# Patient Record
Sex: FEMALE | Race: WHITE | NOT HISPANIC OR LATINO | Employment: OTHER | URBAN - NONMETROPOLITAN AREA
[De-identification: names, ages, dates, MRNs, and addresses within clinical notes are randomized per-mention and may not be internally consistent; named-entity substitution may affect disease eponyms.]

---

## 2017-02-23 ENCOUNTER — APPOINTMENT (OUTPATIENT)
Dept: LAB | Facility: MEDICAL CENTER | Age: 52
End: 2017-02-23
Payer: COMMERCIAL

## 2017-02-23 ENCOUNTER — TRANSCRIBE ORDERS (OUTPATIENT)
Dept: LAB | Facility: MEDICAL CENTER | Age: 52
End: 2017-02-23

## 2017-02-23 DIAGNOSIS — T78.3XXA GIANT URTICARIA, INITIAL ENCOUNTER: Primary | ICD-10-CM

## 2017-02-23 DIAGNOSIS — T78.3XXA GIANT URTICARIA, INITIAL ENCOUNTER: ICD-10-CM

## 2017-02-23 LAB
ALBUMIN SERPL BCP-MCNC: 3.1 G/DL (ref 3.5–5)
ALP SERPL-CCNC: 84 U/L (ref 46–116)
ALT SERPL W P-5'-P-CCNC: 23 U/L (ref 12–78)
ANION GAP SERPL CALCULATED.3IONS-SCNC: 6 MMOL/L (ref 4–13)
AST SERPL W P-5'-P-CCNC: 15 U/L (ref 5–45)
BASOPHILS # BLD AUTO: 0.09 THOUSANDS/ΜL (ref 0–0.1)
BASOPHILS NFR BLD AUTO: 1 % (ref 0–1)
BILIRUB SERPL-MCNC: 0.38 MG/DL (ref 0.2–1)
BUN SERPL-MCNC: 10 MG/DL (ref 5–25)
C4 SERPL-MCNC: 32 MG/DL (ref 10–40)
CALCIUM SERPL-MCNC: 8.9 MG/DL (ref 8.3–10.1)
CHLORIDE SERPL-SCNC: 108 MMOL/L (ref 100–108)
CO2 SERPL-SCNC: 28 MMOL/L (ref 21–32)
CREAT SERPL-MCNC: 1.04 MG/DL (ref 0.6–1.3)
EOSINOPHIL # BLD AUTO: 0.6 THOUSAND/ΜL (ref 0–0.61)
EOSINOPHIL NFR BLD AUTO: 8 % (ref 0–6)
ERYTHROCYTE [DISTWIDTH] IN BLOOD BY AUTOMATED COUNT: 13.6 % (ref 11.6–15.1)
GFR SERPL CREATININE-BSD FRML MDRD: 55.9 ML/MIN/1.73SQ M
GLUCOSE SERPL-MCNC: 83 MG/DL (ref 65–140)
HCT VFR BLD AUTO: 42.5 % (ref 34.8–46.1)
HGB BLD-MCNC: 13.6 G/DL (ref 11.5–15.4)
LYMPHOCYTES # BLD AUTO: 1.88 THOUSANDS/ΜL (ref 0.6–4.47)
LYMPHOCYTES NFR BLD AUTO: 26 % (ref 14–44)
MCH RBC QN AUTO: 30.4 PG (ref 26.8–34.3)
MCHC RBC AUTO-ENTMCNC: 32 G/DL (ref 31.4–37.4)
MCV RBC AUTO: 95 FL (ref 82–98)
MONOCYTES # BLD AUTO: 0.78 THOUSAND/ΜL (ref 0.17–1.22)
MONOCYTES NFR BLD AUTO: 11 % (ref 4–12)
NEUTROPHILS # BLD AUTO: 3.98 THOUSANDS/ΜL (ref 1.85–7.62)
NEUTS SEG NFR BLD AUTO: 54 % (ref 43–75)
NRBC BLD AUTO-RTO: 0 /100 WBCS
PLATELET # BLD AUTO: 347 THOUSANDS/UL (ref 149–390)
PMV BLD AUTO: 11.1 FL (ref 8.9–12.7)
POTASSIUM SERPL-SCNC: 3.8 MMOL/L (ref 3.5–5.3)
PROT SERPL-MCNC: 6.9 G/DL (ref 6.4–8.2)
RBC # BLD AUTO: 4.47 MILLION/UL (ref 3.81–5.12)
SODIUM SERPL-SCNC: 142 MMOL/L (ref 136–145)
WBC # BLD AUTO: 7.35 THOUSAND/UL (ref 4.31–10.16)

## 2017-02-23 PROCEDURE — 85025 COMPLETE CBC W/AUTO DIFF WBC: CPT

## 2017-02-23 PROCEDURE — 86162 COMPLEMENT TOTAL (CH50): CPT

## 2017-02-23 PROCEDURE — 36415 COLL VENOUS BLD VENIPUNCTURE: CPT

## 2017-02-23 PROCEDURE — 80053 COMPREHEN METABOLIC PANEL: CPT

## 2017-02-23 PROCEDURE — 86161 COMPLEMENT/FUNCTION ACTIVITY: CPT

## 2017-02-23 PROCEDURE — 86160 COMPLEMENT ANTIGEN: CPT

## 2017-02-24 LAB — CH50 SERPL-ACNC: 62 U/ML (ref 42–60)

## 2017-02-27 LAB
C1INH ACT/NOR SERPL: 89 %MEAN NORMAL
C1INH SERPL-MCNC: 33 MG/DL (ref 21–39)

## 2017-03-09 ENCOUNTER — TRANSCRIBE ORDERS (OUTPATIENT)
Dept: LAB | Facility: MEDICAL CENTER | Age: 52
End: 2017-03-09

## 2017-03-09 ENCOUNTER — APPOINTMENT (OUTPATIENT)
Dept: LAB | Facility: MEDICAL CENTER | Age: 52
End: 2017-03-09
Payer: COMMERCIAL

## 2017-03-09 DIAGNOSIS — T78.3XXA GIANT URTICARIA, INITIAL ENCOUNTER: Primary | ICD-10-CM

## 2017-03-09 DIAGNOSIS — T78.3XXA GIANT URTICARIA, INITIAL ENCOUNTER: ICD-10-CM

## 2017-03-09 PROCEDURE — 36415 COLL VENOUS BLD VENIPUNCTURE: CPT

## 2017-03-09 PROCEDURE — 86003 ALLG SPEC IGE CRUDE XTRC EA: CPT

## 2017-04-07 ENCOUNTER — HOSPITAL ENCOUNTER (EMERGENCY)
Facility: HOSPITAL | Age: 52
Discharge: HOME | End: 2017-04-08
Attending: EMERGENCY MEDICINE | Admitting: EMERGENCY MEDICINE
Payer: COMMERCIAL

## 2017-04-07 VITALS
HEIGHT: 62 IN | BODY MASS INDEX: 53.73 KG/M2 | TEMPERATURE: 97.6 F | RESPIRATION RATE: 20 BRPM | SYSTOLIC BLOOD PRESSURE: 140 MMHG | HEART RATE: 71 BPM | DIASTOLIC BLOOD PRESSURE: 93 MMHG | OXYGEN SATURATION: 95 % | WEIGHT: 292 LBS
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
## 2017-04-07 DIAGNOSIS — S30.861A TICK BITE OF ABDOMINAL WALL, INITIAL ENCOUNTER: Primary | ICD-10-CM

## 2017-04-07 RX ORDER — GINSENG 100 MG
1 CAPSULE ORAL ONCE
Status: COMPLETED | OUTPATIENT
Start: 2017-04-08 | End: 2017-04-07

## 2017-04-07 RX ORDER — GINSENG 100 MG
CAPSULE ORAL
Status: DISCONTINUED
Start: 2017-04-07 | End: 2017-04-08 | Stop reason: HOSPADM

## 2017-04-07 RX ADMIN — BACITRACIN 1 SMALL APPLICATION: 500 OINTMENT TOPICAL at 23:59

## 2017-04-08 PROBLEM — S30.861A TICK BITE OF ABDOMINAL WALL: Status: ACTIVE | Noted: 2017-04-08
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
## 2017-04-08 PROCEDURE — 99282 EMERGENCY DEPT VISIT SF MDM: CPT

## 2017-04-08 RX ORDER — DOXYCYCLINE HYCLATE 100 MG/1
100 CAPSULE ORAL 2 TIMES DAILY
Qty: 28 CAPSULE | Refills: 0 | Status: SHIPPED | OUTPATIENT
Start: 2017-04-08 | End: 2017-04-22

## 2017-05-18 ENCOUNTER — TRANSCRIBE ORDERS (OUTPATIENT)
Dept: ADMINISTRATIVE | Facility: HOSPITAL | Age: 52
End: 2017-05-18

## 2017-05-18 DIAGNOSIS — I80.9 SUPERFICIAL PHLEBITIS: Primary | ICD-10-CM

## 2017-05-19 ENCOUNTER — APPOINTMENT (OUTPATIENT)
Dept: LAB | Facility: HOSPITAL | Age: 52
End: 2017-05-19
Payer: COMMERCIAL

## 2017-05-19 ENCOUNTER — HOSPITAL ENCOUNTER (OUTPATIENT)
Dept: ULTRASOUND IMAGING | Facility: HOSPITAL | Age: 52
Discharge: HOME | End: 2017-05-19
Payer: COMMERCIAL

## 2017-05-19 DIAGNOSIS — I80.9 SUPERFICIAL PHLEBITIS: ICD-10-CM

## 2017-05-19 LAB — DEPRECATED D DIMER PPP: 2983 NG/ML (FEU) (ref 0–424)

## 2017-05-19 PROCEDURE — 36415 COLL VENOUS BLD VENIPUNCTURE: CPT

## 2017-05-19 PROCEDURE — 93971 EXTREMITY STUDY: CPT

## 2017-05-19 PROCEDURE — 85379 FIBRIN DEGRADATION QUANT: CPT

## 2017-05-24 ENCOUNTER — APPOINTMENT (EMERGENCY)
Dept: CT IMAGING | Facility: HOSPITAL | Age: 52
End: 2017-05-24
Payer: COMMERCIAL

## 2017-05-24 ENCOUNTER — HOSPITAL ENCOUNTER (EMERGENCY)
Facility: HOSPITAL | Age: 52
End: 2017-05-25
Attending: EMERGENCY MEDICINE
Payer: COMMERCIAL

## 2017-05-24 DIAGNOSIS — R77.8 TROPONIN LEVEL ELEVATED: ICD-10-CM

## 2017-05-24 DIAGNOSIS — R79.89 ELEVATED LACTIC ACID LEVEL: ICD-10-CM

## 2017-05-24 DIAGNOSIS — I26.99 PULMONARY EMBOLISM (HCC): Primary | ICD-10-CM

## 2017-05-24 LAB
ALBUMIN SERPL BCP-MCNC: 3.2 G/DL (ref 3.5–5)
ALP SERPL-CCNC: 98 U/L (ref 46–116)
ALT SERPL W P-5'-P-CCNC: 23 U/L (ref 12–78)
ANION GAP SERPL CALCULATED.3IONS-SCNC: 7 MMOL/L (ref 4–13)
AST SERPL W P-5'-P-CCNC: 19 U/L (ref 5–45)
BASOPHILS # BLD AUTO: 0.06 THOUSANDS/ΜL (ref 0–0.1)
BASOPHILS NFR BLD AUTO: 0 % (ref 0–1)
BILIRUB DIRECT SERPL-MCNC: 0.1 MG/DL (ref 0–0.2)
BILIRUB SERPL-MCNC: 0.5 MG/DL (ref 0.2–1)
BUN SERPL-MCNC: 15 MG/DL (ref 5–25)
CALCIUM SERPL-MCNC: 9.4 MG/DL (ref 8.3–10.1)
CHLORIDE SERPL-SCNC: 101 MMOL/L (ref 100–108)
CO2 SERPL-SCNC: 23 MMOL/L (ref 21–32)
CREAT SERPL-MCNC: 1.47 MG/DL (ref 0.6–1.3)
EOSINOPHIL # BLD AUTO: 0.05 THOUSAND/ΜL (ref 0–0.61)
EOSINOPHIL NFR BLD AUTO: 0 % (ref 0–6)
ERYTHROCYTE [DISTWIDTH] IN BLOOD BY AUTOMATED COUNT: 14 % (ref 11.6–15.1)
GFR SERPL CREATININE-BSD FRML MDRD: 37.3 ML/MIN/1.73SQ M
GLUCOSE SERPL-MCNC: 182 MG/DL (ref 65–140)
HCT VFR BLD AUTO: 48.2 % (ref 34.8–46.1)
HGB BLD-MCNC: 15.3 G/DL (ref 11.5–15.4)
INR PPP: 1.41 (ref 0.86–1.16)
LACTATE SERPL-SCNC: 3.8 MMOL/L (ref 0.5–2)
LIPASE SERPL-CCNC: 78 U/L (ref 73–393)
LYMPHOCYTES # BLD AUTO: 1.67 THOUSANDS/ΜL (ref 0.6–4.47)
LYMPHOCYTES NFR BLD AUTO: 12 % (ref 14–44)
MAGNESIUM SERPL-MCNC: 2.1 MG/DL (ref 1.6–2.6)
MCH RBC QN AUTO: 29.9 PG (ref 26.8–34.3)
MCHC RBC AUTO-ENTMCNC: 31.7 G/DL (ref 31.4–37.4)
MCV RBC AUTO: 94 FL (ref 82–98)
MONOCYTES # BLD AUTO: 0.7 THOUSAND/ΜL (ref 0.17–1.22)
MONOCYTES NFR BLD AUTO: 5 % (ref 4–12)
NEUTROPHILS # BLD AUTO: 11.38 THOUSANDS/ΜL (ref 1.85–7.62)
NEUTS SEG NFR BLD AUTO: 83 % (ref 43–75)
NT-PROBNP SERPL-MCNC: ABNORMAL PG/ML
PLATELET # BLD AUTO: 262 THOUSANDS/UL (ref 149–390)
PMV BLD AUTO: 10.9 FL (ref 8.9–12.7)
POTASSIUM SERPL-SCNC: 4.2 MMOL/L (ref 3.5–5.3)
PROT SERPL-MCNC: 7.4 G/DL (ref 6.4–8.2)
PROTHROMBIN TIME: 17.2 SECONDS (ref 12.1–14.4)
RBC # BLD AUTO: 5.12 MILLION/UL (ref 3.81–5.12)
SODIUM SERPL-SCNC: 131 MMOL/L (ref 136–145)
TROPONIN I SERPL-MCNC: 0.35 NG/ML
WBC # BLD AUTO: 13.86 THOUSAND/UL (ref 4.31–10.16)

## 2017-05-24 PROCEDURE — 85730 THROMBOPLASTIN TIME PARTIAL: CPT | Performed by: EMERGENCY MEDICINE

## 2017-05-24 PROCEDURE — 93005 ELECTROCARDIOGRAM TRACING: CPT | Performed by: EMERGENCY MEDICINE

## 2017-05-24 PROCEDURE — 71275 CT ANGIOGRAPHY CHEST: CPT

## 2017-05-24 PROCEDURE — 96365 THER/PROPH/DIAG IV INF INIT: CPT

## 2017-05-24 PROCEDURE — 83735 ASSAY OF MAGNESIUM: CPT | Performed by: EMERGENCY MEDICINE

## 2017-05-24 PROCEDURE — 85025 COMPLETE CBC W/AUTO DIFF WBC: CPT | Performed by: EMERGENCY MEDICINE

## 2017-05-24 PROCEDURE — 96375 TX/PRO/DX INJ NEW DRUG ADDON: CPT

## 2017-05-24 PROCEDURE — 36415 COLL VENOUS BLD VENIPUNCTURE: CPT | Performed by: EMERGENCY MEDICINE

## 2017-05-24 PROCEDURE — 85610 PROTHROMBIN TIME: CPT | Performed by: EMERGENCY MEDICINE

## 2017-05-24 PROCEDURE — 80076 HEPATIC FUNCTION PANEL: CPT | Performed by: EMERGENCY MEDICINE

## 2017-05-24 PROCEDURE — 70450 CT HEAD/BRAIN W/O DYE: CPT

## 2017-05-24 PROCEDURE — 74177 CT ABD & PELVIS W/CONTRAST: CPT

## 2017-05-24 PROCEDURE — 80048 BASIC METABOLIC PNL TOTAL CA: CPT | Performed by: EMERGENCY MEDICINE

## 2017-05-24 PROCEDURE — 83690 ASSAY OF LIPASE: CPT | Performed by: EMERGENCY MEDICINE

## 2017-05-24 PROCEDURE — 83880 ASSAY OF NATRIURETIC PEPTIDE: CPT | Performed by: EMERGENCY MEDICINE

## 2017-05-24 PROCEDURE — 84484 ASSAY OF TROPONIN QUANT: CPT | Performed by: EMERGENCY MEDICINE

## 2017-05-24 PROCEDURE — 96361 HYDRATE IV INFUSION ADD-ON: CPT

## 2017-05-24 PROCEDURE — 83605 ASSAY OF LACTIC ACID: CPT | Performed by: EMERGENCY MEDICINE

## 2017-05-24 RX ORDER — FENTANYL CITRATE 50 UG/ML
50 INJECTION, SOLUTION INTRAMUSCULAR; INTRAVENOUS ONCE
Status: COMPLETED | OUTPATIENT
Start: 2017-05-24 | End: 2017-05-24

## 2017-05-24 RX ORDER — HEPARIN SODIUM 10000 [USP'U]/100ML
3-30 INJECTION, SOLUTION INTRAVENOUS
Status: DISCONTINUED | OUTPATIENT
Start: 2017-05-24 | End: 2017-05-25 | Stop reason: HOSPADM

## 2017-05-24 RX ORDER — HEPARIN SODIUM 1000 [USP'U]/ML
5000 INJECTION, SOLUTION INTRAVENOUS; SUBCUTANEOUS AS NEEDED
Status: DISCONTINUED | OUTPATIENT
Start: 2017-05-24 | End: 2017-05-25 | Stop reason: HOSPADM

## 2017-05-24 RX ORDER — HEPARIN SODIUM 1000 [USP'U]/ML
10000 INJECTION, SOLUTION INTRAVENOUS; SUBCUTANEOUS ONCE
Status: COMPLETED | OUTPATIENT
Start: 2017-05-24 | End: 2017-05-24

## 2017-05-24 RX ORDER — CEFEPIME HYDROCHLORIDE 2 G/1
2000 INJECTION, POWDER, FOR SOLUTION INTRAVENOUS ONCE
Status: DISCONTINUED | OUTPATIENT
Start: 2017-05-24 | End: 2017-05-24

## 2017-05-24 RX ORDER — ASPIRIN 81 MG/1
324 TABLET, CHEWABLE ORAL ONCE
Status: COMPLETED | OUTPATIENT
Start: 2017-05-24 | End: 2017-05-24

## 2017-05-24 RX ORDER — HEPARIN SODIUM 1000 [USP'U]/ML
10000 INJECTION, SOLUTION INTRAVENOUS; SUBCUTANEOUS AS NEEDED
Status: DISCONTINUED | OUTPATIENT
Start: 2017-05-24 | End: 2017-05-25 | Stop reason: HOSPADM

## 2017-05-24 RX ADMIN — FENTANYL CITRATE 50 MCG: 50 INJECTION INTRAMUSCULAR; INTRAVENOUS at 21:45

## 2017-05-24 RX ADMIN — HEPARIN SODIUM 10000 UNITS: 1000 INJECTION, SOLUTION INTRAVENOUS; SUBCUTANEOUS at 23:02

## 2017-05-24 RX ADMIN — ASPIRIN 81 MG 324 MG: 81 TABLET ORAL at 21:43

## 2017-05-24 RX ADMIN — IODIXANOL 80 ML: 320 INJECTION, SOLUTION INTRAVASCULAR at 22:45

## 2017-05-24 RX ADMIN — SODIUM CHLORIDE 1000 ML: 0.9 INJECTION, SOLUTION INTRAVENOUS at 21:48

## 2017-05-24 RX ADMIN — HEPARIN SODIUM AND DEXTROSE 18 UNITS/KG/HR: 10000; 5 INJECTION INTRAVENOUS at 23:25

## 2017-05-25 ENCOUNTER — HOSPITAL ENCOUNTER (INPATIENT)
Facility: HOSPITAL | Age: 52
LOS: 4 days | Discharge: HOME | DRG: 951 | End: 2017-05-29
Attending: INTERNAL MEDICINE | Admitting: INTERNAL MEDICINE
Payer: COMMERCIAL

## 2017-05-25 ENCOUNTER — GENERIC CONVERSION - ENCOUNTER (OUTPATIENT)
Dept: OTHER | Facility: OTHER | Age: 52
End: 2017-05-25

## 2017-05-25 ENCOUNTER — APPOINTMENT (INPATIENT)
Dept: NON INVASIVE DIAGNOSTICS | Facility: HOSPITAL | Age: 52
DRG: 951 | End: 2017-05-25
Payer: COMMERCIAL

## 2017-05-25 ENCOUNTER — ANESTHESIA EVENT (INPATIENT)
Dept: RADIOLOGY | Facility: HOSPITAL | Age: 52
DRG: 951 | End: 2017-05-25
Payer: COMMERCIAL

## 2017-05-25 ENCOUNTER — APPOINTMENT (INPATIENT)
Dept: RADIOLOGY | Facility: HOSPITAL | Age: 52
DRG: 951 | End: 2017-05-25
Attending: INTERNAL MEDICINE
Payer: COMMERCIAL

## 2017-05-25 ENCOUNTER — APPOINTMENT (INPATIENT)
Dept: RADIOLOGY | Facility: HOSPITAL | Age: 52
DRG: 951 | End: 2017-05-25
Payer: COMMERCIAL

## 2017-05-25 ENCOUNTER — ANESTHESIA (INPATIENT)
Dept: RADIOLOGY | Facility: HOSPITAL | Age: 52
DRG: 951 | End: 2017-05-25
Payer: COMMERCIAL

## 2017-05-25 VITALS
OXYGEN SATURATION: 94 % | RESPIRATION RATE: 18 BRPM | DIASTOLIC BLOOD PRESSURE: 88 MMHG | HEART RATE: 114 BPM | TEMPERATURE: 97.6 F | BODY MASS INDEX: 52.77 KG/M2 | SYSTOLIC BLOOD PRESSURE: 143 MMHG | WEIGHT: 288.5 LBS

## 2017-05-25 DIAGNOSIS — N17.9 AKI (ACUTE KIDNEY INJURY) (HCC): ICD-10-CM

## 2017-05-25 DIAGNOSIS — I26.99 PULMONARY EMBOLISM AND INFARCTION (HCC): ICD-10-CM

## 2017-05-25 DIAGNOSIS — I26.92 ACUTE SADDLE PULMONARY EMBOLISM (HCC): Primary | ICD-10-CM

## 2017-05-25 PROBLEM — I95.9 HYPOTENSION: Status: ACTIVE | Noted: 2017-05-25

## 2017-05-25 PROBLEM — E87.2 LACTIC ACIDOSIS: Status: ACTIVE | Noted: 2017-05-25

## 2017-05-25 PROBLEM — R55 SYNCOPE: Status: ACTIVE | Noted: 2017-05-25

## 2017-05-25 PROBLEM — E87.2 LACTIC ACIDOSIS: Status: RESOLVED | Noted: 2017-05-25 | Resolved: 2017-05-25

## 2017-05-25 PROBLEM — S30.861A TICK BITE OF ABDOMINAL WALL: Status: RESOLVED | Noted: 2017-04-08 | Resolved: 2017-05-25

## 2017-05-25 PROBLEM — E87.1 HYPONATREMIA: Status: RESOLVED | Noted: 2017-05-25 | Resolved: 2017-05-25

## 2017-05-25 PROBLEM — R79.89 ELEVATED TROPONIN: Status: ACTIVE | Noted: 2017-05-25
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
## 2017-05-25 PROBLEM — K21.9 GERD (GASTROESOPHAGEAL REFLUX DISEASE): Status: ACTIVE | Noted: 2017-05-25

## 2017-05-25 PROBLEM — E87.20 LACTIC ACIDOSIS: Status: RESOLVED | Noted: 2017-05-25 | Resolved: 2017-05-25

## 2017-05-25 PROBLEM — R77.8 ELEVATED TROPONIN: Status: ACTIVE | Noted: 2017-05-25

## 2017-05-25 PROBLEM — E87.1 HYPONATREMIA: Status: ACTIVE | Noted: 2017-05-25

## 2017-05-25 PROBLEM — I21.4 NSTEMI (NON-ST ELEVATED MYOCARDIAL INFARCTION) (HCC): Status: ACTIVE | Noted: 2017-05-25

## 2017-05-25 PROBLEM — E87.20 LACTIC ACIDOSIS: Status: ACTIVE | Noted: 2017-05-25

## 2017-05-25 LAB
ANION GAP SERPL CALCULATED.3IONS-SCNC: 9 MMOL/L (ref 4–13)
APTT PPP: 45 SECONDS (ref 23–35)
APTT PPP: 65 SECONDS (ref 23–35)
APTT PPP: >210 SECONDS (ref 23–35)
APTT PPP: >210 SECONDS (ref 23–35)
ATRIAL RATE: 115 BPM
BUN SERPL-MCNC: 12 MG/DL (ref 5–25)
CA-I BLD-SCNC: 1.11 MMOL/L (ref 1.12–1.32)
CALCIUM SERPL-MCNC: 8.3 MG/DL (ref 8.3–10.1)
CHLORIDE SERPL-SCNC: 111 MMOL/L (ref 100–108)
CO2 SERPL-SCNC: 22 MMOL/L (ref 21–32)
CREAT SERPL-MCNC: 1.09 MG/DL (ref 0.6–1.3)
ERYTHROCYTE [DISTWIDTH] IN BLOOD BY AUTOMATED COUNT: 14 % (ref 11.6–15.1)
ERYTHROCYTE [DISTWIDTH] IN BLOOD BY AUTOMATED COUNT: 14.1 % (ref 11.6–15.1)
ERYTHROCYTE [DISTWIDTH] IN BLOOD BY AUTOMATED COUNT: 14.1 % (ref 11.6–15.1)
FIBRINOGEN PPP-MCNC: 303 MG/DL (ref 227–495)
GFR SERPL CREATININE-BSD FRML MDRD: 52.7 ML/MIN/1.73SQ M
GLUCOSE SERPL-MCNC: 124 MG/DL (ref 65–140)
HCT VFR BLD AUTO: 41.2 % (ref 34.8–46.1)
HCT VFR BLD AUTO: 42.3 % (ref 34.8–46.1)
HCT VFR BLD AUTO: 44.3 % (ref 34.8–46.1)
HGB BLD-MCNC: 13.5 G/DL (ref 11.5–15.4)
HGB BLD-MCNC: 13.6 G/DL (ref 11.5–15.4)
HGB BLD-MCNC: 14.4 G/DL (ref 11.5–15.4)
INR PPP: 1.5 (ref 0.86–1.16)
INR PPP: 1.56 (ref 0.86–1.16)
LACTATE SERPL-SCNC: 1.5 MMOL/L (ref 0.5–2)
LACTATE SERPL-SCNC: 2.8 MMOL/L (ref 0.5–2)
LACTATE SERPL-SCNC: 3.2 MMOL/L (ref 0.5–2)
MAGNESIUM SERPL-MCNC: 2.1 MG/DL (ref 1.6–2.6)
MCH RBC QN AUTO: 29.9 PG (ref 26.8–34.3)
MCH RBC QN AUTO: 30.2 PG (ref 26.8–34.3)
MCH RBC QN AUTO: 30.6 PG (ref 26.8–34.3)
MCHC RBC AUTO-ENTMCNC: 32.2 G/DL (ref 31.4–37.4)
MCHC RBC AUTO-ENTMCNC: 32.5 G/DL (ref 31.4–37.4)
MCHC RBC AUTO-ENTMCNC: 32.8 G/DL (ref 31.4–37.4)
MCV RBC AUTO: 92 FL (ref 82–98)
MCV RBC AUTO: 93 FL (ref 82–98)
MCV RBC AUTO: 94 FL (ref 82–98)
P AXIS: 64 DEGREES
PHOSPHATE SERPL-MCNC: 2.2 MG/DL (ref 2.7–4.5)
PLATELET # BLD AUTO: 183 THOUSANDS/UL (ref 149–390)
PLATELET # BLD AUTO: 197 THOUSANDS/UL (ref 149–390)
PLATELET # BLD AUTO: 268 THOUSANDS/UL (ref 149–390)
PMV BLD AUTO: 10.9 FL (ref 8.9–12.7)
PMV BLD AUTO: 11 FL (ref 8.9–12.7)
PMV BLD AUTO: 11 FL (ref 8.9–12.7)
POTASSIUM SERPL-SCNC: 4.2 MMOL/L (ref 3.5–5.3)
PR INTERVAL: 154 MS
PROTHROMBIN TIME: 18.2 SECONDS (ref 12.1–14.4)
PROTHROMBIN TIME: 18.8 SECONDS (ref 12.1–14.4)
QRS AXIS: 193 DEGREES
QRSD INTERVAL: 92 MS
QT INTERVAL: 344 MS
QTC INTERVAL: 475 MS
RBC # BLD AUTO: 4.47 MILLION/UL (ref 3.81–5.12)
RBC # BLD AUTO: 4.55 MILLION/UL (ref 3.81–5.12)
RBC # BLD AUTO: 4.71 MILLION/UL (ref 3.81–5.12)
SODIUM SERPL-SCNC: 142 MMOL/L (ref 136–145)
T WAVE AXIS: 3 DEGREES
TROPONIN I SERPL-MCNC: 0.5 NG/ML
TROPONIN I SERPL-MCNC: 1.05 NG/ML
TROPONIN I SERPL-MCNC: 1.19 NG/ML
TROPONIN I SERPL-MCNC: 1.72 NG/ML
VENTRICULAR RATE: 115 BPM
WBC # BLD AUTO: 12.96 THOUSAND/UL (ref 4.31–10.16)
WBC # BLD AUTO: 14.24 THOUSAND/UL (ref 4.31–10.16)
WBC # BLD AUTO: 15.37 THOUSAND/UL (ref 4.31–10.16)

## 2017-05-25 PROCEDURE — C1894 INTRO/SHEATH, NON-LASER: HCPCS

## 2017-05-25 PROCEDURE — 99153 MOD SED SAME PHYS/QHP EA: CPT

## 2017-05-25 PROCEDURE — 93308 TTE F-UP OR LMTD: CPT

## 2017-05-25 PROCEDURE — B5191ZZ FLUOROSCOPY OF INFERIOR VENA CAVA USING LOW OSMOLAR CONTRAST: ICD-10-PCS | Performed by: RADIOLOGY

## 2017-05-25 PROCEDURE — 85027 COMPLETE CBC AUTOMATED: CPT | Performed by: RADIOLOGY

## 2017-05-25 PROCEDURE — C1880 VENA CAVA FILTER: HCPCS

## 2017-05-25 PROCEDURE — 3E04317 INTRODUCTION OF OTHER THROMBOLYTIC INTO CENTRAL VEIN, PERCUTANEOUS APPROACH: ICD-10-PCS | Performed by: RADIOLOGY

## 2017-05-25 PROCEDURE — C1751 CATH, INF, PER/CENT/MIDLINE: HCPCS

## 2017-05-25 PROCEDURE — 76937 US GUIDE VASCULAR ACCESS: CPT

## 2017-05-25 PROCEDURE — 83605 ASSAY OF LACTIC ACID: CPT | Performed by: NURSE PRACTITIONER

## 2017-05-25 PROCEDURE — 99152 MOD SED SAME PHYS/QHP 5/>YRS: CPT

## 2017-05-25 PROCEDURE — 85730 THROMBOPLASTIN TIME PARTIAL: CPT | Performed by: RADIOLOGY

## 2017-05-25 PROCEDURE — 94760 N-INVAS EAR/PLS OXIMETRY 1: CPT

## 2017-05-25 PROCEDURE — 80048 BASIC METABOLIC PNL TOTAL CA: CPT | Performed by: NURSE PRACTITIONER

## 2017-05-25 PROCEDURE — 85730 THROMBOPLASTIN TIME PARTIAL: CPT | Performed by: NURSE PRACTITIONER

## 2017-05-25 PROCEDURE — 06H03DZ INSERTION OF INTRALUMINAL DEVICE INTO INFERIOR VENA CAVA, PERCUTANEOUS APPROACH: ICD-10-PCS | Performed by: RADIOLOGY

## 2017-05-25 PROCEDURE — 83735 ASSAY OF MAGNESIUM: CPT | Performed by: NURSE PRACTITIONER

## 2017-05-25 PROCEDURE — C1769 GUIDE WIRE: HCPCS

## 2017-05-25 PROCEDURE — 75743 ARTERY X-RAYS LUNGS: CPT

## 2017-05-25 PROCEDURE — 37191 INS ENDOVAS VENA CAVA FILTR: CPT

## 2017-05-25 PROCEDURE — 99285 EMERGENCY DEPT VISIT HI MDM: CPT

## 2017-05-25 PROCEDURE — 85610 PROTHROMBIN TIME: CPT | Performed by: NURSE PRACTITIONER

## 2017-05-25 PROCEDURE — 36015 PLACE CATHETER IN ARTERY: CPT

## 2017-05-25 PROCEDURE — 94640 AIRWAY INHALATION TREATMENT: CPT

## 2017-05-25 PROCEDURE — 03HY32Z INSERTION OF MONITORING DEVICE INTO UPPER ARTERY, PERCUTANEOUS APPROACH: ICD-10-PCS | Performed by: HOSPITALIST

## 2017-05-25 PROCEDURE — 85347 COAGULATION TIME ACTIVATED: CPT

## 2017-05-25 PROCEDURE — 82330 ASSAY OF CALCIUM: CPT | Performed by: NURSE PRACTITIONER

## 2017-05-25 PROCEDURE — 93005 ELECTROCARDIOGRAM TRACING: CPT | Performed by: NURSE PRACTITIONER

## 2017-05-25 PROCEDURE — 85384 FIBRINOGEN ACTIVITY: CPT | Performed by: RADIOLOGY

## 2017-05-25 PROCEDURE — 85027 COMPLETE CBC AUTOMATED: CPT | Performed by: NURSE PRACTITIONER

## 2017-05-25 PROCEDURE — 84484 ASSAY OF TROPONIN QUANT: CPT | Performed by: INTERNAL MEDICINE

## 2017-05-25 PROCEDURE — 84100 ASSAY OF PHOSPHORUS: CPT | Performed by: NURSE PRACTITIONER

## 2017-05-25 PROCEDURE — B31TZZZ FLUOROSCOPY OF LEFT PULMONARY ARTERY: ICD-10-PCS | Performed by: RADIOLOGY

## 2017-05-25 PROCEDURE — 37211 THROMBOLYTIC ART THERAPY: CPT

## 2017-05-25 PROCEDURE — 84484 ASSAY OF TROPONIN QUANT: CPT | Performed by: NURSE PRACTITIONER

## 2017-05-25 PROCEDURE — 85610 PROTHROMBIN TIME: CPT | Performed by: RADIOLOGY

## 2017-05-25 PROCEDURE — 93970 EXTREMITY STUDY: CPT

## 2017-05-25 RX ORDER — FENTANYL CITRATE 50 UG/ML
25 INJECTION, SOLUTION INTRAMUSCULAR; INTRAVENOUS EVERY 2 HOUR PRN
Status: DISCONTINUED | OUTPATIENT
Start: 2017-05-25 | End: 2017-05-29 | Stop reason: HOSPADM

## 2017-05-25 RX ORDER — HEPARIN SODIUM 1000 [USP'U]/ML
10000 INJECTION, SOLUTION INTRAVENOUS; SUBCUTANEOUS ONCE
Status: COMPLETED | OUTPATIENT
Start: 2017-05-25 | End: 2017-05-25

## 2017-05-25 RX ORDER — FENTANYL CITRATE 50 UG/ML
INJECTION, SOLUTION INTRAMUSCULAR; INTRAVENOUS
Status: COMPLETED
Start: 2017-05-25 | End: 2017-05-25

## 2017-05-25 RX ORDER — FENTANYL CITRATE 50 UG/ML
INJECTION, SOLUTION INTRAMUSCULAR; INTRAVENOUS CODE/TRAUMA/SEDATION MEDICATION
Status: COMPLETED | OUTPATIENT
Start: 2017-05-25 | End: 2017-05-25

## 2017-05-25 RX ORDER — SODIUM CHLORIDE 9 MG/ML
100 INJECTION, SOLUTION INTRAVENOUS CONTINUOUS
Status: DISCONTINUED | OUTPATIENT
Start: 2017-05-25 | End: 2017-05-26

## 2017-05-25 RX ORDER — HEPARIN SODIUM 10000 [USP'U]/100ML
500 INJECTION, SOLUTION INTRAVENOUS CONTINUOUS
Status: DISCONTINUED | OUTPATIENT
Start: 2017-05-25 | End: 2017-05-25

## 2017-05-25 RX ORDER — SODIUM CHLORIDE FOR INHALATION 0.9 %
3 VIAL, NEBULIZER (ML) INHALATION
Status: DISCONTINUED | OUTPATIENT
Start: 2017-05-25 | End: 2017-05-28

## 2017-05-25 RX ORDER — HEPARIN SODIUM 10000 [USP'U]/100ML
3-30 INJECTION, SOLUTION INTRAVENOUS
Status: DISCONTINUED | OUTPATIENT
Start: 2017-05-25 | End: 2017-05-26

## 2017-05-25 RX ORDER — PANTOPRAZOLE SODIUM 40 MG/1
40 TABLET, DELAYED RELEASE ORAL
Status: DISCONTINUED | OUTPATIENT
Start: 2017-05-25 | End: 2017-05-29 | Stop reason: HOSPADM

## 2017-05-25 RX ORDER — ALBUTEROL SULFATE 90 UG/1
2 AEROSOL, METERED RESPIRATORY (INHALATION) EVERY 4 HOURS PRN
Status: DISCONTINUED | OUTPATIENT
Start: 2017-05-25 | End: 2017-05-25

## 2017-05-25 RX ORDER — HEPARIN SODIUM 1000 [USP'U]/ML
5000 INJECTION, SOLUTION INTRAVENOUS; SUBCUTANEOUS AS NEEDED
Status: DISCONTINUED | OUTPATIENT
Start: 2017-05-25 | End: 2017-05-26

## 2017-05-25 RX ORDER — ONDANSETRON 2 MG/ML
4 INJECTION INTRAMUSCULAR; INTRAVENOUS EVERY 6 HOURS PRN
Status: DISCONTINUED | OUTPATIENT
Start: 2017-05-25 | End: 2017-05-29 | Stop reason: HOSPADM

## 2017-05-25 RX ORDER — METHYLPREDNISOLONE SODIUM SUCCINATE 40 MG/ML
40 INJECTION, POWDER, LYOPHILIZED, FOR SOLUTION INTRAMUSCULAR; INTRAVENOUS EVERY 12 HOURS SCHEDULED
Status: DISCONTINUED | OUTPATIENT
Start: 2017-05-25 | End: 2017-05-26

## 2017-05-25 RX ORDER — LEVALBUTEROL 1.25 MG/.5ML
SOLUTION, CONCENTRATE RESPIRATORY (INHALATION)
Status: DISPENSED
Start: 2017-05-25 | End: 2017-05-25

## 2017-05-25 RX ORDER — MIDAZOLAM HYDROCHLORIDE 1 MG/ML
INJECTION INTRAMUSCULAR; INTRAVENOUS CODE/TRAUMA/SEDATION MEDICATION
Status: COMPLETED | OUTPATIENT
Start: 2017-05-25 | End: 2017-05-25

## 2017-05-25 RX ORDER — LIDOCAINE HYDROCHLORIDE AND EPINEPHRINE 10; 10 MG/ML; UG/ML
5 INJECTION, SOLUTION INFILTRATION; PERINEURAL ONCE
Status: DISCONTINUED | OUTPATIENT
Start: 2017-05-25 | End: 2017-05-25

## 2017-05-25 RX ORDER — MIDAZOLAM HYDROCHLORIDE 1 MG/ML
INJECTION INTRAMUSCULAR; INTRAVENOUS AS NEEDED
Status: DISCONTINUED | OUTPATIENT
Start: 2017-05-25 | End: 2017-05-25 | Stop reason: SURG

## 2017-05-25 RX ORDER — LIDOCAINE HYDROCHLORIDE 10 MG/ML
5 INJECTION, SOLUTION EPIDURAL; INFILTRATION; INTRACAUDAL; PERINEURAL ONCE
Status: COMPLETED | OUTPATIENT
Start: 2017-05-25 | End: 2017-05-25

## 2017-05-25 RX ORDER — LEVALBUTEROL 1.25 MG/.5ML
1.25 SOLUTION, CONCENTRATE RESPIRATORY (INHALATION) EVERY 8 HOURS PRN
Status: DISCONTINUED | OUTPATIENT
Start: 2017-05-25 | End: 2017-05-25

## 2017-05-25 RX ORDER — HEPARIN SODIUM 1000 [USP'U]/ML
10000 INJECTION, SOLUTION INTRAVENOUS; SUBCUTANEOUS AS NEEDED
Status: DISCONTINUED | OUTPATIENT
Start: 2017-05-25 | End: 2017-05-26

## 2017-05-25 RX ORDER — LIDOCAINE HYDROCHLORIDE AND EPINEPHRINE BITARTRATE 20; .01 MG/ML; MG/ML
10 INJECTION, SOLUTION SUBCUTANEOUS ONCE
Status: COMPLETED | OUTPATIENT
Start: 2017-05-25 | End: 2017-05-25

## 2017-05-25 RX ORDER — SODIUM CHLORIDE FOR INHALATION 0.9 %
VIAL, NEBULIZER (ML) INHALATION
Status: COMPLETED
Start: 2017-05-25 | End: 2017-05-25

## 2017-05-25 RX ORDER — LEVALBUTEROL 1.25 MG/.5ML
1.25 SOLUTION, CONCENTRATE RESPIRATORY (INHALATION)
Status: DISCONTINUED | OUTPATIENT
Start: 2017-05-25 | End: 2017-05-25

## 2017-05-25 RX ORDER — LEVALBUTEROL 1.25 MG/.5ML
1.25 SOLUTION, CONCENTRATE RESPIRATORY (INHALATION)
Status: DISCONTINUED | OUTPATIENT
Start: 2017-05-25 | End: 2017-05-28

## 2017-05-25 RX ORDER — LIDOCAINE HYDROCHLORIDE 10 MG/ML
INJECTION, SOLUTION EPIDURAL; INFILTRATION; INTRACAUDAL; PERINEURAL
Status: COMPLETED
Start: 2017-05-25 | End: 2017-05-25

## 2017-05-25 RX ADMIN — METHYLPREDNISOLONE SODIUM SUCCINATE 40 MG: 40 INJECTION, POWDER, FOR SOLUTION INTRAMUSCULAR; INTRAVENOUS at 11:58

## 2017-05-25 RX ADMIN — MIDAZOLAM HYDROCHLORIDE 0.5 MG: 1 INJECTION, SOLUTION INTRAMUSCULAR; INTRAVENOUS at 04:21

## 2017-05-25 RX ADMIN — IOHEXOL 30 ML: 300 INJECTION, SOLUTION INTRAVENOUS at 16:49

## 2017-05-25 RX ADMIN — MIDAZOLAM HYDROCHLORIDE 0.5 MG: 1 INJECTION, SOLUTION INTRAMUSCULAR; INTRAVENOUS at 04:15

## 2017-05-25 RX ADMIN — PANTOPRAZOLE SODIUM 40 MG: 40 TABLET, DELAYED RELEASE ORAL at 06:53

## 2017-05-25 RX ADMIN — HEPARIN SODIUM 20 UNITS/HR: 200 INJECTION, SOLUTION INTRAVENOUS at 05:28

## 2017-05-25 RX ADMIN — ISODIUM CHLORIDE 3 ML: 0.03 SOLUTION RESPIRATORY (INHALATION) at 20:16

## 2017-05-25 RX ADMIN — ISODIUM CHLORIDE 3 ML: 0.03 SOLUTION RESPIRATORY (INHALATION) at 10:56

## 2017-05-25 RX ADMIN — MIDAZOLAM HYDROCHLORIDE 0.5 MG: 1 INJECTION, SOLUTION INTRAMUSCULAR; INTRAVENOUS at 04:19

## 2017-05-25 RX ADMIN — SODIUM CHLORIDE: 0.9 INJECTION, SOLUTION INTRAVENOUS at 03:47

## 2017-05-25 RX ADMIN — SODIUM CHLORIDE 100 ML/HR: 0.9 INJECTION, SOLUTION INTRAVENOUS at 02:18

## 2017-05-25 RX ADMIN — FENTANYL CITRATE 50 MCG: 50 INJECTION, SOLUTION INTRAMUSCULAR; INTRAVENOUS at 15:49

## 2017-05-25 RX ADMIN — LIDOCAINE HYDROCHLORIDE,EPINEPHRINE BITARTRATE 10 ML: 20; .01 INJECTION, SOLUTION INFILTRATION; PERINEURAL at 20:32

## 2017-05-25 RX ADMIN — LIDOCAINE HYDROCHLORIDE 5 ML: 10 INJECTION, SOLUTION EPIDURAL; INFILTRATION; INTRACAUDAL; PERINEURAL at 19:00

## 2017-05-25 RX ADMIN — ALTEPLASE 1 MG/HR: 2.2 INJECTION, POWDER, LYOPHILIZED, FOR SOLUTION INTRAVENOUS at 05:28

## 2017-05-25 RX ADMIN — FENTANYL CITRATE 25 MCG: 50 INJECTION INTRAMUSCULAR; INTRAVENOUS at 18:09

## 2017-05-25 RX ADMIN — SODIUM CHLORIDE 100 ML/HR: 0.9 INJECTION, SOLUTION INTRAVENOUS at 12:02

## 2017-05-25 RX ADMIN — HEPARIN SODIUM 10000 UNITS: 1000 INJECTION, SOLUTION INTRAVENOUS; SUBCUTANEOUS at 17:46

## 2017-05-25 RX ADMIN — METHYLPREDNISOLONE SODIUM SUCCINATE 40 MG: 40 INJECTION, POWDER, FOR SOLUTION INTRAMUSCULAR; INTRAVENOUS at 20:54

## 2017-05-25 RX ADMIN — MIDAZOLAM HYDROCHLORIDE 0.5 MG: 1 INJECTION, SOLUTION INTRAMUSCULAR; INTRAVENOUS at 04:09

## 2017-05-25 RX ADMIN — LEVALBUTEROL HYDROCHLORIDE 1.25 MG: 1.25 SOLUTION, CONCENTRATE RESPIRATORY (INHALATION) at 20:16

## 2017-05-25 RX ADMIN — ALTEPLASE 1 MG/HR: 2.2 INJECTION, POWDER, LYOPHILIZED, FOR SOLUTION INTRAVENOUS at 05:27

## 2017-05-25 RX ADMIN — SODIUM CHLORIDE 100 ML/HR: 0.9 INJECTION, SOLUTION INTRAVENOUS at 19:23

## 2017-05-25 RX ADMIN — HEPARIN SODIUM 18 UNITS/KG/HR: 10000 INJECTION, SOLUTION INTRAVENOUS at 17:00

## 2017-05-25 RX ADMIN — ALTEPLASE 1 MG/HR: 2.2 INJECTION, POWDER, LYOPHILIZED, FOR SOLUTION INTRAVENOUS at 14:35

## 2017-05-25 RX ADMIN — ALTEPLASE 1 MG/HR: 2.2 INJECTION, POWDER, LYOPHILIZED, FOR SOLUTION INTRAVENOUS at 14:34

## 2017-05-25 RX ADMIN — FENTANYL CITRATE 25 MCG: 50 INJECTION, SOLUTION INTRAMUSCULAR; INTRAVENOUS at 18:09

## 2017-05-25 RX ADMIN — FENTANYL CITRATE 50 MCG: 50 INJECTION, SOLUTION INTRAMUSCULAR; INTRAVENOUS at 16:16

## 2017-05-25 RX ADMIN — THROMBIN, TOPICAL (BOVINE): KIT at 22:14

## 2017-05-25 RX ADMIN — MIDAZOLAM HYDROCHLORIDE 1 MG: 1 INJECTION, SOLUTION INTRAMUSCULAR; INTRAVENOUS at 16:19

## 2017-05-25 RX ADMIN — FENTANYL CITRATE 50 MCG: 50 INJECTION, SOLUTION INTRAMUSCULAR; INTRAVENOUS at 15:44

## 2017-05-25 RX ADMIN — HEPARIN SODIUM AND DEXTROSE 500 UNITS/HR: 10000; 5 INJECTION INTRAVENOUS at 06:25

## 2017-05-25 RX ADMIN — LEVALBUTEROL HYDROCHLORIDE 1.25 MG: 1.25 SOLUTION, CONCENTRATE RESPIRATORY (INHALATION) at 10:55

## 2017-05-25 NOTE — PROGRESS NOTES
Progress Note - Critical Care   Naomi Braun 46 y o  female MRN: [id number]  Unit/Bed#: MICU 06 Encounter: [id number]    Attending Physician: Kishore Santana MD      ______________________________________________________________________  Assessment and Plan:     1- acute submassive pulmonary embolism/saddle embolism  2- recurrent syncope  3- lactic acidosis  4- MILO  5- Asthma without exacerbation  6- morbid obesity  7- elevated troponin/elevated BNP     Plan   Critically ill- admit ICU  Concern with recurrent syncope and evidence of right heart strain with elevated troponin/BNP  SBP presently in 120's but remains tachycardic and tachypneic  Her extremities are cold  Extensive clot burden on CT chest with saddle embolus as well as large volume emboli extending to proximal branch vessels in all lobes of lung  I called IR for possible catheter directed thrombolysis  Will continue iv heparin for now  Insert arterial catheter for hemodynamic monitoring  Will check 2D echo  Gentle IV fluid- caution with RV overload  Will check LE duplex  Insert banuelos for now  bronchodilators                Code Status: Level 1 - Full Code    Counseling / Coordination of Care  Total Critical Care time spent 55 minutes excluding procedures, teaching and family updates  ______________________________________________________________________    Chief Complaint: transfer from [address] [place] [address] with acute PE/recurrent syncope    History of present illness  53yo with multiple medical problems transferred from [address] [place] [address] after she had initially presented with syncope, dizziness, lightheadedness and intermittent diaphoresis  CT chest revealed saddle pulmonary embolus and also emboli extending to proximal branch vessels in all the lobes of her lungs   Lab data with positive troponin, elevated BNP and RV/LV ratio >0 9    ______________________________________________________________________    Physical Exam:     Constitutional: She is oriented to person, place, and time  Lying flat in bed in mild respiratory distress  HENT:   Head: Normocephalic and atraumatic  Eyes: Pupils are equal, round, and reactive to light  Neck: Neck supple  No JVD present  Cardiovascular: Regular rhythm and normal heart sounds  Tachycardia present  Pulmonary/Chest: She has decreased breath sounds in the right lower field  She has no wheezes  She has no rhonchi  Abdominal: She exhibits no distension  There is no tenderness  Neurological: She is alert and oriented to person, place, and time  She has normal strength  No sensory deficit  GCS eye subscore is 4  GCS verbal subscore is 5  GCS motor subscore is 6  Extremities: cold and poorly perfused    ______________________________________________________________________  Vitals:    17 0202 17 0206 17 0241   BP:  129/88    Pulse:  (!) 115 (!) 110   Resp:  22 (!) 24   Temp:  99 °F (37 2 °C)    TempSrc:  Rectal    SpO2:  97% 97%   Weight: 128 kg (281 lb 12 oz) 128 kg (281 lb 12 oz)    Height:  5' 3" (1 6 m)      Arterial Line BP: 156/112  Arterial Line MAP (mmHg): 122 mmHg     Temperature:   Temp (24hrs), Av 3 °F (36 8 °C), Min:97 6 °F (36 4 °C), Max:99 °F (37 2 °C)    Current Temperature: 99 °F (37 2 °C)  Weights:   IBW: 52 4 kg    Body mass index is 49 91 kg/(m^2)  Weight (last 2 days)     Date/Time   Weight    17 0206  128 (281 75)    17 0202  128 (281 75)            Hemodynamic Monitoring:  N/A     Non-Invasive/Invasive Ventilation Settings:  Respiratory    Lab Data (Last 4 hours)    None         O2/Vent Data (Last 4 hours)    None              No results found for: PHART, JMY6PAC, PO2ART, NVA7OEJ, G3KQACJP, BEART, SOURCE  SpO2: SpO2: 97 %  Intake and Outputs:  I/O     None          Nutrition:        Diet Orders            Start     Ordered    17 0159  Diet NPO; Sips of clear liquids  Diet effective now     Question Answer Comment   Diet Type NPO    NPO Except:  Sips of clear liquids    RD to adjust diet per protocol? No        05/25/17 0201        Labs:     Results from last 7 days  Lab Units 05/25/17  0215 05/24/17  2124   WBC Thousand/uL 15 37* 13 86*   HEMOGLOBIN g/dL 14 4 15 3   HEMATOCRIT % 44 3 48 2*   PLATELETS Thousands/uL 268 262   NEUTROS PCT %  --  83*   MONOS PCT %  --  5       Results from last 7 days  Lab Units 05/25/17  0215 05/24/17  2124   SODIUM mmol/L 142 131*   POTASSIUM mmol/L 4 2 4 2   CHLORIDE mmol/L 111* 101   CO2 mmol/L 22 23   BUN mg/dL 12 15   CREATININE mg/dL 1 09 1 47*   CALCIUM mg/dL 8 3 9 4   TOTAL PROTEIN g/dL  --  7 4   BILIRUBIN TOTAL mg/dL  --  0 50   ALK PHOS U/L  --  98   ALT U/L  --  23   AST U/L  --  19   GLUCOSE RANDOM mg/dL 124 182*       Results from last 7 days  Lab Units 05/25/17 0215 05/24/17 2124   MAGNESIUM mg/dL 2 1 2 1     Lab Results   Component Value Date    PHOS 2 2 (L) 05/25/2017    PHOS 2 6 (L) 11/25/2016    PHOS 2 8 11/24/2016        Results from last 7 days  Lab Units 05/24/17  2327   INR  1 41*   PTT seconds >210*       0  Lab Value Date/Time   TROPONINI 0 50 (H) 05/25/2017 0215   TROPONINI 0 35 (HH) 05/24/2017 2124   TROPONINI <0 02 11/24/2016 1142       Results from last 7 days  Lab Units 05/25/17 0214 05/24/17 2328 05/24/17 2124   LACTIC ACID mmol/L 2 8* 3 2* 3 8*     ABG:  Lab Results   Component Value Date    PHART 7 408 11/24/2016    HCD0IPS 34 0 (L) 11/24/2016    PO2ART 103 5 11/24/2016    CNT7KGB 21 0 (L) 11/24/2016    BEART -3 0 11/24/2016    SOURCE Brachial, Right 11/24/2016     Imaging:  I have personally reviewed pertinent films in PACS  EKG: Sinus tachycardia  Micro:  No results found for: Reid Low, WOUNDCULT, SPUTUMCULTUR  Allergies:    Allergies   Allergen Reactions    Penicillins GI Intolerance     Nausea and vomiting     Medications:   Scheduled Meds:  pantoprazole 40 mg Oral Early Morning     Continuous Infusions:  sodium chloride 100 mL/hr Last Rate: 100 mL/hr (05/25/17 0218)     PRN Meds:     VTE Pharmacologic Prophylaxis: IV Heparin  VTE Mechanical Prophylaxis: sequential compression device  Invasive lines and devices: Invasive Devices     Peripheral Intravenous Line            Peripheral IV 05/24/17 Left Antecubital less than 1 day    Peripheral IV 05/24/17 Right Antecubital less than 1 day          Arterial Line            Arterial Line 05/25/17 Radial less than 1 day                     Portions of the record may have been created with voice recognition software  Occasional wrong word or "sound a like" substitutions may have occurred due to the inherent limitations of voice recognition software  Read the chart carefully and recognize, using context, where substitutions have occurred      Sarah Jacobo MD

## 2017-05-25 NOTE — BRIEF OP NOTE (RAD/CATH)
INTERVENTIONAL RADIOLOGY PROCEDURE NOTE    Date: 5/25/2017    Preoperative diagnosis: Submassive PE, left lower extremity DVT    Postoperative diagnosis: Same    Procedure: Follow-up pulmonary arteriogram, pressure measurements, IVC gram and filter placement    Surgeon: Joseline Mcginnis MD     Assistant: None  No qualified resident was available  Blood loss: 2 ML    Specimens: None    Findings: Significant improvement in pulmonary flow noted  Pulmonary pressure was reduced from 82/47(60) mm Hg to 31/9(18) mm Hg  lysis was discontinued  Normal IVC noted  ALN optional filter was placed   Patient should be reevaluated in 3 months for possible filter removal     Complications: None immediate    Anesthesia: IV conscious sedation

## 2017-05-25 NOTE — PLAN OF CARE
CARDIOVASCULAR - ADULT     Maintains optimal cardiac output and hemodynamic stability Progressing        DISCHARGE PLANNING     Discharge to home or other facility with appropriate resources Progressing        GASTROINTESTINAL - ADULT     Maintains or returns to baseline bowel function Progressing        GENITOURINARY - ADULT     Maintains or returns to baseline urinary function Progressing     Absence of urinary retention Progressing     Urinary catheter remains patent Progressing        HEMATOLOGIC - ADULT     Maintains hematologic stability Progressing        INFECTION - ADULT     Absence or prevention of progression during hospitalization Progressing     Absence of fever/infection during neutropenic period Progressing        Knowledge Deficit     Patient/family/caregiver demonstrates understanding of disease process, treatment plan, medications, and discharge instructions Progressing        METABOLIC, FLUID AND ELECTROLYTES - ADULT     Electrolytes maintained within normal limits Progressing        MUSCULOSKELETAL - ADULT     Maintain or return mobility to safest level of function Progressing        PAIN - ADULT     Verbalizes/displays adequate comfort level or baseline comfort level Progressing        Potential for Falls     Patient will remain free of falls Progressing        Prexisting or High Potential for Compromised Skin Integrity     Skin integrity is maintained or improved Progressing        RESPIRATORY - ADULT     Achieves optimal ventilation and oxygenation Progressing        SAFETY ADULT     Maintain or return to baseline ADL function Progressing     Maintain or return mobility status to optimal level Progressing        SKIN/TISSUE INTEGRITY - ADULT     Skin integrity remains intact Progressing

## 2017-05-25 NOTE — PROGRESS NOTES
Spoke with [facility] Resident Gilford Jumbo, to clarify if I am to restart heparin gtt, instructed to restart drip now

## 2017-05-25 NOTE — PLAN OF CARE
Problem: DISCHARGE PLANNING - CARE MANAGEMENT  Goal: Discharge to post-acute care or home with appropriate resources  INTERVENTIONS:  - Conduct assessment to determine patient/family and health care team treatment goals, and need for post-acute services based on payer coverage, community resources, and patient preferences, and barriers to discharge  - Address psychosocial, clinical, and financial barriers to discharge as identified in assessment in conjunction with the patient/family and health care team  - Arrange appropriate level of post-acute services according to patients needs and preference and payer coverage in collaboration with the physician and health care team  - Communicate with and update the patient/family, physician, and health care team regarding progress on the discharge plan  - Arrange appropriate transportation to post-acute venues  - Pt to discharge with appropriate resources when medically stable     Outcome: Progressing

## 2017-05-25 NOTE — H&P
H&P Exam - [address] 46 y o  female MRN: [id number]  Unit/Bed#: MICU 06 Encounter: [id number]      Chief Complaint: Syncope     History of Present Illness     Nita Portillo is a 46 y o  female who presented to [facility] with syncope  She states that the previous day, she began feeling light-headed and fatigued  She would "black out" whenever she would stand or attempt activity, and even at rest she was feeling dizzy  This continued to persist into 5/24/17 and she also was experiencing chest pain and pressure with worsening shortness of breath  She is prescribed a diuretic for lower extremity edema, which she no longer takes because of cramping  However, she noted that her legs were more swollen than usual, and had a lower extremity duplex on 5/19/17 as an outpatient that was negative for DVT at that time  In the ED at [facility], CTA chest revealed a large saddle embolus  She also had laboratory evidence of RV strain  Given her constellation of symptoms and associated lab findings, she was a candidate for systemic tPA, however, she refused  However, she agreed to catheter-guided thrombolysis and was transferred to [facility] for IR intervention  History obtained from chart review and the patient   -------------------------------------------------------------------------------------------------------------  Assessment and Plan  Principal Problem:    Acute saddle pulmonary embolism  Active Problems:    Asthma    Leukocytosis    Lactic acidosis    Hyponatremia    GERD (gastroesophageal reflux disease)    Syncope    Hypotension    Elevated troponin      Neuro:   · Syncope- Continue close neuro monitoring  Patient continues to be symptomatic with position changes  CAM-ICU daily  CV:   · Hypotension- Improved since transfer from [facility]  Baseline -120  Preload dependent, start on IVF  Difficult to obtain consistent and accurate cuff pressures  Insert arterial line now   Goal MAP >65  · Elevated troponin- secondary to RV strain  Continue to trend until peak  BNP elevated  Echo ordered STAT  Pulm:  · Saddle pulmonary embolus- Patient refused systemic tPA  Plan for catheter directed thrombolysis with IR  Continue heparin drip  LE duplex and TTE ordered   · Asthma- Nebs PRN  Maintain SpO2 >92%  Continue pulmonary hygiene  Incentive spirometer q1h while awake, encourage coughing and deep breathing  GI:   · GERD- protonix PO q24h       :   · MILO-  Baseline creatinine 0 87-1 04  Possibly secondary to hypoperfusion  Will need to closely watch given IV contrast load  Giving IV fluids now  Patient unable to void, placing banuelos now  Strict q2h I/O monitoring  Continue to follow renal function tests  F/E/N:   · Lactic acidosis- Repeat now and trend until clear  Normal saline @ 100ml/hr  · Hyponatremia- continue to monitor with hydration   · Morbid obesity- NPO with sips of clears for now  Nutrition consult when appropriate     Heme/Onc:   · Hemoglobin and platelets are stable at this time  Repeat CBC now  · PPx- continue heparin drip  LE duplex pending     Endo:   · No history of diabetes  BGL elevated on initial BMP  Repeat now and start SSI as needed  Goal -180  ID:   · Leukocytosis- likely reactive  History not consistent with infection  Continue to monitor fever and WBC curve       MSK/Skin:   · Reposition q2h, eliminate pressures points  · Close skin surveillance       Disposition: Critical care     Code Status: Level 1 - Full Code  --------------------------------------------------------------------------------------------------------------    Historical Information   Past Medical History:   Diagnosis Date    Asthma     GERD (gastroesophageal reflux disease)      Past Surgical History:   Procedure Laterality Date    COLONOSCOPY      EXPLORATORY LAPAROTOMY      FOOT SURGERY Left     HAND SURGERY Right     cyst    HYSTERECTOMY  2009    OPEN ANTERIOR SHOULDER RECONSTRUCTION Left     UT KNEE SCOPE,MED/LAT MENISECTOMY Right 4/11/2016    Procedure: KNEE ARTHROSCOPY WITH MEDIAL MENISCECTOMY ;  Surgeon: Jennifer Hess MD;  Location: [facility] MAIN OR;  Service: Orthopedics     Social History   History   Alcohol Use    Yes     Comment: socially     History   Drug Use No     History   Smoking Status    Former Smoker    Quit date: 11/25/1995   Smokeless Tobacco    Never Used     Comment: quit 20 years ago     Exercise History: Independent ADL, largely sedentary   Family History:   Family History   Problem Relation Age of Onset    Arthritis Mother     Colon cancer Brother     Colon cancer Child     Diabetes Other        Vitals:   Vitals:    05/25/17 0202 05/25/17 0206 05/25/17 0241   BP:  129/88    Pulse:  (!) 115 (!) 110   Resp:  22 (!) 24   Temp:  99 °F (37 2 °C)    TempSrc:  Rectal    SpO2:  97% 97%   Weight: 128 kg (281 lb 12 oz) 128 kg (281 lb 12 oz)    Height:  5' 3" (1 6 m)      Temp  Min: 97 6 °F (36 4 °C)  Max: 99 °F (37 2 °C)        Body mass index is 49 91 kg/(m^2)  Physical Exam   Constitutional: She is oriented to person, place, and time  She appears well-developed and well-nourished  She is cooperative  She appears distressed  Nasal cannula in place  HENT:   Head: Normocephalic and atraumatic  Mouth/Throat: Mucous membranes are normal    Eyes: Pupils are equal, round, and reactive to light  Neck: Neck supple  No JVD present  Cardiovascular: Regular rhythm and normal heart sounds  Tachycardia present  Pulses:       Radial pulses are 2+ on the right side, and 2+ on the left side  Dorsalis pedis pulses are 1+ on the right side, and 1+ on the left side  7 second cap refill  Extremities cold   Varicose veins  1+ LE edema    Pulmonary/Chest: Tachypnea noted  She is in respiratory distress  She has decreased breath sounds in the right lower field  She has no wheezes  She has no rhonchi  She has no rales     Able to speak 3-5 word sentences  Dyspnea at rest    Abdominal: She exhibits no distension  Bowel sounds are decreased  There is no tenderness  Neurological: She is alert and oriented to person, place, and time  She has normal strength  No sensory deficit  GCS eye subscore is 4  GCS verbal subscore is 5  GCS motor subscore is 6  Skin: Skin is dry and intact  She is not diaphoretic  Nursing note and vitals reviewed  Medications:  Prior to Admission Medications   Prescriptions Last Dose Informant Patient Reported? Taking? albuterol (2 5 mg/3 mL) 0 083 % nebulizer solution   Yes No   Sig: Take 2 5 mg by nebulization as needed for wheezing  albuterol (PROVENTIL HFA,VENTOLIN HFA) 90 mcg/act inhaler   Yes No   Sig: Inhale 2 puffs as needed for wheezing  fluticasone (FLOVENT DISKUS) 50 MCG/BLIST diskus inhaler   Yes No   Sig: Inhale 1 puff daily  fluticasone (FLOVENT HFA) 220 mcg/act inhaler   Yes No   Sig: Inhale 2 puffs as needed  loratadine (CLARITIN) 10 mg tablet   Yes No   Sig: Take 10 mg by mouth daily  omeprazole (PriLOSEC) 20 mg delayed release capsule   Yes No   Sig: Take 20 mg by mouth daily  Facility-Administered Medications: None     Allergies: Allergies   Allergen Reactions    Penicillins GI Intolerance     Nausea and vomiting       Review of Systems   Constitutional: Positive for fatigue  HENT: Negative  Eyes: Negative  Respiratory: Positive for shortness of breath  Negative for cough, choking, chest tightness and wheezing  Cardiovascular: Positive for chest pain and leg swelling  Negative for palpitations  Gastrointestinal: Negative  Endocrine: Negative  Genitourinary: Negative  Musculoskeletal: Positive for back pain and myalgias  Skin: Positive for pallor  Allergic/Immunologic: Negative  Neurological: Positive for dizziness, syncope, weakness and light-headedness  Hematological: Negative  Psychiatric/Behavioral: Negative          Laboratory and Diagnostics:    Results from last 7 days  Lab Units 05/24/17 2124   WBC Thousand/uL 13 86*   HEMOGLOBIN g/dL 15 3   HEMATOCRIT % 48 2*   PLATELETS Thousands/uL 262   NEUTROS PCT % 83*   MONOS PCT % 5       Results from last 7 days  Lab Units 05/24/17 2124   SODIUM mmol/L 131*   POTASSIUM mmol/L 4 2   CHLORIDE mmol/L 101   CO2 mmol/L 23   BUN mg/dL 15   CREATININE mg/dL 1 47*   CALCIUM mg/dL 9 4   TOTAL PROTEIN g/dL 7 4   BILIRUBIN TOTAL mg/dL 0 50   ALK PHOS U/L 98   ALT U/L 23   AST U/L 19   GLUCOSE RANDOM mg/dL 182*       Results from last 7 days  Lab Units 05/24/17 2124   MAGNESIUM mg/dL 2 1     Lab Results   Component Value Date    PHOS 2 6 (L) 11/25/2016    PHOS 2 8 11/24/2016    PHOS 1 8 (L) 11/24/2016        Results from last 7 days  Lab Units 05/24/17 2327   INR  1 41*   PTT seconds >210*       0  Lab Value Date/Time   TROPONINI 0 35 (HH) 05/24/2017 2124   TROPONINI <0 02 11/24/2016 1142       Results from last 7 days  Lab Units 05/24/17 2328 05/24/17 2124   LACTIC ACID mmol/L 3 2* 3 8*     ABG:  Lab Results   Component Value Date    PHART 7 408 11/24/2016    FOF4FWR 34 0 (L) 11/24/2016    PO2ART 103 5 11/24/2016    LHR5ZAV 21 0 (L) 11/24/2016    BEART -3 0 11/24/2016    SOURCE Brachial, Right 11/24/2016       EKG: Sinus tach with right-axis deviation     Imaging: I have personally reviewed pertinent films in PACS  5/24 CTH: No acute findings  5/24 CTA chest: Large saddle embolus  Pulmonary embolic disease extending to all lobes of lungs  Dilated RV  Small hiatal hernia  Colonic diverticulosis without diverticulitis  Questionable wall parapelvic renal cysts  ------------------------------------------------------------------------------------------------------------    Anticipated Length of Stay is > 2 midnights    Counseling / Coordination of Care  Total Critical Care time spent 39 minutes excluding procedures, teaching and family updates  Jennifer Kelley

## 2017-05-25 NOTE — PROGRESS NOTES
51-year-old female with submassive PE presenting for pulmonary thrombolysis  The history and physical were reviewed, along with progress notes, and the patient was examined  There are no changes since it was written  Julieth Zacarias

## 2017-05-25 NOTE — MALNUTRITION/BMI
This medical record reflects one or more clinical indicators suggestive of morbid obesity  BMI Findings:  45-49 9    Body mass index is 49 91 kg/(m^2)  See Nutrition note dated 5/25/2017 for additional details  Completed nutrition assessment is viewable in the nutrition documentation

## 2017-05-25 NOTE — BRIEF OP NOTE (RAD/CATH)
INTERVENTIONAL RADIOLOGY PROCEDURE NOTE    Date: 5/25/2017    Preoperative diagnosis: Submassive pulmonary embolism    Postoperative diagnosis: Same    Procedure: Pulmonary thrombolysis    Surgeon: Norman Melendez MD     Assistant: None  No qualified resident was available  Blood loss: Minimal    Specimens: None    Findings: Large saddle embolism and bilateral PEs present  Marketed elevation of pulmonary pressure at 82/47(60) mmHg  Bilateral 5 [place] infusion catheters placed  TPA infusion started at 2 mg per hour, 1 mg in each catheter  The patient will be infused for 12 hours      Complications: None    Anesthesia: IV conscious sedation provided by the Department of anesthesiology

## 2017-05-25 NOTE — SOCIAL WORK
CM met with pt at bedside who is alert  CM reviewed role with dcp  Pt resides with her boyfriend - Louis in a 2 story home with 2 JEFF  Pt has flight of stairs to bedroom  Full bathroom on first floor  Pt independent with ADLs and ambulation PTA  Pt uses a SPC as needed  Pt reports Louis is home on disability and able to help her if needed  Pt and her boyfriend are both able to drive  Pt does not work and home on disability  Pt is on oxygen 2L at night time and is supplied through [address]  Pt has no hx of MH or drug/alcohol treatment  Pt has no hx of HHC or STR  Pt previously went to OP PT  Preferred pharmacy is [facility] in [place] [address]  CM to follow for dcp

## 2017-05-25 NOTE — PLAN OF CARE
Problem: Nutrition/Hydration-ADULT  Goal: Nutrient/Hydration intake appropriate for improving, restoring or maintaining nutritional needs  Monitor and assess patients nutrition/hydration status for malnutrition (ex- brittle hair, bruises, dry skin, pale skin and conjunctiva, muscle wasting, smooth red tongue, and disorientation)  Collaborate with interdisciplinary team and initiate plan and interventions as ordered  Monitor patients weight and dietary intake as ordered or per policy  Utilize nutrition screening tool and intervene per policy  Determine patients food preferences and provide high-protein, high-caloric foods as appropriate       INTERVENTIONS:  - Monitor oral intake, urinary output, labs, and treatment plans  - Assess nutrition and hydration status and recommend course of action  - Evaluate amount of meals eaten  - Assist patient with eating if necessary   - Allow adequate time for meals  - Recommend/ encourage appropriate diets, oral nutritional supplements, and vitamin/mineral supplements  - Order, calculate, and assess calorie counts as needed  - Recommend, monitor, and adjust tube feedings and TPN/PPN based on assessed needs  - Assess need for intravenous fluids  - Provide specific nutrition/hydration education as appropriate  - Include patient/family/caregiver in decisions related to nutrition  Outcome: Not Progressing  NPO

## 2017-05-25 NOTE — PROGRESS NOTES
Patient presented for IVC gram and IVC filter placement with follow-up thrombolysis pressure measurements of the pulmonary artery  The patient has shown improvement in her vital signs and clinical status since lysis began  The patient has received 20 mg of TPA

## 2017-05-25 NOTE — IP AVS SNAPSHOT
[address] Shae Villalobos  [phone]                  After Visit Summary for:             Arthur Baltazar   46 yrs Female (1965)    MRN:  [id number]           About your hospitalization     You were admitted on:  May 25, 2017 You last received care in the:  [address] [place] [address] 7    You were discharged on:  May 29, 2017 Unit phone number:  [phone]         Medications     It is important that you maintain an up-to-date list of medications (prescribed and over-the-counter, as well as vitamins and mineral supplements) that you are taking  Bring your list of current medications whenever you seek treatment at a hospital or other healthcare setting  If you have any questions or concerns, contact your primary care physician's office  Your Medications      STOP taking these medications     fluticasone 50 MCG/BLIST diskus inhaler   Commonly known as:  FLOVENT DISKUS             TAKE these medications     albuterol 90 mcg/act inhaler   Inhale 2 puffs as needed for wheezing  Refills:  0   Commonly known as:  PROVENTIL HFA,VENTOLIN HFA           albuterol (2 5 mg/3 mL) 0 083 % nebulizer solution   Take 2 5 mg by nebulization as needed for wheezing  Refills:  0           dabigatran etexilate 150 mg capsu   Take 1 capsule by mouth 2 (two) times a day   Last time this was given:  150 mg on 5/29/2017  8:32 AM   Refills:  2   Commonly known as:  PRADAXA           fluticasone 220 mcg/act inhaler   Inhale 2 puffs as needed  Refills:  0   Commonly known as:  FLOVENT HFA           loratadine 10 mg tablet   Take 10 mg by mouth daily  Refills:  0   Commonly known as:  CLARITIN           omeprazole 20 mg delayed release capsule   Take 20 mg by mouth daily     Refills:  0   Commonly known as:  PriLOSEC                Where to Get Your Medications      These medications were sent to [address] [place] [address], [facility] [address] [facility] Kathya Dials, [address]  [address] [place], [place] [address] [address]     Phone:  [phone]     dabigatran etexilate 150 mg capsu                  Your Medications      Your Medication List           Morning Noon Evening Bedtime As Needed    * albuterol 90 mcg/act inhaler   Inhale 2 puffs as needed for wheezing  Commonly known as:  PROVENTIL HFA,VENTOLIN HFA                                * albuterol (2 5 mg/3 mL) 0 083 % nebulizer solution   Take 2 5 mg by nebulization as needed for wheezing  dabigatran etexilate 150 mg capsu   Take 1 capsule by mouth 2 (two) times a day   Last time this was given:  150 mg on 5/29/2017  8:32 AM   Commonly known as:  PRADAXA                                fluticasone 220 mcg/act inhaler   Inhale 2 puffs as needed  Commonly known as:  FLOVENT HFA                                loratadine 10 mg tablet   Take 10 mg by mouth daily  Commonly known as:  CLARITIN                                omeprazole 20 mg delayed release capsule   Take 20 mg by mouth daily  Commonly known as:  PriLOSEC                                * Notice: This list has 2 medication(s) that are the same as other medications prescribed for you  Read the directions carefully, and ask your doctor or other care provider to review them with you  Follow-ups for After Discharge        Follow-up Information     Follow up with Monserrat Colon DO  Go on 8/16/2017  Specialties:  Pulmonary Disease, Neurology, Pulmonology    Why:  Please follow up with Dr Cheyanne Lehman in the [facility] Pulmonary Office at 120 pm     Contact information:    [address] [place] [address]  [phone]          Follow up with Mary Dubon DO Follow up in 1 week(s)      Specialty:  Family Medicine    Contact information:    [address] [facility] [address] [id number] [phone]        Immunizations     Name Date      Influenza, Quadrivalent defer-05/29/17     Deferral:  Other Influenza, Quadrivalent 11/27/16       Your Allergies  Date Reviewed: 5/25/2017    Allergen Reactions    Penicillins GI Intolerance    Nausea and vomiting      POLST/Adv Directive          Most Recent Value    POLST  Patient does not have POLST - would not like information    Advance Directive  Patient does not have advance directive - would not like information          Instructions for After Discharge        Activity Instructions     Activity as tolerated                 Other Instructions     Call provider for:  persistent dizziness or light-headedness           Call provider for: active or persistent bleeding                    Summary of Your Hospitalization        Reason for Hospitalization     Your primary diagnosis was:  Acute Saddle Pulmonary Embolism    Your diagnoses also included:  Asthma, Leukocytosis, Lactic Acidosis, Hyponatremia, Gerd (Gastroesophageal Reflux Disease), Syncope, Hypotension, Elevated Troponin, John (Acute Kidney Injury), Nstemi (Non-St Elevated Myocardial Infarction), Hematoma Of Neck, Secondary Pulmonary Hypertension, Acute Blood Loss Anemia      Attending/Consulting Providers     Provider Service Role Specialty Primary office phone    Beatrice Bernal MD -- Attending Provider Internal Medicine [phone]    Valencia Peacock MD Pulmonology Consulting Physician  Critical Care Medicine  [phone]         Last Resulted Labs     Date/Time Component Value Reference Range Units Lab Status    05/25/17 1730 WBC 12 96 4 31 - 10 16 Thousand/uL Thousand/uL Final result    05/26/17 1246 HGB 11 4 11 5 - 15 4 g/dL g/dL Final result    05/26/17 1246 HCT 35 9 34 8 - 46 1 % % Final result    05/25/17 1730  149 - 390 Thousands/uL Thousands/uL Final result    05/26/17 0428  136 - 145 mmol/L mmol/L Final result    05/26/17 0428 K 4 0 3 5 - 5 3 mmol/L mmol/L Final result    05/26/17 0428  100 - 108 mmol/L mmol/L Final result    05/26/17 0428 CO2 21 21 - 32 mmol/L mmol/L Final result 05/26/17 0428 BUN 11 5 - 25 mg/dL mg/dL Final result    05/26/17 0428 CREATININE 0 74 0 60 - 1 30 mg/dL mg/dL Final result    05/26/17 0428 GLUCOSE 147 65 - 140 mg/dL mg/dL Final result    05/24/17 2124 ALKPHOS 98 46 - 116 U/L U/L Final result    05/24/17 2124 ALT 23 12 - 78 U/L U/L Final result    05/24/17 2124 AST 19 5 - 45 U/L U/L Final result    05/24/17 2124 ALBUMIN 3 2 3 5 - 5 0 g/dL g/dL Final result    05/24/17 2124 BILITOT 0 50 0 20 - 1 00 mg/dL mg/dL Final result    05/24/17 2124 LIPASE 78 73 - 393 u/L u/L Final result    01/07/16 0820 CHOL 250 mg/dL mg/dL Final result    01/07/16 0820 HDL 48 mg/dL mg/dL Final result    01/07/16 0820 LDLCALC 170 0 - 100 mg/dL mg/dL Final result    01/07/16 0820 TRIG 159 mg/dL mg/dL Final result    05/14/15 1228 HGBA1C 5 9 4 0 - 5 6 % % Final result    05/25/17 1730 TROPONINI 1 05 <0 04 ng/mL ng/mL Final result    05/25/17 1730 INR 1 56 0 86 - 1 16 - Final result      Librato     To access this document and other health information online, please visit www  [email or web address] to login or create a patient portal account  For questions about any pending test results, you may contact the ordering physician or your primary care provider  Discharge Weight          Most Recent Value    Weight  (!)  141 kg (310 lb 13 6 oz) filed at 05/29/2017 0600      Educational Information     Venous Thromboembolism (VTE) / Deep Vein Thrombosis (DVT) Prevention   VTE/DVT is a disease caused by blood clots that form in veins  Blood clots can be serious and common in patients with risk factors  VTE/DVT may occur after discharge  To decrease risks, take anticoagulation medicine if ordered by your doctor  Report any calf pain, swelling or tenderness and/or shortness of breath to your doctor immediately  Smoking Cessation   If you smoke, use tobacco or nicotine, and/or are exposed to second hand smoke, you are encouraged to stop to improve your health    If you need help quitting, please talk to your health care provider or call:  · Alie Callahan ([phone])  · [facility] ([phone])   · [address] [place] [address] ([phone])      If your symptoms return or if your condition unexpectedly worsens from the time of discharge, notify your doctor or go to the nearest emergency room  If you think you are experiencing a medical emergency, call 91  Readmission Risk Score     Meagan Greenberg is committed to ensuring a safe discharge plan that will allow you to continue your recovery at home  Your risk for readmission has been determined to be HIGH      To prevent readmission, please be sure to:   See your health care provider within 1 week of discharge   Follow your discharge instructions    Take your medication as prescribed   Call your health care provider with any questions or concerns

## 2017-05-25 NOTE — SEDATION DOCUMENTATION
Called to MICU for bleeding from [address] puncture site  Pressure held for 70 minutes with suture x 2 placed by Dr Alyce Braun in the interim  Site soft after cessation of bleeding  Tatyana RN in to evaluate with IR nurse before leaving unit  Patient tolerated well

## 2017-05-25 NOTE — PROGRESS NOTES
Right jugular sheath site bleeding manual pressure applied  ecchymoticat site  noted , IR staff notified dr Alexander Lauren at bedside, Dr Pat Myers notified    prn fentanyl given v/ss noted

## 2017-05-25 NOTE — PROCEDURES
Arterial Line Insertion  Date/Time: 5/25/2017 2:47 AM  Performed by: Jeannette Sapp  Authorized by: Jeannette Sapp     Patient location:  Bedside  Other Assisting Provider: Yes (comment) (Dr Lilly Amato)    Consent:     Consent obtained:  Verbal    Consent given by:  Patient    Risks discussed:  Bleeding, infection, ischemia, pain and repeat procedure  Universal protocol:     Procedure explained and questions answered to patient or proxy's satisfaction: yes      Patient identity confirmed:  Verbally with patient and arm band  Indications:     Indications: hemodynamic monitoring, continuous blood pressure monitoring and frequent labs / infusion    Pre-procedure details:     Skin preparation:  Chlorhexidine    Preparation: Patient was prepped and draped in sterile fashion    Anesthesia (see MAR for exact dosages): Anesthesia method:  Local infiltration    Local anesthetic:  Lidocaine 1% w/o epi  Procedure details:     Location / Tip of Catheter:  Radial    Laterality:  Left    Needle gauge:  20 G    Placement technique:  Percutaneous and ultrasound guided    Number of attempts:  1    Successful placement: yes      Transducer: waveform confirmed    Post-procedure details:     Post-procedure:  Biopatch applied, secured with tape, sterile dressing applied and sutured    CMS:  Unchanged    Patient tolerance of procedure:   Tolerated well, no immediate complications

## 2017-05-26 ENCOUNTER — APPOINTMENT (INPATIENT)
Dept: RADIOLOGY | Facility: HOSPITAL | Age: 52
DRG: 951 | End: 2017-05-26
Payer: COMMERCIAL

## 2017-05-26 ENCOUNTER — GENERIC CONVERSION - ENCOUNTER (OUTPATIENT)
Dept: OTHER | Facility: OTHER | Age: 52
End: 2017-05-26

## 2017-05-26 PROBLEM — S10.93XA HEMATOMA OF NECK: Status: ACTIVE | Noted: 2017-05-26

## 2017-05-26 PROBLEM — IMO0002 SECONDARY PULMONARY HYPERTENSION: Status: ACTIVE | Noted: 2017-05-26

## 2017-05-26 PROBLEM — R77.8 ELEVATED TROPONIN: Status: RESOLVED | Noted: 2017-05-25 | Resolved: 2017-05-26

## 2017-05-26 PROBLEM — R79.89 ELEVATED TROPONIN: Status: RESOLVED | Noted: 2017-05-25 | Resolved: 2017-05-26

## 2017-05-26 LAB
ANION GAP SERPL CALCULATED.3IONS-SCNC: 9 MMOL/L (ref 4–13)
APTT PPP: >210 SECONDS (ref 23–35)
BUN SERPL-MCNC: 11 MG/DL (ref 5–25)
CALCIUM SERPL-MCNC: 8.2 MG/DL (ref 8.3–10.1)
CHLORIDE SERPL-SCNC: 112 MMOL/L (ref 100–108)
CO2 SERPL-SCNC: 21 MMOL/L (ref 21–32)
CREAT SERPL-MCNC: 0.74 MG/DL (ref 0.6–1.3)
GFR SERPL CREATININE-BSD FRML MDRD: >60 ML/MIN/1.73SQ M
GLUCOSE SERPL-MCNC: 147 MG/DL (ref 65–140)
HCT VFR BLD AUTO: 35.9 % (ref 34.8–46.1)
HGB BLD-MCNC: 11.4 G/DL (ref 11.5–15.4)
HGB BLD-MCNC: 11.7 G/DL (ref 11.5–15.4)
POTASSIUM SERPL-SCNC: 4 MMOL/L (ref 3.5–5.3)
SODIUM SERPL-SCNC: 142 MMOL/L (ref 136–145)

## 2017-05-26 PROCEDURE — 0HQ4XZZ REPAIR NECK SKIN, EXTERNAL APPROACH: ICD-10-PCS | Performed by: HOSPITALIST

## 2017-05-26 PROCEDURE — 85730 THROMBOPLASTIN TIME PARTIAL: CPT | Performed by: INTERNAL MEDICINE

## 2017-05-26 PROCEDURE — 94640 AIRWAY INHALATION TREATMENT: CPT

## 2017-05-26 PROCEDURE — 70450 CT HEAD/BRAIN W/O DYE: CPT

## 2017-05-26 PROCEDURE — 85018 HEMOGLOBIN: CPT | Performed by: INTERNAL MEDICINE

## 2017-05-26 PROCEDURE — 94760 N-INVAS EAR/PLS OXIMETRY 1: CPT

## 2017-05-26 PROCEDURE — 85014 HEMATOCRIT: CPT | Performed by: INTERNAL MEDICINE

## 2017-05-26 PROCEDURE — 80048 BASIC METABOLIC PNL TOTAL CA: CPT | Performed by: INTERNAL MEDICINE

## 2017-05-26 PROCEDURE — 85018 HEMOGLOBIN: CPT | Performed by: EMERGENCY MEDICINE

## 2017-05-26 PROCEDURE — 70490 CT SOFT TISSUE NECK W/O DYE: CPT

## 2017-05-26 RX ORDER — METHYLPREDNISOLONE SODIUM SUCCINATE 40 MG/ML
40 INJECTION, POWDER, LYOPHILIZED, FOR SOLUTION INTRAMUSCULAR; INTRAVENOUS DAILY
Status: DISCONTINUED | OUTPATIENT
Start: 2017-05-27 | End: 2017-05-27

## 2017-05-26 RX ADMIN — ISODIUM CHLORIDE 3 ML: 0.03 SOLUTION RESPIRATORY (INHALATION) at 13:51

## 2017-05-26 RX ADMIN — LEVALBUTEROL HYDROCHLORIDE 1.25 MG: 1.25 SOLUTION, CONCENTRATE RESPIRATORY (INHALATION) at 19:56

## 2017-05-26 RX ADMIN — ISODIUM CHLORIDE 3 ML: 0.03 SOLUTION RESPIRATORY (INHALATION) at 19:56

## 2017-05-26 RX ADMIN — METHYLPREDNISOLONE SODIUM SUCCINATE 40 MG: 40 INJECTION, POWDER, FOR SOLUTION INTRAMUSCULAR; INTRAVENOUS at 08:00

## 2017-05-26 RX ADMIN — SODIUM CHLORIDE 100 ML/HR: 0.9 INJECTION, SOLUTION INTRAVENOUS at 04:13

## 2017-05-26 RX ADMIN — ISODIUM CHLORIDE 3 ML: 0.03 SOLUTION RESPIRATORY (INHALATION) at 08:06

## 2017-05-26 RX ADMIN — HEPARIN SODIUM 15 UNITS/KG/HR: 10000 INJECTION, SOLUTION INTRAVENOUS at 04:12

## 2017-05-26 RX ADMIN — LEVALBUTEROL HYDROCHLORIDE 1.25 MG: 1.25 SOLUTION, CONCENTRATE RESPIRATORY (INHALATION) at 13:51

## 2017-05-26 RX ADMIN — ENOXAPARIN SODIUM 135 MG: 150 INJECTION SUBCUTANEOUS at 13:33

## 2017-05-26 RX ADMIN — PANTOPRAZOLE SODIUM 40 MG: 40 TABLET, DELAYED RELEASE ORAL at 06:19

## 2017-05-26 RX ADMIN — LEVALBUTEROL HYDROCHLORIDE 1.25 MG: 1.25 SOLUTION, CONCENTRATE RESPIRATORY (INHALATION) at 08:06

## 2017-05-26 NOTE — PROGRESS NOTES
Dr Marcelo Coffey called to bedside for oozing from sheath site   Applied histocryl, and is no attempting another intervention to stop bleeding

## 2017-05-26 NOTE — PROGRESS NOTES
Progress Note - Critical Care   Ernesto Hamilton 46 y o  female MRN: [id number]  Unit/Bed#: MICU 06 Encounter: [id number]          ______________________________________________________________________  Assessment and Plan:   Patient Active Problem List   Diagnosis    Asthma    Leukocytosis    Acute saddle pulmonary embolism    GERD (gastroesophageal reflux disease)    Syncope    Hypotension    Elevated troponin    NSTEMI (non-ST elevated myocardial infarction)     LINES  PIV  Left A-line  Mark    Neuro:   No active issues    CV:   NSTEMi type II  Likely secondary to strain secondary to submassive pulmonary embolus  Troponin peaked at 1 72  Likely does not require ischemic workup    Echocardiogram performed on 5/25 for evaluation of right heart strain during localized TPA treatment showed moderately dilated right ventricle with moderately reduced systolic function and estimated peak PA pressure of 65 mmHg     Pulm:   Submassive pulmonary embolus  Completed localized TPA therapy on 5/25 with noted improvement in pulmonary pressure from 82/47 (60) mmHg to 31/9 (18) mmHg  IVC filter in place and require reevaluation in 3 months  Currently on heparin drip for VTE/PE high-dose algorithm  Saturating well on 3 L nasal cannula     Secondary pulmonary hypertension  Improved following completion of localized TPA therapy on 5/25     Asthma  Continue with Xopenex nebulization 3 times daily    GI:   Pantoprazole 40 mg daily    : Will DC Mark     F/E/N:   Regular house diet   DC normal saline    ID:   No active issues    Heme:   Hemoglobin this morning 11 7 which is decreased from 13 5 from admission  Follow-up H/H this afternoon  noted oozing from right neck catheter site overnight requiring suture placement and pressure dressing    Endo: no active issues     Msk/Skin:   Oozing/bleeding noted from right neck catheter site  Noted by nursing over night  Required placement of suture as well as pressure dressing  interventional radiology evaluated last evening, and will have interventional radiology evaluate this morning  Tenderness noted to anterior and inferior portions with associated swelling   continue to monitor hemoglobin    Disposition: Continue with ICU level of care     Code Status: Level 1 - Full Code    Counseling / Coordination of Care  Total Critical Care time spent 36 minutes excluding procedures, teaching and family updates  ______________________________________________________________________    Chief Complaint: Complaints of pain in the right neck     24 Hour Events:   Localized TPA therapy discontinued on 5/25 by interventional radiology following improvement in peak pulmonary pressures  Following removal of catheters from right neck, noted to have some oozing of blood site  Pressure dressing was applied for approximately one hour with continued oozing  Sutures as well as localized therapy and pressure dressing applied overnight    ______________________________________________________________________    Physical Exam:       Physical Exam   Constitutional: She is oriented to person, place, and time  She appears well-developed and well-nourished  Nasal cannula in place  HENT:   Head: Normocephalic and atraumatic  Eyes: Conjunctivae and EOM are normal  Pupils are equal, round, and reactive to light  Neck: Neck supple  No JVD present  Cardiovascular: Normal rate, regular rhythm, normal heart sounds and intact distal pulses  Exam reveals no gallop and no friction rub  No murmur heard  Pulmonary/Chest: Effort normal and breath sounds normal  No respiratory distress  She has no wheezes  She has no rales  Abdominal: Soft  Bowel sounds are normal  She exhibits no distension  There is no tenderness  There is no rebound  Genitourinary:   Genitourinary Comments: Flores in place, draining blood tinged urine  Musculoskeletal: She exhibits edema     Neurological: She is alert and oriented to person, place, and time  No cranial nerve deficit  She exhibits normal muscle tone  Skin: Skin is warm and dry  Vitals reviewed  ______________________________________________________________________  Vitals:    17 0400 17 0500 17 0559 17 0600   BP:       Pulse: 68 80  72   Resp: 14 19  22   Temp: 97 5 °F (36 4 °C) (!) 97 2 °F (36 2 °C)  (!) 97 2 °F (36 2 °C)   TempSrc: Probe      SpO2: 99% 99%  100%   Weight:   129 kg (284 lb 9 8 oz)    Height:         Arterial Line BP: 120/68  Arterial Line MAP (mmHg): 88 mmHg     Temperature:   Temp (24hrs), Av 8 °F (36 6 °C), Min:97 2 °F (36 2 °C), Max:98 6 °F (37 °C)    Current Temperature: (!) 97 2 °F (36 2 °C)  Weights:   IBW: 52 4 kg    Body mass index is 50 42 kg/(m^2)  Weight (last 2 days)     Date/Time   Weight    17 0559  129 (284 61)    17 0206  128 (281 75)    17 0202  128 (281 75)            Hemodynamic Monitoring:  PAP:  , PAP mean:        Non-Invasive/Invasive Ventilation Settings:  Respiratory    Lab Data (Last 4 hours)    None         O2/Vent Data (Last 4 hours)    None              No results found for: PHART, EKQ6VFQ, PO2ART, RBE7CHM, K6CDQNDU, BEART, SOURCE  SpO2: SpO2: 100 %  Intake and Outputs:  I/O       701 -  07 -  07    P  O   200    I V  (mL/kg) 161 (1 3) 2651 4 (20 5)    IV Piggyback 26 7 223 7    Total Intake(mL/kg) 187 7 (1 5) 3075 2 (23 8)    Urine (mL/kg/hr) 230 800 (0 3)    Total Output 230 800    Net -42 3 +2275 2              UOP: 33 33cc/hour   Nutrition:        Diet Orders            Start     Ordered    17 1707  Diet Regular; Regular House  Diet effective now     Question Answer Comment   Diet Type Regular    Regular Regular House    RD to adjust diet per protocol?  No        17 1706        Labs:     Results from last 7 days  Lab Units 17  0428 17  1730 17  1156 17  0215 17  2124   WBC Thousand/uL  -- 12 96* 14 24* 15 37* 13 86*   HEMOGLOBIN g/dL 11 7 13 5 13 6 14 4 15 3   HEMATOCRIT %  --  41 2 42 3 44 3 48 2*   PLATELETS Thousands/uL  --  183 197 268 262   NEUTROS PCT %  --   --   --   --  83*   MONOS PCT %  --   --   --   --  5       Results from last 7 days  Lab Units 05/26/17  0428 05/25/17  0215 05/24/17  2124   SODIUM mmol/L 142 142 131*   POTASSIUM mmol/L 4 0 4 2 4 2   CHLORIDE mmol/L 112* 111* 101   CO2 mmol/L 21 22 23   BUN mg/dL 11 12 15   CREATININE mg/dL 0 74 1 09 1 47*   CALCIUM mg/dL 8 2* 8 3 9 4   TOTAL PROTEIN g/dL  --   --  7 4   BILIRUBIN TOTAL mg/dL  --   --  0 50   ALK PHOS U/L  --   --  98   ALT U/L  --   --  23   AST U/L  --   --  19   GLUCOSE RANDOM mg/dL 147* 124 182*       Results from last 7 days  Lab Units 05/25/17 0215 05/24/17  2124   MAGNESIUM mg/dL 2 1 2 1     Lab Results   Component Value Date    PHOS 2 2 (L) 05/25/2017    PHOS 2 6 (L) 11/25/2016    PHOS 2 8 11/24/2016        Results from last 7 days  Lab Units 05/26/17  0019 05/25/17  1730 05/25/17  1156 05/25/17  0300 05/24/17  2327   INR   --  1 56*  --  1 50* 1 41*   PTT seconds >210* 65* 45* >210* >210*       0  Lab Value Date/Time   TROPONINI 1 05 (H) 05/25/2017 1730   TROPONINI 1 72 (H) 05/25/2017 1038   TROPONINI 1 19 (H) 05/25/2017 0640   TROPONINI 0 50 (H) 05/25/2017 0215   TROPONINI 0 35 (HH) 05/24/2017 2124   TROPONINI <0 02 11/24/2016 1142       Results from last 7 days  Lab Units 05/25/17  0640 05/25/17  0214 05/24/17  2328   LACTIC ACID mmol/L 1 5 2 8* 3 2*     ABG:  Lab Results   Component Value Date    PHART 7 408 11/24/2016    WZH9HMA 34 0 (L) 11/24/2016    PO2ART 103 5 11/24/2016    QIT2LRD 21 0 (L) 11/24/2016    BEART -3 0 11/24/2016    SOURCE Brachial, Right 11/24/2016     Imaging:   Ir Optional Ivc Filter    Result Date: 5/25/2017  Impression: Impression: 1    Significant improvement in pulmonary pressure was 32/9(18) mm Hg, significantly improved from her original pre-lysis pressure measurement which was 82/47(60) mm Hg  2   Normal inferior venacavogram  3   Satisfactory intravascular placement of an ALN (optional) filter below the level of the renal veins  If the patient is stable on anticoagulation at 3 months and has no significant lower extremity DVT progression, filter removal may be considered at that time  Workstation performed: [id number]     Ir Pulmonary Arteriogram    Result Date: 5/25/2017  Impression: Impression: 1  Large bilateral obstructive pulmonary emboli with a saddle component demonstrated  2   Significant pulmonary hypertension with a pressure of 82/47 (60) mm Hg  3   Successful placement of bilateral infusion catheters with mild milligram per TPA infused through each catheter per hour, for a total dose of 2 mg of TPA per hour  Workstation performed: [id number]      I have personally reviewed pertinent reports  EKG: NSR on Tele  Micro:  No results found for: Maribell Thomas, SPUTUMCULTUR  Allergies: Allergies   Allergen Reactions    Penicillins GI Intolerance     Nausea and vomiting     Medications:   Scheduled Meds:  levalbuterol 1 25 mg Nebulization TID   methylPREDNISolone sodium succinate 40 mg Intravenous Q12H RAYMUNDO   pantoprazole 40 mg Oral Early Morning   sodium chloride 3 mL Nebulization TID     Continuous Infusions:  heparin (porcine) 3-30 Units/kg/hr (Order-Specific) Last Rate: 15 Units/kg/hr (05/26/17 0412)   sodium chloride 100 mL/hr Last Rate: 100 mL/hr (05/26/17 0413)     PRN Meds:    fentanyl citrate (PF) 25 mcg Q2H PRN   heparin (porcine) 10,000 Units PRN   heparin (porcine) 5,000 Units PRN   influenza vaccine 0 5 mL Prior to discharge   iodixanol 100 mL Once in imaging   ondansetron 4 mg Q6H PRN     VTE Pharmacologic Prophylaxis: Heparin  VTE Mechanical Prophylaxis: sequential compression device  Invasive lines and devices:   Invasive Devices     Peripheral Intravenous Line            Peripheral IV 05/24/17 Left Antecubital 1 day          Arterial Line Arterial Line 05/25/17 Radial 1 day          Drain            Urethral Catheter Temperature probe 1 day                     Portions of the record may have been created with voice recognition software  Occasional wrong word or "sound a like" substitutions may have occurred due to the inherent limitations of voice recognition software  Read the chart carefully and recognize, using context, where substitutions have occurred      Dennie Marshall, MD

## 2017-05-26 NOTE — PROGRESS NOTES
Notified Dr Ashley Feldman that patients right IJ site continued to ooze small amounts of blood, instructed me to reapply pressure dressing and leave in place do not remove or lift up tape at all

## 2017-05-26 NOTE — PROCEDURES
Laceration Repair  Date/Time: 5/26/2017 2:09 AM  Performed by: Kavitha Smith  Authorized by: Kavitha Smith     Patient location:  Bedside  Consent:     Consent obtained:  Verbal    Consent given by:  Patient    Risks discussed:  Infection, need for additional repair and pain    Alternatives discussed:  No treatment  Universal protocol:     Procedure explained and questions answered to patient or proxy's satisfaction: yes      Required blood products, implants, devices, and special equipment available: yes      Site/side marked: yes      Immediately prior to procedure, a time out was called: yes      Patient identity confirmed:  Verbally with patient  Anesthesia (see MAR for exact dosages): Anesthesia method:  Local infiltration    Local anesthetic:  Lidocaine 1% w/o epi  Laceration details:     Location:  Neck    Neck location:  R anterior    Length (cm) of Repair:  0 2    Depth (mm):  5  Repair type:     Repair type:  Simple  Pre-procedure details:     Preparation:  Patient was prepped and draped in usual sterile fashion  Skin repair:     Repair method:  Sutures    Suture size:  2-0    Wound skin closure material used: silk  Suture technique:  Simple interrupted    Number of sutures:  2  Approximation:     Approximation:  Close    Approximation difficulty:  Simple  Post-procedure details:     Dressing:  Sterile dressing    Complication (if applicable):  Continued bleeding  Comments:      Patient with bleeding at the site of R IJ sheath removed by IR earlier today used for tPA administration  Patient on heparin due to massive PEs  IR held pressure for over an hour without improvement  Two 2-0 silk sutures placed with hemostasis significantly improved but mild oozing continued  Histacryl placed and lido with 2% epinephrine given with continued improvement  Sterile gauze with pressure bandage

## 2017-05-26 NOTE — PROGRESS NOTES
Patient with persistent bleeding from the site of the right internal jugular sheath used to deliver TPA  Mild venous oozing at the site of the previously repaired laceration  The previously applied surgical glue and one of the previous sutures was removed  There was persistent oozing in the area of the laceration near the remaining suture  A figure of 8 suture was placed and lidocaine with epinephrine was injected in the area which achieved hemostasis  Surgical glue was reapplied and Surgifoam was placed over the area with gauze and a Tegaderm for easy observation of the area  The area anterior and inferior to the wound appears tense and is painful to palpation  Likely contusion although difficult to fully ascertain as the patient has a large neck    Will have IR re-evaluate patient in the AM

## 2017-05-26 NOTE — CASE MANAGEMENT
Initial Clinical Review    Admission: Date/Time/Statement: 5/25/17 @ 0135 Inpatient Written     Orders Placed This Encounter   Procedures    Inpatient Admission     Standing Status:   Standing     Number of Occurrences:   1     Order Specific Question:   Admitting Physician     Answer:   Kirk Hinojosa [[id number]]     Order Specific Question:   Level of Care     Answer:   Critical Care [15]     Order Specific Question:   Estimated length of stay     Answer:   More than 2 Midnights     Order Specific Question:   Certification     Answer:   I certify that inpatient services are medically necessary for this patient for a duration of greater than two midnights  See H&P and MD Progress Notes for additional information about the patient's course of treatment  Chief Complaint: Syncope    History of Illness: Jaimee Abrams is a 46 y o  female who presented to [facility] with syncope  She states that the previous day, she began feeling light-headed and fatigued  She would "black out" whenever she would stand or attempt activity, and even at rest she was feeling dizzy  This continued to persist into 5/24/17 and she also was experiencing chest pain and pressure with worsening shortness of breath  She is prescribed a diuretic for lower extremity edema, which she no longer takes because of cramping  However, she noted that her legs were more swollen than usual, and had a lower extremity duplex on 5/19/17 as an outpatient that was negative for DVT at that time  In the ED at [facility], CTA chest revealed a large saddle embolus  She also had laboratory evidence of RV strain  Given her constellation of symptoms and associated lab findings, she was a candidate for systemic tPA, however, she refused   However, she agreed to catheter-guided thrombolysis and was transferred to [facility] for IR intervention         ED Vital Signs:   ED Triage Vitals   Temperature Pulse Respirations Blood Pressure SpO2   05/25/17 0206 05/25/17 0206 05/25/17 0206 05/25/17 0206 05/25/17 0206   99 °F (37 2 °C) 115 22 129/88 97 %      Temp Source Heart Rate Source Patient Position BP Location FiO2 (%)   05/25/17 0206 05/25/17 0206 05/25/17 0206 05/25/17 0206 --   Rectal Monitor Lying Right arm       Pain Score       05/25/17 0206       No Pain        Wt Readings from Last 1 Encounters:   05/26/17 129 kg (284 lb 9 8 oz)       Vital Signs (abnormal): temp 97 2, /86, 151/101, 160/119, -115,   Resps 20-31    Abnormal Labs:  WBC Thousand/uL 13 86*   HEMATOCRIT % 48 2*   NEUTROS PCT % 83*     SODIUM mmol/L 131*   CREATININE mg/dL 1 47*   GLUCOSE RANDOM mg/dL 182*     INR   1 41*   PTT seconds >210*     TROPONINI 0 35 (HH)     LACTIC ACID mmol/L 3 2* 3 8*     OQM9QYF 34 0 (L)   SDT9JAG 21 0 (L)     Diagnostic Test Results:   EKG: Sinus tach with right-axis deviation      Imaging: I have personally reviewed pertinent films in PACS  5/24 CTH: No acute findings  5/24 CTA chest: Large saddle embolus  Pulmonary embolic disease extending to all lobes of lungs  Dilated RV  Small hiatal hernia  Colonic diverticulosis without diverticulitis  Questionable wall parapelvic renal cysts    Past Medical/Surgical History:    Active Ambulatory Problems     Diagnosis Date Noted    Asthma 11/26/2016    Leukocytosis 11/26/2016    Acute saddle pulmonary embolism 05/25/2017    GERD (gastroesophageal reflux disease) 05/25/2017     Resolved Ambulatory Problems     Diagnosis Date Noted    Medial meniscus tear 04/11/2016    Endotracheally intubated 11/24/2016    Hypokalemia 11/24/2016    Hypophosphatemia 11/24/2016    Bradycardia 11/24/2016    Angioedema 11/24/2016    CAP (community acquired pneumonia) 11/26/2016    Tick bite of abdominal wall 04/08/2017    Lactic acidosis 05/25/2017    Hyponatremia 05/25/2017     Past Medical History:   Diagnosis Date    Asthma     GERD (gastroesophageal reflux disease)        Admitting Diagnosis: Saddle pulmonary embolus [I26 92]    Age/Sex: 46 y o  female    Assessment:  Principal Problem:  Acute saddle pulmonary embolism  Active Problems:  Asthma  Leukocytosis  Lactic acidosis  Hyponatremia  GERD (gastroesophageal reflux disease)  Syncope  Hypotension  Elevated troponin        Neuro:   · Syncope- Continue close neuro monitoring  Patient continues to be symptomatic with position changes  CAM-ICU daily        CV:   · Hypotension- Improved since transfer from [facility]  Baseline -120  Preload dependent, start on IVF  Difficult to obtain consistent and accurate cuff pressures  Insert arterial line now  Goal MAP >65  · Elevated troponin- secondary to RV strain  Continue to trend until peak  BNP elevated  Echo ordered STAT      Pulm:  · Saddle pulmonary embolus- Patient refused systemic tPA  Plan for catheter directed thrombolysis with IR  Continue heparin drip  LE duplex and TTE ordered   · Asthma- Nebs PRN  Maintain SpO2 >92%  Continue pulmonary hygiene  Incentive spirometer q1h while awake, encourage coughing and deep breathing        GI:   · GERD- protonix PO q24h      :   · MILO- Baseline creatinine 0 87-1 04  Possibly secondary to hypoperfusion  Will need to closely watch given IV contrast load  Giving IV fluids now  Patient unable to void, placing banuelos now  Strict q2h I/O monitoring  Continue to follow renal function tests        F/E/N:   · Lactic acidosis- Repeat now and trend until clear  Normal saline @ 100ml/hr  · Hyponatremia- continue to monitor with hydration   · Morbid obesity- NPO with sips of clears for now  Nutrition consult when appropriate      Heme/Onc:   · Hemoglobin and platelets are stable at this time  Repeat CBC now  · PPx- continue heparin drip  LE duplex pending      Endo:   · No history of diabetes  BGL elevated on initial BMP  Repeat now and start SSI as needed  Goal -180        ID:   · Leukocytosis- likely reactive  History not consistent with infection   Continue to monitor fever and WBC curve       MSK/Skin: · Reposition q2h, eliminate pressures points  · Close skin surveillance       Disposition: Critical care  Anticipated Length of Stay is > 2 midnights    Admission Orders:  MICU  NPO  Bedrest   Michaela  Daily weights  I/O  Neuro checks q4h  Respiratory protocol  Consult case management, nutrition services  O2 NC   Echocardiogram  VAS lower limb venous duplex study  Sequential compression device  Trop q3h  IR IVC filter    Scheduled Meds:   levalbuterol 1 25 mg Nebulization TID   methylPREDNISolone sodium succinate 40 mg Intravenous Q12H [address]   pantoprazole 40 mg Oral Early Morning   sodium chloride 3 mL Nebulization TID     Continuous Infusions:   heparin (porcine) 3-30 Units/kg/hr (Order-Specific) Last Rate: 15 Units/kg/hr (05/26/17 0412)   sodium chloride 100 mL/hr Last Rate: 100 mL/hr (05/26/17 0413)     PRN Meds:   fentanyl citrate (PF)    heparin (porcine)    heparin (porcine)    influenza vaccine    iodixanol    ondansetron    St  [address] [facility] Utilization Review Assistants  Main Number:  [phone] (follow prompts) Main Fax: [phone]   Phone [place] [address] Denials/Peer to Peer All Facilities   Op  [address] [facility]  3 Op  [address] [place]  3 Op  [address] [facility] Patients [place]   Op  [address] ([facility]) Patients/Requests Additional Clinical [place]/All Facilities   Op  [address] [facility] Patients [facility] [address] [facility] Patients [facility]   Please call with any questions or concerns [phone] - Confidential Voicemails  Fax all determinations, requests for additional clinical or denials to [phone]

## 2017-05-26 NOTE — PROGRESS NOTES
Progress Note - ICU Transfer to Step down/med  surg  - Delicia Colvin 46 y o  female MRN: [id number]    [facility]: [id number]    Code Status: Level 1 - Full Code  POA:    POLST:      Reason for ICU adm: Submassive pulmonary embolus    Active problems: Principal Problem:    Acute saddle pulmonary embolism  Active Problems:    Asthma    Leukocytosis    GERD (gastroesophageal reflux disease)    Syncope    Hypotension    NSTEMI (non-ST elevated myocardial infarction)    Hematoma of neck    Secondary pulmonary hypertension  Resolved Problems:    Lactic acidosis    Hyponatremia    Elevated troponin    MILO (acute kidney injury)      Consultants: Interventional Radiology    History of Present Illness/Summary of clinical course:   Ms Delicia Colvin is a 71-year-old female with past medical history significant for asthma as well as arthritis  She presented to [address] [place] [address] on the evening of 5/24 with complaints of 2 days episodes of syncope  For that approximately one week prior to presentation she presented to the emergency department for evaluation of lower extremity swelling  At that time, lower extremity duplex was negative for DVTs  On presentation to the emergency department on 5/24, patient was evaluated for pulmonary embolus with CT angiogram which was positive for a large saddle embolus  At [facility], patient refused therapy with systemic TPA and was transferred to [facility] for catheter directed localized TPA with interventional radiology  Initial evaluation was also significant for elevation of troponin, BNP and EKG changes consistent with pulmonary embolus and right heart strain  On arrival to [facility] intensive care unit, patient was noted to be to Go saturating well on 4 L nasal cannula   Patient was taken to interventional radiology for placement of catheter directed TPA therapy and transferred back to the intensive care floor for a completion of treatment of 12 hours of TPA  Bedside echocardiogram performed on 5/25 showed right ventricle moderate dilation and moderately reduced systolic and elevated peak PA pressure at 65 mmHg  Patient returned to interventional radiology in the afternoon of 5/25 for evaluation of localized TPA therapy  Follow-up interventional radiology note noted a reduction in pulmonary pressure from 82/47 (60) mmHg to 31/9 (18) mmHg  Patient was transitioned to high-dose heparin therapy for the VTE management on the afternoon of 5/25 and transitioned to therapeutic Lovenox therapy on the morning of 5/26  Patient was transferred to the medical floors with telemetry monitoring on the afternoon of 5/26  Recent or scheduled procedures:   Ct Head Wo Contrast    Result Date: 5/26/2017  Impression: No acute intracranial abnormality  Workstation performed: [id number]     Ct Soft Tissue Neck Wo Contrast    Result Date: 5/26/2017  Impression: Enlargement of the right sternocleidomastoid muscle with edema and stranding surrounding the muscle extending inferiorly from approximately C3 into the soft tissues inferiorly to the level of the clavicular head  Findings suggest hematoma  Without contrast active extravasation cannot be excluded  Workstation performed: [id number]     Ir Optional Ivc Filter    Result Date: 5/25/2017  Impression: Impression: 1  Significant improvement in pulmonary pressure was 32/9(18) mm Hg, significantly improved from her original pre-lysis pressure measurement which was 82/47(60) mm Hg  2   Normal inferior venacavogram  3   Satisfactory intravascular placement of an ALN (optional) filter below the level of the renal veins  If the patient is stable on anticoagulation at 3 months and has no significant lower extremity DVT progression, filter removal may be considered at that time  Workstation performed: [id number]     Ir Pulmonary Arteriogram    Result Date: 5/25/2017  Impression: Impression: 1   Large bilateral obstructive pulmonary emboli with a saddle component demonstrated  2   Significant pulmonary hypertension with a pressure of 82/47 (60) mm Hg  3   Successful placement of bilateral infusion catheters with mild milligram per TPA infused through each catheter per hour, for a total dose of 2 mg of TPA per hour  Workstation performed: [id number]     Outstanding/pending diagnostics: none       Specific Diagnosis Plan:  Submassive Pulmonary embolism  Patient initially presented to [address] with episodes of syncope  At [address], patient had CT angiogram which showed large saddle embolus  Laboratories were positive for elevated BNP, troponin and EKG changes consistent with pulmonary embolism and right heart strain  Patient was transferred to [facility] for localize catheter directed TPA therapy  Patient completed TPA therapy on 5/20 6 in the afternoon  Transition to high-dose heparin therapy for venous thromboembolism which was discontinued this morning to transitioned to therapeutic Lovenox management  Follow-up interventional radiology note noted a reduction in pulmonary pressure from 82/47 (60) mmHg to 31/9 (18) mmHg  IVC filter was placed which would require reevaluation in 3 months    LLE DVT  Lower extremity duplex Doppler on 5/25 was positive for acute occlusive thrombus in the popliteal vein, posterior tibial veins and peroneal veins  Suspected thrombus in the distal femoral vein  [facility] filter placed on 5/25 by interventional radiology  Continue Lovenox therapeutic therapy until transition to oral anticoagulant    NSTEMI type II  On presentation, patient had elevated troponin which peaked at 1 72  This was likely secondary to right ventricular strain and pulmonary embolus  Asthma  Patient has history of asthma for which she reports using albuterol and Flovent at home    Continue with Xopenex therapy per respiratory protocol    Right Neck Hematoma  Following removal of catheter directed TPA therapy on 5/25, patient noted to have pain and tenderness of the right neck at site of removed catheters  Interventional radiology evaluated and recommended pressure dressing  Over the evening, site noted to continue losing for which sutures were applied as well as localized thrombin and epinephrine  Continue to monitor dressing  CT neck showed enlargement of right sternocleidomastoid muscle with edema and stranding surrounding the muscle extending inferiorly from approximately C3 into soft tissue inferiorly  Finding suggestive of hematoma  Continue to monitor clinically    Spoke with Dr Betty Rodriugez at 14:21 regarding transfer         Marva Babinski, MD  Internal Medicine Resident  [id number]

## 2017-05-26 NOTE — PLAN OF CARE
CARDIOVASCULAR - ADULT     Maintains optimal cardiac output and hemodynamic stability Progressing        DISCHARGE PLANNING     Discharge to home or other facility with appropriate resources Progressing        DISCHARGE PLANNING - CARE MANAGEMENT     Discharge to post-acute care or home with appropriate resources Progressing        GASTROINTESTINAL - ADULT     Maintains or returns to baseline bowel function Progressing        GENITOURINARY - ADULT     Maintains or returns to baseline urinary function Progressing     Absence of urinary retention Progressing     Urinary catheter remains patent Progressing        HEMATOLOGIC - ADULT     Maintains hematologic stability Progressing        INFECTION - ADULT     Absence or prevention of progression during hospitalization Progressing     Absence of fever/infection during neutropenic period Progressing        Knowledge Deficit     Patient/family/caregiver demonstrates understanding of disease process, treatment plan, medications, and discharge instructions Progressing        METABOLIC, FLUID AND ELECTROLYTES - ADULT     Electrolytes maintained within normal limits Progressing        MUSCULOSKELETAL - ADULT     Maintain or return mobility to safest level of function Progressing        Nutrition/Hydration-ADULT     Nutrient/Hydration intake appropriate for improving, restoring or maintaining nutritional needs Progressing        PAIN - ADULT     Verbalizes/displays adequate comfort level or baseline comfort level Progressing        Potential for Falls     Patient will remain free of falls Progressing        Prexisting or High Potential for Compromised Skin Integrity     Skin integrity is maintained or improved Progressing        RESPIRATORY - ADULT     Achieves optimal ventilation and oxygenation Progressing        SAFETY ADULT     Maintain or return to baseline ADL function Progressing     Maintain or return mobility status to optimal level Progressing SKIN/TISSUE INTEGRITY - ADULT     Skin integrity remains intact Progressing

## 2017-05-26 NOTE — PROGRESS NOTES
Received from MICU awake,alert and oriented with o2 at 2l nasal canulla, family at bedside, report given to incoming nurse, telemetry started

## 2017-05-26 NOTE — PROCEDURES
Laceration Repair  Date/Time: 5/26/2017 5:11 AM  Performed by: Jayda Valladares  Authorized by: Jayda Valladares     Patient location:  Bedside  Consent:     Consent obtained:  Verbal    Consent given by:  Patient    Risks discussed:  Need for additional repair, poor cosmetic result and vascular damage    Alternatives discussed:  No treatment  Universal protocol:     Procedure explained and questions answered to patient or proxy's satisfaction: yes      Required blood products, implants, devices, and special equipment available: yes      Site/side marked: yes      Immediately prior to procedure, a time out was called: yes      Patient identity confirmed:  Verbally with patient  Anesthesia (see MAR for exact dosages): Anesthesia method:  Local infiltration    Local anesthetic:  Lidocaine 2% WITH epi  Laceration details:     Location:  Neck    Neck location:  R anterior    Length (cm) of Repair:  0 2    Depth (mm):  5  Repair type:     Repair type:  Simple  Pre-procedure details:     Preparation:  Patient was prepped and draped in usual sterile fashion  Exploration:     Hemostasis achieved with:  Epinephrine and direct pressure (topical thrombin)  Treatment:     Area cleansed with:  Water    Amount of cleaning:  Standard  Skin repair:     Repair method:  Sutures and glue    Suture size:  3-0    Suture material:  Nylon    Suture technique:  Figure eight    Number of sutures:  1  Approximation:     Approximation:  Close    Approximation difficulty:  Simple  Post-procedure details:     Dressing: surgifoam     Patient tolerance of procedure: Tolerated well, no immediate complications  Comments:      1 prior suture removed  Currently 1 prior (silk 2-0) simple interrupted and 1 current (nylon 3-0) figure 8  Hemostasis achieved and area reinforced with surgical glue and surgifoam layered

## 2017-05-27 ENCOUNTER — GENERIC CONVERSION - ENCOUNTER (OUTPATIENT)
Dept: OTHER | Facility: OTHER | Age: 52
End: 2017-05-27

## 2017-05-27 PROBLEM — D62 ACUTE BLOOD LOSS ANEMIA: Status: ACTIVE | Noted: 2017-05-27

## 2017-05-27 PROCEDURE — 94760 N-INVAS EAR/PLS OXIMETRY 1: CPT

## 2017-05-27 PROCEDURE — C1751 CATH, INF, PER/CENT/MIDLINE: HCPCS

## 2017-05-27 PROCEDURE — 36569 INSJ PICC 5 YR+ W/O IMAGING: CPT

## 2017-05-27 PROCEDURE — 94640 AIRWAY INHALATION TREATMENT: CPT

## 2017-05-27 RX ORDER — PREDNISONE 20 MG/1
20 TABLET ORAL DAILY
Status: COMPLETED | OUTPATIENT
Start: 2017-05-28 | End: 2017-05-29

## 2017-05-27 RX ORDER — IBUPROFEN 600 MG/1
600 TABLET ORAL EVERY 6 HOURS PRN
Status: DISCONTINUED | OUTPATIENT
Start: 2017-05-27 | End: 2017-05-28

## 2017-05-27 RX ORDER — PREDNISONE 20 MG/1
40 TABLET ORAL DAILY
Status: DISCONTINUED | OUTPATIENT
Start: 2017-05-28 | End: 2017-05-27

## 2017-05-27 RX ADMIN — LEVALBUTEROL HYDROCHLORIDE 1.25 MG: 1.25 SOLUTION, CONCENTRATE RESPIRATORY (INHALATION) at 13:23

## 2017-05-27 RX ADMIN — LEVALBUTEROL HYDROCHLORIDE 1.25 MG: 1.25 SOLUTION, CONCENTRATE RESPIRATORY (INHALATION) at 07:15

## 2017-05-27 RX ADMIN — ENOXAPARIN SODIUM 135 MG: 150 INJECTION SUBCUTANEOUS at 14:50

## 2017-05-27 RX ADMIN — ENOXAPARIN SODIUM 135 MG: 150 INJECTION SUBCUTANEOUS at 01:21

## 2017-05-27 RX ADMIN — IBUPROFEN 600 MG: 600 TABLET ORAL at 02:37

## 2017-05-27 RX ADMIN — METHYLPREDNISOLONE SODIUM SUCCINATE 40 MG: 40 INJECTION, POWDER, FOR SOLUTION INTRAMUSCULAR; INTRAVENOUS at 10:03

## 2017-05-27 RX ADMIN — LEVALBUTEROL HYDROCHLORIDE 1.25 MG: 1.25 SOLUTION, CONCENTRATE RESPIRATORY (INHALATION) at 19:17

## 2017-05-27 RX ADMIN — ISODIUM CHLORIDE 3 ML: 0.03 SOLUTION RESPIRATORY (INHALATION) at 13:23

## 2017-05-27 RX ADMIN — ISODIUM CHLORIDE 3 ML: 0.03 SOLUTION RESPIRATORY (INHALATION) at 19:17

## 2017-05-27 RX ADMIN — PANTOPRAZOLE SODIUM 40 MG: 40 TABLET, DELAYED RELEASE ORAL at 05:07

## 2017-05-27 RX ADMIN — ISODIUM CHLORIDE 3 ML: 0.03 SOLUTION RESPIRATORY (INHALATION) at 07:15

## 2017-05-27 NOTE — PROCEDURES
Insert PICC line  Date/Time: 5/27/2017 9:27 AM  Performed by: Avery Waddell by: Samia Conner     Patient location:  Bedside  Procedure performed by consultant: Deena KIDD  Consent:     Consent obtained:  Written (Cely Gama obtained consent on chart )    Consent given by:  Patient  Universal protocol:     Procedure explained and questions answered to patient or proxy's satisfaction: yes      Relevant documents present and verified: yes      Test results available and properly labeled: yes      Imaging studies available: yes      Required blood products, implants, devices, and special equipment available: yes      Site/side marked: yes      Immediately prior to procedure, a time out was called: yes      Patient identity confirmed:  Verbally with patient and arm band  Pre-procedure details:     Hand hygiene: Hand hygiene performed prior to insertion      Sterile barrier technique: All elements of maximal sterile technique followed      Skin preparation:  ChloraPrep    Skin preparation agent: Skin preparation agent completely dried prior to procedure    Indications:     PICC line indications: vascular access    Anesthesia (see MAR for exact dosages):      Anesthesia method:  Local infiltration    Local anesthetic:  Lidocaine 1% w/o epi (2 ml)  Procedure details:     Location:  [place]    Vessel type: vein      Laterality:  Right    Approach: percutaneous technique used      Patient position:  Flat    Catheter size:  5 Fr    Landmarks identified: yes      Ultrasound guidance: yes      Sterile ultrasound techniques: Sterile gel and sterile probe covers were used      Number of attempts:  2    Successful placement: yes      Vessel of catheter tip end:  Sherlock 3CG confirmed    Total catheter length (cm):  44    Catheter out on skin (cm):  0    Max flow rate:  999ml/hr    Arm circumference:  39  Post-procedure details:     Post-procedure:  Securement device placed and dressing applied Assessment:  Blood return through all ports    Post-procedure complications: none      Patient tolerance of procedure: Tolerated well, no immediate complications  Comments:      Used R arm for PICC line pt c/o  Left antecubital  IV site pain that is very painful and tender to touch IV site has swelling and ecchymosis  Primary RN Milka Tavera aware and d/c'd IV site at bedside before PICC line was placed

## 2017-05-27 NOTE — PROGRESS NOTES
Progress Note - Pulmonary   Wilfredo Leblanc 46 y o  female MRN: [id number]  Unit/Bed#: [facility] 729-01 Encounter: [id number]    Assessment/Plan:  1  Acute saddle pulmonary embolism  · On Lovenox  · Status post inferior vena cava filter  · Agree with plan to transition to oral anticoagulation tomorrow  · Continue to monitor clinical symptoms  2  Secondary pulmonary hypertension related to the pulmonary embolism  3  Acute hypoxemic respiratory failure  · Continues on 4 L of supplemental oxygen, wean as able  · This is likely secondary to a combination of the acute pulmonary embolism, and obesity with hypoventilation  4  Asthma without exacerbation  · On prednisone, unclear reasons at this point  · Taper rapidly  We will go to 20 mg tomorrow and can DC on 5/29 if tolerating reduction in dose  · Continue nebulizer treatments  5  Morbid obesity    D/w Dr rTisten Marion    ----------------------------------------------------------------------------------------------------------------------    HPI/Interval History:   Seen in transfer out of the intensive care unit  Clinically improving  Still requiring supplemental oxygen at 4 L  She denies respiratory distress  She does not feel that her asthma is exacerbated  She is tolerating nebulizer treatments  Vitals:   Blood pressure 95/62, pulse 96, temperature 97 7 °F (36 5 °C), temperature source Oral, resp  rate 18, height 5' 3" (1 6 m), weight (!) 142 kg (313 lb 0 9 oz), SpO2 99 %  ,Body mass index is 55 45 kg/(m^2)  SpO2: 99 %  SpO2 Activity: At Rest  O2 Device: Nasal cannula    Physical Exam:   Gen: Alert and without respiratory distress  Morbidly obese  HEENT: PERRL  O/P: clear  no erythema or exudate  Crowded posteriorly  Neck: Supple  There is no JVD, no LAD or thyromegaly appreciated  Trachea is midline  There is a right neck hematoma and bruising  Chest: Shallow inspiratory effort but completely clear to auscultation  Cardiac: Regular, no murmur  Abdomen: Obese Soft and nontender  Bowel sounds are present  Extremities: Swelling of the legs bilaterally  Neuro: Awake and alert  Some mild generalized weakness  Skin: Multiple ecchymoses and bruises at IV puncture site      Labs:    CBC:    Results from last 7 days  Lab Units 05/26/17  1246  05/25/17  1730   WBC Thousand/uL  --   --  12 96*   HEMOGLOBIN g/dL 11 4*  < > 13 5   HEMATOCRIT % 35 9  --  41 2   PLATELETS Thousands/uL  --   --  183   < > = values in this interval not displayed  BMP:     Results from last 7 days  Lab Units 05/26/17  0428   SODIUM mmol/L 142   POTASSIUM mmol/L 4 0   CHLORIDE mmol/L 112*   CO2 mmol/L 21   BUN mg/dL 11   CREATININE mg/dL 0 74   GLUCOSE RANDOM mg/dL 147*   CALCIUM mg/dL 8 2*       Imaging studies:  CT of the neck reviewed on the [facility] system, consistent with hematoma  CT scan of the chest was reviewed on the [facility] system  This showed a significant saddle pulmonary embolism      Lottie Aragon MD    Portions of the record may have been created with voice recognition software  Occasional wrong word or "sound a like" substitutions may have occurred due to the inherent limitations of voice recognition software  Read the chart carefully and recognize, using context, where substitutions have occurred

## 2017-05-27 NOTE — PROGRESS NOTES
[address] Internal Medicine Progress Note  Patient: Javi Medina 46 y o  female   MRN: [id number]  PCP: Kathe Au DO  Unit/Bed#: [facility] 729-01 Encounter: [id number]  Date Of Visit: 05/27/17    Assessment:    Principal Problem:    Acute saddle pulmonary embolism  Active Problems:    Asthma    Leukocytosis    GERD (gastroesophageal reflux disease)    Syncope    Hypotension    NSTEMI (non-ST elevated myocardial infarction)    Hematoma of neck    Secondary pulmonary hypertension    Plan:    · Acute saddle PE with cor pulmonale  · S/p catheter direct thrombolysis  · Cont lovenox BID  · Will transition to oral agent tomorrow   · Cont supplemental O2 as needed   · S/p IVC filter--> will need to be addressed in 3 months  · Asthma:  · Cont steroids (pred 40mg x5 d to start tomorrow)  · Anemia; acute blood loss 2/2 hematoma  · Monitor CBC daily   · Elevated troponin; type II NSTEMI 2/2 right heart strain      VTE Pharmacologic Prophylaxis:   Pharmacologic: Enoxaparin (Lovenox)  Mechanical VTE Prophylaxis in Place: No    Patient Centered Rounds: I have performed bedside rounds with nursing staff today  Discussions with Specialists or Other Care Team Provider: none    Education and Discussions with Family / Patient: patient     Time Spent for Care: 30 minutes  More than 50% of total time spent on counseling and coordination of care as described above  Current Length of Stay: 2 day(s)    Current Patient Status: Inpatient   Certification Statement: The patient will continue to require additional inpatient hospital stay due to hypoxia 2/2 acute saddle embolus     Discharge Plan / Estimated Discharge Date: TBD based on clinical course; once oxygenation improves will plan on starting DOAC  Code Status: Level 1 - Full Code    Subjective:   Pt is a little sore from the various bruises and hematomas she has accumulated  Breathing feels good  Pt is anxious to get up and move as well       Objective:     Vitals:   Temp (24hrs), Av °F (36 7 °C), Min:97 7 °F (36 5 °C), Max:98 6 °F (37 °C)    HR:  [87-98] 96  Resp:  [17-20] 18  BP: ()/(62-80) 95/62  SpO2:  [96 %-100 %] 99 %  Body mass index is 55 45 kg/(m^2)  Input and Output Summary (last 24 hours): Intake/Output Summary (Last 24 hours) at 17 1100  Last data filed at 17 0330   Gross per 24 hour   Intake            783 5 ml   Output              475 ml   Net            308 5 ml     Physical Exam:      Physical Exam   Constitutional: She is oriented to person, place, and time  HENT:   R sided neck fullness with ecchymosis  NC in place   Cardiovascular: Normal rate and regular rhythm  Exam reveals no gallop  No murmur heard  Pulmonary/Chest: Effort normal and breath sounds normal  No respiratory distress  She has no wheezes  Abdominal: Soft  Bowel sounds are normal  She exhibits no distension  There is no tenderness  There is no rebound  Musculoskeletal: She exhibits edema (LUE extremity (forearrm); overlying ecchymoses in the area of prior IV site  )  Neurological: She is alert and oriented to person, place, and time  No cranial nerve deficit  Skin: Skin is warm and dry  Psychiatric: She has a normal mood and affect  Her behavior is normal    Nursing note and vitals reviewed  Additional Data:     Labs:      Results from last 7 days  Lab Units 17  1246  17  1730  17  2124   WBC Thousand/uL  --   --  12 96*  < > 13 86*   HEMOGLOBIN g/dL 11 4*  < > 13 5  < > 15 3   HEMATOCRIT % 35 9  --  41 2  < > 48 2*   PLATELETS Thousands/uL  --   --  183  < > 262   NEUTROS PCT %  --   --   --   --  83*   LYMPHS PCT %  --   --   --   --  12*   MONOS PCT %  --   --   --   --  5   EOS PCT %  --   --   --   --  0   < > = values in this interval not displayed      Results from last 7 days  Lab Units 17  0428  17  2124   SODIUM mmol/L 142  < > 131*   POTASSIUM mmol/L 4 0  < > 4 2   CHLORIDE mmol/L 112*  < > 101   CO2 mmol/L 21 < > 23   BUN mg/dL 11  < > 15   CREATININE mg/dL 0 74  < > 1 47*   CALCIUM mg/dL 8 2*  < > 9 4   TOTAL PROTEIN g/dL  --   --  7 4   BILIRUBIN TOTAL mg/dL  --   --  0 50   ALK PHOS U/L  --   --  98   ALT U/L  --   --  23   AST U/L  --   --  19   GLUCOSE RANDOM mg/dL 147*  < > 182*   < > = values in this interval not displayed  Results from last 7 days  Lab Units 05/25/17  1730   INR  1 56*       * I Have Reviewed All Lab Data Listed Above  * Additional Pertinent Lab Tests Reviewed: All Labs Within Last 24 Hours Reviewed    Imaging:    Imaging Reports Reviewed Today Include:   Imaging Personally Reviewed by Myself Includes:     Recent Cultures (last 7 days):           Last 24 Hours Medication List:     enoxaparin 1 mg/kg Subcutaneous Q12H [address]   levalbuterol 1 25 mg Nebulization TID   methylPREDNISolone sodium succinate 40 mg Intravenous Daily   pantoprazole 40 mg Oral Early Morning   sodium chloride 3 mL Nebulization TID        Today, Patient Was Seen By: Maryam Norris MD    ** Please Note: This note has been constructed using a voice recognition system   **

## 2017-05-28 PROCEDURE — 94760 N-INVAS EAR/PLS OXIMETRY 1: CPT

## 2017-05-28 PROCEDURE — 94640 AIRWAY INHALATION TREATMENT: CPT

## 2017-05-28 RX ORDER — DABIGATRAN ETEXILATE 150 MG/1
150 CAPSULE, COATED PELLETS ORAL EVERY 12 HOURS SCHEDULED
Status: DISCONTINUED | OUTPATIENT
Start: 2017-05-28 | End: 2017-05-29 | Stop reason: HOSPADM

## 2017-05-28 RX ORDER — IBUPROFEN 400 MG/1
400 TABLET ORAL EVERY 6 HOURS PRN
Status: DISCONTINUED | OUTPATIENT
Start: 2017-05-28 | End: 2017-05-29 | Stop reason: HOSPADM

## 2017-05-28 RX ORDER — LEVALBUTEROL 1.25 MG/.5ML
1.25 SOLUTION, CONCENTRATE RESPIRATORY (INHALATION) EVERY 6 HOURS PRN
Status: DISCONTINUED | OUTPATIENT
Start: 2017-05-28 | End: 2017-05-29 | Stop reason: HOSPADM

## 2017-05-28 RX ORDER — SODIUM CHLORIDE FOR INHALATION 0.9 %
3 VIAL, NEBULIZER (ML) INHALATION EVERY 6 HOURS PRN
Status: DISCONTINUED | OUTPATIENT
Start: 2017-05-28 | End: 2017-05-29 | Stop reason: HOSPADM

## 2017-05-28 RX ADMIN — ISODIUM CHLORIDE 3 ML: 0.03 SOLUTION RESPIRATORY (INHALATION) at 13:32

## 2017-05-28 RX ADMIN — ENOXAPARIN SODIUM 135 MG: 150 INJECTION SUBCUTANEOUS at 00:25

## 2017-05-28 RX ADMIN — ISODIUM CHLORIDE 3 ML: 0.03 SOLUTION RESPIRATORY (INHALATION) at 07:23

## 2017-05-28 RX ADMIN — LEVALBUTEROL HYDROCHLORIDE 1.25 MG: 1.25 SOLUTION, CONCENTRATE RESPIRATORY (INHALATION) at 13:32

## 2017-05-28 RX ADMIN — PREDNISONE 20 MG: 20 TABLET ORAL at 08:12

## 2017-05-28 RX ADMIN — IBUPROFEN 600 MG: 600 TABLET ORAL at 08:12

## 2017-05-28 RX ADMIN — ENOXAPARIN SODIUM 135 MG: 150 INJECTION SUBCUTANEOUS at 12:06

## 2017-05-28 RX ADMIN — DABIGATRAN ETEXILATE MESYLATE 150 MG: 150 CAPSULE ORAL at 21:01

## 2017-05-28 RX ADMIN — LEVALBUTEROL HYDROCHLORIDE 1.25 MG: 1.25 SOLUTION, CONCENTRATE RESPIRATORY (INHALATION) at 07:23

## 2017-05-28 RX ADMIN — PANTOPRAZOLE SODIUM 40 MG: 40 TABLET, DELAYED RELEASE ORAL at 05:31

## 2017-05-28 NOTE — PROGRESS NOTES
Progress Note - Pulmonary   Jaimee Abrams 46 y o  female MRN: [id number]  Unit/Bed#: [facility] 729-01 Encounter: [id number]    Assessment/Plan:  1  Acute saddle pulmonary embolism  · On Lovenox  · Okay for transition to oral anticoagulant from my perspective  2  Secondary pulmonary hypertension related to the pulmonary embolism  · Eventual outpatient follow-up echocardiogram in roughly 6 months  3  Acute hypoxemic respiratory failure  · Continues on 4 L of supplemental oxygen, wean as able  · Encourage incentive spirometry  · Increase activity as able, ambulate with assistance  4  Asthma without exacerbation  · Taper prednisone rapidly  We will go to 20 mg tomorrow and can DC on 5/29 if tolerating reduction in dose  · Continue nebulizer treatments  5  Morbid obesity    D/w Dr Meredith Regalado    ----------------------------------------------------------------------------------------------------------------------    HPI/Interval History:   Feeling better  Oxygen requirement has improved  Now down to 2 L  Denies chest pain  Some pain in the left forearm and antecubital area from bruising and hematoma    Vitals:   Blood pressure 117/66, pulse 84, temperature 98 °F (36 7 °C), temperature source Oral, resp  rate 18, height 5' 3" (1 6 m), weight (!) 140 kg (307 lb 12 2 oz), SpO2 99 %  ,Body mass index is 54 52 kg/(m^2)  SpO2: 99 %  SpO2 Activity: At Rest  O2 Device: Nasal cannula    Physical Exam:   Gen: More active today  Appears less short of breath  HEENT: PERRL  O/P: clear  no erythema or exudate  Crowded posteriorly  Neck:  There is a right neck hematoma and bruising which extends into the right anterior chest wall  Chest: Shallow inspiratory effort but completely clear to auscultation  Cardiac: Regular, no murmur  Abdomen: Obese Soft and nontender  Bowel sounds are present  Extremities: Mild Swelling of the legs bilaterally   Left upper arm swollen with a large ecchymosis and hematoma at the left antecubital and forearm area  Skin: Multiple ecchymoses and bruises at IV puncture sites      Labs:    CBC:    Results from last 7 days  Lab Units 05/26/17  1246  05/25/17  1730   WBC Thousand/uL  --   --  12 96*   HEMOGLOBIN g/dL 11 4*  < > 13 5   HEMATOCRIT % 35 9  --  41 2   PLATELETS Thousands/uL  --   --  183   < > = values in this interval not displayed  BMP:     Results from last 7 days  Lab Units 05/26/17  0428   SODIUM mmol/L 142   POTASSIUM mmol/L 4 0   CHLORIDE mmol/L 112*   CO2 mmol/L 21   BUN mg/dL 11   CREATININE mg/dL 0 74   GLUCOSE RANDOM mg/dL 147*   CALCIUM mg/dL 8 2*       Imaging studies:  No new imaging studies    Orma MD Kat    Portions of the record may have been created with voice recognition software  Occasional wrong word or "sound a like" substitutions may have occurred due to the inherent limitations of voice recognition software  Read the chart carefully and recognize, using context, where substitutions have occurred

## 2017-05-28 NOTE — PROGRESS NOTES
[address] Internal Medicine Progress Note  Patient: Chad Ghosh 46 y o  female   MRN: [id number]  PCP: Eliel Burch DO  [facility] Encounter: [id number]  Date Of Visit: 05/28/17    Assessment:    Principal Problem:    Acute saddle pulmonary embolism  Active Problems:    Asthma    Leukocytosis    GERD (gastroesophageal reflux disease)    Syncope    Hypotension    NSTEMI (non-ST elevated myocardial infarction)    Hematoma of neck    Secondary pulmonary hypertension    Acute blood loss anemia      Plan:    · Acute saddle PE with cor pulmonale  ¨ S/p catheter direct thrombolysis  ¨ discont lovenox BID   ¨ transition to pradaxa this evening  ¨ Will transition to oral agent tomorrow   ¨ Cont supplemental O2 as needed   ¨ Encourage ambulation, IS  ¨ S/p IVC filter--> will need to be addressed in 3 months  · Asthma:  ¨ Rapid steroid taper  · Anemia; acute blood loss 2/2 hematoma  ¨ Monitor CBC daily   · Elevated troponin; type II NSTEMI 2/2 right heart strain  ¨ Repeat TTE in ~ 6months       VTE Pharmacologic Prophylaxis:   Pharmacologic: Dabigatran (Pradaxa)  Mechanical VTE Prophylaxis in Place: Yes    Patient Centered Rounds: I have performed bedside rounds with nursing staff today  Discussions with Specialists or Other Care Team Provider: pulmonology    Education and Discussions with Family / Patient: patient     Time Spent for Care: 20 minutes  More than 50% of total time spent on counseling and coordination of care as described above  Current Length of Stay: 3 day(s)    Current Patient Status: Inpatient   Certification Statement: The patient will continue to require additional inpatient hospital stay due to resolving hypoxia s/p lysis of pulmonary embolus  Discharge Plan / Estimated Discharge Date: TBD based on clinical course; ~24-48hrs (hopeful dc tomorrow)     Code Status: Level 1 - Full Code    Subjective:   Pt is feeling better overall   Has been increasing her activity and using spirometer  No hemoptysis  No CP     Objective:     Vitals:   Temp (24hrs), Av 9 °F (36 6 °C), Min:97 7 °F (36 5 °C), Max:98 2 °F (36 8 °C)    HR:  [82-98] 98  Resp:  [18] 18  BP: ()/(63-69) 96/66  SpO2:  [97 %-100 %] 98 %  Body mass index is 54 52 kg/(m^2)  Input and Output Summary (last 24 hours): Intake/Output Summary (Last 24 hours) at 17 1226  Last data filed at 17 0800   Gross per 24 hour   Intake              580 ml   Output             1100 ml   Net             -520 ml       Physical Exam:     Physical Exam   Constitutional: She is oriented to person, place, and time  She appears well-developed and well-nourished  No distress  Cardiovascular: Normal rate and regular rhythm  Exam reveals no gallop  No murmur heard  Pulmonary/Chest: Effort normal and breath sounds normal  No respiratory distress  She has no wheezes  She has no rales  Abdominal: Soft  Bowel sounds are normal  She exhibits no distension  There is no tenderness  Neurological: She is alert and oriented to person, place, and time  No cranial nerve deficit  Skin: Skin is warm and dry  She is not diaphoretic  No erythema  Scattered ecchymoses of UE  Psychiatric: She has a normal mood and affect  Her behavior is normal    Nursing note and vitals reviewed  Additional Data:     Labs:      Results from last 7 days  Lab Units 17  1246  17  1730  17  2124   WBC Thousand/uL  --   --  12 96*  < > 13 86*   HEMOGLOBIN g/dL 11 4*  < > 13 5  < > 15 3   HEMATOCRIT % 35 9  --  41 2  < > 48 2*   PLATELETS Thousands/uL  --   --  183  < > 262   NEUTROS PCT %  --   --   --   --  83*   LYMPHS PCT %  --   --   --   --  12*   MONOS PCT %  --   --   --   --  5   EOS PCT %  --   --   --   --  0   < > = values in this interval not displayed      Results from last 7 days  Lab Units 17  0428  17  2124   SODIUM mmol/L 142  < > 131*   POTASSIUM mmol/L 4 0  < > 4 2   CHLORIDE mmol/L 112*  < > 101 CO2 mmol/L 21  < > 23   BUN mg/dL 11  < > 15   CREATININE mg/dL 0 74  < > 1 47*   CALCIUM mg/dL 8 2*  < > 9 4   TOTAL PROTEIN g/dL  --   --  7 4   BILIRUBIN TOTAL mg/dL  --   --  0 50   ALK PHOS U/L  --   --  98   ALT U/L  --   --  23   AST U/L  --   --  19   GLUCOSE RANDOM mg/dL 147*  < > 182*   < > = values in this interval not displayed  Results from last 7 days  Lab Units 05/25/17  1730   INR  1 56*       * I Have Reviewed All Lab Data Listed Above  * Additional Pertinent Lab Tests Reviewed: All Labs Within Last 24 Hours Reviewed    Imaging:    Imaging Reports Reviewed Today Include:   Imaging Personally Reviewed by Myself Includes:      Recent Cultures (last 7 days):           Last 24 Hours Medication List:     dabigatran etexilate 150 mg Oral Q12H [address]   levalbuterol 1 25 mg Nebulization TID   pantoprazole 40 mg Oral Early Morning   predniSONE 20 mg Oral Daily   sodium chloride 3 mL Nebulization TID        Today, Patient Was Seen By: Michelle Jeter MD    ** Please Note: This note has been constructed using a voice recognition system   **

## 2017-05-29 VITALS
DIASTOLIC BLOOD PRESSURE: 76 MMHG | BODY MASS INDEX: 51.91 KG/M2 | OXYGEN SATURATION: 95 % | TEMPERATURE: 98.5 F | SYSTOLIC BLOOD PRESSURE: 119 MMHG | HEIGHT: 63 IN | RESPIRATION RATE: 16 BRPM | HEART RATE: 91 BPM | WEIGHT: 293 LBS

## 2017-05-29 PROCEDURE — 94760 N-INVAS EAR/PLS OXIMETRY 1: CPT

## 2017-05-29 RX ORDER — DABIGATRAN ETEXILATE 150 MG/1
150 CAPSULE, COATED PELLETS ORAL 2 TIMES DAILY
Qty: 60 CAPSULE | Refills: 2 | Status: SHIPPED | OUTPATIENT
Start: 2017-05-29 | End: 2017-06-26 | Stop reason: HOSPADM

## 2017-05-29 RX ADMIN — DABIGATRAN ETEXILATE MESYLATE 150 MG: 150 CAPSULE ORAL at 08:32

## 2017-05-29 RX ADMIN — PREDNISONE 20 MG: 20 TABLET ORAL at 08:32

## 2017-05-29 RX ADMIN — PANTOPRAZOLE SODIUM 40 MG: 40 TABLET, DELAYED RELEASE ORAL at 05:27

## 2017-05-29 NOTE — PROGRESS NOTES
Progress Note - Pulmonary   Rey Schooling 46 y o  female MRN: [id number]  Unit/Bed#: [facility] 729-01 Encounter: [id number]      Assessment:  1  Acute hypoxic respiratory failure - resolved  2  Acute saddle PE  3  Asthma without exacerbation  4  Secondary pulmonary HTN related to PE  5  Morbid obesity    Plan:  1  Pradaxa per SLIM  2  Finish prednisone taper as ordered  3  Room air rest saturations acceptable  Will need to check ambulatory sats to ensure she does not desaturate with activities  4  Repeat 2Decho in 6 months  5  Follow up with us as per discharge instructions  6  Stable from a pulmonary standpoint for discharge once room air saturations obtained with walking  7  Discussed with Dr Cyrus Mcburney    Subjective:   Mrs Edinson Montalvo is seen in bed this morning  She is doing well and is hoping to go home later today  She has been weaned from oxygen at rest   She denies any shortness of breath, cough, bronchospasm or sputum production  She has been afebrile and denies chest pain  She does complain of some arthritic pain this morning  Objective:     Vitals: Blood pressure 127/82, pulse 86, temperature 98 3 °F (36 8 °C), temperature source Oral, resp  rate 18, height 5' 3" (1 6 m), weight (!) 141 kg (310 lb 13 6 oz), SpO2 95 % , RA rest, Body mass index is 55 06 kg/(m^2)  Intake/Output Summary (Last 24 hours) at 05/29/17 0901  Last data filed at 05/29/17 0757   Gross per 24 hour   Intake              900 ml   Output                0 ml   Net              900 ml         Physical Exam  Gen: Awake, alert, oriented x 3, no acute distress  HEENT: Mucous membranes moist, no oral lesions, no thrush  Crowded oropharynx  NECK: No accessory muscle use, JVP not elevated, stoudt neck  Cardiac: Regular, single S1, single S2, no murmurs, no rubs, no gallops  Lungs: Clear to auscultation bilaterally without wheezes, rhonchi or rales bilaterally  Abdomen:  Morbidly obese, normoactive bowel sounds, soft nontender, nondistended, no rebound or rigidity, no guarding  Extremities: no cyanosis, no clubbing, no edema  Multiple ecchymotic areas over both arms  Labs: I have personally reviewed pertinent lab results  , ABG: No results found for: PHART, PRZ2QVF, PO2ART, JIH9ULB, T7PCUDFG, BEART, SOURCE, BNP: No results found for: BNP, CBC: No results found for: WBC, HGB, HCT, MCV, PLT, ADJUSTEDWBC, MCH, MCHC, RDW, MPV, NRBC, CMP: No results found for: NA, K, CL, CO2, ANIONGAP, BUN, CREATININE, GLUCOSE, CALCIUM, AST, ALT, ALKPHOS, PROT, ALBUMIN, BILITOT, EGFR, PT/INR: No results found for: PT, INR, Troponin: No results found for: TROPONINI     Imaging and other studies: I have personally reviewed pertinent films in PACS    No new pulmonary imaging since 5/25/17    Lord Saint, PA-C

## 2017-05-29 NOTE — DISCHARGE SUMMARY
Discharge Summary - [place] [address] Internal Medicine    Patient Information: Chad Ghosh 46 y o  female MRN: [id number]  [facility] Encounter: [id number]    Discharging Physician / Practitioner: Rosalina Fan MD  PCP: Eliel Burch DO  Admission Date: 5/25/2017  Discharge Date: 05/29/17    Reason for Admission: SOB     Discharge Diagnoses:     Principal Problem:    Acute saddle pulmonary embolism  Active Problems:    Asthma    Leukocytosis    GERD (gastroesophageal reflux disease)    Syncope    Hypotension    NSTEMI (non-ST elevated myocardial infarction)    Hematoma of neck    Secondary pulmonary hypertension    Acute blood loss anemia  Resolved Problems:    Lactic acidosis    Hyponatremia    Elevated troponin    MILO (acute kidney injury)      Consultations During Hospital Stay:  · IR  · Pulmonology     Procedures Performed:     · IR catheter directed lysis    Significant Findings / Test Results:   CT PE (5/24/17)  IMPRESSION:  Large saddle embolus  Pulmonary embolic disease extending to all lobes of the lungs  Dilated right ventricle suggesting right heart strain which puts the patient at risk for increased morbidity and mortality  Small hiatal hernia  Colonic diverticulosis without diverticulitis  Questionable wall parapelvic renal cysts    LE doppler (5/25/17)  Impression:  RIGHT LOWER LIMB:  No evidence of acute deep vein thrombosis  No evidence of superficial thrombophlebitis noted  Doppler evaluation shows a normal response to augmentation maneuvers  Popliteal, posterior tibial and anterior tibial arterial Doppler waveforms are  triphasic       LEFT LOWER LIMB:  Evaluation shows acute occlusive thrombus in the popliteal vein, posterior  tibial veins and peroneal veins  Suspected thrombus in the distal femoral vein  based on color flow analysis  No evidence of superficial thrombophlebitis noted    Popliteal, posterior tibial and anterior tibial arterial Doppler waveforms are  triphasic  CT head (5/26/17)  IMPRESSION:     No acute intracranial abnormality    CT head and neck:  IMPRESSION:     Enlargement of the right sternocleidomastoid muscle with edema and stranding surrounding the muscle extending inferiorly from approximately C3 into the soft tissues inferiorly to the level of the clavicular head  Findings suggest hematoma  Without   contrast active extravasation cannot be excluded  Incidental Findings:   · none     Test Results Pending at Discharge (will require follow up):   · none     Outpatient Tests Requested:  · Repeat CBC with PCP  · Repeat TTE in ~6 months    Complications:  Hematoma of R neck; hematoma of left arm     Hospital Course:     Liliana Guerrero is a 46 y o  female patient who originally presented to the hospital on 5/25/2017 due to syncope and chest pain  Pt was seen at her PCP's office prior to her presentation with complaints of swollen LE  DVT study at that time was negative(5/19)  Pt's CTA of chest on this admission showed a large saddle embolus  Troponin elevated suggesting R heart strain  Pt was a candidate for systemic tPA, but refused  She did agree to a catheter directed lysis of the clot and was transferred to [facility] for this  Procedure was successful  Pt was initially on heparin gtt--> lovenox and eventually transitioned to pradaxa 150mg BID  Pt intially hypoxic but this was able to be weaned to RA during her stay  Pt's stay was complicated by a large R sided neck hematoma and LUE ecchymosis  hgb remained stable on repeat, but should have this checked at hospital follow-up  Pt to follow-up with pulmonology in Aug  Should have repeat TTE in ~6 months  Condition at Discharge: fair     Discharge Day Visit / Exam:     * Please refer to separate progress for these details *    Discharge instructions/Information to patient and family:   See after visit summary for information provided to patient and family        Provisions for Follow-Up Care:  See after visit summary for information related to follow-up care and any pertinent home health orders  Disposition:     Home    For Discharges to [facility] SNF:   · Not Applicable to this Patient - Not Applicable to this Patient    Planned Readmission: none    Discharge Statement:  I spent 35 minutes discharging the patient  This time was spent on the day of discharge  I had direct contact with the patient on the day of discharge  Greater than 50% of the total time was spent examining patient, answering all patient questions, arranging and discussing plan of care with patient as well as directly providing post-discharge instructions  Additional time then spent on discharge activities  Discharge Medications:  See after visit summary for reconciled discharge medications provided to patient and family  ** Please Note: This note has been constructed using a voice recognition system   **

## 2017-05-31 LAB
KCT BLD-ACNC: 213 SEC (ref 89–137)
SPECIMEN SOURCE: ABNORMAL

## 2017-06-01 ENCOUNTER — HOSPITAL ENCOUNTER (EMERGENCY)
Facility: HOSPITAL | Age: 52
Discharge: HOME | End: 2017-06-01
Attending: EMERGENCY MEDICINE
Payer: COMMERCIAL

## 2017-06-01 ENCOUNTER — APPOINTMENT (EMERGENCY)
Dept: CT IMAGING | Facility: HOSPITAL | Age: 52
End: 2017-06-01
Payer: COMMERCIAL

## 2017-06-01 VITALS
WEIGHT: 291.1 LBS | DIASTOLIC BLOOD PRESSURE: 70 MMHG | TEMPERATURE: 98 F | HEIGHT: 65 IN | RESPIRATION RATE: 18 BRPM | OXYGEN SATURATION: 100 % | SYSTOLIC BLOOD PRESSURE: 132 MMHG | HEART RATE: 70 BPM | BODY MASS INDEX: 48.5 KG/M2

## 2017-06-01 DIAGNOSIS — S10.93XD HEMATOMA OF NECK, SUBSEQUENT ENCOUNTER: Primary | ICD-10-CM

## 2017-06-01 LAB
ANION GAP BLD CALC-SCNC: 16 MMOL/L (ref 4–13)
BUN BLD-MCNC: 14 MG/DL (ref 5–25)
CA-I BLD-SCNC: 1.14 MMOL/L (ref 1.12–1.32)
CHLORIDE BLD-SCNC: 102 MMOL/L (ref 100–108)
CREAT BLD-MCNC: 1.1 MG/DL (ref 0.6–1.3)
GFR SERPL CREATININE-BSD FRML MDRD: 52.2 ML/MIN/1.73SQ M
GLUCOSE SERPL-MCNC: 102 MG/DL (ref 65–140)
HCT VFR BLD CALC: 38 % (ref 34.8–46.1)
HGB BLDA-MCNC: 12.9 G/DL (ref 11.5–15.4)
PCO2 BLD: 27 MMOL/L (ref 21–32)
POTASSIUM BLD-SCNC: 4.1 MMOL/L (ref 3.5–5.3)
SODIUM BLD-SCNC: 140 MMOL/L (ref 136–145)
SPECIMEN SOURCE: ABNORMAL

## 2017-06-01 PROCEDURE — 99284 EMERGENCY DEPT VISIT MOD MDM: CPT

## 2017-06-01 PROCEDURE — 85014 HEMATOCRIT: CPT

## 2017-06-01 PROCEDURE — 96374 THER/PROPH/DIAG INJ IV PUSH: CPT

## 2017-06-01 PROCEDURE — 80047 BASIC METABLC PNL IONIZED CA: CPT

## 2017-06-01 PROCEDURE — 96375 TX/PRO/DX INJ NEW DRUG ADDON: CPT

## 2017-06-01 PROCEDURE — 70491 CT SOFT TISSUE NECK W/DYE: CPT

## 2017-06-01 PROCEDURE — 96361 HYDRATE IV INFUSION ADD-ON: CPT

## 2017-06-01 RX ORDER — MORPHINE SULFATE 4 MG/ML
4 INJECTION, SOLUTION INTRAMUSCULAR; INTRAVENOUS ONCE
Status: COMPLETED | OUTPATIENT
Start: 2017-06-01 | End: 2017-06-01

## 2017-06-01 RX ORDER — ONDANSETRON 2 MG/ML
4 INJECTION INTRAMUSCULAR; INTRAVENOUS ONCE
Status: COMPLETED | OUTPATIENT
Start: 2017-06-01 | End: 2017-06-01

## 2017-06-01 RX ADMIN — MORPHINE SULFATE 4 MG: 4 INJECTION, SOLUTION INTRAMUSCULAR; INTRAVENOUS at 16:36

## 2017-06-01 RX ADMIN — ONDANSETRON HYDROCHLORIDE 4 MG: 2 INJECTION, SOLUTION INTRAVENOUS at 16:39

## 2017-06-01 RX ADMIN — IOHEXOL 85 ML: 350 INJECTION, SOLUTION INTRAVENOUS at 17:29

## 2017-06-01 RX ADMIN — SODIUM CHLORIDE 1000 ML: 0.9 INJECTION, SOLUTION INTRAVENOUS at 16:36

## 2017-06-04 ENCOUNTER — APPOINTMENT (EMERGENCY)
Dept: RADIOLOGY | Facility: HOSPITAL | Age: 52
End: 2017-06-04
Payer: COMMERCIAL

## 2017-06-04 ENCOUNTER — HOSPITAL ENCOUNTER (EMERGENCY)
Facility: HOSPITAL | Age: 52
End: 2017-06-04
Attending: EMERGENCY MEDICINE
Payer: COMMERCIAL

## 2017-06-04 ENCOUNTER — APPOINTMENT (EMERGENCY)
Dept: CT IMAGING | Facility: HOSPITAL | Age: 52
End: 2017-06-04
Payer: COMMERCIAL

## 2017-06-04 VITALS
BODY MASS INDEX: 48.26 KG/M2 | TEMPERATURE: 98.3 F | HEART RATE: 110 BPM | RESPIRATION RATE: 20 BRPM | OXYGEN SATURATION: 98 % | SYSTOLIC BLOOD PRESSURE: 116 MMHG | WEIGHT: 290 LBS | DIASTOLIC BLOOD PRESSURE: 74 MMHG

## 2017-06-04 DIAGNOSIS — I26.99 PULMONARY INFARCTION (HCC): ICD-10-CM

## 2017-06-04 DIAGNOSIS — I26.99 PULMONARY EMBOLISM (HCC): ICD-10-CM

## 2017-06-04 DIAGNOSIS — R65.10 SIRS (SYSTEMIC INFLAMMATORY RESPONSE SYNDROME) (HCC): Primary | ICD-10-CM

## 2017-06-04 LAB
ALBUMIN SERPL BCP-MCNC: 2.6 G/DL (ref 3.5–5)
ALP SERPL-CCNC: 127 U/L (ref 46–116)
ALT SERPL W P-5'-P-CCNC: 114 U/L (ref 12–78)
ANION GAP SERPL CALCULATED.3IONS-SCNC: 11 MMOL/L (ref 4–13)
AST SERPL W P-5'-P-CCNC: 106 U/L (ref 5–45)
BACTERIA UR QL AUTO: ABNORMAL /HPF
BASOPHILS # BLD AUTO: 0.05 THOUSANDS/ΜL (ref 0–0.1)
BASOPHILS NFR BLD AUTO: 0 % (ref 0–1)
BILIRUB SERPL-MCNC: 1.7 MG/DL (ref 0.2–1)
BILIRUB UR QL STRIP: ABNORMAL
BUN SERPL-MCNC: 13 MG/DL (ref 5–25)
CALCIUM SERPL-MCNC: 8.5 MG/DL (ref 8.3–10.1)
CHLORIDE SERPL-SCNC: 98 MMOL/L (ref 100–108)
CLARITY UR: ABNORMAL
CO2 SERPL-SCNC: 22 MMOL/L (ref 21–32)
COLOR UR: ABNORMAL
CREAT SERPL-MCNC: 1.32 MG/DL (ref 0.6–1.3)
EOSINOPHIL # BLD AUTO: 0 THOUSAND/ΜL (ref 0–0.61)
EOSINOPHIL NFR BLD AUTO: 0 % (ref 0–6)
ERYTHROCYTE [DISTWIDTH] IN BLOOD BY AUTOMATED COUNT: 14.4 % (ref 11.6–15.1)
GFR SERPL CREATININE-BSD FRML MDRD: 42.3 ML/MIN/1.73SQ M
GLUCOSE SERPL-MCNC: 130 MG/DL (ref 65–140)
GLUCOSE UR STRIP-MCNC: NEGATIVE MG/DL
HCT VFR BLD AUTO: 35.7 % (ref 34.8–46.1)
HGB BLD-MCNC: 11.3 G/DL (ref 11.5–15.4)
HGB UR QL STRIP.AUTO: NEGATIVE
KETONES UR STRIP-MCNC: ABNORMAL MG/DL
LACTATE SERPL-SCNC: 1.2 MMOL/L (ref 0.5–2)
LEUKOCYTE ESTERASE UR QL STRIP: ABNORMAL
LYMPHOCYTES # BLD AUTO: 0.65 THOUSANDS/ΜL (ref 0.6–4.47)
LYMPHOCYTES NFR BLD AUTO: 4 % (ref 14–44)
MCH RBC QN AUTO: 30.2 PG (ref 26.8–34.3)
MCHC RBC AUTO-ENTMCNC: 31.7 G/DL (ref 31.4–37.4)
MCV RBC AUTO: 96 FL (ref 82–98)
MONOCYTES # BLD AUTO: 1.61 THOUSAND/ΜL (ref 0.17–1.22)
MONOCYTES NFR BLD AUTO: 10 % (ref 4–12)
NEUTROPHILS # BLD AUTO: 13.62 THOUSANDS/ΜL (ref 1.85–7.62)
NEUTS SEG NFR BLD AUTO: 86 % (ref 43–75)
NITRITE UR QL STRIP: NEGATIVE
NON-SQ EPI CELLS URNS QL MICRO: ABNORMAL /HPF
PH UR STRIP.AUTO: 5.5 [PH] (ref 4.5–8)
PLATELET # BLD AUTO: 336 THOUSANDS/UL (ref 149–390)
PMV BLD AUTO: 10.5 FL (ref 8.9–12.7)
POTASSIUM SERPL-SCNC: 3.3 MMOL/L (ref 3.5–5.3)
PROT SERPL-MCNC: 6.8 G/DL (ref 6.4–8.2)
PROT UR STRIP-MCNC: ABNORMAL MG/DL
RBC # BLD AUTO: 3.74 MILLION/UL (ref 3.81–5.12)
RBC #/AREA URNS AUTO: ABNORMAL /HPF
SODIUM SERPL-SCNC: 131 MMOL/L (ref 136–145)
SP GR UR STRIP.AUTO: >=1.03 (ref 1–1.03)
TROPONIN I SERPL-MCNC: 0.08 NG/ML
UROBILINOGEN UR QL STRIP.AUTO: 1 E.U./DL
WBC # BLD AUTO: 15.93 THOUSAND/UL (ref 4.31–10.16)
WBC #/AREA URNS AUTO: ABNORMAL /HPF

## 2017-06-04 PROCEDURE — 99285 EMERGENCY DEPT VISIT HI MDM: CPT

## 2017-06-04 PROCEDURE — 71275 CT ANGIOGRAPHY CHEST: CPT

## 2017-06-04 PROCEDURE — 87147 CULTURE TYPE IMMUNOLOGIC: CPT | Performed by: PHYSICIAN ASSISTANT

## 2017-06-04 PROCEDURE — 96361 HYDRATE IV INFUSION ADD-ON: CPT

## 2017-06-04 PROCEDURE — 71010 HB CHEST X-RAY 1 VIEW FRONTAL (PORTABLE): CPT

## 2017-06-04 PROCEDURE — 85025 COMPLETE CBC W/AUTO DIFF WBC: CPT | Performed by: PHYSICIAN ASSISTANT

## 2017-06-04 PROCEDURE — 96365 THER/PROPH/DIAG IV INF INIT: CPT

## 2017-06-04 PROCEDURE — 96374 THER/PROPH/DIAG INJ IV PUSH: CPT

## 2017-06-04 PROCEDURE — 83605 ASSAY OF LACTIC ACID: CPT | Performed by: PHYSICIAN ASSISTANT

## 2017-06-04 PROCEDURE — 80053 COMPREHEN METABOLIC PANEL: CPT | Performed by: PHYSICIAN ASSISTANT

## 2017-06-04 PROCEDURE — 96375 TX/PRO/DX INJ NEW DRUG ADDON: CPT

## 2017-06-04 PROCEDURE — 87040 BLOOD CULTURE FOR BACTERIA: CPT | Performed by: PHYSICIAN ASSISTANT

## 2017-06-04 PROCEDURE — 87186 SC STD MICRODIL/AGAR DIL: CPT | Performed by: PHYSICIAN ASSISTANT

## 2017-06-04 PROCEDURE — 36415 COLL VENOUS BLD VENIPUNCTURE: CPT | Performed by: PHYSICIAN ASSISTANT

## 2017-06-04 PROCEDURE — 81001 URINALYSIS AUTO W/SCOPE: CPT | Performed by: PHYSICIAN ASSISTANT

## 2017-06-04 PROCEDURE — 84484 ASSAY OF TROPONIN QUANT: CPT | Performed by: PHYSICIAN ASSISTANT

## 2017-06-04 PROCEDURE — 96360 HYDRATION IV INFUSION INIT: CPT

## 2017-06-04 RX ORDER — CEFEPIME HYDROCHLORIDE 2 G/1
INJECTION, POWDER, FOR SOLUTION INTRAVENOUS
Status: COMPLETED
Start: 2017-06-04 | End: 2017-06-04

## 2017-06-04 RX ORDER — ACETAMINOPHEN 325 MG/1
975 TABLET ORAL ONCE
Status: COMPLETED | OUTPATIENT
Start: 2017-06-04 | End: 2017-06-04

## 2017-06-04 RX ORDER — POTASSIUM CHLORIDE 20 MEQ/1
40 TABLET, EXTENDED RELEASE ORAL ONCE
Status: COMPLETED | OUTPATIENT
Start: 2017-06-04 | End: 2017-06-04

## 2017-06-04 RX ORDER — FENTANYL CITRATE 50 UG/ML
50 INJECTION, SOLUTION INTRAMUSCULAR; INTRAVENOUS ONCE
Status: COMPLETED | OUTPATIENT
Start: 2017-06-04 | End: 2017-06-04

## 2017-06-04 RX ORDER — ONDANSETRON 2 MG/ML
4 INJECTION INTRAMUSCULAR; INTRAVENOUS ONCE
Status: COMPLETED | OUTPATIENT
Start: 2017-06-04 | End: 2017-06-04

## 2017-06-04 RX ORDER — DIAZEPAM 5 MG/ML
5 INJECTION, SOLUTION INTRAMUSCULAR; INTRAVENOUS ONCE
Status: DISCONTINUED | OUTPATIENT
Start: 2017-06-04 | End: 2017-06-04

## 2017-06-04 RX ADMIN — SODIUM CHLORIDE 1000 ML: 0.9 INJECTION, SOLUTION INTRAVENOUS at 20:52

## 2017-06-04 RX ADMIN — FENTANYL CITRATE 50 MCG: 50 INJECTION INTRAMUSCULAR; INTRAVENOUS at 23:44

## 2017-06-04 RX ADMIN — ONDANSETRON 4 MG: 2 INJECTION INTRAMUSCULAR; INTRAVENOUS at 20:44

## 2017-06-04 RX ADMIN — SODIUM CHLORIDE 1000 ML: 0.9 INJECTION, SOLUTION INTRAVENOUS at 19:21

## 2017-06-04 RX ADMIN — SODIUM CHLORIDE 1000 ML: 0.9 INJECTION, SOLUTION INTRAVENOUS at 20:12

## 2017-06-04 RX ADMIN — IODIXANOL 100 ML: 320 INJECTION, SOLUTION INTRAVASCULAR at 21:52

## 2017-06-04 RX ADMIN — POTASSIUM CHLORIDE 40 MEQ: 20 TABLET, EXTENDED RELEASE ORAL at 21:30

## 2017-06-04 RX ADMIN — ACETAMINOPHEN 975 MG: 325 TABLET, FILM COATED ORAL at 19:22

## 2017-06-04 RX ADMIN — CEFEPIME HYDROCHLORIDE 2000 MG: 2 INJECTION, POWDER, FOR SOLUTION INTRAVENOUS at 20:47

## 2017-06-05 ENCOUNTER — APPOINTMENT (INPATIENT)
Dept: NON INVASIVE DIAGNOSTICS | Facility: HOSPITAL | Age: 52
DRG: 711 | End: 2017-06-05
Payer: COMMERCIAL

## 2017-06-05 ENCOUNTER — GENERIC CONVERSION - ENCOUNTER (OUTPATIENT)
Dept: OTHER | Facility: OTHER | Age: 52
End: 2017-06-05

## 2017-06-05 ENCOUNTER — APPOINTMENT (INPATIENT)
Dept: RADIOLOGY | Facility: HOSPITAL | Age: 52
DRG: 711 | End: 2017-06-05
Payer: COMMERCIAL

## 2017-06-05 ENCOUNTER — HOSPITAL ENCOUNTER (INPATIENT)
Facility: HOSPITAL | Age: 52
LOS: 21 days | Discharge: HOME HEALTH | DRG: 711 | End: 2017-06-26
Attending: HOSPITALIST | Admitting: HOSPITALIST
Payer: COMMERCIAL

## 2017-06-05 DIAGNOSIS — R74.8 ELEVATED LIVER ENZYMES: Primary | ICD-10-CM

## 2017-06-05 DIAGNOSIS — A41.9 SEVERE SEPSIS (HCC): ICD-10-CM

## 2017-06-05 DIAGNOSIS — L02.91 ABSCESS: ICD-10-CM

## 2017-06-05 DIAGNOSIS — R65.20 SEVERE SEPSIS (HCC): ICD-10-CM

## 2017-06-05 DIAGNOSIS — I26.99 PULMONARY EMBOLISM, BILATERAL (HCC): ICD-10-CM

## 2017-06-05 DIAGNOSIS — N17.9 ACUTE KIDNEY INJURY (HCC): ICD-10-CM

## 2017-06-05 DIAGNOSIS — S10.93XD HEMATOMA OF NECK, SUBSEQUENT ENCOUNTER: ICD-10-CM

## 2017-06-05 DIAGNOSIS — I21.4 NSTEMI (NON-ST ELEVATED MYOCARDIAL INFARCTION) (HCC): ICD-10-CM

## 2017-06-05 PROBLEM — E66.01 MORBID OBESITY (HCC): Status: ACTIVE | Noted: 2017-06-05

## 2017-06-05 LAB
ALBUMIN SERPL BCP-MCNC: 2.3 G/DL (ref 3.5–5)
ALP SERPL-CCNC: 106 U/L (ref 46–116)
ALT SERPL W P-5'-P-CCNC: 92 U/L (ref 12–78)
ANION GAP SERPL CALCULATED.3IONS-SCNC: 9 MMOL/L (ref 4–13)
APTT PPP: 77 SECONDS (ref 23–35)
APTT PPP: 80 SECONDS (ref 23–35)
ARTERIAL PATENCY WRIST A: YES
AST SERPL W P-5'-P-CCNC: 69 U/L (ref 5–45)
ATRIAL RATE: 144 BPM
BACTERIA UR QL AUTO: ABNORMAL /HPF
BASE EXCESS BLDA CALC-SCNC: -7.8 MMOL/L
BILIRUB SERPL-MCNC: 2.03 MG/DL (ref 0.2–1)
BILIRUB UR QL STRIP: ABNORMAL
BUN SERPL-MCNC: 12 MG/DL (ref 5–25)
CALCIUM SERPL-MCNC: 7.7 MG/DL (ref 8.3–10.1)
CHLORIDE SERPL-SCNC: 111 MMOL/L (ref 100–108)
CHOLEST SERPL-MCNC: 127 MG/DL (ref 50–200)
CLARITY UR: CLEAR
CO2 SERPL-SCNC: 21 MMOL/L (ref 21–32)
COLOR UR: ABNORMAL
CREAT SERPL-MCNC: 1.28 MG/DL (ref 0.6–1.3)
ERYTHROCYTE [DISTWIDTH] IN BLOOD BY AUTOMATED COUNT: 14.8 % (ref 11.6–15.1)
ERYTHROCYTE [DISTWIDTH] IN BLOOD BY AUTOMATED COUNT: 15.3 % (ref 11.6–15.1)
GFR SERPL CREATININE-BSD FRML MDRD: 43.8 ML/MIN/1.73SQ M
GLUCOSE SERPL-MCNC: 108 MG/DL (ref 65–140)
GLUCOSE UR STRIP-MCNC: NEGATIVE MG/DL
HCO3 BLDA-SCNC: 16.4 MMOL/L (ref 22–28)
HCT VFR BLD AUTO: 31.9 % (ref 34.8–46.1)
HCT VFR BLD AUTO: 33.8 % (ref 34.8–46.1)
HDLC SERPL-MCNC: 35 MG/DL (ref 40–60)
HGB BLD-MCNC: 10.6 G/DL (ref 11.5–15.4)
HGB BLD-MCNC: 9.7 G/DL (ref 11.5–15.4)
HGB UR QL STRIP.AUTO: ABNORMAL
INR PPP: 1.75 (ref 0.86–1.16)
INR PPP: 1.91 (ref 0.86–1.16)
KETONES UR STRIP-MCNC: ABNORMAL MG/DL
LACTATE SERPL-SCNC: 1.7 MMOL/L (ref 0.5–2)
LDLC SERPL CALC-MCNC: 75 MG/DL (ref 0–100)
LEUKOCYTE ESTERASE UR QL STRIP: NEGATIVE
MAGNESIUM SERPL-MCNC: 1.8 MG/DL (ref 1.6–2.6)
MCH RBC QN AUTO: 29.2 PG (ref 26.8–34.3)
MCH RBC QN AUTO: 29.9 PG (ref 26.8–34.3)
MCHC RBC AUTO-ENTMCNC: 30.4 G/DL (ref 31.4–37.4)
MCHC RBC AUTO-ENTMCNC: 31.4 G/DL (ref 31.4–37.4)
MCV RBC AUTO: 96 FL (ref 82–98)
MCV RBC AUTO: 96 FL (ref 82–98)
NASAL CANNULA: ABNORMAL
NITRITE UR QL STRIP: NEGATIVE
NON-SQ EPI CELLS URNS QL MICRO: ABNORMAL /HPF
NT-PROBNP SERPL-MCNC: 4794 PG/ML
O2 CT BLDA-SCNC: 14.5 ML/DL (ref 16–23)
OXYHGB MFR BLDA: 97.9 % (ref 94–97)
P AXIS: 57 DEGREES
PCO2 BLDA: 29.2 MM HG (ref 36–44)
PH BLDA: 7.37 [PH] (ref 7.35–7.45)
PH UR STRIP.AUTO: 5 [PH] (ref 4.5–8)
PHOSPHATE SERPL-MCNC: 1.2 MG/DL (ref 2.7–4.5)
PLATELET # BLD AUTO: 264 THOUSANDS/UL (ref 149–390)
PLATELET # BLD AUTO: 288 THOUSANDS/UL (ref 149–390)
PMV BLD AUTO: 10 FL (ref 8.9–12.7)
PMV BLD AUTO: 10.1 FL (ref 8.9–12.7)
PO2 BLDA: 124.1 MM HG (ref 75–129)
POTASSIUM SERPL-SCNC: 4 MMOL/L (ref 3.5–5.3)
PR INTERVAL: 140 MS
PROT SERPL-MCNC: 6.2 G/DL (ref 6.4–8.2)
PROT UR STRIP-MCNC: ABNORMAL MG/DL
PROTHROMBIN TIME: 20.6 SECONDS (ref 12.1–14.4)
PROTHROMBIN TIME: 22.1 SECONDS (ref 12.1–14.4)
QRS AXIS: -28 DEGREES
QRSD INTERVAL: 82 MS
QT INTERVAL: 270 MS
QTC INTERVAL: 418 MS
RBC # BLD AUTO: 3.32 MILLION/UL (ref 3.81–5.12)
RBC # BLD AUTO: 3.54 MILLION/UL (ref 3.81–5.12)
RBC #/AREA URNS AUTO: ABNORMAL /HPF
SODIUM SERPL-SCNC: 141 MMOL/L (ref 136–145)
SP GR UR STRIP.AUTO: >1.045 (ref 1–1.03)
SPECIMEN SOURCE: ABNORMAL
T WAVE AXIS: 48 DEGREES
TRIGL SERPL-MCNC: 86 MG/DL
TROPONIN I SERPL-MCNC: 0.08 NG/ML
TROPONIN I SERPL-MCNC: 0.1 NG/ML
UROBILINOGEN UR QL STRIP.AUTO: 1 E.U./DL
VENTRICULAR RATE: 144 BPM
WBC # BLD AUTO: 14.47 THOUSAND/UL (ref 4.31–10.16)
WBC # BLD AUTO: 15.49 THOUSAND/UL (ref 4.31–10.16)
WBC #/AREA URNS AUTO: ABNORMAL /HPF

## 2017-06-05 PROCEDURE — 85730 THROMBOPLASTIN TIME PARTIAL: CPT | Performed by: HOSPITALIST

## 2017-06-05 PROCEDURE — 84484 ASSAY OF TROPONIN QUANT: CPT | Performed by: HOSPITALIST

## 2017-06-05 PROCEDURE — 87205 SMEAR GRAM STAIN: CPT | Performed by: SURGERY

## 2017-06-05 PROCEDURE — 85027 COMPLETE CBC AUTOMATED: CPT | Performed by: HOSPITALIST

## 2017-06-05 PROCEDURE — 83605 ASSAY OF LACTIC ACID: CPT | Performed by: HOSPITALIST

## 2017-06-05 PROCEDURE — 85730 THROMBOPLASTIN TIME PARTIAL: CPT | Performed by: FAMILY MEDICINE

## 2017-06-05 PROCEDURE — 87186 SC STD MICRODIL/AGAR DIL: CPT | Performed by: SURGERY

## 2017-06-05 PROCEDURE — 94640 AIRWAY INHALATION TREATMENT: CPT

## 2017-06-05 PROCEDURE — 85027 COMPLETE CBC AUTOMATED: CPT | Performed by: FAMILY MEDICINE

## 2017-06-05 PROCEDURE — 0J940ZZ DRAINAGE OF RIGHT NECK SUBCUTANEOUS TISSUE AND FASCIA, OPEN APPROACH: ICD-10-PCS | Performed by: SURGERY

## 2017-06-05 PROCEDURE — 93308 TTE F-UP OR LMTD: CPT

## 2017-06-05 PROCEDURE — 83880 ASSAY OF NATRIURETIC PEPTIDE: CPT | Performed by: INTERNAL MEDICINE

## 2017-06-05 PROCEDURE — 36600 WITHDRAWAL OF ARTERIAL BLOOD: CPT

## 2017-06-05 PROCEDURE — 85610 PROTHROMBIN TIME: CPT | Performed by: FAMILY MEDICINE

## 2017-06-05 PROCEDURE — 84100 ASSAY OF PHOSPHORUS: CPT | Performed by: HOSPITALIST

## 2017-06-05 PROCEDURE — 83735 ASSAY OF MAGNESIUM: CPT | Performed by: HOSPITALIST

## 2017-06-05 PROCEDURE — 81001 URINALYSIS AUTO W/SCOPE: CPT | Performed by: HOSPITALIST

## 2017-06-05 PROCEDURE — 82805 BLOOD GASES W/O2 SATURATION: CPT | Performed by: GENERAL PRACTICE

## 2017-06-05 PROCEDURE — 87070 CULTURE OTHR SPECIMN AEROBIC: CPT | Performed by: SURGERY

## 2017-06-05 PROCEDURE — 80053 COMPREHEN METABOLIC PANEL: CPT | Performed by: HOSPITALIST

## 2017-06-05 PROCEDURE — 87147 CULTURE TYPE IMMUNOLOGIC: CPT | Performed by: SURGERY

## 2017-06-05 PROCEDURE — 85610 PROTHROMBIN TIME: CPT | Performed by: HOSPITALIST

## 2017-06-05 PROCEDURE — 76705 ECHO EXAM OF ABDOMEN: CPT

## 2017-06-05 PROCEDURE — 94760 N-INVAS EAR/PLS OXIMETRY 1: CPT

## 2017-06-05 PROCEDURE — 87075 CULTR BACTERIA EXCEPT BLOOD: CPT | Performed by: SURGERY

## 2017-06-05 PROCEDURE — 80061 LIPID PANEL: CPT | Performed by: INTERNAL MEDICINE

## 2017-06-05 RX ORDER — DOCUSATE SODIUM 100 MG/1
100 CAPSULE, LIQUID FILLED ORAL 2 TIMES DAILY
Status: DISCONTINUED | OUTPATIENT
Start: 2017-06-05 | End: 2017-06-26 | Stop reason: HOSPADM

## 2017-06-05 RX ORDER — HEPARIN SODIUM 10000 [USP'U]/100ML
3-20 INJECTION, SOLUTION INTRAVENOUS
Status: DISCONTINUED | OUTPATIENT
Start: 2017-06-05 | End: 2017-06-05

## 2017-06-05 RX ORDER — LEVALBUTEROL 1.25 MG/.5ML
1.25 SOLUTION, CONCENTRATE RESPIRATORY (INHALATION) EVERY 6 HOURS PRN
Status: DISCONTINUED | OUTPATIENT
Start: 2017-06-05 | End: 2017-06-06

## 2017-06-05 RX ORDER — ALBUTEROL SULFATE 2.5 MG/3ML
2.5 SOLUTION RESPIRATORY (INHALATION) AS NEEDED
Status: DISCONTINUED | OUTPATIENT
Start: 2017-06-05 | End: 2017-06-05

## 2017-06-05 RX ORDER — PANTOPRAZOLE SODIUM 20 MG/1
20 TABLET, DELAYED RELEASE ORAL
Status: DISCONTINUED | OUTPATIENT
Start: 2017-06-05 | End: 2017-06-21

## 2017-06-05 RX ORDER — LIDOCAINE HYDROCHLORIDE AND EPINEPHRINE 10; 10 MG/ML; UG/ML
20 INJECTION, SOLUTION INFILTRATION; PERINEURAL ONCE
Status: COMPLETED | OUTPATIENT
Start: 2017-06-05 | End: 2017-06-05

## 2017-06-05 RX ORDER — ALBUTEROL SULFATE 90 UG/1
2 AEROSOL, METERED RESPIRATORY (INHALATION) AS NEEDED
Status: DISCONTINUED | OUTPATIENT
Start: 2017-06-05 | End: 2017-06-05

## 2017-06-05 RX ORDER — SODIUM CHLORIDE 9 MG/ML
90 INJECTION, SOLUTION INTRAVENOUS CONTINUOUS
Status: DISPENSED | OUTPATIENT
Start: 2017-06-05 | End: 2017-06-05

## 2017-06-05 RX ORDER — SENNOSIDES 8.6 MG
1 TABLET ORAL DAILY
Status: DISCONTINUED | OUTPATIENT
Start: 2017-06-05 | End: 2017-06-26 | Stop reason: HOSPADM

## 2017-06-05 RX ORDER — FLUTICASONE PROPIONATE 220 UG/1
2 AEROSOL, METERED RESPIRATORY (INHALATION) EVERY 12 HOURS SCHEDULED
Status: DISCONTINUED | OUTPATIENT
Start: 2017-06-05 | End: 2017-06-26 | Stop reason: HOSPADM

## 2017-06-05 RX ORDER — LEVALBUTEROL 1.25 MG/.5ML
1.25 SOLUTION, CONCENTRATE RESPIRATORY (INHALATION)
Status: DISCONTINUED | OUTPATIENT
Start: 2017-06-05 | End: 2017-06-05

## 2017-06-05 RX ORDER — DABIGATRAN ETEXILATE 150 MG/1
150 CAPSULE, COATED PELLETS ORAL 2 TIMES DAILY
Status: DISCONTINUED | OUTPATIENT
Start: 2017-06-05 | End: 2017-06-05

## 2017-06-05 RX ORDER — OXYCODONE HYDROCHLORIDE AND ACETAMINOPHEN 5; 325 MG/1; MG/1
1 TABLET ORAL EVERY 4 HOURS PRN
Status: DISCONTINUED | OUTPATIENT
Start: 2017-06-05 | End: 2017-06-26 | Stop reason: HOSPADM

## 2017-06-05 RX ORDER — ACETAMINOPHEN 325 MG/1
650 TABLET ORAL EVERY 6 HOURS PRN
Status: DISCONTINUED | OUTPATIENT
Start: 2017-06-05 | End: 2017-06-26 | Stop reason: HOSPADM

## 2017-06-05 RX ORDER — ONDANSETRON 2 MG/ML
4 INJECTION INTRAMUSCULAR; INTRAVENOUS EVERY 4 HOURS PRN
Status: DISCONTINUED | OUTPATIENT
Start: 2017-06-05 | End: 2017-06-06

## 2017-06-05 RX ORDER — HEPARIN SODIUM 10000 [USP'U]/100ML
3-30 INJECTION, SOLUTION INTRAVENOUS
Status: DISCONTINUED | OUTPATIENT
Start: 2017-06-05 | End: 2017-06-06

## 2017-06-05 RX ORDER — LORATADINE 10 MG/1
10 TABLET ORAL DAILY
Status: DISCONTINUED | OUTPATIENT
Start: 2017-06-05 | End: 2017-06-26 | Stop reason: HOSPADM

## 2017-06-05 RX ORDER — HEPARIN SODIUM 1000 [USP'U]/ML
10000 INJECTION, SOLUTION INTRAVENOUS; SUBCUTANEOUS AS NEEDED
Status: DISCONTINUED | OUTPATIENT
Start: 2017-06-05 | End: 2017-06-06

## 2017-06-05 RX ORDER — LEVALBUTEROL 1.25 MG/.5ML
1.25 SOLUTION, CONCENTRATE RESPIRATORY (INHALATION) ONCE
Status: COMPLETED | OUTPATIENT
Start: 2017-06-05 | End: 2017-06-05

## 2017-06-05 RX ORDER — HEPARIN SODIUM 1000 [USP'U]/ML
5000 INJECTION, SOLUTION INTRAVENOUS; SUBCUTANEOUS AS NEEDED
Status: DISCONTINUED | OUTPATIENT
Start: 2017-06-05 | End: 2017-06-06

## 2017-06-05 RX ADMIN — CEFEPIME HYDROCHLORIDE 2000 MG: 2 INJECTION, POWDER, FOR SOLUTION INTRAVENOUS at 09:25

## 2017-06-05 RX ADMIN — Medication 1 MG: at 08:20

## 2017-06-05 RX ADMIN — CEFEPIME HYDROCHLORIDE 2000 MG: 2 INJECTION, POWDER, FOR SOLUTION INTRAVENOUS at 21:06

## 2017-06-05 RX ADMIN — SENNOSIDES 8.6 MG: 8.6 TABLET, FILM COATED ORAL at 09:41

## 2017-06-05 RX ADMIN — OXYCODONE HYDROCHLORIDE AND ACETAMINOPHEN 1 TABLET: 5; 325 TABLET ORAL at 14:51

## 2017-06-05 RX ADMIN — HEPARIN SODIUM AND DEXTROSE 11.1 UNITS/KG/HR: 10000; 5 INJECTION INTRAVENOUS at 18:03

## 2017-06-05 RX ADMIN — FLUTICASONE PROPIONATE 2 PUFF: 220 AEROSOL, METERED RESPIRATORY (INHALATION) at 21:06

## 2017-06-05 RX ADMIN — VANCOMYCIN HYDROCHLORIDE 2000 MG: 1 INJECTION, POWDER, LYOPHILIZED, FOR SOLUTION INTRAVENOUS at 03:50

## 2017-06-05 RX ADMIN — DOCUSATE SODIUM 100 MG: 100 CAPSULE, LIQUID FILLED ORAL at 09:41

## 2017-06-05 RX ADMIN — HYDROMORPHONE HYDROCHLORIDE 1 MG: 1 INJECTION, SOLUTION INTRAMUSCULAR; INTRAVENOUS; SUBCUTANEOUS at 02:25

## 2017-06-05 RX ADMIN — SODIUM PHOSPHATE, MONOBASIC, MONOHYDRATE AND SODIUM PHOSPHATE, DIBASIC, ANHYDROUS 30 MMOL: 276; 142 INJECTION, SOLUTION INTRAVENOUS at 10:00

## 2017-06-05 RX ADMIN — PANTOPRAZOLE SODIUM 20 MG: 20 TABLET, DELAYED RELEASE ORAL at 05:59

## 2017-06-05 RX ADMIN — HEPARIN SODIUM AND DEXTROSE 11.1 UNITS/KG/HR: 10000; 5 INJECTION INTRAVENOUS at 04:15

## 2017-06-05 RX ADMIN — LORATADINE 10 MG: 10 TABLET ORAL at 09:41

## 2017-06-05 RX ADMIN — DOCUSATE SODIUM 100 MG: 100 CAPSULE, LIQUID FILLED ORAL at 18:24

## 2017-06-05 RX ADMIN — LIDOCAINE HYDROCHLORIDE AND EPINEPHRINE 20 ML: 10; 10 INJECTION, SOLUTION INFILTRATION; PERINEURAL at 03:51

## 2017-06-05 RX ADMIN — VANCOMYCIN HYDROCHLORIDE 2000 MG: 1 INJECTION, POWDER, LYOPHILIZED, FOR SOLUTION INTRAVENOUS at 17:30

## 2017-06-05 RX ADMIN — FLUTICASONE PROPIONATE 2 PUFF: 220 AEROSOL, METERED RESPIRATORY (INHALATION) at 09:30

## 2017-06-05 RX ADMIN — HYDROMORPHONE HYDROCHLORIDE 1 MG: 1 INJECTION, SOLUTION INTRAMUSCULAR; INTRAVENOUS; SUBCUTANEOUS at 20:51

## 2017-06-05 RX ADMIN — HYDROMORPHONE HYDROCHLORIDE 1 MG: 1 INJECTION, SOLUTION INTRAMUSCULAR; INTRAVENOUS; SUBCUTANEOUS at 08:20

## 2017-06-05 RX ADMIN — ACETAMINOPHEN 650 MG: 325 TABLET, FILM COATED ORAL at 07:51

## 2017-06-05 RX ADMIN — SODIUM CHLORIDE: 0.9 INJECTION, SOLUTION INTRAVENOUS at 09:00

## 2017-06-05 RX ADMIN — LEVALBUTEROL HYDROCHLORIDE 1.25 MG: 1.25 SOLUTION, CONCENTRATE RESPIRATORY (INHALATION) at 09:50

## 2017-06-05 RX ADMIN — SODIUM CHLORIDE 90 ML/HR: 0.9 INJECTION, SOLUTION INTRAVENOUS at 01:24

## 2017-06-06 LAB
ALBUMIN SERPL BCP-MCNC: 1.9 G/DL (ref 3.5–5)
ALP SERPL-CCNC: 163 U/L (ref 46–116)
ALT SERPL W P-5'-P-CCNC: 66 U/L (ref 12–78)
ANION GAP SERPL CALCULATED.3IONS-SCNC: 8 MMOL/L (ref 4–13)
APTT PPP: 75 SECONDS (ref 23–35)
AST SERPL W P-5'-P-CCNC: 35 U/L (ref 5–45)
BASOPHILS # BLD AUTO: 0.03 THOUSANDS/ΜL (ref 0–0.1)
BASOPHILS NFR BLD AUTO: 0 % (ref 0–1)
BILIRUB SERPL-MCNC: 0.94 MG/DL (ref 0.2–1)
BUN SERPL-MCNC: 14 MG/DL (ref 5–25)
CALCIUM SERPL-MCNC: 7.5 MG/DL (ref 8.3–10.1)
CHLORIDE SERPL-SCNC: 107 MMOL/L (ref 100–108)
CO2 SERPL-SCNC: 23 MMOL/L (ref 21–32)
CREAT SERPL-MCNC: 1.21 MG/DL (ref 0.6–1.3)
EOSINOPHIL # BLD AUTO: 0.14 THOUSAND/ΜL (ref 0–0.61)
EOSINOPHIL NFR BLD AUTO: 1 % (ref 0–6)
ERYTHROCYTE [DISTWIDTH] IN BLOOD BY AUTOMATED COUNT: 15.4 % (ref 11.6–15.1)
EST. AVERAGE GLUCOSE BLD GHB EST-MCNC: 123 MG/DL
GFR SERPL CREATININE-BSD FRML MDRD: 46.7 ML/MIN/1.73SQ M
GLUCOSE SERPL-MCNC: 104 MG/DL (ref 65–140)
HBA1C MFR BLD: 5.9 % (ref 4.2–6.3)
HCT VFR BLD AUTO: 29.8 % (ref 34.8–46.1)
HGB BLD-MCNC: 9.3 G/DL (ref 11.5–15.4)
INR PPP: 1.73 (ref 0.86–1.16)
LYMPHOCYTES # BLD AUTO: 0.58 THOUSANDS/ΜL (ref 0.6–4.47)
LYMPHOCYTES NFR BLD AUTO: 4 % (ref 14–44)
MAGNESIUM SERPL-MCNC: 2 MG/DL (ref 1.6–2.6)
MCH RBC QN AUTO: 29.4 PG (ref 26.8–34.3)
MCHC RBC AUTO-ENTMCNC: 31.2 G/DL (ref 31.4–37.4)
MCV RBC AUTO: 94 FL (ref 82–98)
MONOCYTES # BLD AUTO: 0.99 THOUSAND/ΜL (ref 0.17–1.22)
MONOCYTES NFR BLD AUTO: 7 % (ref 4–12)
NEUTROPHILS # BLD AUTO: 11.87 THOUSANDS/ΜL (ref 1.85–7.62)
NEUTS SEG NFR BLD AUTO: 88 % (ref 43–75)
NRBC BLD AUTO-RTO: 0 /100 WBCS
PHOSPHATE SERPL-MCNC: 2.3 MG/DL (ref 2.7–4.5)
PLATELET # BLD AUTO: 280 THOUSANDS/UL (ref 149–390)
PMV BLD AUTO: 10.8 FL (ref 8.9–12.7)
POTASSIUM SERPL-SCNC: 3.8 MMOL/L (ref 3.5–5.3)
PROT SERPL-MCNC: 5.9 G/DL (ref 6.4–8.2)
PROTHROMBIN TIME: 20.4 SECONDS (ref 12.1–14.4)
RBC # BLD AUTO: 3.16 MILLION/UL (ref 3.81–5.12)
SODIUM SERPL-SCNC: 138 MMOL/L (ref 136–145)
VANCOMYCIN TROUGH SERPL-MCNC: 24.9 UG/ML (ref 10–20)
WBC # BLD AUTO: 13.87 THOUSAND/UL (ref 4.31–10.16)

## 2017-06-06 PROCEDURE — 83036 HEMOGLOBIN GLYCOSYLATED A1C: CPT | Performed by: INTERNAL MEDICINE

## 2017-06-06 PROCEDURE — 83735 ASSAY OF MAGNESIUM: CPT | Performed by: GENERAL PRACTICE

## 2017-06-06 PROCEDURE — 85610 PROTHROMBIN TIME: CPT | Performed by: PHYSICIAN ASSISTANT

## 2017-06-06 PROCEDURE — 85025 COMPLETE CBC W/AUTO DIFF WBC: CPT | Performed by: PHYSICIAN ASSISTANT

## 2017-06-06 PROCEDURE — 85025 COMPLETE CBC W/AUTO DIFF WBC: CPT | Performed by: GENERAL PRACTICE

## 2017-06-06 PROCEDURE — 80053 COMPREHEN METABOLIC PANEL: CPT | Performed by: GENERAL PRACTICE

## 2017-06-06 PROCEDURE — 85730 THROMBOPLASTIN TIME PARTIAL: CPT | Performed by: PHYSICIAN ASSISTANT

## 2017-06-06 PROCEDURE — 85610 PROTHROMBIN TIME: CPT | Performed by: HOSPITALIST

## 2017-06-06 PROCEDURE — 84100 ASSAY OF PHOSPHORUS: CPT | Performed by: GENERAL PRACTICE

## 2017-06-06 PROCEDURE — 80202 ASSAY OF VANCOMYCIN: CPT | Performed by: INTERNAL MEDICINE

## 2017-06-06 PROCEDURE — 85730 THROMBOPLASTIN TIME PARTIAL: CPT | Performed by: FAMILY MEDICINE

## 2017-06-06 RX ORDER — LEVALBUTEROL 1.25 MG/.5ML
1.25 SOLUTION, CONCENTRATE RESPIRATORY (INHALATION)
Status: DISCONTINUED | OUTPATIENT
Start: 2017-06-06 | End: 2017-06-06

## 2017-06-06 RX ORDER — GUAIFENESIN/DEXTROMETHORPHAN 100-10MG/5
10 SYRUP ORAL EVERY 4 HOURS PRN
Status: DISCONTINUED | OUTPATIENT
Start: 2017-06-06 | End: 2017-06-26 | Stop reason: HOSPADM

## 2017-06-06 RX ORDER — ALBUTEROL SULFATE 2.5 MG/3ML
2.5 SOLUTION RESPIRATORY (INHALATION) EVERY 4 HOURS PRN
Status: DISCONTINUED | OUTPATIENT
Start: 2017-06-06 | End: 2017-06-26 | Stop reason: HOSPADM

## 2017-06-06 RX ORDER — GUAIFENESIN 600 MG
600 TABLET, EXTENDED RELEASE 12 HR ORAL EVERY 12 HOURS SCHEDULED
Status: DISCONTINUED | OUTPATIENT
Start: 2017-06-06 | End: 2017-06-26 | Stop reason: HOSPADM

## 2017-06-06 RX ORDER — SODIUM PHOSPHATE, DIBASIC AND SODIUM PHOSPHATE, MONOBASIC 7; 19 G/133ML; G/133ML
1 ENEMA RECTAL DAILY PRN
Status: DISCONTINUED | OUTPATIENT
Start: 2017-06-06 | End: 2017-06-21

## 2017-06-06 RX ORDER — OXYMETAZOLINE HYDROCHLORIDE 0.05 G/100ML
3 SPRAY NASAL ONCE
Status: COMPLETED | OUTPATIENT
Start: 2017-06-06 | End: 2017-06-06

## 2017-06-06 RX ORDER — METOCLOPRAMIDE HYDROCHLORIDE 5 MG/ML
10 INJECTION INTRAMUSCULAR; INTRAVENOUS EVERY 6 HOURS PRN
Status: DISCONTINUED | OUTPATIENT
Start: 2017-06-06 | End: 2017-06-26 | Stop reason: HOSPADM

## 2017-06-06 RX ORDER — BISACODYL 10 MG
10 SUPPOSITORY, RECTAL RECTAL DAILY PRN
Status: DISCONTINUED | OUTPATIENT
Start: 2017-06-06 | End: 2017-06-26 | Stop reason: HOSPADM

## 2017-06-06 RX ORDER — METOCLOPRAMIDE HYDROCHLORIDE 5 MG/ML
10 INJECTION INTRAMUSCULAR; INTRAVENOUS EVERY 6 HOURS PRN
Status: DISCONTINUED | OUTPATIENT
Start: 2017-06-06 | End: 2017-06-06

## 2017-06-06 RX ORDER — POLYETHYLENE GLYCOL 3350 17 G/17G
17 POWDER, FOR SOLUTION ORAL DAILY
Status: DISPENSED | OUTPATIENT
Start: 2017-06-06 | End: 2017-06-09

## 2017-06-06 RX ORDER — PROCHLORPERAZINE MALEATE 5 MG/1
5 TABLET ORAL EVERY 6 HOURS PRN
Status: DISCONTINUED | OUTPATIENT
Start: 2017-06-06 | End: 2017-06-26 | Stop reason: HOSPADM

## 2017-06-06 RX ADMIN — LORATADINE 10 MG: 10 TABLET ORAL at 08:03

## 2017-06-06 RX ADMIN — CEFEPIME HYDROCHLORIDE 2000 MG: 2 INJECTION, POWDER, FOR SOLUTION INTRAVENOUS at 08:06

## 2017-06-06 RX ADMIN — ONDANSETRON 4 MG: 2 INJECTION INTRAMUSCULAR; INTRAVENOUS at 10:09

## 2017-06-06 RX ADMIN — FLUTICASONE PROPIONATE 2 PUFF: 220 AEROSOL, METERED RESPIRATORY (INHALATION) at 21:52

## 2017-06-06 RX ADMIN — PANTOPRAZOLE SODIUM 20 MG: 20 TABLET, DELAYED RELEASE ORAL at 05:56

## 2017-06-06 RX ADMIN — SODIUM PHOSPHATE 1 ENEMA: 7; 19 ENEMA RECTAL at 22:38

## 2017-06-06 RX ADMIN — VANCOMYCIN HYDROCHLORIDE 1500 MG: 1 INJECTION, POWDER, LYOPHILIZED, FOR SOLUTION INTRAVENOUS at 18:00

## 2017-06-06 RX ADMIN — ONDANSETRON 4 MG: 2 INJECTION INTRAMUSCULAR; INTRAVENOUS at 15:08

## 2017-06-06 RX ADMIN — FLUTICASONE PROPIONATE 2 PUFF: 220 AEROSOL, METERED RESPIRATORY (INHALATION) at 08:03

## 2017-06-06 RX ADMIN — VANCOMYCIN HYDROCHLORIDE 2000 MG: 1 INJECTION, POWDER, LYOPHILIZED, FOR SOLUTION INTRAVENOUS at 04:46

## 2017-06-06 RX ADMIN — GUAIFENESIN AND DEXTROMETHORPHAN 10 ML: 100; 10 SYRUP ORAL at 23:39

## 2017-06-06 RX ADMIN — SENNOSIDES 8.6 MG: 8.6 TABLET, FILM COATED ORAL at 08:03

## 2017-06-06 RX ADMIN — HEPARIN SODIUM AND DEXTROSE 11.1 UNITS/KG/HR: 10000; 5 INJECTION INTRAVENOUS at 00:11

## 2017-06-06 RX ADMIN — HYDROMORPHONE HYDROCHLORIDE 1 MG: 1 INJECTION, SOLUTION INTRAMUSCULAR; INTRAVENOUS; SUBCUTANEOUS at 15:41

## 2017-06-06 RX ADMIN — SODIUM CHLORIDE 500 ML: 0.9 INJECTION, SOLUTION INTRAVENOUS at 21:52

## 2017-06-06 RX ADMIN — GUAIFENESIN 600 MG: 600 TABLET, EXTENDED RELEASE ORAL at 10:07

## 2017-06-06 RX ADMIN — PROCHLORPERAZINE MALEATE 5 MG: 5 TABLET, FILM COATED ORAL at 16:59

## 2017-06-06 RX ADMIN — DOCUSATE SODIUM 100 MG: 100 CAPSULE, LIQUID FILLED ORAL at 08:03

## 2017-06-06 RX ADMIN — HEPARIN SODIUM AND DEXTROSE 11.1 UNITS/KG/HR: 10000; 5 INJECTION INTRAVENOUS at 16:59

## 2017-06-06 RX ADMIN — DOCUSATE SODIUM 100 MG: 100 CAPSULE, LIQUID FILLED ORAL at 17:00

## 2017-06-06 RX ADMIN — OXYMETAZOLINE HYDROCHLORIDE 3 SPRAY: 5 SPRAY NASAL at 23:50

## 2017-06-06 RX ADMIN — METOCLOPRAMIDE 10 MG: 5 INJECTION, SOLUTION INTRAMUSCULAR; INTRAVENOUS at 12:32

## 2017-06-06 RX ADMIN — BISACODYL 10 MG: 10 SUPPOSITORY RECTAL at 21:52

## 2017-06-07 ENCOUNTER — APPOINTMENT (INPATIENT)
Dept: RADIOLOGY | Facility: HOSPITAL | Age: 52
DRG: 711 | End: 2017-06-07
Payer: COMMERCIAL

## 2017-06-07 LAB
ANION GAP SERPL CALCULATED.3IONS-SCNC: 7 MMOL/L (ref 4–13)
APTT PPP: 45 SECONDS (ref 23–35)
APTT PPP: 62 SECONDS (ref 23–35)
APTT PPP: 63 SECONDS (ref 23–35)
APTT PPP: 82 SECONDS (ref 23–35)
BACTERIA BLD CULT: ABNORMAL
BACTERIA SPEC ANAEROBE CULT: NORMAL
BACTERIA WND AEROBE CULT: ABNORMAL
BASOPHILS # BLD AUTO: 0.02 THOUSANDS/ΜL (ref 0–0.1)
BASOPHILS # BLD AUTO: 0.03 THOUSANDS/ΜL (ref 0–0.1)
BASOPHILS NFR BLD AUTO: 0 % (ref 0–1)
BASOPHILS NFR BLD AUTO: 0 % (ref 0–1)
BUN SERPL-MCNC: 19 MG/DL (ref 5–25)
CALCIUM SERPL-MCNC: 7.9 MG/DL (ref 8.3–10.1)
CHLORIDE SERPL-SCNC: 107 MMOL/L (ref 100–108)
CO2 SERPL-SCNC: 25 MMOL/L (ref 21–32)
CREAT SERPL-MCNC: 0.78 MG/DL (ref 0.6–1.3)
EOSINOPHIL # BLD AUTO: 0.03 THOUSAND/ΜL (ref 0–0.61)
EOSINOPHIL # BLD AUTO: 0.04 THOUSAND/ΜL (ref 0–0.61)
EOSINOPHIL NFR BLD AUTO: 0 % (ref 0–6)
EOSINOPHIL NFR BLD AUTO: 0 % (ref 0–6)
ERYTHROCYTE [DISTWIDTH] IN BLOOD BY AUTOMATED COUNT: 15.5 % (ref 11.6–15.1)
ERYTHROCYTE [DISTWIDTH] IN BLOOD BY AUTOMATED COUNT: 15.5 % (ref 11.6–15.1)
GFR SERPL CREATININE-BSD FRML MDRD: >60 ML/MIN/1.73SQ M
GLUCOSE SERPL-MCNC: 153 MG/DL (ref 65–140)
GRAM STN SPEC: ABNORMAL
HCT VFR BLD AUTO: 26.8 % (ref 34.8–46.1)
HCT VFR BLD AUTO: 27.7 % (ref 34.8–46.1)
HCT VFR BLD AUTO: 28.7 % (ref 34.8–46.1)
HGB BLD-MCNC: 8.6 G/DL (ref 11.5–15.4)
HGB BLD-MCNC: 8.8 G/DL (ref 11.5–15.4)
HGB BLD-MCNC: 9 G/DL (ref 11.5–15.4)
INR PPP: 1.61 (ref 0.86–1.16)
LYMPHOCYTES # BLD AUTO: 0.57 THOUSANDS/ΜL (ref 0.6–4.47)
LYMPHOCYTES # BLD AUTO: 0.88 THOUSANDS/ΜL (ref 0.6–4.47)
LYMPHOCYTES NFR BLD AUTO: 5 % (ref 14–44)
LYMPHOCYTES NFR BLD AUTO: 8 % (ref 14–44)
MCH RBC QN AUTO: 29.4 PG (ref 26.8–34.3)
MCH RBC QN AUTO: 30.2 PG (ref 26.8–34.3)
MCHC RBC AUTO-ENTMCNC: 31.4 G/DL (ref 31.4–37.4)
MCHC RBC AUTO-ENTMCNC: 32.1 G/DL (ref 31.4–37.4)
MCV RBC AUTO: 94 FL (ref 82–98)
MCV RBC AUTO: 94 FL (ref 82–98)
MONOCYTES # BLD AUTO: 0.89 THOUSAND/ΜL (ref 0.17–1.22)
MONOCYTES # BLD AUTO: 1.09 THOUSAND/ΜL (ref 0.17–1.22)
MONOCYTES NFR BLD AUTO: 7 % (ref 4–12)
MONOCYTES NFR BLD AUTO: 9 % (ref 4–12)
NEUTROPHILS # BLD AUTO: 10.85 THOUSANDS/ΜL (ref 1.85–7.62)
NEUTROPHILS # BLD AUTO: 9.64 THOUSANDS/ΜL (ref 1.85–7.62)
NEUTS SEG NFR BLD AUTO: 83 % (ref 43–75)
NEUTS SEG NFR BLD AUTO: 88 % (ref 43–75)
NRBC BLD AUTO-RTO: 0 /100 WBCS
NRBC BLD AUTO-RTO: 0 /100 WBCS
PHOSPHATE SERPL-MCNC: 1.7 MG/DL (ref 2.7–4.5)
PLATELET # BLD AUTO: 285 THOUSANDS/UL (ref 149–390)
PLATELET # BLD AUTO: 303 THOUSANDS/UL (ref 149–390)
PMV BLD AUTO: 10.5 FL (ref 8.9–12.7)
PMV BLD AUTO: 11.5 FL (ref 8.9–12.7)
POTASSIUM SERPL-SCNC: 3.4 MMOL/L (ref 3.5–5.3)
PROTHROMBIN TIME: 19.3 SECONDS (ref 12.1–14.4)
RBC # BLD AUTO: 2.85 MILLION/UL (ref 3.81–5.12)
RBC # BLD AUTO: 3.06 MILLION/UL (ref 3.81–5.12)
SODIUM SERPL-SCNC: 139 MMOL/L (ref 136–145)
WBC # BLD AUTO: 11.78 THOUSAND/UL (ref 4.31–10.16)
WBC # BLD AUTO: 12.48 THOUSAND/UL (ref 4.31–10.16)

## 2017-06-07 PROCEDURE — 94640 AIRWAY INHALATION TREATMENT: CPT

## 2017-06-07 PROCEDURE — 87070 CULTURE OTHR SPECIMN AEROBIC: CPT | Performed by: HOSPITALIST

## 2017-06-07 PROCEDURE — 87147 CULTURE TYPE IMMUNOLOGIC: CPT | Performed by: INTERNAL MEDICINE

## 2017-06-07 PROCEDURE — 87040 BLOOD CULTURE FOR BACTERIA: CPT | Performed by: INTERNAL MEDICINE

## 2017-06-07 PROCEDURE — 85730 THROMBOPLASTIN TIME PARTIAL: CPT | Performed by: PHYSICIAN ASSISTANT

## 2017-06-07 PROCEDURE — 80048 BASIC METABOLIC PNL TOTAL CA: CPT | Performed by: HOSPITALIST

## 2017-06-07 PROCEDURE — 94760 N-INVAS EAR/PLS OXIMETRY 1: CPT

## 2017-06-07 PROCEDURE — 84100 ASSAY OF PHOSPHORUS: CPT | Performed by: HOSPITALIST

## 2017-06-07 PROCEDURE — 85018 HEMOGLOBIN: CPT | Performed by: PHYSICIAN ASSISTANT

## 2017-06-07 PROCEDURE — 87205 SMEAR GRAM STAIN: CPT | Performed by: HOSPITALIST

## 2017-06-07 PROCEDURE — 85025 COMPLETE CBC W/AUTO DIFF WBC: CPT | Performed by: HOSPITALIST

## 2017-06-07 PROCEDURE — 85730 THROMBOPLASTIN TIME PARTIAL: CPT | Performed by: HOSPITALIST

## 2017-06-07 PROCEDURE — 85014 HEMATOCRIT: CPT | Performed by: PHYSICIAN ASSISTANT

## 2017-06-07 PROCEDURE — 70491 CT SOFT TISSUE NECK W/DYE: CPT

## 2017-06-07 RX ORDER — HEPARIN SODIUM 1000 [USP'U]/ML
5000 INJECTION, SOLUTION INTRAVENOUS; SUBCUTANEOUS AS NEEDED
Status: DISCONTINUED | OUTPATIENT
Start: 2017-06-07 | End: 2017-06-24

## 2017-06-07 RX ORDER — HEPARIN SODIUM 10000 [USP'U]/100ML
3-20 INJECTION, SOLUTION INTRAVENOUS
Status: DISCONTINUED | OUTPATIENT
Start: 2017-06-07 | End: 2017-06-07

## 2017-06-07 RX ORDER — HEPARIN SODIUM 1000 [USP'U]/ML
10000 INJECTION, SOLUTION INTRAVENOUS; SUBCUTANEOUS AS NEEDED
Status: DISCONTINUED | OUTPATIENT
Start: 2017-06-07 | End: 2017-06-24

## 2017-06-07 RX ORDER — HEPARIN SODIUM 1000 [USP'U]/ML
4000 INJECTION, SOLUTION INTRAVENOUS; SUBCUTANEOUS AS NEEDED
Status: DISCONTINUED | OUTPATIENT
Start: 2017-06-07 | End: 2017-06-07

## 2017-06-07 RX ORDER — HEPARIN SODIUM 10000 [USP'U]/100ML
3-30 INJECTION, SOLUTION INTRAVENOUS
Status: DISPENSED | OUTPATIENT
Start: 2017-06-07 | End: 2017-06-23

## 2017-06-07 RX ORDER — POTASSIUM CHLORIDE 20 MEQ/1
40 TABLET, EXTENDED RELEASE ORAL ONCE
Status: DISCONTINUED | OUTPATIENT
Start: 2017-06-07 | End: 2017-06-07

## 2017-06-07 RX ORDER — HEPARIN SODIUM 1000 [USP'U]/ML
2000 INJECTION, SOLUTION INTRAVENOUS; SUBCUTANEOUS AS NEEDED
Status: DISCONTINUED | OUTPATIENT
Start: 2017-06-07 | End: 2017-06-07

## 2017-06-07 RX ADMIN — IOHEXOL 85 ML: 350 INJECTION, SOLUTION INTRAVENOUS at 14:51

## 2017-06-07 RX ADMIN — GUAIFENESIN 600 MG: 600 TABLET, EXTENDED RELEASE ORAL at 19:49

## 2017-06-07 RX ADMIN — DIBASIC SODIUM PHOSPHATE, MONOBASIC POTASSIUM PHOSPHATE AND MONOBASIC SODIUM PHOSPHATE 1 TABLET: 852; 155; 130 TABLET ORAL at 12:02

## 2017-06-07 RX ADMIN — FLUTICASONE PROPIONATE 2 PUFF: 220 AEROSOL, METERED RESPIRATORY (INHALATION) at 08:46

## 2017-06-07 RX ADMIN — METOCLOPRAMIDE 10 MG: 5 INJECTION, SOLUTION INTRAMUSCULAR; INTRAVENOUS at 01:45

## 2017-06-07 RX ADMIN — VANCOMYCIN HYDROCHLORIDE 1500 MG: 1 INJECTION, POWDER, LYOPHILIZED, FOR SOLUTION INTRAVENOUS at 19:48

## 2017-06-07 RX ADMIN — DOCUSATE SODIUM 100 MG: 100 CAPSULE, LIQUID FILLED ORAL at 08:45

## 2017-06-07 RX ADMIN — PANTOPRAZOLE SODIUM 20 MG: 20 TABLET, DELAYED RELEASE ORAL at 06:09

## 2017-06-07 RX ADMIN — HEPARIN SODIUM 13.1 UNITS/KG/HR: 10000 INJECTION, SOLUTION INTRAVENOUS at 19:45

## 2017-06-07 RX ADMIN — DIBASIC SODIUM PHOSPHATE, MONOBASIC POTASSIUM PHOSPHATE AND MONOBASIC SODIUM PHOSPHATE 1 TABLET: 852; 155; 130 TABLET ORAL at 17:48

## 2017-06-07 RX ADMIN — DOCUSATE SODIUM 100 MG: 100 CAPSULE, LIQUID FILLED ORAL at 17:48

## 2017-06-07 RX ADMIN — FLUTICASONE PROPIONATE 2 PUFF: 220 AEROSOL, METERED RESPIRATORY (INHALATION) at 20:06

## 2017-06-07 RX ADMIN — HEPARIN SODIUM AND DEXTROSE 11.1 UNITS/KG/HR: 10000; 5 INJECTION INTRAVENOUS at 01:53

## 2017-06-07 RX ADMIN — ALBUTEROL SULFATE 2.5 MG: 2.5 SOLUTION RESPIRATORY (INHALATION) at 13:42

## 2017-06-07 RX ADMIN — VANCOMYCIN HYDROCHLORIDE 1500 MG: 1 INJECTION, POWDER, LYOPHILIZED, FOR SOLUTION INTRAVENOUS at 06:09

## 2017-06-07 RX ADMIN — DIBASIC SODIUM PHOSPHATE, MONOBASIC POTASSIUM PHOSPHATE AND MONOBASIC SODIUM PHOSPHATE 1 TABLET: 852; 155; 130 TABLET ORAL at 08:45

## 2017-06-07 RX ADMIN — LORATADINE 10 MG: 10 TABLET ORAL at 08:45

## 2017-06-07 RX ADMIN — DIBASIC SODIUM PHOSPHATE, MONOBASIC POTASSIUM PHOSPHATE AND MONOBASIC SODIUM PHOSPHATE 1 TABLET: 852; 155; 130 TABLET ORAL at 19:49

## 2017-06-07 RX ADMIN — GUAIFENESIN 600 MG: 600 TABLET, EXTENDED RELEASE ORAL at 08:45

## 2017-06-08 ENCOUNTER — ANESTHESIA EVENT (INPATIENT)
Dept: PERIOP | Facility: HOSPITAL | Age: 52
DRG: 711 | End: 2017-06-08
Payer: COMMERCIAL

## 2017-06-08 ENCOUNTER — ANESTHESIA (INPATIENT)
Dept: PERIOP | Facility: HOSPITAL | Age: 52
DRG: 711 | End: 2017-06-08
Payer: COMMERCIAL

## 2017-06-08 LAB
ABO GROUP BLD: NORMAL
ALBUMIN SERPL BCP-MCNC: 1.6 G/DL (ref 3.5–5)
ALP SERPL-CCNC: 117 U/L (ref 46–116)
ALT SERPL W P-5'-P-CCNC: 57 U/L (ref 12–78)
ANION GAP SERPL CALCULATED.3IONS-SCNC: 7 MMOL/L (ref 4–13)
APTT PPP: 35 SECONDS (ref 23–35)
APTT PPP: 93 SECONDS (ref 23–35)
AST SERPL W P-5'-P-CCNC: 55 U/L (ref 5–45)
BASOPHILS # BLD AUTO: 0.03 THOUSANDS/ΜL (ref 0–0.1)
BASOPHILS NFR BLD AUTO: 0 % (ref 0–1)
BILIRUB DIRECT SERPL-MCNC: 0.63 MG/DL (ref 0–0.2)
BILIRUB SERPL-MCNC: 1.06 MG/DL (ref 0.2–1)
BLD GP AB SCN SERPL QL: NEGATIVE
BUN SERPL-MCNC: 14 MG/DL (ref 5–25)
CALCIUM SERPL-MCNC: 8.2 MG/DL (ref 8.3–10.1)
CHLORIDE SERPL-SCNC: 109 MMOL/L (ref 100–108)
CO2 SERPL-SCNC: 27 MMOL/L (ref 21–32)
CREAT SERPL-MCNC: 0.72 MG/DL (ref 0.6–1.3)
EOSINOPHIL # BLD AUTO: 0.14 THOUSAND/ΜL (ref 0–0.61)
EOSINOPHIL NFR BLD AUTO: 1 % (ref 0–6)
ERYTHROCYTE [DISTWIDTH] IN BLOOD BY AUTOMATED COUNT: 15.7 % (ref 11.6–15.1)
GFR SERPL CREATININE-BSD FRML MDRD: >60 ML/MIN/1.73SQ M
GLUCOSE SERPL-MCNC: 117 MG/DL (ref 65–140)
HCT VFR BLD AUTO: 26.1 % (ref 34.8–46.1)
HGB BLD-MCNC: 8.3 G/DL (ref 11.5–15.4)
LYMPHOCYTES # BLD AUTO: 1.22 THOUSANDS/ΜL (ref 0.6–4.47)
LYMPHOCYTES NFR BLD AUTO: 10 % (ref 14–44)
MCH RBC QN AUTO: 30 PG (ref 26.8–34.3)
MCHC RBC AUTO-ENTMCNC: 31.8 G/DL (ref 31.4–37.4)
MCV RBC AUTO: 94 FL (ref 82–98)
MONOCYTES # BLD AUTO: 1.29 THOUSAND/ΜL (ref 0.17–1.22)
MONOCYTES NFR BLD AUTO: 11 % (ref 4–12)
NEUTROPHILS # BLD AUTO: 9.25 THOUSANDS/ΜL (ref 1.85–7.62)
NEUTS SEG NFR BLD AUTO: 78 % (ref 43–75)
NRBC BLD AUTO-RTO: 0 /100 WBCS
PHOSPHATE SERPL-MCNC: 2.5 MG/DL (ref 2.7–4.5)
PLATELET # BLD AUTO: 352 THOUSANDS/UL (ref 149–390)
PMV BLD AUTO: 11.3 FL (ref 8.9–12.7)
POTASSIUM SERPL-SCNC: 3.3 MMOL/L (ref 3.5–5.3)
PROT SERPL-MCNC: 5.4 G/DL (ref 6.4–8.2)
RBC # BLD AUTO: 2.77 MILLION/UL (ref 3.81–5.12)
RH BLD: NEGATIVE
SODIUM SERPL-SCNC: 143 MMOL/L (ref 136–145)
SPECIMEN EXPIRATION DATE: NORMAL
WBC # BLD AUTO: 12.01 THOUSAND/UL (ref 4.31–10.16)

## 2017-06-08 PROCEDURE — 85025 COMPLETE CBC W/AUTO DIFF WBC: CPT | Performed by: HOSPITALIST

## 2017-06-08 PROCEDURE — 84100 ASSAY OF PHOSPHORUS: CPT | Performed by: HOSPITALIST

## 2017-06-08 PROCEDURE — 87147 CULTURE TYPE IMMUNOLOGIC: CPT | Performed by: OTOLARYNGOLOGY

## 2017-06-08 PROCEDURE — 87186 SC STD MICRODIL/AGAR DIL: CPT | Performed by: OTOLARYNGOLOGY

## 2017-06-08 PROCEDURE — 87075 CULTR BACTERIA EXCEPT BLOOD: CPT | Performed by: OTOLARYNGOLOGY

## 2017-06-08 PROCEDURE — 87070 CULTURE OTHR SPECIMN AEROBIC: CPT | Performed by: OTOLARYNGOLOGY

## 2017-06-08 PROCEDURE — 86900 BLOOD TYPING SEROLOGIC ABO: CPT | Performed by: SURGERY

## 2017-06-08 PROCEDURE — 85730 THROMBOPLASTIN TIME PARTIAL: CPT | Performed by: INTERNAL MEDICINE

## 2017-06-08 PROCEDURE — 80048 BASIC METABOLIC PNL TOTAL CA: CPT | Performed by: HOSPITALIST

## 2017-06-08 PROCEDURE — 87205 SMEAR GRAM STAIN: CPT | Performed by: OTOLARYNGOLOGY

## 2017-06-08 PROCEDURE — 86901 BLOOD TYPING SEROLOGIC RH(D): CPT | Performed by: SURGERY

## 2017-06-08 PROCEDURE — 88304 TISSUE EXAM BY PATHOLOGIST: CPT | Performed by: OTOLARYNGOLOGY

## 2017-06-08 PROCEDURE — 94760 N-INVAS EAR/PLS OXIMETRY 1: CPT

## 2017-06-08 PROCEDURE — 0K9200Z DRAINAGE OF RIGHT NECK MUSCLE WITH DRAINAGE DEVICE, OPEN APPROACH: ICD-10-PCS | Performed by: OTOLARYNGOLOGY

## 2017-06-08 PROCEDURE — 80076 HEPATIC FUNCTION PANEL: CPT | Performed by: HOSPITALIST

## 2017-06-08 PROCEDURE — 86850 RBC ANTIBODY SCREEN: CPT | Performed by: SURGERY

## 2017-06-08 RX ORDER — ALBUTEROL SULFATE 2.5 MG/3ML
2.5 SOLUTION RESPIRATORY (INHALATION) ONCE
Status: COMPLETED | OUTPATIENT
Start: 2017-06-08 | End: 2017-06-08

## 2017-06-08 RX ORDER — ONDANSETRON 2 MG/ML
4 INJECTION INTRAMUSCULAR; INTRAVENOUS EVERY 4 HOURS PRN
Status: DISCONTINUED | OUTPATIENT
Start: 2017-06-08 | End: 2017-06-08 | Stop reason: HOSPADM

## 2017-06-08 RX ORDER — SODIUM CHLORIDE, SODIUM LACTATE, POTASSIUM CHLORIDE, CALCIUM CHLORIDE 600; 310; 30; 20 MG/100ML; MG/100ML; MG/100ML; MG/100ML
50 INJECTION, SOLUTION INTRAVENOUS CONTINUOUS
Status: DISCONTINUED | OUTPATIENT
Start: 2017-06-08 | End: 2017-06-09

## 2017-06-08 RX ORDER — GINSENG 100 MG
CAPSULE ORAL AS NEEDED
Status: DISCONTINUED | OUTPATIENT
Start: 2017-06-08 | End: 2017-06-08 | Stop reason: HOSPADM

## 2017-06-08 RX ORDER — LIDOCAINE HYDROCHLORIDE 10 MG/ML
INJECTION, SOLUTION INFILTRATION; PERINEURAL AS NEEDED
Status: DISCONTINUED | OUTPATIENT
Start: 2017-06-08 | End: 2017-06-08 | Stop reason: SURG

## 2017-06-08 RX ORDER — MIDAZOLAM HYDROCHLORIDE 1 MG/ML
INJECTION INTRAMUSCULAR; INTRAVENOUS AS NEEDED
Status: DISCONTINUED | OUTPATIENT
Start: 2017-06-08 | End: 2017-06-08 | Stop reason: SURG

## 2017-06-08 RX ORDER — LIDOCAINE HYDROCHLORIDE AND EPINEPHRINE 10; 10 MG/ML; UG/ML
INJECTION, SOLUTION INFILTRATION; PERINEURAL AS NEEDED
Status: DISCONTINUED | OUTPATIENT
Start: 2017-06-08 | End: 2017-06-08 | Stop reason: HOSPADM

## 2017-06-08 RX ORDER — FENTANYL CITRATE/PF 50 MCG/ML
25 SYRINGE (ML) INJECTION
Status: DISCONTINUED | OUTPATIENT
Start: 2017-06-08 | End: 2017-06-08 | Stop reason: HOSPADM

## 2017-06-08 RX ORDER — POTASSIUM CHLORIDE 20 MEQ/1
40 TABLET, EXTENDED RELEASE ORAL ONCE
Status: COMPLETED | OUTPATIENT
Start: 2017-06-08 | End: 2017-06-08

## 2017-06-08 RX ORDER — FENTANYL CITRATE 50 UG/ML
INJECTION, SOLUTION INTRAMUSCULAR; INTRAVENOUS AS NEEDED
Status: DISCONTINUED | OUTPATIENT
Start: 2017-06-08 | End: 2017-06-08 | Stop reason: SURG

## 2017-06-08 RX ORDER — PROPOFOL 10 MG/ML
INJECTION, EMULSION INTRAVENOUS AS NEEDED
Status: DISCONTINUED | OUTPATIENT
Start: 2017-06-08 | End: 2017-06-08 | Stop reason: SURG

## 2017-06-08 RX ORDER — MAGNESIUM HYDROXIDE 1200 MG/15ML
LIQUID ORAL AS NEEDED
Status: DISCONTINUED | OUTPATIENT
Start: 2017-06-08 | End: 2017-06-08 | Stop reason: HOSPADM

## 2017-06-08 RX ORDER — ONDANSETRON 2 MG/ML
INJECTION INTRAMUSCULAR; INTRAVENOUS AS NEEDED
Status: DISCONTINUED | OUTPATIENT
Start: 2017-06-08 | End: 2017-06-08 | Stop reason: SURG

## 2017-06-08 RX ORDER — GLYCOPYRROLATE 0.2 MG/ML
INJECTION INTRAMUSCULAR; INTRAVENOUS AS NEEDED
Status: DISCONTINUED | OUTPATIENT
Start: 2017-06-08 | End: 2017-06-08 | Stop reason: SURG

## 2017-06-08 RX ORDER — SODIUM CHLORIDE 9 MG/ML
75 INJECTION, SOLUTION INTRAVENOUS CONTINUOUS
Status: DISCONTINUED | OUTPATIENT
Start: 2017-06-08 | End: 2017-06-09

## 2017-06-08 RX ADMIN — OXYCODONE HYDROCHLORIDE AND ACETAMINOPHEN 1 TABLET: 5; 325 TABLET ORAL at 22:14

## 2017-06-08 RX ADMIN — METOPROLOL TARTRATE 2.5 MG: 5 INJECTION, SOLUTION INTRAVENOUS at 19:14

## 2017-06-08 RX ADMIN — FENTANYL CITRATE 25 MCG: 50 INJECTION, SOLUTION INTRAMUSCULAR; INTRAVENOUS at 19:20

## 2017-06-08 RX ADMIN — POTASSIUM CHLORIDE 40 MEQ: 1500 TABLET, EXTENDED RELEASE ORAL at 10:13

## 2017-06-08 RX ADMIN — LORATADINE 10 MG: 10 TABLET ORAL at 08:24

## 2017-06-08 RX ADMIN — MIDAZOLAM HYDROCHLORIDE 1 MG: 1 INJECTION, SOLUTION INTRAMUSCULAR; INTRAVENOUS at 18:49

## 2017-06-08 RX ADMIN — ALBUTEROL SULFATE 2.5 MG: 2.5 SOLUTION RESPIRATORY (INHALATION) at 09:11

## 2017-06-08 RX ADMIN — HEPARIN SODIUM 11.1 UNITS/KG/HR: 10000 INJECTION, SOLUTION INTRAVENOUS at 20:40

## 2017-06-08 RX ADMIN — VANCOMYCIN HYDROCHLORIDE 1500 MG: 1 INJECTION, POWDER, LYOPHILIZED, FOR SOLUTION INTRAVENOUS at 06:15

## 2017-06-08 RX ADMIN — GLYCOPYRROLATE 0.1 MG: 0.2 INJECTION INTRAMUSCULAR; INTRAVENOUS at 18:49

## 2017-06-08 RX ADMIN — SODIUM CHLORIDE 75 ML/HR: 0.9 INJECTION, SOLUTION INTRAVENOUS at 05:17

## 2017-06-08 RX ADMIN — PROPOFOL 100 MG: 10 INJECTION, EMULSION INTRAVENOUS at 19:00

## 2017-06-08 RX ADMIN — DOCUSATE SODIUM 100 MG: 100 CAPSULE, LIQUID FILLED ORAL at 08:24

## 2017-06-08 RX ADMIN — GUAIFENESIN 600 MG: 600 TABLET, EXTENDED RELEASE ORAL at 08:24

## 2017-06-08 RX ADMIN — GUAIFENESIN 600 MG: 600 TABLET, EXTENDED RELEASE ORAL at 22:55

## 2017-06-08 RX ADMIN — ONDANSETRON 4 MG: 2 INJECTION INTRAMUSCULAR; INTRAVENOUS at 18:49

## 2017-06-08 RX ADMIN — FLUTICASONE PROPIONATE 2 PUFF: 220 AEROSOL, METERED RESPIRATORY (INHALATION) at 08:44

## 2017-06-08 RX ADMIN — GUAIFENESIN AND DEXTROMETHORPHAN 10 ML: 100; 10 SYRUP ORAL at 00:27

## 2017-06-08 RX ADMIN — DIBASIC SODIUM PHOSPHATE, MONOBASIC POTASSIUM PHOSPHATE AND MONOBASIC SODIUM PHOSPHATE 2 TABLET: 852; 155; 130 TABLET ORAL at 22:55

## 2017-06-08 RX ADMIN — SENNOSIDES 8.6 MG: 8.6 TABLET, FILM COATED ORAL at 08:24

## 2017-06-08 RX ADMIN — VANCOMYCIN HYDROCHLORIDE 1500 MG: 1 INJECTION, POWDER, LYOPHILIZED, FOR SOLUTION INTRAVENOUS at 19:05

## 2017-06-08 RX ADMIN — MIDAZOLAM HYDROCHLORIDE 1 MG: 1 INJECTION, SOLUTION INTRAMUSCULAR; INTRAVENOUS at 18:53

## 2017-06-08 RX ADMIN — OXYCODONE HYDROCHLORIDE AND ACETAMINOPHEN 1 TABLET: 5; 325 TABLET ORAL at 00:27

## 2017-06-08 RX ADMIN — SODIUM CHLORIDE 75 ML/HR: 0.9 INJECTION, SOLUTION INTRAVENOUS at 20:32

## 2017-06-08 RX ADMIN — DIBASIC SODIUM PHOSPHATE, MONOBASIC POTASSIUM PHOSPHATE AND MONOBASIC SODIUM PHOSPHATE 1 TABLET: 852; 155; 130 TABLET ORAL at 10:13

## 2017-06-08 RX ADMIN — FENTANYL CITRATE 25 MCG: 50 INJECTION, SOLUTION INTRAMUSCULAR; INTRAVENOUS at 19:08

## 2017-06-08 RX ADMIN — METOPROLOL TARTRATE 2.5 MG: 5 INJECTION, SOLUTION INTRAVENOUS at 19:25

## 2017-06-08 RX ADMIN — SODIUM CHLORIDE, SODIUM LACTATE, POTASSIUM CHLORIDE, AND CALCIUM CHLORIDE: .6; .31; .03; .02 INJECTION, SOLUTION INTRAVENOUS at 18:45

## 2017-06-08 RX ADMIN — PANTOPRAZOLE SODIUM 20 MG: 20 TABLET, DELAYED RELEASE ORAL at 05:17

## 2017-06-08 RX ADMIN — LIDOCAINE HYDROCHLORIDE 100 MG: 10 INJECTION, SOLUTION INFILTRATION; PERINEURAL at 19:00

## 2017-06-08 RX ADMIN — GUAIFENESIN AND DEXTROMETHORPHAN 10 ML: 100; 10 SYRUP ORAL at 22:14

## 2017-06-08 RX ADMIN — FENTANYL CITRATE 50 MCG: 50 INJECTION, SOLUTION INTRAMUSCULAR; INTRAVENOUS at 19:00

## 2017-06-08 RX ADMIN — ALBUTEROL SULFATE 2.5 MG: 2.5 SOLUTION RESPIRATORY (INHALATION) at 20:00

## 2017-06-09 ENCOUNTER — APPOINTMENT (INPATIENT)
Dept: RADIOLOGY | Facility: HOSPITAL | Age: 52
DRG: 711 | End: 2017-06-09
Attending: HOSPITALIST
Payer: COMMERCIAL

## 2017-06-09 LAB
ALBUMIN SERPL BCP-MCNC: 1.5 G/DL (ref 3.5–5)
ALP SERPL-CCNC: 105 U/L (ref 46–116)
ALT SERPL W P-5'-P-CCNC: 57 U/L (ref 12–78)
ANION GAP SERPL CALCULATED.3IONS-SCNC: 8 MMOL/L (ref 4–13)
ANION GAP SERPL CALCULATED.3IONS-SCNC: 8 MMOL/L (ref 4–13)
APTT PPP: 46 SECONDS (ref 23–35)
APTT PPP: 85 SECONDS (ref 23–35)
APTT PPP: 94 SECONDS (ref 23–35)
AST SERPL W P-5'-P-CCNC: 50 U/L (ref 5–45)
BACTERIA SPT RESP CULT: NORMAL
BASOPHILS # BLD AUTO: 0.05 THOUSANDS/ΜL (ref 0–0.1)
BASOPHILS NFR BLD AUTO: 0 % (ref 0–1)
BILIRUB DIRECT SERPL-MCNC: 0.5 MG/DL (ref 0–0.2)
BILIRUB SERPL-MCNC: 0.81 MG/DL (ref 0.2–1)
BUN SERPL-MCNC: 15 MG/DL (ref 5–25)
BUN SERPL-MCNC: 18 MG/DL (ref 5–25)
CALCIUM SERPL-MCNC: 6.8 MG/DL (ref 8.3–10.1)
CALCIUM SERPL-MCNC: 8.2 MG/DL (ref 8.3–10.1)
CHLORIDE SERPL-SCNC: 111 MMOL/L (ref 100–108)
CHLORIDE SERPL-SCNC: 113 MMOL/L (ref 100–108)
CO2 SERPL-SCNC: 24 MMOL/L (ref 21–32)
CO2 SERPL-SCNC: 25 MMOL/L (ref 21–32)
CREAT SERPL-MCNC: 0.88 MG/DL (ref 0.6–1.3)
CREAT SERPL-MCNC: 1.5 MG/DL (ref 0.6–1.3)
EOSINOPHIL # BLD AUTO: 0.26 THOUSAND/ΜL (ref 0–0.61)
EOSINOPHIL NFR BLD AUTO: 2 % (ref 0–6)
ERYTHROCYTE [DISTWIDTH] IN BLOOD BY AUTOMATED COUNT: 15.9 % (ref 11.6–15.1)
GFR SERPL CREATININE-BSD FRML MDRD: 36.5 ML/MIN/1.73SQ M
GFR SERPL CREATININE-BSD FRML MDRD: >60 ML/MIN/1.73SQ M
GLUCOSE SERPL-MCNC: 109 MG/DL (ref 65–140)
GLUCOSE SERPL-MCNC: 133 MG/DL (ref 65–140)
GRAM STN SPEC: NORMAL
HCT VFR BLD AUTO: 25.1 % (ref 34.8–46.1)
HGB BLD-MCNC: 7.9 G/DL (ref 11.5–15.4)
LYMPHOCYTES # BLD AUTO: 1.57 THOUSANDS/ΜL (ref 0.6–4.47)
LYMPHOCYTES NFR BLD AUTO: 10 % (ref 14–44)
MCH RBC QN AUTO: 29.6 PG (ref 26.8–34.3)
MCHC RBC AUTO-ENTMCNC: 31.5 G/DL (ref 31.4–37.4)
MCV RBC AUTO: 94 FL (ref 82–98)
MONOCYTES # BLD AUTO: 1.37 THOUSAND/ΜL (ref 0.17–1.22)
MONOCYTES NFR BLD AUTO: 9 % (ref 4–12)
NEUTROPHILS # BLD AUTO: 11.58 THOUSANDS/ΜL (ref 1.85–7.62)
NEUTS SEG NFR BLD AUTO: 79 % (ref 43–75)
NRBC BLD AUTO-RTO: 1 /100 WBCS
PLATELET # BLD AUTO: 393 THOUSANDS/UL (ref 149–390)
PMV BLD AUTO: 10.2 FL (ref 8.9–12.7)
POTASSIUM SERPL-SCNC: 3 MMOL/L (ref 3.5–5.3)
POTASSIUM SERPL-SCNC: 4.7 MMOL/L (ref 3.5–5.3)
PROT SERPL-MCNC: 4.9 G/DL (ref 6.4–8.2)
RBC # BLD AUTO: 2.67 MILLION/UL (ref 3.81–5.12)
SODIUM SERPL-SCNC: 143 MMOL/L (ref 136–145)
SODIUM SERPL-SCNC: 146 MMOL/L (ref 136–145)
WBC # BLD AUTO: 15.03 THOUSAND/UL (ref 4.31–10.16)

## 2017-06-09 PROCEDURE — 94760 N-INVAS EAR/PLS OXIMETRY 1: CPT | Performed by: SOCIAL WORKER

## 2017-06-09 PROCEDURE — 85025 COMPLETE CBC W/AUTO DIFF WBC: CPT | Performed by: HOSPITALIST

## 2017-06-09 PROCEDURE — 94640 AIRWAY INHALATION TREATMENT: CPT

## 2017-06-09 PROCEDURE — 85730 THROMBOPLASTIN TIME PARTIAL: CPT | Performed by: HOSPITALIST

## 2017-06-09 PROCEDURE — 94640 AIRWAY INHALATION TREATMENT: CPT | Performed by: SOCIAL WORKER

## 2017-06-09 PROCEDURE — 80048 BASIC METABOLIC PNL TOTAL CA: CPT | Performed by: HOSPITALIST

## 2017-06-09 PROCEDURE — 94760 N-INVAS EAR/PLS OXIMETRY 1: CPT

## 2017-06-09 PROCEDURE — 71020 HB CHEST X-RAY 2VW FRONTAL&LATL: CPT

## 2017-06-09 PROCEDURE — 80076 HEPATIC FUNCTION PANEL: CPT | Performed by: HOSPITALIST

## 2017-06-09 RX ORDER — POTASSIUM CHLORIDE 14.9 MG/ML
20 INJECTION INTRAVENOUS ONCE
Status: COMPLETED | OUTPATIENT
Start: 2017-06-09 | End: 2017-06-10

## 2017-06-09 RX ORDER — DEXTROSE AND SODIUM CHLORIDE 5; .45 G/100ML; G/100ML
150 INJECTION, SOLUTION INTRAVENOUS CONTINUOUS
Status: DISCONTINUED | OUTPATIENT
Start: 2017-06-09 | End: 2017-06-10

## 2017-06-09 RX ORDER — LEVALBUTEROL 1.25 MG/.5ML
1.25 SOLUTION, CONCENTRATE RESPIRATORY (INHALATION)
Status: DISCONTINUED | OUTPATIENT
Start: 2017-06-09 | End: 2017-06-09

## 2017-06-09 RX ORDER — DEXTROSE AND SODIUM CHLORIDE 5; .45 G/100ML; G/100ML
100 INJECTION, SOLUTION INTRAVENOUS CONTINUOUS
Status: DISCONTINUED | OUTPATIENT
Start: 2017-06-09 | End: 2017-06-09

## 2017-06-09 RX ORDER — POTASSIUM CHLORIDE 20 MEQ/1
40 TABLET, EXTENDED RELEASE ORAL ONCE
Status: COMPLETED | OUTPATIENT
Start: 2017-06-09 | End: 2017-06-09

## 2017-06-09 RX ORDER — SODIUM CHLORIDE FOR INHALATION 0.9 %
3 VIAL, NEBULIZER (ML) INHALATION
Status: DISCONTINUED | OUTPATIENT
Start: 2017-06-09 | End: 2017-06-09

## 2017-06-09 RX ORDER — LEVALBUTEROL 1.25 MG/.5ML
1.25 SOLUTION, CONCENTRATE RESPIRATORY (INHALATION)
Status: DISCONTINUED | OUTPATIENT
Start: 2017-06-09 | End: 2017-06-15

## 2017-06-09 RX ORDER — SODIUM CHLORIDE FOR INHALATION 0.9 %
3 VIAL, NEBULIZER (ML) INHALATION
Status: DISCONTINUED | OUTPATIENT
Start: 2017-06-09 | End: 2017-06-15

## 2017-06-09 RX ADMIN — DIBASIC SODIUM PHOSPHATE, MONOBASIC POTASSIUM PHOSPHATE AND MONOBASIC SODIUM PHOSPHATE 1 TABLET: 852; 155; 130 TABLET ORAL at 09:48

## 2017-06-09 RX ADMIN — LEVALBUTEROL HYDROCHLORIDE 1.25 MG: 1.25 SOLUTION, CONCENTRATE RESPIRATORY (INHALATION) at 19:38

## 2017-06-09 RX ADMIN — GUAIFENESIN 600 MG: 600 TABLET, EXTENDED RELEASE ORAL at 09:47

## 2017-06-09 RX ADMIN — POTASSIUM CHLORIDE 40 MEQ: 1500 TABLET, EXTENDED RELEASE ORAL at 05:19

## 2017-06-09 RX ADMIN — DEXTROSE AND SODIUM CHLORIDE 150 ML/HR: 5; .45 INJECTION, SOLUTION INTRAVENOUS at 17:41

## 2017-06-09 RX ADMIN — LEVALBUTEROL HYDROCHLORIDE 1.25 MG: 1.25 SOLUTION, CONCENTRATE RESPIRATORY (INHALATION) at 13:32

## 2017-06-09 RX ADMIN — FLUTICASONE PROPIONATE 2 PUFF: 220 AEROSOL, METERED RESPIRATORY (INHALATION) at 20:14

## 2017-06-09 RX ADMIN — HEPARIN SODIUM 13.1 UNITS/KG/HR: 10000 INJECTION, SOLUTION INTRAVENOUS at 15:00

## 2017-06-09 RX ADMIN — POTASSIUM CHLORIDE 20 MEQ: 200 INJECTION, SOLUTION INTRAVENOUS at 05:15

## 2017-06-09 RX ADMIN — LORATADINE 10 MG: 10 TABLET ORAL at 09:47

## 2017-06-09 RX ADMIN — POTASSIUM CHLORIDE 20 MEQ: 200 INJECTION, SOLUTION INTRAVENOUS at 09:47

## 2017-06-09 RX ADMIN — FLUTICASONE PROPIONATE 2 PUFF: 220 AEROSOL, METERED RESPIRATORY (INHALATION) at 09:48

## 2017-06-09 RX ADMIN — DOCUSATE SODIUM 100 MG: 100 CAPSULE, LIQUID FILLED ORAL at 09:47

## 2017-06-09 RX ADMIN — VANCOMYCIN HYDROCHLORIDE 1500 MG: 1 INJECTION, POWDER, LYOPHILIZED, FOR SOLUTION INTRAVENOUS at 06:27

## 2017-06-09 RX ADMIN — ISODIUM CHLORIDE 3 ML: 0.03 SOLUTION RESPIRATORY (INHALATION) at 13:32

## 2017-06-09 RX ADMIN — HYDROMORPHONE HYDROCHLORIDE 1 MG: 1 INJECTION, SOLUTION INTRAMUSCULAR; INTRAVENOUS; SUBCUTANEOUS at 02:27

## 2017-06-09 RX ADMIN — GUAIFENESIN AND DEXTROMETHORPHAN 10 ML: 100; 10 SYRUP ORAL at 17:50

## 2017-06-09 RX ADMIN — GUAIFENESIN AND DEXTROMETHORPHAN 10 ML: 100; 10 SYRUP ORAL at 09:47

## 2017-06-09 RX ADMIN — ISODIUM CHLORIDE 3 ML: 0.03 SOLUTION RESPIRATORY (INHALATION) at 19:38

## 2017-06-09 RX ADMIN — GUAIFENESIN 600 MG: 600 TABLET, EXTENDED RELEASE ORAL at 20:14

## 2017-06-09 RX ADMIN — ALBUTEROL SULFATE 2.5 MG: 2.5 SOLUTION RESPIRATORY (INHALATION) at 08:57

## 2017-06-09 RX ADMIN — VANCOMYCIN HYDROCHLORIDE 1500 MG: 1 INJECTION, POWDER, LYOPHILIZED, FOR SOLUTION INTRAVENOUS at 23:40

## 2017-06-09 RX ADMIN — HEPARIN SODIUM 5000 UNITS: 1000 INJECTION, SOLUTION INTRAVENOUS; SUBCUTANEOUS at 10:08

## 2017-06-09 RX ADMIN — DOCUSATE SODIUM 100 MG: 100 CAPSULE, LIQUID FILLED ORAL at 17:50

## 2017-06-09 RX ADMIN — SENNOSIDES 8.6 MG: 8.6 TABLET, FILM COATED ORAL at 09:47

## 2017-06-09 RX ADMIN — PANTOPRAZOLE SODIUM 20 MG: 20 TABLET, DELAYED RELEASE ORAL at 05:19

## 2017-06-10 LAB
ANION GAP SERPL CALCULATED.3IONS-SCNC: 8 MMOL/L (ref 4–13)
ANISOCYTOSIS BLD QL SMEAR: PRESENT
APTT PPP: 128 SECONDS (ref 23–35)
APTT PPP: 51 SECONDS (ref 23–35)
APTT PPP: 53 SECONDS (ref 23–35)
APTT PPP: 97 SECONDS (ref 23–35)
BACTERIA BLD CULT: NORMAL
BACTERIA SPEC ANAEROBE CULT: NORMAL
BASOPHILS # BLD MANUAL: 0 THOUSAND/UL (ref 0–0.1)
BASOPHILS NFR MAR MANUAL: 0 % (ref 0–1)
BUN SERPL-MCNC: 18 MG/DL (ref 5–25)
CALCIUM SERPL-MCNC: 8 MG/DL (ref 8.3–10.1)
CHLORIDE SERPL-SCNC: 110 MMOL/L (ref 100–108)
CO2 SERPL-SCNC: 26 MMOL/L (ref 21–32)
CREAT SERPL-MCNC: 1.73 MG/DL (ref 0.6–1.3)
CREAT UR-MCNC: 85.1 MG/DL
EOSINOPHIL # BLD MANUAL: 0.25 THOUSAND/UL (ref 0–0.4)
EOSINOPHIL NFR BLD MANUAL: 2 % (ref 0–6)
ERYTHROCYTE [DISTWIDTH] IN BLOOD BY AUTOMATED COUNT: 16.5 % (ref 11.6–15.1)
GFR SERPL CREATININE-BSD FRML MDRD: 30.9 ML/MIN/1.73SQ M
GLUCOSE SERPL-MCNC: 108 MG/DL (ref 65–140)
GRAM STN SPEC: NORMAL
HCT VFR BLD AUTO: 22.6 % (ref 34.8–46.1)
HCT VFR BLD AUTO: 24.6 % (ref 34.8–46.1)
HGB BLD-MCNC: 7.1 G/DL (ref 11.5–15.4)
HGB BLD-MCNC: 7.7 G/DL (ref 11.5–15.4)
LYMPHOCYTES # BLD AUTO: 1.77 THOUSAND/UL (ref 0.6–4.47)
LYMPHOCYTES # BLD AUTO: 14 % (ref 14–44)
MCH RBC QN AUTO: 29.5 PG (ref 26.8–34.3)
MCHC RBC AUTO-ENTMCNC: 31.4 G/DL (ref 31.4–37.4)
MCV RBC AUTO: 94 FL (ref 82–98)
METAMYELOCYTES NFR BLD MANUAL: 3 % (ref 0–1)
MONOCYTES # BLD AUTO: 1.77 THOUSAND/UL (ref 0–1.22)
MONOCYTES NFR BLD: 14 % (ref 4–12)
MYELOCYTES NFR BLD MANUAL: 1 % (ref 0–1)
NEUTROPHILS # BLD MANUAL: 8.34 THOUSAND/UL (ref 1.85–7.62)
NEUTS BAND NFR BLD MANUAL: 1 % (ref 0–8)
NEUTS SEG NFR BLD AUTO: 65 % (ref 43–75)
NRBC BLD AUTO-RTO: 1 /100 WBCS
NRBC BLD AUTO-RTO: 2 /100 WBC (ref 0–2)
PLATELET # BLD AUTO: 466 THOUSANDS/UL (ref 149–390)
PLATELET BLD QL SMEAR: ABNORMAL
PMV BLD AUTO: 10.4 FL (ref 8.9–12.7)
POIKILOCYTOSIS BLD QL SMEAR: PRESENT
POLYCHROMASIA BLD QL SMEAR: PRESENT
POTASSIUM SERPL-SCNC: 3.7 MMOL/L (ref 3.5–5.3)
RBC # BLD AUTO: 2.41 MILLION/UL (ref 3.81–5.12)
RBC MORPH BLD: PRESENT
SODIUM 24H UR-SCNC: 30 MOL/L
SODIUM SERPL-SCNC: 144 MMOL/L (ref 136–145)
VANCOMYCIN TROUGH SERPL-MCNC: 35.2 UG/ML (ref 10–20)
WBC # BLD AUTO: 12.63 THOUSAND/UL (ref 4.31–10.16)

## 2017-06-10 PROCEDURE — 94760 N-INVAS EAR/PLS OXIMETRY 1: CPT

## 2017-06-10 PROCEDURE — 85014 HEMATOCRIT: CPT | Performed by: HOSPITALIST

## 2017-06-10 PROCEDURE — 85730 THROMBOPLASTIN TIME PARTIAL: CPT | Performed by: HOSPITALIST

## 2017-06-10 PROCEDURE — 82570 ASSAY OF URINE CREATININE: CPT | Performed by: HOSPITALIST

## 2017-06-10 PROCEDURE — 94640 AIRWAY INHALATION TREATMENT: CPT

## 2017-06-10 PROCEDURE — 85018 HEMOGLOBIN: CPT | Performed by: HOSPITALIST

## 2017-06-10 PROCEDURE — 80048 BASIC METABOLIC PNL TOTAL CA: CPT | Performed by: HOSPITALIST

## 2017-06-10 PROCEDURE — 85007 BL SMEAR W/DIFF WBC COUNT: CPT | Performed by: HOSPITALIST

## 2017-06-10 PROCEDURE — 87040 BLOOD CULTURE FOR BACTERIA: CPT | Performed by: INTERNAL MEDICINE

## 2017-06-10 PROCEDURE — 84300 ASSAY OF URINE SODIUM: CPT | Performed by: HOSPITALIST

## 2017-06-10 PROCEDURE — 85027 COMPLETE CBC AUTOMATED: CPT | Performed by: HOSPITALIST

## 2017-06-10 PROCEDURE — 80202 ASSAY OF VANCOMYCIN: CPT | Performed by: INTERNAL MEDICINE

## 2017-06-10 RX ADMIN — OXYCODONE HYDROCHLORIDE AND ACETAMINOPHEN 1 TABLET: 5; 325 TABLET ORAL at 15:19

## 2017-06-10 RX ADMIN — MENTHOL 5.4 MG: 5.4 LOZENGE ORAL at 15:54

## 2017-06-10 RX ADMIN — MENTHOL 5.4 MG: 5.4 LOZENGE ORAL at 21:30

## 2017-06-10 RX ADMIN — LEVALBUTEROL HYDROCHLORIDE 1.25 MG: 1.25 SOLUTION, CONCENTRATE RESPIRATORY (INHALATION) at 19:30

## 2017-06-10 RX ADMIN — ISODIUM CHLORIDE 3 ML: 0.03 SOLUTION RESPIRATORY (INHALATION) at 07:31

## 2017-06-10 RX ADMIN — GUAIFENESIN AND DEXTROMETHORPHAN 10 ML: 100; 10 SYRUP ORAL at 21:30

## 2017-06-10 RX ADMIN — FLUTICASONE PROPIONATE 2 PUFF: 220 AEROSOL, METERED RESPIRATORY (INHALATION) at 21:30

## 2017-06-10 RX ADMIN — LEVALBUTEROL HYDROCHLORIDE 1.25 MG: 1.25 SOLUTION, CONCENTRATE RESPIRATORY (INHALATION) at 07:31

## 2017-06-10 RX ADMIN — OXYCODONE HYDROCHLORIDE AND ACETAMINOPHEN 1 TABLET: 5; 325 TABLET ORAL at 06:31

## 2017-06-10 RX ADMIN — DOCUSATE SODIUM 100 MG: 100 CAPSULE, LIQUID FILLED ORAL at 17:04

## 2017-06-10 RX ADMIN — SENNOSIDES 8.6 MG: 8.6 TABLET, FILM COATED ORAL at 08:46

## 2017-06-10 RX ADMIN — HEPARIN SODIUM 5000 UNITS: 1000 INJECTION, SOLUTION INTRAVENOUS; SUBCUTANEOUS at 01:23

## 2017-06-10 RX ADMIN — PANTOPRAZOLE SODIUM 20 MG: 20 TABLET, DELAYED RELEASE ORAL at 06:31

## 2017-06-10 RX ADMIN — GUAIFENESIN AND DEXTROMETHORPHAN 10 ML: 100; 10 SYRUP ORAL at 15:19

## 2017-06-10 RX ADMIN — GUAIFENESIN 600 MG: 600 TABLET, EXTENDED RELEASE ORAL at 08:46

## 2017-06-10 RX ADMIN — DOCUSATE SODIUM 100 MG: 100 CAPSULE, LIQUID FILLED ORAL at 08:46

## 2017-06-10 RX ADMIN — ISODIUM CHLORIDE 3 ML: 0.03 SOLUTION RESPIRATORY (INHALATION) at 19:30

## 2017-06-10 RX ADMIN — LEVALBUTEROL HYDROCHLORIDE 1.25 MG: 1.25 SOLUTION, CONCENTRATE RESPIRATORY (INHALATION) at 13:49

## 2017-06-10 RX ADMIN — HEPARIN SODIUM 5000 UNITS: 1000 INJECTION, SOLUTION INTRAVENOUS; SUBCUTANEOUS at 16:30

## 2017-06-10 RX ADMIN — HEPARIN SODIUM 13.1 UNITS/KG/HR: 10000 INJECTION, SOLUTION INTRAVENOUS at 06:37

## 2017-06-10 RX ADMIN — FLUTICASONE PROPIONATE 2 PUFF: 220 AEROSOL, METERED RESPIRATORY (INHALATION) at 08:46

## 2017-06-10 RX ADMIN — GUAIFENESIN AND DEXTROMETHORPHAN 10 ML: 100; 10 SYRUP ORAL at 06:32

## 2017-06-10 RX ADMIN — LORATADINE 10 MG: 10 TABLET ORAL at 08:46

## 2017-06-10 RX ADMIN — GUAIFENESIN 600 MG: 600 TABLET, EXTENDED RELEASE ORAL at 21:30

## 2017-06-10 RX ADMIN — ISODIUM CHLORIDE 3 ML: 0.03 SOLUTION RESPIRATORY (INHALATION) at 13:49

## 2017-06-11 LAB
ANION GAP SERPL CALCULATED.3IONS-SCNC: 7 MMOL/L (ref 4–13)
ANISOCYTOSIS BLD QL SMEAR: PRESENT
APTT PPP: 173 SECONDS (ref 23–35)
APTT PPP: 41 SECONDS (ref 23–35)
APTT PPP: 75 SECONDS (ref 23–35)
BACTERIA WND AEROBE CULT: ABNORMAL
BASOPHILS # BLD MANUAL: 0 THOUSAND/UL (ref 0–0.1)
BASOPHILS NFR MAR MANUAL: 0 % (ref 0–1)
BUN SERPL-MCNC: 15 MG/DL (ref 5–25)
CALCIUM SERPL-MCNC: 8.1 MG/DL (ref 8.3–10.1)
CHLORIDE SERPL-SCNC: 110 MMOL/L (ref 100–108)
CO2 SERPL-SCNC: 27 MMOL/L (ref 21–32)
CREAT SERPL-MCNC: 1.75 MG/DL (ref 0.6–1.3)
EOSINOPHIL # BLD MANUAL: 0.36 THOUSAND/UL (ref 0–0.4)
EOSINOPHIL NFR BLD MANUAL: 3 % (ref 0–6)
ERYTHROCYTE [DISTWIDTH] IN BLOOD BY AUTOMATED COUNT: 16.9 % (ref 11.6–15.1)
GFR SERPL CREATININE-BSD FRML MDRD: 30.5 ML/MIN/1.73SQ M
GLUCOSE SERPL-MCNC: 93 MG/DL (ref 65–140)
GRAM STN SPEC: ABNORMAL
GRAM STN SPEC: ABNORMAL
HCT VFR BLD AUTO: 22.7 % (ref 34.8–46.1)
HGB BLD-MCNC: 7.3 G/DL (ref 11.5–15.4)
LYMPHOCYTES # BLD AUTO: 0.96 THOUSAND/UL (ref 0.6–4.47)
LYMPHOCYTES # BLD AUTO: 8 % (ref 14–44)
MCH RBC QN AUTO: 30.5 PG (ref 26.8–34.3)
MCHC RBC AUTO-ENTMCNC: 32.2 G/DL (ref 31.4–37.4)
MCV RBC AUTO: 95 FL (ref 82–98)
MONOCYTES # BLD AUTO: 0.72 THOUSAND/UL (ref 0–1.22)
MONOCYTES NFR BLD: 6 % (ref 4–12)
NEUTROPHILS # BLD MANUAL: 9.98 THOUSAND/UL (ref 1.85–7.62)
NEUTS BAND NFR BLD MANUAL: 1 % (ref 0–8)
NEUTS SEG NFR BLD AUTO: 82 % (ref 43–75)
NRBC BLD AUTO-RTO: 0 /100 WBCS
PHOSPHATE SERPL-MCNC: 3.7 MG/DL (ref 2.7–4.5)
PLATELET # BLD AUTO: 491 THOUSANDS/UL (ref 149–390)
PLATELET BLD QL SMEAR: ABNORMAL
PMV BLD AUTO: 10.5 FL (ref 8.9–12.7)
POLYCHROMASIA BLD QL SMEAR: PRESENT
POTASSIUM SERPL-SCNC: 3.6 MMOL/L (ref 3.5–5.3)
RBC # BLD AUTO: 2.39 MILLION/UL (ref 3.81–5.12)
RBC MORPH BLD: PRESENT
SODIUM SERPL-SCNC: 144 MMOL/L (ref 136–145)
VANCOMYCIN SERPL-MCNC: 22.4 UG/ML
WBC # BLD AUTO: 12.02 THOUSAND/UL (ref 4.31–10.16)

## 2017-06-11 PROCEDURE — 85730 THROMBOPLASTIN TIME PARTIAL: CPT | Performed by: INTERNAL MEDICINE

## 2017-06-11 PROCEDURE — 84100 ASSAY OF PHOSPHORUS: CPT | Performed by: HOSPITALIST

## 2017-06-11 PROCEDURE — 94640 AIRWAY INHALATION TREATMENT: CPT

## 2017-06-11 PROCEDURE — 85730 THROMBOPLASTIN TIME PARTIAL: CPT | Performed by: HOSPITALIST

## 2017-06-11 PROCEDURE — 80202 ASSAY OF VANCOMYCIN: CPT | Performed by: INTERNAL MEDICINE

## 2017-06-11 PROCEDURE — 80048 BASIC METABOLIC PNL TOTAL CA: CPT | Performed by: HOSPITALIST

## 2017-06-11 PROCEDURE — 94760 N-INVAS EAR/PLS OXIMETRY 1: CPT

## 2017-06-11 PROCEDURE — 85007 BL SMEAR W/DIFF WBC COUNT: CPT | Performed by: HOSPITALIST

## 2017-06-11 PROCEDURE — 85027 COMPLETE CBC AUTOMATED: CPT | Performed by: HOSPITALIST

## 2017-06-11 RX ORDER — POTASSIUM CHLORIDE 20 MEQ/1
40 TABLET, EXTENDED RELEASE ORAL ONCE
Status: COMPLETED | OUTPATIENT
Start: 2017-06-11 | End: 2017-06-11

## 2017-06-11 RX ORDER — VANCOMYCIN HYDROCHLORIDE 1 G/200ML
1000 INJECTION, SOLUTION INTRAVENOUS EVERY 12 HOURS
Status: DISCONTINUED | OUTPATIENT
Start: 2017-06-11 | End: 2017-06-11

## 2017-06-11 RX ADMIN — FLUTICASONE PROPIONATE 2 PUFF: 220 AEROSOL, METERED RESPIRATORY (INHALATION) at 08:11

## 2017-06-11 RX ADMIN — POTASSIUM CHLORIDE 40 MEQ: 1500 TABLET, EXTENDED RELEASE ORAL at 10:01

## 2017-06-11 RX ADMIN — GUAIFENESIN 600 MG: 600 TABLET, EXTENDED RELEASE ORAL at 20:16

## 2017-06-11 RX ADMIN — ISODIUM CHLORIDE 3 ML: 0.03 SOLUTION RESPIRATORY (INHALATION) at 13:46

## 2017-06-11 RX ADMIN — HEPARIN SODIUM 10000 UNITS: 1000 INJECTION, SOLUTION INTRAVENOUS; SUBCUTANEOUS at 07:57

## 2017-06-11 RX ADMIN — LORATADINE 10 MG: 10 TABLET ORAL at 08:10

## 2017-06-11 RX ADMIN — MENTHOL 5.4 MG: 5.4 LOZENGE ORAL at 08:15

## 2017-06-11 RX ADMIN — BISACODYL 10 MG: 10 SUPPOSITORY RECTAL at 08:10

## 2017-06-11 RX ADMIN — LEVALBUTEROL HYDROCHLORIDE 1.25 MG: 1.25 SOLUTION, CONCENTRATE RESPIRATORY (INHALATION) at 19:37

## 2017-06-11 RX ADMIN — ISODIUM CHLORIDE 3 ML: 0.03 SOLUTION RESPIRATORY (INHALATION) at 07:19

## 2017-06-11 RX ADMIN — HEPARIN SODIUM 11.12 UNITS/KG/HR: 10000 INJECTION, SOLUTION INTRAVENOUS at 20:17

## 2017-06-11 RX ADMIN — GUAIFENESIN AND DEXTROMETHORPHAN 10 ML: 100; 10 SYRUP ORAL at 06:26

## 2017-06-11 RX ADMIN — LEVALBUTEROL HYDROCHLORIDE 1.25 MG: 1.25 SOLUTION, CONCENTRATE RESPIRATORY (INHALATION) at 07:19

## 2017-06-11 RX ADMIN — SENNOSIDES 8.6 MG: 8.6 TABLET, FILM COATED ORAL at 08:10

## 2017-06-11 RX ADMIN — PANTOPRAZOLE SODIUM 20 MG: 20 TABLET, DELAYED RELEASE ORAL at 06:26

## 2017-06-11 RX ADMIN — GUAIFENESIN AND DEXTROMETHORPHAN 10 ML: 100; 10 SYRUP ORAL at 20:16

## 2017-06-11 RX ADMIN — ISODIUM CHLORIDE 3 ML: 0.03 SOLUTION RESPIRATORY (INHALATION) at 19:37

## 2017-06-11 RX ADMIN — CEFTAROLINE FOSAMIL 600 MG: 600 POWDER, FOR SOLUTION INTRAVENOUS at 10:02

## 2017-06-11 RX ADMIN — HEPARIN SODIUM 10.1 UNITS/KG/HR: 10000 INJECTION, SOLUTION INTRAVENOUS at 02:50

## 2017-06-11 RX ADMIN — GUAIFENESIN 600 MG: 600 TABLET, EXTENDED RELEASE ORAL at 08:10

## 2017-06-11 RX ADMIN — LEVALBUTEROL HYDROCHLORIDE 1.25 MG: 1.25 SOLUTION, CONCENTRATE RESPIRATORY (INHALATION) at 13:46

## 2017-06-11 RX ADMIN — FLUTICASONE PROPIONATE 2 PUFF: 220 AEROSOL, METERED RESPIRATORY (INHALATION) at 20:17

## 2017-06-11 RX ADMIN — DOCUSATE SODIUM 100 MG: 100 CAPSULE, LIQUID FILLED ORAL at 08:10

## 2017-06-11 RX ADMIN — GUAIFENESIN AND DEXTROMETHORPHAN 10 ML: 100; 10 SYRUP ORAL at 12:49

## 2017-06-11 RX ADMIN — CEFTAROLINE FOSAMIL 600 MG: 600 POWDER, FOR SOLUTION INTRAVENOUS at 23:38

## 2017-06-11 RX ADMIN — ACETAMINOPHEN 650 MG: 325 TABLET, FILM COATED ORAL at 10:08

## 2017-06-12 LAB
ANION GAP SERPL CALCULATED.3IONS-SCNC: 8 MMOL/L (ref 4–13)
APTT PPP: 79 SECONDS (ref 23–35)
BACTERIA BLD CULT: NORMAL
BASOPHILS # BLD MANUAL: 0.13 THOUSAND/UL (ref 0–0.1)
BASOPHILS NFR MAR MANUAL: 1 % (ref 0–1)
BUN SERPL-MCNC: 14 MG/DL (ref 5–25)
CALCIUM SERPL-MCNC: 8.2 MG/DL (ref 8.3–10.1)
CHLORIDE SERPL-SCNC: 111 MMOL/L (ref 100–108)
CO2 SERPL-SCNC: 27 MMOL/L (ref 21–32)
CREAT SERPL-MCNC: 1.69 MG/DL (ref 0.6–1.3)
EOSINOPHIL # BLD MANUAL: 0.13 THOUSAND/UL (ref 0–0.4)
EOSINOPHIL NFR BLD MANUAL: 1 % (ref 0–6)
ERYTHROCYTE [DISTWIDTH] IN BLOOD BY AUTOMATED COUNT: 17 % (ref 11.6–15.1)
GFR SERPL CREATININE-BSD FRML MDRD: 31.8 ML/MIN/1.73SQ M
GLUCOSE SERPL-MCNC: 97 MG/DL (ref 65–140)
HCT VFR BLD AUTO: 23.3 % (ref 34.8–46.1)
HGB BLD-MCNC: 7.3 G/DL (ref 11.5–15.4)
LYMPHOCYTES # BLD AUTO: 1.25 THOUSAND/UL (ref 0.6–4.47)
LYMPHOCYTES # BLD AUTO: 10 % (ref 14–44)
MCH RBC QN AUTO: 29.9 PG (ref 26.8–34.3)
MCHC RBC AUTO-ENTMCNC: 31.3 G/DL (ref 31.4–37.4)
MCV RBC AUTO: 96 FL (ref 82–98)
METAMYELOCYTES NFR BLD MANUAL: 2 % (ref 0–1)
MONOCYTES # BLD AUTO: 0.25 THOUSAND/UL (ref 0–1.22)
MONOCYTES NFR BLD: 2 % (ref 4–12)
MYELOCYTES NFR BLD MANUAL: 1 % (ref 0–1)
NEUTROPHILS # BLD MANUAL: 10.38 THOUSAND/UL (ref 1.85–7.62)
NEUTS BAND NFR BLD MANUAL: 1 % (ref 0–8)
NEUTS SEG NFR BLD AUTO: 82 % (ref 43–75)
NRBC BLD AUTO-RTO: 1 /100 WBCS
PLATELET # BLD AUTO: 530 THOUSANDS/UL (ref 149–390)
PLATELET BLD QL SMEAR: ABNORMAL
PMV BLD AUTO: 10.1 FL (ref 8.9–12.7)
POTASSIUM SERPL-SCNC: 3.9 MMOL/L (ref 3.5–5.3)
RBC # BLD AUTO: 2.44 MILLION/UL (ref 3.81–5.12)
SODIUM SERPL-SCNC: 146 MMOL/L (ref 136–145)
TOTAL CELLS COUNTED SPEC: 100
WBC # BLD AUTO: 12.51 THOUSAND/UL (ref 4.31–10.16)

## 2017-06-12 PROCEDURE — 85007 BL SMEAR W/DIFF WBC COUNT: CPT | Performed by: HOSPITALIST

## 2017-06-12 PROCEDURE — 94640 AIRWAY INHALATION TREATMENT: CPT

## 2017-06-12 PROCEDURE — 80048 BASIC METABOLIC PNL TOTAL CA: CPT | Performed by: HOSPITALIST

## 2017-06-12 PROCEDURE — 85730 THROMBOPLASTIN TIME PARTIAL: CPT | Performed by: HOSPITALIST

## 2017-06-12 PROCEDURE — 85027 COMPLETE CBC AUTOMATED: CPT | Performed by: HOSPITALIST

## 2017-06-12 PROCEDURE — 94760 N-INVAS EAR/PLS OXIMETRY 1: CPT

## 2017-06-12 RX ORDER — SODIUM CHLORIDE 450 MG/100ML
50 INJECTION, SOLUTION INTRAVENOUS CONTINUOUS
Status: DISCONTINUED | OUTPATIENT
Start: 2017-06-12 | End: 2017-06-17

## 2017-06-12 RX ADMIN — FLUTICASONE PROPIONATE 2 PUFF: 220 AEROSOL, METERED RESPIRATORY (INHALATION) at 10:10

## 2017-06-12 RX ADMIN — FLUTICASONE PROPIONATE 2 PUFF: 220 AEROSOL, METERED RESPIRATORY (INHALATION) at 20:17

## 2017-06-12 RX ADMIN — LEVALBUTEROL HYDROCHLORIDE 1.25 MG: 1.25 SOLUTION, CONCENTRATE RESPIRATORY (INHALATION) at 07:40

## 2017-06-12 RX ADMIN — CEFTAROLINE FOSAMIL 600 MG: 600 POWDER, FOR SOLUTION INTRAVENOUS at 20:15

## 2017-06-12 RX ADMIN — GUAIFENESIN 600 MG: 600 TABLET, EXTENDED RELEASE ORAL at 10:09

## 2017-06-12 RX ADMIN — ISODIUM CHLORIDE 3 ML: 0.03 SOLUTION RESPIRATORY (INHALATION) at 14:12

## 2017-06-12 RX ADMIN — GUAIFENESIN AND DEXTROMETHORPHAN 10 ML: 100; 10 SYRUP ORAL at 20:12

## 2017-06-12 RX ADMIN — LEVALBUTEROL HYDROCHLORIDE 1.25 MG: 1.25 SOLUTION, CONCENTRATE RESPIRATORY (INHALATION) at 14:12

## 2017-06-12 RX ADMIN — ACETAMINOPHEN 650 MG: 325 TABLET, FILM COATED ORAL at 20:12

## 2017-06-12 RX ADMIN — CEFTAROLINE FOSAMIL 600 MG: 600 POWDER, FOR SOLUTION INTRAVENOUS at 10:09

## 2017-06-12 RX ADMIN — PANTOPRAZOLE SODIUM 20 MG: 20 TABLET, DELAYED RELEASE ORAL at 05:30

## 2017-06-12 RX ADMIN — HEPARIN SODIUM 11.1 UNITS/KG/HR: 10000 INJECTION, SOLUTION INTRAVENOUS at 10:09

## 2017-06-12 RX ADMIN — GUAIFENESIN 600 MG: 600 TABLET, EXTENDED RELEASE ORAL at 20:12

## 2017-06-12 RX ADMIN — ISODIUM CHLORIDE 3 ML: 0.03 SOLUTION RESPIRATORY (INHALATION) at 07:40

## 2017-06-12 RX ADMIN — ISODIUM CHLORIDE 3 ML: 0.03 SOLUTION RESPIRATORY (INHALATION) at 19:16

## 2017-06-12 RX ADMIN — SODIUM CHLORIDE 50 ML/HR: 0.45 INJECTION, SOLUTION INTRAVENOUS at 12:21

## 2017-06-12 RX ADMIN — DOCUSATE SODIUM 100 MG: 100 CAPSULE, LIQUID FILLED ORAL at 10:10

## 2017-06-12 RX ADMIN — LORATADINE 10 MG: 10 TABLET ORAL at 10:10

## 2017-06-12 RX ADMIN — LEVALBUTEROL HYDROCHLORIDE 1.25 MG: 1.25 SOLUTION, CONCENTRATE RESPIRATORY (INHALATION) at 19:16

## 2017-06-12 RX ADMIN — ACETAMINOPHEN 650 MG: 325 TABLET, FILM COATED ORAL at 05:40

## 2017-06-12 RX ADMIN — SENNOSIDES 8.6 MG: 8.6 TABLET, FILM COATED ORAL at 10:10

## 2017-06-13 PROBLEM — I21.A1 NON-ST ELEVATION MYOCARDIAL INFARCTION (NSTEMI), TYPE 2: Status: ACTIVE | Noted: 2017-05-25

## 2017-06-13 LAB
ANION GAP SERPL CALCULATED.3IONS-SCNC: 8 MMOL/L (ref 4–13)
APTT PPP: 109 SECONDS (ref 23–35)
APTT PPP: 58 SECONDS (ref 23–35)
APTT PPP: 94 SECONDS (ref 23–35)
BASOPHILS # BLD AUTO: 0.05 THOUSANDS/ΜL (ref 0–0.1)
BASOPHILS NFR BLD AUTO: 1 % (ref 0–1)
BUN SERPL-MCNC: 12 MG/DL (ref 5–25)
CALCIUM SERPL-MCNC: 7.8 MG/DL (ref 8.3–10.1)
CHLORIDE SERPL-SCNC: 109 MMOL/L (ref 100–108)
CO2 SERPL-SCNC: 24 MMOL/L (ref 21–32)
CREAT SERPL-MCNC: 1.66 MG/DL (ref 0.6–1.3)
EOSINOPHIL # BLD AUTO: 0.18 THOUSAND/ΜL (ref 0–0.61)
EOSINOPHIL NFR BLD AUTO: 2 % (ref 0–6)
ERYTHROCYTE [DISTWIDTH] IN BLOOD BY AUTOMATED COUNT: 17.2 % (ref 11.6–15.1)
GFR SERPL CREATININE-BSD FRML MDRD: 32.4 ML/MIN/1.73SQ M
GLUCOSE SERPL-MCNC: 107 MG/DL (ref 65–140)
HCT VFR BLD AUTO: 24.1 % (ref 34.8–46.1)
HGB BLD-MCNC: 7.4 G/DL (ref 11.5–15.4)
LYMPHOCYTES # BLD AUTO: 1.29 THOUSANDS/ΜL (ref 0.6–4.47)
LYMPHOCYTES NFR BLD AUTO: 12 % (ref 14–44)
MCH RBC QN AUTO: 29.5 PG (ref 26.8–34.3)
MCHC RBC AUTO-ENTMCNC: 30.7 G/DL (ref 31.4–37.4)
MCV RBC AUTO: 96 FL (ref 82–98)
MONOCYTES # BLD AUTO: 0.85 THOUSAND/ΜL (ref 0.17–1.22)
MONOCYTES NFR BLD AUTO: 8 % (ref 4–12)
NEUTROPHILS # BLD AUTO: 8 THOUSANDS/ΜL (ref 1.85–7.62)
NEUTS SEG NFR BLD AUTO: 77 % (ref 43–75)
NRBC BLD AUTO-RTO: 1 /100 WBCS
PLATELET # BLD AUTO: 542 THOUSANDS/UL (ref 149–390)
PMV BLD AUTO: 10 FL (ref 8.9–12.7)
POTASSIUM SERPL-SCNC: 3.7 MMOL/L (ref 3.5–5.3)
RBC # BLD AUTO: 2.51 MILLION/UL (ref 3.81–5.12)
SODIUM SERPL-SCNC: 141 MMOL/L (ref 136–145)
WBC # BLD AUTO: 10.57 THOUSAND/UL (ref 4.31–10.16)

## 2017-06-13 PROCEDURE — 85730 THROMBOPLASTIN TIME PARTIAL: CPT | Performed by: HOSPITALIST

## 2017-06-13 PROCEDURE — 80048 BASIC METABOLIC PNL TOTAL CA: CPT | Performed by: HOSPITALIST

## 2017-06-13 PROCEDURE — 85025 COMPLETE CBC W/AUTO DIFF WBC: CPT | Performed by: HOSPITALIST

## 2017-06-13 PROCEDURE — 94640 AIRWAY INHALATION TREATMENT: CPT

## 2017-06-13 PROCEDURE — 94760 N-INVAS EAR/PLS OXIMETRY 1: CPT

## 2017-06-13 PROCEDURE — 85730 THROMBOPLASTIN TIME PARTIAL: CPT | Performed by: FAMILY MEDICINE

## 2017-06-13 RX ADMIN — CEFTAROLINE FOSAMIL 600 MG: 600 POWDER, FOR SOLUTION INTRAVENOUS at 21:25

## 2017-06-13 RX ADMIN — ISODIUM CHLORIDE 3 ML: 0.03 SOLUTION RESPIRATORY (INHALATION) at 19:04

## 2017-06-13 RX ADMIN — GUAIFENESIN AND DEXTROMETHORPHAN 10 ML: 100; 10 SYRUP ORAL at 21:52

## 2017-06-13 RX ADMIN — LEVALBUTEROL HYDROCHLORIDE 1.25 MG: 1.25 SOLUTION, CONCENTRATE RESPIRATORY (INHALATION) at 19:04

## 2017-06-13 RX ADMIN — GUAIFENESIN 600 MG: 600 TABLET, EXTENDED RELEASE ORAL at 08:54

## 2017-06-13 RX ADMIN — FLUTICASONE PROPIONATE 2 PUFF: 220 AEROSOL, METERED RESPIRATORY (INHALATION) at 08:57

## 2017-06-13 RX ADMIN — ISODIUM CHLORIDE 3 ML: 0.03 SOLUTION RESPIRATORY (INHALATION) at 13:58

## 2017-06-13 RX ADMIN — PANTOPRAZOLE SODIUM 20 MG: 20 TABLET, DELAYED RELEASE ORAL at 05:00

## 2017-06-13 RX ADMIN — HEPARIN SODIUM 11.1 UNITS/KG/HR: 10000 INJECTION, SOLUTION INTRAVENOUS at 02:35

## 2017-06-13 RX ADMIN — ACETAMINOPHEN 650 MG: 325 TABLET, FILM COATED ORAL at 08:54

## 2017-06-13 RX ADMIN — CEFTAROLINE FOSAMIL 600 MG: 600 POWDER, FOR SOLUTION INTRAVENOUS at 09:00

## 2017-06-13 RX ADMIN — GUAIFENESIN AND DEXTROMETHORPHAN 10 ML: 100; 10 SYRUP ORAL at 08:54

## 2017-06-13 RX ADMIN — LEVALBUTEROL HYDROCHLORIDE 1.25 MG: 1.25 SOLUTION, CONCENTRATE RESPIRATORY (INHALATION) at 07:39

## 2017-06-13 RX ADMIN — ISODIUM CHLORIDE 3 ML: 0.03 SOLUTION RESPIRATORY (INHALATION) at 07:39

## 2017-06-13 RX ADMIN — HEPARIN SODIUM 9.1 UNITS/KG/HR: 10000 INJECTION, SOLUTION INTRAVENOUS at 23:12

## 2017-06-13 RX ADMIN — GUAIFENESIN AND DEXTROMETHORPHAN 10 ML: 100; 10 SYRUP ORAL at 03:24

## 2017-06-13 RX ADMIN — FLUTICASONE PROPIONATE 2 PUFF: 220 AEROSOL, METERED RESPIRATORY (INHALATION) at 21:25

## 2017-06-13 RX ADMIN — HEPARIN SODIUM 5000 UNITS: 1000 INJECTION, SOLUTION INTRAVENOUS; SUBCUTANEOUS at 14:28

## 2017-06-13 RX ADMIN — GUAIFENESIN 600 MG: 600 TABLET, EXTENDED RELEASE ORAL at 21:25

## 2017-06-13 RX ADMIN — LEVALBUTEROL HYDROCHLORIDE 1.25 MG: 1.25 SOLUTION, CONCENTRATE RESPIRATORY (INHALATION) at 13:58

## 2017-06-13 RX ADMIN — LORATADINE 10 MG: 10 TABLET ORAL at 08:54

## 2017-06-14 PROBLEM — R11.2 NAUSEA & VOMITING: Status: ACTIVE | Noted: 2017-06-14

## 2017-06-14 LAB
ABO GROUP BLD: NORMAL
ANION GAP SERPL CALCULATED.3IONS-SCNC: 7 MMOL/L (ref 4–13)
ANION GAP SERPL CALCULATED.3IONS-SCNC: 8 MMOL/L (ref 4–13)
APTT PPP: 173 SECONDS (ref 23–35)
APTT PPP: 39 SECONDS (ref 23–35)
APTT PPP: 40 SECONDS (ref 23–35)
APTT PPP: >210 SECONDS (ref 23–35)
BASOPHILS # BLD AUTO: 0.04 THOUSANDS/ΜL (ref 0–0.1)
BASOPHILS # BLD AUTO: 0.04 THOUSANDS/ΜL (ref 0–0.1)
BASOPHILS NFR BLD AUTO: 0 % (ref 0–1)
BASOPHILS NFR BLD AUTO: 0 % (ref 0–1)
BLD GP AB SCN SERPL QL: NEGATIVE
BUN SERPL-MCNC: 10 MG/DL (ref 5–25)
BUN SERPL-MCNC: 9 MG/DL (ref 5–25)
CALCIUM SERPL-MCNC: 7.1 MG/DL (ref 8.3–10.1)
CALCIUM SERPL-MCNC: 8.2 MG/DL (ref 8.3–10.1)
CHLORIDE SERPL-SCNC: 105 MMOL/L (ref 100–108)
CHLORIDE SERPL-SCNC: 108 MMOL/L (ref 100–108)
CO2 SERPL-SCNC: 23 MMOL/L (ref 21–32)
CO2 SERPL-SCNC: 27 MMOL/L (ref 21–32)
CREAT SERPL-MCNC: 1.51 MG/DL (ref 0.6–1.3)
CREAT SERPL-MCNC: 1.74 MG/DL (ref 0.6–1.3)
EOSINOPHIL # BLD AUTO: 0.15 THOUSAND/ΜL (ref 0–0.61)
EOSINOPHIL # BLD AUTO: 0.21 THOUSAND/ΜL (ref 0–0.61)
EOSINOPHIL NFR BLD AUTO: 2 % (ref 0–6)
EOSINOPHIL NFR BLD AUTO: 2 % (ref 0–6)
ERYTHROCYTE [DISTWIDTH] IN BLOOD BY AUTOMATED COUNT: 16.9 % (ref 11.6–15.1)
ERYTHROCYTE [DISTWIDTH] IN BLOOD BY AUTOMATED COUNT: 17.2 % (ref 11.6–15.1)
GFR SERPL CREATININE-BSD FRML MDRD: 30.7 ML/MIN/1.73SQ M
GFR SERPL CREATININE-BSD FRML MDRD: 36.2 ML/MIN/1.73SQ M
GLUCOSE SERPL-MCNC: 100 MG/DL (ref 65–140)
GLUCOSE SERPL-MCNC: 171 MG/DL (ref 65–140)
HCT VFR BLD AUTO: 20.7 % (ref 34.8–46.1)
HCT VFR BLD AUTO: 25.5 % (ref 34.8–46.1)
HGB BLD-MCNC: 6.3 G/DL (ref 11.5–15.4)
HGB BLD-MCNC: 7.7 G/DL (ref 11.5–15.4)
LYMPHOCYTES # BLD AUTO: 1.22 THOUSANDS/ΜL (ref 0.6–4.47)
LYMPHOCYTES # BLD AUTO: 1.61 THOUSANDS/ΜL (ref 0.6–4.47)
LYMPHOCYTES NFR BLD AUTO: 12 % (ref 14–44)
LYMPHOCYTES NFR BLD AUTO: 13 % (ref 14–44)
MCH RBC QN AUTO: 29.1 PG (ref 26.8–34.3)
MCH RBC QN AUTO: 29.6 PG (ref 26.8–34.3)
MCHC RBC AUTO-ENTMCNC: 30.2 G/DL (ref 31.4–37.4)
MCHC RBC AUTO-ENTMCNC: 30.4 G/DL (ref 31.4–37.4)
MCV RBC AUTO: 96 FL (ref 82–98)
MCV RBC AUTO: 97 FL (ref 82–98)
MONOCYTES # BLD AUTO: 0.79 THOUSAND/ΜL (ref 0.17–1.22)
MONOCYTES # BLD AUTO: 0.8 THOUSAND/ΜL (ref 0.17–1.22)
MONOCYTES NFR BLD AUTO: 6 % (ref 4–12)
MONOCYTES NFR BLD AUTO: 8 % (ref 4–12)
NEUTROPHILS # BLD AUTO: 7.7 THOUSANDS/ΜL (ref 1.85–7.62)
NEUTROPHILS # BLD AUTO: 9.72 THOUSANDS/ΜL (ref 1.85–7.62)
NEUTS SEG NFR BLD AUTO: 78 % (ref 43–75)
NEUTS SEG NFR BLD AUTO: 79 % (ref 43–75)
NRBC BLD AUTO-RTO: 0 /100 WBCS
NRBC BLD AUTO-RTO: 0 /100 WBCS
PLATELET # BLD AUTO: 456 THOUSANDS/UL (ref 149–390)
PLATELET # BLD AUTO: 603 THOUSANDS/UL (ref 149–390)
PMV BLD AUTO: 9.3 FL (ref 8.9–12.7)
PMV BLD AUTO: 9.4 FL (ref 8.9–12.7)
POTASSIUM SERPL-SCNC: 3.4 MMOL/L (ref 3.5–5.3)
POTASSIUM SERPL-SCNC: 3.8 MMOL/L (ref 3.5–5.3)
RBC # BLD AUTO: 2.13 MILLION/UL (ref 3.81–5.12)
RBC # BLD AUTO: 2.65 MILLION/UL (ref 3.81–5.12)
RH BLD: NEGATIVE
SODIUM SERPL-SCNC: 136 MMOL/L (ref 136–145)
SODIUM SERPL-SCNC: 142 MMOL/L (ref 136–145)
SPECIMEN EXPIRATION DATE: NORMAL
WBC # BLD AUTO: 10.01 THOUSAND/UL (ref 4.31–10.16)
WBC # BLD AUTO: 12.52 THOUSAND/UL (ref 4.31–10.16)

## 2017-06-14 PROCEDURE — 94640 AIRWAY INHALATION TREATMENT: CPT

## 2017-06-14 PROCEDURE — 86900 BLOOD TYPING SEROLOGIC ABO: CPT | Performed by: PHYSICIAN ASSISTANT

## 2017-06-14 PROCEDURE — 94760 N-INVAS EAR/PLS OXIMETRY 1: CPT

## 2017-06-14 PROCEDURE — 86923 COMPATIBILITY TEST ELECTRIC: CPT

## 2017-06-14 PROCEDURE — 85025 COMPLETE CBC W/AUTO DIFF WBC: CPT | Performed by: PHYSICIAN ASSISTANT

## 2017-06-14 PROCEDURE — 85730 THROMBOPLASTIN TIME PARTIAL: CPT | Performed by: FAMILY MEDICINE

## 2017-06-14 PROCEDURE — 85025 COMPLETE CBC W/AUTO DIFF WBC: CPT | Performed by: FAMILY MEDICINE

## 2017-06-14 PROCEDURE — 80048 BASIC METABOLIC PNL TOTAL CA: CPT | Performed by: FAMILY MEDICINE

## 2017-06-14 PROCEDURE — 86901 BLOOD TYPING SEROLOGIC RH(D): CPT | Performed by: PHYSICIAN ASSISTANT

## 2017-06-14 PROCEDURE — 86850 RBC ANTIBODY SCREEN: CPT | Performed by: PHYSICIAN ASSISTANT

## 2017-06-14 PROCEDURE — 30233N1 TRANSFUSION OF NONAUTOLOGOUS RED BLOOD CELLS INTO PERIPHERAL VEIN, PERCUTANEOUS APPROACH: ICD-10-PCS | Performed by: HOSPITALIST

## 2017-06-14 RX ADMIN — ISODIUM CHLORIDE 3 ML: 0.03 SOLUTION RESPIRATORY (INHALATION) at 08:24

## 2017-06-14 RX ADMIN — ISODIUM CHLORIDE 3 ML: 0.03 SOLUTION RESPIRATORY (INHALATION) at 13:54

## 2017-06-14 RX ADMIN — GUAIFENESIN 600 MG: 600 TABLET, EXTENDED RELEASE ORAL at 22:31

## 2017-06-14 RX ADMIN — GUAIFENESIN AND DEXTROMETHORPHAN 10 ML: 100; 10 SYRUP ORAL at 17:45

## 2017-06-14 RX ADMIN — FLUTICASONE PROPIONATE 2 PUFF: 220 AEROSOL, METERED RESPIRATORY (INHALATION) at 08:49

## 2017-06-14 RX ADMIN — PANTOPRAZOLE SODIUM 20 MG: 20 TABLET, DELAYED RELEASE ORAL at 05:02

## 2017-06-14 RX ADMIN — SENNOSIDES 8.6 MG: 8.6 TABLET, FILM COATED ORAL at 08:48

## 2017-06-14 RX ADMIN — SODIUM CHLORIDE 50 ML/HR: 0.45 INJECTION, SOLUTION INTRAVENOUS at 17:37

## 2017-06-14 RX ADMIN — GUAIFENESIN AND DEXTROMETHORPHAN 10 ML: 100; 10 SYRUP ORAL at 05:02

## 2017-06-14 RX ADMIN — SODIUM CHLORIDE 50 ML/HR: 0.45 INJECTION, SOLUTION INTRAVENOUS at 04:42

## 2017-06-14 RX ADMIN — LEVALBUTEROL HYDROCHLORIDE 1.25 MG: 1.25 SOLUTION, CONCENTRATE RESPIRATORY (INHALATION) at 19:16

## 2017-06-14 RX ADMIN — HEPARIN SODIUM 10000 UNITS: 1000 INJECTION, SOLUTION INTRAVENOUS; SUBCUTANEOUS at 11:54

## 2017-06-14 RX ADMIN — LEVALBUTEROL HYDROCHLORIDE 1.25 MG: 1.25 SOLUTION, CONCENTRATE RESPIRATORY (INHALATION) at 08:25

## 2017-06-14 RX ADMIN — DOCUSATE SODIUM 100 MG: 100 CAPSULE, LIQUID FILLED ORAL at 08:49

## 2017-06-14 RX ADMIN — HEPARIN SODIUM 13.12 UNITS/KG/HR: 10000 INJECTION, SOLUTION INTRAVENOUS at 17:37

## 2017-06-14 RX ADMIN — FLUTICASONE PROPIONATE 2 PUFF: 220 AEROSOL, METERED RESPIRATORY (INHALATION) at 22:30

## 2017-06-14 RX ADMIN — CEFTAROLINE FOSAMIL 600 MG: 600 POWDER, FOR SOLUTION INTRAVENOUS at 17:31

## 2017-06-14 RX ADMIN — LORATADINE 10 MG: 10 TABLET ORAL at 08:48

## 2017-06-14 RX ADMIN — CEFTAROLINE FOSAMIL 600 MG: 600 POWDER, FOR SOLUTION INTRAVENOUS at 08:39

## 2017-06-14 RX ADMIN — DOCUSATE SODIUM 100 MG: 100 CAPSULE, LIQUID FILLED ORAL at 17:31

## 2017-06-14 RX ADMIN — ISODIUM CHLORIDE 3 ML: 0.03 SOLUTION RESPIRATORY (INHALATION) at 19:16

## 2017-06-14 RX ADMIN — LEVALBUTEROL HYDROCHLORIDE 1.25 MG: 1.25 SOLUTION, CONCENTRATE RESPIRATORY (INHALATION) at 13:54

## 2017-06-14 RX ADMIN — GUAIFENESIN 600 MG: 600 TABLET, EXTENDED RELEASE ORAL at 08:48

## 2017-06-15 LAB
ANION GAP SERPL CALCULATED.3IONS-SCNC: 7 MMOL/L (ref 4–13)
APTT PPP: 37 SECONDS (ref 23–35)
APTT PPP: 79 SECONDS (ref 23–35)
APTT PPP: >210 SECONDS (ref 23–35)
BACTERIA BLD CULT: NORMAL
BACTERIA BLD CULT: NORMAL
BUN SERPL-MCNC: 10 MG/DL (ref 5–25)
CALCIUM SERPL-MCNC: 8 MG/DL (ref 8.3–10.1)
CHLORIDE SERPL-SCNC: 108 MMOL/L (ref 100–108)
CO2 SERPL-SCNC: 28 MMOL/L (ref 21–32)
CREAT SERPL-MCNC: 1.81 MG/DL (ref 0.6–1.3)
ERYTHROCYTE [DISTWIDTH] IN BLOOD BY AUTOMATED COUNT: 17 % (ref 11.6–15.1)
GFR SERPL CREATININE-BSD FRML MDRD: 29.4 ML/MIN/1.73SQ M
GLUCOSE SERPL-MCNC: 100 MG/DL (ref 65–140)
HCT VFR BLD AUTO: 24 % (ref 34.8–46.1)
HGB BLD-MCNC: 7.5 G/DL (ref 11.5–15.4)
MCH RBC QN AUTO: 30.2 PG (ref 26.8–34.3)
MCHC RBC AUTO-ENTMCNC: 31.3 G/DL (ref 31.4–37.4)
MCV RBC AUTO: 97 FL (ref 82–98)
PLATELET # BLD AUTO: 518 THOUSANDS/UL (ref 149–390)
PMV BLD AUTO: 9.4 FL (ref 8.9–12.7)
POTASSIUM SERPL-SCNC: 3.7 MMOL/L (ref 3.5–5.3)
RBC # BLD AUTO: 2.48 MILLION/UL (ref 3.81–5.12)
SODIUM SERPL-SCNC: 143 MMOL/L (ref 136–145)
WBC # BLD AUTO: 10.03 THOUSAND/UL (ref 4.31–10.16)

## 2017-06-15 PROCEDURE — 94640 AIRWAY INHALATION TREATMENT: CPT

## 2017-06-15 PROCEDURE — 94760 N-INVAS EAR/PLS OXIMETRY 1: CPT

## 2017-06-15 PROCEDURE — 80048 BASIC METABOLIC PNL TOTAL CA: CPT | Performed by: FAMILY MEDICINE

## 2017-06-15 PROCEDURE — 85730 THROMBOPLASTIN TIME PARTIAL: CPT | Performed by: FAMILY MEDICINE

## 2017-06-15 PROCEDURE — 85027 COMPLETE CBC AUTOMATED: CPT | Performed by: FAMILY MEDICINE

## 2017-06-15 RX ORDER — LEVALBUTEROL 1.25 MG/.5ML
1.25 SOLUTION, CONCENTRATE RESPIRATORY (INHALATION)
Status: DISCONTINUED | OUTPATIENT
Start: 2017-06-15 | End: 2017-06-24

## 2017-06-15 RX ORDER — SODIUM CHLORIDE FOR INHALATION 0.9 %
3 VIAL, NEBULIZER (ML) INHALATION
Status: DISCONTINUED | OUTPATIENT
Start: 2017-06-15 | End: 2017-06-24

## 2017-06-15 RX ADMIN — PANTOPRAZOLE SODIUM 20 MG: 20 TABLET, DELAYED RELEASE ORAL at 05:27

## 2017-06-15 RX ADMIN — SENNOSIDES 8.6 MG: 8.6 TABLET, FILM COATED ORAL at 08:37

## 2017-06-15 RX ADMIN — HEPARIN SODIUM 10000 UNITS: 1000 INJECTION, SOLUTION INTRAVENOUS; SUBCUTANEOUS at 12:27

## 2017-06-15 RX ADMIN — ISODIUM CHLORIDE 3 ML: 0.03 SOLUTION RESPIRATORY (INHALATION) at 19:45

## 2017-06-15 RX ADMIN — LEVALBUTEROL HYDROCHLORIDE 1.25 MG: 1.25 SOLUTION, CONCENTRATE RESPIRATORY (INHALATION) at 19:45

## 2017-06-15 RX ADMIN — HEPARIN SODIUM 14.1 UNITS/KG/HR: 10000 INJECTION, SOLUTION INTRAVENOUS at 14:10

## 2017-06-15 RX ADMIN — CEFTAROLINE FOSAMIL 600 MG: 600 POWDER, FOR SOLUTION INTRAVENOUS at 08:38

## 2017-06-15 RX ADMIN — GUAIFENESIN 600 MG: 600 TABLET, EXTENDED RELEASE ORAL at 08:37

## 2017-06-15 RX ADMIN — FLUTICASONE PROPIONATE 2 PUFF: 220 AEROSOL, METERED RESPIRATORY (INHALATION) at 21:49

## 2017-06-15 RX ADMIN — SODIUM CHLORIDE 50 ML/HR: 0.45 INJECTION, SOLUTION INTRAVENOUS at 14:12

## 2017-06-15 RX ADMIN — LORATADINE 10 MG: 10 TABLET ORAL at 08:37

## 2017-06-15 RX ADMIN — ISODIUM CHLORIDE 3 ML: 0.03 SOLUTION RESPIRATORY (INHALATION) at 08:22

## 2017-06-15 RX ADMIN — GUAIFENESIN 600 MG: 600 TABLET, EXTENDED RELEASE ORAL at 21:49

## 2017-06-15 RX ADMIN — CEFTAROLINE FOSAMIL 600 MG: 600 POWDER, FOR SOLUTION INTRAVENOUS at 21:48

## 2017-06-15 RX ADMIN — CEFTAROLINE FOSAMIL 600 MG: 600 POWDER, FOR SOLUTION INTRAVENOUS at 00:11

## 2017-06-15 RX ADMIN — FLUTICASONE PROPIONATE 2 PUFF: 220 AEROSOL, METERED RESPIRATORY (INHALATION) at 08:38

## 2017-06-15 RX ADMIN — DOCUSATE SODIUM 100 MG: 100 CAPSULE, LIQUID FILLED ORAL at 08:37

## 2017-06-15 RX ADMIN — ACETAMINOPHEN 650 MG: 325 TABLET, FILM COATED ORAL at 16:58

## 2017-06-15 RX ADMIN — LEVALBUTEROL HYDROCHLORIDE 1.25 MG: 1.25 SOLUTION, CONCENTRATE RESPIRATORY (INHALATION) at 08:22

## 2017-06-16 LAB
ANION GAP SERPL CALCULATED.3IONS-SCNC: 7 MMOL/L (ref 4–13)
APTT PPP: 101 SECONDS (ref 23–35)
APTT PPP: 52 SECONDS (ref 23–35)
APTT PPP: 60 SECONDS (ref 23–35)
BUN SERPL-MCNC: 9 MG/DL (ref 5–25)
CALCIUM SERPL-MCNC: 8.1 MG/DL (ref 8.3–10.1)
CHLORIDE SERPL-SCNC: 109 MMOL/L (ref 100–108)
CO2 SERPL-SCNC: 27 MMOL/L (ref 21–32)
CREAT SERPL-MCNC: 1.77 MG/DL (ref 0.6–1.3)
ERYTHROCYTE [DISTWIDTH] IN BLOOD BY AUTOMATED COUNT: 17 % (ref 11.6–15.1)
GFR SERPL CREATININE-BSD FRML MDRD: 30.1 ML/MIN/1.73SQ M
GLUCOSE SERPL-MCNC: 99 MG/DL (ref 65–140)
HCT VFR BLD AUTO: 24 % (ref 34.8–46.1)
HGB BLD-MCNC: 7.4 G/DL (ref 11.5–15.4)
MCH RBC QN AUTO: 29.7 PG (ref 26.8–34.3)
MCHC RBC AUTO-ENTMCNC: 30.8 G/DL (ref 31.4–37.4)
MCV RBC AUTO: 96 FL (ref 82–98)
PLATELET # BLD AUTO: 509 THOUSANDS/UL (ref 149–390)
PMV BLD AUTO: 9.1 FL (ref 8.9–12.7)
POTASSIUM SERPL-SCNC: 3.8 MMOL/L (ref 3.5–5.3)
RBC # BLD AUTO: 2.49 MILLION/UL (ref 3.81–5.12)
SODIUM SERPL-SCNC: 143 MMOL/L (ref 136–145)
WBC # BLD AUTO: 9.37 THOUSAND/UL (ref 4.31–10.16)

## 2017-06-16 PROCEDURE — G8987 SELF CARE CURRENT STATUS: HCPCS

## 2017-06-16 PROCEDURE — G8978 MOBILITY CURRENT STATUS: HCPCS

## 2017-06-16 PROCEDURE — 94640 AIRWAY INHALATION TREATMENT: CPT

## 2017-06-16 PROCEDURE — 94760 N-INVAS EAR/PLS OXIMETRY 1: CPT

## 2017-06-16 PROCEDURE — 85730 THROMBOPLASTIN TIME PARTIAL: CPT | Performed by: FAMILY MEDICINE

## 2017-06-16 PROCEDURE — 85027 COMPLETE CBC AUTOMATED: CPT | Performed by: FAMILY MEDICINE

## 2017-06-16 PROCEDURE — 80048 BASIC METABOLIC PNL TOTAL CA: CPT | Performed by: FAMILY MEDICINE

## 2017-06-16 PROCEDURE — 97167 OT EVAL HIGH COMPLEX 60 MIN: CPT

## 2017-06-16 PROCEDURE — G8979 MOBILITY GOAL STATUS: HCPCS

## 2017-06-16 PROCEDURE — 97163 PT EVAL HIGH COMPLEX 45 MIN: CPT

## 2017-06-16 PROCEDURE — G8988 SELF CARE GOAL STATUS: HCPCS

## 2017-06-16 RX ADMIN — DOCUSATE SODIUM 100 MG: 100 CAPSULE, LIQUID FILLED ORAL at 09:18

## 2017-06-16 RX ADMIN — LORATADINE 10 MG: 10 TABLET ORAL at 09:18

## 2017-06-16 RX ADMIN — HEPARIN SODIUM 5000 UNITS: 1000 INJECTION, SOLUTION INTRAVENOUS; SUBCUTANEOUS at 20:25

## 2017-06-16 RX ADMIN — GUAIFENESIN 600 MG: 600 TABLET, EXTENDED RELEASE ORAL at 09:16

## 2017-06-16 RX ADMIN — HEPARIN SODIUM 9.1 UNITS/KG/HR: 10000 INJECTION, SOLUTION INTRAVENOUS at 20:18

## 2017-06-16 RX ADMIN — CEFTAROLINE FOSAMIL 600 MG: 600 POWDER, FOR SOLUTION INTRAVENOUS at 08:21

## 2017-06-16 RX ADMIN — LEVALBUTEROL HYDROCHLORIDE 1.25 MG: 1.25 SOLUTION, CONCENTRATE RESPIRATORY (INHALATION) at 08:32

## 2017-06-16 RX ADMIN — CEFTAROLINE FOSAMIL 600 MG: 600 POWDER, FOR SOLUTION INTRAVENOUS at 20:21

## 2017-06-16 RX ADMIN — PANTOPRAZOLE SODIUM 20 MG: 20 TABLET, DELAYED RELEASE ORAL at 05:00

## 2017-06-16 RX ADMIN — HEPARIN SODIUM 11.1 UNITS/KG/HR: 10000 INJECTION, SOLUTION INTRAVENOUS at 04:56

## 2017-06-16 RX ADMIN — SODIUM CHLORIDE 50 ML/HR: 0.45 INJECTION, SOLUTION INTRAVENOUS at 20:16

## 2017-06-16 RX ADMIN — FLUTICASONE PROPIONATE 2 PUFF: 220 AEROSOL, METERED RESPIRATORY (INHALATION) at 20:23

## 2017-06-16 RX ADMIN — SODIUM CHLORIDE 50 ML/HR: 0.45 INJECTION, SOLUTION INTRAVENOUS at 04:56

## 2017-06-16 RX ADMIN — LEVALBUTEROL HYDROCHLORIDE 1.25 MG: 1.25 SOLUTION, CONCENTRATE RESPIRATORY (INHALATION) at 19:27

## 2017-06-16 RX ADMIN — ISODIUM CHLORIDE 3 ML: 0.03 SOLUTION RESPIRATORY (INHALATION) at 19:27

## 2017-06-16 RX ADMIN — SENNOSIDES 8.6 MG: 8.6 TABLET, FILM COATED ORAL at 09:18

## 2017-06-16 RX ADMIN — ISODIUM CHLORIDE 3 ML: 0.03 SOLUTION RESPIRATORY (INHALATION) at 08:32

## 2017-06-16 RX ADMIN — GUAIFENESIN 600 MG: 600 TABLET, EXTENDED RELEASE ORAL at 20:23

## 2017-06-16 RX ADMIN — FLUTICASONE PROPIONATE 2 PUFF: 220 AEROSOL, METERED RESPIRATORY (INHALATION) at 09:18

## 2017-06-17 PROBLEM — D62 ACUTE BLOOD LOSS ANEMIA: Status: RESOLVED | Noted: 2017-05-27 | Resolved: 2017-06-17

## 2017-06-17 PROBLEM — A41.9 SEVERE SEPSIS (HCC): Status: RESOLVED | Noted: 2017-06-05 | Resolved: 2017-06-17

## 2017-06-17 PROBLEM — R65.20 SEVERE SEPSIS (HCC): Status: RESOLVED | Noted: 2017-06-05 | Resolved: 2017-06-17

## 2017-06-17 PROBLEM — N17.9 AKI (ACUTE KIDNEY INJURY) (HCC): Status: RESOLVED | Noted: 2017-05-25 | Resolved: 2017-06-17

## 2017-06-17 LAB
ANION GAP SERPL CALCULATED.3IONS-SCNC: 6 MMOL/L (ref 4–13)
APTT PPP: 118 SECONDS (ref 23–35)
APTT PPP: 56 SECONDS (ref 23–35)
APTT PPP: 85 SECONDS (ref 23–35)
BUN SERPL-MCNC: 10 MG/DL (ref 5–25)
CALCIUM SERPL-MCNC: 8.2 MG/DL (ref 8.3–10.1)
CHLORIDE SERPL-SCNC: 109 MMOL/L (ref 100–108)
CO2 SERPL-SCNC: 28 MMOL/L (ref 21–32)
CREAT SERPL-MCNC: 1.85 MG/DL (ref 0.6–1.3)
ERYTHROCYTE [DISTWIDTH] IN BLOOD BY AUTOMATED COUNT: 16.8 % (ref 11.6–15.1)
GFR SERPL CREATININE-BSD FRML MDRD: 28.6 ML/MIN/1.73SQ M
GLUCOSE SERPL-MCNC: 102 MG/DL (ref 65–140)
HCT VFR BLD AUTO: 23.9 % (ref 34.8–46.1)
HGB BLD-MCNC: 7.4 G/DL (ref 11.5–15.4)
MCH RBC QN AUTO: 30.1 PG (ref 26.8–34.3)
MCHC RBC AUTO-ENTMCNC: 31 G/DL (ref 31.4–37.4)
MCV RBC AUTO: 97 FL (ref 82–98)
PLATELET # BLD AUTO: 501 THOUSANDS/UL (ref 149–390)
PMV BLD AUTO: 9.1 FL (ref 8.9–12.7)
POTASSIUM SERPL-SCNC: 3.8 MMOL/L (ref 3.5–5.3)
RBC # BLD AUTO: 2.46 MILLION/UL (ref 3.81–5.12)
SODIUM SERPL-SCNC: 143 MMOL/L (ref 136–145)
WBC # BLD AUTO: 9.6 THOUSAND/UL (ref 4.31–10.16)

## 2017-06-17 PROCEDURE — 80048 BASIC METABOLIC PNL TOTAL CA: CPT | Performed by: FAMILY MEDICINE

## 2017-06-17 PROCEDURE — 94640 AIRWAY INHALATION TREATMENT: CPT

## 2017-06-17 PROCEDURE — 85027 COMPLETE CBC AUTOMATED: CPT | Performed by: FAMILY MEDICINE

## 2017-06-17 PROCEDURE — 94760 N-INVAS EAR/PLS OXIMETRY 1: CPT

## 2017-06-17 PROCEDURE — 85730 THROMBOPLASTIN TIME PARTIAL: CPT | Performed by: FAMILY MEDICINE

## 2017-06-17 RX ORDER — PROCHLORPERAZINE MALEATE 5 MG/1
5 TABLET ORAL EVERY 6 HOURS PRN
Qty: 30 TABLET | Refills: 0 | Status: SHIPPED | OUTPATIENT
Start: 2017-06-17 | End: 2018-05-08 | Stop reason: ALTCHOICE

## 2017-06-17 RX ORDER — OXYCODONE HYDROCHLORIDE AND ACETAMINOPHEN 5; 325 MG/1; MG/1
1 TABLET ORAL EVERY 4 HOURS PRN
Qty: 15 TABLET | Refills: 0 | Status: SHIPPED | OUTPATIENT
Start: 2017-06-17 | End: 2017-06-27

## 2017-06-17 RX ORDER — SODIUM CHLORIDE 9 MG/ML
75 INJECTION, SOLUTION INTRAVENOUS ONCE
Status: COMPLETED | OUTPATIENT
Start: 2017-06-17 | End: 2017-06-17

## 2017-06-17 RX ADMIN — CEFTAROLINE FOSAMIL 600 MG: 600 POWDER, FOR SOLUTION INTRAVENOUS at 08:19

## 2017-06-17 RX ADMIN — PANTOPRAZOLE SODIUM 20 MG: 20 TABLET, DELAYED RELEASE ORAL at 05:46

## 2017-06-17 RX ADMIN — DOCUSATE SODIUM 100 MG: 100 CAPSULE, LIQUID FILLED ORAL at 18:38

## 2017-06-17 RX ADMIN — ISODIUM CHLORIDE 3 ML: 0.03 SOLUTION RESPIRATORY (INHALATION) at 07:54

## 2017-06-17 RX ADMIN — ISODIUM CHLORIDE 3 ML: 0.03 SOLUTION RESPIRATORY (INHALATION) at 20:05

## 2017-06-17 RX ADMIN — GUAIFENESIN 600 MG: 600 TABLET, EXTENDED RELEASE ORAL at 09:24

## 2017-06-17 RX ADMIN — LEVALBUTEROL HYDROCHLORIDE 1.25 MG: 1.25 SOLUTION, CONCENTRATE RESPIRATORY (INHALATION) at 07:53

## 2017-06-17 RX ADMIN — SODIUM CHLORIDE 75 ML/HR: 0.9 INJECTION, SOLUTION INTRAVENOUS at 10:37

## 2017-06-17 RX ADMIN — HEPARIN SODIUM 10.08 UNITS/KG/HR: 10000 INJECTION, SOLUTION INTRAVENOUS at 16:39

## 2017-06-17 RX ADMIN — SENNOSIDES 8.6 MG: 8.6 TABLET, FILM COATED ORAL at 09:24

## 2017-06-17 RX ADMIN — CEFTAROLINE FOSAMIL 600 MG: 600 POWDER, FOR SOLUTION INTRAVENOUS at 20:56

## 2017-06-17 RX ADMIN — DOCUSATE SODIUM 100 MG: 100 CAPSULE, LIQUID FILLED ORAL at 09:27

## 2017-06-17 RX ADMIN — LORATADINE 10 MG: 10 TABLET ORAL at 09:24

## 2017-06-17 RX ADMIN — FLUTICASONE PROPIONATE 2 PUFF: 220 AEROSOL, METERED RESPIRATORY (INHALATION) at 20:57

## 2017-06-17 RX ADMIN — LEVALBUTEROL HYDROCHLORIDE 1.25 MG: 1.25 SOLUTION, CONCENTRATE RESPIRATORY (INHALATION) at 20:05

## 2017-06-17 RX ADMIN — GUAIFENESIN 600 MG: 600 TABLET, EXTENDED RELEASE ORAL at 20:57

## 2017-06-17 RX ADMIN — FLUTICASONE PROPIONATE 2 PUFF: 220 AEROSOL, METERED RESPIRATORY (INHALATION) at 09:25

## 2017-06-17 RX ADMIN — HEPARIN SODIUM 5000 UNITS: 1000 INJECTION, SOLUTION INTRAVENOUS; SUBCUTANEOUS at 12:03

## 2017-06-18 LAB
ANION GAP SERPL CALCULATED.3IONS-SCNC: 7 MMOL/L (ref 4–13)
APTT PPP: 66 SECONDS (ref 23–35)
BUN SERPL-MCNC: 10 MG/DL (ref 5–25)
CALCIUM SERPL-MCNC: 8.1 MG/DL (ref 8.3–10.1)
CHLORIDE SERPL-SCNC: 108 MMOL/L (ref 100–108)
CO2 SERPL-SCNC: 28 MMOL/L (ref 21–32)
CREAT SERPL-MCNC: 1.92 MG/DL (ref 0.6–1.3)
GFR SERPL CREATININE-BSD FRML MDRD: 27.4 ML/MIN/1.73SQ M
GLUCOSE SERPL-MCNC: 109 MG/DL (ref 65–140)
POTASSIUM SERPL-SCNC: 3.5 MMOL/L (ref 3.5–5.3)
SODIUM SERPL-SCNC: 143 MMOL/L (ref 136–145)

## 2017-06-18 PROCEDURE — 94640 AIRWAY INHALATION TREATMENT: CPT

## 2017-06-18 PROCEDURE — 85730 THROMBOPLASTIN TIME PARTIAL: CPT | Performed by: FAMILY MEDICINE

## 2017-06-18 PROCEDURE — 80048 BASIC METABOLIC PNL TOTAL CA: CPT | Performed by: FAMILY MEDICINE

## 2017-06-18 PROCEDURE — 94760 N-INVAS EAR/PLS OXIMETRY 1: CPT

## 2017-06-18 RX ORDER — FUROSEMIDE 10 MG/ML
20 INJECTION INTRAMUSCULAR; INTRAVENOUS ONCE
Status: COMPLETED | OUTPATIENT
Start: 2017-06-18 | End: 2017-06-18

## 2017-06-18 RX ADMIN — GUAIFENESIN 600 MG: 600 TABLET, EXTENDED RELEASE ORAL at 08:30

## 2017-06-18 RX ADMIN — CEFTAROLINE FOSAMIL 600 MG: 600 POWDER, FOR SOLUTION INTRAVENOUS at 08:30

## 2017-06-18 RX ADMIN — DOCUSATE SODIUM 100 MG: 100 CAPSULE, LIQUID FILLED ORAL at 17:20

## 2017-06-18 RX ADMIN — ISODIUM CHLORIDE 3 ML: 0.03 SOLUTION RESPIRATORY (INHALATION) at 08:34

## 2017-06-18 RX ADMIN — PANTOPRAZOLE SODIUM 20 MG: 20 TABLET, DELAYED RELEASE ORAL at 06:23

## 2017-06-18 RX ADMIN — ISODIUM CHLORIDE 3 ML: 0.03 SOLUTION RESPIRATORY (INHALATION) at 20:39

## 2017-06-18 RX ADMIN — LEVALBUTEROL HYDROCHLORIDE 1.25 MG: 1.25 SOLUTION, CONCENTRATE RESPIRATORY (INHALATION) at 20:39

## 2017-06-18 RX ADMIN — FLUTICASONE PROPIONATE 2 PUFF: 220 AEROSOL, METERED RESPIRATORY (INHALATION) at 22:22

## 2017-06-18 RX ADMIN — GUAIFENESIN 600 MG: 600 TABLET, EXTENDED RELEASE ORAL at 22:24

## 2017-06-18 RX ADMIN — LORATADINE 10 MG: 10 TABLET ORAL at 08:30

## 2017-06-18 RX ADMIN — FLUTICASONE PROPIONATE 2 PUFF: 220 AEROSOL, METERED RESPIRATORY (INHALATION) at 08:30

## 2017-06-18 RX ADMIN — LEVALBUTEROL HYDROCHLORIDE 1.25 MG: 1.25 SOLUTION, CONCENTRATE RESPIRATORY (INHALATION) at 08:34

## 2017-06-18 RX ADMIN — FUROSEMIDE 20 MG: 10 INJECTION, SOLUTION INTRAMUSCULAR; INTRAVENOUS at 12:34

## 2017-06-18 RX ADMIN — HEPARIN SODIUM 10.08 UNITS/KG/HR: 10000 INJECTION, SOLUTION INTRAVENOUS at 12:37

## 2017-06-18 RX ADMIN — CEFTAROLINE FOSAMIL 600 MG: 600 POWDER, FOR SOLUTION INTRAVENOUS at 22:22

## 2017-06-19 PROBLEM — R11.2 NAUSEA & VOMITING: Status: RESOLVED | Noted: 2017-06-14 | Resolved: 2017-06-19

## 2017-06-19 LAB
ANION GAP SERPL CALCULATED.3IONS-SCNC: 8 MMOL/L (ref 4–13)
APTT PPP: 104 SECONDS (ref 23–35)
APTT PPP: 44 SECONDS (ref 23–35)
APTT PPP: 68 SECONDS (ref 23–35)
BUN SERPL-MCNC: 9 MG/DL (ref 5–25)
CALCIUM SERPL-MCNC: 9 MG/DL (ref 8.3–10.1)
CHLORIDE SERPL-SCNC: 107 MMOL/L (ref 100–108)
CO2 SERPL-SCNC: 29 MMOL/L (ref 21–32)
CREAT SERPL-MCNC: 1.94 MG/DL (ref 0.6–1.3)
GFR SERPL CREATININE-BSD FRML MDRD: 27.1 ML/MIN/1.73SQ M
GLUCOSE SERPL-MCNC: 94 MG/DL (ref 65–140)
POTASSIUM SERPL-SCNC: 3.7 MMOL/L (ref 3.5–5.3)
SODIUM SERPL-SCNC: 144 MMOL/L (ref 136–145)

## 2017-06-19 PROCEDURE — 97530 THERAPEUTIC ACTIVITIES: CPT

## 2017-06-19 PROCEDURE — 94640 AIRWAY INHALATION TREATMENT: CPT

## 2017-06-19 PROCEDURE — 94760 N-INVAS EAR/PLS OXIMETRY 1: CPT

## 2017-06-19 PROCEDURE — 80048 BASIC METABOLIC PNL TOTAL CA: CPT | Performed by: FAMILY MEDICINE

## 2017-06-19 PROCEDURE — 85730 THROMBOPLASTIN TIME PARTIAL: CPT | Performed by: FAMILY MEDICINE

## 2017-06-19 PROCEDURE — 97535 SELF CARE MNGMENT TRAINING: CPT

## 2017-06-19 RX ORDER — FUROSEMIDE 10 MG/ML
40 INJECTION INTRAMUSCULAR; INTRAVENOUS ONCE
Status: COMPLETED | OUTPATIENT
Start: 2017-06-19 | End: 2017-06-19

## 2017-06-19 RX ADMIN — LORATADINE 10 MG: 10 TABLET ORAL at 07:53

## 2017-06-19 RX ADMIN — FUROSEMIDE 40 MG: 10 INJECTION, SOLUTION INTRAMUSCULAR; INTRAVENOUS at 09:16

## 2017-06-19 RX ADMIN — PANTOPRAZOLE SODIUM 20 MG: 20 TABLET, DELAYED RELEASE ORAL at 06:09

## 2017-06-19 RX ADMIN — GUAIFENESIN 600 MG: 600 TABLET, EXTENDED RELEASE ORAL at 21:17

## 2017-06-19 RX ADMIN — FLUTICASONE PROPIONATE 2 PUFF: 220 AEROSOL, METERED RESPIRATORY (INHALATION) at 07:54

## 2017-06-19 RX ADMIN — ISODIUM CHLORIDE 3 ML: 0.03 SOLUTION RESPIRATORY (INHALATION) at 19:13

## 2017-06-19 RX ADMIN — HEPARIN SODIUM 10.1 UNITS/KG/HR: 10000 INJECTION, SOLUTION INTRAVENOUS at 06:10

## 2017-06-19 RX ADMIN — LEVALBUTEROL HYDROCHLORIDE 1.25 MG: 1.25 SOLUTION, CONCENTRATE RESPIRATORY (INHALATION) at 07:37

## 2017-06-19 RX ADMIN — HEPARIN SODIUM 10.1 UNITS/KG/HR: 10000 INJECTION, SOLUTION INTRAVENOUS at 21:04

## 2017-06-19 RX ADMIN — FLUTICASONE PROPIONATE 2 PUFF: 220 AEROSOL, METERED RESPIRATORY (INHALATION) at 21:17

## 2017-06-19 RX ADMIN — SENNOSIDES 8.6 MG: 8.6 TABLET, FILM COATED ORAL at 07:53

## 2017-06-19 RX ADMIN — GUAIFENESIN 600 MG: 600 TABLET, EXTENDED RELEASE ORAL at 07:53

## 2017-06-19 RX ADMIN — CEFTAROLINE FOSAMIL 600 MG: 600 POWDER, FOR SOLUTION INTRAVENOUS at 21:09

## 2017-06-19 RX ADMIN — LEVALBUTEROL HYDROCHLORIDE 1.25 MG: 1.25 SOLUTION, CONCENTRATE RESPIRATORY (INHALATION) at 19:13

## 2017-06-19 RX ADMIN — CEFTAROLINE FOSAMIL 600 MG: 600 POWDER, FOR SOLUTION INTRAVENOUS at 07:59

## 2017-06-19 RX ADMIN — HEPARIN SODIUM 5000 UNITS: 1000 INJECTION, SOLUTION INTRAVENOUS; SUBCUTANEOUS at 07:47

## 2017-06-19 RX ADMIN — ISODIUM CHLORIDE 3 ML: 0.03 SOLUTION RESPIRATORY (INHALATION) at 07:37

## 2017-06-19 RX ADMIN — DOCUSATE SODIUM 100 MG: 100 CAPSULE, LIQUID FILLED ORAL at 07:53

## 2017-06-20 LAB
ANION GAP SERPL CALCULATED.3IONS-SCNC: 8 MMOL/L (ref 4–13)
APTT PPP: 110 SECONDS (ref 23–35)
APTT PPP: 41 SECONDS (ref 23–35)
APTT PPP: 64 SECONDS (ref 23–35)
BASOPHILS # BLD AUTO: 0.18 THOUSANDS/ΜL (ref 0–0.1)
BASOPHILS NFR BLD AUTO: 2 % (ref 0–1)
BUN SERPL-MCNC: 10 MG/DL (ref 5–25)
CALCIUM SERPL-MCNC: 8.8 MG/DL (ref 8.3–10.1)
CHLORIDE SERPL-SCNC: 104 MMOL/L (ref 100–108)
CO2 SERPL-SCNC: 29 MMOL/L (ref 21–32)
CREAT SERPL-MCNC: 1.96 MG/DL (ref 0.6–1.3)
EOSINOPHIL # BLD AUTO: 0.25 THOUSAND/ΜL (ref 0–0.61)
EOSINOPHIL NFR BLD AUTO: 3 % (ref 0–6)
ERYTHROCYTE [DISTWIDTH] IN BLOOD BY AUTOMATED COUNT: 16.7 % (ref 11.6–15.1)
GFR SERPL CREATININE-BSD FRML MDRD: 26.8 ML/MIN/1.73SQ M
GLUCOSE SERPL-MCNC: 103 MG/DL (ref 65–140)
HCT VFR BLD AUTO: 25.8 % (ref 34.8–46.1)
HGB BLD-MCNC: 8.1 G/DL (ref 11.5–15.4)
INR PPP: 1.19 (ref 0.86–1.16)
LYMPHOCYTES # BLD AUTO: 1.58 THOUSANDS/ΜL (ref 0.6–4.47)
LYMPHOCYTES NFR BLD AUTO: 18 % (ref 14–44)
MCH RBC QN AUTO: 30.7 PG (ref 26.8–34.3)
MCHC RBC AUTO-ENTMCNC: 31.4 G/DL (ref 31.4–37.4)
MCV RBC AUTO: 98 FL (ref 82–98)
MONOCYTES # BLD AUTO: 0.97 THOUSAND/ΜL (ref 0.17–1.22)
MONOCYTES NFR BLD AUTO: 11 % (ref 4–12)
NEUTROPHILS # BLD AUTO: 5.71 THOUSANDS/ΜL (ref 1.85–7.62)
NEUTS SEG NFR BLD AUTO: 66 % (ref 43–75)
NRBC BLD AUTO-RTO: 0 /100 WBCS
PLATELET # BLD AUTO: 497 THOUSANDS/UL (ref 149–390)
PMV BLD AUTO: 9.3 FL (ref 8.9–12.7)
POTASSIUM SERPL-SCNC: 3.7 MMOL/L (ref 3.5–5.3)
PROTHROMBIN TIME: 15.2 SECONDS (ref 12.1–14.4)
RBC # BLD AUTO: 2.64 MILLION/UL (ref 3.81–5.12)
SODIUM SERPL-SCNC: 141 MMOL/L (ref 136–145)
WBC # BLD AUTO: 8.73 THOUSAND/UL (ref 4.31–10.16)

## 2017-06-20 PROCEDURE — 94640 AIRWAY INHALATION TREATMENT: CPT

## 2017-06-20 PROCEDURE — 97110 THERAPEUTIC EXERCISES: CPT

## 2017-06-20 PROCEDURE — 80048 BASIC METABOLIC PNL TOTAL CA: CPT | Performed by: INTERNAL MEDICINE

## 2017-06-20 PROCEDURE — 97116 GAIT TRAINING THERAPY: CPT

## 2017-06-20 PROCEDURE — 94760 N-INVAS EAR/PLS OXIMETRY 1: CPT

## 2017-06-20 PROCEDURE — 85730 THROMBOPLASTIN TIME PARTIAL: CPT | Performed by: FAMILY MEDICINE

## 2017-06-20 PROCEDURE — 85610 PROTHROMBIN TIME: CPT | Performed by: HOSPITALIST

## 2017-06-20 PROCEDURE — 85025 COMPLETE CBC W/AUTO DIFF WBC: CPT | Performed by: INTERNAL MEDICINE

## 2017-06-20 PROCEDURE — 85730 THROMBOPLASTIN TIME PARTIAL: CPT | Performed by: HOSPITALIST

## 2017-06-20 RX ORDER — WARFARIN SODIUM 5 MG/1
5 TABLET ORAL
Status: DISCONTINUED | OUTPATIENT
Start: 2017-06-20 | End: 2017-06-22

## 2017-06-20 RX ADMIN — LORATADINE 10 MG: 10 TABLET ORAL at 08:23

## 2017-06-20 RX ADMIN — WARFARIN SODIUM 5 MG: 5 TABLET ORAL at 18:42

## 2017-06-20 RX ADMIN — FLUTICASONE PROPIONATE 2 PUFF: 220 AEROSOL, METERED RESPIRATORY (INHALATION) at 08:23

## 2017-06-20 RX ADMIN — CEFTAROLINE FOSAMIL 600 MG: 600 POWDER, FOR SOLUTION INTRAVENOUS at 21:33

## 2017-06-20 RX ADMIN — CEFTAROLINE FOSAMIL 600 MG: 600 POWDER, FOR SOLUTION INTRAVENOUS at 08:22

## 2017-06-20 RX ADMIN — ISODIUM CHLORIDE 3 ML: 0.03 SOLUTION RESPIRATORY (INHALATION) at 07:37

## 2017-06-20 RX ADMIN — GUAIFENESIN 600 MG: 600 TABLET, EXTENDED RELEASE ORAL at 21:33

## 2017-06-20 RX ADMIN — PANTOPRAZOLE SODIUM 20 MG: 20 TABLET, DELAYED RELEASE ORAL at 05:44

## 2017-06-20 RX ADMIN — FLUTICASONE PROPIONATE 2 PUFF: 220 AEROSOL, METERED RESPIRATORY (INHALATION) at 21:33

## 2017-06-20 RX ADMIN — DOCUSATE SODIUM 100 MG: 100 CAPSULE, LIQUID FILLED ORAL at 18:41

## 2017-06-20 RX ADMIN — LEVALBUTEROL HYDROCHLORIDE 1.25 MG: 1.25 SOLUTION, CONCENTRATE RESPIRATORY (INHALATION) at 19:20

## 2017-06-20 RX ADMIN — HEPARIN SODIUM 12.1 UNITS/KG/HR: 10000 INJECTION, SOLUTION INTRAVENOUS at 13:00

## 2017-06-20 RX ADMIN — HEPARIN SODIUM 10000 UNITS: 1000 INJECTION, SOLUTION INTRAVENOUS; SUBCUTANEOUS at 07:30

## 2017-06-20 RX ADMIN — GUAIFENESIN 600 MG: 600 TABLET, EXTENDED RELEASE ORAL at 08:23

## 2017-06-20 RX ADMIN — DOCUSATE SODIUM 100 MG: 100 CAPSULE, LIQUID FILLED ORAL at 08:23

## 2017-06-20 RX ADMIN — SENNOSIDES 8.6 MG: 8.6 TABLET, FILM COATED ORAL at 08:23

## 2017-06-20 RX ADMIN — LEVALBUTEROL HYDROCHLORIDE 1.25 MG: 1.25 SOLUTION, CONCENTRATE RESPIRATORY (INHALATION) at 07:37

## 2017-06-20 RX ADMIN — ISODIUM CHLORIDE 3 ML: 0.03 SOLUTION RESPIRATORY (INHALATION) at 19:20

## 2017-06-21 ENCOUNTER — APPOINTMENT (INPATIENT)
Dept: PHYSICAL THERAPY | Facility: HOSPITAL | Age: 52
DRG: 711 | End: 2017-06-21
Payer: COMMERCIAL

## 2017-06-21 LAB
ANION GAP SERPL CALCULATED.3IONS-SCNC: 7 MMOL/L (ref 4–13)
APTT PPP: 74 SECONDS (ref 23–35)
BUN SERPL-MCNC: 12 MG/DL (ref 5–25)
CALCIUM SERPL-MCNC: 8.8 MG/DL (ref 8.3–10.1)
CHLORIDE SERPL-SCNC: 105 MMOL/L (ref 100–108)
CO2 SERPL-SCNC: 29 MMOL/L (ref 21–32)
CREAT SERPL-MCNC: 2.02 MG/DL (ref 0.6–1.3)
GFR SERPL CREATININE-BSD FRML MDRD: 25.9 ML/MIN/1.73SQ M
GLUCOSE SERPL-MCNC: 110 MG/DL (ref 65–140)
INR PPP: 1.12 (ref 0.86–1.16)
POTASSIUM SERPL-SCNC: 4 MMOL/L (ref 3.5–5.3)
PROTHROMBIN TIME: 14.4 SECONDS (ref 12.1–14.4)
SODIUM SERPL-SCNC: 141 MMOL/L (ref 136–145)

## 2017-06-21 PROCEDURE — 94640 AIRWAY INHALATION TREATMENT: CPT

## 2017-06-21 PROCEDURE — 85610 PROTHROMBIN TIME: CPT | Performed by: HOSPITALIST

## 2017-06-21 PROCEDURE — 94760 N-INVAS EAR/PLS OXIMETRY 1: CPT

## 2017-06-21 PROCEDURE — 85730 THROMBOPLASTIN TIME PARTIAL: CPT | Performed by: HOSPITALIST

## 2017-06-21 PROCEDURE — 97530 THERAPEUTIC ACTIVITIES: CPT

## 2017-06-21 PROCEDURE — 97110 THERAPEUTIC EXERCISES: CPT

## 2017-06-21 PROCEDURE — 80048 BASIC METABOLIC PNL TOTAL CA: CPT | Performed by: HOSPITALIST

## 2017-06-21 RX ORDER — FAMOTIDINE 20 MG/1
20 TABLET, FILM COATED ORAL DAILY
Status: DISCONTINUED | OUTPATIENT
Start: 2017-06-21 | End: 2017-06-26 | Stop reason: HOSPADM

## 2017-06-21 RX ADMIN — LEVALBUTEROL HYDROCHLORIDE 1.25 MG: 1.25 SOLUTION, CONCENTRATE RESPIRATORY (INHALATION) at 20:34

## 2017-06-21 RX ADMIN — PANTOPRAZOLE SODIUM 20 MG: 20 TABLET, DELAYED RELEASE ORAL at 05:19

## 2017-06-21 RX ADMIN — CEFTAROLINE FOSAMIL 600 MG: 600 POWDER, FOR SOLUTION INTRAVENOUS at 09:27

## 2017-06-21 RX ADMIN — LEVALBUTEROL HYDROCHLORIDE 1.25 MG: 1.25 SOLUTION, CONCENTRATE RESPIRATORY (INHALATION) at 07:41

## 2017-06-21 RX ADMIN — FAMOTIDINE 20 MG: 20 TABLET ORAL at 17:17

## 2017-06-21 RX ADMIN — SENNOSIDES 8.6 MG: 8.6 TABLET, FILM COATED ORAL at 09:27

## 2017-06-21 RX ADMIN — ISODIUM CHLORIDE 3 ML: 0.03 SOLUTION RESPIRATORY (INHALATION) at 20:34

## 2017-06-21 RX ADMIN — GUAIFENESIN 600 MG: 600 TABLET, EXTENDED RELEASE ORAL at 21:23

## 2017-06-21 RX ADMIN — FLUTICASONE PROPIONATE 2 PUFF: 220 AEROSOL, METERED RESPIRATORY (INHALATION) at 21:23

## 2017-06-21 RX ADMIN — DOCUSATE SODIUM 100 MG: 100 CAPSULE, LIQUID FILLED ORAL at 09:27

## 2017-06-21 RX ADMIN — WARFARIN SODIUM 5 MG: 5 TABLET ORAL at 17:17

## 2017-06-21 RX ADMIN — ISODIUM CHLORIDE 3 ML: 0.03 SOLUTION RESPIRATORY (INHALATION) at 07:41

## 2017-06-21 RX ADMIN — FLUTICASONE PROPIONATE 2 PUFF: 220 AEROSOL, METERED RESPIRATORY (INHALATION) at 09:28

## 2017-06-21 RX ADMIN — DOCUSATE SODIUM 100 MG: 100 CAPSULE, LIQUID FILLED ORAL at 17:17

## 2017-06-21 RX ADMIN — CEFTAROLINE FOSAMIL 600 MG: 600 POWDER, FOR SOLUTION INTRAVENOUS at 20:12

## 2017-06-21 RX ADMIN — GUAIFENESIN 600 MG: 600 TABLET, EXTENDED RELEASE ORAL at 09:27

## 2017-06-21 RX ADMIN — HEPARIN SODIUM 10.1 UNITS/KG/HR: 10000 INJECTION, SOLUTION INTRAVENOUS at 11:49

## 2017-06-21 RX ADMIN — LORATADINE 10 MG: 10 TABLET ORAL at 09:27

## 2017-06-22 ENCOUNTER — GENERIC CONVERSION - ENCOUNTER (OUTPATIENT)
Dept: OTHER | Facility: OTHER | Age: 52
End: 2017-06-22

## 2017-06-22 ENCOUNTER — APPOINTMENT (INPATIENT)
Dept: RADIOLOGY | Facility: HOSPITAL | Age: 52
DRG: 711 | End: 2017-06-22
Payer: COMMERCIAL

## 2017-06-22 LAB
ANION GAP SERPL CALCULATED.3IONS-SCNC: 9 MMOL/L (ref 4–13)
APTT PPP: 64 SECONDS (ref 23–35)
BACTERIA UR QL AUTO: ABNORMAL /HPF
BILIRUB UR QL STRIP: NEGATIVE
BUN SERPL-MCNC: 13 MG/DL (ref 5–25)
CALCIUM SERPL-MCNC: 9.2 MG/DL (ref 8.3–10.1)
CHLORIDE SERPL-SCNC: 105 MMOL/L (ref 100–108)
CLARITY UR: CLEAR
CO2 SERPL-SCNC: 26 MMOL/L (ref 21–32)
COLOR UR: YELLOW
CREAT SERPL-MCNC: 1.91 MG/DL (ref 0.6–1.3)
FERRITIN SERPL-MCNC: 329 NG/ML (ref 8–388)
GFR SERPL CREATININE-BSD FRML MDRD: 27.6 ML/MIN/1.73SQ M
GLUCOSE SERPL-MCNC: 100 MG/DL (ref 65–140)
GLUCOSE UR STRIP-MCNC: NEGATIVE MG/DL
HGB UR QL STRIP.AUTO: NEGATIVE
HYALINE CASTS #/AREA URNS LPF: ABNORMAL /LPF
INR PPP: 1.14 (ref 0.86–1.16)
IRON SATN MFR SERPL: 15 %
IRON SERPL-MCNC: 46 UG/DL (ref 50–170)
KETONES UR STRIP-MCNC: NEGATIVE MG/DL
LEUKOCYTE ESTERASE UR QL STRIP: ABNORMAL
MAGNESIUM SERPL-MCNC: 2.4 MG/DL (ref 1.6–2.6)
NITRITE UR QL STRIP: NEGATIVE
NON-SQ EPI CELLS URNS QL MICRO: ABNORMAL /HPF
PH UR STRIP.AUTO: 7.5 [PH] (ref 4.5–8)
POTASSIUM SERPL-SCNC: 3.8 MMOL/L (ref 3.5–5.3)
PROT UR STRIP-MCNC: NEGATIVE MG/DL
PROTHROMBIN TIME: 14.6 SECONDS (ref 12.1–14.4)
RBC #/AREA URNS AUTO: ABNORMAL /HPF
SODIUM SERPL-SCNC: 140 MMOL/L (ref 136–145)
SP GR UR STRIP.AUTO: 1.01 (ref 1–1.03)
TIBC SERPL-MCNC: 311 UG/DL (ref 250–450)
UROBILINOGEN UR QL STRIP.AUTO: 0.2 E.U./DL
WBC #/AREA URNS AUTO: ABNORMAL /HPF

## 2017-06-22 PROCEDURE — 76770 US EXAM ABDO BACK WALL COMP: CPT

## 2017-06-22 PROCEDURE — 83550 IRON BINDING TEST: CPT | Performed by: NURSE PRACTITIONER

## 2017-06-22 PROCEDURE — 83735 ASSAY OF MAGNESIUM: CPT | Performed by: NURSE PRACTITIONER

## 2017-06-22 PROCEDURE — 83540 ASSAY OF IRON: CPT | Performed by: NURSE PRACTITIONER

## 2017-06-22 PROCEDURE — 02HV33Z INSERTION OF INFUSION DEVICE INTO SUPERIOR VENA CAVA, PERCUTANEOUS APPROACH: ICD-10-PCS | Performed by: HOSPITALIST

## 2017-06-22 PROCEDURE — 94760 N-INVAS EAR/PLS OXIMETRY 1: CPT

## 2017-06-22 PROCEDURE — 85610 PROTHROMBIN TIME: CPT | Performed by: HOSPITALIST

## 2017-06-22 PROCEDURE — 87205 SMEAR GRAM STAIN: CPT | Performed by: NURSE PRACTITIONER

## 2017-06-22 PROCEDURE — 80048 BASIC METABOLIC PNL TOTAL CA: CPT | Performed by: INTERNAL MEDICINE

## 2017-06-22 PROCEDURE — 81001 URINALYSIS AUTO W/SCOPE: CPT | Performed by: HOSPITALIST

## 2017-06-22 PROCEDURE — 94640 AIRWAY INHALATION TREATMENT: CPT

## 2017-06-22 PROCEDURE — C1751 CATH, INF, PER/CENT/MIDLINE: HCPCS

## 2017-06-22 PROCEDURE — 82728 ASSAY OF FERRITIN: CPT | Performed by: NURSE PRACTITIONER

## 2017-06-22 PROCEDURE — 36569 INSJ PICC 5 YR+ W/O IMAGING: CPT

## 2017-06-22 PROCEDURE — 85730 THROMBOPLASTIN TIME PARTIAL: CPT | Performed by: HOSPITALIST

## 2017-06-22 RX ORDER — WARFARIN SODIUM 7.5 MG/1
7.5 TABLET ORAL
Status: DISCONTINUED | OUTPATIENT
Start: 2017-06-22 | End: 2017-06-23

## 2017-06-22 RX ADMIN — FAMOTIDINE 20 MG: 20 TABLET ORAL at 09:33

## 2017-06-22 RX ADMIN — CEFTAROLINE FOSAMIL 600 MG: 600 POWDER, FOR SOLUTION INTRAVENOUS at 09:33

## 2017-06-22 RX ADMIN — DOCUSATE SODIUM 100 MG: 100 CAPSULE, LIQUID FILLED ORAL at 17:26

## 2017-06-22 RX ADMIN — ISODIUM CHLORIDE 3 ML: 0.03 SOLUTION RESPIRATORY (INHALATION) at 07:17

## 2017-06-22 RX ADMIN — WARFARIN SODIUM 7.5 MG: 7.5 TABLET ORAL at 17:26

## 2017-06-22 RX ADMIN — BISACODYL 10 MG: 10 SUPPOSITORY RECTAL at 16:52

## 2017-06-22 RX ADMIN — DOCUSATE SODIUM 100 MG: 100 CAPSULE, LIQUID FILLED ORAL at 09:33

## 2017-06-22 RX ADMIN — LEVALBUTEROL HYDROCHLORIDE 1.25 MG: 1.25 SOLUTION, CONCENTRATE RESPIRATORY (INHALATION) at 07:17

## 2017-06-22 RX ADMIN — GUAIFENESIN 600 MG: 600 TABLET, EXTENDED RELEASE ORAL at 09:33

## 2017-06-22 RX ADMIN — CEFTAROLINE FOSAMIL 600 MG: 600 POWDER, FOR SOLUTION INTRAVENOUS at 21:21

## 2017-06-22 RX ADMIN — FLUTICASONE PROPIONATE 2 PUFF: 220 AEROSOL, METERED RESPIRATORY (INHALATION) at 09:34

## 2017-06-22 RX ADMIN — FLUTICASONE PROPIONATE 2 PUFF: 220 AEROSOL, METERED RESPIRATORY (INHALATION) at 21:17

## 2017-06-22 RX ADMIN — LORATADINE 10 MG: 10 TABLET ORAL at 09:33

## 2017-06-22 RX ADMIN — LEVALBUTEROL HYDROCHLORIDE 1.25 MG: 1.25 SOLUTION, CONCENTRATE RESPIRATORY (INHALATION) at 19:51

## 2017-06-22 RX ADMIN — GUAIFENESIN 600 MG: 600 TABLET, EXTENDED RELEASE ORAL at 21:17

## 2017-06-22 RX ADMIN — HEPARIN SODIUM 12.6 UNITS/KG/HR: 10000 INJECTION, SOLUTION INTRAVENOUS at 06:12

## 2017-06-22 RX ADMIN — SENNOSIDES 8.6 MG: 8.6 TABLET, FILM COATED ORAL at 09:33

## 2017-06-22 RX ADMIN — ISODIUM CHLORIDE 3 ML: 0.03 SOLUTION RESPIRATORY (INHALATION) at 19:51

## 2017-06-23 LAB
ANION GAP SERPL CALCULATED.3IONS-SCNC: 9 MMOL/L (ref 4–13)
APTT PPP: 68 SECONDS (ref 23–35)
BASOPHILS # BLD AUTO: 0.22 THOUSANDS/ΜL (ref 0–0.1)
BASOPHILS NFR BLD AUTO: 3 % (ref 0–1)
BUN SERPL-MCNC: 15 MG/DL (ref 5–25)
CALCIUM SERPL-MCNC: 9.2 MG/DL (ref 8.3–10.1)
CHLORIDE SERPL-SCNC: 107 MMOL/L (ref 100–108)
CO2 SERPL-SCNC: 27 MMOL/L (ref 21–32)
CREAT SERPL-MCNC: 1.94 MG/DL (ref 0.6–1.3)
EOSINOPHIL # BLD AUTO: 0.25 THOUSAND/ΜL (ref 0–0.61)
EOSINOPHIL NFR BLD AUTO: 4 % (ref 0–6)
EOSINOPHIL NFR URNS MANUAL: 2 %
ERYTHROCYTE [DISTWIDTH] IN BLOOD BY AUTOMATED COUNT: 16 % (ref 11.6–15.1)
GFR SERPL CREATININE-BSD FRML MDRD: 27.1 ML/MIN/1.73SQ M
GLUCOSE SERPL-MCNC: 96 MG/DL (ref 65–140)
HCT VFR BLD AUTO: 26.9 % (ref 34.8–46.1)
HGB BLD-MCNC: 8 G/DL (ref 11.5–15.4)
LYMPHOCYTES # BLD AUTO: 1.81 THOUSANDS/ΜL (ref 0.6–4.47)
LYMPHOCYTES NFR BLD AUTO: 26 % (ref 14–44)
MCH RBC QN AUTO: 29.3 PG (ref 26.8–34.3)
MCHC RBC AUTO-ENTMCNC: 29.7 G/DL (ref 31.4–37.4)
MCV RBC AUTO: 99 FL (ref 82–98)
MONOCYTES # BLD AUTO: 0.74 THOUSAND/ΜL (ref 0.17–1.22)
MONOCYTES NFR BLD AUTO: 11 % (ref 4–12)
NEUTROPHILS # BLD AUTO: 3.94 THOUSANDS/ΜL (ref 1.85–7.62)
NEUTS SEG NFR BLD AUTO: 56 % (ref 43–75)
NRBC BLD AUTO-RTO: 0 /100 WBCS
PLATELET # BLD AUTO: 401 THOUSANDS/UL (ref 149–390)
PMV BLD AUTO: 9 FL (ref 8.9–12.7)
POTASSIUM SERPL-SCNC: 3.9 MMOL/L (ref 3.5–5.3)
RBC # BLD AUTO: 2.73 MILLION/UL (ref 3.81–5.12)
SODIUM SERPL-SCNC: 143 MMOL/L (ref 136–145)
WBC # BLD AUTO: 6.99 THOUSAND/UL (ref 4.31–10.16)

## 2017-06-23 PROCEDURE — 94760 N-INVAS EAR/PLS OXIMETRY 1: CPT

## 2017-06-23 PROCEDURE — 94640 AIRWAY INHALATION TREATMENT: CPT

## 2017-06-23 PROCEDURE — 85025 COMPLETE CBC W/AUTO DIFF WBC: CPT | Performed by: HOSPITALIST

## 2017-06-23 PROCEDURE — 97116 GAIT TRAINING THERAPY: CPT

## 2017-06-23 PROCEDURE — 97110 THERAPEUTIC EXERCISES: CPT

## 2017-06-23 PROCEDURE — 97535 SELF CARE MNGMENT TRAINING: CPT

## 2017-06-23 PROCEDURE — 85730 THROMBOPLASTIN TIME PARTIAL: CPT | Performed by: HOSPITALIST

## 2017-06-23 PROCEDURE — 97530 THERAPEUTIC ACTIVITIES: CPT

## 2017-06-23 PROCEDURE — 80048 BASIC METABOLIC PNL TOTAL CA: CPT | Performed by: HOSPITALIST

## 2017-06-23 RX ADMIN — CEFTAROLINE FOSAMIL 600 MG: 600 POWDER, FOR SOLUTION INTRAVENOUS at 08:42

## 2017-06-23 RX ADMIN — ISODIUM CHLORIDE 3 ML: 0.03 SOLUTION RESPIRATORY (INHALATION) at 07:19

## 2017-06-23 RX ADMIN — GUAIFENESIN 600 MG: 600 TABLET, EXTENDED RELEASE ORAL at 21:25

## 2017-06-23 RX ADMIN — CEFTAROLINE FOSAMIL 600 MG: 600 POWDER, FOR SOLUTION INTRAVENOUS at 21:25

## 2017-06-23 RX ADMIN — FLUTICASONE PROPIONATE 2 PUFF: 220 AEROSOL, METERED RESPIRATORY (INHALATION) at 08:43

## 2017-06-23 RX ADMIN — HEPARIN SODIUM 12.6 UNITS/KG/HR: 10000 INJECTION, SOLUTION INTRAVENOUS at 03:44

## 2017-06-23 RX ADMIN — FLUTICASONE PROPIONATE 2 PUFF: 220 AEROSOL, METERED RESPIRATORY (INHALATION) at 21:25

## 2017-06-23 RX ADMIN — DOCUSATE SODIUM 100 MG: 100 CAPSULE, LIQUID FILLED ORAL at 08:42

## 2017-06-23 RX ADMIN — DOCUSATE SODIUM 100 MG: 100 CAPSULE, LIQUID FILLED ORAL at 17:14

## 2017-06-23 RX ADMIN — LEVALBUTEROL HYDROCHLORIDE 1.25 MG: 1.25 SOLUTION, CONCENTRATE RESPIRATORY (INHALATION) at 07:19

## 2017-06-23 RX ADMIN — LEVALBUTEROL HYDROCHLORIDE 1.25 MG: 1.25 SOLUTION, CONCENTRATE RESPIRATORY (INHALATION) at 19:32

## 2017-06-23 RX ADMIN — ISODIUM CHLORIDE 3 ML: 0.03 SOLUTION RESPIRATORY (INHALATION) at 19:32

## 2017-06-23 RX ADMIN — SENNOSIDES 8.6 MG: 8.6 TABLET, FILM COATED ORAL at 08:42

## 2017-06-23 RX ADMIN — FAMOTIDINE 20 MG: 20 TABLET ORAL at 08:42

## 2017-06-23 RX ADMIN — APIXABAN 5 MG: 5 TABLET, FILM COATED ORAL at 17:14

## 2017-06-23 RX ADMIN — LORATADINE 10 MG: 10 TABLET ORAL at 08:42

## 2017-06-23 RX ADMIN — GUAIFENESIN 600 MG: 600 TABLET, EXTENDED RELEASE ORAL at 08:42

## 2017-06-24 LAB
ANION GAP SERPL CALCULATED.3IONS-SCNC: 6 MMOL/L (ref 4–13)
BASOPHILS # BLD AUTO: 0.16 THOUSANDS/ΜL (ref 0–0.1)
BASOPHILS NFR BLD AUTO: 3 % (ref 0–1)
BUN SERPL-MCNC: 14 MG/DL (ref 5–25)
CALCIUM SERPL-MCNC: 9.2 MG/DL (ref 8.3–10.1)
CHLORIDE SERPL-SCNC: 106 MMOL/L (ref 100–108)
CO2 SERPL-SCNC: 29 MMOL/L (ref 21–32)
CREAT SERPL-MCNC: 1.95 MG/DL (ref 0.6–1.3)
EOSINOPHIL # BLD AUTO: 0.26 THOUSAND/ΜL (ref 0–0.61)
EOSINOPHIL NFR BLD AUTO: 5 % (ref 0–6)
ERYTHROCYTE [DISTWIDTH] IN BLOOD BY AUTOMATED COUNT: 16 % (ref 11.6–15.1)
GFR SERPL CREATININE-BSD FRML MDRD: 26.9 ML/MIN/1.73SQ M
GLUCOSE SERPL-MCNC: 91 MG/DL (ref 65–140)
HCT VFR BLD AUTO: 26.3 % (ref 34.8–46.1)
HGB BLD-MCNC: 7.9 G/DL (ref 11.5–15.4)
LYMPHOCYTES # BLD AUTO: 1.15 THOUSANDS/ΜL (ref 0.6–4.47)
LYMPHOCYTES NFR BLD AUTO: 21 % (ref 14–44)
MCH RBC QN AUTO: 29.6 PG (ref 26.8–34.3)
MCHC RBC AUTO-ENTMCNC: 30 G/DL (ref 31.4–37.4)
MCV RBC AUTO: 99 FL (ref 82–98)
MONOCYTES # BLD AUTO: 0.75 THOUSAND/ΜL (ref 0.17–1.22)
MONOCYTES NFR BLD AUTO: 14 % (ref 4–12)
NEUTROPHILS # BLD AUTO: 3.24 THOUSANDS/ΜL (ref 1.85–7.62)
NEUTS SEG NFR BLD AUTO: 57 % (ref 43–75)
NRBC BLD AUTO-RTO: 0 /100 WBCS
PLATELET # BLD AUTO: 337 THOUSANDS/UL (ref 149–390)
PMV BLD AUTO: 9.1 FL (ref 8.9–12.7)
POTASSIUM SERPL-SCNC: 4.1 MMOL/L (ref 3.5–5.3)
RBC # BLD AUTO: 2.67 MILLION/UL (ref 3.81–5.12)
SODIUM SERPL-SCNC: 141 MMOL/L (ref 136–145)
WBC # BLD AUTO: 5.57 THOUSAND/UL (ref 4.31–10.16)

## 2017-06-24 PROCEDURE — 97116 GAIT TRAINING THERAPY: CPT

## 2017-06-24 PROCEDURE — 85025 COMPLETE CBC W/AUTO DIFF WBC: CPT | Performed by: HOSPITALIST

## 2017-06-24 PROCEDURE — 94760 N-INVAS EAR/PLS OXIMETRY 1: CPT

## 2017-06-24 PROCEDURE — 94640 AIRWAY INHALATION TREATMENT: CPT

## 2017-06-24 PROCEDURE — 80048 BASIC METABOLIC PNL TOTAL CA: CPT | Performed by: HOSPITALIST

## 2017-06-24 RX ADMIN — SENNOSIDES 8.6 MG: 8.6 TABLET, FILM COATED ORAL at 08:06

## 2017-06-24 RX ADMIN — FLUTICASONE PROPIONATE 2 PUFF: 220 AEROSOL, METERED RESPIRATORY (INHALATION) at 20:28

## 2017-06-24 RX ADMIN — APIXABAN 5 MG: 5 TABLET, FILM COATED ORAL at 08:06

## 2017-06-24 RX ADMIN — ISODIUM CHLORIDE 3 ML: 0.03 SOLUTION RESPIRATORY (INHALATION) at 07:40

## 2017-06-24 RX ADMIN — DOCUSATE SODIUM 100 MG: 100 CAPSULE, LIQUID FILLED ORAL at 17:27

## 2017-06-24 RX ADMIN — GUAIFENESIN 600 MG: 600 TABLET, EXTENDED RELEASE ORAL at 20:28

## 2017-06-24 RX ADMIN — LEVALBUTEROL HYDROCHLORIDE 1.25 MG: 1.25 SOLUTION, CONCENTRATE RESPIRATORY (INHALATION) at 07:40

## 2017-06-24 RX ADMIN — LORATADINE 10 MG: 10 TABLET ORAL at 08:06

## 2017-06-24 RX ADMIN — CEFTAROLINE FOSAMIL 600 MG: 600 POWDER, FOR SOLUTION INTRAVENOUS at 19:52

## 2017-06-24 RX ADMIN — FLUTICASONE PROPIONATE 2 PUFF: 220 AEROSOL, METERED RESPIRATORY (INHALATION) at 08:08

## 2017-06-24 RX ADMIN — DOCUSATE SODIUM 100 MG: 100 CAPSULE, LIQUID FILLED ORAL at 08:06

## 2017-06-24 RX ADMIN — APIXABAN 5 MG: 5 TABLET, FILM COATED ORAL at 17:29

## 2017-06-24 RX ADMIN — GUAIFENESIN 600 MG: 600 TABLET, EXTENDED RELEASE ORAL at 08:07

## 2017-06-24 RX ADMIN — FAMOTIDINE 20 MG: 20 TABLET ORAL at 08:06

## 2017-06-24 RX ADMIN — CEFTAROLINE FOSAMIL 600 MG: 600 POWDER, FOR SOLUTION INTRAVENOUS at 08:56

## 2017-06-25 LAB
ANION GAP SERPL CALCULATED.3IONS-SCNC: 6 MMOL/L (ref 4–13)
BACTERIA BLD CULT: ABNORMAL
BASOPHILS # BLD AUTO: 0.2 THOUSANDS/ΜL (ref 0–0.1)
BASOPHILS NFR BLD AUTO: 3 % (ref 0–1)
BUN SERPL-MCNC: 13 MG/DL (ref 5–25)
CALCIUM SERPL-MCNC: 9.2 MG/DL (ref 8.3–10.1)
CHLORIDE SERPL-SCNC: 106 MMOL/L (ref 100–108)
CK SERPL-CCNC: 44 U/L (ref 26–192)
CO2 SERPL-SCNC: 28 MMOL/L (ref 21–32)
CREAT SERPL-MCNC: 1.92 MG/DL (ref 0.6–1.3)
EOSINOPHIL # BLD AUTO: 0.35 THOUSAND/ΜL (ref 0–0.61)
EOSINOPHIL NFR BLD AUTO: 6 % (ref 0–6)
ERYTHROCYTE [DISTWIDTH] IN BLOOD BY AUTOMATED COUNT: 15.6 % (ref 11.6–15.1)
GFR SERPL CREATININE-BSD FRML MDRD: 27.4 ML/MIN/1.73SQ M
GLUCOSE SERPL-MCNC: 93 MG/DL (ref 65–140)
GRAM STN SPEC: ABNORMAL
HCT VFR BLD AUTO: 28 % (ref 34.8–46.1)
HGB BLD-MCNC: 8.4 G/DL (ref 11.5–15.4)
LYMPHOCYTES # BLD AUTO: 1.38 THOUSANDS/ΜL (ref 0.6–4.47)
LYMPHOCYTES NFR BLD AUTO: 22 % (ref 14–44)
MCH RBC QN AUTO: 29.7 PG (ref 26.8–34.3)
MCHC RBC AUTO-ENTMCNC: 30 G/DL (ref 31.4–37.4)
MCV RBC AUTO: 99 FL (ref 82–98)
MONOCYTES # BLD AUTO: 0.92 THOUSAND/ΜL (ref 0.17–1.22)
MONOCYTES NFR BLD AUTO: 15 % (ref 4–12)
NEUTROPHILS # BLD AUTO: 3.46 THOUSANDS/ΜL (ref 1.85–7.62)
NEUTS SEG NFR BLD AUTO: 54 % (ref 43–75)
NRBC BLD AUTO-RTO: 0 /100 WBCS
PLATELET # BLD AUTO: 315 THOUSANDS/UL (ref 149–390)
PMV BLD AUTO: 9.3 FL (ref 8.9–12.7)
POTASSIUM SERPL-SCNC: 4.2 MMOL/L (ref 3.5–5.3)
RBC # BLD AUTO: 2.83 MILLION/UL (ref 3.81–5.12)
SODIUM SERPL-SCNC: 140 MMOL/L (ref 136–145)
WBC # BLD AUTO: 6.33 THOUSAND/UL (ref 4.31–10.16)

## 2017-06-25 PROCEDURE — 82550 ASSAY OF CK (CPK): CPT | Performed by: INTERNAL MEDICINE

## 2017-06-25 PROCEDURE — 97530 THERAPEUTIC ACTIVITIES: CPT

## 2017-06-25 PROCEDURE — 80048 BASIC METABOLIC PNL TOTAL CA: CPT | Performed by: HOSPITALIST

## 2017-06-25 PROCEDURE — 97535 SELF CARE MNGMENT TRAINING: CPT

## 2017-06-25 PROCEDURE — 85025 COMPLETE CBC W/AUTO DIFF WBC: CPT | Performed by: HOSPITALIST

## 2017-06-25 RX ADMIN — GUAIFENESIN 600 MG: 600 TABLET, EXTENDED RELEASE ORAL at 08:18

## 2017-06-25 RX ADMIN — FLUTICASONE PROPIONATE 2 PUFF: 220 AEROSOL, METERED RESPIRATORY (INHALATION) at 20:08

## 2017-06-25 RX ADMIN — DOCUSATE SODIUM 100 MG: 100 CAPSULE, LIQUID FILLED ORAL at 08:19

## 2017-06-25 RX ADMIN — APIXABAN 5 MG: 5 TABLET, FILM COATED ORAL at 08:18

## 2017-06-25 RX ADMIN — DOCUSATE SODIUM 100 MG: 100 CAPSULE, LIQUID FILLED ORAL at 17:38

## 2017-06-25 RX ADMIN — FLUTICASONE PROPIONATE 2 PUFF: 220 AEROSOL, METERED RESPIRATORY (INHALATION) at 08:19

## 2017-06-25 RX ADMIN — SENNOSIDES 8.6 MG: 8.6 TABLET, FILM COATED ORAL at 08:19

## 2017-06-25 RX ADMIN — LORATADINE 10 MG: 10 TABLET ORAL at 08:19

## 2017-06-25 RX ADMIN — GUAIFENESIN 600 MG: 600 TABLET, EXTENDED RELEASE ORAL at 20:07

## 2017-06-25 RX ADMIN — FAMOTIDINE 20 MG: 20 TABLET ORAL at 08:19

## 2017-06-25 RX ADMIN — DAPTOMYCIN 500 MG: 500 INJECTION, POWDER, LYOPHILIZED, FOR SOLUTION INTRAVENOUS at 10:32

## 2017-06-25 RX ADMIN — CEFTAROLINE FOSAMIL 600 MG: 600 POWDER, FOR SOLUTION INTRAVENOUS at 08:18

## 2017-06-25 RX ADMIN — APIXABAN 5 MG: 5 TABLET, FILM COATED ORAL at 17:38

## 2017-06-26 VITALS
SYSTOLIC BLOOD PRESSURE: 126 MMHG | HEIGHT: 63 IN | TEMPERATURE: 97.8 F | RESPIRATION RATE: 18 BRPM | OXYGEN SATURATION: 95 % | DIASTOLIC BLOOD PRESSURE: 71 MMHG | BODY MASS INDEX: 49.77 KG/M2 | HEART RATE: 82 BPM | WEIGHT: 280.87 LBS

## 2017-06-26 LAB
ALBUMIN SERPL BCP-MCNC: 2.5 G/DL (ref 3.5–5)
ALP SERPL-CCNC: 83 U/L (ref 46–116)
ALT SERPL W P-5'-P-CCNC: 22 U/L (ref 12–78)
ANION GAP SERPL CALCULATED.3IONS-SCNC: 8 MMOL/L (ref 4–13)
AST SERPL W P-5'-P-CCNC: 20 U/L (ref 5–45)
BASOPHILS # BLD AUTO: 0.14 THOUSANDS/ΜL (ref 0–0.1)
BASOPHILS NFR BLD AUTO: 3 % (ref 0–1)
BILIRUB SERPL-MCNC: 0.33 MG/DL (ref 0.2–1)
BUN SERPL-MCNC: 15 MG/DL (ref 5–25)
CALCIUM SERPL-MCNC: 9.1 MG/DL (ref 8.3–10.1)
CHLORIDE SERPL-SCNC: 105 MMOL/L (ref 100–108)
CO2 SERPL-SCNC: 26 MMOL/L (ref 21–32)
CREAT SERPL-MCNC: 1.96 MG/DL (ref 0.6–1.3)
EOSINOPHIL # BLD AUTO: 0.3 THOUSAND/ΜL (ref 0–0.61)
EOSINOPHIL NFR BLD AUTO: 5 % (ref 0–6)
ERYTHROCYTE [DISTWIDTH] IN BLOOD BY AUTOMATED COUNT: 15.4 % (ref 11.6–15.1)
GFR SERPL CREATININE-BSD FRML MDRD: 26.8 ML/MIN/1.73SQ M
GLUCOSE SERPL-MCNC: 95 MG/DL (ref 65–140)
HCT VFR BLD AUTO: 27.5 % (ref 34.8–46.1)
HGB BLD-MCNC: 8.4 G/DL (ref 11.5–15.4)
LYMPHOCYTES # BLD AUTO: 1.54 THOUSANDS/ΜL (ref 0.6–4.47)
LYMPHOCYTES NFR BLD AUTO: 27 % (ref 14–44)
MCH RBC QN AUTO: 29.9 PG (ref 26.8–34.3)
MCHC RBC AUTO-ENTMCNC: 30.5 G/DL (ref 31.4–37.4)
MCV RBC AUTO: 98 FL (ref 82–98)
MONOCYTES # BLD AUTO: 0.66 THOUSAND/ΜL (ref 0.17–1.22)
MONOCYTES NFR BLD AUTO: 12 % (ref 4–12)
NEUTROPHILS # BLD AUTO: 2.99 THOUSANDS/ΜL (ref 1.85–7.62)
NEUTS SEG NFR BLD AUTO: 53 % (ref 43–75)
NRBC BLD AUTO-RTO: 0 /100 WBCS
PLATELET # BLD AUTO: 273 THOUSANDS/UL (ref 149–390)
PMV BLD AUTO: 9.4 FL (ref 8.9–12.7)
POTASSIUM SERPL-SCNC: 4.2 MMOL/L (ref 3.5–5.3)
PROT SERPL-MCNC: 7 G/DL (ref 6.4–8.2)
RBC # BLD AUTO: 2.81 MILLION/UL (ref 3.81–5.12)
SODIUM SERPL-SCNC: 139 MMOL/L (ref 136–145)
WBC # BLD AUTO: 5.65 THOUSAND/UL (ref 4.31–10.16)

## 2017-06-26 PROCEDURE — 80053 COMPREHEN METABOLIC PANEL: CPT | Performed by: INTERNAL MEDICINE

## 2017-06-26 PROCEDURE — 85025 COMPLETE CBC W/AUTO DIFF WBC: CPT | Performed by: INTERNAL MEDICINE

## 2017-06-26 PROCEDURE — 97116 GAIT TRAINING THERAPY: CPT

## 2017-06-26 RX ORDER — FAMOTIDINE 20 MG/1
20 TABLET, FILM COATED ORAL DAILY
Qty: 30 TABLET | Refills: 0 | Status: SHIPPED | OUTPATIENT
Start: 2017-06-26 | End: 2018-10-11 | Stop reason: SDUPTHER

## 2017-06-26 RX ADMIN — LORATADINE 10 MG: 10 TABLET ORAL at 08:51

## 2017-06-26 RX ADMIN — FLUTICASONE PROPIONATE 2 PUFF: 220 AEROSOL, METERED RESPIRATORY (INHALATION) at 08:52

## 2017-06-26 RX ADMIN — DOCUSATE SODIUM 100 MG: 100 CAPSULE, LIQUID FILLED ORAL at 08:52

## 2017-06-26 RX ADMIN — DAPTOMYCIN 500 MG: 500 INJECTION, POWDER, LYOPHILIZED, FOR SOLUTION INTRAVENOUS at 08:55

## 2017-06-26 RX ADMIN — GUAIFENESIN 600 MG: 600 TABLET, EXTENDED RELEASE ORAL at 08:51

## 2017-06-26 RX ADMIN — FAMOTIDINE 20 MG: 20 TABLET ORAL at 08:52

## 2017-06-26 RX ADMIN — SENNOSIDES 8.6 MG: 8.6 TABLET, FILM COATED ORAL at 08:51

## 2017-06-26 RX ADMIN — APIXABAN 5 MG: 5 TABLET, FILM COATED ORAL at 08:52

## 2017-06-30 ENCOUNTER — ALLSCRIPTS OFFICE VISIT (OUTPATIENT)
Dept: OTHER | Facility: OTHER | Age: 52
End: 2017-06-30

## 2017-07-03 ENCOUNTER — LAB REQUISITION (OUTPATIENT)
Dept: LAB | Facility: HOSPITAL | Age: 52
End: 2017-07-03
Payer: COMMERCIAL

## 2017-07-03 DIAGNOSIS — A41.02 SEPSIS DUE TO METHICILLIN RESISTANT STAPHYLOCOCCUS AUREUS (HCC): ICD-10-CM

## 2017-07-03 LAB
ANION GAP SERPL CALCULATED.3IONS-SCNC: 5 MMOL/L (ref 4–13)
BASOPHILS # BLD AUTO: 0.11 THOUSANDS/ΜL (ref 0–0.1)
BASOPHILS NFR BLD AUTO: 2 % (ref 0–1)
BUN SERPL-MCNC: 14 MG/DL (ref 5–25)
CALCIUM SERPL-MCNC: 9.3 MG/DL (ref 8.3–10.1)
CHLORIDE SERPL-SCNC: 108 MMOL/L (ref 100–108)
CK MB SERPL-MCNC: 1.7 NG/ML (ref 0–5)
CK MB SERPL-MCNC: <1 % (ref 0–2.5)
CK SERPL-CCNC: 190 U/L (ref 26–192)
CO2 SERPL-SCNC: 26 MMOL/L (ref 21–32)
CREAT SERPL-MCNC: 1.61 MG/DL (ref 0.6–1.3)
EOSINOPHIL # BLD AUTO: 0.5 THOUSAND/ΜL (ref 0–0.61)
EOSINOPHIL NFR BLD AUTO: 8 % (ref 0–6)
ERYTHROCYTE [DISTWIDTH] IN BLOOD BY AUTOMATED COUNT: 15.1 % (ref 11.6–15.1)
GFR SERPL CREATININE-BSD FRML MDRD: 33.6 ML/MIN/1.73SQ M
GLUCOSE P FAST SERPL-MCNC: 89 MG/DL (ref 65–99)
HCT VFR BLD AUTO: 30.7 % (ref 34.8–46.1)
HGB BLD-MCNC: 9.4 G/DL (ref 11.5–15.4)
LYMPHOCYTES # BLD AUTO: 1.98 THOUSANDS/ΜL (ref 0.6–4.47)
LYMPHOCYTES NFR BLD AUTO: 32 % (ref 14–44)
MCH RBC QN AUTO: 29.7 PG (ref 26.8–34.3)
MCHC RBC AUTO-ENTMCNC: 30.6 G/DL (ref 31.4–37.4)
MCV RBC AUTO: 97 FL (ref 82–98)
MONOCYTES # BLD AUTO: 0.75 THOUSAND/ΜL (ref 0.17–1.22)
MONOCYTES NFR BLD AUTO: 12 % (ref 4–12)
NEUTROPHILS # BLD AUTO: 2.89 THOUSANDS/ΜL (ref 1.85–7.62)
NEUTS SEG NFR BLD AUTO: 46 % (ref 43–75)
NRBC BLD AUTO-RTO: 0 /100 WBCS
PLATELET # BLD AUTO: 360 THOUSANDS/UL (ref 149–390)
PMV BLD AUTO: 9.9 FL (ref 8.9–12.7)
POTASSIUM SERPL-SCNC: 4 MMOL/L (ref 3.5–5.3)
RBC # BLD AUTO: 3.16 MILLION/UL (ref 3.81–5.12)
SODIUM SERPL-SCNC: 139 MMOL/L (ref 136–145)
WBC # BLD AUTO: 6.24 THOUSAND/UL (ref 4.31–10.16)

## 2017-07-03 PROCEDURE — 80048 BASIC METABOLIC PNL TOTAL CA: CPT | Performed by: INTERNAL MEDICINE

## 2017-07-03 PROCEDURE — 82550 ASSAY OF CK (CPK): CPT | Performed by: INTERNAL MEDICINE

## 2017-07-03 PROCEDURE — 85025 COMPLETE CBC W/AUTO DIFF WBC: CPT | Performed by: INTERNAL MEDICINE

## 2017-07-03 PROCEDURE — 82553 CREATINE MB FRACTION: CPT | Performed by: INTERNAL MEDICINE

## 2017-07-06 LAB — MISCELLANEOUS LAB TEST RESULT: NORMAL

## 2017-07-09 ENCOUNTER — APPOINTMENT (EMERGENCY)
Dept: RADIOLOGY | Facility: HOSPITAL | Age: 52
End: 2017-07-09
Payer: COMMERCIAL

## 2017-07-09 ENCOUNTER — HOSPITAL ENCOUNTER (EMERGENCY)
Facility: HOSPITAL | Age: 52
End: 2017-07-09
Attending: EMERGENCY MEDICINE | Admitting: EMERGENCY MEDICINE
Payer: COMMERCIAL

## 2017-07-09 ENCOUNTER — APPOINTMENT (EMERGENCY)
Dept: CT IMAGING | Facility: HOSPITAL | Age: 52
End: 2017-07-09
Payer: COMMERCIAL

## 2017-07-09 ENCOUNTER — HOSPITAL ENCOUNTER (INPATIENT)
Facility: HOSPITAL | Age: 52
LOS: 3 days | Discharge: HOME HEALTH | DRG: 721 | End: 2017-07-12
Attending: INTERNAL MEDICINE | Admitting: INTERNAL MEDICINE
Payer: COMMERCIAL

## 2017-07-09 VITALS
DIASTOLIC BLOOD PRESSURE: 70 MMHG | HEART RATE: 119 BPM | TEMPERATURE: 99.6 F | SYSTOLIC BLOOD PRESSURE: 113 MMHG | BODY MASS INDEX: 51.73 KG/M2 | OXYGEN SATURATION: 97 % | WEIGHT: 292 LBS | RESPIRATION RATE: 20 BRPM

## 2017-07-09 DIAGNOSIS — R00.0 TACHYCARDIA: ICD-10-CM

## 2017-07-09 DIAGNOSIS — J18.9 MULTIFOCAL PNEUMONIA: ICD-10-CM

## 2017-07-09 DIAGNOSIS — R78.81 BACTEREMIA: Primary | ICD-10-CM

## 2017-07-09 DIAGNOSIS — E87.6 HYPOKALEMIA: ICD-10-CM

## 2017-07-09 DIAGNOSIS — Z86.711 PERSONAL HISTORY OF PULMONARY EMBOLISM: ICD-10-CM

## 2017-07-09 DIAGNOSIS — A41.9 SEVERE SEPSIS (HCC): Primary | ICD-10-CM

## 2017-07-09 DIAGNOSIS — R65.20 SEVERE SEPSIS (HCC): Primary | ICD-10-CM

## 2017-07-09 DIAGNOSIS — T80.219A PICC LINE INFECTION, INITIAL ENCOUNTER: ICD-10-CM

## 2017-07-09 DIAGNOSIS — N39.0 URINARY TRACT INFECTION: ICD-10-CM

## 2017-07-09 DIAGNOSIS — J18.9 HOSPITAL-ACQUIRED PNEUMONIA: ICD-10-CM
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
## 2017-07-09 LAB
ALBUMIN SERPL BCP-MCNC: 2.6 G/DL (ref 3.5–5)
ALP SERPL-CCNC: 112 U/L (ref 46–116)
ALT SERPL W P-5'-P-CCNC: 29 U/L (ref 12–78)
ANION GAP SERPL CALCULATED.3IONS-SCNC: 15 MMOL/L (ref 4–13)
APTT PPP: 34 SECONDS (ref 23–35)
AST SERPL W P-5'-P-CCNC: 41 U/L (ref 5–45)
BACTERIA UR QL AUTO: ABNORMAL /HPF
BASOPHILS # BLD AUTO: 0.04 THOUSANDS/ΜL (ref 0–0.1)
BASOPHILS NFR BLD AUTO: 2 % (ref 0–1)
BILIRUB SERPL-MCNC: 0.6 MG/DL (ref 0.2–1)
BILIRUB UR QL STRIP: NEGATIVE
BUN SERPL-MCNC: 17 MG/DL (ref 5–25)
CALCIUM SERPL-MCNC: 8.2 MG/DL (ref 8.3–10.1)
CHLORIDE SERPL-SCNC: 101 MMOL/L (ref 100–108)
CLARITY UR: ABNORMAL
CO2 SERPL-SCNC: 20 MMOL/L (ref 21–32)
COLOR UR: ABNORMAL
CREAT SERPL-MCNC: 1.86 MG/DL (ref 0.6–1.3)
EOSINOPHIL # BLD AUTO: 0.01 THOUSAND/ΜL (ref 0–0.61)
EOSINOPHIL NFR BLD AUTO: 0 % (ref 0–6)
ERYTHROCYTE [DISTWIDTH] IN BLOOD BY AUTOMATED COUNT: 14.9 % (ref 11.6–15.1)
GFR SERPL CREATININE-BSD FRML MDRD: 28.4 ML/MIN/1.73SQ M
GLUCOSE SERPL-MCNC: 99 MG/DL (ref 65–140)
GLUCOSE UR STRIP-MCNC: NEGATIVE MG/DL
HCT VFR BLD AUTO: 31.6 % (ref 34.8–46.1)
HGB BLD-MCNC: 9.6 G/DL (ref 11.5–15.4)
HGB UR QL STRIP.AUTO: NEGATIVE
INR PPP: 1.72 (ref 0.86–1.16)
KETONES UR STRIP-MCNC: NEGATIVE MG/DL
LACTATE SERPL-SCNC: 2.3 MMOL/L (ref 0.5–2)
LACTATE SERPL-SCNC: 2.3 MMOL/L (ref 0.5–2)
LEUKOCYTE ESTERASE UR QL STRIP: ABNORMAL
LYMPHOCYTES # BLD AUTO: 0.24 THOUSANDS/ΜL (ref 0.6–4.47)
LYMPHOCYTES NFR BLD AUTO: 10 % (ref 14–44)
MCH RBC QN AUTO: 29.5 PG (ref 26.8–34.3)
MCHC RBC AUTO-ENTMCNC: 30.4 G/DL (ref 31.4–37.4)
MCV RBC AUTO: 97 FL (ref 82–98)
MONOCYTES # BLD AUTO: 0.04 THOUSAND/ΜL (ref 0.17–1.22)
MONOCYTES NFR BLD AUTO: 2 % (ref 4–12)
NEUTROPHILS # BLD AUTO: 2.18 THOUSANDS/ΜL (ref 1.85–7.62)
NEUTS SEG NFR BLD AUTO: 86 % (ref 43–75)
NITRITE UR QL STRIP: NEGATIVE
NON-SQ EPI CELLS URNS QL MICRO: ABNORMAL /HPF
PH UR STRIP.AUTO: 5.5 [PH] (ref 4.5–8)
PLATELET # BLD AUTO: 213 THOUSANDS/UL (ref 149–390)
PMV BLD AUTO: 9.5 FL (ref 8.9–12.7)
POTASSIUM SERPL-SCNC: 3.3 MMOL/L (ref 3.5–5.3)
PROT SERPL-MCNC: 6.9 G/DL (ref 6.4–8.2)
PROT UR STRIP-MCNC: ABNORMAL MG/DL
PROTHROMBIN TIME: 20.2 SECONDS (ref 12.1–14.4)
RBC # BLD AUTO: 3.25 MILLION/UL (ref 3.81–5.12)
RBC #/AREA URNS AUTO: ABNORMAL /HPF
SODIUM SERPL-SCNC: 136 MMOL/L (ref 136–145)
SP GR UR STRIP.AUTO: 1.02 (ref 1–1.03)
UROBILINOGEN UR QL STRIP.AUTO: 0.2 E.U./DL
WBC # BLD AUTO: 2.51 THOUSAND/UL (ref 4.31–10.16)
WBC #/AREA URNS AUTO: ABNORMAL /HPF

## 2017-07-09 PROCEDURE — 99285 EMERGENCY DEPT VISIT HI MDM: CPT

## 2017-07-09 PROCEDURE — 87449 NOS EACH ORGANISM AG IA: CPT | Performed by: EMERGENCY MEDICINE

## 2017-07-09 PROCEDURE — 96375 TX/PRO/DX INJ NEW DRUG ADDON: CPT

## 2017-07-09 PROCEDURE — 96365 THER/PROPH/DIAG IV INF INIT: CPT

## 2017-07-09 PROCEDURE — 87040 BLOOD CULTURE FOR BACTERIA: CPT | Performed by: EMERGENCY MEDICINE

## 2017-07-09 PROCEDURE — 71020 HB CHEST X-RAY 2VW FRONTAL&LATL: CPT

## 2017-07-09 PROCEDURE — 81001 URINALYSIS AUTO W/SCOPE: CPT | Performed by: EMERGENCY MEDICINE

## 2017-07-09 PROCEDURE — 85025 COMPLETE CBC W/AUTO DIFF WBC: CPT | Performed by: EMERGENCY MEDICINE

## 2017-07-09 PROCEDURE — 87077 CULTURE AEROBIC IDENTIFY: CPT | Performed by: EMERGENCY MEDICINE

## 2017-07-09 PROCEDURE — 87186 SC STD MICRODIL/AGAR DIL: CPT | Performed by: EMERGENCY MEDICINE

## 2017-07-09 PROCEDURE — 36415 COLL VENOUS BLD VENIPUNCTURE: CPT | Performed by: EMERGENCY MEDICINE

## 2017-07-09 PROCEDURE — 80053 COMPREHEN METABOLIC PANEL: CPT | Performed by: EMERGENCY MEDICINE

## 2017-07-09 PROCEDURE — 85730 THROMBOPLASTIN TIME PARTIAL: CPT | Performed by: EMERGENCY MEDICINE

## 2017-07-09 PROCEDURE — 96366 THER/PROPH/DIAG IV INF ADDON: CPT

## 2017-07-09 PROCEDURE — 96367 TX/PROPH/DG ADDL SEQ IV INF: CPT

## 2017-07-09 PROCEDURE — 85610 PROTHROMBIN TIME: CPT | Performed by: EMERGENCY MEDICINE

## 2017-07-09 PROCEDURE — 71250 CT THORAX DX C-: CPT

## 2017-07-09 PROCEDURE — 96361 HYDRATE IV INFUSION ADD-ON: CPT

## 2017-07-09 PROCEDURE — 74176 CT ABD & PELVIS W/O CONTRAST: CPT

## 2017-07-09 PROCEDURE — 83605 ASSAY OF LACTIC ACID: CPT | Performed by: EMERGENCY MEDICINE

## 2017-07-09 PROCEDURE — 94640 AIRWAY INHALATION TREATMENT: CPT

## 2017-07-09 RX ORDER — METHYLPREDNISOLONE SODIUM SUCCINATE 125 MG/2ML
125 INJECTION, POWDER, LYOPHILIZED, FOR SOLUTION INTRAMUSCULAR; INTRAVENOUS ONCE
Status: COMPLETED | OUTPATIENT
Start: 2017-07-09 | End: 2017-07-09

## 2017-07-09 RX ORDER — SODIUM CHLORIDE 9 MG/ML
100 INJECTION, SOLUTION INTRAVENOUS CONTINUOUS
Status: DISCONTINUED | OUTPATIENT
Start: 2017-07-09 | End: 2017-07-12 | Stop reason: HOSPADM

## 2017-07-09 RX ORDER — FAMOTIDINE 20 MG/1
20 TABLET, FILM COATED ORAL DAILY
Status: DISCONTINUED | OUTPATIENT
Start: 2017-07-10 | End: 2017-07-12 | Stop reason: HOSPADM

## 2017-07-09 RX ORDER — ALBUTEROL SULFATE 2.5 MG/3ML
2.5 SOLUTION RESPIRATORY (INHALATION) 3 TIMES DAILY PRN
Status: DISCONTINUED | OUTPATIENT
Start: 2017-07-09 | End: 2017-07-11

## 2017-07-09 RX ORDER — FLUTICASONE PROPIONATE 220 UG/1
2 AEROSOL, METERED RESPIRATORY (INHALATION) EVERY 12 HOURS SCHEDULED
Status: DISCONTINUED | OUTPATIENT
Start: 2017-07-09 | End: 2017-07-12 | Stop reason: HOSPADM

## 2017-07-09 RX ORDER — GUAIFENESIN/DEXTROMETHORPHAN 100-10MG/5
10 SYRUP ORAL ONCE
Status: COMPLETED | OUTPATIENT
Start: 2017-07-09 | End: 2017-07-09

## 2017-07-09 RX ORDER — HEPARIN SODIUM 10000 [USP'U]/100ML
18 INJECTION, SOLUTION INTRAVENOUS
Status: DISCONTINUED | OUTPATIENT
Start: 2017-07-09 | End: 2017-07-09 | Stop reason: HOSPADM

## 2017-07-09 RX ORDER — HEPARIN SODIUM 1000 [USP'U]/ML
9600 INJECTION, SOLUTION INTRAVENOUS; SUBCUTANEOUS AS NEEDED
Status: DISCONTINUED | OUTPATIENT
Start: 2017-07-09 | End: 2017-07-09 | Stop reason: HOSPADM

## 2017-07-09 RX ORDER — POTASSIUM CHLORIDE 20 MEQ/1
40 TABLET, EXTENDED RELEASE ORAL ONCE
Status: COMPLETED | OUTPATIENT
Start: 2017-07-09 | End: 2017-07-09

## 2017-07-09 RX ORDER — PROCHLORPERAZINE MALEATE 5 MG/1
5 TABLET ORAL EVERY 6 HOURS PRN
Status: DISCONTINUED | OUTPATIENT
Start: 2017-07-09 | End: 2017-07-12 | Stop reason: HOSPADM

## 2017-07-09 RX ORDER — ACETAMINOPHEN 325 MG/1
650 TABLET ORAL ONCE
Status: COMPLETED | OUTPATIENT
Start: 2017-07-09 | End: 2017-07-09

## 2017-07-09 RX ORDER — LORATADINE 10 MG/1
10 TABLET ORAL DAILY
Status: DISCONTINUED | OUTPATIENT
Start: 2017-07-10 | End: 2017-07-12 | Stop reason: HOSPADM

## 2017-07-09 RX ORDER — DAPTOMYCIN 50 MG/ML
500 INJECTION, POWDER, LYOPHILIZED, FOR SOLUTION INTRAVENOUS DAILY
Status: DISCONTINUED | OUTPATIENT
Start: 2017-07-10 | End: 2017-07-09

## 2017-07-09 RX ORDER — ALBUTEROL SULFATE 2.5 MG/3ML
2.5 SOLUTION RESPIRATORY (INHALATION) AS NEEDED
Status: DISCONTINUED | OUTPATIENT
Start: 2017-07-09 | End: 2017-07-09

## 2017-07-09 RX ORDER — MAGNESIUM HYDROXIDE/ALUMINUM HYDROXICE/SIMETHICONE 120; 1200; 1200 MG/30ML; MG/30ML; MG/30ML
30 SUSPENSION ORAL EVERY 6 HOURS PRN
Status: DISCONTINUED | OUTPATIENT
Start: 2017-07-09 | End: 2017-07-12 | Stop reason: HOSPADM

## 2017-07-09 RX ORDER — DOCUSATE SODIUM 100 MG/1
100 CAPSULE, LIQUID FILLED ORAL 2 TIMES DAILY PRN
Status: DISCONTINUED | OUTPATIENT
Start: 2017-07-09 | End: 2017-07-12 | Stop reason: HOSPADM

## 2017-07-09 RX ORDER — HEPARIN SODIUM 1000 [USP'U]/ML
9600 INJECTION, SOLUTION INTRAVENOUS; SUBCUTANEOUS ONCE
Status: COMPLETED | OUTPATIENT
Start: 2017-07-09 | End: 2017-07-09

## 2017-07-09 RX ORDER — HEPARIN SODIUM 1000 [USP'U]/ML
4800 INJECTION, SOLUTION INTRAVENOUS; SUBCUTANEOUS AS NEEDED
Status: DISCONTINUED | OUTPATIENT
Start: 2017-07-09 | End: 2017-07-09 | Stop reason: HOSPADM

## 2017-07-09 RX ORDER — ALBUTEROL SULFATE 90 UG/1
2 AEROSOL, METERED RESPIRATORY (INHALATION)
Status: DISCONTINUED | OUTPATIENT
Start: 2017-07-10 | End: 2017-07-12 | Stop reason: HOSPADM

## 2017-07-09 RX ORDER — ONDANSETRON 2 MG/ML
4 INJECTION INTRAMUSCULAR; INTRAVENOUS EVERY 6 HOURS PRN
Status: DISCONTINUED | OUTPATIENT
Start: 2017-07-09 | End: 2017-07-12 | Stop reason: HOSPADM

## 2017-07-09 RX ORDER — ALBUTEROL SULFATE 90 UG/1
2 AEROSOL, METERED RESPIRATORY (INHALATION) AS NEEDED
Status: DISCONTINUED | OUTPATIENT
Start: 2017-07-09 | End: 2017-07-09

## 2017-07-09 RX ORDER — ACETAMINOPHEN 325 MG/1
650 TABLET ORAL EVERY 6 HOURS PRN
Status: DISCONTINUED | OUTPATIENT
Start: 2017-07-09 | End: 2017-07-11

## 2017-07-09 RX ADMIN — HEPARIN SODIUM AND DEXTROSE 18 UNITS/KG/HR: 10000; 5 INJECTION INTRAVENOUS at 17:11

## 2017-07-09 RX ADMIN — SODIUM CHLORIDE 1000 ML: 0.9 INJECTION, SOLUTION INTRAVENOUS at 14:41

## 2017-07-09 RX ADMIN — SODIUM CHLORIDE 100 ML/HR: 0.9 INJECTION, SOLUTION INTRAVENOUS at 22:34

## 2017-07-09 RX ADMIN — SODIUM CHLORIDE 1000 ML: 0.9 INJECTION, SOLUTION INTRAVENOUS at 16:55

## 2017-07-09 RX ADMIN — HEPARIN SODIUM 9600 UNITS: 1000 INJECTION, SOLUTION INTRAVENOUS; SUBCUTANEOUS at 17:11

## 2017-07-09 RX ADMIN — GUAIFENESIN AND DEXTROMETHORPHAN 10 ML: 100; 10 SYRUP ORAL at 14:21

## 2017-07-09 RX ADMIN — CEFEPIME HYDROCHLORIDE 2000 MG: 2 INJECTION, SOLUTION INTRAVENOUS at 14:40

## 2017-07-09 RX ADMIN — ACETAMINOPHEN 650 MG: 325 TABLET, FILM COATED ORAL at 15:55

## 2017-07-09 RX ADMIN — IPRATROPIUM BROMIDE 0.5 MG: 0.5 SOLUTION RESPIRATORY (INHALATION) at 14:22

## 2017-07-09 RX ADMIN — ALBUTEROL SULFATE 5 MG: 2.5 SOLUTION RESPIRATORY (INHALATION) at 14:21

## 2017-07-09 RX ADMIN — METHYLPREDNISOLONE SODIUM SUCCINATE 125 MG: 125 INJECTION, POWDER, FOR SOLUTION INTRAMUSCULAR; INTRAVENOUS at 16:57

## 2017-07-09 RX ADMIN — FLUTICASONE PROPIONATE 2 PUFF: 220 AEROSOL, METERED RESPIRATORY (INHALATION) at 22:36

## 2017-07-09 RX ADMIN — POTASSIUM CHLORIDE 40 MEQ: 1500 TABLET, EXTENDED RELEASE ORAL at 15:56

## 2017-07-09 RX ADMIN — APIXABAN 5 MG: 5 TABLET, FILM COATED ORAL at 22:36

## 2017-07-09 RX ADMIN — AZITHROMYCIN MONOHYDRATE 500 MG: 500 INJECTION, POWDER, LYOPHILIZED, FOR SOLUTION INTRAVENOUS at 15:12

## 2017-07-10 PROBLEM — R05.9 COUGH: Status: ACTIVE | Noted: 2017-07-10

## 2017-07-10 PROBLEM — L02.11 NECK ABSCESS: Status: ACTIVE | Noted: 2017-07-10

## 2017-07-10 PROBLEM — Z86.14 HISTORY OF METHICILLIN RESISTANT STAPHYLOCOCCUS AUREUS (MRSA): Status: ACTIVE | Noted: 2017-07-10

## 2017-07-10 PROBLEM — Z79.01 CHRONIC ANTICOAGULATION: Status: ACTIVE | Noted: 2017-07-10

## 2017-07-10 PROBLEM — R50.9 FEVER IN ADULT: Status: ACTIVE | Noted: 2017-07-10

## 2017-07-10 PROBLEM — R19.7 DIARRHEA: Status: ACTIVE | Noted: 2017-07-10

## 2017-07-10 PROBLEM — R78.81 BACTEREMIA: Status: ACTIVE | Noted: 2017-07-10

## 2017-07-10 LAB
ALBUMIN SERPL BCP-MCNC: 2.3 G/DL (ref 3.5–5)
ALP SERPL-CCNC: 96 U/L (ref 46–116)
ALT SERPL W P-5'-P-CCNC: 37 U/L (ref 12–78)
ANION GAP SERPL CALCULATED.3IONS-SCNC: 8 MMOL/L (ref 4–13)
APTT PPP: 132 SECONDS (ref 23–35)
AST SERPL W P-5'-P-CCNC: 35 U/L (ref 5–45)
BILIRUB SERPL-MCNC: 0.44 MG/DL (ref 0.2–1)
BUN SERPL-MCNC: 19 MG/DL (ref 5–25)
CALCIUM SERPL-MCNC: 7.8 MG/DL (ref 8.3–10.1)
CHLORIDE SERPL-SCNC: 112 MMOL/L (ref 100–108)
CO2 SERPL-SCNC: 20 MMOL/L (ref 21–32)
CREAT SERPL-MCNC: 1.5 MG/DL (ref 0.6–1.3)
ERYTHROCYTE [DISTWIDTH] IN BLOOD BY AUTOMATED COUNT: 15.3 % (ref 11.6–15.1)
GFR SERPL CREATININE-BSD FRML MDRD: 36.5 ML/MIN/1.73SQ M
GLUCOSE SERPL-MCNC: 183 MG/DL (ref 65–140)
HCT VFR BLD AUTO: 29.4 % (ref 34.8–46.1)
HGB BLD-MCNC: 9.2 G/DL (ref 11.5–15.4)
INR PPP: 2.15 (ref 0.86–1.16)
L PNEUMO1 AG UR QL IA.RAPID: NEGATIVE
MAGNESIUM SERPL-MCNC: 2.1 MG/DL (ref 1.6–2.6)
MCH RBC QN AUTO: 29.9 PG (ref 26.8–34.3)
MCHC RBC AUTO-ENTMCNC: 31.3 G/DL (ref 31.4–37.4)
MCV RBC AUTO: 96 FL (ref 82–98)
PLATELET # BLD AUTO: 207 THOUSANDS/UL (ref 149–390)
PMV BLD AUTO: 9.7 FL (ref 8.9–12.7)
POTASSIUM SERPL-SCNC: 4.3 MMOL/L (ref 3.5–5.3)
PROT SERPL-MCNC: 6.4 G/DL (ref 6.4–8.2)
PROTHROMBIN TIME: 24.2 SECONDS (ref 12.1–14.4)
RBC # BLD AUTO: 3.08 MILLION/UL (ref 3.81–5.12)
S PNEUM AG UR QL: NEGATIVE
SODIUM SERPL-SCNC: 140 MMOL/L (ref 136–145)
WBC # BLD AUTO: 16.03 THOUSAND/UL (ref 4.31–10.16)

## 2017-07-10 PROCEDURE — 85027 COMPLETE CBC AUTOMATED: CPT | Performed by: INTERNAL MEDICINE

## 2017-07-10 PROCEDURE — 87493 C DIFF AMPLIFIED PROBE: CPT | Performed by: INTERNAL MEDICINE

## 2017-07-10 PROCEDURE — 85730 THROMBOPLASTIN TIME PARTIAL: CPT | Performed by: INTERNAL MEDICINE

## 2017-07-10 PROCEDURE — 02PYX3Z REMOVAL OF INFUSION DEVICE FROM GREAT VESSEL, EXTERNAL APPROACH: ICD-10-PCS | Performed by: INTERNAL MEDICINE

## 2017-07-10 PROCEDURE — 85610 PROTHROMBIN TIME: CPT | Performed by: INTERNAL MEDICINE

## 2017-07-10 PROCEDURE — 83735 ASSAY OF MAGNESIUM: CPT | Performed by: INTERNAL MEDICINE

## 2017-07-10 PROCEDURE — 80053 COMPREHEN METABOLIC PANEL: CPT | Performed by: INTERNAL MEDICINE

## 2017-07-10 PROCEDURE — 87070 CULTURE OTHR SPECIMN AEROBIC: CPT | Performed by: PHYSICIAN ASSISTANT

## 2017-07-10 RX ORDER — GUAIFENESIN 100 MG/5ML
100 SOLUTION ORAL EVERY 6 HOURS PRN
Status: DISCONTINUED | OUTPATIENT
Start: 2017-07-10 | End: 2017-07-12 | Stop reason: HOSPADM

## 2017-07-10 RX ADMIN — LORATADINE 10 MG: 10 TABLET ORAL at 09:26

## 2017-07-10 RX ADMIN — ALBUTEROL SULFATE 2 PUFF: 90 AEROSOL, METERED RESPIRATORY (INHALATION) at 20:24

## 2017-07-10 RX ADMIN — GUAIFENESIN 100 MG: 100 SOLUTION ORAL at 19:39

## 2017-07-10 RX ADMIN — DAPTOMYCIN 500 MG: 500 INJECTION, POWDER, LYOPHILIZED, FOR SOLUTION INTRAVENOUS at 09:26

## 2017-07-10 RX ADMIN — FLUTICASONE PROPIONATE 2 PUFF: 220 AEROSOL, METERED RESPIRATORY (INHALATION) at 20:20

## 2017-07-10 RX ADMIN — ALBUTEROL SULFATE 2 PUFF: 90 AEROSOL, METERED RESPIRATORY (INHALATION) at 09:26

## 2017-07-10 RX ADMIN — CEFEPIME HYDROCHLORIDE 1000 MG: 1 INJECTION, POWDER, FOR SOLUTION INTRAMUSCULAR; INTRAVENOUS at 11:08

## 2017-07-10 RX ADMIN — APIXABAN 5 MG: 5 TABLET, FILM COATED ORAL at 09:26

## 2017-07-10 RX ADMIN — SODIUM CHLORIDE 100 ML/HR: 0.9 INJECTION, SOLUTION INTRAVENOUS at 09:25

## 2017-07-10 RX ADMIN — CEFEPIME HYDROCHLORIDE 1000 MG: 1 INJECTION, POWDER, FOR SOLUTION INTRAMUSCULAR; INTRAVENOUS at 23:22

## 2017-07-10 RX ADMIN — APIXABAN 5 MG: 5 TABLET, FILM COATED ORAL at 17:30

## 2017-07-10 RX ADMIN — FLUTICASONE PROPIONATE 2 PUFF: 220 AEROSOL, METERED RESPIRATORY (INHALATION) at 09:25

## 2017-07-10 RX ADMIN — FAMOTIDINE 20 MG: 20 TABLET, FILM COATED ORAL at 09:26

## 2017-07-11 LAB
ANION GAP SERPL CALCULATED.3IONS-SCNC: 6 MMOL/L (ref 4–13)
ANISOCYTOSIS BLD QL SMEAR: PRESENT
BACTERIA BLD CULT: NORMAL
BACTERIA BLD CULT: NORMAL
BASOPHILS # BLD MANUAL: 0.15 THOUSAND/UL (ref 0–0.1)
BASOPHILS NFR MAR MANUAL: 1 % (ref 0–1)
BUN SERPL-MCNC: 23 MG/DL (ref 5–25)
BURR CELLS BLD QL SMEAR: PRESENT
C DIFF TOX GENS STL QL NAA+PROBE: NORMAL
CALCIUM SERPL-MCNC: 8 MG/DL (ref 8.3–10.1)
CHLORIDE SERPL-SCNC: 115 MMOL/L (ref 100–108)
CO2 SERPL-SCNC: 21 MMOL/L (ref 21–32)
CREAT SERPL-MCNC: 1.29 MG/DL (ref 0.6–1.3)
EOSINOPHIL # BLD MANUAL: 0 THOUSAND/UL (ref 0–0.4)
EOSINOPHIL NFR BLD MANUAL: 0 % (ref 0–6)
ERYTHROCYTE [DISTWIDTH] IN BLOOD BY AUTOMATED COUNT: 15.7 % (ref 11.6–15.1)
GFR SERPL CREATININE-BSD FRML MDRD: 43.4 ML/MIN/1.73SQ M
GIANT PLATELETS BLD QL SMEAR: PRESENT
GLUCOSE SERPL-MCNC: 91 MG/DL (ref 65–140)
GRAM STN SPEC: NORMAL
GRAM STN SPEC: NORMAL
HCT VFR BLD AUTO: 27.8 % (ref 34.8–46.1)
HGB BLD-MCNC: 8.7 G/DL (ref 11.5–15.4)
LYMPHOCYTES # BLD AUTO: 0.6 THOUSAND/UL (ref 0.6–4.47)
LYMPHOCYTES # BLD AUTO: 4 % (ref 14–44)
MCH RBC QN AUTO: 30 PG (ref 26.8–34.3)
MCHC RBC AUTO-ENTMCNC: 31.3 G/DL (ref 31.4–37.4)
MCV RBC AUTO: 96 FL (ref 82–98)
MONOCYTES # BLD AUTO: 0.45 THOUSAND/UL (ref 0–1.22)
MONOCYTES NFR BLD: 3 % (ref 4–12)
NEUTROPHILS # BLD MANUAL: 13.73 THOUSAND/UL (ref 1.85–7.62)
NEUTS BAND NFR BLD MANUAL: 8 % (ref 0–8)
NEUTS SEG NFR BLD AUTO: 84 % (ref 43–75)
NRBC BLD AUTO-RTO: 0 /100 WBCS
PLATELET # BLD AUTO: 236 THOUSANDS/UL (ref 149–390)
PLATELET BLD QL SMEAR: ADEQUATE
PMV BLD AUTO: 10.5 FL (ref 8.9–12.7)
POIKILOCYTOSIS BLD QL SMEAR: PRESENT
POTASSIUM SERPL-SCNC: 3.8 MMOL/L (ref 3.5–5.3)
RBC # BLD AUTO: 2.9 MILLION/UL (ref 3.81–5.12)
RBC MORPH BLD: PRESENT
SODIUM SERPL-SCNC: 142 MMOL/L (ref 136–145)
WBC # BLD AUTO: 14.92 THOUSAND/UL (ref 4.31–10.16)

## 2017-07-11 PROCEDURE — 85007 BL SMEAR W/DIFF WBC COUNT: CPT | Performed by: PHYSICIAN ASSISTANT

## 2017-07-11 PROCEDURE — 85027 COMPLETE CBC AUTOMATED: CPT | Performed by: PHYSICIAN ASSISTANT

## 2017-07-11 PROCEDURE — 80048 BASIC METABOLIC PNL TOTAL CA: CPT | Performed by: PHYSICIAN ASSISTANT

## 2017-07-11 RX ORDER — ACETAMINOPHEN 325 MG/1
650 TABLET ORAL EVERY 6 HOURS PRN
Status: DISCONTINUED | OUTPATIENT
Start: 2017-07-11 | End: 2017-07-12 | Stop reason: HOSPADM

## 2017-07-11 RX ORDER — ALBUTEROL SULFATE 90 UG/1
2 AEROSOL, METERED RESPIRATORY (INHALATION) EVERY 4 HOURS PRN
Status: DISCONTINUED | OUTPATIENT
Start: 2017-07-11 | End: 2017-07-12 | Stop reason: HOSPADM

## 2017-07-11 RX ADMIN — SODIUM CHLORIDE 100 ML/HR: 0.9 INJECTION, SOLUTION INTRAVENOUS at 10:18

## 2017-07-11 RX ADMIN — LORATADINE 10 MG: 10 TABLET ORAL at 08:56

## 2017-07-11 RX ADMIN — SODIUM CHLORIDE 100 ML/HR: 0.9 INJECTION, SOLUTION INTRAVENOUS at 20:57

## 2017-07-11 RX ADMIN — ALBUTEROL SULFATE 2 PUFF: 90 AEROSOL, METERED RESPIRATORY (INHALATION) at 20:52

## 2017-07-11 RX ADMIN — DAPTOMYCIN 500 MG: 500 INJECTION, POWDER, LYOPHILIZED, FOR SOLUTION INTRAVENOUS at 08:56

## 2017-07-11 RX ADMIN — APIXABAN 5 MG: 5 TABLET, FILM COATED ORAL at 08:56

## 2017-07-11 RX ADMIN — GUAIFENESIN 100 MG: 100 SOLUTION ORAL at 08:56

## 2017-07-11 RX ADMIN — ACETAMINOPHEN 650 MG: 325 TABLET, FILM COATED ORAL at 10:34

## 2017-07-11 RX ADMIN — ALBUTEROL SULFATE 2 PUFF: 90 AEROSOL, METERED RESPIRATORY (INHALATION) at 08:56

## 2017-07-11 RX ADMIN — FAMOTIDINE 20 MG: 20 TABLET, FILM COATED ORAL at 08:56

## 2017-07-11 RX ADMIN — CEFEPIME HYDROCHLORIDE 1000 MG: 1 INJECTION, POWDER, FOR SOLUTION INTRAMUSCULAR; INTRAVENOUS at 10:04

## 2017-07-11 RX ADMIN — FLUTICASONE PROPIONATE 2 PUFF: 220 AEROSOL, METERED RESPIRATORY (INHALATION) at 20:52

## 2017-07-11 RX ADMIN — FLUTICASONE PROPIONATE 2 PUFF: 220 AEROSOL, METERED RESPIRATORY (INHALATION) at 08:56

## 2017-07-11 RX ADMIN — CEFAZOLIN SODIUM 1000 MG: 1 SOLUTION INTRAVENOUS at 14:27

## 2017-07-11 RX ADMIN — CEFAZOLIN SODIUM 1000 MG: 1 SOLUTION INTRAVENOUS at 21:00

## 2017-07-11 RX ADMIN — APIXABAN 5 MG: 5 TABLET, FILM COATED ORAL at 17:46

## 2017-07-12 VITALS
HEART RATE: 70 BPM | TEMPERATURE: 97.4 F | OXYGEN SATURATION: 97 % | WEIGHT: 293 LBS | BODY MASS INDEX: 51.91 KG/M2 | RESPIRATION RATE: 18 BRPM | HEIGHT: 63 IN | SYSTOLIC BLOOD PRESSURE: 110 MMHG | DIASTOLIC BLOOD PRESSURE: 68 MMHG

## 2017-07-12 PROBLEM — T80.219A PICC LINE INFECTION: Status: ACTIVE | Noted: 2017-07-12

## 2017-07-12 PROBLEM — R50.9 FEVER IN ADULT: Status: RESOLVED | Noted: 2017-07-10 | Resolved: 2017-07-12

## 2017-07-12 PROBLEM — R78.81 BACTEREMIA: Status: RESOLVED | Noted: 2017-07-10 | Resolved: 2017-07-12

## 2017-07-12 PROBLEM — L02.11 NECK ABSCESS: Status: RESOLVED | Noted: 2017-07-10 | Resolved: 2017-07-12

## 2017-07-12 PROBLEM — R19.7 DIARRHEA: Status: RESOLVED | Noted: 2017-07-10 | Resolved: 2017-07-12

## 2017-07-12 LAB
ANION GAP SERPL CALCULATED.3IONS-SCNC: 7 MMOL/L (ref 4–13)
BASOPHILS # BLD AUTO: 0.09 THOUSANDS/ΜL (ref 0–0.1)
BASOPHILS NFR BLD AUTO: 1 % (ref 0–1)
BUN SERPL-MCNC: 16 MG/DL (ref 5–25)
CALCIUM SERPL-MCNC: 8.2 MG/DL (ref 8.3–10.1)
CHLORIDE SERPL-SCNC: 116 MMOL/L (ref 100–108)
CO2 SERPL-SCNC: 22 MMOL/L (ref 21–32)
CREAT SERPL-MCNC: 1.16 MG/DL (ref 0.6–1.3)
EOSINOPHIL # BLD AUTO: 0.09 THOUSAND/ΜL (ref 0–0.61)
EOSINOPHIL NFR BLD AUTO: 1 % (ref 0–6)
ERYTHROCYTE [DISTWIDTH] IN BLOOD BY AUTOMATED COUNT: 15.7 % (ref 11.6–15.1)
GFR SERPL CREATININE-BSD FRML MDRD: 49.1 ML/MIN/1.73SQ M
GLUCOSE SERPL-MCNC: 82 MG/DL (ref 65–140)
HCT VFR BLD AUTO: 28.1 % (ref 34.8–46.1)
HGB BLD-MCNC: 8.8 G/DL (ref 11.5–15.4)
LYMPHOCYTES # BLD AUTO: 2.23 THOUSANDS/ΜL (ref 0.6–4.47)
LYMPHOCYTES NFR BLD AUTO: 32 % (ref 14–44)
MCH RBC QN AUTO: 29.7 PG (ref 26.8–34.3)
MCHC RBC AUTO-ENTMCNC: 31.3 G/DL (ref 31.4–37.4)
MCV RBC AUTO: 95 FL (ref 82–98)
MONOCYTES # BLD AUTO: 0.77 THOUSAND/ΜL (ref 0.17–1.22)
MONOCYTES NFR BLD AUTO: 11 % (ref 4–12)
NEUTROPHILS # BLD AUTO: 3.81 THOUSANDS/ΜL (ref 1.85–7.62)
NEUTS SEG NFR BLD AUTO: 55 % (ref 43–75)
NRBC BLD AUTO-RTO: 0 /100 WBCS
PLATELET # BLD AUTO: 262 THOUSANDS/UL (ref 149–390)
PMV BLD AUTO: 10.4 FL (ref 8.9–12.7)
POTASSIUM SERPL-SCNC: 4 MMOL/L (ref 3.5–5.3)
RBC # BLD AUTO: 2.96 MILLION/UL (ref 3.81–5.12)
SODIUM SERPL-SCNC: 145 MMOL/L (ref 136–145)
WBC # BLD AUTO: 7.01 THOUSAND/UL (ref 4.31–10.16)

## 2017-07-12 PROCEDURE — 80048 BASIC METABOLIC PNL TOTAL CA: CPT | Performed by: PHYSICIAN ASSISTANT

## 2017-07-12 PROCEDURE — 85025 COMPLETE CBC W/AUTO DIFF WBC: CPT | Performed by: PHYSICIAN ASSISTANT

## 2017-07-12 RX ORDER — CEPHALEXIN 500 MG/1
500 CAPSULE ORAL EVERY 6 HOURS SCHEDULED
Qty: 48 CAPSULE | Refills: 0 | Status: SHIPPED | OUTPATIENT
Start: 2017-07-12 | End: 2017-07-24

## 2017-07-12 RX ADMIN — CEFAZOLIN SODIUM 1000 MG: 1 SOLUTION INTRAVENOUS at 05:43

## 2017-07-12 RX ADMIN — ALBUTEROL SULFATE 2 PUFF: 90 AEROSOL, METERED RESPIRATORY (INHALATION) at 08:49

## 2017-07-12 RX ADMIN — FAMOTIDINE 20 MG: 20 TABLET, FILM COATED ORAL at 08:48

## 2017-07-12 RX ADMIN — DAPTOMYCIN 500 MG: 500 INJECTION, POWDER, LYOPHILIZED, FOR SOLUTION INTRAVENOUS at 08:51

## 2017-07-12 RX ADMIN — FLUTICASONE PROPIONATE 2 PUFF: 220 AEROSOL, METERED RESPIRATORY (INHALATION) at 08:46

## 2017-07-12 RX ADMIN — LORATADINE 10 MG: 10 TABLET ORAL at 08:48

## 2017-07-12 RX ADMIN — SODIUM CHLORIDE 100 ML/HR: 0.9 INJECTION, SOLUTION INTRAVENOUS at 08:45

## 2017-07-12 RX ADMIN — APIXABAN 5 MG: 5 TABLET, FILM COATED ORAL at 08:48

## 2017-07-13 LAB
BACTERIA CATH TIP CULT: NO GROWTH
BACTERIA CATH TIP CULT: NORMAL

## 2017-07-18 ENCOUNTER — ALLSCRIPTS OFFICE VISIT (OUTPATIENT)
Dept: OTHER | Facility: OTHER | Age: 52
End: 2017-07-18

## 2017-07-25 ENCOUNTER — TRANSCRIBE ORDERS (OUTPATIENT)
Dept: LAB | Facility: MEDICAL CENTER | Age: 52
End: 2017-07-25

## 2017-07-25 ENCOUNTER — APPOINTMENT (OUTPATIENT)
Dept: LAB | Facility: MEDICAL CENTER | Age: 52
End: 2017-07-25
Payer: COMMERCIAL

## 2017-07-25 DIAGNOSIS — A41.9 SEPSIS, UNSPECIFIED: ICD-10-CM

## 2017-07-25 DIAGNOSIS — N17.9 ACUTE KIDNEY FAILURE (HCC): ICD-10-CM

## 2017-07-25 PROCEDURE — 87040 BLOOD CULTURE FOR BACTERIA: CPT

## 2017-07-30 LAB — BACTERIA BLD CULT: NORMAL

## 2017-07-31 ENCOUNTER — GENERIC CONVERSION - ENCOUNTER (OUTPATIENT)
Dept: OTHER | Facility: OTHER | Age: 52
End: 2017-07-31

## 2017-08-01 ENCOUNTER — ALLSCRIPTS OFFICE VISIT (OUTPATIENT)
Dept: OTHER | Facility: OTHER | Age: 52
End: 2017-08-01

## 2017-08-01 ENCOUNTER — APPOINTMENT (OUTPATIENT)
Dept: LAB | Facility: MEDICAL CENTER | Age: 52
End: 2017-08-01
Payer: COMMERCIAL

## 2017-08-01 ENCOUNTER — GENERIC CONVERSION - ENCOUNTER (OUTPATIENT)
Dept: OTHER | Facility: OTHER | Age: 52
End: 2017-08-01

## 2017-08-01 DIAGNOSIS — A41.9 SEPSIS, UNSPECIFIED: ICD-10-CM

## 2017-08-01 DIAGNOSIS — N17.9 ACUTE KIDNEY FAILURE (HCC): ICD-10-CM

## 2017-08-01 LAB
ANION GAP SERPL CALCULATED.3IONS-SCNC: 8 MMOL/L (ref 4–13)
BASOPHILS # BLD AUTO: 0.1 THOUSANDS/ΜL (ref 0–0.1)
BASOPHILS NFR BLD AUTO: 2 % (ref 0–1)
BUN SERPL-MCNC: 14 MG/DL (ref 5–25)
CALCIUM SERPL-MCNC: 9 MG/DL (ref 8.3–10.1)
CHLORIDE SERPL-SCNC: 109 MMOL/L (ref 100–108)
CO2 SERPL-SCNC: 24 MMOL/L (ref 21–32)
CREAT SERPL-MCNC: 1.31 MG/DL (ref 0.6–1.3)
EOSINOPHIL # BLD AUTO: 0.16 THOUSAND/ΜL (ref 0–0.61)
EOSINOPHIL NFR BLD AUTO: 4 % (ref 0–6)
ERYTHROCYTE [DISTWIDTH] IN BLOOD BY AUTOMATED COUNT: 14.6 % (ref 11.6–15.1)
GFR SERPL CREATININE-BSD FRML MDRD: 47 ML/MIN/1.73SQ M
GLUCOSE P FAST SERPL-MCNC: 93 MG/DL (ref 65–99)
HCT VFR BLD AUTO: 37.7 % (ref 34.8–46.1)
HGB BLD-MCNC: 11.6 G/DL (ref 11.5–15.4)
LYMPHOCYTES # BLD AUTO: 2.2 THOUSANDS/ΜL (ref 0.6–4.47)
LYMPHOCYTES NFR BLD AUTO: 48 % (ref 14–44)
MCH RBC QN AUTO: 29.3 PG (ref 26.8–34.3)
MCHC RBC AUTO-ENTMCNC: 30.8 G/DL (ref 31.4–37.4)
MCV RBC AUTO: 95 FL (ref 82–98)
MONOCYTES # BLD AUTO: 0.57 THOUSAND/ΜL (ref 0.17–1.22)
MONOCYTES NFR BLD AUTO: 13 % (ref 4–12)
NEUTROPHILS # BLD AUTO: 1.49 THOUSANDS/ΜL (ref 1.85–7.62)
NEUTS SEG NFR BLD AUTO: 33 % (ref 43–75)
NRBC BLD AUTO-RTO: 0 /100 WBCS
PLATELET # BLD AUTO: 391 THOUSANDS/UL (ref 149–390)
PMV BLD AUTO: 10.1 FL (ref 8.9–12.7)
POTASSIUM SERPL-SCNC: 4 MMOL/L (ref 3.5–5.3)
RBC # BLD AUTO: 3.96 MILLION/UL (ref 3.81–5.12)
SODIUM SERPL-SCNC: 141 MMOL/L (ref 136–145)
WBC # BLD AUTO: 4.52 THOUSAND/UL (ref 4.31–10.16)

## 2017-08-01 PROCEDURE — 85025 COMPLETE CBC W/AUTO DIFF WBC: CPT

## 2017-08-01 PROCEDURE — 80048 BASIC METABOLIC PNL TOTAL CA: CPT

## 2017-08-01 PROCEDURE — 36415 COLL VENOUS BLD VENIPUNCTURE: CPT

## 2017-08-16 ENCOUNTER — ALLSCRIPTS OFFICE VISIT (OUTPATIENT)
Dept: OTHER | Facility: OTHER | Age: 52
End: 2017-08-16

## 2017-08-29 ENCOUNTER — ALLSCRIPTS OFFICE VISIT (OUTPATIENT)
Dept: OTHER | Facility: OTHER | Age: 52
End: 2017-08-29

## 2017-08-29 DIAGNOSIS — E78.00 PURE HYPERCHOLESTEROLEMIA: ICD-10-CM

## 2017-08-29 DIAGNOSIS — E03.9 HYPOTHYROIDISM: ICD-10-CM

## 2017-08-29 DIAGNOSIS — I26.99 OTHER PULMONARY EMBOLISM WITHOUT ACUTE COR PULMONALE (HCC): ICD-10-CM

## 2017-08-29 DIAGNOSIS — N17.9 ACUTE KIDNEY FAILURE (HCC): ICD-10-CM

## 2017-08-29 DIAGNOSIS — Z12.31 ENCOUNTER FOR SCREENING MAMMOGRAM FOR MALIGNANT NEOPLASM OF BREAST: ICD-10-CM

## 2017-08-31 ENCOUNTER — TELEPHONE (OUTPATIENT)
Dept: RADIOLOGY | Facility: HOSPITAL | Age: 52
End: 2017-08-31

## 2017-09-07 ENCOUNTER — HOSPITAL ENCOUNTER (OUTPATIENT)
Dept: MAMMOGRAPHY | Facility: HOSPITAL | Age: 52
Discharge: HOME | End: 2017-09-07
Payer: COMMERCIAL

## 2017-09-07 DIAGNOSIS — Z12.31 ENCOUNTER FOR SCREENING MAMMOGRAM FOR MALIGNANT NEOPLASM OF BREAST: ICD-10-CM

## 2017-09-12 ENCOUNTER — HOSPITAL ENCOUNTER (OUTPATIENT)
Dept: ULTRASOUND IMAGING | Facility: HOSPITAL | Age: 52
Discharge: HOME | End: 2017-09-12
Attending: INTERNAL MEDICINE
Payer: COMMERCIAL

## 2017-09-12 DIAGNOSIS — I26.99 OTHER PULMONARY EMBOLISM WITHOUT ACUTE COR PULMONALE (HCC): ICD-10-CM

## 2017-09-12 PROCEDURE — 93970 EXTREMITY STUDY: CPT

## 2017-09-14 ENCOUNTER — GENERIC CONVERSION - ENCOUNTER (OUTPATIENT)
Dept: OTHER | Facility: OTHER | Age: 52
End: 2017-09-14

## 2017-09-18 ENCOUNTER — GENERIC CONVERSION - ENCOUNTER (OUTPATIENT)
Dept: OTHER | Facility: OTHER | Age: 52
End: 2017-09-18

## 2017-09-25 ENCOUNTER — GENERIC CONVERSION - ENCOUNTER (OUTPATIENT)
Dept: OTHER | Facility: OTHER | Age: 52
End: 2017-09-25

## 2017-10-17 ENCOUNTER — TRANSCRIBE ORDERS (OUTPATIENT)
Dept: LAB | Facility: MEDICAL CENTER | Age: 52
End: 2017-10-17

## 2017-10-17 ENCOUNTER — APPOINTMENT (OUTPATIENT)
Dept: LAB | Facility: MEDICAL CENTER | Age: 52
End: 2017-10-17
Payer: COMMERCIAL

## 2017-10-17 DIAGNOSIS — Z12.39 BREAST SCREENING: Primary | ICD-10-CM

## 2017-10-17 DIAGNOSIS — E03.9 HYPOTHYROIDISM: ICD-10-CM

## 2017-10-17 DIAGNOSIS — E78.00 PURE HYPERCHOLESTEROLEMIA: ICD-10-CM

## 2017-10-17 DIAGNOSIS — N17.9 ACUTE KIDNEY FAILURE (HCC): ICD-10-CM

## 2017-10-17 LAB
ALBUMIN SERPL BCP-MCNC: 3.2 G/DL (ref 3.5–5)
ALP SERPL-CCNC: 92 U/L (ref 46–116)
ALT SERPL W P-5'-P-CCNC: 21 U/L (ref 12–78)
ANION GAP SERPL CALCULATED.3IONS-SCNC: 5 MMOL/L (ref 4–13)
AST SERPL W P-5'-P-CCNC: 20 U/L (ref 5–45)
BASOPHILS # BLD AUTO: 0.09 THOUSANDS/ΜL (ref 0–0.1)
BASOPHILS NFR BLD AUTO: 1 % (ref 0–1)
BILIRUB SERPL-MCNC: 0.35 MG/DL (ref 0.2–1)
BUN SERPL-MCNC: 15 MG/DL (ref 5–25)
CALCIUM SERPL-MCNC: 9 MG/DL (ref 8.3–10.1)
CHLORIDE SERPL-SCNC: 105 MMOL/L (ref 100–108)
CHOLEST SERPL-MCNC: 202 MG/DL (ref 50–200)
CO2 SERPL-SCNC: 28 MMOL/L (ref 21–32)
CREAT SERPL-MCNC: 1.3 MG/DL (ref 0.6–1.3)
EOSINOPHIL # BLD AUTO: 0.21 THOUSAND/ΜL (ref 0–0.61)
EOSINOPHIL NFR BLD AUTO: 3 % (ref 0–6)
ERYTHROCYTE [DISTWIDTH] IN BLOOD BY AUTOMATED COUNT: 13.9 % (ref 11.6–15.1)
GFR SERPL CREATININE-BSD FRML MDRD: 47 ML/MIN/1.73SQ M
GLUCOSE P FAST SERPL-MCNC: 83 MG/DL (ref 65–99)
HCT VFR BLD AUTO: 42.2 % (ref 34.8–46.1)
HDLC SERPL-MCNC: 46 MG/DL (ref 40–60)
HGB BLD-MCNC: 13.5 G/DL (ref 11.5–15.4)
LDLC SERPL CALC-MCNC: 115 MG/DL (ref 0–100)
LYMPHOCYTES # BLD AUTO: 2.27 THOUSANDS/ΜL (ref 0.6–4.47)
LYMPHOCYTES NFR BLD AUTO: 37 % (ref 14–44)
MCH RBC QN AUTO: 29 PG (ref 26.8–34.3)
MCHC RBC AUTO-ENTMCNC: 32 G/DL (ref 31.4–37.4)
MCV RBC AUTO: 91 FL (ref 82–98)
MONOCYTES # BLD AUTO: 0.7 THOUSAND/ΜL (ref 0.17–1.22)
MONOCYTES NFR BLD AUTO: 11 % (ref 4–12)
NEUTROPHILS # BLD AUTO: 2.93 THOUSANDS/ΜL (ref 1.85–7.62)
NEUTS SEG NFR BLD AUTO: 48 % (ref 43–75)
NRBC BLD AUTO-RTO: 0 /100 WBCS
PLATELET # BLD AUTO: 376 THOUSANDS/UL (ref 149–390)
PMV BLD AUTO: 10.3 FL (ref 8.9–12.7)
POTASSIUM SERPL-SCNC: 4.2 MMOL/L (ref 3.5–5.3)
PROT SERPL-MCNC: 7.3 G/DL (ref 6.4–8.2)
RBC # BLD AUTO: 4.66 MILLION/UL (ref 3.81–5.12)
SODIUM SERPL-SCNC: 138 MMOL/L (ref 136–145)
TRIGL SERPL-MCNC: 207 MG/DL
TSH SERPL DL<=0.05 MIU/L-ACNC: 7.8 UIU/ML (ref 0.36–3.74)
WBC # BLD AUTO: 6.21 THOUSAND/UL (ref 4.31–10.16)

## 2017-10-17 PROCEDURE — 36415 COLL VENOUS BLD VENIPUNCTURE: CPT

## 2017-10-17 PROCEDURE — 80061 LIPID PANEL: CPT

## 2017-10-17 PROCEDURE — 80053 COMPREHEN METABOLIC PANEL: CPT

## 2017-10-17 PROCEDURE — 85025 COMPLETE CBC W/AUTO DIFF WBC: CPT

## 2017-10-17 PROCEDURE — 84443 ASSAY THYROID STIM HORMONE: CPT

## 2017-10-18 ENCOUNTER — GENERIC CONVERSION - ENCOUNTER (OUTPATIENT)
Dept: OTHER | Facility: OTHER | Age: 52
End: 2017-10-18

## 2017-10-23 ENCOUNTER — ALLSCRIPTS OFFICE VISIT (OUTPATIENT)
Dept: OTHER | Facility: OTHER | Age: 52
End: 2017-10-23

## 2017-10-24 NOTE — PROGRESS NOTES
Assessment  1  Eye redness (379 93) (H57 8)   2  Pain of right eye (379 91) (H57 11)    Plan  Colon cancer screening    · COLONOSCOPY; Status:Temporary Deferral - Pt requests deferral;    10/23/2018  Dermatitis    · Triamcinolone Acetonide 0 1 % External Cream  Eye redness, Pain of right eye    · THUY MIKEY (OPTHAMOLOGY ) Co-Management  *  Status: Hold For -  Scheduling  Requested for: [address] provided  : Yes    Discussion/Summary  The patient was counseled regarding instructions for management,-- risk factor reductions,-- prognosis,-- patient and family education,-- risks and benefits of treatment options,-- importance of compliance with treatment  Possible side effects of new medications were reviewed with the patient/guardian today  The treatment plan was reviewed with the patient/guardian  The patient/guardian understands and agrees with the treatment plan      Chief Complaint  1  Eye Itching  Soumya Tatum is here today with complaints of redness and pain in her R eye since this morning  She denies any trauma to the area  She does not wear contacts and does not use any eye drops  History of Present Illness  HPI: See chief complaint  Review of Systems    Constitutional: no fever-- and-- no chills  ENT: no earache,-- no sore throat-- and-- no nasal discharge  Cardiovascular: no chest pain  Respiratory: no shortness of breath  Neurological: no headache  ROS reviewed  Active Problems  1  Acute renal failure (584 9) (N17 9)   2  Arthritis (716 90) (M19 90)   3  Asthma (493 90) (J45 909)   4  Chest tightness or pressure (786 59) (R07 89)   5  Colon cancer screening (V76 51) (Z12 11)   6  Depression screening (V79 0) (Z13 89)   7  Dermatitis (692 9) (L30 9)   8  Disorder of heart (429 9) (I51 9)   9  Heart murmur (785 2) (R01 1)   10  High cholesterol (272 0) (E78 00)   11  Hypothyroidism (244 9) (E03 9)   12  Pulmonary embolism (415 19) (I26 99)   13   Sepsis (038 9,995 91) (A41 9)    Past Medical History  1  History of Abdominal pain, lower (789 09) (R10 30)   2  History of Ankle pain, right (719 47) (M25 571)   3  History of Bilateral chronic knee pain (534 56,928 04) (M25 561,M25 562,G89 29)   4  History of Bladder pain (788 99) (R39 89)   5  History of Curvature of spine (737 9) (M43 9)   6  History of Encounter for cervical Pap smear with pelvic exam (V76 2,V72 31) (Z01 419)   7  History of Female pelvic pain (625 9) (R10 2)   8  History of Foot pain, unspecified laterality   9  History of Hernia (553 9) (K46 9)   10  History of allergic rhinitis (V12 69) (Z87 09)   11  History of arthritis (V13 4) (Z87 39)   12  History of back pain (V13 59) (Z87 39)   13  History of bursitis (V13 59) (Z87 39)   14  History of chest pain (V13 89) (Z87 898)   15  History of hiatal hernia (V12 79) (Z87 19)   16  History of influenza vaccination (V49 89) (Z92 29)   17  History of muscle pain (V13 59) (Z87 39)   18  History of obesity (V13 89) (Z86 39)   19  History of pneumococcal vaccination (V49 89) (Z92 29)   20  History of right inguinal hernia (V45 89) (Z87 19)   21  History of screening mammography (V15 89) (Z92 89)   22  History of screening mammography (V15 89) (Z92 89)   23  History of sinusitis (V12 69) (Z87 09)   24  History of thyroid disorder (V12 29) (Z86 39)   25  History of umbilical hernia (W21 93) (Z87 19)   26  History of upper respiratory infection (V12 09) (Z87 09)   27  History of vitamin D deficiency (V12 1) (Z86 39)   28  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)   29  History of Left knee pain (719 46) (M25 562)   30  History of Lumbar herniated disc (722 10) (M51 26)   31  History of Morbid or severe obesity due to excess calories (278 01) (E66 01)   32  History of Otitis media of both ears (382 9) (H66 93)   33  History of Pain of finger, unspecified laterality (729 5) (M12 646)   34  History of Patellar tendonitis (726 64) (M76 50)   35   History of Poor flexibility of tendon (727 81) (M67 80)   36  History of Primary osteoarthritis of right knee (715 16) (M17 11)   37  History of Right knee pain (719 46) (M25 561)   38  History of Screening for colon cancer (V76 51) (Z12 11)   39  History of Screening for depression (V79 0) (Z13 89)   40  History of Screening for osteoporosis (V82 81) (Z13 820)   41  History of Severe sepsis due to methicillin resistant Staphylococcus aureus (MRSA) with    acute organ dysfunction (891 59,032 10) (A41 02,R65 20)   42  History of Spider vein, symptomatic (448 1) (I78 1)   43  History of Status post arthroscopy of right knee (V45 89) (Z98 890)   44  History of Stomach disorder (537 9) (K31 9)   45  History of Tear of medial meniscus of right knee, subsequent encounter  Active Problems And Past Medical History Reviewed: The active problems and past medical history were reviewed and updated today  Family History  Mother    1  Family history of Arthritis pain (716 90) (M19 90)  Child    2  Family history of Colon cancer, ascending (153 6) (C18 2)  Brother    3  Family history of Colon cancer, ascending (153 6) (C18 2)  Aunt    4  Family history of Diabetes mellitus (250 00) (E11 9)  Unknown    5  Family history of diabetes mellitus (V18 0) (Z83 3)   6  Family history of lung disease (V19 8) (Z83 6)  Family History Reviewed: The family history was reviewed and updated today  Social History   · Always uses seat belt   ·    · Former smoker (T40 80) (N99 627)   · No alcohol use   · No drug use  The social history was reviewed and updated today  The social history was reviewed and is unchanged  Surgical History  1  History of Blood Vessel Repair   2  History of Cystoscopy With Biopsy   3  History of Exploratory Laparotomy   4  History of Hand Excision Of Tendon Cyst   5  History of Hysterectomy   6  History of Incisional Hernia Repair With Implantation Of Mesh   7   History of IVC Filter Placement W/ Fluorosc Angiogr Guidance W/ IV Contrast   8  History of Knee Arthroscopy (Therapeutic)   9  History of Shoulder Surgery  Surgical History Reviewed: The surgical history was reviewed and updated today  Current Meds   1  Albuterol Sulfate (2 5 MG/3ML) 0 083% Inhalation Nebulization Solution; USE 1 UNIT   DOSE EVERY 4-6 HOURS AS NEEDED FOR WHEEZING ; Therapy: 41BKU0540 to (Last Rx:30Jun2017)  Requested for: 65Ori3573 Ordered   2  Eliquis 5 MG Oral Tablet; Take 1 tablet twice daily  Requested for: 46Kiq0885; Last   Rx:74Qrb6084 Ordered   3  Famotidine 20 MG Oral Tablet; take 1 tablet by mouth once daily; Therapy: 51ISC4053 to (Evaluate:31Mar2018)  Requested for: 76MNK6141; Last   Rx:02Oct2017 Ordered   4  Flovent  MCG/ACT Inhalation Aerosol; INHALE 2 PUFFS TWICE DAILY  RINSE   MOUTH AFTER USE; Therapy: 65FNE3186 to (Last Rx:30Jun2017) Ordered   5  Levothyroxine Sodium 25 MCG Oral Tablet; TAKE 1 TABLET BY MOUTH ONCE DAILY; Therapy: 53QAZ8101 to (Last Rx:18Oct2017)  Requested for: 37TUC4632 Ordered   6  Loratadine 10 MG Oral Tablet; TAKE ONE TABLET EVERY DAY; Therapy: 41ZNY9875 to (HDQLFTAG:60QHE1992)  Requested for: 85IQS9417; Last   Rx:60Rhm8542 Ordered   7  Synvisc One 48 MG/6ML Intra-articular Solution Prefilled Syringe; INJECT 48  UNIT   Intra-articular; To Be Done: 62BMN1542; Status: HOLD FOR - Administration Ordered   8  Triamcinolone Acetonide 0 1 % External Cream; APPLY  AND RUB  IN A THIN FILM TO   AFFECTED AREAS TWICE DAILY  (AM AND PM); Therapy: 29Wcs3733 to (Last Ang [place])  Requested for: 45Rkb5755 Ordered   9  Ventolin  (90 Base) MCG/ACT Inhalation Aerosol Solution; INHALE 2 PUFFS   EVERY 6 HOURS AS NEEDED FOR WHEEZING; Therapy: 23IMP8947 to (Evaluate:00Zrq4642); Last Rx:30Jun2017 Ordered    The medication list was reviewed and updated today  Allergies  1  Amoxicillin-Pot Clavulanate TABS   2  Penicillin G Sodium SOLR   3   Penicillins    Vitals   Recorded: 51WCP3061 44:69IQ   Systolic 382, LUE, Sitting   Diastolic 80, LUE, Sitting   Height 5 ft 4 in   Weight 287 lb 8 0 oz   BMI Calculated 49 35   BSA Calculated 2 28     Physical Exam    Constitutional   General appearance: No acute distress, well appearing and well nourished  Eyes   Conjunctiva and lids: Abnormal   Conjunctiva Findings: right hyperemia, but-- no watery discharge,-- no purulent discharge,-- no subconjunctival hemorrhages,-- no increase in tearing-- and-- normal left conjunctiva  Pupils and irises: Equal, round and reactive to light  Ears, Nose, Mouth, and Throat   External inspection of ears and nose: Normal     Otoscopic examination: Tympanic membranes translucent with normal light reflex  Canals patent without erythema  Oropharynx: Normal with no erythema, edema, exudate or lesions  Pulmonary   Respiratory effort: No increased work of breathing or signs of respiratory distress  Auscultation of lungs: Clear to auscultation  Cardiovascular   Auscultation of heart: Normal rate and rhythm, normal S1 and S2, without murmurs  Skin   Skin and subcutaneous tissue: Normal without rashes or lesions      Psychiatric   Orientation to person, place, and time: Normal     Mood and affect: Normal          Future Appointments    Date/Time Provider Specialty Site   12/21/2017 10:00 AM Katie Lang DO Family Medicine [address] [facility]     Signatures   Electronically signed by : Floresita Mccoy, [facility]; Oct 23 2017  3:02PM EST                       (Author)    Electronically signed by : Nathan Chatterjee DO; Oct 23 2017  5:32PM EST

## 2017-11-03 ENCOUNTER — HOSPITAL ENCOUNTER (OUTPATIENT)
Dept: MAMMOGRAPHY | Facility: HOSPITAL | Age: 52
Discharge: HOME | End: 2017-11-03
Payer: COMMERCIAL

## 2017-11-03 DIAGNOSIS — Z12.39 BREAST SCREENING: ICD-10-CM

## 2017-11-03 PROCEDURE — 77063 BREAST TOMOSYNTHESIS BI: CPT

## 2017-11-03 PROCEDURE — G0202 SCR MAMMO BI INCL CAD: HCPCS

## 2017-11-06 ENCOUNTER — GENERIC CONVERSION - ENCOUNTER (OUTPATIENT)
Dept: OTHER | Facility: OTHER | Age: 52
End: 2017-11-06

## 2017-11-21 ENCOUNTER — GENERIC CONVERSION - ENCOUNTER (OUTPATIENT)
Dept: FAMILY MEDICINE CLINIC | Facility: CLINIC | Age: 52
End: 2017-11-21

## 2017-11-28 ENCOUNTER — ALLSCRIPTS OFFICE VISIT (OUTPATIENT)
Dept: OTHER | Facility: OTHER | Age: 52
End: 2017-11-28

## 2017-11-29 ENCOUNTER — GENERIC CONVERSION - ENCOUNTER (OUTPATIENT)
Dept: OTHER | Facility: OTHER | Age: 52
End: 2017-11-29

## 2017-11-29 ENCOUNTER — TRANSCRIBE ORDERS (OUTPATIENT)
Dept: RADIOLOGY | Facility: MEDICAL CENTER | Age: 52
End: 2017-11-29

## 2017-11-29 ENCOUNTER — APPOINTMENT (OUTPATIENT)
Dept: LAB | Facility: MEDICAL CENTER | Age: 52
End: 2017-11-29
Payer: COMMERCIAL

## 2017-11-29 DIAGNOSIS — E03.9 HYPOTHYROIDISM: ICD-10-CM

## 2017-11-29 LAB — TSH SERPL DL<=0.05 MIU/L-ACNC: 7.99 UIU/ML (ref 0.36–3.74)

## 2017-11-29 PROCEDURE — 36415 COLL VENOUS BLD VENIPUNCTURE: CPT

## 2017-11-29 PROCEDURE — 84443 ASSAY THYROID STIM HORMONE: CPT

## 2017-11-29 NOTE — PROGRESS NOTES
Assessment    1  Acute sinusitis (461 9) (J01 90)    Plan  Acute sinusitis    · DrRx Zithromax Z-Cristiano 250 MG #6 pill pack; Take 2 pills on day 1, take 1 pill ondays 2-5  Screening for colon cancer    · COLONOSCOPY; Status:Temporary Deferral - Pt requests deferral;    11/28/2018    Discussion/Summary  The patient was counseled regarding instructions for management,-- risk factor reductions,-- prognosis,-- patient and family education,-- risks and benefits of treatment options,-- importance of compliance with treatment  Possible side effects of new medications were reviewed with the patient/guardian today  The treatment plan was reviewed with the patient/guardian  The patient/guardian understands and agrees with the treatment plan      Chief Complaint    1  Sore Throat  Rosio Mckeon is here today with cold symptoms since last week  History of Present Illness  HPI: See chief complaint  Review of Systems   Constitutional: no fever-- and-- no chills  ENT: sore throat-- and-- nasal discharge, but-- no earache  Cardiovascular: no chest pain-- and-- no palpitations  Respiratory: cough-- and-- PND, but-- no shortness of breath-- and-- no wheezing  Gastrointestinal: no abdominal pain,-- no constipation-- and-- no diarrhea  ROS reviewed  Active Problems  1  Asthma (493 90) (J45 909)   2  Hypothyroidism (244 9) (E03 9)   3  Screening for colon cancer (V76 51) (Z12 11)    Past Medical History    1  History of Abdominal pain, lower (789 09) (R10 30)   2  History of Ankle pain, right (719 47) (M25 571)   3  History of Bilateral chronic knee pain (859 23,174 80) (M25 561,M25 562,G89 29)   4  History of Bladder pain (788 99) (R39 89)   5  History of Chest tightness or pressure (786 59) (R07 89)   6  History of Curvature of spine (737 9) (M43 9)   7  History of Encounter for cervical Pap smear with pelvic exam (V76 2,V72 31) (Z01 419)   8  History of Female pelvic pain (625 9) (R10 2)   9   History of Foot pain, unspecified laterality   10  History of Hernia (553 9) (K46 9)   11  History of acute renal failure (V13 09) (Z87 448)   12  History of allergic rhinitis (V12 69) (Z87 09)   13  History of arthritis (V13 4) (Z87 39)   14  History of arthritis (V13 4) (Z87 39)   15  History of back pain (V13 59) (Z87 39)   16  History of bursitis (V13 59) (Z87 39)   17  History of cardiac disorder (V12 50) (Z86 79)   18  History of cardiac murmur (V12 59) (Z86 79)   19  History of chest pain (V13 89) (Z87 898)   20  History of dermatitis (V13 3) (Z87 2)   21  History of hiatal hernia (V12 79) (Z87 19)   22  History of high cholesterol (V12 29) (Z86 39)   23  History of influenza vaccination (V49 89) (Z92 29)   24  History of muscle pain (V13 59) (Z87 39)   25  History of obesity (V13 89) (Z86 39)   26  History of pneumococcal vaccination (V49 89) (Z92 29)   27  History of pulmonary embolism (V12 55) (Z86 711)   28  History of redness of eye (V12 49) (Z86 69)   29  History of right inguinal hernia (V45 89) (Z87 19)   30  History of screening mammography (V15 89) (Z92 89)   31  History of screening mammography (V15 89) (Z92 89)   32  History of sepsis (V12 09) (Z86 19)   33  History of sinusitis (V12 69) (Z87 09)   34  History of thyroid disorder (V12 29) (Z86 39)   35  History of umbilical hernia (Q54 34) (Z87 19)   36  History of upper respiratory infection (V12 09) (Z87 09)   37  History of vitamin D deficiency (V12 1) (Z86 39)   38  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)   39  History of Left knee pain (719 46) (M25 562)   40  History of Lumbar herniated disc (722 10) (M51 26)   41  History of Morbid or severe obesity due to excess calories (278 01) (E66 01)   42  History of Otitis media of both ears (382 9) (H66 93)   43  History of Pain of finger, unspecified laterality (729 5) (M24 646)   44  History of Pain of right eye (379 91) (H57 11)   45  History of Patellar tendonitis (726 64) (M83 50)   46   History of Poor flexibility of tendon (727 81) (M67 80)   47  History of Primary osteoarthritis of right knee (715 16) (M17 11)   48  History of Right knee pain (719 46) (M25 561)   49  History of Screening for colon cancer (V76 51) (Z12 11)   50  History of Screening for depression (V79 0) (Z13 89)   51  History of Screening for osteoporosis (V82 81) (Z13 820)   52  History of Severe sepsis due to methicillin resistant Staphylococcus aureus (MRSA) with  acute organ dysfunction (954 60,783 86) (A41 02,R65 20)   53  History of Spider vein, symptomatic (448 1) (I78 1)   54  History of Status post arthroscopy of right knee (V45 89) (Z98 890)   55  History of Stomach disorder (537 9) (K31 9)   56  History of Tear of medial meniscus of right knee, subsequent encounter  Active Problems And Past Medical History Reviewed: The active problems and past medical history were reviewed and updated today  Family History  Mother    1  Family history of Arthritis pain (716 90) (M19 90)  Child    2  Family history of Colon cancer, ascending (153 6) (C18 2)  Brother    3  Family history of Colon cancer, ascending (153 6) (C18 2)  Aunt    4  Family history of Diabetes mellitus (250 00) (E11 9)  Unknown    5  Family history of diabetes mellitus (V18 0) (Z83 3)   6  Family history of lung disease (V19 8) (Z83 6)  Family History Reviewed: The family history was reviewed and updated today  Social History   · Always uses seat belt   ·    · Former smoker (J42 64) (K41 100)   · No alcohol use   · No drug use  The social history was reviewed and updated today  The social history was reviewed and is unchanged  Surgical History    1  History of Blood Vessel Repair   2  History of Cystoscopy With Biopsy   3  History of Exploratory Laparotomy   4  History of Hand Excision Of Tendon Cyst   5  History of Hysterectomy   6  History of Incisional Hernia Repair With Implantation Of Mesh   7   History of IVC Filter Placement W/ Fluorosc Angiogr Guidance W/ IV Contrast   8  History of Knee Arthroscopy (Therapeutic)   9  History of Shoulder Surgery  Surgical History Reviewed: The surgical history was reviewed and updated today  Current Meds   1  Albuterol Sulfate (2 5 MG/3ML) 0 083% Inhalation Nebulization Solution; USE 1 UNIT DOSE EVERY 4-6 HOURS AS NEEDED FOR WHEEZING ; Therapy: 63QIN8399 to (Last Rx:30Jun2017)  Requested for: 30Jun2017 Ordered   2  Eliquis 5 MG Oral Tablet; Take 1 tablet twice daily  Requested for: 57Uer3280; Last Rx:02Wha0226 Ordered   3  Famotidine 20 MG Oral Tablet; take 1 tablet by mouth once daily; Therapy: 84QWS2637 to (Evaluate:31Mar2018)  Requested for: 38BLL8056; Last Rx:02Oct2017 Ordered   4  Flovent  MCG/ACT Inhalation Aerosol; INHALE 2 PUFFS TWICE DAILY  RINSE MOUTH AFTER USE; Therapy: 55URN2235 to (Last Rx:30Jun2017) Ordered   5  Levothyroxine Sodium 25 MCG Oral Tablet; TAKE 1 TABLET BY MOUTH ONCE DAILY; Therapy: 08TGD5494 to (Last Rx:18Oct2017)  Requested for: 39SQZ4696 Ordered   6  Loratadine 10 MG Oral Tablet; TAKE ONE TABLET EVERY DAY; Therapy: 41RIW7507 to (HUYWCOMA:46QHZ9425)  Requested for: 80TBP8918; Last Rx:22Vtg9372 Ordered   7  Synvisc One 48 MG/6ML Intra-articular Solution Prefilled Syringe; INJECT 48  UNIT Intra-articular; To Be Done: 09UAP7748; Status: HOLD FOR - Administration Ordered   8  Ventolin  (90 Base) MCG/ACT Inhalation Aerosol Solution; INHALE 2 PUFFS EVERY 6 HOURS AS NEEDED FOR WHEEZING; Therapy: 11DQJ5789 to (Evaluate:37Shx2896); Last Rx:30Jun2017 Ordered    The medication list was reviewed and updated today  Allergies  1  Amoxicillin-Pot Clavulanate TABS   2  Penicillin G Sodium SOLR   3   Penicillins    Vitals   Recorded: 88IFJ5675 03:07PM   Temperature 34 8 F   Systolic 983, LUE, Sitting   Diastolic 76, LUE, Sitting   Height 5 ft 4 in   Weight 292 lb    BMI Calculated 50 12   BSA Calculated 2 3       Physical Exam   Constitutional  General appearance: No acute distress, well appearing and well nourished  Ears, Nose, Mouth, and Throat  External inspection of ears and nose: Normal    Otoscopic examination: Tympanic membranes translucent with normal light reflex  Canals patent without erythema  Oropharynx: Abnormal   The posterior pharynx was erythematous, but-- did not have an exudate  -- +PND  Pulmonary  Respiratory effort: No increased work of breathing or signs of respiratory distress  Auscultation of lungs: Clear to auscultation  Cardiovascular  Auscultation of heart: Normal rate and rhythm, normal S1 and S2, without murmurs  Lymphatic  Palpation of lymph nodes in neck: No lymphadenopathy  Skin  Skin and subcutaneous tissue: Normal without rashes or lesions     Psychiatric  Orientation to person, place, and time: Normal    Mood and affect: Normal          Future Appointments    Date/Time Provider Specialty Site   12/21/2017 10:00 AM Rosemary Osler, DO Family Medicine [address] [facility]       Signatures   Electronically signed by : Beata Davis, [facility]; Nov 28 2017  3:20PM EST                       (Author)    Electronically signed by : Angelica Glynn DO; Nov 28 2017  5:34PM EST

## 2017-12-06 ENCOUNTER — ALLSCRIPTS OFFICE VISIT (OUTPATIENT)
Dept: OTHER | Facility: OTHER | Age: 52
End: 2017-12-06

## 2017-12-07 NOTE — PROGRESS NOTES
Assessment    1  Former smoker (V15 82) (D77 827)   2  Acute upper respiratory infection (465 9) (J06 9)    Plan  Acute upper respiratory infection    · Fluticasone Propionate 50 MCG/ACT Nasal Suspension; USE 2 SPRAYS IN EACHNOSTRIL ONCE DAILY   · PredniSONE 10 MG Oral Tablet; 2 tabs by mouth daily for 3 days, then one tab bymouth daily for 6 days, then half pill daily for 6 days   · Follow Up if Not Better Evaluation and Treatment  Follow-up  Status: Complete  Done:07Mii3031  Asthma    · Ventolin  (90 Base) MCG/ACT Inhalation Aerosol Solution; INHALE 2PUFFS EVERY 6 HOURS AS NEEDED FOR WHEEZING  Screening for colon cancer    · 1 - Nicole Garrison DO (Gastroenterology) Co-Management  *  Status: Active -Retrospective By Protocol Authorization  Requested for: 52LXK6911  Care Summary provided  : Yes    Discussion/Summary    We will treat the congestion  Rx for prednisone taper and Flonase nose spray  Push fluids  Get a room humidifier  Call us if symptoms continue or increase  Possible side effects of new medications were reviewed with the patient/guardian today  The treatment plan was reviewed with the patient/guardian  The patient/guardian understands and agrees with the treatment plan      Chief Complaint    1  Cold Symptoms  Continue cold symptoms      History of Present Illness  HPI: Patient here today continue with congestion  Patient finished a Z-Cristiano last week  Still has postnasal drip and mild productive cough  No fever chills  No nausea vomiting or diarrhea  Cold Symptoms:   DAIN NARVAEZ presents with complaints of constant episodes of moderate cold symptoms  Episodes started about 2 weeks ago  She is currently experiencing cold symptoms  Symptoms are unchanged  Associated symptoms include nasal congestion,-- post nasal drainage-- and-- sore throat, but-- no nausea,-- no vomiting,-- no diarrhea,-- no fever-- and-- no chills  The patient presents with complaints of mild productive cough        Review of Systems   Constitutional: No fever, no chills, feels well, no tiredness, no recent weight gain or loss  ENT: as noted in HPI  Cardiovascular: no complaints of slow or fast heart rate, no chest pain, no palpitations, no leg claudication or lower extremity edema  Respiratory: no complaints of shortness of breath, no wheezing, no dyspnea on exertion, no orthopnea or PND  Gastrointestinal: no complaints of abdominal pain, no constipation, no nausea or diarrhea, no vomiting, no bloody stools  Genitourinary: no complaints of dysuria, no incontinence, no pelvic pain, no dysmenorrhea, no vaginal discharge or abnormal vaginal bleeding  Active Problems  1  Acute sinusitis (461 9) (J01 90)   2  Asthma (493 90) (J45 909)   3  Hypothyroidism (244 9) (E03 9)   4  Screening for colon cancer (V76 51) (Z12 11)    Past Medical History    1  History of Abdominal pain, lower (789 09) (R10 30)   2  History of Ankle pain, right (719 47) (M25 571)   3  History of Bilateral chronic knee pain (318 06,872 48) (M25 561,M25 562,G89 29)   4  History of Bladder pain (788 99) (R39 89)   5  History of Chest tightness or pressure (786 59) (R07 89)   6  History of Curvature of spine (737 9) (M43 9)   7  History of Encounter for cervical Pap smear with pelvic exam (V76 2,V72 31) (Z01 419)   8  History of Female pelvic pain (625 9) (R10 2)   9  History of Foot pain, unspecified laterality   10  History of Hernia (553 9) (K46 9)   11  History of acute renal failure (V13 09) (Z87 448)   12  History of allergic rhinitis (V12 69) (Z87 09)   13  History of arthritis (V13 4) (Z87 39)   14  History of arthritis (V13 4) (Z87 39)   15  History of back pain (V13 59) (Z87 39)   16  History of bursitis (V13 59) (Z87 39)   17  History of cardiac disorder (V12 50) (Z86 79)   18  History of cardiac murmur (V12 59) (Z86 79)   19  History of chest pain (V13 89) (Z87 898)   20  History of dermatitis (V13 3) (Z87 2)   21   History of hiatal hernia (V12 79) (Z87 19)   22  History of high cholesterol (V12 29) (Z86 39)   23  History of influenza vaccination (V49 89) (Z92 29)   24  History of muscle pain (V13 59) (Z87 39)   25  History of obesity (V12 29) (Z86 39)   26  History of pneumococcal vaccination (V49 89) (Z92 29)   27  History of pulmonary embolism (V12 55) (Z86 711)   28  History of redness of eye (V12 49) (Z86 69)   29  History of right inguinal hernia (V45 89) (Z87 19)   30  History of screening mammography (V15 89) (Z92 89)   31  History of screening mammography (V15 89) (Z92 89)   32  History of sepsis (V12 09) (Z86 19)   33  History of sinusitis (V12 69) (Z87 09)   34  History of thyroid disorder (V12 29) (Z86 39)   35  History of umbilical hernia (V65 58) (Z87 19)   36  History of upper respiratory infection (V12 09) (Z87 09)   37  History of vitamin D deficiency (V12 1) (Z86 39)   38  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)   39  History of Left knee pain (719 46) (M25 562)   40  History of Lumbar herniated disc (722 10) (M51 26)   41  History of Morbid or severe obesity due to excess calories (278 01) (E66 01)   42  History of Otitis media of both ears (382 9) (H66 93)   43  History of Pain of finger, unspecified laterality (729 5) (M79 646)   44  History of Pain of right eye (379 91) (H57 11)   45  History of Patellar tendonitis (726 64) (M76 50)   46  History of Poor flexibility of tendon (727 81) (M67 80)   47  History of Primary osteoarthritis of right knee (715 16) (M17 11)   48  History of Right knee pain (719 46) (M25 561)   49  History of Screening for colon cancer (V76 51) (Z12 11)   50  History of Screening for depression (V79 0) (Z13 89)   51  History of Screening for osteoporosis (V82 81) (Z13 820)   52  History of Severe sepsis due to methicillin resistant Staphylococcus aureus (MRSA) with  acute organ dysfunction (579 67,902 50) (A41 02,R65 20)   53  History of Spider vein, symptomatic (448 1) (I78 1)   54   History of Status post arthroscopy of right knee (V45 89) (Z98 890)   55  History of Stomach disorder (537 9) (K31 9)   56  History of Tear of medial meniscus of right knee, subsequent encounter  Active Problems And Past Medical History Reviewed: The active problems and past medical history were reviewed and updated today  Family History  Mother    1  Family history of Arthritis pain (716 90) (M19 90)  Child    2  Family history of Colon cancer, ascending (153 6) (C18 2)  Brother    3  Family history of Colon cancer, ascending (153 6) (C18 2)  Aunt    4  Family history of Diabetes mellitus (250 00) (E11 9)  Unknown    5  Family history of diabetes mellitus (V18 0) (Z83 3)   6  Family history of lung disease (V19 8) (Z83 6)  Family History Reviewed: The family history was reviewed and updated today  Social History   · Always uses seat belt   ·    · Former smoker (K13 47) (K48 584)   · No alcohol use   · No drug use  The social history was reviewed and updated today  The social history was reviewed and is unchanged  Surgical History    1  History of Blood Vessel Repair   2  History of Cystoscopy With Biopsy   3  History of Exploratory Laparotomy   4  History of Hand Excision Of Tendon Cyst   5  History of Hysterectomy   6  History of Incisional Hernia Repair With Implantation Of Mesh   7  History of IVC Filter Placement W/ Fluorosc Angiogr Guidance W/ IV Contrast   8  History of Knee Arthroscopy (Therapeutic)   9  History of Shoulder Surgery  Surgical History Reviewed: The surgical history was reviewed and updated today  Current Meds   1  Albuterol Sulfate (2 5 MG/3ML) 0 083% Inhalation Nebulization Solution; USE 1 UNIT DOSE EVERY 4-6 HOURS AS NEEDED FOR WHEEZING ; Therapy: 61CRH4048 to (Last Rx:30Jun2017)  Requested for: 30Jun2017 Ordered   2  Eliquis 5 MG Oral Tablet; Take 1 tablet twice daily  Requested for: 26Xvi0302; Last Rx:16Vjy4505 Ordered   3   Famotidine 20 MG Oral Tablet; take 1 tablet by mouth once daily; Therapy: 24BEZ0226 to (Evaluate:31Mar2018)  Requested for: 96MGB5950; Last Rx:02Oct2017 Ordered   4  Flovent  MCG/ACT Inhalation Aerosol; INHALE 2 PUFFS TWICE DAILY  RINSE MOUTH AFTER USE; Therapy: 16ZTI3381 to (Last Rx:30Jun2017) Ordered   5  Levothyroxine Sodium 50 MCG Oral Tablet; TAKE 1 TABLET BY MOUTH ONCE DAILY; Therapy: 23JPO0586 to (Cinthia Gabriel)  Requested for: 95EVR7490; Last Rx:29Nov2017 Ordered   6  Loratadine 10 MG Oral Tablet; TAKE ONE TABLET EVERY DAY; Therapy: 58MNX4612 to (HJUDQIME:37HEM7522)  Requested for: 98CRU1889; Last Rx:24Wzy1133 Ordered   7  Synvisc One 48 MG/6ML Intra-articular Solution Prefilled Syringe; INJECT 48  UNIT Intra-articular; To Be Done: 34ZLG9773; Status: HOLD FOR - Administration Ordered   8  Ventolin  (90 Base) MCG/ACT Inhalation Aerosol Solution; INHALE 2 PUFFS EVERY 6 HOURS AS NEEDED FOR WHEEZING; Therapy: 84OZM5229 to (Evaluate:07Cen2199); Last Rx:30Jun2017 Ordered    The medication list was reviewed and updated today  Allergies  1  Amoxicillin-Pot Clavulanate TABS   2  Penicillin G Sodium SOLR   3  Penicillins    Vitals   Recorded: 13UDR2132 16:51WR   Systolic 817   Diastolic 82   Height 5 ft    Weight 291 lb    BMI Calculated 56 83   BSA Calculated 2 19       Physical Exam   Constitutional  General appearance: No acute distress, well appearing and well nourished  Ears, Nose, Mouth, and Throat  Otoscopic examination: Tympanic membranes translucent with normal light reflex  Canals patent without erythema  Nasal mucosa, septum, and turbinates: Abnormal   There was clear rhinorrhea from both nares  The bilateral nasal mucosa was boggy  Oropharynx: Abnormal   The posterior pharynx Clear postnasal drip, but-- was not erythematous-- and-- did not have an exudate  Pulmonary  Respiratory effort: No increased work of breathing or signs of respiratory distress  Auscultation of lungs: Clear to auscultation     Cardiovascular  Auscultation of heart: Normal rate and rhythm, normal S1 and S2, without murmurs     Examination of extremities for edema and/or varicosities: Normal    Psychiatric  Orientation to person, place, and time: Normal    Mood and affect: Normal          Future Appointments    Date/Time Provider Specialty Site   12/21/2017 10:00 AM Jose Austin DO [address]   Electronically signed by : Davis Salgado DO; Dec  6 2017  1:09PM EST                       (Author)

## 2018-01-09 NOTE — RESULT NOTES
Verified Results  (1) CULTURE, BLOOD BACT 71Yzb4967 10:38AM Toby Polk Order Number: [id number]     Test Name Result Flag Reference   CLINICAL REPORT (Report)     Test:        Blood culture  Specimen Type:   Blood  Specimen Date:   7/25/2017 10:38 AM  Result Date:    7/30/2017 4:01 PM  Result Status:   Final result  Resulting Lab:   [place] [address]            Tel: [phone]      CULTURE                                       ------------------                                   No Growth After 5 Days

## 2018-01-10 DIAGNOSIS — E03.9 HYPOTHYROIDISM: ICD-10-CM

## 2018-01-10 NOTE — PROCEDURES
Procedures by Vern Shaffer MD at  6/5/2017  3:52 AM      Author:  Vern Shaffer MD Service:  Surgery-General Author Type:  Resident    Filed:  6/5/2017  3:59 AM Date of Service:  6/5/2017  3:52 AM Status:  Attested    :  Vern Shaffer MD (Resident)  Cosigner:  Josephine Bryant MD at 6/7/2017  9:23 AM      Procedure Orders:       1  INCISION AND DRAINAGE [[id number]] ordered by Vern Shaffer MD at 06/05/17 4175                 Post-procedure Diagnoses:       1  Abscess [L02 91]              Attestation signed by Josephine Bryant MD at 6/7/2017  9:23 AM             Teaching Physician Statement  I agree with the resident's procedure note, documented findings, and plan of care  Josephine Bryant MD  2017-Jun-5                                              Incision and Drainage  Date/Time: 6/5/2017 3:53 AM  Performed by: Mandy Maya  Authorized by: Carlos Arias     Patient location:  Bedside  Other Assisting Provider:  Yes (comment) Bo Schumacher)    Consent:     Consent obtained:  Written    Consent given by:  Patient    Risks discussed:  Bleeding, incomplete drainage, damage to other organs and pain  Universal protocol:     Patient identity confirmed:  Arm band  Location:     Type:  Abscess    Location:  Head/neck    Head/neck location:  Neck (right)  Pre-procedure details:     Skin preparation:  Hibiclens  Anesthesia (see MAR for exact dosages): Anesthesia method:  Local infiltration    Local anesthetic:  Lidocaine 1% WITH epi  Procedure details:     Complexity:  Simple    Incision types:  Cruciate    Scalpel blade:  15    Approach:  Open    Incision depth:  Subcutaneous    Wound management:  Probed and deloculated, irrigated with saline, extensive cleaning and debrided    Drainage:  Purulent and serosanguinous    Drainage amount:   Moderate    Wound treatment:  Wound left open    Packing materials:  1/2 in gauze  Post-procedure details:     Patient tolerance of procedure: Tolerated well, no immediate complications        Several foreign bodies were removed before proceeding, they appeared to be silk and nylon sutures matted in pus  The wound appeared as such after removal of the mated foreign bodies:        Next, the previous IJ sheath site was extended open anteriorly and superficially  Ultrasound-guidance was used to ensure we did not extend into a major vessel  Copious pus was expressed and sent for culture  Inferior to the IJ site a separate area of  skin opened on its own and pus was expressed  Finally, a cruciate incision was made anterior to the IJ sheath site in order to facilitate irrigation  Copious loculations were broken up, and the saline was flushed into the anterior wound to drain out the  IJ sheath site  The skin opening was also flushed  All three openings were packed with 1/2 gauze          Monserrat Cahs MD PGY-3  3:59 AM  06/05/17               Received for:Anup Bergeron MD  Jun 7 2017  9:23AM Eastern Standard Time

## 2018-01-10 NOTE — PROGRESS NOTES
Assessment    1  Spider vein, symptomatic (448 1) (I78 1)   2  Morbid or severe obesity due to excess calories (278 01) (E66 01)    Plan    1  Some eating tips that can help you lose weight ; Status:Complete;   Done: 76OAW1617    2  Keep your leg elevated whenever you can to decrease swelling and increase circulation ;   Status:Complete;   Done: 85ZJD5074   3  Support stockings can help keep the blood from pooling in the small veins in your feet   and legs ; Status:Complete;   Done: 48VTO9976   4  Follow-up PRN Evaluation and Treatment  Follow-up  Status: Complete  Done:   39GLX5079   5  Gradient compression stocking, below knee, 20-30mm Hg, each; Status:Complete;     Done: 90KYN0437    Discussion/Summary  Discussion Summary: This patient has a few scattered spider varicosities to bilateral lower extremities  She reports bilateral intermittent edema and cramping to the lower extremities  She has easily palpable DP pulses bilaterally and LEV negative for DVT and with triphasic waveforms  We discussed the pathophysiology of venous disease  I have given her a script to obtain 20-30 mmHg gradient compression stockings, which she should wear on a daily basis  We also discussed the benefits of frequent elevation, regular physical activity and weight loss  If her symptoms are not managed with conservative therapy she should contact the office, otherwise we will be happy to see her on an as needed basis  Chief Complaint  Chief Complaint Free Text Note Form: "I am here to have my painful spider veins checked "         History of Present Illness  Varicose Veins [facility]:   Symptoms:  bilateral leg swelling, bilateral muscle cramps, bilateral spider veins and bilateral leg pain, but no bulging veins, no stasis dermatitis, no cellulitis, no hyperpigmentation, no venous ulcers, no dry skin, no itching and no bleeding  The patient describes the pain as Cramping  These symptoms developed 20 year(s) ago     This patient has no history of DVT, pulmonary embolism, superficial venous thrombosis, or a hypercoagulable state  Evaluation and Treatment History: She was previously evaluated by a primary physician  This patient has had no prior surgical treatment of the venous system  This patient is not currently utilizing any conservative management strategies to manage the current symptoms  Free Text HPI: This patient is new to our practice, referred by Dr Yumiko Walker for evaluation of varicose veins  Patient had a LEV done on 10/24/2016  She complains of spider veins to her right leg  She says that she gets cramping pain to bilateral legs  Pain occurs at any time, she can be walking or resting  It goes away on it's own  She is not aware of anything that makes it better  It occurs and goes away spontaneously  She reports intermittent bilateral ankle edema  She has never worn compression stockings  She does not elevate  She denies heaviness, fatigue or aching pain  Stating it is intermittent unexplained cramping  She has easily palpable DP pulses  So venous skin changes  Review of Systems  Complete Female - Vasc:   Constitutional: no loss in appetite, chills and feeling tired, but no fever, no recent weight gain and no recent weight loss  Eyes: itching of the eyes, no sudden vision loss, no blurred vision and no double vision, but no eye pain, no eyesight problems, no dryness of the eyes, eyes not red, no purulent discharge from the eyes and wears glasses  ENT: earache, nasal discharge and hoarseness, but no nosebleeds, no sore throat and no hearing loss  Cardiovascular: no painful veins and no bleeding veins, but regular heart rate, no chest pain, no intermittent leg claudication, no palpitations and leg pain with walking  Respiratory: no shortness of breath, no cough, no orthopnea and no complaints of PND, but wheezing and shortness of breath during exertion     Genitourinary: no dysuria, no Hematuria,no urinary incontinence  Musculoskeletal: joint swelling, limb pain, myalgias, joint stiffness and limb swelling, but lumbar pain  Integumentary: no rash, no lesions, no wounds, no ulcer  Neurological: no dementia and difficulty walking, but no headache, no numbness, no confusion, no dizziness, no limb weakness, no convulsions and no fainting  Psychiatric: no depression, no mood disorders, no anxiety  Hematologic/Lymphatic: no bleeding disorder, no easy bruising  ROS Reviewed:   ROS reviewed  Active Problems    1  Abdominal pain, lower (789 09) (R10 30)   2  Allergic rhinitis (477 9) (J30 9)   3  Ankle pain, right (719 47) (M25 571)   4  Arthritis (716 90) (M19 90)   5  Arthritis (716 90) (M19 90)   6  Asthma (493 90) (J45 909)   7  Back pain (724 5) (M54 9)   8  Bilateral chronic knee pain (711 16,335 23) (M25 561,M25 562,G89 29)   9  Bladder pain (788 99) (R39 89)   10  Bursitis (727 3) (M71 9)   11  Chest pain (786 50) (R07 9)   12  Curvature of spine (737 9) (M43 9)   13  Disorder of heart (429 9) (I51 9)   14  Encounter for cervical Pap smear with pelvic exam (V76 2,V72 31) (Z01 419)   15  Female pelvic pain (625 9) (R10 2)   16  Foot pain, unspecified laterality   17  Heart murmur (785 2) (R01 1)   18  Hernia (553 9) (K46 9)   19  Hernia, inguinal, right (550 90) (K40 90)   20  Hiatal hernia (553 3) (K44 9)   21  High cholesterol (272 0) (E78 00)   22  Hypothyroidism (244 9) (E03 9)   23  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)   24  Left knee pain (719 46) (M25 562)   25  Lumbar herniated disc (722 10) (M51 26)   26  Morbid or severe obesity due to excess calories (278 01) (E66 01)   27  Myalgia (729 1) (M79 1)   28  Otitis media of both ears (382 9) (H66 93)   29  Pain of finger, unspecified laterality (729 5) (M79 646)   30  Patellar tendonitis (726 64) (M76 50)   31  Poor flexibility of tendon (727 81) (M67 80)   32  Primary osteoarthritis of right knee (715 16) (M17 11)   33   Right knee pain (719 46) (M25 561)   34  Screening for osteoporosis (V82 81) (Z13 820)   35  Sinusitis (473 9) (J32 9)   36  Status post arthroscopy of right knee (V45 89) (Z98 890)   37  Stomach disorder (537 9) (K31 9)   38  Tear of medial meniscus of right knee, subsequent encounter   39  Thyroid disease (246 9) (E07 9)   40  Umbilical hernia (883 8) (K42 9)   41  Upper respiratory infection (465 9) (J06 9)   42  Visit for screening mammogram (V76 12) (Z12 31)   43  Vitamin D deficiency (268 9) (E55 9)    Past Medical History    1  History of obesity (V13 89) (Z86 39)  Active Problems And Past Medical History Reviewed: The active problems and past medical history were reviewed and updated today  Surgical History    1  History of Blood Vessel Repair   2  History of Cystoscopy With Biopsy   3  History of Exploratory Laparotomy   4  History of Hand Excision Of Tendon Cyst   5  History of Hysterectomy   6  History of Incisional Hernia Repair With Implantation Of Mesh   7  History of Knee Arthroscopy   8  History of Shoulder Surgery  Surgical History Reviewed: The surgical history was reviewed and updated today  Family History  Mother    1  Family history of Arthritis pain (716 90) (M19 90)  Child    2  Family history of Colon cancer, ascending (153 6) (C18 2)  Brother    3  Family history of Colon cancer, ascending (153 6) (C18 2)  Aunt    4  Family history of Diabetes mellitus (250 00) (E11 9)  Unknown    5  Family history of diabetes mellitus (V18 0) (Z83 3)   6  Family history of lung disease (V19 8) (Z83 6)  Family History Reviewed: The family history was reviewed and updated today  Social History    · Always uses seat belt   ·    · Former smoker (V93 27) (T61 840)   · No alcohol use   · No drug use  Social History Reviewed: The social history was reviewed and updated today  Current Meds   1   Albuterol Sulfate (2 5 MG/3ML) 0 083% Inhalation Nebulization Solution; USE 1 UNIT   DOSE EVERY 4-6 HOURS AS NEEDED FOR WHEEZING ; Therapy: 68XHX2471 to (Last Rx:50Yvx0958)  Requested for: 66PEW5485 Ordered   2  Claritin 10 MG Oral Tablet; TAKE 1 TABLET DAILY AS NEEDED; Therapy: 79GMJ8801 to (YWTCDAEB:32JMP0938)  Requested for: 46HKG8816; Last   MO:87NSK3126 Ordered   3  Diclofenac-Misoprostol 75-0 2 MG Oral Tablet Delayed Release; TAKE 1 TABLET Every   twelve hours PRN hand pain; Therapy: 25Djk6539 to (Evaluate:53Vyp8682)  Requested for: 62Pqf2366; Last   Rx:00Sog9788 Ordered   4  Drisdol 78177 UNIT CAPS; TAKE 1 CAPSULE Weekly Recorded   5  Flovent  MCG/ACT Inhalation Aerosol; INHALE 2 PUFFS BY MOUTH TWICE   DAILY  RINSE MOUTH AFTER USE; Therapy: 08WKZ1840 to ()  Requested for: 93MGR3835; Last   SJ:38WZW2055 Ordered   6  Fluticasone Propionate 50 MCG/ACT Nasal Suspension; USE 1 TO 2 SPRAYS IN EACH   NOSTRIL ONCE DAILY; Therapy: 43PPY2732 to (Last Rx:49Oxw2683)  Requested for: 61Sal6257 Ordered   7  Levothyroxine Sodium 100 MCG Oral Tablet; Take 1 tablet daily as directed Recorded   8  Omeprazole 20 MG Oral Capsule Delayed Release; take 1 capsule by mouth once daily; Therapy: 81VRX7313 to ()  Requested for: 69IAA9363; Last   LF:72PZU8310 Ordered   9  Omeprazole 20 MG Oral Tablet Delayed Release; Take 1 tablet daily  Requested for:   92Arn5377; Last Rx:65Aes8799 Ordered   10  Proventil  (90 Base) MCG/ACT Inhalation Aerosol Solution; 2 PUFFS Q 4 HRS; Therapy: 35KSJ2906 to (Last Rx:18Vei5699)  Requested for: 05SDO6093 Ordered   11  Synvisc One 48 MG/6ML Intra-articular Solution Prefilled Syringe; INJECT 48  UNIT    Intra-articular; To Be Done: 05WEI1986; Status: HOLD FOR - Administration Ordered   12  Triamterene-HCTZ 75-50 MG Oral Tablet; Take 1 tablet daily Recorded  Medication List Reviewed: The medication list was reviewed and updated today  Allergies    1  Amoxicillin-Pot Clavulanate TABS   2  Penicillin G Sodium SOLR   3  Penicillins    Vitals  Vital Signs    Recorded: 54Hqz1420 02:47PM   Heart Rate 78, L Brachial Artery   Pulse Quality Regular, L Brachial Artery   Respiration Quality Normal   Respiration 18   Systolic 860, LUE, Sitting   Diastolic 94, LUE, Sitting   Height 5 ft 4 in   Weight 295 lb    BMI Calculated 50 64   BSA Calculated 2 31     Results/Data  VAS LOWER LIMB VENOUS DUPLEX STUDY, UNILATERAL/LIMITED 97KKF5500 05:42PM EPIC, Provider   Test ordered by: Lorin Muñiz     Test Name Result Flag Reference   VAS LOWER LIMB VENOUS DUPLEX STUDY, UNILATERAL/LIMITED (Report)     [facility] REPORT   CLINICAL:   Indications: Right Limb Pain [M79 609]  Patient has had a cramping pain in her right calf for many years  She reports   that she had a tear in the calf muscle 6-7 years ago, and that the pain began   around that time  Risk Factors: The patient has history of previous smoking (quit >10yrs ago)  She has no history of DVT, limb trauma or malignancy  Clinical:   Right Lower Limb   There is complaint of pain  FINDINGS:      Segment Right      Left          Impression    Impression       CFV   Normal (Patent) Normal (Patent)             CONCLUSION:   Impression:   RIGHT LOWER LIMB: NORMAL   No evidence of acute or chronic deep vein thrombosis   No evidence of superficial thrombophlebitis noted  Doppler evaluation shows a normal response to augmentation maneuvers  Popliteal, posterior tibial and anterior tibial arterial Doppler waveforms are   triphasic  LEFT LOWER LIMB LIMITED: NORMAL   Evaluation shows no evidence of thrombus in the common femoral vein  Doppler evaluation shows a normal response to augmentation maneuvers        SIGNATURE:   Electronically Signed by: Iliana Muniz MD on 2016-10-24 09:29:51 PM     VAS LOWER LIMB VENOUS DUPLEX STUDY, UNILATERAL/LIMITED 29HZE1752 07:16PM EPIC, Provider   Test ordered by: Lorin Muñiz     Test Name Result Flag Reference   VAS LOWER LIMB VENOUS DUPLEX STUDY, UNILATERAL/LIMITED (Report)     [facility] REPORT   CLINICAL:   Indications: Right Limb Pain [M79 609]  Patient has been having calf pain since two tear injuries to her right calf 4-5   years ago  Risk Factors: The patient has history of obesity and previous smoking (quit   >10yrs ago)  She has no history of DVT or malignancy  Clinical: Right Lower Limb   There is complaint of pain  FINDINGS:      Segment  Right      Left           Impression    Impression       CFV    Normal (Patent) Normal (Patent)    Peroneal Not Visualized                      CONCLUSION:   Impression:   RIGHT LOWER LIMB: NORMAL   No evidence of acute or chronic deep vein thrombosis  Peroneal veins were poorly visualized due to body habitus  No evidence of superficial thrombophlebitis noted  Doppler evaluation shows a normal response to augmentation maneuvers  Popliteal, posterior tibial and anterior tibial arterial Doppler waveforms are   triphasic  LEFT LOWER LIMB LIMITED: NORMAL   Evaluation shows no evidence of thrombus in the common femoral vein  Doppler evaluation shows a normal response to augmentation maneuvers  SIGNATURE:   Electronically Signed by: Sushant Pablo MD, RPVI on 2016-01-19 07:16:00 PM     Physical Exam    Posterior tibialis: left 2+  Dorsalis pedis: right 2+ and left 2+  Distal Pulse Exam: Normal Capillary Refill  Trace edema bilateral ankles   LE Varicose Veins: right leg spider veins and left leg spider veins  The heart rate was normal  The rhythm was regular  Heart sounds: normal S1 and normal S2    Murmurs: No murmurs were heard  Pulmonary   Respiratory effort: No increased work of breathing or signs of respiratory distress  Abdomen   Abdomen: The abdomen was obese  Psychiatric   Orientation to person, place and time: Normal    Eyes   Conjunctiva and lids: No swelling, erythema, or discharge     Ears, Nose, Mouth, and Throat   Hearing: Normal  Neck   Neck: No jugular venous distention, normal ROM and extension  No cervical adenopathy, trachea midline, neck supple  Neurologic Sensory exam normal   Motor skills intact  Musculoskeletal   Gait and station: Normal    Digits and nails: Normal without clubbing or cyanosis  Muscle strength/tone: Normal    Skin   Skin and subcutaneous tissue: Normal without rashes or lesions     Venous Disease: No lipodermatosclerosis, stasis dermatitis, hyperpigmentation, or atrophie adis noted on exam       Signatures   Electronically signed by : Phillip Woodward; Dec 19 2016  3:40PM EST                       (Author)    Electronically signed by : Delpha Romberg Doctor, MD; Dec 22 2016  2:01PM EST                       (Author)

## 2018-01-11 NOTE — PROCEDURES
Procedures by Maria E Carroll PA-C at 5/25/2017  2:47 AM      Author:  Maria E Carroll PA-C Service:  Critical Care/ICU Author Type:  Physician Assistant    Filed:  5/25/2017  2:48 AM Date of Service:  5/25/2017  2:47 AM Status:  Attested    :  Maria E Carroll PA-C (Physician Assistant)  Cosigner:  No Islas MD at 5/25/2017  6:55 AM      Procedure Orders:       1  Insert arterial line [[id number]] ordered by Maria E Carroll PA-C at 05/25/17 0247                 Post-procedure Diagnoses:       1  Acute saddle pulmonary embolism [I26 92]              Attestation signed by No Islas MD at 5/25/2017  6:55 AM           I was present for procedure                                           Arterial Line Insertion  Date/Time: 5/25/2017 2:47 AM  Performed by: Tayo Curran by: Denys Saunders     Patient location:  Bedside  Other Assisting Provider:  Yes (comment) (Dr Poncho Alonso)    Consent:     Consent obtained:  Verbal    Consent given by:  Patient    Risks discussed:  Bleeding, infection, ischemia, pain and repeat procedure  Universal protocol:     Procedure explained and questions answered to patient or proxy's satisfaction: yes      Patient identity confirmed:  Verbally with patient and arm band  Indications:     Indications: hemodynamic monitoring, continuous blood pressure monitoring and frequent labs / infusion    Pre-procedure details:     Skin preparation:  Chlorhexidine    Preparation: Patient was prepped and draped in sterile fashion    Anesthesia (see MAR for exact dosages):      Anesthesia method:  Local infiltration    Local anesthetic:  Lidocaine 1% w/o epi  Procedure details:     Location / Tip of Catheter:  Radial    Laterality:  Left    Needle gauge:  20 G    Placement technique:  Percutaneous and ultrasound guided    Number of attempts:  1    Successful placement: yes      Transducer: waveform confirmed    Post-procedure details:     Post-procedure: Biopatch applied, secured with tape, sterile dressing applied and sutured    CMS:  Unchanged    Patient tolerance of procedure:   Tolerated well, no immediate complications                     Received for:Provider  EPIC   May 25 2017  6:55AM [facility] Standard Time

## 2018-01-11 NOTE — RESULT NOTES
Verified Results  MAMMO SCREENING BILATERAL W 3D & CAD [id number] 01:25PM Thais Gray     Test Name Result Flag Reference   MAMMO SCREENING BILATERAL W 3D & CAD (Report)     Patient History:   Patient is postmenopausal    Family history of lung cancer at age 48 or over in father, breast   cancer at age 79 in maternal aunt  Took hormonal contraceptives for 10 years  Took estrogen for 1    year  Patient is a former smoker, and smoked for 30 years  Patient's    BMI is 50 1  Reason for exam: screening, asymptomatic  Mammo Screening Bilateral W DBT and CAD: November 3, 2017 - Check   In #: [de-identified]   2D/3D Procedure   3D views: Bilateral MLO view(s) were taken  2D views: Bilateral CC view(s) were taken  Technologist: RT Oscar(R)(M)   Prior study comparison: Urmila 10, 2015, bilateral digital    screening mammogram performed at [address] [place] [address] [address]  The breast tissue is almost entirely fat  A combination of    mediolateral oblique 3-D tomographic slices as well as standard    two-dimensional orthogonal images were obtained  No dominant soft tissue mass, architectural distortion or    suspicious calcifications are noted  The skin and nipple    contours are within normal limits  No evidence of malignancy  No significant changes   when compared with prior studies  ACR BI-RADSï¾® Assessments: BiRad:1 - Negative     Recommendation:   Routine screening mammogram of both breasts in 1 year  A    reminder letter will be sent  The patient is scheduled in a reminder system for screening    mammography  8-10% of cancers will be missed on mammography  Management of a    palpable abnormality must be based on clinical grounds  Patients    will be notified of their results via letter from our facility  Accredited by [address] [place] [address] of Radiology and FDA       Transcription Location: [facility] Workman 98: [id number]     Risk Value(s):   Evert 10 Year: 2  400%, Evert Lifetime: 9 100%,    Myriad Table: 1 5%, DHARA 5 Year: 0 8%, NCI Lifetime: 6 3%   Signed by:    Jerrell Vargas MD   11/6/17

## 2018-01-12 VITALS
SYSTOLIC BLOOD PRESSURE: 126 MMHG | WEIGHT: 283.38 LBS | BODY MASS INDEX: 48.38 KG/M2 | HEIGHT: 64 IN | DIASTOLIC BLOOD PRESSURE: 64 MMHG

## 2018-01-12 NOTE — PROCEDURES
Procedures by Tara Sauceda RN at 5/27/2017  9:27 AM      Author:  Tara Sauceda RN  Service:  (none) Author Type:  Registered Nurse     Filed:  5/27/2017 11:45 AM  Date of Service:  5/27/2017  9:27 AM Status:  Addendum     :  Tara Sauceda RN (Registered Nurse)        Related Notes: Original Note by Tara Sauceda RN (Registered Nurse) filed at 5/27/2017  9:30 AM         Procedure Orders:       1  Insert PICC line [id number] ordered by Lisette Mullen MD at 05/26/17 1251                     Insert PICC line  Date/Time: 5/27/2017 9:27 AM  Performed by: Charis Massey by: Alecia Bray     Patient location:  Bedside  Procedure performed by consultant:  Luke KIDD  Consent:     Consent obtained:  Written (Bacilio Doan obtained consent on chart )    Consent given by:  Patient  Universal protocol:     Procedure explained and questions answered to patient or proxy's satisfaction: yes      Relevant documents present and verified: yes      Test results available and properly labeled: yes       Imaging studies available: yes      Required blood products, implants, devices, and special equipment available: yes      Site/side marked: yes      Immediately prior to procedure, a time out was called: yes      Patient identity confirmed:  Verbally with patient and arm band  Pre-procedure details:     Hand hygiene: Hand hygiene performed prior to insertion      Sterile barrier technique: All elements of maximal sterile technique followed      Skin preparation:  ChloraPrep    Skin preparation agent: Skin preparation agent completely dried prior to procedure    Indications:     PICC line indications: vascular access    Anesthesia (see MAR for exact dosages):      Anesthesia method:  Local infiltration    Local anesthetic:  Lidocaine 1% w/o epi (2 ml)  Procedure details:     Location:  Basilic    Vessel type: vein      Laterality:  Right    Approach: percutaneous technique used Patient position:  Flat    Catheter size:  5 Fr    Landmarks identified: yes      Ultrasound guidance: yes      Sterile ultrasound techniques: Sterile gel and sterile probe covers were used      Number of attempts:  2    Successful placement: yes      Vessel of catheter tip end:  Sherlock 3CG confirmed    Total catheter length (cm):  44    Catheter out on skin (cm):  0    Max flow rate:  999ml/hr    Arm circumference:  39  Post-procedure details:     Post-procedure:  Securement device placed and dressing applied    Assessment:  Blood return through all ports    Post-procedure complications: none      Patient tolerance of procedure: Tolerated well, no immediate complications  Comments:      Used R arm for PICC line pt c/o  Left antecubital  IV site pain that is very painful and tender to touch IV site has swelling and ecchymosis  Primary RN Lindsey Parekh aware and d/c'd IV site at bedside before PICC line was placed                          Received for:Provider  EPIC   May 27 2017 11:46AM [facility] Standard Time

## 2018-01-13 VITALS
HEIGHT: 64 IN | BODY MASS INDEX: 49.08 KG/M2 | SYSTOLIC BLOOD PRESSURE: 122 MMHG | DIASTOLIC BLOOD PRESSURE: 80 MMHG | WEIGHT: 287.5 LBS

## 2018-01-13 VITALS
WEIGHT: 282 LBS | OXYGEN SATURATION: 96 % | BODY MASS INDEX: 48.14 KG/M2 | SYSTOLIC BLOOD PRESSURE: 118 MMHG | RESPIRATION RATE: 18 BRPM | TEMPERATURE: 98.4 F | DIASTOLIC BLOOD PRESSURE: 68 MMHG | HEART RATE: 93 BPM | HEIGHT: 64 IN

## 2018-01-13 VITALS
TEMPERATURE: 96.3 F | DIASTOLIC BLOOD PRESSURE: 76 MMHG | BODY MASS INDEX: 49.85 KG/M2 | WEIGHT: 292 LBS | HEIGHT: 64 IN | SYSTOLIC BLOOD PRESSURE: 116 MMHG

## 2018-01-13 VITALS
BODY MASS INDEX: 49.42 KG/M2 | WEIGHT: 289.5 LBS | DIASTOLIC BLOOD PRESSURE: 68 MMHG | HEIGHT: 64 IN | SYSTOLIC BLOOD PRESSURE: 126 MMHG

## 2018-01-13 VITALS
WEIGHT: 281 LBS | SYSTOLIC BLOOD PRESSURE: 122 MMHG | HEIGHT: 64 IN | DIASTOLIC BLOOD PRESSURE: 86 MMHG | BODY MASS INDEX: 47.97 KG/M2

## 2018-01-13 NOTE — RESULT NOTES
Verified Results  (1) CBC/PLT/DIFF 17Oct2017 10:17AM Rosaura Cohen Order Number: [id number]     Test Name Result Flag Reference   WBC COUNT 6 21 Thousand/uL  4 31-10 16   RBC COUNT 4 66 Million/uL  3 81-5 12   HEMOGLOBIN 13 5 g/dL  11 5-15 4   HEMATOCRIT 42 2 %  34 8-46  1   MCV 91 fL  82-98   MCH 29 0 pg  26 8-34 3   MCHC 32 0 g/dL  31 4-37 4   RDW 13 9 %  11 6-15 1   MPV 10 3 fL  8 9-12 7   PLATELET COUNT 525 Thousands/uL  149-390   nRBC AUTOMATED 0 /100 WBCs     NEUTROPHILS RELATIVE PERCENT 48 %  43-75   LYMPHOCYTES RELATIVE PERCENT 37 %  14-44   MONOCYTES RELATIVE PERCENT 11 %  4-12   EOSINOPHILS RELATIVE PERCENT 3 %  0-6   BASOPHILS RELATIVE PERCENT 1 %  0-1   NEUTROPHILS ABSOLUTE COUNT 2 93 Thousands/? ??L  1 85-7 62   LYMPHOCYTES ABSOLUTE COUNT 2 27 Thousands/? ??L  0 60-4 47   MONOCYTES ABSOLUTE COUNT 0 70 Thousand/? ??L  0 17-1 22   EOSINOPHILS ABSOLUTE COUNT 0 21 Thousand/? ??L  0 00-0 61   BASOPHILS ABSOLUTE COUNT 0 09 Thousands/? ??L  0 00-0 10     (1) COMPREHENSIVE METABOLIC PANEL 86YKZ3078 42:46AC Rosaura Cohen Order Number: [id number]     Test Name Result Flag Reference   SODIUM 138 mmol/L  136-145   POTASSIUM 4 2 mmol/L  3 5-5 3   CHLORIDE 105 mmol/L  100-108   CARBON DIOXIDE 28 mmol/L  21-32   ANION GAP (CALC) 5 mmol/L  4-13   BLOOD UREA NITROGEN 15 mg/dL  5-25   CREATININE 1 30 mg/dL  0 60-1 30   Standardized to IDMS reference method   CALCIUM 9 0 mg/dL  8 3-10 1   BILI, TOTAL 0 35 mg/dL  0 20-1 00   ALK PHOSPHATAS 92 U/L     ALT (SGPT) 21 U/L  12-78   Specimen collection should occur prior to Sulfasalazine and/or Sulfapyridine administration due to the potential for falsely depressed results  AST(SGOT) 20 U/L  5-45   Specimen collection should occur prior to Sulfasalazine administration due to the potential for falsely depressed results     ALBUMIN 3 2 g/dL L 3 5-5 0   TOTAL PROTEIN 7 3 g/dL  6 4-8 2   eGFR 47 ml/min/1 73sq m     National Kidney Disease Education Program recommendations are as follows:  GFR calculation is accurate only with a steady state creatinine  Chronic Kidney disease less than 60 ml/min/1 73 sq  meters  Kidney failure less than 15 ml/min/1 73 sq  meters  GLUCOSE FASTING 83 mg/dL  65-99   Specimen collection should occur prior to Sulfasalazine administration due to the potential for falsely depressed results  Specimen collection should occur prior to Sulfapyridine administration due to the potential for falsely elevated results  (1) LIPID PANEL, FASTING 60DOH3979 10:17AM [facility] Order Number: [id number]     Test Name Result Flag Reference   CHOLESTEROL 202 mg/dL H    HDL,DIRECT 46 mg/dL  40-60   Specimen collection should occur prior to Metamizole administration due to the potential for falsley depressed results  LDL CHOLESTEROL CALCULATED 115 mg/dL H 0-100   Triglyceride:        Normal <150 mg/dl   Borderline High 150-199 mg/dl   High 200-499 mg/dl   Very High >499 mg/dl      Cholesterol:       Desirable <200 mg/dl    Borderline High 200-239 mg/dl    High >239 mg/dl      HDL Cholesterol:       High>59 mg/dL    Low <41 mg/dL      This screening LDL is a calculated result  It does not have the accuracy of the Direct Measured LDL in the monitoring of patients with hyperlipidemia and/or statin therapy  Direct Measure LDL (YQD460) must be ordered separately in these patients  TRIGLYCERIDES 207 mg/dL H <=150   Specimen collection should occur prior to N-Acetylcysteine or Metamizole administration due to the potential for falsely depressed results  (1) TSH 17Oct2017 10:17AM [facility] Order Number: [id number]     Test Name Result Flag Reference   TSH 7 800 uIU/mL H 0 358-3 740   Patients undergoing fluorescein dye angiography may retain small amounts of fluorescein in the body for 48-72 hours post procedure  Samples containing fluorescein can produce falsely depressed TSH values  If the patient had this procedure,a specimen should be resubmitted post fluorescein clearance  The recommended reference ranges for TSH during pregnancy are as follows:  First trimester 0 1 to 2 5 uIU/mL  Second trimester  0 2 to 3 0 uIU/mL  Third trimester 0 3 to 3 0 uIU/m       Plan  Hypothyroidism    · Levothyroxine Sodium 25 MCG Oral Tablet; TAKE 1 TABLET BY MOUTH ONCE  DAILY   · (1) TSH; Status:Active;  Requested for:29Nov2017;

## 2018-01-13 NOTE — PROCEDURES
Procedures by Radha Garnett MD at  5/26/2017  5:11 AM      Author:  Radha Garnett MD Service:  Critical Care/ICU Author Type:  Physician    Filed:  5/26/2017  5:16 AM Date of Service:  5/26/2017  5:11 AM Status:  Attested    :  Radha Garnett MD (Physician)  Cosigner:  Mandy Maya DO at 5/26/2017 11:04 PM      Procedure Orders:       1  LACERATION REPAIR [[id number]] ordered by Radha Garnett MD at 05/26/17 9192                 Post-procedure Diagnoses:       1  Acute saddle pulmonary embolism [I26 92]              Attestation signed by Mandy Maya DO at 5/26/2017 11:04 PM           As the critical care attending, I was present and supervised the entire procedure  Time out called and procedure excludes critical care time  Laceration Repair  Date/Time: 5/26/2017 5:11 AM  Performed by: Bradley Zhong  Authorized by: Bradley Zhong     Patient location:  Bedside  Consent:     Consent obtained:  Verbal    Consent given by:  Patient    Risks discussed:  Need for additional repair, poor cosmetic result and vascular damage    Alternatives discussed:  No treatment  Universal protocol:     Procedure explained and questions answered to patient or proxy's satisfaction: yes      Required blood products, implants, devices, and special equipment available: yes      Site/side marked: yes       Immediately prior to procedure, a time out was called: yes      Patient identity confirmed:  Verbally with patient  Anesthesia (see MAR for exact dosages):      Anesthesia method:  Local infiltration    Local anesthetic:  Lidocaine 2% WITH epi  Laceration details:     Location:  Neck    Neck location:  R anterior    Length (cm) of Repair:  0 2    Depth (mm):  5  Repair type:     Repair type:  Simple  Pre-procedure details:     Preparation:  Patient was prepped and draped in usual sterile fashion  Exploration:     Hemostasis achieved with:  Epinephrine and direct pressure (topical thrombin)  Treatment:     Area cleansed with:  Water    Amount of cleaning:  Standard  Skin repair:     Repair method:  Sutures and glue    Suture size:  3-0    Suture material:  Nylon    Suture technique:  Figure eight    Number of sutures:  1  Approximation:     Approximation:  Close    Approximation difficulty:  Simple  Post-procedure details:     Dressing: surgifoam     Patient tolerance of procedure: Tolerated well, no immediate complications  Comments:      1 prior suture removed  Currently 1 prior (silk 2-0) simple interrupted and 1 current (nylon 3-0) figure 8  Hemostasis achieved and area reinforced with surgical glue and surgifoam layered                         Received for:Provider  EPIC   May 26 2017 11:04PM [facility] Standard Time

## 2018-01-13 NOTE — MISCELLANEOUS
Assessment    1  Former smoker (V15 82) (X29 355)   2  Pulmonary embolism (415 19) (I26 99)   3  Severe sepsis due to methicillin resistant Staphylococcus aureus (MRSA) with acute   organ dysfunction (038 12,996 92) (A41 02,R65 20)    Plan  Asthma    · Flovent  MCG/ACT Inhalation Aerosol; INHALE 2 PUFFS TWICE DAILY  RINSE MOUTH AFTER USE   Rx By: Moraima Sotor; Dispense: 0 Days ; #:1 X 12 GM Inhaler; Refill: 5; For: Asthma; CONNOR = N; Record   · Ventolin  (90 Base) MCG/ACT Inhalation Aerosol Solution; INHALE 2  PUFFS EVERY 6 HOURS AS NEEDED FOR WHEEZING   Rx By: Moraima Pedlar; Dispense: 30 Days ; #:1 X 18 GM Inhaler; Refill: 1; For: Asthma; CONNOR = N; Record   · Albuterol Sulfate (2 5 MG/3ML) 0 083% Inhalation Nebulization Solution; USE 1  UNIT DOSE EVERY 4-6 HOURS AS NEEDED FOR WHEEZING    Rx By: Moraima Kwoklar; Dispense: 0 Days ; #:1 X 3 ML Plas Cont (125 Plas Conts); Refill: 5; For: Asthma; CONNOR = N; Sent To: [address]  Pulmonary embolism    · Follow-up visit in 1 month Evaluation and Treatment  Follow-up  Status: Hold For -  Scheduling  Requested for: 22TLT2607   Ordered; For: Pulmonary embolism; Ordered By: Moraima Marshall Performed:  Due: 74USJ5366  Screening for colon cancer    · COLONOSCOPY; Status:Temporary Deferral - Pt refuses;    6/30/2019   Perform:[facility]; OCT:63ENA5065; Last Updated Joselin Bender; 6/30/2017 11:36:11 AM;Ordered; For:Screening for colon cancer; Ordered By:Neil Dan;  Screening for depression    · *VB-Depression Screening; Status:Complete - Retrospective By Protocol Authorization;    Done: 38WJV8414 11:34AM   Performed: In Office; [phone]; Last Updated Joselin Bender; 6/30/2017 11:36:11 AM;Ordered; For:Screening for depression; Ordered By:Neil Dan;    Discussion/Summary  Discussion Summary:   Patient will follow up with her infectious disease doctor pulmonary doctor  She will finish the antibiotic on July 16  Refilled her albuterol nebulizer treatments  Follow-up here in one month or when necessary  Medication SE Review and Pt Understands Tx: Possible side effects of new medications were reviewed with the patient/guardian today  The treatment plan was reviewed with the patient/guardian  The patient/guardian understands and agrees with the treatment plan      Chief Complaint  Chief Complaint Free Text Note Form: PATI      History of Present Illness  TCM Communication [facility]: The patient is being contacted for follow-up after hospitalization and Patients boyfriend Britney Dean stopped at office to schedule [facility] appt for for this new patient for 6/30/17 at 11:15 am  She has no pending appts scheduled with our office at this time  Hospital orders for weekly BMP and CK while on antibiotic  Patient had a CTA PE study, US RUQ, TTE performed on this admission  She was hospitalized at [facility] ED and Date of Admission was 6/4/17 which she was then transferred to [facility] 6/05/17 and Discharged on 6/26/17  Diagnosis: Hematoma of neck; Asthma; Pneumonia; Pulmonary embolism, bilateral; Non-ST elevation myocardial infarction, type 2; Acute kidney injury; Elevated liver enzymes; Morbid obesity; Hypokalemia; Acute blood loss anemia; Severe sepsis; Nausea and vomiting; Bedside I & D; OR I&D of neck abscess procedures performed  She was discharged to home, with home health services, VNA services  Medications were not reviewed today  She scheduled a follow up appointment  Symptoms: dizziness  per boyfriend Andrzej Gonzales   Communication performed and completed by Rachel Costa   HPI: Patient here today for PATI  Patient was admitted a couple times over the past month for a pulmonary embolism  Patient had a thrombectomy, and discharge on Pradaxa  Patient developed a right neck hematoma, which later became infected with MRSA  Patient became septic, causing acute kidney injury  Patient miraculously improved   Now on IV antibiotics until 7/16/2017  Patient's breathing is stable  Denies any chest pain or shortness of breath   Pulmonary Embolism (Brief): The patient is being seen for follow-up of a hospitalization for pulmonary embolism  The patient is currently asymptomatic  No associated symptoms are reported  Current treatment includes Eliquis  By report, there is good compliance with treatment, good tolerance of treatment and good symptom control  Active Problems    1  Arthritis (716 90) (M19 90)   2  Asthma (493 90) (J45 909)   3  Curvature of spine (737 9) (M43 9)   4  Disorder of heart (429 9) (I51 9)   5  Heart murmur (785 2) (R01 1)   6  Hernia, inguinal, right (550 90) (K40 90)   7  High cholesterol (272 0) (E78 00)   8  Hypothyroidism (244 9) (E03 9)   9  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)   10  Lumbar herniated disc (722 10) (M51 26)   11  Myalgia (729 1) (M79 1)   12  Patellar tendonitis (726 64) (M76 50)   13  Poor flexibility of tendon (727 81) (M67 80)   14  Primary osteoarthritis of right knee (715 16) (M17 11)   15  Spider vein, symptomatic (448 1) (I78 1)   16  Stomach disorder (537 9) (K31 9)   17  Thyroid disease (246 9) (E07 9)   18  Umbilical hernia (194 4) (K42 9)   19  Vitamin D deficiency (268 9) (E55 9)    Past Medical History    1  History of obesity (V13 89) (Z86 39)    Surgical History    1  History of Blood Vessel Repair   2  History of Cystoscopy With Biopsy   3  History of Exploratory Laparotomy   4  History of Hand Excision Of Tendon Cyst   5  History of Hysterectomy   6  History of Incisional Hernia Repair With Implantation Of Mesh   7  History of Knee Arthroscopy (Therapeutic)   8  History of Shoulder Surgery    Family History  Mother    1  Family history of Arthritis pain (716 90) (M19 90)  Child    2  Family history of Colon cancer, ascending (153 6) (C18 2)  Brother    3  Family history of Colon cancer, ascending (153 6) (C18 2)  Aunt    4  Family history of Diabetes mellitus (250 00) (E11 9)  Unknown    5  Family history of diabetes mellitus (V18 0) (Z83 3)   6  Family history of lung disease (V19 8) (Z83 6)  Family History Reviewed: The family history was reviewed and updated today  Social History    · Always uses seat belt   ·    · Former smoker (I45 10) (D65 632)   · No alcohol use   · No drug use  Social History Reviewed: The social history was reviewed and updated today  The social history was reviewed and is unchanged  Current Meds   1  Albuterol Sulfate (2 5 MG/3ML) 0 083% Inhalation Nebulization Solution; USE 1 UNIT   DOSE EVERY 4-6 HOURS AS NEEDED FOR WHEEZING ; Therapy: 84HWN9355 to (Last Rx:02Otw5587)  Requested for: 72FEA3096 Ordered   2  Claritin 10 MG Oral Tablet; TAKE 1 TABLET DAILY AS NEEDED; Therapy: 97BQW7309 to (VXPOFBBZ:18JVJ1000)  Requested for: 14BXR6880; Last   CV:39OUO7643 Ordered   3  Diclofenac-Misoprostol 75-0 2 MG Oral Tablet Delayed Release; TAKE 1 TABLET Every   twelve hours PRN hand pain; Therapy: 24Hkr8027 to (Evaluate:61Tdb7608)  Requested for: 62Afe5006; Last   Rx:83Cxn6303 Ordered   4  Eliquis 5 MG Oral Tablet; Take 1 tablet twice daily Recorded   5  Flovent  MCG/ACT Inhalation Aerosol; INHALE 2 PUFFS BY MOUTH TWICE   DAILY  RINSE MOUTH AFTER USE; Therapy: 19TQU5546 to (Georg Ormond)  Requested for: 55QLQ3319; Last   ZT:20ZBA6000 Ordered   6  Fluticasone Propionate 50 MCG/ACT Nasal Suspension; USE 1 TO 2 SPRAYS IN EACH   NOSTRIL ONCE DAILY; Therapy: 39KPK2836 to (Last Rx:52Apz6421)  Requested for: 01Phe6435 Ordered   7  Omeprazole 20 MG Oral Capsule Delayed Release; take 1 capsule by mouth once daily; Therapy: 85GIX6203 to (Georg Ormond)  Requested for: 51ICQ7356; Last   RB:21BUW5676 Ordered   8  Omeprazole 20 MG Oral Tablet Delayed Release; Take 1 tablet daily  Requested for:   83Zxo5758; Last Rx:98Rgh4453 Ordered   9  Pepcid AC 10 MG Oral Tablet; TAKE 1 TABLET DAILY AS DIRECTED; Therapy: (Recorded:30Jun2017) to Recorded   10  Proventil  (90 Base) MCG/ACT Inhalation Aerosol Solution; 2 PUFFS Q 4 HRS; Therapy: 42QUH4850 to (Last Rx:83Bta2085)  Requested for: 47GZB6843 Ordered   11  Synvisc One 48 MG/6ML Intra-articular Solution Prefilled Syringe; INJECT 48  UNIT    Intra-articular; To Be Done: 65CTL5056; Status: HOLD FOR - Administration Ordered  Medication List Reviewed: The medication list was reviewed and updated today  Allergies    1  Amoxicillin-Pot Clavulanate TABS   2  Penicillin G Sodium SOLR   3  Penicillins    Vitals  Signs   Recorded: 36GIE0083 18:55JE   Systolic: 058  Diastolic: 68  Height: 5 ft 4 in  Weight: 289 lb 8 oz  BMI Calculated: 49 69  BSA Calculated: 2 29    Physical Exam    Constitutional   General appearance: No acute distress, well appearing and well nourished  Pulmonary   Respiratory effort: No increased work of breathing or signs of respiratory distress  Auscultation of lungs: Clear to auscultation  Cardiovascular   Auscultation of heart: Normal rate and rhythm, normal S1 and S2, without murmurs  Examination of extremities for edema and/or varicosities: Normal     Skin PICC line shows no sign of infection  Neck incision on the right side healing well     Psychiatric   Orientation to person, place, and time: Normal     Mood and affect: Normal          Results/Data  *VB-Depression Screening 59TRH4294 11:34AM Shreya Finney     Test Name Result Flag Reference   Depression Scale Result      Depression Screen - Negative For Symptoms       Future Appointments    Date/Time Provider Specialty Site   07/28/2017 03:40 PM Kylee Guzmán, [address]  Nephrology [facility] NEPHROLOGY [facility]   08/16/2017 01:20 PM Amandeep Colon DO Pulmonary Medicine [address] PULMONARY [facility]   07/13/2017 12:30 PM Al Hinds MD Infectious Disease [facility] INFECTIOUS DISEASE     Signatures   Electronically signed by : Lashon Odell, ; Jun 27 2017  1:32PM EST                       (Author)    Electronically signed by : Nohemy Majano DO; Jun 30 2017 12:02PM EST                       (Author)

## 2018-01-14 VITALS
BODY MASS INDEX: 48.32 KG/M2 | DIASTOLIC BLOOD PRESSURE: 86 MMHG | WEIGHT: 283 LBS | OXYGEN SATURATION: 96 % | SYSTOLIC BLOOD PRESSURE: 134 MMHG | HEART RATE: 89 BPM | HEIGHT: 64 IN

## 2018-01-15 NOTE — RESULT NOTES
Verified Results  (1) APTT 59ILM8423 09:42AM Soraya Blanton Order Number: [id number]    TW Order Number: [id number] Order Number: [id number]     Test Name Result Flag Reference   PARTIAL THROMBOPLASTIN TIME 28 seconds  24-35   Therapeutic Heparin Range =  60-90 seconds     (1) BASIC METABOLIC PROFILE 68VCM5310 09:42AM Soraya Blanton Order Number: [id number]      National Kidney Disease Education Program recommendations are as follows:  GFR calculation is accurate only with a steady state creatinine  Chronic Kidney disease less than 60 ml/min/1 73 sq  meters  Kidney failure less than 15 ml/min/1 73 sq  meters  Test Name Result Flag Reference   GLUCOSE,RANDM 90 mg/dL     If the patient is fasting, the ADA then defines impaired fasting glucose as > 100 mg/dL and diabetes as > or equal to 123 mg/dL  SODIUM 142 mmol/L  136-145   POTASSIUM 3 9 mmol/L  3 5-5 3   CHLORIDE 106 mmol/L  100-108   CARBON DIOXIDE 29 mmol/L  21-32   ANION GAP (CALC) 7 mmol/L  4-13   BLOOD UREA NITROGEN 13 mg/dL  5-25   CREATININE 1 06 mg/dL  0 60-1 30   Standardized to IDMS reference method   CALCIUM 8 7 mg/dL  8 3-10 1   eGFR Non-African American 54 7 ml/min/1 73sq m       (1) CBC/PLT/DIFF 32SRL9489 09:42AM Adryan Tan    Order Number: [id number]    TW Order Number: [id number]     Test Name Result Flag Reference   WBC COUNT 6 31 Thousand/uL  4 31-10 16   RBC COUNT 4 36 Million/uL  3 81-5 12   HEMOGLOBIN 13 1 g/dL  11 5-15 4   HEMATOCRIT 41 6 %  34 8-46  1   MCV 95 fL  82-98   MCH 30 0 pg  26 8-34 3   MCHC 31 5 g/dL  31 4-37 4   RDW 13 3 %  11 6-15 1   MPV 9 9 fL  8 9-12 7   PLATELET COUNT 270 Thousands/uL  149-390   NEUTROPHILS RELATIVE PERCENT 52 %  43-75   LYMPHOCYTES RELATIVE PERCENT 30 %  14-44   MONOCYTES RELATIVE PERCENT 12 %  4-12   EOSINOPHILS RELATIVE PERCENT 5 %  0-6   BASOPHILS RELATIVE PERCENT 1 %  0-1   NEUTROPHILS ABSOLUTE COUNT 3 28 Thousands/µL  1 85-7 62   LYMPHOCYTES ABSOLUTE COUNT 1 90 Thousands/µL  0 60-4 47   MONOCYTES ABSOLUTE COUNT 0 75 Thousand/µL  0 17-1 22   EOSINOPHILS ABSOLUTE COUNT 0 31 Thousand/µL  0 00-0 61   BASOPHILS ABSOLUTE COUNT 0 07 Thousands/µL  0 00-0 10     (1) PT WITH INR 23Mar2016 09:42AM Martin Mejia    Order Number: [id number]     Order Number: [id number] Order Number: [id number]     Test Name Result Flag Reference   INR 1 02  0 86-1 16   PT 13 1 seconds  11 8-14 1     (1) URINALYSIS (will reflex a microscopy if leukocytes, occult blood, protein or nitrites are not within normal limits) 81BJA8868 09:42AM Oneil Hernandez Order Number: [id number]     Test Name Result Flag Reference   COLOR Yellow     CLARITY Cloudy     SPECIFIC GRAVITY UA 1 015  1 003-1 030   PH UA 6 0  4 5-8 0   LEUKOCYTE ESTERASE UA Large A Negative   NITRITE UA Negative  Negative   PROTEIN UA Negative mg/dl  Negative   GLUCOSE UA Negative mg/dl  Negative   KETONES UA Negative mg/dl  Negative   UROBILINOGEN UA 0 2 E U /dl  0 2, 1 0 E U /dl   BILIRUBIN UA Negative  Negative   BLOOD UA Negative  Negative, Trace-Intact   BACTERIA Moderate /hpf A None Seen, Occasional   EPITHELIAL CELLS Moderate /hpf A None Seen, Occasional   RBC UA 1-2 /hpf A None Seen   WBC UA 10-20 /hpf A None Seen

## 2018-01-15 NOTE — PROCEDURES
Procedures by Janice Heard RN at 6/22/2017 11:23 AM      Author:  Janice Heard RN Service:  (none) Author Type:  Registered Nurse    Filed:  6/22/2017 11:25 AM Date of Service:  6/22/2017 11:23 AM Status:  Signed    :  Janice Heard RN (Registered Nurse)        Procedure Orders:       1  Insert PICC line [id number] ordered by Keven Faith MD at 06/22/17 1014                  Insert PICC  line  Date/Time: 6/22/2017 11:23 AM  Performed by: [email or web address]  by: Kavita Augustin     Patient location:  Bedside  Other Assisting Provider:  Yes (comment) ([address] [place] [address])    Consent:     Consent obtained:  Written    Consent given by:  Patient    Procedural risks discussed: consent obtained by physician  [place] protocol:     Procedure explained and questions answered to patient or proxy's satisfaction: yes      Relevant documents present and verified: yes      Test results available and properly labeled: yes       Imaging studies available: yes      Required blood products, implants, devices, and special equipment available: yes      Site/side marked: yes      Immediately prior to procedure, a time out was called: yes      Patient identity confirmed:  Verbally with patient  Pre-procedure details:     Hand hygiene: Hand hygiene performed prior to insertion      Sterile barrier technique: All elements of maximal sterile technique followed      Skin preparation:  ChloraPrep    Skin preparation agent: Skin preparation agent completely dried prior to procedure    Indications:     PICC line indications: long term antibiotics    Anesthesia (see MAR for exact dosages):      Anesthesia method:  Local infiltration    Local anesthetic:  Lidocaine 1% w/o epi (2ml)  Procedure details:     Location:  Cephalic    Vessel type: vein      Laterality:  Left    Approach: percutaneous technique used      Patient position:  Flat    Procedural supplies:  Double lumen    Catheter size:  5 Fr Landmarks identified: yes      Ultrasound guidance: yes      Sterile ultrasound techniques: Sterile gel and sterile probe covers were used      Number of attempts:  1    Successful placement: yes      Vessel of catheter tip end:  Sherlock 3CG confirmed    Total catheter length (cm):  49    Catheter out on skin (cm):  1    Max flow rate:  999    Arm circumference:  38  Post-procedure details:     Post-procedure:  Dressing applied and securement device placed    Assessment:  Blood return through all ports and free fluid flow    Post-procedure complications: none      Patient tolerance of procedure:   Tolerated well, no immediate complications                     Received for:Provider  EPIC   Jun 22 2017 11:26AM [facility] Standard Time

## 2018-01-15 NOTE — PROCEDURES
Procedures by Evgeny Wang MD at  5/26/2017  2:09 AM      Author:  Evgeny Wang MD Service:  Critical Care/ICU Author Type:  Physician    Filed:  5/26/2017  2:13 AM Date of Service:  5/26/2017  2:09 AM Status:  Attested    :  Evgeny Wang MD (Physician)  Cosigner:  Natalie Graham DO at 5/26/2017  6:04 PM      Procedure Orders:       1  LACERATION REPAIR [[id number]] ordered by Evgeny Wang MD at 05/26/17 0209                 Post-procedure Diagnoses:       1  Acute saddle pulmonary embolism [I26 92]              Attestation signed by Natalie Graham DO at 5/26/2017  6:04 PM           I was present and supervised resident performing the procedure  Laceration Repair  Date/Time: 5/26/2017 2:09 AM  Performed by: Emiliana Carson  Authorized by: Emiliana Carson     Patient location:  Bedside  Consent:     Consent obtained:  Verbal    Consent given by:  Patient    Risks discussed:  Infection, need for additional repair and pain    Alternatives discussed:  No treatment  Universal protocol:     Procedure explained and questions answered to patient or proxy's satisfaction: yes      Required blood products, implants, devices, and special equipment available: yes      Site/side marked: yes       Immediately prior to procedure, a time out was called: yes      Patient identity confirmed:  Verbally with patient  Anesthesia (see MAR for exact dosages): Anesthesia method:  Local infiltration    Local anesthetic:  Lidocaine 1% w/o epi  Laceration details:     Location:  Neck    Neck location:  R anterior    Length (cm) of Repair:  0 2    Depth (mm):  5  Repair type:     Repair type:  Simple  Pre-procedure details:     Preparation:  Patient was prepped and draped in usual sterile fashion  Skin repair:     Repair method:  Sutures    Suture size:  2-0    Wound skin closure material used: silk      Suture technique:  Simple interrupted    Number of sutures: 2  Approximation:     Approximation:  Close    Approximation difficulty:  Simple  Post-procedure details:     Dressing:  Sterile dressing    Complication (if applicable):  Continued bleeding  Comments:      Patient with bleeding at the site of R IJ sheath removed by IR earlier today used for tPA administration  Patient on heparin due to massive PEs  IR held pressure for over an hour without improvement  Two 2-0 silk sutures placed with hemostasis  significantly improved but mild oozing continued  Histacryl placed and lido with 2% epinephrine given with continued improvement  Sterile gauze with pressure bandage                       Received for:Provider  EPIC   May 26 2017  6:05PM Josué Ambrose Standard Time

## 2018-01-15 NOTE — RESULT NOTES
Verified Results  (1) TSH [id number] 10:06AM Roverto Hilliard Order Number: [id number]     Test Name Result Flag Reference   TSH 7 990 uIU/mL H 0 358-3 740   Patients undergoing fluorescein dye angiography may retain small amounts of fluorescein in the body for 48-72 hours post procedure  Samples containing fluorescein can produce falsely depressed TSH values  If the patient had this procedure,a specimen should be resubmitted post fluorescein clearance  The recommended reference ranges for TSH during pregnancy are as follows:  First trimester 0 1 to 2 5 uIU/mL  Second trimester  0 2 to 3 0 uIU/mL  Third trimester 0 3 to 3 0 uIU/m       Plan  Hypothyroidism    · From  Levothyroxine Sodium 25 MCG Oral Tablet TAKE 1 TABLET BY MOUTH  ONCE DAILY To Levothyroxine Sodium 50 MCG Oral Tablet TAKE 1 TABLET BY MOUTH  ONCE DAILY   · (1) TSH; Status:Active;  Requested HQD:[id number];

## 2018-01-16 NOTE — RESULT NOTES
Verified Results  VAS LOWER LIMB VENOUS DUPLEX STUDY, COMPLETE BILATERAL [id number] 09:06AM Saleem Landry Order Number: [id number]    - Patient Instructions: To schedule this appointment, please contact Central Scheduling at [phone]  Test Name Result Flag Reference   VAS LOWER LIMB VENOUS DUPLEX STUDY, COMPLETE BILATERAL (Report)     [facility] REPORT   CLINICAL:   Indications: Follow-up left leg DVT  Patient had a PE  She is taking Eliquis   and has an IVC filter  She still has bilateral leg pain  Risk Factors: The patient has history of obesity, DVT and previous smoking   (quit >10yrs ago)  The patient current BMI is 49 77, Weight is 281 lb and Height   is 63 in  Clinical:Left Lower Limb   There is complaint of pain  Right Lower Limb   There is complaint of pain  FINDINGS:      Segment   Right      Left             Impression    Impression               CFV     Normal (Patent) Normal (Patent)             PostTibial          E3  Occlusive Segmental (Chronic)             CONCLUSION:      Impression:   RIGHT LOWER LIMB:   No evidence of acute or chronic deep vein thrombosis  No evidence of superficial thrombophlebitis noted  Doppler evaluation shows a normal response to augmentation maneuvers  Popliteal, posterior tibial and anterior tibial arterial Doppler waveforms are   triphasic  LEFT LOWER LIMB:   Non-occlusive sub-acute vs chronic deep vein thrombosis seen in the posterior   tibial veins in the proximal calf  Compression not performed on distal femoral   vein due to patient pain  Patency proven with Doppler  No evidence of superficial thrombophlebitis noted  Doppler evaluation shows a normal response to augmentation maneuvers  Popliteal, posterior tibial and anterior tibial arterial Doppler waveforms are   triphasic        Tech note: Technically difficult and limited study secondary to patient's body   habitus, and poor tolerance to compression/augmentation maneuvers        SIGNATURE:   Electronically Signed by: Hipolito Saba on 2017-09-12 49:56:60 PM

## 2018-01-17 NOTE — RESULT NOTES
Verified Results  (1) CBC/PLT/DIFF [id number] 09:12AM Toby Polk Order Number: [id number]     Test Name Result Flag Reference   WBC COUNT 4 52 Thousand/uL  4 31-10 16   RBC COUNT 3 96 Million/uL  3 81-5 12   HEMOGLOBIN 11 6 g/dL  11 5-15 4   HEMATOCRIT 37 7 %  34 8-46  1   MCV 95 fL  82-98   MCH 29 3 pg  26 8-34 3   MCHC 30 8 g/dL L 31 4-37 4   RDW 14 6 %  11 6-15 1   MPV 10 1 fL  8 9-12 7   PLATELET COUNT 904 Thousands/uL H 149-390   nRBC AUTOMATED 0 /100 WBCs     NEUTROPHILS RELATIVE PERCENT 33 % L 43-75   LYMPHOCYTES RELATIVE PERCENT 48 % H 14-44   MONOCYTES RELATIVE PERCENT 13 % H 4-12   EOSINOPHILS RELATIVE PERCENT 4 %  0-6   BASOPHILS RELATIVE PERCENT 2 % H 0-1   NEUTROPHILS ABSOLUTE COUNT 1 49 Thousands/? ??L L 1 85-7 62   LYMPHOCYTES ABSOLUTE COUNT 2 20 Thousands/? ??L  0 60-4 47   MONOCYTES ABSOLUTE COUNT 0 57 Thousand/? ??L  0 17-1 22   EOSINOPHILS ABSOLUTE COUNT 0 16 Thousand/? ??L  0 00-0 61   BASOPHILS ABSOLUTE COUNT 0 10 Thousands/? ??L  0 00-0 10     (1) BASIC METABOLIC PROFILE [id number] 09:12AM Toby Polk Order Number: [id number]     Test Name Result Flag Reference   SODIUM 141 mmol/L  136-145   POTASSIUM 4 0 mmol/L  3 5-5 3   CHLORIDE 109 mmol/L H 100-108   CARBON DIOXIDE 24 mmol/L  21-32   ANION GAP (CALC) 8 mmol/L  4-13   BLOOD UREA NITROGEN 14 mg/dL  5-25   CREATININE 1 31 mg/dL H 0 60-1 30   Standardized to IDMS reference method   CALCIUM 9 0 mg/dL  8 3-10 1   eGFR 47 ml/min/1 73sq m     National Kidney Disease Education Program recommendations are as follows:  GFR calculation is accurate only with a steady state creatinine  Chronic Kidney disease less than 60 ml/min/1 73 sq  meters  Kidney failure less than 15 ml/min/1 73 sq  meters     GLUCOSE FASTING 93 mg/dL  65-99

## 2018-01-22 VITALS
WEIGHT: 281 LBS | BODY MASS INDEX: 47.97 KG/M2 | SYSTOLIC BLOOD PRESSURE: 130 MMHG | HEIGHT: 64 IN | DIASTOLIC BLOOD PRESSURE: 78 MMHG

## 2018-01-22 VITALS
WEIGHT: 291 LBS | HEIGHT: 60 IN | BODY MASS INDEX: 57.13 KG/M2 | SYSTOLIC BLOOD PRESSURE: 124 MMHG | DIASTOLIC BLOOD PRESSURE: 82 MMHG

## 2018-01-22 VITALS
SYSTOLIC BLOOD PRESSURE: 130 MMHG | WEIGHT: 282 LBS | BODY MASS INDEX: 48.14 KG/M2 | DIASTOLIC BLOOD PRESSURE: 68 MMHG | HEIGHT: 64 IN

## 2018-05-07 ENCOUNTER — OFFICE VISIT (OUTPATIENT)
Dept: FAMILY MEDICINE CLINIC | Facility: CLINIC | Age: 53
End: 2018-05-07
Payer: COMMERCIAL

## 2018-05-07 VITALS
BODY MASS INDEX: 51.74 KG/M2 | DIASTOLIC BLOOD PRESSURE: 76 MMHG | WEIGHT: 292 LBS | HEIGHT: 63 IN | SYSTOLIC BLOOD PRESSURE: 120 MMHG

## 2018-05-07 DIAGNOSIS — I26.99 PULMONARY EMBOLISM, BILATERAL (HCC): Primary | ICD-10-CM

## 2018-05-07 DIAGNOSIS — E03.8 OTHER SPECIFIED HYPOTHYROIDISM: ICD-10-CM

## 2018-05-07 DIAGNOSIS — I21.A1 TYPE 2 MYOCARDIAL INFARCTION WITHOUT ST ELEVATION (HCC): ICD-10-CM

## 2018-05-07 DIAGNOSIS — J45.20 MILD INTERMITTENT ASTHMA WITHOUT COMPLICATION: Chronic | ICD-10-CM

## 2018-05-07 PROCEDURE — 99214 OFFICE O/P EST MOD 30 MIN: CPT | Performed by: FAMILY MEDICINE

## 2018-05-07 RX ORDER — LEVOTHYROXINE SODIUM 0.05 MG/1
1 TABLET ORAL DAILY
COMMUNITY
Start: 2017-10-18 | End: 2018-05-08 | Stop reason: SDUPTHER

## 2018-05-07 NOTE — PROGRESS NOTES
Assessment/Plan:   stressed to patient that she needs to remain on her Eliquis  I refilled it  We will refer to vascular and Pulmonary  Blood work ordered  Follow-up in 2 months or p r n  No problem-specific Assessment & Plan notes found for this encounter  Diagnoses and all orders for this visit:    Pulmonary embolism, bilateral ([address] )  -     Ambulatory referral to Vascular Surgery; Future  -     apixaban (ELIQUIS) 5 mg; Take 1 tablet (5 mg total) by mouth 2 (two) times a day  -     Ambulatory referral to Pulmonology; Future  -     CBC and differential    Mild intermittent asthma without complication  -     Ambulatory referral to Pulmonology; Future    Type 2 myocardial infarction without ST elevation ([facility]CC)  -     CBC and differential  -     Comprehensive metabolic panel  -     Lipid Panel with Direct LDL reflex    Other specified hypothyroidism  -     TSH, 3rd generation with T4 reflex    Other orders  -     levothyroxine 50 mcg tablet; Take 1 tablet by mouth daily          Subjective:      Patient ID: Prashanth Jimenez is a 48 y o  female  Patient here today for follow-up  Patient has been getting intermittent sharp pains in her right upper abdomen  Happens when the weather changes  No nausea vomiting or diarrhea  Patient states has not taking her Eliquis in the last couple months  Was "sick a taking pills"  Never followed up with vascular surgery for her vena cava filter  Has not seen pulmonology in a while  She denies any increased shortness of breath  Asthma   She complains of shortness of breath ( occasional)  There is no wheezing  This is a chronic problem  The problem occurs intermittently  The problem has been unchanged  Pertinent negatives include no fever, malaise/fatigue or nasal congestion  Her symptoms are aggravated by nothing  Her symptoms are alleviated by nothing  Her symptoms are not alleviated by beta-agonist and steroid inhaler   Her past medical history is significant for asthma  The following portions of the patient's history were reviewed and updated as appropriate:   She  has a past medical history of Abdominal pain, lower; Acute renal failure ([address] ); Allergic rhinitis; Ankle pain, right; Arthritis; Asthma; Bilateral chronic knee pain; Bladder pain; Blood clot in vein; Bursitis; Cardiac disorder; Cardiac murmur; Chest tightness or pressure; Curvature of spine; GERD (gastroesophageal reflux disease); Hernia, hiatal; Hyperlipidemia; Inguinal hernia; Jaw closure abnormality; Lumbar herniated disc; Morbid obesity ([address] ); Obesity; Osteoarthritis of right knee; Patellar tendinitis; Poor flexibility of tendon; Pulmonary embolism ([address] ); Sepsis ([facility] [address] ); Severe sepsis due to methicillin resistant Staphylococcus aureus (MRSA) with acute organ dysfunction ([address] ); Spider vein, symptomatic; Status post arthroscopy of right knee; Stomach disorder; Tear of medial meniscus of right knee; Thyroid disorder; and Umbilical hernia  She   Patient Active Problem List    Diagnosis Date Noted    PICC line infection 07/12/2017    History of methicillin resistant staphylococcus aureus (MRSA) 07/10/2017    Chronic anticoagulation 07/10/2017    Cough 07/10/2017    Elevated liver enzymes 06/05/2017    Morbid obesity ([address] ) 06/05/2017    Hematoma of neck 05/26/2017    Secondary pulmonary hypertension 05/26/2017    Pulmonary embolism, bilateral ([address] ) 05/25/2017    GERD (gastroesophageal reflux disease) 05/25/2017    Syncope 05/25/2017    Hypotension 05/25/2017    Type 2 myocardial infarction without ST elevation ([facility] [address] ) 05/25/2017    Acute kidney injury ([address] ) 05/25/2017    NSTEMI (non-ST elevated myocardial infarction) ([facility] [address] ) 05/25/2017    Mild intermittent asthma without complication 99/44/9662    Pneumonia 11/26/2016    Leukocytosis 11/26/2016    Other specified hypothyroidism 05/28/2015     She  has a past surgical history that includes Hysterectomy (2009);  Open anterior shoulder reconstruction (Left); Hand surgery (Right); Foot surgery (Left); Colonoscopy; pr knee scope,med/lat menisectomy (Right, 4/11/2016); Exploratory laparotomy; Peripherally inserted central catheter insertion; Incision and drainage anterior neck (Right, 6/8/2017); Other surgical history; Cystoscopy (01/1994); Incisional hernia repair; and Shoulder surgery  Her family history includes Arthritis in her mother; Colon cancer in her brother and child; Diabetes in her family and other; Lung disease in her family  She  reports that she quit smoking about 22 years ago  She has never used smokeless tobacco  She reports that she drinks alcohol  She reports that she does not use drugs  Current Outpatient Prescriptions   Medication Sig Dispense Refill    levothyroxine 50 mcg tablet Take 1 tablet by mouth daily      albuterol (2 5 mg/3 mL) 0 083 % nebulizer solution Take 2 5 mg by nebulization as needed for wheezing   albuterol (PROVENTIL HFA,VENTOLIN HFA) 90 mcg/act inhaler Inhale 2 puffs as needed for wheezing   apixaban (ELIQUIS) 5 mg Take 1 tablet (5 mg total) by mouth 2 (two) times a day 60 tablet 5    famotidine (PEPCID) 20 mg tablet Take 1 tablet by mouth daily 30 tablet 0    fluticasone (FLOVENT HFA) 220 mcg/act inhaler Inhale 2 puffs as needed   loratadine (CLARITIN) 10 mg tablet Take 10 mg by mouth daily   prochlorperazine (COMPAZINE) 5 mg tablet Take 1 tablet by mouth every 6 (six) hours as needed for nausea or vomiting 30 tablet 0     No current facility-administered medications for this visit  Current Outpatient Prescriptions on File Prior to Visit   Medication Sig    albuterol (2 5 mg/3 mL) 0 083 % nebulizer solution Take 2 5 mg by nebulization as needed for wheezing   albuterol (PROVENTIL HFA,VENTOLIN HFA) 90 mcg/act inhaler Inhale 2 puffs as needed for wheezing      famotidine (PEPCID) 20 mg tablet Take 1 tablet by mouth daily    fluticasone (FLOVENT HFA) 220 mcg/act inhaler Inhale 2 puffs as needed   loratadine (CLARITIN) 10 mg tablet Take 10 mg by mouth daily   prochlorperazine (COMPAZINE) 5 mg tablet Take 1 tablet by mouth every 6 (six) hours as needed for nausea or vomiting    [DISCONTINUED] apixaban (ELIQUIS) 5 mg Take 1 tablet by mouth 2 (two) times a day     No current facility-administered medications on file prior to visit  She is allergic to penicillin g and penicillins       Review of Systems   Constitutional: Negative for fever and malaise/fatigue  Respiratory: Positive for shortness of breath ( occasional)  Negative for wheezing  Cardiovascular: Negative  Gastrointestinal: Negative  Genitourinary: Negative  Objective:      /76   Ht 5' 3" (1 6 m)   Wt 132 kg (292 lb)   BMI 51 73 kg/m²          Physical Exam   Constitutional: She is oriented to person, place, and time  She appears well-developed and well-nourished  No distress  Cardiovascular: Normal rate and regular rhythm  No murmur heard  Pulmonary/Chest: Effort normal and breath sounds normal  No respiratory distress  She has no wheezes  She has no rales  Musculoskeletal: She exhibits no edema  Neurological: She is alert and oriented to person, place, and time  Skin: She is not diaphoretic  Psychiatric: She has a normal mood and affect  Her behavior is normal  Judgment and thought content normal    Vitals reviewed

## 2018-05-08 ENCOUNTER — OFFICE VISIT (OUTPATIENT)
Dept: PULMONOLOGY | Facility: HOSPITAL | Age: 53
End: 2018-05-08
Payer: COMMERCIAL

## 2018-05-08 ENCOUNTER — TRANSCRIBE ORDERS (OUTPATIENT)
Dept: LAB | Facility: MEDICAL CENTER | Age: 53
End: 2018-05-08

## 2018-05-08 ENCOUNTER — APPOINTMENT (OUTPATIENT)
Dept: LAB | Facility: MEDICAL CENTER | Age: 53
End: 2018-05-08
Payer: COMMERCIAL

## 2018-05-08 VITALS
OXYGEN SATURATION: 95 % | BODY MASS INDEX: 51.91 KG/M2 | HEIGHT: 63 IN | HEART RATE: 87 BPM | SYSTOLIC BLOOD PRESSURE: 120 MMHG | WEIGHT: 293 LBS | DIASTOLIC BLOOD PRESSURE: 82 MMHG

## 2018-05-08 DIAGNOSIS — J45.20 MILD INTERMITTENT ASTHMA WITHOUT COMPLICATION: Chronic | ICD-10-CM

## 2018-05-08 DIAGNOSIS — I26.99 PULMONARY EMBOLISM, BILATERAL (HCC): ICD-10-CM

## 2018-05-08 DIAGNOSIS — E03.8 OTHER SPECIFIED HYPOTHYROIDISM: Primary | ICD-10-CM

## 2018-05-08 LAB
ALBUMIN SERPL BCP-MCNC: 3.1 G/DL (ref 3.5–5)
ALP SERPL-CCNC: 84 U/L (ref 46–116)
ALT SERPL W P-5'-P-CCNC: 16 U/L (ref 12–78)
ANION GAP SERPL CALCULATED.3IONS-SCNC: 7 MMOL/L (ref 4–13)
AST SERPL W P-5'-P-CCNC: 16 U/L (ref 5–45)
BASOPHILS # BLD AUTO: 0.09 THOUSANDS/ΜL (ref 0–0.1)
BASOPHILS NFR BLD AUTO: 2 % (ref 0–1)
BILIRUB SERPL-MCNC: 0.4 MG/DL (ref 0.2–1)
BUN SERPL-MCNC: 19 MG/DL (ref 5–25)
CALCIUM SERPL-MCNC: 9.1 MG/DL (ref 8.3–10.1)
CHLORIDE SERPL-SCNC: 106 MMOL/L (ref 100–108)
CHOLEST SERPL-MCNC: 202 MG/DL (ref 50–200)
CO2 SERPL-SCNC: 26 MMOL/L (ref 21–32)
CREAT SERPL-MCNC: 1.27 MG/DL (ref 0.6–1.3)
EOSINOPHIL # BLD AUTO: 0.32 THOUSAND/ΜL (ref 0–0.61)
EOSINOPHIL NFR BLD AUTO: 6 % (ref 0–6)
ERYTHROCYTE [DISTWIDTH] IN BLOOD BY AUTOMATED COUNT: 13.2 % (ref 11.6–15.1)
GFR SERPL CREATININE-BSD FRML MDRD: 48 ML/MIN/1.73SQ M
GLUCOSE P FAST SERPL-MCNC: 86 MG/DL (ref 65–99)
HCT VFR BLD AUTO: 42.7 % (ref 34.8–46.1)
HDLC SERPL-MCNC: 43 MG/DL (ref 40–60)
HGB BLD-MCNC: 14 G/DL (ref 11.5–15.4)
LDLC SERPL CALC-MCNC: 133 MG/DL (ref 0–100)
LYMPHOCYTES # BLD AUTO: 2 THOUSANDS/ΜL (ref 0.6–4.47)
LYMPHOCYTES NFR BLD AUTO: 37 % (ref 14–44)
MCH RBC QN AUTO: 30.7 PG (ref 26.8–34.3)
MCHC RBC AUTO-ENTMCNC: 32.8 G/DL (ref 31.4–37.4)
MCV RBC AUTO: 94 FL (ref 82–98)
MONOCYTES # BLD AUTO: 0.63 THOUSAND/ΜL (ref 0.17–1.22)
MONOCYTES NFR BLD AUTO: 12 % (ref 4–12)
NEUTROPHILS # BLD AUTO: 2.37 THOUSANDS/ΜL (ref 1.85–7.62)
NEUTS SEG NFR BLD AUTO: 43 % (ref 43–75)
NRBC BLD AUTO-RTO: 0 /100 WBCS
PLATELET # BLD AUTO: 355 THOUSANDS/UL (ref 149–390)
PMV BLD AUTO: 11.1 FL (ref 8.9–12.7)
POTASSIUM SERPL-SCNC: 3.9 MMOL/L (ref 3.5–5.3)
PROT SERPL-MCNC: 7.2 G/DL (ref 6.4–8.2)
RBC # BLD AUTO: 4.56 MILLION/UL (ref 3.81–5.12)
SODIUM SERPL-SCNC: 139 MMOL/L (ref 136–145)
T4 FREE SERPL-MCNC: 0.81 NG/DL (ref 0.76–1.46)
TRIGL SERPL-MCNC: 132 MG/DL
TSH SERPL DL<=0.05 MIU/L-ACNC: 14.7 UIU/ML (ref 0.36–3.74)
WBC # BLD AUTO: 5.42 THOUSAND/UL (ref 4.31–10.16)

## 2018-05-08 PROCEDURE — 99213 OFFICE O/P EST LOW 20 MIN: CPT | Performed by: PHYSICIAN ASSISTANT

## 2018-05-08 PROCEDURE — 80053 COMPREHEN METABOLIC PANEL: CPT | Performed by: FAMILY MEDICINE

## 2018-05-08 PROCEDURE — 36415 COLL VENOUS BLD VENIPUNCTURE: CPT | Performed by: FAMILY MEDICINE

## 2018-05-08 PROCEDURE — 84443 ASSAY THYROID STIM HORMONE: CPT | Performed by: FAMILY MEDICINE

## 2018-05-08 PROCEDURE — 80061 LIPID PANEL: CPT | Performed by: FAMILY MEDICINE

## 2018-05-08 PROCEDURE — 84439 ASSAY OF FREE THYROXINE: CPT | Performed by: FAMILY MEDICINE

## 2018-05-08 PROCEDURE — 85025 COMPLETE CBC W/AUTO DIFF WBC: CPT | Performed by: FAMILY MEDICINE

## 2018-05-08 RX ORDER — ALBUTEROL SULFATE 90 UG/1
2 AEROSOL, METERED RESPIRATORY (INHALATION) EVERY 4 HOURS PRN
Qty: 1 INHALER | Refills: 3 | Status: SHIPPED | OUTPATIENT
Start: 2018-05-08 | End: 2020-03-13

## 2018-05-08 RX ORDER — LEVOTHYROXINE SODIUM 0.07 MG/1
75 TABLET ORAL DAILY
Qty: 30 TABLET | Refills: 3 | Status: SHIPPED | OUTPATIENT
Start: 2018-05-08 | End: 2018-07-11 | Stop reason: SDUPTHER

## 2018-05-08 NOTE — ASSESSMENT & PLAN NOTE
· Her asthma appears stable at this time  She has not been using her medications appropriately  She has been using both her Flovent and her Xopenex HFA on an as needed basis  She does not even use these weekly  · I have asked her to stop the Flovent altogether and to continue using Xopenex HFA on an as-needed basis  New prescription for the Xopenex was sent to her pharmacy

## 2018-05-08 NOTE — PROGRESS NOTES
Pulmonary Follow Up Note   Deujan Mcgee 48 y o  female MRN: [id number]  5/8/2018      Assessment:    Mild intermittent asthma without complication  · Her asthma appears stable at this time  She has not been using her medications appropriately  She has been using both her Flovent and her Xopenex HFA on an as needed basis  She does not even use these weekly  · I have asked her to stop the Flovent altogether and to continue using Xopenex HFA on an as-needed basis  New prescription for the Xopenex was sent to her pharmacy  Pulmonary embolism, bilateral ([facility] [address] )  · She will continue on Eliquis as per her family physician  She should be getting her prescription this evening  · She was again educated on the importance of taking this medication as prescribed and the risks of stopping this medication which could include death  · Due to the fact that she had a large saddle embolus 1 year ago that required lytic therapy it is recommended that she continue with lifelong anticoagulation  · She will follow up with vascular surgery to determine whether or not IVC filter will be removed  Plan:    Diagnoses and all orders for this visit:    Pulmonary embolism, bilateral ([address] )  -     Ambulatory referral to Pulmonology    Mild intermittent asthma without complication  -     Ambulatory referral to Pulmonology  -     albuterol (PROVENTIL HFA,VENTOLIN HFA) 90 mcg/act inhaler; Inhale 2 puffs every 4 (four) hours as needed for wheezing      Her  was updated today as he was present for appointment  She will follow-up in 6 months or sooner if problems arise  All questions were answered  She was instructed to call the office with any further questions or changes in her breathing  History of Present Illness   HPI:  Dejuan Mcgee is a 48 y o  female who presents for follow-up of her underlying asthma  She was last seen in our office in August 2017 for follow-up after being hospitalized for large saddle PE in May of 2017  At that time she was admitted to the emergency room after a syncopal event in found to have large saddle PE  She was transferred to [facility] and underwent catheter directed lysis of the clot  IVC filter was placed at that time  Unfortunately, she developed hematoma on the right side of her neck that became infected with MRSA  She was subsequently readmitted for sepsis and IV antibiotics  Her PICC line ended up also becoming infected which then had to be removed  She has had no previous history of blood clots but does have a brother who has thick blood    Since that visit she did have a lower extremity Doppler that showed left lower extremity DVT  She has been maintained on Eliquis by her PCP  In regards to her asthma, she has been given both Flovent 220 dose as well as Xopenex HFA  Overall, she has been doing very well from a pulmonary standpoint over the past year  She has not been taking medications as prescribed  She has been using both the Flovent and Xopenex HFA on an as-needed basis only  She may take 1 week if that  She is able to walk 1-2 miles without any significant dyspnea on exertion  She does develops some mild dyspnea walking hills  Unfortunately, she stopped taking her Eliquis about 2 and half months ago as she did not want to take so many pills    She was seen by her PCP yesterday and this was addressed  She should have her medications this evening to start taking again  Currently, she denies cough, sputum production, hemoptysis or bronchospasm  She denies resting shortness of breath, chest pain or pleurisy  She does have occasional lower extremity edema which is chronic  She has been afebrile  They are waiting vascular surgery evaluation to determine whether not IVC filter can be retrieved  Review of Systems   Constitutional: Negative  HENT: Negative  Eyes: Negative  Wears glasses   Respiratory: Negative      Cardiovascular: Positive for leg swelling  Negative for chest pain and palpitations  Gastrointestinal: Negative  Endocrine: Negative  Genitourinary: Negative  Musculoskeletal: Negative  Skin: Negative  Allergic/Immunologic: Negative  Neurological: Negative  Psychiatric/Behavioral: Negative          Historical Information   Past Medical History:   Diagnosis Date    Abdominal pain, lower     [id number] RESOLVED    Acute renal failure ([facility] [address] )     [id number] RESOLVED    Allergic rhinitis     [id number] RESOLVED    Ankle pain, right     [id number] RESOLVED    Arthritis     [id number] RESOLVED  [id number] RESOLVED    Asthma     Bilateral chronic knee pain     [id number]    Bladder pain     [id number] RESOLVED    Blood clot in vein     Bursitis     [id number] RESOLVED    Cardiac disorder     [id number]  RESOLVED    Cardiac murmur     10HID1704 RESOLVED    Chest tightness or pressure     36SBK0711 RESOLVED    Curvature of spine     [id number] RESOLVED    GERD (gastroesophageal reflux disease)     Hernia, hiatal     68CTU7723 RESOLVED    Hyperlipidemia     10RIW6922 RESOLVED    Inguinal hernia     RIGHT   [id number] RESOLVED    Jaw closure abnormality     02OMF2699 RESOLVED    Lumbar herniated disc     47VAE5094 RESOLVED    Morbid obesity ([facility] [address] )     80PYK1644    Obesity     Osteoarthritis of right knee     57CRN6807 RESOLVED    Patellar tendinitis     75WYR2170    Poor flexibility of tendon     20PXX0274    Pulmonary embolism ([facility] [address] )     [id number] RESOLVED    Sepsis ([facility] [address] )     [id number] RESOLVED    Severe sepsis due to methicillin resistant Staphylococcus aureus (MRSA) with acute organ dysfunction ([facility] [address] )     [id number] RESOLVED    Spider vein, symptomatic     37LLG3999 RESOLVED    Status post arthroscopy of right knee     05LGO8959 RESOLVED    Stomach disorder     [id number] RESOLVED    Tear of medial meniscus of right knee     SUBSEQUENT ENCOUNTER  RESOLVED [id number]    Thyroid disorder     63URQ9734    Umbilical hernia     [id number] RESOLVED     Past Surgical History:   Procedure Laterality Date    COLONOSCOPY      CYSTOSCOPY  01/1994    WITH BIOPSY      EXPLORATORY LAPAROTOMY      FOOT SURGERY Left     HAND SURGERY Right     cyst    HYSTERECTOMY  2009    INCISION AND DRAINAGE ANTERIOR NECK Right 6/8/2017    Procedure: INCISION AND DRAINAGE  (I&D) NECK;  Surgeon: Abel Blount MD;  Location: [facility];  Service:    Dannie Mangle HERNIA REPAIR      WITH IMPLANTATION OF MESH  13 MAY 2014  LAST ASSESSED    OPEN ANTERIOR SHOULDER RECONSTRUCTION Left     OTHER SURGICAL HISTORY      BLOOD VESSEL REPAIR   13 MAY 2014 LAST ASSESSED    PERIPHERALLY INSERTED CENTRAL CATHETER INSERTION      ME KNEE SCOPE,MED/LAT MENISECTOMY Right 4/11/2016    Procedure: KNEE ARTHROSCOPY WITH MEDIAL MENISCECTOMY ;  Surgeon: Lilliam Jose MD;  Location: [facility];  Service: Orthopedics    SHOULDER SURGERY       Family History   Problem Relation Age of Onset    Arthritis Mother     Colon cancer Brother     Colon cancer Child     Diabetes Other      MELLITUS    Diabetes Family      MELLITUS (UNKNOWN F M )    Lung disease Family      (UNKNOWN F M )       History   Smoking Status    Former Smoker    Quit date: 11/25/1995   Smokeless Tobacco    Never Used     Comment: quit 20 years ago         Meds/Allergies     Current Outpatient Prescriptions:     albuterol (2 5 mg/3 mL) 0 083 % nebulizer solution, Take 2 5 mg by nebulization as needed for wheezing , Disp: , Rfl:     albuterol (PROVENTIL HFA,VENTOLIN HFA) 90 mcg/act inhaler, Inhale 2 puffs every 4 (four) hours as needed for wheezing, Disp: 1 Inhaler, Rfl: 3    apixaban (ELIQUIS) 5 mg, Take 1 tablet (5 mg total) by mouth 2 (two) times a day, Disp: 60 tablet, Rfl: 5    famotidine (PEPCID) 20 mg tablet, Take 1 tablet by mouth daily, Disp: 30 tablet, Rfl: 0    levothyroxine 50 mcg tablet, Take 1 tablet by mouth daily, Disp: , Rfl:     loratadine (CLARITIN) 10 mg tablet, Take 10 mg by mouth daily  , Disp: , Rfl:   Allergies   Allergen Reactions    Penicillin G Nausea Only    Penicillins GI Intolerance     Nausea and vomiting       Vitals: Blood pressure 120/82, pulse 87, height 5' 3" (1 6 m), weight 134 kg (295 lb), SpO2 95 %  Body mass index is 52 26 kg/m²  Oxygen Therapy  SpO2: 95 %    Physical Exam  Physical Exam   Constitutional: She is oriented to person, place, and time  She appears well-developed and well-nourished  No distress  HENT:   Head: Normocephalic and atraumatic  Nose: Nose normal    Mouth/Throat: Oropharynx is clear and moist  No oropharyngeal exudate  Poor dentition, currently without dentures in place   Eyes: Conjunctivae and EOM are normal  Pupils are equal, round, and reactive to light  No scleral icterus  Wears glasses   Neck: Normal range of motion  Neck supple  No JVD present  No tracheal deviation present  No thyromegaly present  Cardiovascular: Normal rate, regular rhythm and normal heart sounds  Exam reveals no gallop and no friction rub  No murmur heard  Pulmonary/Chest: Effort normal and breath sounds normal  No stridor  No respiratory distress  She has no wheezes  She has no rales  She exhibits no tenderness  Abdominal: Soft  Bowel sounds are normal    Obese   Musculoskeletal: Normal range of motion  She exhibits edema  Trace lower extremity edema bilaterally   Lymphadenopathy:     She has no cervical adenopathy  Neurological: She is alert and oriented to person, place, and time  Skin: Skin is warm and dry  No rash noted  She is not diaphoretic  Psychiatric: She has a normal mood and affect  Her behavior is normal  Judgment and thought content normal    Vitals reviewed  Labs: I have personally reviewed pertinent lab results  , ABG: No results found for: PHART, AFM4EZP, PO2ART, ASQ2EOA, V9EQXNKN, BEART, SOURCE, BNP: No results found for: BNP, CBC: No results found for: WBC, HGB, HCT, MCV, PLT, ADJUSTEDWBC, MCH, MCHC, RDW, MPV, NRBC, CMP: No results found for: NA, K, CL, CO2, ANIONGAP, BUN, CREATININE, GLUCOSE, CALCIUM, AST, ALT, ALKPHOS, PROT, ALBUMIN, BILITOT, EGFR, PT/INR: No results found for: PT, INR, Troponin: No results found for: TROPONINI  Lab Results   Component Value Date    WBC 6 21 10/17/2017    HGB 13 5 10/17/2017    HCT 42 2 10/17/2017    MCV 91 10/17/2017     10/17/2017     Lab Results   Component Value Date    GLUCOSE 82 07/12/2017    CALCIUM 9 0 10/17/2017     10/17/2017    K 4 2 10/17/2017    CO2 28 10/17/2017     10/17/2017    BUN 15 10/17/2017    CREATININE 1 30 10/17/2017     Lab Results   Component Value Date    IGE 51 4 12/02/2016     Lab Results   Component Value Date    ALT 21 10/17/2017    AST 20 10/17/2017    ALKPHOS 92 10/17/2017    BILITOT 0 35 10/17/2017       Imaging and other studies: I have personally reviewed pertinent films in PACS     CTC/A/P 7/9/17  CHEST     LUNGS:  Scattered upper lobe opacities slightly different compared to the prior study but concerning for multifocal infiltrates and/or septic emboli        PLEURA:  Unremarkable      HEART/GREAT VESSELS:  Small pericardial effusion      MEDIASTINUM AND CELIA:  Unremarkable      CHEST WALL AND LOWER NECK:  A PICC line enters on the left      ABDOMEN     LIVER/BILIARY TREE:  Unremarkable      GALLBLADDER:  No calcified gallstones  No pericholecystic inflammatory change      SPLEEN:  Unremarkable      PANCREAS:  Unremarkable      ADRENAL GLANDS:  Unremarkable      KIDNEYS/URETERS:  Unremarkable  No hydronephrosis      STOMACH AND BOWEL:  Hiatal hernia  No bowel obstruction  Diverticulosis coli  No acute diverticulitis      APPENDIX:  No findings to suggest appendicitis      ABDOMINOPELVIC CAVITY:  No ascites or free intraperitoneal air   No lymphadenopathy      VESSELS:  IVC filter      PELVIS     REPRODUCTIVE ORGANS:  Unremarkable for patient's age      URINARY BLADDER:  Unremarkable      ABDOMINAL WALL/INGUINAL REGIONS:  Small fat-containing umbilical hernia  2 5 x 2 9 cm nodular density in the right anterior pelvic wall      OSSEOUS STRUCTURES:  No acute fracture or destructive osseous lesion      IMPRESSION:        1  Scattered nodular parenchymal densities concerning for multifocal infiltrates and/or septic emboli  2  Small pericardial effusion  3   Hiatal hernia  4   Round nodular density in the subcutaneous fat anterior pelvic wall on the right, possibly related to subcutaneous injection, infection not excluded      LE Dopplers Sept 2017 -showed nonocclusive subacute versus chronic DVT in the posterior tibial vein in the proximal calf  Right lower extremity was without acute or chronic DVT

## 2018-05-08 NOTE — PATIENT INSTRUCTIONS
1  Stop using Flovent altogether  2  Xopenex HFA as needed  3   Eliquis daily as prescribed by her PCP

## 2018-05-08 NOTE — ASSESSMENT & PLAN NOTE
· She will continue on Eliquis as per her family physician  She should be getting her prescription this evening  · She was again educated on the importance of taking this medication as prescribed and the risks of stopping this medication which could include death  · Due to the fact that she had a large saddle embolus 1 year ago that required lytic therapy it is recommended that she continue with lifelong anticoagulation  · She will follow up with vascular surgery to determine whether or not IVC filter will be removed

## 2018-07-10 ENCOUNTER — APPOINTMENT (OUTPATIENT)
Dept: LAB | Facility: MEDICAL CENTER | Age: 53
End: 2018-07-10
Payer: COMMERCIAL

## 2018-07-10 ENCOUNTER — OFFICE VISIT (OUTPATIENT)
Dept: FAMILY MEDICINE CLINIC | Facility: CLINIC | Age: 53
End: 2018-07-10
Payer: COMMERCIAL

## 2018-07-10 VITALS
DIASTOLIC BLOOD PRESSURE: 62 MMHG | BODY MASS INDEX: 51.91 KG/M2 | WEIGHT: 293 LBS | HEIGHT: 63 IN | SYSTOLIC BLOOD PRESSURE: 134 MMHG

## 2018-07-10 DIAGNOSIS — I26.99 PULMONARY EMBOLISM, BILATERAL (HCC): ICD-10-CM

## 2018-07-10 DIAGNOSIS — E03.8 OTHER SPECIFIED HYPOTHYROIDISM: ICD-10-CM

## 2018-07-10 DIAGNOSIS — Z12.11 SCREEN FOR COLON CANCER: Primary | ICD-10-CM

## 2018-07-10 DIAGNOSIS — K21.9 GASTROESOPHAGEAL REFLUX DISEASE WITHOUT ESOPHAGITIS: ICD-10-CM

## 2018-07-10 DIAGNOSIS — J30.1 SEASONAL ALLERGIC RHINITIS DUE TO POLLEN: ICD-10-CM

## 2018-07-10 LAB
T4 FREE SERPL-MCNC: 0.8 NG/DL (ref 0.76–1.46)
TSH SERPL DL<=0.05 MIU/L-ACNC: 8.91 UIU/ML (ref 0.36–3.74)

## 2018-07-10 PROCEDURE — 36415 COLL VENOUS BLD VENIPUNCTURE: CPT

## 2018-07-10 PROCEDURE — 99214 OFFICE O/P EST MOD 30 MIN: CPT | Performed by: FAMILY MEDICINE

## 2018-07-10 PROCEDURE — 84439 ASSAY OF FREE THYROXINE: CPT

## 2018-07-10 PROCEDURE — 84443 ASSAY THYROID STIM HORMONE: CPT

## 2018-07-10 RX ORDER — MONTELUKAST SODIUM 10 MG/1
10 TABLET ORAL
Qty: 30 TABLET | Refills: 5 | Status: SHIPPED | OUTPATIENT
Start: 2018-07-10 | End: 2018-12-18 | Stop reason: ALTCHOICE

## 2018-07-10 NOTE — PROGRESS NOTES
Assessment/Plan: For her allergies, Rx for Singulair  She will get Allegra over-the-counter  She will get her TSH done today  We will refer her to vascular for her IVC filter  We will see her back in 3 months or p r n  No problem-specific Assessment & Plan notes found for this encounter  Diagnoses and all orders for this visit:    Screen for colon cancer  -     Occult Bloood,Fecal Immunochemical; Future    Other specified hypothyroidism    Gastroesophageal reflux disease without esophagitis    Seasonal allergic rhinitis due to pollen  -     montelukast (SINGULAIR) 10 mg tablet; Take 1 tablet (10 mg total) by mouth daily at bedtime    Pulmonary embolism, bilateral ([address] )  -     Ambulatory referral to Vascular Surgery; Future          Subjective:      Patient ID: Martine Inman is a 48 y o  female  Patient here today for follow-up  Patient denies any chest pain or shortness of breath  Patient states her allergies have been acting up  The Claritin does not seem to help  Flonase gives her bloody noses at times  She states she is consistently taking her Eliquis  Unfortunately vascular never got back to her for an appointment to evaluate her IVC filter  Patient needs to have a TSH done today to re-evaluate her thyroid  Heartburn   She complains of heartburn  The problem occurs rarely  Pertinent negatives include no weight loss  Risk factors include obesity  She has tried a histamine-2 antagonist for the symptoms  The treatment provided significant relief  Thyroid Problem   Presents for follow-up visit  Patient reports no palpitations or weight loss  The symptoms have been stable  The following portions of the patient's history were reviewed and updated as appropriate:   She  has a past medical history of Abdominal pain, lower; Acute renal failure ([address] ); Allergic rhinitis; Ankle pain, right; Arthritis;  Asthma; Bilateral chronic knee pain; Bladder pain; Blood clot in vein; Bursitis; Cardiac disorder; Cardiac murmur; Chest tightness or pressure; Curvature of spine; GERD (gastroesophageal reflux disease); Hernia, hiatal; Hyperlipidemia; Inguinal hernia; Jaw closure abnormality; Lumbar herniated disc; Morbid obesity ([facility] [address] ); Obesity; Osteoarthritis of right knee; Patellar tendinitis; Poor flexibility of tendon; Pulmonary embolism ([facility] [address] ); Sepsis ([facility] [address] ); Severe sepsis due to methicillin resistant Staphylococcus aureus (MRSA) with acute organ dysfunction ([facility] [address] ); Spider vein, symptomatic; Status post arthroscopy of right knee; Stomach disorder; Tear of medial meniscus of right knee; Thyroid disorder; and Umbilical hernia  She   Patient Active Problem List    Diagnosis Date Noted    Seasonal allergic rhinitis due to pollen 07/10/2018    PICC line infection 07/12/2017    History of methicillin resistant staphylococcus aureus (MRSA) 07/10/2017    Chronic anticoagulation 07/10/2017    Cough 07/10/2017    Elevated liver enzymes 06/05/2017    Morbid obesity ([facility] [address] ) 06/05/2017    Hematoma of neck 05/26/2017    Secondary pulmonary hypertension 05/26/2017    Pulmonary embolism, bilateral ([facility] [address] ) 05/25/2017    Gastroesophageal reflux disease without esophagitis 05/25/2017    Syncope 05/25/2017    Hypotension 05/25/2017    Type 2 myocardial infarction without ST elevation ([facility] [address] ) 05/25/2017    [facility] 05/25/2017    NSTEMI (non-ST elevated myocardial infarction) ([facility] ) 05/25/2017    Mild intermittent asthma without complication 93/02/5052    Pneumonia 11/26/2016    Leukocytosis 11/26/2016    Other specified hypothyroidism 05/28/2015     She  has a past surgical history that includes Hysterectomy (2009); Open anterior shoulder reconstruction (Left); Hand surgery (Right); Foot surgery (Left); Colonoscopy; pr knee scope,med/lat menisectomy (Right, 4/11/2016); Exploratory laparotomy; Peripherally inserted central catheter insertion; Incision and drainage anterior neck (Right, 6/8/2017);  Other surgical history; Cystoscopy (01/1994); Incisional hernia repair; and Shoulder surgery  Her family history includes Arthritis in her mother; Colon cancer in her brother and child; Diabetes in her family and other; Lung disease in her family  She  reports that she quit smoking about 22 years ago  She has never used smokeless tobacco  She reports that she drinks alcohol  She reports that she does not use drugs  Current Outpatient Prescriptions   Medication Sig Dispense Refill    albuterol (2 5 mg/3 mL) 0 083 % nebulizer solution Take 2 5 mg by nebulization as needed for wheezing   albuterol (PROVENTIL HFA,VENTOLIN HFA) 90 mcg/act inhaler Inhale 2 puffs every 4 (four) hours as needed for wheezing 1 Inhaler 3    apixaban (ELIQUIS) 5 mg Take 1 tablet (5 mg total) by mouth 2 (two) times a day 60 tablet 5    famotidine (PEPCID) 20 mg tablet Take 1 tablet by mouth daily 30 tablet 0    levothyroxine 75 mcg tablet Take 1 tablet (75 mcg total) by mouth daily 30 tablet 3    loratadine (CLARITIN) 10 mg tablet Take 10 mg by mouth daily   montelukast (SINGULAIR) 10 mg tablet Take 1 tablet (10 mg total) by mouth daily at bedtime 30 tablet 5     No current facility-administered medications for this visit  Current Outpatient Prescriptions on File Prior to Visit   Medication Sig    albuterol (2 5 mg/3 mL) 0 083 % nebulizer solution Take 2 5 mg by nebulization as needed for wheezing   albuterol (PROVENTIL HFA,VENTOLIN HFA) 90 mcg/act inhaler Inhale 2 puffs every 4 (four) hours as needed for wheezing    apixaban (ELIQUIS) 5 mg Take 1 tablet (5 mg total) by mouth 2 (two) times a day    famotidine (PEPCID) 20 mg tablet Take 1 tablet by mouth daily    levothyroxine 75 mcg tablet Take 1 tablet (75 mcg total) by mouth daily    loratadine (CLARITIN) 10 mg tablet Take 10 mg by mouth daily  No current facility-administered medications on file prior to visit        She is allergic to penicillin g and penicillins       Review of Systems   Constitutional: Negative  Negative for weight loss  HENT: Positive for congestion  Respiratory: Negative  Cardiovascular: Negative  Negative for palpitations  Gastrointestinal: Positive for heartburn  As per HPI   Endocrine:        As per HPI   Genitourinary: Negative  Objective:      /62   Ht 5' 3" (1 6 m)   Wt (!) 136 kg (300 lb 9 6 oz)   BMI 53 25 kg/m²          Physical Exam   Constitutional: She is oriented to person, place, and time  She appears well-developed and well-nourished  No distress  HENT:   Nose: Mucosal edema and rhinorrhea present  Cardiovascular: Normal rate, regular rhythm and normal heart sounds  Exam reveals no gallop and no friction rub  No murmur heard  Pulmonary/Chest: Effort normal and breath sounds normal  No respiratory distress  She has no wheezes  She has no rales  Musculoskeletal: She exhibits no edema  Neurological: She is alert and oriented to person, place, and time  Skin: She is not diaphoretic  Psychiatric: She has a normal mood and affect  Her behavior is normal  Judgment and thought content normal    Vitals reviewed

## 2018-07-11 DIAGNOSIS — E03.8 OTHER SPECIFIED HYPOTHYROIDISM: ICD-10-CM

## 2018-07-11 RX ORDER — LEVOTHYROXINE SODIUM 88 UG/1
88 TABLET ORAL DAILY
Qty: 30 TABLET | Refills: 3 | Status: SHIPPED | OUTPATIENT
Start: 2018-07-11 | End: 2018-10-12 | Stop reason: SDUPTHER

## 2018-07-17 ENCOUNTER — LAB (OUTPATIENT)
Dept: LAB | Facility: MEDICAL CENTER | Age: 53
End: 2018-07-17
Payer: COMMERCIAL

## 2018-07-17 ENCOUNTER — TRANSCRIBE ORDERS (OUTPATIENT)
Dept: LAB | Facility: MEDICAL CENTER | Age: 53
End: 2018-07-17

## 2018-07-17 DIAGNOSIS — Z12.11 SCREEN FOR COLON CANCER: ICD-10-CM

## 2018-07-17 LAB — HEMOCCULT STL QL IA: NEGATIVE

## 2018-07-17 PROCEDURE — G0328 FECAL BLOOD SCRN IMMUNOASSAY: HCPCS

## 2018-07-27 ENCOUNTER — TELEPHONE (OUTPATIENT)
Dept: FAMILY MEDICINE CLINIC | Facility: CLINIC | Age: 53
End: 2018-07-27

## 2018-10-11 DIAGNOSIS — K21.9 GASTROESOPHAGEAL REFLUX DISEASE WITHOUT ESOPHAGITIS: Primary | ICD-10-CM

## 2018-10-11 RX ORDER — FAMOTIDINE 20 MG/1
TABLET, FILM COATED ORAL
Qty: 30 TABLET | Refills: 5 | Status: SHIPPED | OUTPATIENT
Start: 2018-10-11 | End: 2020-03-13

## 2018-10-12 ENCOUNTER — OFFICE VISIT (OUTPATIENT)
Dept: FAMILY MEDICINE CLINIC | Facility: CLINIC | Age: 53
End: 2018-10-12
Payer: COMMERCIAL

## 2018-10-12 VITALS
HEIGHT: 63 IN | BODY MASS INDEX: 51.91 KG/M2 | DIASTOLIC BLOOD PRESSURE: 78 MMHG | WEIGHT: 293 LBS | SYSTOLIC BLOOD PRESSURE: 120 MMHG

## 2018-10-12 DIAGNOSIS — E03.8 OTHER SPECIFIED HYPOTHYROIDISM: Primary | ICD-10-CM

## 2018-10-12 DIAGNOSIS — Z23 ENCOUNTER FOR IMMUNIZATION: ICD-10-CM

## 2018-10-12 DIAGNOSIS — K21.9 GASTROESOPHAGEAL REFLUX DISEASE WITHOUT ESOPHAGITIS: ICD-10-CM

## 2018-10-12 DIAGNOSIS — I26.99 PULMONARY EMBOLISM, BILATERAL (HCC): ICD-10-CM

## 2018-10-12 DIAGNOSIS — J45.20 MILD INTERMITTENT ASTHMA WITHOUT COMPLICATION: ICD-10-CM

## 2018-10-12 DIAGNOSIS — Z95.828 PRESENCE OF IVC FILTER: ICD-10-CM

## 2018-10-12 PROCEDURE — 99213 OFFICE O/P EST LOW 20 MIN: CPT | Performed by: FAMILY MEDICINE

## 2018-10-12 PROCEDURE — 90682 RIV4 VACC RECOMBINANT DNA IM: CPT

## 2018-10-12 PROCEDURE — 90471 IMMUNIZATION ADMIN: CPT

## 2018-10-12 RX ORDER — LEVOTHYROXINE SODIUM 88 UG/1
88 TABLET ORAL DAILY
Qty: 30 TABLET | Refills: 5 | Status: SHIPPED | OUTPATIENT
Start: 2018-10-12 | End: 2018-11-09

## 2018-10-12 RX ORDER — LEVOTHYROXINE SODIUM 88 UG/1
88 TABLET ORAL DAILY
Qty: 30 TABLET | Refills: 5 | Status: SHIPPED | OUTPATIENT
Start: 2018-10-12 | End: 2018-10-12 | Stop reason: SDUPTHER

## 2018-10-12 NOTE — PROGRESS NOTES
Assessment/Plan:  I stressed to patient that she needs to take her medications daily, and if she runs out, she needs to call us for refills  Flu shot given today  Will put in another referral to vascular  She will call us if she has not heard from them, or she will call them  She will get a TSH checked before Thanksgiving  We will see her back in the office in 3-4 months or p r n  No problem-specific Assessment & Plan notes found for this encounter  Diagnoses and all orders for this visit:    Other specified hypothyroidism  -     levothyroxine 88 mcg tablet; Take 1 tablet (88 mcg total) by mouth daily    Mild intermittent asthma without complication    Gastroesophageal reflux disease without esophagitis    Presence of IVC filter  -     Ambulatory referral to Vascular Surgery; Future    Encounter for immunization  -     influenza vaccine, [id number], quadrivalent, recombinant, PF, 0 5 mL, for patients 18 yr+ (FLUBLOK)    Pulmonary embolism, bilateral (HCC)  -     apixaban (ELIQUIS) 5 mg; Take 1 tablet (5 mg total) by mouth 2 (two) times a day          Subjective:      Patient ID: No Jurado is a 48 y o  female  Patient here today for follow-up  Patient admits that she is not taking her levothyroxine in over a month  She denies any chest pain or increased shortness of breath  Has not needed her inhaler much  She never followed up with vascular surgery, because they had ever called her again for an appointment  Asthma   She complains of wheezing  This is a chronic problem  The current episode started more than 1 year ago  The problem occurs rarely  The problem has been gradually improving  Associated symptoms include heartburn  Pertinent negatives include no weight loss  Her symptoms are aggravated by pollen  Her symptoms are alleviated by beta-agonist  She reports significant improvement on treatment  Her past medical history is significant for asthma     Heartburn   She complains of heartburn and wheezing  This is a recurrent problem  The problem occurs occasionally  The symptoms are aggravated by certain foods  Pertinent negatives include no weight loss  There are no known risk factors  She has tried a histamine-2 antagonist for the symptoms  The treatment provided significant relief  Thyroid Problem   Presents for follow-up visit  Patient reports no palpitations or weight loss  The symptoms have been stable  The following portions of the patient's history were reviewed and updated as appropriate:   She  has a past medical history of Abdominal pain, lower; Acute renal failure ([address] ); Allergic rhinitis; Ankle pain, right; Arthritis; Asthma; Bilateral chronic knee pain; Bladder pain; Blood clot in vein; Bursitis; Cardiac disorder; Cardiac murmur; Chest tightness or pressure; Curvature of spine; GERD (gastroesophageal reflux disease); Hernia, hiatal; Hyperlipidemia; Inguinal hernia; Jaw closure abnormality; Lumbar herniated disc; Morbid obesity ([address] ); Obesity; Osteoarthritis of right knee; Patellar tendinitis; Poor flexibility of tendon; Pulmonary embolism ([address] ); Sepsis ([address] ); Severe sepsis due to methicillin resistant Staphylococcus aureus (MRSA) with acute organ dysfunction ([address] ); Spider vein, symptomatic; Status post arthroscopy of right knee; Stomach disorder; Tear of medial meniscus of right knee; Thyroid disorder; and Umbilical hernia    She   Patient Active Problem List    Diagnosis Date Noted    Presence of IVC filter 10/12/2018    Seasonal allergic rhinitis due to pollen 07/10/2018    PICC line infection 07/12/2017    History of methicillin resistant staphylococcus aureus (MRSA) 07/10/2017    Chronic anticoagulation 07/10/2017    Cough 07/10/2017    Elevated liver enzymes 06/05/2017    Morbid obesity ([address] ) 06/05/2017    Hematoma of neck 05/26/2017    Secondary pulmonary hypertension 05/26/2017    Pulmonary embolism, bilateral ([address] ) 05/25/2017    Gastroesophageal reflux disease without esophagitis 05/25/2017    Syncope 05/25/2017    Hypotension 05/25/2017    Type 2 myocardial infarction without ST elevation ([facility] ) 05/25/2017    Acute kidney injury ([facility] ) 05/25/2017    NSTEMI (non-ST elevated myocardial infarction) ([facility] ) 05/25/2017    Mild intermittent asthma without complication 94/61/1914    Pneumonia 11/26/2016    Leukocytosis 11/26/2016    Other specified hypothyroidism 05/28/2015     She  has a past surgical history that includes Hysterectomy (2009); Open anterior shoulder reconstruction (Left); Hand surgery (Right); Foot surgery (Left); Colonoscopy; pr knee scope,med/lat menisectomy (Right, 4/11/2016); Exploratory laparotomy; Peripherally inserted central catheter insertion; Incision and drainage anterior neck (Right, 6/8/2017); Other surgical history; Cystoscopy (01/1994); Incisional hernia repair; and Shoulder surgery  Her family history includes Arthritis in her mother; Colon cancer in her brother and child; Diabetes in her family and other; Lung disease in her family  She  reports that she quit smoking about 22 years ago  She has never used smokeless tobacco  She reports that she drinks alcohol  She reports that she does not use drugs  Current Outpatient Prescriptions   Medication Sig Dispense Refill    albuterol (2 5 mg/3 mL) 0 083 % nebulizer solution Take 2 5 mg by nebulization as needed for wheezing   albuterol (PROVENTIL HFA,VENTOLIN HFA) 90 mcg/act inhaler Inhale 2 puffs every 4 (four) hours as needed for wheezing 1 Inhaler 3    apixaban (ELIQUIS) 5 mg Take 1 tablet (5 mg total) by mouth 2 (two) times a day 60 tablet 5    famotidine (PEPCID) 20 mg tablet take 1 tablet by mouth once daily as directed 30 tablet 5    levothyroxine 88 mcg tablet Take 1 tablet (88 mcg total) by mouth daily 30 tablet 5    loratadine (CLARITIN) 10 mg tablet Take 10 mg by mouth daily        montelukast (SINGULAIR) 10 mg tablet Take 1 tablet (10 mg total) by mouth daily at bedtime 30 tablet 5     No current facility-administered medications for this visit  Current Outpatient Prescriptions on File Prior to Visit   Medication Sig    albuterol (2 5 mg/3 mL) 0 083 % nebulizer solution Take 2 5 mg by nebulization as needed for wheezing   albuterol (PROVENTIL HFA,VENTOLIN HFA) 90 mcg/act inhaler Inhale 2 puffs every 4 (four) hours as needed for wheezing    famotidine (PEPCID) 20 mg tablet take 1 tablet by mouth once daily as directed    loratadine (CLARITIN) 10 mg tablet Take 10 mg by mouth daily   montelukast (SINGULAIR) 10 mg tablet Take 1 tablet (10 mg total) by mouth daily at bedtime    [DISCONTINUED] apixaban (ELIQUIS) 5 mg Take 1 tablet (5 mg total) by mouth 2 (two) times a day    [DISCONTINUED] levothyroxine 88 mcg tablet Take 1 tablet (88 mcg total) by mouth daily     No current facility-administered medications on file prior to visit  She is allergic to penicillin g and penicillins       Review of Systems   Constitutional: Negative  Negative for weight loss  Respiratory: Positive for wheezing  Cardiovascular: Negative  Negative for palpitations  Gastrointestinal: Positive for heartburn  Genitourinary: Negative  Objective:      /78   Ht 5' 3" (1 6 m)   Wt (!) 141 kg (311 lb 9 6 oz)   BMI 55 20 kg/m²          Physical Exam   Constitutional: She is oriented to person, place, and time  She appears well-developed and well-nourished  No distress  Cardiovascular: Normal rate, regular rhythm and normal heart sounds  Exam reveals no gallop and no friction rub  No murmur heard  Pulmonary/Chest: Effort normal and breath sounds normal  No respiratory distress  She has no wheezes  She has no rales  Musculoskeletal: She exhibits no edema  Neurological: She is alert and oriented to person, place, and time  Skin: She is not diaphoretic  Psychiatric: She has a normal mood and affect   Her behavior is normal  Judgment and thought content normal  Vitals reviewed

## 2018-11-06 ENCOUNTER — OFFICE VISIT (OUTPATIENT)
Dept: FAMILY MEDICINE CLINIC | Facility: CLINIC | Age: 53
End: 2018-11-06
Payer: COMMERCIAL

## 2018-11-06 VITALS
WEIGHT: 293 LBS | HEIGHT: 63 IN | BODY MASS INDEX: 51.91 KG/M2 | SYSTOLIC BLOOD PRESSURE: 130 MMHG | DIASTOLIC BLOOD PRESSURE: 78 MMHG

## 2018-11-06 DIAGNOSIS — Z13.6 SCREENING FOR CARDIOVASCULAR CONDITION: ICD-10-CM

## 2018-11-06 DIAGNOSIS — J30.1 SEASONAL ALLERGIC RHINITIS DUE TO POLLEN: ICD-10-CM

## 2018-11-06 DIAGNOSIS — E03.8 OTHER SPECIFIED HYPOTHYROIDISM: Primary | ICD-10-CM

## 2018-11-06 PROCEDURE — 99213 OFFICE O/P EST LOW 20 MIN: CPT | Performed by: FAMILY MEDICINE

## 2018-11-06 RX ORDER — FLUTICASONE PROPIONATE 50 MCG
2 SPRAY, SUSPENSION (ML) NASAL DAILY
Qty: 16 G | Refills: 5 | Status: SHIPPED | OUTPATIENT
Start: 2018-11-06 | End: 2019-08-16

## 2018-11-06 NOTE — PROGRESS NOTES
Assessment/Plan:  Patient will follow up with pulmonology tomorrow  Patient has skin tags around her neck, they are small, do not look infected at all  Rx for blood work  We will call her with results  Follow-up here in 4 months or p r n  No problem-specific Assessment & Plan notes found for this encounter  Diagnoses and all orders for this visit:    Other specified hypothyroidism  -     TSH, 3rd generation with Free T4 reflex  -     CBC and differential    Screening for cardiovascular condition  -     Comprehensive metabolic panel  -     Lipid Panel with Direct LDL reflex  -     CBC and differential    Seasonal allergic rhinitis due to pollen  -     fluticasone (FLONASE) 50 mcg/act nasal spray; 2 sprays into each nostril daily          Subjective:      Patient ID: Janet Estevez is a 48 y o  female  Patient here today for follow-up  Patient did have a lesion on her right neck, but it is fallen off  No chest pain or shortness of breath  She is going to see pulmonology tomorrow  She has not had blood work in quite a while  The following portions of the patient's history were reviewed and updated as appropriate:   She  has a past medical history of Abdominal pain, lower; Acute renal failure ([address] ); Allergic rhinitis; Ankle pain, right; Arthritis; Asthma; Bilateral chronic knee pain; Bladder pain; Blood clot in vein; Bursitis; Cardiac disorder; Cardiac murmur; Chest tightness or pressure; Curvature of spine; GERD (gastroesophageal reflux disease); Hernia, hiatal; Hyperlipidemia; Inguinal hernia; Jaw closure abnormality; Lumbar herniated disc; Morbid obesity ([address] ); Obesity; Osteoarthritis of right knee; Patellar tendinitis; Poor flexibility of tendon; Pulmonary embolism ([address] ); Sepsis ([address] ); Severe sepsis due to methicillin resistant Staphylococcus aureus (MRSA) with acute organ dysfunction ([address] );  Spider vein, symptomatic; Status post arthroscopy of right knee; Stomach disorder; Tear of medial meniscus of right knee; Thyroid disorder; and Umbilical hernia  She   Patient Active Problem List    Diagnosis Date Noted    Presence of IVC filter 10/12/2018    Seasonal allergic rhinitis due to pollen 07/10/2018    PICC line infection 07/12/2017    History of methicillin resistant staphylococcus aureus (MRSA) 07/10/2017    Chronic anticoagulation 07/10/2017    Cough 07/10/2017    Elevated liver enzymes 06/05/2017    Morbid obesity ([facility] ) 06/05/2017    Hematoma of neck 05/26/2017    Secondary pulmonary hypertension 05/26/2017    Pulmonary embolism, bilateral ([facility] ) 05/25/2017    Gastroesophageal reflux disease without esophagitis 05/25/2017    Syncope 05/25/2017    Hypotension 05/25/2017    Type 2 myocardial infarction without ST elevation ([facility] ) 05/25/2017    Acute kidney injury ([facility] ) 05/25/2017    NSTEMI (non-ST elevated myocardial infarction) ([facility] ) 05/25/2017    Mild intermittent asthma without complication 87/98/7596    Pneumonia 11/26/2016    Leukocytosis 11/26/2016    Other specified hypothyroidism 05/28/2015     She  has a past surgical history that includes Hysterectomy (2009); Open anterior shoulder reconstruction (Left); Hand surgery (Right); Foot surgery (Left); Colonoscopy; pr knee scope,med/lat menisectomy (Right, 4/11/2016); Exploratory laparotomy; Peripherally inserted central catheter insertion; Incision and drainage anterior neck (Right, 6/8/2017); Other surgical history; Cystoscopy (01/1994); Incisional hernia repair; and Shoulder surgery  Her family history includes Arthritis in her mother; Colon cancer in her brother and child; Diabetes in her family and other; Lung disease in her family  She  reports that she quit smoking about 22 years ago  She has never used smokeless tobacco  She reports that she drinks alcohol  She reports that she does not use drugs    Current Outpatient Prescriptions   Medication Sig Dispense Refill    albuterol (2 5 mg/3 mL) 0 083 % nebulizer solution Take 2 5 mg by nebulization as needed for wheezing   albuterol (PROVENTIL HFA,VENTOLIN HFA) 90 mcg/act inhaler Inhale 2 puffs every 4 (four) hours as needed for wheezing 1 Inhaler 3    apixaban (ELIQUIS) 5 mg Take 1 tablet (5 mg total) by mouth 2 (two) times a day 60 tablet 5    famotidine (PEPCID) 20 mg tablet take 1 tablet by mouth once daily as directed 30 tablet 5    fluticasone (FLONASE) 50 mcg/act nasal spray 2 sprays into each nostril daily 16 g 5    levothyroxine 88 mcg tablet Take 1 tablet (88 mcg total) by mouth daily 30 tablet 5    loratadine (CLARITIN) 10 mg tablet Take 10 mg by mouth daily   montelukast (SINGULAIR) 10 mg tablet Take 1 tablet (10 mg total) by mouth daily at bedtime 30 tablet 5     No current facility-administered medications for this visit  Current Outpatient Prescriptions on File Prior to Visit   Medication Sig    albuterol (2 5 mg/3 mL) 0 083 % nebulizer solution Take 2 5 mg by nebulization as needed for wheezing   albuterol (PROVENTIL HFA,VENTOLIN HFA) 90 mcg/act inhaler Inhale 2 puffs every 4 (four) hours as needed for wheezing    apixaban (ELIQUIS) 5 mg Take 1 tablet (5 mg total) by mouth 2 (two) times a day    famotidine (PEPCID) 20 mg tablet take 1 tablet by mouth once daily as directed    levothyroxine 88 mcg tablet Take 1 tablet (88 mcg total) by mouth daily    loratadine (CLARITIN) 10 mg tablet Take 10 mg by mouth daily   montelukast (SINGULAIR) 10 mg tablet Take 1 tablet (10 mg total) by mouth daily at bedtime     No current facility-administered medications on file prior to visit  She is allergic to penicillin g and penicillins       Review of Systems   Constitutional: Negative  Respiratory: Negative  Cardiovascular: Negative  Gastrointestinal: Negative  Genitourinary: Negative            Objective:      /78   Ht 5' 3" (1 6 m)   Wt (!) 141 kg (311 lb)   BMI 55 09 kg/m²          Physical Exam   Constitutional: She is oriented to person, place, and time  She appears well-developed and well-nourished  No distress  HENT:   Head: Normocephalic and atraumatic  Eyes: Conjunctivae are normal    Cardiovascular: Normal rate, regular rhythm and normal heart sounds  Exam reveals no gallop and no friction rub  No murmur heard  Pulmonary/Chest: Effort normal and breath sounds normal  No respiratory distress  She has no wheezes  She has no rales  Musculoskeletal: She exhibits no edema  Neurological: She is alert and oriented to person, place, and time  Skin: She is not diaphoretic  Small Skin tags around her neck   Vitals reviewed

## 2018-11-07 ENCOUNTER — OFFICE VISIT (OUTPATIENT)
Dept: VASCULAR SURGERY | Facility: HOSPITAL | Age: 53
End: 2018-11-07
Payer: COMMERCIAL

## 2018-11-07 VITALS
HEIGHT: 62 IN | DIASTOLIC BLOOD PRESSURE: 84 MMHG | WEIGHT: 293 LBS | HEART RATE: 78 BPM | RESPIRATION RATE: 18 BRPM | SYSTOLIC BLOOD PRESSURE: 136 MMHG | BODY MASS INDEX: 53.92 KG/M2 | TEMPERATURE: 96.4 F

## 2018-11-07 DIAGNOSIS — N18.30 STAGE 3 CHRONIC KIDNEY DISEASE (HCC): ICD-10-CM

## 2018-11-07 DIAGNOSIS — Z95.828 PRESENCE OF IVC FILTER: Primary | ICD-10-CM

## 2018-11-07 DIAGNOSIS — Z79.01 CHRONIC ANTICOAGULATION: ICD-10-CM

## 2018-11-07 DIAGNOSIS — E66.01 MORBID OBESITY (HCC): ICD-10-CM

## 2018-11-07 DIAGNOSIS — I26.99 PULMONARY EMBOLISM, BILATERAL (HCC): ICD-10-CM

## 2018-11-07 PROCEDURE — 99213 OFFICE O/P EST LOW 20 MIN: CPT | Performed by: NURSE PRACTITIONER

## 2018-11-07 NOTE — ASSESSMENT & PLAN NOTE
Morbid obesity, BMI 56 52 kg/M2  -likely due to calorie intake and inactivity  -encouraged low-fat, low-cholesterol, low-salt diet  -encouraged patient to engage in more frequent activity  -follow up with primary care provider

## 2018-11-07 NOTE — ASSESSMENT & PLAN NOTE
05/25/2017 large saddle pulmonary embolism bilaterally with catheter directed lysis and IVC filter placement  -patient on Eliquis 5 mg b i d   -in reviewing notes patient has had trouble with full compliance with her anticoagulation  -we discussed the need for continued anticoagulation and the importance of taking her meds as ordered  -no recent studies, follows with pulmonology, primary care provider    -on exam she denies any chest pain, shortness of breath, palpitations

## 2018-11-07 NOTE — PROGRESS NOTES
Assessment/Plan:    Pulmonary embolism, bilateral ([address] )  05/25/2017 large saddle pulmonary embolism bilaterally with catheter directed lysis and IVC filter placement  -patient on Eliquis 5 mg b i d   -in reviewing notes patient has had trouble with full compliance with her anticoagulation  -we discussed the need for continued anticoagulation and the importance of taking her meds as ordered  -no recent studies, follows with pulmonology, primary care provider    -on exam she denies any chest pain, shortness of breath, palpitations    Chronic anticoagulation  Large saddle pulmonary embolism with catheter directed tPA 05/25/2017  -IVC filter placed at this time  -patient was started on Pradaxa, changed to Eliquis on 1 of her admissions  -she continues on Eliquis 5 mg b i d   With episodes of noncompliance  -denies any side effects from Eliquis, she is tearful though in having to take long-term lifelong anticoagulation  -emotional support provided  -patient aware of the risk of stopping her anticoagulation and expresses understanding of the need for continued compliance      Presence of IVC filter  ALN optional IVC filter was placed on 05/25/2017 by IR for large bilateral saddle pulmonary embolism  -she is on chronic Eliquis  -she never followed up with vascular surgery for re-evaluation of her IVC filter  -she reports bilateral lower extremity swelling which is well controlled with elevation; is unable to done her compression stockings  -she denies any acute issues to her lower extremities including erythema, warmth, significant swelling, pain, motor decrease    Plan:  -case discussed with Dr Eliel Linn and Dr Val Foreman  -continue on chronic Eliquis therapy for IVC filter patency as well as pulmonary embolism history  -Discussed with interventional Radiology, Dr Val Foreman who advised on CT scan with contrast of abdomen and pelvis, LEV of bilateral lower extremities to assess for chronicity of clot  -BMP ordered for assessment of creatinine prior to CT scan  -patient will likely need an appointment with interventional Radiology to further discuss removal filter  I discussed this with her and she is aware that our office will call her to schedule an appointment with Interventional Radiology once it is determined if the filter can be removed  Morbid obesity (HCC)  Morbid obesity, BMI 56 52 kg/M2  -likely due to calorie intake and inactivity  -encouraged low-fat, low-cholesterol, low-salt diet  -encouraged patient to engage in more frequent activity  -follow up with primary care provider    Stage 3 chronic kidney disease ([facility] )  CKD stage 3  -creatinine 1 27, GFR 48         Diagnoses and all orders for this visit:    Presence of IVC filter  -     Ambulatory referral to Vascular Surgery  -     VAS lower limb venous duplex study, complete bilateral; Future  -     CT abdomen pelvis w contrast; Future  -     Basic metabolic panel; Future    Chronic anticoagulation    Pulmonary embolism, bilateral (HCC)    Morbid obesity (HCC)    Stage 3 chronic kidney disease ([facility] )  -     Basic metabolic panel; Future    Other orders  -     Cancel: VAS ivc/iliac veins duplex; complete study; Future          Subjective:      Patient ID: Liliana Guerrero is a 48 y o  female  Patient is new to our practice referred by Dr Steven Johnston)  She had a LEV done on 9/12/2018  She does not have L leg pain and once in a while leg swelling  She denies tired and heavy legs  She does experience cramping from time to time in her legs  She does not wear compression and does not elevate her legs  She has a history of DVT in left leg and PE  She has an IVC filter in place  She takes Eliquis  ADA  Petr Richey is a 59-year-old morbidly obese female with a past medical history of bilateral saddle pulmonary embolism, pulmonary arterial hypertension, asthma, GERD, bilateral lower extremity swelling    She was seen in the hospital 05/25/2017 and diagnosed with an acute large saddle pulmonary embolism which underwent catheter directed tPA and in AL and optional IVC filter was placed at this time  Patient was discharged on Pradaxa and during the preceding months changed to Eliquis  She has been maintained on Eliquis, though she reports a few months where she did not take the anticoagulation due to some side effects she was experiencing and related to the medication  Her primary care provider and pulmonology reiterated to her the importance of taking the anticoagulation        The following portions of the patient's history were reviewed and updated as appropriate: allergies, current medications, past family history, past medical history, past social history, past surgical history and problem list     Review of Systems   Constitutional: Negative  HENT: Negative  Eyes: Negative  Respiratory: Negative  Cardiovascular: Positive for leg swelling  Gastrointestinal: Negative  Endocrine: Negative  Genitourinary: Negative  Musculoskeletal:        Leg cramping   Skin: Negative  Allergic/Immunologic: Negative  Neurological: Negative  Hematological: Negative  Psychiatric/Behavioral: Negative  ROS reviewed and updated  Objective:      /84 (BP Location: Right arm, Patient Position: Sitting)   Pulse 78   Temp (!) 96 4 °F (35 8 °C)   Resp 18   Ht 5' 2" (1 575 m)   Wt (!) 140 kg (309 lb)   BMI 56 52 kg/m²          Physical Exam   Constitutional: She is oriented to person, place, and time  She appears well-developed and well-nourished  HENT:   Head: Normocephalic and atraumatic  Eyes: Pupils are equal, round, and reactive to light  EOM are normal  No scleral icterus  Neck: Normal range of motion  Cardiovascular: Normal rate, regular rhythm and normal heart sounds  No murmur heard  Pulses:       Radial pulses are 2+ on the right side, and 2+ on the left side          Dorsalis pedis pulses are 2+ on the right side, and 2+ on the left side    +1 left leg swelling, trace right leg swelling    Pulmonary/Chest: Effort normal and breath sounds normal  No respiratory distress  Abdominal: Soft  Bowel sounds are normal    Large protuberant abdomen   Musculoskeletal: Normal range of motion  Neurological: She is alert and oriented to person, place, and time  She has normal strength  Skin: Skin is warm, dry and intact  Psychiatric: She has a normal mood and affect  Her speech is normal and behavior is normal  Judgment and thought content normal  Cognition and memory are normal    Tearful during conversation   Nursing note and vitals reviewed        Vitals:    11/07/18 1307   BP: 136/84   BP Location: Right arm   Patient Position: Sitting   Pulse: 78   Resp: 18   Temp: (!) 96 4 °F (35 8 °C)   Weight: (!) 140 kg (309 lb)   Height: 5' 2" (1 575 m)       Patient Active Problem List   Diagnosis    Mild intermittent asthma without complication    Pneumonia    Leukocytosis    Pulmonary embolism, bilateral (HCC)    Gastroesophageal reflux disease without esophagitis    Syncope    Hypotension    Type 2 myocardial infarction without ST elevation (HCC)    Acute kidney injury ([address] )    NSTEMI (non-ST elevated myocardial infarction) (HCC)    Hematoma of neck    Secondary pulmonary hypertension    Elevated liver enzymes    Morbid obesity (HCC)    History of methicillin resistant staphylococcus aureus (MRSA)    Chronic anticoagulation    Cough    PICC line infection    Other specified hypothyroidism    Seasonal allergic rhinitis due to pollen    Presence of IVC filter    Stage 3 chronic kidney disease (HCC)       Past Surgical History:   Procedure Laterality Date    COLONOSCOPY      CYSTOSCOPY  01/1994    WITH BIOPSY      EXPLORATORY LAPAROTOMY      FOOT SURGERY Left     HAND SURGERY Right     cyst    HYSTERECTOMY  2009    INCISION AND DRAINAGE ANTERIOR NECK Right 6/8/2017    Procedure: INCISION AND DRAINAGE  (I&D) NECK;  Surgeon: Sandra Clay MD;  Location:  MAIN OR;  Service:    [facility]      WITH IMPLANTATION OF MESH  13 MAY 2014  LAST ASSESSED    OPEN ANTERIOR SHOULDER RECONSTRUCTION Left     OTHER SURGICAL HISTORY      BLOOD VESSEL REPAIR   13 MAY 2014 LAST ASSESSED    PERIPHERALLY INSERTED CENTRAL CATHETER INSERTION      MS KNEE SCOPE,MED/LAT MENISECTOMY Right 4/11/2016    Procedure: KNEE ARTHROSCOPY WITH MEDIAL MENISCECTOMY ;  Surgeon: Melecio Thomas MD;  Location: [facility] MAIN OR;  Service: Orthopedics    SHOULDER SURGERY         Family History   Problem Relation Age of Onset    Arthritis Mother     Colon cancer Brother     Colon cancer Child     Diabetes Other         MELLITUS    Diabetes Family         MELLITUS (UNKNOWN F M )    Lung disease Family         (UNKNOWN F M )       Social History     Social History    Marital status: Single     Spouse name: N/A    Number of children: N/A    Years of education: N/A     Occupational History    unemployed      Social History Main Topics    Smoking status: Former Smoker     Quit date: 11/25/1995    Smokeless tobacco: Never Used      Comment: quit 20 years ago    Alcohol use Yes      Comment: socially  [employer] SAYS NO USE    Drug use: No    Sexual activity: No     Other Topics Concern    Not on file     Social History Narrative    ALWAYS USES SEAT BELT           Allergies   Allergen Reactions    Penicillin G Nausea Only    Penicillins GI Intolerance     Nausea and vomiting         Current Outpatient Prescriptions:     albuterol (2 5 mg/3 mL) 0 083 % nebulizer solution, Take 2 5 mg by nebulization as needed for wheezing , Disp: , Rfl:     albuterol (PROVENTIL HFA,VENTOLIN HFA) 90 mcg/act inhaler, Inhale 2 puffs every 4 (four) hours as needed for wheezing, Disp: 1 Inhaler, Rfl: 3    apixaban (ELIQUIS) 5 mg, Take 1 tablet (5 mg total) by mouth 2 (two) times a day, Disp: 60 tablet, Rfl: 5    famotidine (PEPCID) 20 mg tablet, take 1 tablet by mouth once daily as directed, Disp: 30 tablet, Rfl: 5    fluticasone (FLONASE) 50 mcg/act nasal spray, 2 sprays into each nostril daily, Disp: 16 g, Rfl: 5    levothyroxine 88 mcg tablet, Take 1 tablet (88 mcg total) by mouth daily, Disp: 30 tablet, Rfl: 5    loratadine (CLARITIN) 10 mg tablet, Take 10 mg by mouth daily  , Disp: , Rfl:     montelukast (SINGULAIR) 10 mg tablet, Take 1 tablet (10 mg total) by mouth daily at bedtime (Patient not taking: Reported on 11/7/2018 ), Disp: 30 tablet, Rfl: 5

## 2018-11-07 NOTE — LETTER
November 7, 2018     Rigo Felix,   [address] [facility] [address]    Patient: Juanita Giang   YOB: 1965   Date of Visit: 11/7/2018       Dear Dr Brian Marshall: Thank you for referring Juanita Giang to me for evaluation  Below are my notes for this consultation  If you have questions, please do not hesitate to call me  I look forward to following your patient along with you           Sincerely,        CARL Sanchez        CC: No Recipients

## 2018-11-07 NOTE — ASSESSMENT & PLAN NOTE
ALN optional IVC filter was placed on 05/25/2017 by IR for large bilateral saddle pulmonary embolism  -she is on chronic Eliquis  -she never followed up with vascular surgery for re-evaluation of her IVC filter  -she reports bilateral lower extremity swelling which is well controlled with elevation; is unable to done her compression stockings  -she denies any acute issues to her lower extremities including erythema, warmth, significant swelling, pain, motor decrease    Plan:  -case discussed with Dr Samia Rajan and Dr Brandy Joaquin  -continue on chronic Eliquis therapy for IVC filter patency as well as pulmonary embolism history  -Discussed with interventional Radiology, Dr Brandy Joaquin who advised on CT scan with contrast of abdomen and pelvis, LEV of bilateral lower extremities to assess for chronicity of clot  -BMP ordered for assessment of creatinine prior to CT scan  -patient will likely need an appointment with interventional Radiology to further discuss removal filter  I discussed this with her and she is aware that our office will call her to schedule an appointment with Interventional Radiology once it is determined if the filter can be removed

## 2018-11-07 NOTE — PATIENT INSTRUCTIONS
You have an IVC filter in your inferior vena cava due to your pulmonary embolism  I will contact the interventional radiology department to discuss removal of the IVC filter  Once I find out if there are any tests that need to be done I will contact you and schedule those tests  Please continue to take the Eliquis twice a day  If you have any change in symptoms to her lower extremities including increased swelling, redness, warmth, tightness or difficulty with motor/walking please call our office or go to the emergency room for evaluation

## 2018-11-07 NOTE — ASSESSMENT & PLAN NOTE
Large saddle pulmonary embolism with catheter directed tPA 05/25/2017  -IVC filter placed at this time  -patient was started on Pradaxa, changed to Eliquis on 1 of her admissions  -she continues on Eliquis 5 mg b i d   With episodes of noncompliance  -denies any side effects from Eliquis, she is tearful though in having to take long-term lifelong anticoagulation  -emotional support provided  -patient aware of the risk of stopping her anticoagulation and expresses understanding of the need for continued compliance

## 2018-11-08 ENCOUNTER — APPOINTMENT (OUTPATIENT)
Dept: LAB | Facility: MEDICAL CENTER | Age: 53
End: 2018-11-08
Payer: COMMERCIAL

## 2018-11-08 DIAGNOSIS — Z95.828 PRESENCE OF IVC FILTER: ICD-10-CM

## 2018-11-08 DIAGNOSIS — N18.30 STAGE 3 CHRONIC KIDNEY DISEASE (HCC): ICD-10-CM

## 2018-11-08 LAB
ALBUMIN SERPL BCP-MCNC: 3.1 G/DL (ref 3.5–5)
ALP SERPL-CCNC: 86 U/L (ref 46–116)
ALT SERPL W P-5'-P-CCNC: 20 U/L (ref 12–78)
ANION GAP SERPL CALCULATED.3IONS-SCNC: 4 MMOL/L (ref 4–13)
AST SERPL W P-5'-P-CCNC: 15 U/L (ref 5–45)
BASOPHILS # BLD AUTO: 0.11 THOUSANDS/ΜL (ref 0–0.1)
BASOPHILS NFR BLD AUTO: 2 % (ref 0–1)
BILIRUB SERPL-MCNC: 0.44 MG/DL (ref 0.2–1)
BUN SERPL-MCNC: 17 MG/DL (ref 5–25)
CALCIUM SERPL-MCNC: 8.8 MG/DL (ref 8.3–10.1)
CHLORIDE SERPL-SCNC: 105 MMOL/L (ref 100–108)
CHOLEST SERPL-MCNC: 220 MG/DL (ref 50–200)
CO2 SERPL-SCNC: 26 MMOL/L (ref 21–32)
CREAT SERPL-MCNC: 1.16 MG/DL (ref 0.6–1.3)
EOSINOPHIL # BLD AUTO: 0.21 THOUSAND/ΜL (ref 0–0.61)
EOSINOPHIL NFR BLD AUTO: 4 % (ref 0–6)
ERYTHROCYTE [DISTWIDTH] IN BLOOD BY AUTOMATED COUNT: 12.9 % (ref 11.6–15.1)
GFR SERPL CREATININE-BSD FRML MDRD: 54 ML/MIN/1.73SQ M
GLUCOSE P FAST SERPL-MCNC: 91 MG/DL (ref 65–99)
HCT VFR BLD AUTO: 42.6 % (ref 34.8–46.1)
HDLC SERPL-MCNC: 45 MG/DL (ref 40–60)
HGB BLD-MCNC: 13.1 G/DL (ref 11.5–15.4)
IMM GRANULOCYTES # BLD AUTO: 0.02 THOUSAND/UL (ref 0–0.2)
IMM GRANULOCYTES NFR BLD AUTO: 0 % (ref 0–2)
LDLC SERPL CALC-MCNC: 151 MG/DL (ref 0–100)
LYMPHOCYTES # BLD AUTO: 1.87 THOUSANDS/ΜL (ref 0.6–4.47)
LYMPHOCYTES NFR BLD AUTO: 32 % (ref 14–44)
MCH RBC QN AUTO: 30.2 PG (ref 26.8–34.3)
MCHC RBC AUTO-ENTMCNC: 30.8 G/DL (ref 31.4–37.4)
MCV RBC AUTO: 98 FL (ref 82–98)
MONOCYTES # BLD AUTO: 0.77 THOUSAND/ΜL (ref 0.17–1.22)
MONOCYTES NFR BLD AUTO: 13 % (ref 4–12)
NEUTROPHILS # BLD AUTO: 2.83 THOUSANDS/ΜL (ref 1.85–7.62)
NEUTS SEG NFR BLD AUTO: 49 % (ref 43–75)
NRBC BLD AUTO-RTO: 0 /100 WBCS
PLATELET # BLD AUTO: 268 THOUSANDS/UL (ref 149–390)
PMV BLD AUTO: 11.4 FL (ref 8.9–12.7)
POTASSIUM SERPL-SCNC: 4.1 MMOL/L (ref 3.5–5.3)
PROT SERPL-MCNC: 6.9 G/DL (ref 6.4–8.2)
RBC # BLD AUTO: 4.34 MILLION/UL (ref 3.81–5.12)
SODIUM SERPL-SCNC: 135 MMOL/L (ref 136–145)
T4 FREE SERPL-MCNC: 0.91 NG/DL (ref 0.76–1.46)
TRIGL SERPL-MCNC: 118 MG/DL
TSH SERPL DL<=0.05 MIU/L-ACNC: 7.24 UIU/ML (ref 0.36–3.74)
WBC # BLD AUTO: 5.81 THOUSAND/UL (ref 4.31–10.16)

## 2018-11-08 PROCEDURE — 84439 ASSAY OF FREE THYROXINE: CPT | Performed by: FAMILY MEDICINE

## 2018-11-08 PROCEDURE — 85025 COMPLETE CBC W/AUTO DIFF WBC: CPT | Performed by: FAMILY MEDICINE

## 2018-11-08 PROCEDURE — 84443 ASSAY THYROID STIM HORMONE: CPT | Performed by: FAMILY MEDICINE

## 2018-11-08 PROCEDURE — 80053 COMPREHEN METABOLIC PANEL: CPT | Performed by: FAMILY MEDICINE

## 2018-11-08 PROCEDURE — 36415 COLL VENOUS BLD VENIPUNCTURE: CPT | Performed by: FAMILY MEDICINE

## 2018-11-08 PROCEDURE — 80061 LIPID PANEL: CPT | Performed by: FAMILY MEDICINE

## 2018-11-09 ENCOUNTER — TELEPHONE (OUTPATIENT)
Dept: VASCULAR SURGERY | Facility: CLINIC | Age: 53
End: 2018-11-09

## 2018-11-09 DIAGNOSIS — E03.8 OTHER SPECIFIED HYPOTHYROIDISM: Primary | ICD-10-CM

## 2018-11-09 DIAGNOSIS — E78.5 DYSLIPIDEMIA: ICD-10-CM

## 2018-11-09 RX ORDER — LEVOTHYROXINE SODIUM 0.1 MG/1
100 TABLET ORAL DAILY
Qty: 30 TABLET | Refills: 5 | Status: SHIPPED | OUTPATIENT
Start: 2018-11-09 | End: 2019-08-20 | Stop reason: SDUPTHER

## 2018-11-09 RX ORDER — ATORVASTATIN CALCIUM 10 MG/1
10 TABLET, FILM COATED ORAL DAILY
Qty: 30 TABLET | Refills: 5 | Status: SHIPPED | OUTPATIENT
Start: 2018-11-09 | End: 2019-08-20 | Stop reason: SDUPTHER

## 2018-11-09 NOTE — TELEPHONE ENCOUNTER
Called to discuss testing with the patient  She will  Need CTA with venous phase contrast to assess vena cava, new order was placed  I discussed this with the patient  She is aware that she needs a CT scan as well as a lower extremity venous duplex  Will have the office call to schedule both appointments as well as a follow-up with Interventional radiologist   Patient aware of p o  Hydration pre and post CT A  She expressed understanding

## 2018-11-15 ENCOUNTER — TELEPHONE (OUTPATIENT)
Dept: VASCULAR SURGERY | Facility: CLINIC | Age: 53
End: 2018-11-15

## 2018-11-15 ENCOUNTER — HOSPITAL ENCOUNTER (OUTPATIENT)
Dept: CT IMAGING | Facility: HOSPITAL | Age: 53
Discharge: HOME | End: 2018-11-15
Payer: COMMERCIAL

## 2018-11-15 DIAGNOSIS — Z95.828 PRESENCE OF IVC FILTER: ICD-10-CM

## 2018-11-15 PROCEDURE — 74177 CT ABD & PELVIS W/CONTRAST: CPT

## 2018-11-15 RX ADMIN — IOHEXOL 100 ML: 350 INJECTION, SOLUTION INTRAVENOUS at 10:36

## 2018-11-15 NOTE — TELEPHONE ENCOUNTER
Martín, just wanted to make you aware that we rec'd call from the cat scan dept stating that the order for the CTA with venous phase to asses the vena cave /inc filter was put in wrong  The ct dept said it should just be a ct with contrast and then annotate venous phase to assess cave/inc filter  They said they will change the order

## 2018-11-16 ENCOUNTER — TELEPHONE (OUTPATIENT)
Dept: OTHER | Facility: HOSPITAL | Age: 53
End: 2018-11-16

## 2018-11-16 NOTE — TELEPHONE ENCOUNTER
Called patient to discuss CTA scan which showed clot in and around the IVC filter  She has had episodes of non-compliance with her anticoagulation so I called to reiterate the importance of taking her Eliquis as prescribed  She is going for her LEV on Monday and she states she is in fact taking the Eliquis as ordered  I also reminded her of her appointment with Dr Andrew Cuevas on 11/29 [facility]  She is aware

## 2018-11-19 ENCOUNTER — HOSPITAL ENCOUNTER (OUTPATIENT)
Dept: ULTRASOUND IMAGING | Facility: HOSPITAL | Age: 53
Discharge: HOME | End: 2018-11-19
Payer: COMMERCIAL

## 2018-11-19 DIAGNOSIS — Z95.828 PRESENCE OF IVC FILTER: ICD-10-CM

## 2018-11-19 PROCEDURE — 93970 EXTREMITY STUDY: CPT

## 2018-11-19 PROCEDURE — 93970 EXTREMITY STUDY: CPT | Performed by: SURGERY

## 2018-11-21 ENCOUNTER — OFFICE VISIT (OUTPATIENT)
Dept: FAMILY MEDICINE CLINIC | Facility: CLINIC | Age: 53
End: 2018-11-21
Payer: COMMERCIAL

## 2018-11-21 VITALS
SYSTOLIC BLOOD PRESSURE: 124 MMHG | WEIGHT: 293 LBS | DIASTOLIC BLOOD PRESSURE: 76 MMHG | HEIGHT: 62 IN | BODY MASS INDEX: 53.92 KG/M2
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
## 2018-11-21 DIAGNOSIS — Z12.31 ENCOUNTER FOR SCREENING MAMMOGRAM FOR BREAST CANCER: Primary | ICD-10-CM

## 2018-11-21 DIAGNOSIS — Z12.4 SCREENING FOR CERVICAL CANCER: ICD-10-CM

## 2018-11-21 PROCEDURE — 1036F TOBACCO NON-USER: CPT | Performed by: PHYSICIAN ASSISTANT

## 2018-11-21 PROCEDURE — 99213 OFFICE O/P EST LOW 20 MIN: CPT | Performed by: PHYSICIAN ASSISTANT

## 2018-11-21 PROCEDURE — 3008F BODY MASS INDEX DOCD: CPT | Performed by: PHYSICIAN ASSISTANT

## 2018-11-21 RX ORDER — CLINDAMYCIN HYDROCHLORIDE 300 MG/1
300 CAPSULE ORAL 3 TIMES DAILY
Qty: 21 CAPSULE | Refills: 0 | Status: SHIPPED | OUTPATIENT
Start: 2018-11-21 | End: 2018-11-28

## 2018-11-21 NOTE — PROGRESS NOTES
Assessment/Plan:    Continue using OTC cortisone cream as well as ice packs to arm as needed  Call me if symptoms worsen  Diagnoses and all orders for this visit:    Encounter for screening mammogram for breast cancer  -     Mammo screening bilateral w 3d & cad; Future    Insect bite, initial encounter  -     clindamycin (CLEOCIN) 300 MG capsule; Take 1 capsule (300 mg total) by mouth 3 (three) times a day for 7 days    Screening for cervical cancer  -     Ambulatory referral to Gynecology; Future          Subjective:      Patient ID: Gene Liao is a 48 y o  female  David Elkinsw is here today complaining of a "bite" on her R inner forearm since yesterday  Area is red, itchy, and tender  Denies fever/chills  Has been using OTC cortisone cream on the area with some relief  The following portions of the patient's history were reviewed and updated as appropriate:   She  has a past medical history of Abdominal pain, lower; Acute renal failure ([address] ); Allergic rhinitis; Ankle pain, right; Arthritis; Asthma; Bilateral chronic knee pain; Bladder pain; Blood clot in vein; Bursitis; Cardiac disorder; Cardiac murmur; Chest tightness or pressure; Curvature of spine; GERD (gastroesophageal reflux disease); Hernia, hiatal; Hyperlipidemia; Inguinal hernia; Jaw closure abnormality; Lumbar herniated disc; Morbid obesity ([address] ); Obesity; Osteoarthritis of right knee; Patellar tendinitis; Poor flexibility of tendon; Pulmonary embolism ([address] ); Sepsis ([address] ); Severe sepsis due to methicillin resistant Staphylococcus aureus (MRSA) with acute organ dysfunction ([address] ); Spider vein, symptomatic; Status post arthroscopy of right knee; Stomach disorder; Tear of medial meniscus of right knee; Thyroid disorder; and Umbilical hernia    She   Patient Active Problem List    Diagnosis Date Noted    Dyslipidemia 11/09/2018    Stage 3 chronic kidney disease ([address] ) 11/07/2018    Presence of IVC filter 10/12/2018    Seasonal allergic rhinitis due to pollen 07/10/2018    PICC line infection 07/12/2017    History of methicillin resistant staphylococcus aureus (MRSA) 07/10/2017    Chronic anticoagulation 07/10/2017    Cough 07/10/2017    Elevated liver enzymes 06/05/2017    Morbid obesity ([address] ) 06/05/2017    Hematoma of neck 05/26/2017    Secondary pulmonary hypertension 05/26/2017    Pulmonary embolism, bilateral ([address] ) 05/25/2017    Gastroesophageal reflux disease without esophagitis 05/25/2017    Syncope 05/25/2017    Hypotension 05/25/2017    [facility] 05/25/2017    [facility] 05/25/2017    NSTEMI (non-ST elevated myocardial infarction) ([facility] ) 05/25/2017    Mild intermittent asthma without complication 10/44/2758    Pneumonia 11/26/2016    Leukocytosis 11/26/2016    Other specified hypothyroidism 05/28/2015     She  has a past surgical history that includes Hysterectomy (2009); Open anterior shoulder reconstruction (Left); Hand surgery (Right); Foot surgery (Left); Colonoscopy; pr knee scope,med/lat menisectomy (Right, 4/11/2016); Exploratory laparotomy; Peripherally inserted central catheter insertion; Incision and drainage anterior neck (Right, 6/8/2017); Other surgical history; Cystoscopy (01/1994); Incisional hernia repair; and Shoulder surgery  Her family history includes Arthritis in her mother; Colon cancer in her brother and child; Diabetes in her family and other; Lung disease in her family  She  reports that she quit smoking about 23 years ago  She has never used smokeless tobacco  She reports that she does not drink alcohol or use drugs  Current Outpatient Prescriptions   Medication Sig Dispense Refill    albuterol (2 5 mg/3 mL) 0 083 % nebulizer solution Take 2 5 mg by nebulization as needed for wheezing        albuterol (PROVENTIL HFA,VENTOLIN HFA) 90 mcg/act inhaler Inhale 2 puffs every 4 (four) hours as needed for wheezing 1 Inhaler 3    apixaban (ELIQUIS) 5 mg Take 1 tablet (5 mg total) by mouth 2 (two) times a day 60 tablet 5    atorvastatin (LIPITOR) 10 mg tablet Take 1 tablet (10 mg total) by mouth daily 30 tablet 5    famotidine (PEPCID) 20 mg tablet take 1 tablet by mouth once daily as directed 30 tablet 5    fluticasone (FLONASE) 50 mcg/act nasal spray 2 sprays into each nostril daily 16 g 5    levothyroxine 100 mcg tablet Take 1 tablet (100 mcg total) by mouth daily 30 tablet 5    montelukast (SINGULAIR) 10 mg tablet Take 1 tablet (10 mg total) by mouth daily at bedtime 30 tablet 5    clindamycin (CLEOCIN) 300 MG capsule Take 1 capsule (300 mg total) by mouth 3 (three) times a day for 7 days 21 capsule 0     No current facility-administered medications for this visit  Current Outpatient Prescriptions on File Prior to Visit   Medication Sig    albuterol (2 5 mg/3 mL) 0 083 % nebulizer solution Take 2 5 mg by nebulization as needed for wheezing   albuterol (PROVENTIL HFA,VENTOLIN HFA) 90 mcg/act inhaler Inhale 2 puffs every 4 (four) hours as needed for wheezing    apixaban (ELIQUIS) 5 mg Take 1 tablet (5 mg total) by mouth 2 (two) times a day    atorvastatin (LIPITOR) 10 mg tablet Take 1 tablet (10 mg total) by mouth daily    famotidine (PEPCID) 20 mg tablet take 1 tablet by mouth once daily as directed    fluticasone (FLONASE) 50 mcg/act nasal spray 2 sprays into each nostril daily    levothyroxine 100 mcg tablet Take 1 tablet (100 mcg total) by mouth daily    montelukast (SINGULAIR) 10 mg tablet Take 1 tablet (10 mg total) by mouth daily at bedtime    [DISCONTINUED] loratadine (CLARITIN) 10 mg tablet Take 10 mg by mouth daily  No current facility-administered medications on file prior to visit  She is allergic to penicillin g and penicillins       Review of Systems   Constitutional: Negative for activity change, appetite change, chills, diaphoresis, fatigue, fever and unexpected weight change     HENT: Negative for congestion, ear pain, postnasal drip, rhinorrhea, sinus pain, sinus pressure, sneezing, sore throat, tinnitus and voice change  Eyes: Negative for pain, redness and visual disturbance  Respiratory: Negative for cough, chest tightness, shortness of breath and wheezing  Cardiovascular: Negative for chest pain, palpitations and leg swelling  Gastrointestinal: Negative for abdominal pain, blood in stool, constipation, diarrhea, nausea and vomiting  Genitourinary: Negative for difficulty urinating, dysuria, frequency, hematuria and urgency  Musculoskeletal: Negative for arthralgias, back pain, gait problem, joint swelling, myalgias, neck pain and neck stiffness  Skin: Positive for color change, rash and wound  Negative for pallor  Neurological: Negative for dizziness, tremors, weakness, light-headedness and headaches  Psychiatric/Behavioral: Negative for dysphoric mood, self-injury, sleep disturbance and suicidal ideas  The patient is not nervous/anxious  Objective:      /76 (BP Location: Left arm, Patient Position: Sitting)   Ht 5' 2" (1 575 m)   Wt (!) 142 kg (313 lb 12 8 oz)   BMI 57 39 kg/m²          Physical Exam   Constitutional: She is oriented to person, place, and time  She appears well-developed and well-nourished  No distress  HENT:   Head: Normocephalic and atraumatic  Right Ear: Hearing, tympanic membrane, external ear and ear canal normal    Left Ear: Hearing, tympanic membrane, external ear and ear canal normal    Mouth/Throat: Uvula is midline, oropharynx is clear and moist and mucous membranes are normal  No oropharyngeal exudate  Eyes: Conjunctivae are normal  Right eye exhibits no discharge  Left eye exhibits no discharge  No scleral icterus  Neck: Neck supple  Carotid bruit is not present  No thyromegaly present  Cardiovascular: Normal rate, regular rhythm and normal heart sounds  No murmur heard    Pulmonary/Chest: Effort normal and breath sounds normal  No respiratory distress  She has no wheezes  Abdominal: Soft  Bowel sounds are normal  She exhibits no distension and no mass  There is no tenderness  There is no rebound and no guarding  Musculoskeletal: Normal range of motion  She exhibits no edema or tenderness  Lymphadenopathy:     She has no cervical adenopathy  Neurological: She is alert and oriented to person, place, and time  Skin: Skin is warm and dry  Rash noted  Rash is macular and urticarial  She is not diaphoretic  There is erythema  Area without central clearing, no erythematous streaking  Psychiatric: She has a normal mood and affect  Her behavior is normal  Judgment and thought content normal    Vitals reviewed

## 2018-12-04 ENCOUNTER — TELEPHONE (OUTPATIENT)
Dept: RADIOLOGY | Facility: HOSPITAL | Age: 53
End: 2018-12-04

## 2018-12-04 DIAGNOSIS — Z95.828 PRESENCE OF IVC FILTER: ICD-10-CM

## 2018-12-04 DIAGNOSIS — I82.90 NON-OCCLUSIVE THROMBUS: Primary | ICD-10-CM

## 2018-12-04 NOTE — TELEPHONE ENCOUNTER
I spoke with Ms Blanca Patton by telephone on 11/30/2018 to discuss her CT results and to discuss possible options for removal of her IVC filter  Patient with IVC filter placed over a year ago secondary to DVT and pulmonary embolism  Patient is currently on Eliquis, per notes patient has been intermittently compliant  Recent CT demonstrates nonocclusive thrombus involving the IVC filter  The filter is a retrievable ALN filter with modest tilt to the left  Recent lower extremity duplex ultrasound demonstrated no evidence of DVT  I discussed with the patient that we could continue to follow and repeat imaging in 3 months assuming compliance with Eliquis  I also offered to perform IVC thrombectomy and simultaneous filter removal   There is a fair amount of thrombus extending above the hook as well as below in the filter legs  I expressed concern that the thrombus could dislodge from the hook and potentially cause pulmonary embolism  I explained to the patient that the procedure would involve venous access within the right neck and right groin, possible involvement of anesthesia , possible use of rigid Lymol forceps and possible complications such as intra-procedural pulmonary embolism due to fragmentation or dislodgement of clot during the thrombectomy/filter removal   Additionally, I explained that removal of the filter could cause damage to the IVC and that the filter could potentially fracture with possible inability to remove the fractured pieces and additional possibility of migration of those pieces  After answering multiple questions and allowing the patient to make a decision, the patient ultimately decided to proceed with intervention to remove the IVC filter and clot  I will place an order and have our scheduling department reach out to her

## 2018-12-18 ENCOUNTER — OFFICE VISIT (OUTPATIENT)
Dept: PULMONOLOGY | Facility: HOSPITAL | Age: 53
End: 2018-12-18
Payer: COMMERCIAL

## 2018-12-18 VITALS
OXYGEN SATURATION: 97 % | HEART RATE: 86 BPM | DIASTOLIC BLOOD PRESSURE: 80 MMHG | SYSTOLIC BLOOD PRESSURE: 122 MMHG | WEIGHT: 293 LBS | BODY MASS INDEX: 53.92 KG/M2 | HEIGHT: 62 IN

## 2018-12-18 DIAGNOSIS — I26.99 PULMONARY EMBOLISM, BILATERAL (HCC): ICD-10-CM

## 2018-12-18 DIAGNOSIS — J45.20 MILD INTERMITTENT ASTHMA WITHOUT COMPLICATION: Primary | ICD-10-CM

## 2018-12-18 PROCEDURE — 99213 OFFICE O/P EST LOW 20 MIN: CPT | Performed by: PHYSICIAN ASSISTANT

## 2018-12-18 RX ORDER — LEVOTHYROXINE SODIUM 88 UG/1
TABLET ORAL
Refills: 0 | COMMUNITY
Start: 2018-12-04 | End: 2018-12-18 | Stop reason: DRUGHIGH

## 2018-12-18 NOTE — ASSESSMENT & PLAN NOTE
· Currently stable at this time  · She will continue on Xopenex HFA as needed  She does not need refills at this time and will call us if and when she does

## 2018-12-18 NOTE — PROGRESS NOTES
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  · Await call back from vascular surgery to determine timing of IVC filter removal       Plan:    Diagnoses and all orders for this visit:    Mild intermittent asthma without complication    Pulmonary embolism, bilateral ([address] )    Other orders  -     Discontinue: levothyroxine 88 mcg tablet; She will follow-up in 1 year or sooner if problems arise  All her questions were answered  She was instructed to call the office with any questions she may have or she develops any changes in her breathing  History of Present Illness   HPI:  Rey Celis is a 48 y o  female who presents to the office this morning for follow-up of her mild intermittent asthma  She was last seen by me in May and at that time, I had recommended that she stop her Flovent altogether in continue Xopenex HFA on an as-needed basis  Over the past 6 months, she has been doing well from a pulmonary standpoint  She has not required hospitalization, steroids or antibiotics for her breathing  She has remained off the Flovent and is using her Xopenex HFA maybe once a day  She has rare cough with occasional white sputum production  She denies hemoptysis, bronchospasm, resting shortness of breath, chest pain or pleurisy  She has been afebrile  She denies any significant dyspnea on exertion and feels she is doing very well  She does have some occasional lower extremity edema which she correlates to increased salt in her diet    She has been taking her Eliquis as prescribed by her PCP and tells me that she should be getting her IVC filter out soon  She is awaiting callback from vascular surgery to schedule this  She has not been taking Singulair for quite some time  She is up-to-date on flu vaccination  Review of Systems   Constitutional: Negative  HENT: Negative  Poor dentition   Eyes: Negative  Wears glasses   Respiratory: Positive for cough  Negative for apnea, choking, chest tightness, shortness of breath, wheezing and stridor  Cardiovascular: Positive for leg swelling  Negative for chest pain and palpitations  Gastrointestinal: Negative  Endocrine: Negative  Genitourinary: Negative  Musculoskeletal: Negative  Skin: Negative  Allergic/Immunologic: Negative  Neurological: Negative  Psychiatric/Behavioral: Negative          Historical Information   Past Medical History:   Diagnosis Date    Abdominal pain, lower     [id number] RESOLVED    Acute renal failure ([facility] )     74NFT2756 RESOLVED    Allergic rhinitis     75LTG1634 RESOLVED    Ankle pain, right     84OMZ6470 RESOLVED    Arthritis     69QHR4709 RESOLVED  [id number] RESOLVED    Asthma     Bilateral chronic knee pain     09JSI0118    Bladder pain     80KQR5901 RESOLVED    Blood clot in vein     Bursitis     71CQR7477 RESOLVED    Cardiac disorder     34TPP2831  RESOLVED    Cardiac murmur     95BDX2993 RESOLVED    Chest tightness or pressure     01LZE7701 RESOLVED    Curvature of spine     96AIZ3603 RESOLVED    GERD (gastroesophageal reflux disease)     Hernia, hiatal     68JKX9002 RESOLVED    Hyperlipidemia     92AIF9297 RESOLVED    Inguinal hernia     RIGHT   88OHQ0209 RESOLVED    Jaw closure abnormality     22OPE0370 RESOLVED    Lumbar herniated disc     75TUB0164 RESOLVED    Morbid obesity ([facility] [address] )     31NHE0722    Obesity     Osteoarthritis of right knee     38HPY4602 RESOLVED    Patellar tendinitis     78HDN2319    Poor flexibility of tendon     83FXE4940    Pulmonary embolism [address] [address] )     69QJX5452 RESOLVED    Sepsis ([facility] [address] )     83DKU3236 RESOLVED    Severe sepsis due to methicillin resistant Staphylococcus aureus (MRSA) with acute organ dysfunction ([facility] [address] )     01QYQ9365 RESOLVED    Spider vein, symptomatic     28QRK8887 RESOLVED    Status post arthroscopy of right knee     41EEE9158 RESOLVED    Stomach disorder     34IXU9011 RESOLVED    Tear of medial meniscus of right knee     SUBSEQUENT ENCOUNTER  RESOLVED 66LXV4812    Thyroid disorder     04TWK3459    Umbilical hernia     73ESA2639 RESOLVED     Past Surgical History:   Procedure Laterality Date    COLONOSCOPY      CYSTOSCOPY  01/1994    WITH BIOPSY      EXPLORATORY LAPAROTOMY      FOOT SURGERY Left     HAND SURGERY Right     cyst    HYSTERECTOMY  2009    INCISION AND DRAINAGE ANTERIOR NECK Right 6/8/2017    Procedure: INCISION AND DRAINAGE  (I&D) NECK;  Surgeon: Annemarie Jaffe MD;  Location:  MAIN OR;  Service:    [address] [place] [address]      WITH IMPLANTATION OF MESH  13 MAY 2014  LAST ASSESSED    OPEN ANTERIOR SHOULDER RECONSTRUCTION Left     OTHER SURGICAL HISTORY      BLOOD VESSEL REPAIR   13 MAY 2014 LAST ASSESSED    PERIPHERALLY INSERTED CENTRAL CATHETER INSERTION      DC KNEE SCOPE,MED/LAT MENISECTOMY Right 4/11/2016    Procedure: KNEE ARTHROSCOPY WITH MEDIAL MENISCECTOMY ;  Surgeon: Joseph Mejia MD;  Location: [facility] MAIN OR;  Service: Orthopedics    SHOULDER SURGERY       Family History   Problem Relation Age of Onset    Arthritis Mother     Colon cancer Brother     Colon cancer Child     Diabetes Other         MELLITUS    Diabetes Family         MELLITUS (UNKNOWN F M )    Lung disease Family         (UNKNOWN F M )       History   Smoking Status    Former Smoker    Quit date: 11/25/1995   Smokeless Tobacco    Never Used     Comment: quit 20 years ago         Meds/Allergies     Current Outpatient Prescriptions:     albuterol (2 5 mg/3 mL) 0  083 % nebulizer solution, Take 2 5 mg by nebulization as needed for wheezing , Disp: , Rfl:     albuterol (PROVENTIL HFA,VENTOLIN HFA) 90 mcg/act inhaler, Inhale 2 puffs every 4 (four) hours as needed for wheezing, Disp: 1 Inhaler, Rfl: 3    apixaban (ELIQUIS) 5 mg, Take 1 tablet (5 mg total) by mouth 2 (two) times a day, Disp: 60 tablet, Rfl: 5    atorvastatin (LIPITOR) 10 mg tablet, Take 1 tablet (10 mg total) by mouth daily, Disp: 30 tablet, Rfl: 5    famotidine (PEPCID) 20 mg tablet, take 1 tablet by mouth once daily as directed, Disp: 30 tablet, Rfl: 5    fluticasone (FLONASE) 50 mcg/act nasal spray, 2 sprays into each nostril daily, Disp: 16 g, Rfl: 5    levothyroxine 100 mcg tablet, Take 1 tablet (100 mcg total) by mouth daily, Disp: 30 tablet, Rfl: 5  Allergies   Allergen Reactions    Penicillin G Nausea Only    Penicillins GI Intolerance     Nausea and vomiting       Vitals: Blood pressure 122/80, pulse 86, height 5' 2" (1 575 m), weight (!) 142 kg (312 lb), SpO2 97 %  Body mass index is 57 07 kg/m²  Oxygen Therapy  SpO2: 97 %    Physical Exam  Physical Exam   Constitutional: She is oriented to person, place, and time  She appears well-developed and well-nourished  No distress  HENT:   Head: Normocephalic and atraumatic  Poor dentition   Eyes: Pupils are equal, round, and reactive to light  Conjunctivae and EOM are normal  No scleral icterus  Wearing glasses   Neck: Normal range of motion  Neck supple  No JVD present  No tracheal deviation present  Cardiovascular: Normal rate, regular rhythm and normal heart sounds  Exam reveals no gallop and no friction rub  No murmur heard  Pulmonary/Chest: Effort normal and breath sounds normal  No stridor  No respiratory distress  She has no wheezes  She has no rales  She exhibits no tenderness  Abdominal: Soft  Bowel sounds are normal    Obese   Musculoskeletal: Normal range of motion  She exhibits edema     Trace lower extremity edema Neurological: She is alert and oriented to person, place, and time  Skin: Skin is warm and dry  No rash noted  She is not diaphoretic  No pallor  Psychiatric: She has a normal mood and affect  Her behavior is normal  Judgment and thought content normal    Vitals reviewed  Labs: I have personally reviewed pertinent lab results  , ABG: No results found for: PHART, UDZ9ZAK, PO2ART, FZF6JNE, O8CMMJXS, BEART, SOURCE, BNP: No results found for: BNP, CBC: No results found for: WBC, HGB, HCT, MCV, PLT, ADJUSTEDWBC, MCH, MCHC, RDW, MPV, NRBC, CMP: No results found for: SODIUM, K, CL, CO2, ANIONGAP, BUN, CREATININE, GLUCOSE, CALCIUM, AST, ALT, ALKPHOS, PROT, BILITOT, EGFR, PT/INR: No results found for: PT, INR, Troponin: No results found for: TROPONINI  Lab Results   Component Value Date    WBC 5 81 11/08/2018    HGB 13 1 11/08/2018    HCT 42 6 11/08/2018    MCV 98 11/08/2018     11/08/2018     Lab Results   Component Value Date    GLUCOSE 102 06/01/2017    CALCIUM 8 8 11/08/2018     01/07/2016    K 4 1 11/08/2018    CO2 26 11/08/2018     11/08/2018    BUN 17 11/08/2018    CREATININE 1 16 11/08/2018     Lab Results   Component Value Date    IGE 51 4 12/02/2016     Lab Results   Component Value Date    ALT 20 11/08/2018    AST 15 11/08/2018    ALKPHOS 86 11/08/2018    BILITOT 0 40 01/07/2016       Imaging and other studies: I have personally reviewed pertinent films in PACS     No new pulmonary imaging since July 9, 2017    Pulmonary function testing:  No PFTs to be reviewed

## 2018-12-18 NOTE — ASSESSMENT & PLAN NOTE
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

## 2019-01-17 ENCOUNTER — TELEPHONE (OUTPATIENT)
Dept: RADIOLOGY | Facility: HOSPITAL | Age: 54
End: 2019-01-17

## 2019-01-17 RX ORDER — SODIUM CHLORIDE 9 MG/ML
75 INJECTION, SOLUTION INTRAVENOUS CONTINUOUS
Status: CANCELLED | OUTPATIENT
Start: 2019-01-17

## 2019-01-22 ENCOUNTER — HOSPITAL ENCOUNTER (OUTPATIENT)
Dept: RADIOLOGY | Facility: HOSPITAL | Age: 54
Discharge: HOME | End: 2019-01-22
Attending: RADIOLOGY | Admitting: RADIOLOGY
Payer: COMMERCIAL

## 2019-01-22 ENCOUNTER — ANESTHESIA EVENT (OUTPATIENT)
Dept: SURGERY | Facility: HOSPITAL | Age: 54
End: 2019-01-22
Payer: COMMERCIAL

## 2019-01-22 ENCOUNTER — ANESTHESIA (OUTPATIENT)
Dept: SURGERY | Facility: HOSPITAL | Age: 54
End: 2019-01-22
Payer: COMMERCIAL

## 2019-01-22 VITALS
OXYGEN SATURATION: 98 % | HEART RATE: 86 BPM | TEMPERATURE: 97.5 F | HEIGHT: 65 IN | DIASTOLIC BLOOD PRESSURE: 82 MMHG | RESPIRATION RATE: 19 BRPM | WEIGHT: 293 LBS | BODY MASS INDEX: 48.82 KG/M2 | SYSTOLIC BLOOD PRESSURE: 154 MMHG

## 2019-01-22 DIAGNOSIS — I82.90 NON-OCCLUSIVE THROMBUS: ICD-10-CM

## 2019-01-22 DIAGNOSIS — Z95.828 PRESENCE OF IVC FILTER: ICD-10-CM

## 2019-01-22 LAB
INR PPP: 1.13 (ref 0.86–1.17)
PROTHROMBIN TIME: 14.6 SECONDS (ref 11.8–14.2)

## 2019-01-22 PROCEDURE — 85610 PROTHROMBIN TIME: CPT | Performed by: RADIOLOGY

## 2019-01-22 PROCEDURE — C1894 INTRO/SHEATH, NON-LASER: HCPCS

## 2019-01-22 PROCEDURE — C1757 CATH, THROMBECTOMY/EMBOLECT: HCPCS

## 2019-01-22 PROCEDURE — C1751 CATH, INF, PER/CENT/MIDLINE: HCPCS

## 2019-01-22 PROCEDURE — 37187 VENOUS MECH THROMBECTOMY: CPT | Performed by: RADIOLOGY

## 2019-01-22 PROCEDURE — C1773 RET DEV, INSERTABLE: HCPCS

## 2019-01-22 PROCEDURE — 37187 VENOUS MECH THROMBECTOMY: CPT

## 2019-01-22 PROCEDURE — 37193 REM ENDOVAS VENA CAVA FILTER: CPT

## 2019-01-22 PROCEDURE — 36556 INSERT NON-TUNNEL CV CATH: CPT | Performed by: RADIOLOGY

## 2019-01-22 PROCEDURE — 77001 FLUOROGUIDE FOR VEIN DEVICE: CPT | Performed by: RADIOLOGY

## 2019-01-22 PROCEDURE — 37193 REM ENDOVAS VENA CAVA FILTER: CPT | Performed by: RADIOLOGY

## 2019-01-22 PROCEDURE — C1769 GUIDE WIRE: HCPCS

## 2019-01-22 RX ORDER — ONDANSETRON 2 MG/ML
4 INJECTION INTRAMUSCULAR; INTRAVENOUS EVERY 8 HOURS PRN
Status: DISCONTINUED | OUTPATIENT
Start: 2019-01-22 | End: 2019-01-22 | Stop reason: HOSPADM

## 2019-01-22 RX ORDER — SODIUM CHLORIDE 9 MG/ML
75 INJECTION, SOLUTION INTRAVENOUS CONTINUOUS
Status: DISCONTINUED | OUTPATIENT
Start: 2019-01-22 | End: 2019-01-22 | Stop reason: HOSPADM

## 2019-01-22 RX ORDER — HEPARIN SODIUM 1000 [USP'U]/ML
INJECTION, SOLUTION INTRAVENOUS; SUBCUTANEOUS CODE/TRAUMA/SEDATION MEDICATION
Status: COMPLETED | OUTPATIENT
Start: 2019-01-22 | End: 2019-01-22

## 2019-01-22 RX ORDER — PROPOFOL 10 MG/ML
INJECTION, EMULSION INTRAVENOUS AS NEEDED
Status: DISCONTINUED | OUTPATIENT
Start: 2019-01-22 | End: 2019-01-22 | Stop reason: SURG

## 2019-01-22 RX ORDER — FENTANYL CITRATE/PF 50 MCG/ML
25 SYRINGE (ML) INJECTION
Status: DISCONTINUED | OUTPATIENT
Start: 2019-01-22 | End: 2019-01-22 | Stop reason: HOSPADM

## 2019-01-22 RX ORDER — PROPOFOL 10 MG/ML
INJECTION, EMULSION INTRAVENOUS CONTINUOUS PRN
Status: DISCONTINUED | OUTPATIENT
Start: 2019-01-22 | End: 2019-01-22 | Stop reason: SURG

## 2019-01-22 RX ORDER — OXYCODONE HYDROCHLORIDE AND ACETAMINOPHEN 5; 325 MG/1; MG/1
1 TABLET ORAL EVERY 6 HOURS PRN
Status: DISCONTINUED | OUTPATIENT
Start: 2019-01-22 | End: 2019-01-22 | Stop reason: HOSPADM

## 2019-01-22 RX ORDER — FENTANYL CITRATE 50 UG/ML
INJECTION, SOLUTION INTRAMUSCULAR; INTRAVENOUS AS NEEDED
Status: DISCONTINUED | OUTPATIENT
Start: 2019-01-22 | End: 2019-01-22 | Stop reason: SURG

## 2019-01-22 RX ORDER — KETAMINE HYDROCHLORIDE 50 MG/ML
INJECTION, SOLUTION, CONCENTRATE INTRAMUSCULAR; INTRAVENOUS AS NEEDED
Status: DISCONTINUED | OUTPATIENT
Start: 2019-01-22 | End: 2019-01-22 | Stop reason: SURG

## 2019-01-22 RX ORDER — MIDAZOLAM HYDROCHLORIDE 1 MG/ML
INJECTION INTRAMUSCULAR; INTRAVENOUS AS NEEDED
Status: DISCONTINUED | OUTPATIENT
Start: 2019-01-22 | End: 2019-01-22 | Stop reason: SURG

## 2019-01-22 RX ADMIN — SODIUM CHLORIDE: 0.9 INJECTION, SOLUTION INTRAVENOUS at 16:31

## 2019-01-22 RX ADMIN — KETAMINE HYDROCHLORIDE 20 MG: 50 INJECTION, SOLUTION INTRAMUSCULAR; INTRAVENOUS at 16:31

## 2019-01-22 RX ADMIN — PROPOFOL 100 MCG/KG/MIN: 10 INJECTION, EMULSION INTRAVENOUS at 16:31

## 2019-01-22 RX ADMIN — KETAMINE HYDROCHLORIDE 10 MG: 50 INJECTION, SOLUTION INTRAMUSCULAR; INTRAVENOUS at 16:39

## 2019-01-22 RX ADMIN — IOHEXOL 80 ML: 300 INJECTION, SOLUTION INTRAVENOUS at 18:58

## 2019-01-22 RX ADMIN — MIDAZOLAM 1 MG: 1 INJECTION INTRAMUSCULAR; INTRAVENOUS at 16:48

## 2019-01-22 RX ADMIN — HEPARIN SODIUM 3000 UNITS: 1000 INJECTION INTRAVENOUS; SUBCUTANEOUS at 16:53

## 2019-01-22 RX ADMIN — KETAMINE HYDROCHLORIDE 10 MG: 50 INJECTION, SOLUTION INTRAMUSCULAR; INTRAVENOUS at 16:54

## 2019-01-22 RX ADMIN — KETAMINE HYDROCHLORIDE 15 MG: 50 INJECTION, SOLUTION INTRAMUSCULAR; INTRAVENOUS at 17:27

## 2019-01-22 RX ADMIN — FENTANYL CITRATE 12.5 MCG: 50 INJECTION, SOLUTION INTRAMUSCULAR; INTRAVENOUS at 16:40

## 2019-01-22 RX ADMIN — MIDAZOLAM 1 MG: 1 INJECTION INTRAMUSCULAR; INTRAVENOUS at 16:31

## 2019-01-22 RX ADMIN — PROPOFOL 30 MG: 10 INJECTION, EMULSION INTRAVENOUS at 16:31

## 2019-01-22 RX ADMIN — KETAMINE HYDROCHLORIDE 10 MG: 50 INJECTION, SOLUTION INTRAMUSCULAR; INTRAVENOUS at 17:12

## 2019-01-22 RX ADMIN — PROPOFOL 20 MG: 10 INJECTION, EMULSION INTRAVENOUS at 16:54

## 2019-01-22 RX ADMIN — FENTANYL CITRATE 12.5 MCG: 50 INJECTION, SOLUTION INTRAMUSCULAR; INTRAVENOUS at 16:31

## 2019-01-22 RX ADMIN — KETAMINE HYDROCHLORIDE 10 MG: 50 INJECTION, SOLUTION INTRAMUSCULAR; INTRAVENOUS at 17:17

## 2019-01-22 NOTE — ANESTHESIA PREPROCEDURE EVALUATION
Review of Systems/Medical History  Patient summary reviewed    No history of anesthetic complications     Cardiovascular  EKG reviewed, Exercise tolerance (METS): >4,  Hyperlipidemia, Past MI (type 2 NSTEMI) ,   Comment: Remote saddle PE, PE, Pulmonary hypertension Pulmonary  No pneumonia, Asthma Last rescue: < 1 month ago ,        GI/Hepatic    GERD well controlled,  Hiatal hernia,             Endo/Other  History of thyroid disease , hypothyroidism,   Obesity  super morbid obesity   GYN       Hematology    Hypercoagulable state,    Musculoskeletal    Arthritis     Neurology  Negative neurology ROS      Psychology   No psychiatric history, Anxiety,              Physical Exam    Airway    Mallampati score: II  TM Distance: >3 FB  Neck ROM: full     Dental   No notable dental hx     Cardiovascular  Rhythm: regular, Rate: normal,     Pulmonary  Breath sounds clear to auscultation,     Other Findings    PLAN FOR RSI/ETT    Anesthesia Plan  ASA Score- 3     Anesthesia Type- IV sedation with anesthesia with ASA Monitors  Additional Monitors:   Airway Plan:     Comment: MAC  Difficult vascular access  IR physician to place CVC for use  MAC indicated for monitoring periprocedure given concern for systemic embolization        Plan Factors-    Induction- intravenous  Postoperative Plan-     Informed Consent- Anesthetic plan and risks discussed with patient  I personally reviewed this patient with the CRNA  Discussed and agreed on the Anesthesia Plan with the CRNA  Kaleb Abdi

## 2019-01-22 NOTE — BRIEF OP NOTE (RAD/CATH)
IVC Filter Removal and Mechanical Thrombectomy Procedure Note    PATIENT NAME: Zee Jones  : 1965  MRN: [id number]     Pre-op Diagnosis:   1  Presence of IVC filter    2  Non-occlusive thrombus      Post-op Diagnosis:   1  Presence of IVC filter    2  Non-occlusive thrombus        Surgeon:   Mark Young DO  Assistants:     No qualified resident was available  Estimated Blood Loss:  Minimal  Findings:   · Right internal jugular vein and right common femoral vein access  · Venogram identified tilted IVC filter with small chronic thrombus near the filter hook and linear chronic thrombus along the left lateral venous wall  · Mechanical thrombectomy using  device from both IJ and common femoral venous approach is performed  Small volume of chronic thrombus removed  · IVC filter removed in its entirety  Post IVC removal venogram identified no evidence of perforation or IVC wall abnormality  · Repeat mechanical thrombectomy  Post intervention venogram identified mild linear chronic thrombus along the wall  · Both she has removed  Manual pressure over the right neck for hemostasis  · Seven Sami triple-lumen catheter placed in the right groin access for continued medication during recovery  Specimens:  None      Complications:  None apparent    Anesthesia: JONELLE Young DO     Date: 2019  Time: 5:53 PM

## 2019-01-22 NOTE — DISCHARGE INSTRUCTIONS
Inferior Vena Cava Filter Removal     WHAT YOU NEED TO KNOW:   Inferior vena cava (IVC) filter removal is a procedure to remove your IVC filter  DISCHARGE INSTRUCTIONS:     Wound care: Keep your wound clean and dry  Bandaide may come off in 24 hours  Self-care:   · Limit activity for 24 hours  Slowly start to do more each day  Return to your daily activities as directed  · Resume your normal diet  Small sips of flat soda will help with nausea  Contact Interventional Radiology at [phone] Bruce PATIENTS: Contact Interventional Radiology at [phone]) Mayra Cota PATIENTS: Contact Interventional Radiology at [phone]) if:  · You have a fever  · Persistent nausea or vomiting  · You have chills, a cough, or feel weak and achy  · Your wound is red, swollen, or draining pus  · You have questions or concerns about your condition

## 2019-01-22 NOTE — H&P
History and Physical  Alejandra Kaur 48 y o  female MRN: [id number]  [place] [facility] Encounter: [id number]    Assessment:   25-year-old female here for elective IVC filter removal   Patient had placed over year ago secondary to DVT and pulmonary embolism  Patient is currently compliant on Eliquis per her  Ultrasound of the legs in late November identified no DVT  CT of the abdomen did demonstrate some nonocclusive thrombus in the filter  Plan:  Plan for IVC filter removal   Possible thrombectomy within the IVC filter if needed  HPI:  Alejandra Kaur is a 48 y o  female who presents with chronic IVC filter with nonocclusive thrombus within the filter  Here for filter removal and/or thrombectomy      Historical Information   Past Medical History:   Diagnosis Date    Abdominal pain, lower     69UGN3958 RESOLVED    Acute renal failure ([address] )     71LQV2599 RESOLVED    Allergic rhinitis     15RUG6126 RESOLVED    Ankle pain, right     79GSD2943 RESOLVED    Arthritis     39MLM3070 RESOLVED  569NNE1511 RESOLVED    Asthma     Bilateral chronic knee pain     09ZWX4167    Bladder pain     43SIG7554 RESOLVED    Blood clot in vein     Bursitis     76CJA0981 RESOLVED    Cardiac disorder     64YFL1457  RESOLVED    Cardiac murmur     55JZT2460 RESOLVED    Chest tightness or pressure     43WVZ8789 RESOLVED    Curvature of spine     35ODC0214 RESOLVED    GERD (gastroesophageal reflux disease)     Hernia, hiatal     77NLT4544 RESOLVED    Hyperlipidemia     34UZF1898 RESOLVED    Inguinal hernia     RIGHT   26DJG6682 RESOLVED    Jaw closure abnormality     92PUG5880 RESOLVED    Lumbar herniated disc     24NLE7991 RESOLVED    Morbid obesity ([address] )     89OEP1424    Obesity     Osteoarthritis of right knee     25XWZ9428 RESOLVED    Patellar tendinitis     24QFF2838    Poor flexibility of tendon     86XRA5512    Pulmonary embolism ([address] )     27SQA6072 RESOLVED    Sepsis ([address] )     02LKQ5723 RESOLVED    Severe sepsis due to methicillin resistant Staphylococcus aureus (MRSA) with acute organ dysfunction ([facility] [address] )     34LZL1888 RESOLVED    Spider vein, symptomatic     70RTI6718 RESOLVED    Status post arthroscopy of right knee     95ORR0655 RESOLVED    Stomach disorder     59FYT2416 RESOLVED    Tear of medial meniscus of right knee     SUBSEQUENT ENCOUNTER  RESOLVED 80GAT3601    Thyroid disorder     61HMM3406    Umbilical hernia     34YZV4064 RESOLVED     Past Surgical History:   Procedure Laterality Date    COLONOSCOPY      CYSTOSCOPY  01/1994    WITH BIOPSY      EXPLORATORY LAPAROTOMY      FOOT SURGERY Left     HAND SURGERY Right     cyst    HYSTERECTOMY  2009    INCISION AND DRAINAGE ANTERIOR NECK Right 6/8/2017    Procedure: INCISION AND DRAINAGE  (I&D) NECK;  Surgeon: Sandra Clay MD;  Location:  MAIN OR;  Service:    [facility] HERNIA REPAIR      WITH IMPLANTATION OF MESH  13 MAY 2014  LAST ASSESSED    OPEN ANTERIOR SHOULDER RECONSTRUCTION Left     OTHER SURGICAL HISTORY      BLOOD VESSEL REPAIR   13 MAY 2014 LAST ASSESSED    PERIPHERALLY INSERTED CENTRAL CATHETER INSERTION      NE KNEE SCOPE,MED/LAT MENISECTOMY Right 4/11/2016    Procedure: KNEE ARTHROSCOPY WITH MEDIAL MENISCECTOMY ;  Surgeon: Melecio Thomas MD;  Location: [facility];  Service: Orthopedics    SHOULDER SURGERY       Social History   History   Alcohol Use No     History   Drug Use No     History   Smoking Status    Former Smoker    Quit date: 11/25/1995   Smokeless Tobacco    Never Used     Comment: quit 20 years ago     Family History   Problem Relation Age of Onset    Arthritis Mother     Colon cancer Brother     Colon cancer Child     Diabetes Other         MELLITUS    Diabetes Family         MELLITUS (UNKNOWN F M )    Lung disease Family         (UNKNOWN F M )       Meds/Allergies   Allergies   Allergen Reactions    Penicillin G Nausea Only    Penicillins GI Intolerance     Nausea and vomiting Meds:    Current Facility-Administered Medications:     sodium chloride 0 9 % infusion, 75 mL/hr, Intravenous, Continuous, Reola Franc, DO  Prescriptions Prior to Admission   Medication    albuterol (2 5 mg/3 mL) 0 083 % nebulizer solution    albuterol (PROVENTIL HFA,VENTOLIN HFA) 90 mcg/act inhaler    apixaban (ELIQUIS) 5 mg    atorvastatin (LIPITOR) 10 mg tablet    famotidine (PEPCID) 20 mg tablet    fluticasone (FLONASE) 50 mcg/act nasal spray    levothyroxine 100 mcg tablet         REVIEW OF SYSTEMS  Constitutional:  Denies fever or chills   Eyes:  Denies change in visual acuity   HENT:  Denies nasal congestion or sore throat   Respiratory:  Denies cough or shortness of breath   Cardiovascular:  Denies chest pain or edema   GI:  Denies abdominal pain, nausea, vomiting, bloody stools or diarrhea   :  Denies dysuria, frequency, difficulty in micturition and nocturia  Musculoskeletal:  Denies back pain or joint pain   Neurologic:  Denies headache, focal weakness or sensory changes   Psychiatric:  Positive for anxiety        Current Vitals:   Blood Pressure: (!) 172/71 (01/22/19 1203)  Pulse: 86 (01/22/19 1203)  Respirations: 18 (01/22/19 1203)  Height: 5' 5" (165 1 cm) (01/22/19 1203)  Weight - Scale: (!) 142 kg (312 lb) (01/22/19 1203)  SpO2: 95 % (01/22/19 1203)  SPO2 RA Rest      Admission (Current) from IR IVC REMOVAL ANESTHESIA from 1/22/2019 in [facility]   SpO2  95 %   SpO2 Activity  At Rest   O2 Device  None (Room air)   O2 Flow Rate  --          PHYSICAL EXAMS:  NAD, EOMI, clear, regular, obese soft abdomen, no overt leg swelling     Lab Results:   CBC:   Lab Results   Component Value Date    WBC 5 81 11/08/2018    HGB 13 1 11/08/2018    HCT 42 6 11/08/2018    MCV 98 11/08/2018     11/08/2018    MCH 30 2 11/08/2018    MCHC 30 8 (L) 11/08/2018    RDW 12 9 11/08/2018    MPV 11 4 11/08/2018    NRBC 0 11/08/2018     CMP:  Lab Results   Component Value Date     01/07/2016     11/08/2018     01/07/2016    CO2 26 11/08/2018    CO2 27 06/01/2017    ANIONGAP 8 01/07/2016    BUN 17 11/08/2018    BUN 13 01/07/2016    CREATININE 1 16 11/08/2018    CREATININE 1 05 01/07/2016    GLUCOSE 102 06/01/2017    GLUCOSE 86 01/07/2016    CALCIUM 8 8 11/08/2018    CALCIUM 8 4 01/07/2016    AST 15 11/08/2018    AST 22 01/07/2016    ALT 20 11/08/2018    ALT 21 01/07/2016    ALKPHOS 86 11/08/2018    ALKPHOS 76 01/07/2016    PROT 6 8 01/07/2016    BILITOT 0 40 01/07/2016    EGFR 54 11/08/2018    EGFR 52 2 06/01/2017     Lab Results   Component Value Date    TROPONINI 0 08 (H) 06/05/2017    CKMB 1 7 07/03/2017    CKTOTAL 190 07/03/2017    CKTOTAL 234 (H) 09/25/2014     Coagulation:   Lab Results   Component Value Date    INR 1 13 01/22/2019    INR 1 06 08/08/2014      Amylase: No results found for: AMYLASE  Lipase:   Lab Results   Component Value Date    LIPASE 78 05/24/2017        Imaging Studies: I have personally reviewed pertinent films in PACS    Counseling / Coordination of Care  Total floor / unit time spent today 10 minutes  Greater than 50% of total time was spent with the patient and / or family counseling and / or coordination of care       Mami Mckinley DO  1/22/2019, 3:31 PM

## 2019-01-22 NOTE — ANESTHESIA POSTPROCEDURE EVALUATION
Post-Op Assessment Note      CV Status:  Stable    Mental Status:  Alert and awake    Hydration Status:  Euvolemic    PONV Controlled:  Controlled    Airway Patency:  Patent    Post Op Vitals Reviewed: Yes          Staff: CRNA           /82 (01/22/19 1809)    Temp (!) 97 °F (36 1 °C) (01/22/19 1809)    Pulse 82 (01/22/19 1809)   Resp 15 (01/22/19 1809)    SpO2 97 % (01/22/19 1809)

## 2019-01-23 ENCOUNTER — TELEPHONE (OUTPATIENT)
Dept: RADIOLOGY | Facility: HOSPITAL | Age: 54
End: 2019-01-23

## 2019-01-23 NOTE — TELEPHONE ENCOUNTER
Follow-up IVC filter removal phone call  Patient is doing well without complaints or concerns  No significant pain over the right neck and right groin accesses  I instructed the patient to continue with her oral anticoagulation

## 2019-08-16 ENCOUNTER — OFFICE VISIT (OUTPATIENT)
Dept: FAMILY MEDICINE CLINIC | Facility: CLINIC | Age: 54
End: 2019-08-16
Payer: COMMERCIAL

## 2019-08-16 VITALS
SYSTOLIC BLOOD PRESSURE: 148 MMHG | TEMPERATURE: 96.9 F | HEIGHT: 65 IN | DIASTOLIC BLOOD PRESSURE: 94 MMHG | WEIGHT: 293 LBS | BODY MASS INDEX: 48.82 KG/M2

## 2019-08-16 DIAGNOSIS — Z12.11 SCREENING FOR COLON CANCER: ICD-10-CM

## 2019-08-16 DIAGNOSIS — Z12.31 ENCOUNTER FOR SCREENING MAMMOGRAM FOR BREAST CANCER: ICD-10-CM

## 2019-08-16 DIAGNOSIS — J30.1 SEASONAL ALLERGIC RHINITIS DUE TO POLLEN: ICD-10-CM

## 2019-08-16 DIAGNOSIS — Z11.59 NEED FOR HEPATITIS C SCREENING TEST: Primary | ICD-10-CM

## 2019-08-16 DIAGNOSIS — E03.8 OTHER SPECIFIED HYPOTHYROIDISM: ICD-10-CM

## 2019-08-16 DIAGNOSIS — E78.5 DYSLIPIDEMIA: ICD-10-CM

## 2019-08-16 PROCEDURE — 3008F BODY MASS INDEX DOCD: CPT | Performed by: FAMILY MEDICINE

## 2019-08-16 PROCEDURE — 99213 OFFICE O/P EST LOW 20 MIN: CPT | Performed by: FAMILY MEDICINE

## 2019-08-16 RX ORDER — AZITHROMYCIN 250 MG/1
TABLET, FILM COATED ORAL
Qty: 6 TABLET | Refills: 0 | Status: SHIPPED | OUTPATIENT
Start: 2019-08-16 | End: 2019-08-21

## 2019-08-16 RX ORDER — FLUTICASONE PROPIONATE 50 MCG
2 SPRAY, SUSPENSION (ML) NASAL DAILY
Qty: 16 G | Refills: 5 | Status: SHIPPED | OUTPATIENT
Start: 2019-08-16 | End: 2020-03-13

## 2019-08-16 NOTE — PROGRESS NOTES
Assessment/Plan:  I believe she just suffering from her allergies  She will restart her Flonase and get over-the-counter generic Claritin or Allegra  If symptoms continue or increase, she will start a Z-Cristiano  Blood work ordered on her  Follow-up here in 3 months or p r n  Problem List Items Addressed This Visit        Endocrine    Other specified hypothyroidism    Relevant Orders    CBC and differential    TSH, 3rd generation with Free T4 reflex       Respiratory    Seasonal allergic rhinitis due to pollen    Relevant Medications    azithromycin (ZITHROMAX) 250 mg tablet    fluticasone (FLONASE) 50 mcg/act nasal spray    Other Relevant Orders    CBC and differential       Other    Dyslipidemia    Relevant Orders    Comprehensive metabolic panel    Lipid Panel with Direct LDL reflex      Other Visit Diagnoses     Need for hepatitis C screening test    -  Primary    Relevant Orders    Hepatitis C antibody    Screening for colon cancer        Relevant Orders    Occult Blood, Fecal Immunochemical    Encounter for screening mammogram for breast cancer        Relevant Orders    Mammo screening bilateral w 3d & cad           Diagnoses and all orders for this visit:    Need for hepatitis C screening test  -     Hepatitis C antibody    Screening for colon cancer  -     Occult Blood, Fecal Immunochemical; Future    Encounter for screening mammogram for breast cancer  -     Mammo screening bilateral w 3d & cad; Future    Other specified hypothyroidism  -     CBC and differential  -     TSH, 3rd generation with Free T4 reflex    Dyslipidemia  -     Comprehensive metabolic panel  -     Lipid Panel with Direct LDL reflex    Seasonal allergic rhinitis due to pollen  -     CBC and differential  -     azithromycin (ZITHROMAX) 250 mg tablet;  Take 2 tablets today then 1 tablet daily x 4 days  -     fluticasone (FLONASE) 50 mcg/act nasal spray; 2 sprays into each nostril daily        No problem-specific Assessment & Plan notes found for this encounter  Subjective:      Patient ID: Prashanth Jimenez is a 47 y o  female  Patient here today stating since this morning has had a cough mild sore throat  Coughing up some phlegm off and on  Feeling hot and cold  No nausea vomiting or diarrhea  Has not taken anything for it  URI    This is a new problem  The current episode started today  The problem has been unchanged  There has been no fever  Associated symptoms include congestion and coughing  Pertinent negatives include no diarrhea, nausea or vomiting  She has tried nothing for the symptoms  The following portions of the patient's history were reviewed and updated as appropriate:   She has a past medical history of Abdominal pain, lower, Acute renal failure ([address] ), Allergic rhinitis, Ankle pain, right, Arthritis, Asthma, Bilateral chronic knee pain, Bladder pain, Blood clot in vein, Bursitis, Cardiac disorder, Cardiac murmur, Chest tightness or pressure, Curvature of spine, GERD (gastroesophageal reflux disease), Hernia, hiatal, Hyperlipidemia, Inguinal hernia, Jaw closure abnormality, Lumbar herniated disc, Morbid obesity ([address] ), Obesity, Osteoarthritis of right knee, Patellar tendinitis, Poor flexibility of tendon, Pulmonary embolism ([address] ), Sepsis ([address] ), Severe sepsis due to methicillin resistant Staphylococcus aureus (MRSA) with acute organ dysfunction ([address] ), Spider vein, symptomatic, Status post arthroscopy of right knee, Stomach disorder, Tear of medial meniscus of right knee, Thyroid disorder, and Umbilical hernia ,  does not have any pertinent problems on file  ,   has a past surgical history that includes Hysterectomy (2009); Open anterior shoulder reconstruction (Left); Hand surgery (Right); Foot surgery (Left); Colonoscopy; pr knee scope,med/lat menisectomy (Right, 4/11/2016); Exploratory laparotomy; Peripherally inserted central catheter insertion; Incision and drainage anterior neck (Right, 6/8/2017);  Other surgical history; Cystoscopy (01/1994); Incisional hernia repair; Shoulder surgery; and IR IVC filter removal (1/22/2019)  ,  family history includes Arthritis in her mother; Colon cancer in her brother and child; Diabetes in her family and other; Lung disease in her family  ,   reports that she quit smoking about 23 years ago  She has never used smokeless tobacco  She reports that she does not drink alcohol or use drugs  ,  is allergic to penicillin g and penicillins     Current Outpatient Medications   Medication Sig Dispense Refill    albuterol (2 5 mg/3 mL) 0 083 % nebulizer solution Take 2 5 mg by nebulization as needed for wheezing   albuterol (PROVENTIL HFA,VENTOLIN HFA) 90 mcg/act inhaler Inhale 2 puffs every 4 (four) hours as needed for wheezing 1 Inhaler 3    apixaban (ELIQUIS) 5 mg Take 1 tablet (5 mg total) by mouth 2 (two) times a day 60 tablet 5    atorvastatin (LIPITOR) 10 mg tablet Take 1 tablet (10 mg total) by mouth daily (Patient not taking: Reported on 8/16/2019) 30 tablet 5    azithromycin (ZITHROMAX) 250 mg tablet Take 2 tablets today then 1 tablet daily x 4 days 6 tablet 0    famotidine (PEPCID) 20 mg tablet take 1 tablet by mouth once daily as directed (Patient not taking: Reported on 8/16/2019) 30 tablet 5    fluticasone (FLONASE) 50 mcg/act nasal spray 2 sprays into each nostril daily 16 g 5    levothyroxine 100 mcg tablet Take 1 tablet (100 mcg total) by mouth daily (Patient not taking: Reported on 8/16/2019) 30 tablet 5     No current facility-administered medications for this visit  Review of Systems   Constitutional: Negative  HENT: Positive for congestion  Respiratory: Positive for cough  Cardiovascular: Negative  Gastrointestinal: Negative  Negative for diarrhea, nausea and vomiting  Genitourinary: Negative            Objective:  Vitals:    08/16/19 1328   BP: 148/94   BP Location: Left arm   Patient Position: Sitting   Temp: (!) 96 9 °F (36 1 °C)   Weight: (!) 142 kg (313 lb 12 8 oz)   Height: 5' 5" (1 651 m)     Body mass index is 52 22 kg/m²  Physical Exam   Constitutional: She is oriented to person, place, and time  She appears well-developed and well-nourished  No distress  HENT:   Head: Normocephalic and atraumatic  Nose: Mucosal edema present  Clear postnasal drip   Eyes: Conjunctivae are normal    Cardiovascular: Normal rate, regular rhythm and normal heart sounds  Exam reveals no gallop and no friction rub  No murmur heard  Pulmonary/Chest: Effort normal and breath sounds normal  No respiratory distress  She has no wheezes  She has no rales  Musculoskeletal: She exhibits no edema  Neurological: She is alert and oriented to person, place, and time  Skin: She is not diaphoretic  Psychiatric: She has a normal mood and affect  Her behavior is normal  Judgment and thought content normal    Vitals reviewed

## 2019-08-17 ENCOUNTER — APPOINTMENT (OUTPATIENT)
Dept: LAB | Facility: HOSPITAL | Age: 54
End: 2019-08-17
Payer: COMMERCIAL

## 2019-08-17 LAB
ALBUMIN SERPL BCP-MCNC: 3.1 G/DL (ref 3.5–5)
ALP SERPL-CCNC: 83 U/L (ref 46–116)
ALT SERPL W P-5'-P-CCNC: 20 U/L (ref 12–78)
ANION GAP SERPL CALCULATED.3IONS-SCNC: 7 MMOL/L (ref 4–13)
AST SERPL W P-5'-P-CCNC: 17 U/L (ref 5–45)
BASOPHILS # BLD AUTO: 0.09 THOUSANDS/ΜL (ref 0–0.1)
BASOPHILS NFR BLD AUTO: 1 % (ref 0–1)
BILIRUB SERPL-MCNC: 0.5 MG/DL (ref 0.2–1)
BUN SERPL-MCNC: 12 MG/DL (ref 5–25)
CALCIUM SERPL-MCNC: 8.9 MG/DL (ref 8.3–10.1)
CHLORIDE SERPL-SCNC: 105 MMOL/L (ref 100–108)
CHOLEST SERPL-MCNC: 223 MG/DL (ref 50–200)
CO2 SERPL-SCNC: 29 MMOL/L (ref 21–32)
CREAT SERPL-MCNC: 1.21 MG/DL (ref 0.6–1.3)
EOSINOPHIL # BLD AUTO: 0.25 THOUSAND/ΜL (ref 0–0.61)
EOSINOPHIL NFR BLD AUTO: 4 % (ref 0–6)
ERYTHROCYTE [DISTWIDTH] IN BLOOD BY AUTOMATED COUNT: 13.3 % (ref 11.6–15.1)
GFR SERPL CREATININE-BSD FRML MDRD: 51 ML/MIN/1.73SQ M
GLUCOSE P FAST SERPL-MCNC: 98 MG/DL (ref 65–99)
HCT VFR BLD AUTO: 43 % (ref 34.8–46.1)
HDLC SERPL-MCNC: 41 MG/DL (ref 40–60)
HGB BLD-MCNC: 13.2 G/DL (ref 11.5–15.4)
IMM GRANULOCYTES # BLD AUTO: 0.01 THOUSAND/UL (ref 0–0.2)
IMM GRANULOCYTES NFR BLD AUTO: 0 % (ref 0–2)
LDLC SERPL CALC-MCNC: 151 MG/DL (ref 0–100)
LYMPHOCYTES # BLD AUTO: 1.64 THOUSANDS/ΜL (ref 0.6–4.47)
LYMPHOCYTES NFR BLD AUTO: 25 % (ref 14–44)
MCH RBC QN AUTO: 30.3 PG (ref 26.8–34.3)
MCHC RBC AUTO-ENTMCNC: 30.7 G/DL (ref 31.4–37.4)
MCV RBC AUTO: 99 FL (ref 82–98)
MONOCYTES # BLD AUTO: 0.82 THOUSAND/ΜL (ref 0.17–1.22)
MONOCYTES NFR BLD AUTO: 13 % (ref 4–12)
NEUTROPHILS # BLD AUTO: 3.75 THOUSANDS/ΜL (ref 1.85–7.62)
NEUTS SEG NFR BLD AUTO: 57 % (ref 43–75)
NRBC BLD AUTO-RTO: 0 /100 WBCS
PLATELET # BLD AUTO: 305 THOUSANDS/UL (ref 149–390)
PMV BLD AUTO: 9.6 FL (ref 8.9–12.7)
POTASSIUM SERPL-SCNC: 3.8 MMOL/L (ref 3.5–5.3)
PROT SERPL-MCNC: 6.8 G/DL (ref 6.4–8.2)
RBC # BLD AUTO: 4.36 MILLION/UL (ref 3.81–5.12)
SODIUM SERPL-SCNC: 141 MMOL/L (ref 136–145)
T4 FREE SERPL-MCNC: 0.8 NG/DL (ref 0.76–1.46)
TRIGL SERPL-MCNC: 156 MG/DL
TSH SERPL DL<=0.05 MIU/L-ACNC: 9.01 UIU/ML (ref 0.36–3.74)
WBC # BLD AUTO: 6.56 THOUSAND/UL (ref 4.31–10.16)

## 2019-08-17 PROCEDURE — 84443 ASSAY THYROID STIM HORMONE: CPT | Performed by: FAMILY MEDICINE

## 2019-08-17 PROCEDURE — 80061 LIPID PANEL: CPT | Performed by: FAMILY MEDICINE

## 2019-08-17 PROCEDURE — 84439 ASSAY OF FREE THYROXINE: CPT | Performed by: FAMILY MEDICINE

## 2019-08-17 PROCEDURE — 85025 COMPLETE CBC W/AUTO DIFF WBC: CPT | Performed by: FAMILY MEDICINE

## 2019-08-17 PROCEDURE — 80053 COMPREHEN METABOLIC PANEL: CPT | Performed by: FAMILY MEDICINE

## 2019-08-17 PROCEDURE — 36415 COLL VENOUS BLD VENIPUNCTURE: CPT | Performed by: FAMILY MEDICINE

## 2019-08-17 PROCEDURE — 86803 HEPATITIS C AB TEST: CPT | Performed by: FAMILY MEDICINE

## 2019-08-18 LAB — HCV AB SER QL: NORMAL

## 2019-08-19 ENCOUNTER — TELEPHONE (OUTPATIENT)
Dept: OTHER | Facility: OTHER | Age: 54
End: 2019-08-19

## 2019-08-20 ENCOUNTER — HOSPITAL ENCOUNTER (OUTPATIENT)
Dept: MAMMOGRAPHY | Facility: HOSPITAL | Age: 54
Discharge: HOME | End: 2019-08-20
Payer: COMMERCIAL

## 2019-08-20 VITALS — WEIGHT: 293 LBS | BODY MASS INDEX: 48.82 KG/M2 | HEIGHT: 65 IN

## 2019-08-20 DIAGNOSIS — E78.5 DYSLIPIDEMIA: ICD-10-CM

## 2019-08-20 DIAGNOSIS — Z12.31 ENCOUNTER FOR SCREENING MAMMOGRAM FOR BREAST CANCER: ICD-10-CM

## 2019-08-20 DIAGNOSIS — E03.8 OTHER SPECIFIED HYPOTHYROIDISM: ICD-10-CM

## 2019-08-20 DIAGNOSIS — I26.99 PULMONARY EMBOLISM, BILATERAL (HCC): ICD-10-CM

## 2019-08-20 PROCEDURE — 77067 SCR MAMMO BI INCL CAD: CPT

## 2019-08-20 PROCEDURE — 77063 BREAST TOMOSYNTHESIS BI: CPT

## 2019-08-20 RX ORDER — LEVOTHYROXINE SODIUM 0.1 MG/1
100 TABLET ORAL DAILY
Qty: 30 TABLET | Refills: 5 | Status: SHIPPED | OUTPATIENT
Start: 2019-08-20 | End: 2020-03-13

## 2019-08-20 RX ORDER — ATORVASTATIN CALCIUM 10 MG/1
10 TABLET, FILM COATED ORAL DAILY
Qty: 30 TABLET | Refills: 5 | Status: SHIPPED | OUTPATIENT
Start: 2019-08-20 | End: 2020-03-13

## 2019-09-08 ENCOUNTER — HOSPITAL ENCOUNTER (EMERGENCY)
Facility: HOSPITAL | Age: 54
End: 2019-09-08
Attending: EMERGENCY MEDICINE
Payer: COMMERCIAL

## 2019-09-08 ENCOUNTER — HOSPITAL ENCOUNTER (INPATIENT)
Facility: HOSPITAL | Age: 54
LOS: 1 days | Discharge: HOME | DRG: 423 | End: 2019-09-09
Attending: INTERNAL MEDICINE | Admitting: INTERNAL MEDICINE
Payer: COMMERCIAL

## 2019-09-08 ENCOUNTER — APPOINTMENT (EMERGENCY)
Dept: CT IMAGING | Facility: HOSPITAL | Age: 54
End: 2019-09-08
Payer: COMMERCIAL

## 2019-09-08 VITALS
RESPIRATION RATE: 20 BRPM | SYSTOLIC BLOOD PRESSURE: 121 MMHG | BODY MASS INDEX: 52.09 KG/M2 | HEART RATE: 114 BPM | DIASTOLIC BLOOD PRESSURE: 80 MMHG | WEIGHT: 293 LBS | OXYGEN SATURATION: 96 % | TEMPERATURE: 95.7 F

## 2019-09-08 DIAGNOSIS — T78.3XXA ANGIOEDEMA, INITIAL ENCOUNTER: Primary | ICD-10-CM

## 2019-09-08 DIAGNOSIS — J39.2 PHARYNGEAL EDEMA: ICD-10-CM

## 2019-09-08 DIAGNOSIS — J38.4 LARYNGEAL EDEMA: ICD-10-CM

## 2019-09-08 DIAGNOSIS — J02.9 SORE THROAT: ICD-10-CM

## 2019-09-08 DIAGNOSIS — J98.8 AIRWAY OBSTRUCTION: Primary | ICD-10-CM

## 2019-09-08 LAB
ABO GROUP BLD: NORMAL
ALBUMIN SERPL BCP-MCNC: 3.3 G/DL (ref 3.5–5)
ALP SERPL-CCNC: 113 U/L (ref 46–116)
ALT SERPL W P-5'-P-CCNC: 34 U/L (ref 12–78)
ANION GAP SERPL CALCULATED.3IONS-SCNC: 8 MMOL/L (ref 4–13)
APTT PPP: 33 SECONDS (ref 23–37)
AST SERPL W P-5'-P-CCNC: 27 U/L (ref 5–45)
BASOPHILS # BLD AUTO: 0.1 THOUSANDS/ΜL (ref 0–0.1)
BASOPHILS NFR BLD AUTO: 1 % (ref 0–1)
BILIRUB SERPL-MCNC: 0.5 MG/DL (ref 0.2–1)
BLD GP AB SCN SERPL QL: NEGATIVE
BUN SERPL-MCNC: 15 MG/DL (ref 5–25)
CALCIUM SERPL-MCNC: 9 MG/DL (ref 8.3–10.1)
CHLORIDE SERPL-SCNC: 105 MMOL/L (ref 100–108)
CO2 SERPL-SCNC: 25 MMOL/L (ref 21–32)
CREAT SERPL-MCNC: 1.28 MG/DL (ref 0.6–1.3)
EOSINOPHIL # BLD AUTO: 0.62 THOUSAND/ΜL (ref 0–0.61)
EOSINOPHIL NFR BLD AUTO: 8 % (ref 0–6)
ERYTHROCYTE [DISTWIDTH] IN BLOOD BY AUTOMATED COUNT: 13.2 % (ref 11.6–15.1)
GFR SERPL CREATININE-BSD FRML MDRD: 48 ML/MIN/1.73SQ M
GLUCOSE SERPL-MCNC: 101 MG/DL (ref 65–140)
HCT VFR BLD AUTO: 43.7 % (ref 34.8–46.1)
HETEROPH AB SER QL: NEGATIVE
HGB BLD-MCNC: 13.5 G/DL (ref 11.5–15.4)
IMM GRANULOCYTES # BLD AUTO: 0.02 THOUSAND/UL (ref 0–0.2)
IMM GRANULOCYTES NFR BLD AUTO: 0 % (ref 0–2)
INR PPP: 1.22 (ref 0.84–1.19)
LACTATE SERPL-SCNC: 1.3 MMOL/L (ref 0.5–2)
LYMPHOCYTES # BLD AUTO: 1.68 THOUSANDS/ΜL (ref 0.6–4.47)
LYMPHOCYTES NFR BLD AUTO: 22 % (ref 14–44)
MAGNESIUM SERPL-MCNC: 2.1 MG/DL (ref 1.6–2.6)
MCH RBC QN AUTO: 30.1 PG (ref 26.8–34.3)
MCHC RBC AUTO-ENTMCNC: 30.9 G/DL (ref 31.4–37.4)
MCV RBC AUTO: 98 FL (ref 82–98)
MONOCYTES # BLD AUTO: 0.64 THOUSAND/ΜL (ref 0.17–1.22)
MONOCYTES NFR BLD AUTO: 9 % (ref 4–12)
NEUTROPHILS # BLD AUTO: 4.46 THOUSANDS/ΜL (ref 1.85–7.62)
NEUTS SEG NFR BLD AUTO: 60 % (ref 43–75)
NRBC BLD AUTO-RTO: 0 /100 WBCS
PLATELET # BLD AUTO: 305 THOUSANDS/UL (ref 149–390)
PMV BLD AUTO: 10.4 FL (ref 8.9–12.7)
POTASSIUM SERPL-SCNC: 3.5 MMOL/L (ref 3.5–5.3)
PROT SERPL-MCNC: 6.8 G/DL (ref 6.4–8.2)
PROTHROMBIN TIME: 15.5 SECONDS (ref 11.6–14.5)
RBC # BLD AUTO: 4.48 MILLION/UL (ref 3.81–5.12)
RH BLD: NEGATIVE
S PYO AG THROAT QL: NEGATIVE
SODIUM SERPL-SCNC: 138 MMOL/L (ref 136–145)
SPECIMEN EXPIRATION DATE: NORMAL
WBC # BLD AUTO: 7.52 THOUSAND/UL (ref 4.31–10.16)

## 2019-09-08 PROCEDURE — 93005 ELECTROCARDIOGRAM TRACING: CPT

## 2019-09-08 PROCEDURE — 86900 BLOOD TYPING SEROLOGIC ABO: CPT | Performed by: EMERGENCY MEDICINE

## 2019-09-08 PROCEDURE — 83605 ASSAY OF LACTIC ACID: CPT | Performed by: EMERGENCY MEDICINE

## 2019-09-08 PROCEDURE — 96375 TX/PRO/DX INJ NEW DRUG ADDON: CPT

## 2019-09-08 PROCEDURE — 36415 COLL VENOUS BLD VENIPUNCTURE: CPT | Performed by: EMERGENCY MEDICINE

## 2019-09-08 PROCEDURE — 96372 THER/PROPH/DIAG INJ SC/IM: CPT

## 2019-09-08 PROCEDURE — 96361 HYDRATE IV INFUSION ADD-ON: CPT

## 2019-09-08 PROCEDURE — 99222 1ST HOSP IP/OBS MODERATE 55: CPT | Performed by: INTERNAL MEDICINE

## 2019-09-08 PROCEDURE — 86308 HETEROPHILE ANTIBODY SCREEN: CPT | Performed by: EMERGENCY MEDICINE

## 2019-09-08 PROCEDURE — 99285 EMERGENCY DEPT VISIT HI MDM: CPT

## 2019-09-08 PROCEDURE — 86901 BLOOD TYPING SEROLOGIC RH(D): CPT | Performed by: EMERGENCY MEDICINE

## 2019-09-08 PROCEDURE — 85025 COMPLETE CBC W/AUTO DIFF WBC: CPT | Performed by: EMERGENCY MEDICINE

## 2019-09-08 PROCEDURE — G0379 DIRECT REFER HOSPITAL OBSERV: HCPCS

## 2019-09-08 PROCEDURE — 99291 CRITICAL CARE FIRST HOUR: CPT | Performed by: EMERGENCY MEDICINE

## 2019-09-08 PROCEDURE — 85730 THROMBOPLASTIN TIME PARTIAL: CPT | Performed by: EMERGENCY MEDICINE

## 2019-09-08 PROCEDURE — 96374 THER/PROPH/DIAG INJ IV PUSH: CPT

## 2019-09-08 PROCEDURE — 86850 RBC ANTIBODY SCREEN: CPT | Performed by: EMERGENCY MEDICINE

## 2019-09-08 PROCEDURE — 70491 CT SOFT TISSUE NECK W/DYE: CPT

## 2019-09-08 PROCEDURE — 83735 ASSAY OF MAGNESIUM: CPT | Performed by: EMERGENCY MEDICINE

## 2019-09-08 PROCEDURE — 87430 STREP A AG IA: CPT | Performed by: EMERGENCY MEDICINE

## 2019-09-08 PROCEDURE — 80053 COMPREHEN METABOLIC PANEL: CPT | Performed by: EMERGENCY MEDICINE

## 2019-09-08 PROCEDURE — 94640 AIRWAY INHALATION TREATMENT: CPT

## 2019-09-08 PROCEDURE — 85610 PROTHROMBIN TIME: CPT | Performed by: EMERGENCY MEDICINE

## 2019-09-08 RX ORDER — FAMOTIDINE 20 MG/1
20 TABLET, FILM COATED ORAL
Status: DISCONTINUED | OUTPATIENT
Start: 2019-09-08 | End: 2019-09-09 | Stop reason: HOSPADM

## 2019-09-08 RX ORDER — DIPHENHYDRAMINE HYDROCHLORIDE 50 MG/ML
25 INJECTION INTRAMUSCULAR; INTRAVENOUS ONCE
Status: COMPLETED | OUTPATIENT
Start: 2019-09-08 | End: 2019-09-08

## 2019-09-08 RX ORDER — LEVALBUTEROL 1.25 MG/.5ML
1.25 SOLUTION, CONCENTRATE RESPIRATORY (INHALATION) EVERY 8 HOURS PRN
Status: DISCONTINUED | OUTPATIENT
Start: 2019-09-08 | End: 2019-09-09 | Stop reason: HOSPADM

## 2019-09-08 RX ORDER — EPINEPHRINE 1 MG/ML
0.3 INJECTION, SOLUTION, CONCENTRATE INTRAVENOUS ONCE
Status: COMPLETED | OUTPATIENT
Start: 2019-09-08 | End: 2019-09-08

## 2019-09-08 RX ORDER — LEVOTHYROXINE SODIUM 0.1 MG/1
100 TABLET ORAL
Status: DISCONTINUED | OUTPATIENT
Start: 2019-09-08 | End: 2019-09-09 | Stop reason: HOSPADM

## 2019-09-08 RX ORDER — FLUTICASONE PROPIONATE 50 MCG
2 SPRAY, SUSPENSION (ML) NASAL DAILY
Status: DISCONTINUED | OUTPATIENT
Start: 2019-09-08 | End: 2019-09-09 | Stop reason: HOSPADM

## 2019-09-08 RX ORDER — METHYLPREDNISOLONE SODIUM SUCCINATE 125 MG/2ML
125 INJECTION, POWDER, LYOPHILIZED, FOR SOLUTION INTRAMUSCULAR; INTRAVENOUS ONCE
Status: COMPLETED | OUTPATIENT
Start: 2019-09-08 | End: 2019-09-08

## 2019-09-08 RX ORDER — ATORVASTATIN CALCIUM 20 MG/1
10 TABLET, FILM COATED ORAL
Status: DISCONTINUED | OUTPATIENT
Start: 2019-09-08 | End: 2019-09-09 | Stop reason: HOSPADM

## 2019-09-08 RX ADMIN — FAMOTIDINE 20 MG: 10 INJECTION, SOLUTION INTRAVENOUS at 03:22

## 2019-09-08 RX ADMIN — METHYLPREDNISOLONE SODIUM SUCCINATE 125 MG: 125 INJECTION, POWDER, FOR SOLUTION INTRAMUSCULAR; INTRAVENOUS at 03:22

## 2019-09-08 RX ADMIN — APIXABAN 5 MG: 5 TABLET, FILM COATED ORAL at 09:01

## 2019-09-08 RX ADMIN — FLUTICASONE PROPIONATE 2 SPRAY: 50 SPRAY, METERED NASAL at 09:01

## 2019-09-08 RX ADMIN — IOHEXOL 85 ML: 350 INJECTION, SOLUTION INTRAVENOUS at 04:23

## 2019-09-08 RX ADMIN — SODIUM CHLORIDE 1000 ML: 0.9 INJECTION, SOLUTION INTRAVENOUS at 03:22

## 2019-09-08 RX ADMIN — EPINEPHRINE 0.3 MG: 1 INJECTION, SOLUTION, CONCENTRATE INTRAVENOUS at 04:34

## 2019-09-08 RX ADMIN — DIPHENHYDRAMINE HYDROCHLORIDE 25 MG: 50 INJECTION INTRAMUSCULAR; INTRAVENOUS at 03:22

## 2019-09-08 RX ADMIN — FAMOTIDINE 20 MG: 20 TABLET ORAL at 09:01

## 2019-09-08 RX ADMIN — RACEPINEPHRINE HYDROCHLORIDE 0.5 ML: 11.25 SOLUTION RESPIRATORY (INHALATION) at 04:57

## 2019-09-08 RX ADMIN — ATORVASTATIN CALCIUM 10 MG: 20 TABLET, FILM COATED ORAL at 17:17

## 2019-09-08 RX ADMIN — LEVOTHYROXINE SODIUM 100 MCG: 100 TABLET ORAL at 09:00

## 2019-09-08 RX ADMIN — APIXABAN 5 MG: 5 TABLET, FILM COATED ORAL at 17:17

## 2019-09-08 NOTE — ED NOTES
Patient transported to [facility] Cristin, RN  09/08/19 5880

## 2019-09-08 NOTE — EMTALA/ACUTE CARE TRANSFER
[facility] EMERGENCY DEPARTMENT  [address] [facility] [address]  Dept: [phone]      EMTALA TRANSFER CONSENT    NAME Ciro Cheadle                                         1965                              MRN [id number]    I have been informed of my rights regarding examination, treatment, and transfer   by Dr Macy Blue DO    Benefits:      Risks:        Consent for Transfer:  I acknowledge that my medical condition has been evaluated and explained to me by the emergency department physician or other qualified medical person and/or my attending physician, who has recommended that I be transferred to the service of Dr Homar Ragsdale    at [facility]    The above potential benefits of such transfer, the potential risks associated with such transfer, and the probable risks of not being transferred have been explained to me, and I fully understand them  The doctor has explained that, in my case, the benefits of transfer outweigh the risks  I agree to be transferred  I authorize the performance of emergency medical procedures and treatments upon me in both transit and upon arrival at the receiving facility  Additionally, I authorize the release of any and all medical records to the receiving facility and request they be transported with me, if possible  I understand that the safest mode of transportation during a medical emergency is an ambulance and that the Hospital advocates the use of this mode of transport  Risks of traveling to the receiving facility by car, including absence of medical control, life sustaining equipment, such as oxygen, and medical personnel has been explained to me and I fully understand them  (DALY CORRECT BOX BELOW)  [  ]  I consent to the stated transfer and to be transported by ambulance/helicopter  [  ]  I consent to the stated transfer, but refuse transportation by ambulance and accept full responsibility for my transportation by car    I understand the risks of non-ambulance transfers and I exonerate the Hospital and its staff from any deterioration in my condition that results from this refusal     X___________________________________________    DATE  19  TIME________  Signature of patient or legally responsible individual signing on patient behalf           RELATIONSHIP TO PATIENT_________________________          Provider Certification    NAME Ferny Samayoa                                         1965                              MRN [id number]    A medical screening exam was performed on the above named patient  Based on the examination:    Condition Necessitating Transfer There were no encounter diagnoses  Patient Condition:      Reason for Transfer:      Transfer Requirements: Facility     · Space available and qualified personnel available for treatment as acknowledged by    · Agreed to accept transfer and to provide appropriate medical treatment as acknowledged by          · Appropriate medical records of the examination and treatment of the patient are provided at the time of transfer   [address] [place] [address] [address] _______  · Transfer will be performed by qualified personnel from    and appropriate transfer equipment as required, including the use of necessary and appropriate life support measures      Provider Certification: I have examined the patient and explained the following risks and benefits of being transferred/refusing transfer to the patient/family:         Based on these reasonable risks and benefits to the patient and/or the unborn child(av), and based upon the information available at the time of the patients examination, I certify that the medical benefits reasonably to be expected from the provision of appropriate medical treatments at another medical facility outweigh the increasing risks, if any, to the individuals medical condition, and in the case of labor to the unborn child, from effecting the transfer      X____________________________________________ DATE 09/08/19        TIME_______      ORIGINAL - SEND TO MEDICAL RECORDS   COPY - SEND WITH PATIENT DURING TRANSFER

## 2019-09-08 NOTE — ED PROVIDER NOTES
History  Chief Complaint   Patient presents with    Sore Throat     Patient woke up about 2 hours ago with a sore throat and difficulty swallowing  Patient feels as though shes having a hard time breathing  Patient is a 51-year-old female with significant past medical history of severe sepsis, osteoarthritis, PE on Eliquis, morbid obesity, history of renal failure subsequently resolved, herniated disc, coming in today with complaint of sore throat  Patient states she went to bed feeling well and woke up several hours ago with worsening sore throat  She had no new foods, medication  She is not ACE-inhibitor  She states that with it feels like her throat was closing in that she can't breathe  She has no wheezing  She has no fevers, chills, recent surgeries or recent dental procedures  She did not take anything for this  She states that she could swallow any of the pills  She has no trauma to the region  Patient states that there is no family members with known history of hereditary angioedema  She reports there is no do diet changes  No new medication changes         used: No    Sore Throat   Location:  Generalized  Quality:  Aching, sharp and sore  Severity:  Moderate  Onset quality:  Gradual  Timing:  Constant  Progression:  Worsening  Chronicity:  New  Relieved by:  None tried  Worsened by:  Nothing  Ineffective treatments:  None tried  Associated symptoms: adenopathy, shortness of breath, trouble swallowing and voice change    Associated symptoms: no abdominal pain, no chest pain, no chills, no cough, no drooling, no ear discharge, no ear pain, no epistaxis, no eye discharge, no fever, no headaches, no neck stiffness, no night sweats, no plugged ear sensation, no postnasal drip, no rash, no rhinorrhea, no sinus congestion and no stridor    Risk factors: no exposure to strep, no exposure to mono, no sick contacts, no recent dental procedure, no recent endoscopy and no recent ENT procedure        Prior to Admission Medications   Prescriptions Last Dose Informant Patient Reported? Taking? albuterol (2 5 mg/3 mL) 0 083 % nebulizer solution   Yes No   Sig: Take 2 5 mg by nebulization as needed for wheezing     albuterol (PROVENTIL HFA,VENTOLIN HFA) 90 mcg/act inhaler   No No   Sig: Inhale 2 puffs every 4 (four) hours as needed for wheezing   apixaban (ELIQUIS) 5 mg   No No   Sig: Take 1 tablet (5 mg total) by mouth 2 (two) times a day   atorvastatin (LIPITOR) 10 mg tablet   No No   Sig: Take 1 tablet (10 mg total) by mouth daily   famotidine (PEPCID) 20 mg tablet   No No   Sig: take 1 tablet by mouth once daily as directed   Patient not taking: Reported on 2019   fluticasone (FLONASE) 50 mcg/act nasal spray   No No   Si sprays into each nostril daily   levothyroxine 100 mcg tablet   No No   Sig: Take 1 tablet (100 mcg total) by mouth daily      Facility-Administered Medications: None       Past Medical History:   Diagnosis Date    Abdominal pain, lower     [id number] RESOLVED    Acute renal failure ([facility] [address] )     [id number] RESOLVED    Allergic rhinitis     00QCC4858 RESOLVED    Ankle pain, right     13FDS9585 RESOLVED    Arthritis     [id number] RESOLVED  [id number] RESOLVED    Asthma     Bilateral chronic knee pain     39ISD4656    Bladder pain     39WLP5019 RESOLVED    Blood clot in vein     Bursitis     69ZFW4001 RESOLVED    Cardiac disorder     37OQX0568  RESOLVED    Cardiac murmur     62UZW0860 RESOLVED    Chest tightness or pressure     01FUY1181 RESOLVED    Curvature of spine     99SOY0565 RESOLVED    GERD (gastroesophageal reflux disease)     Hernia, hiatal     92ESN0425 RESOLVED    Hyperlipidemia     69LEM7694 RESOLVED    Inguinal hernia     RIGHT   37MWE0172 RESOLVED    Jaw closure abnormality     00HFY9362 RESOLVED    Lumbar herniated disc     68IOX5803 RESOLVED    Morbid obesity ([facility] [address] )     16UUI0309    Obesity     Osteoarthritis of right knee     09LHQ4518 RESOLVED    Patellar tendinitis     61QKZ2788    Poor flexibility of tendon     87UXE1553    Pulmonary embolism ([facility] [address] [facility] )     45JWO8518 RESOLVED    Sepsis ([address] )     43JGW8545 RESOLVED    Severe sepsis due to methicillin resistant Staphylococcus aureus (MRSA) with acute organ dysfunction ([facility] [address] [facility] )     41OPB6721 RESOLVED    Spider vein, symptomatic     35GKF2262 RESOLVED    Status post arthroscopy of right knee     97NJS6953 RESOLVED    Stomach disorder     80VKA9498 RESOLVED    Tear of medial meniscus of right knee     SUBSEQUENT ENCOUNTER  RESOLVED 02IYF9968    Thyroid disorder     08ZNX8287    Umbilical hernia     63COA1514 RESOLVED       Past Surgical History:   Procedure Laterality Date    COLONOSCOPY      CYSTOSCOPY  01/1994    WITH BIOPSY      EXPLORATORY LAPAROTOMY      FOOT SURGERY Left     HAND SURGERY Right     cyst    HYSTERECTOMY  2009    INCISION AND DRAINAGE ANTERIOR NECK Right 6/8/2017    Procedure: INCISION AND DRAINAGE  (I&D) NECK;  Surgeon: Nicole Doyle MD;  Location:  MAIN OR;  Service:    [address] [place] [address]      WITH IMPLANTATION OF MESH  13 MAY 2014  LAST ASSESSED    IR IVC FILTER REMOVAL  1/22/2019    OPEN ANTERIOR SHOULDER RECONSTRUCTION Left     OTHER SURGICAL HISTORY      BLOOD VESSEL REPAIR   13 MAY 2014 LAST ASSESSED    PERIPHERALLY INSERTED CENTRAL CATHETER INSERTION      CO KNEE SCOPE,MED/LAT MENISECTOMY Right 4/11/2016    Procedure: KNEE ARTHROSCOPY WITH MEDIAL MENISCECTOMY ;  Surgeon: Mindy Montague MD;  Location: [facility];  Service: Orthopedics    SHOULDER SURGERY         Family History   Problem Relation Age of Onset    Arthritis Mother     Colon cancer Brother     Lung cancer Father     No Known Problems Sister     No Known Problems Daughter     No Known Problems Maternal Grandmother     No Known Problems Maternal Grandfather     No Known Problems Paternal Grandmother     No Known Problems Paternal Grandfather     Breast cancer Maternal Aunt  Diabetes Maternal Aunt     No Known Problems Sister     No Known Problems Sister     No Known Problems Sister     No Known Problems Sister     No Known Problems Sister     No Known Problems Daughter     Colon cancer Son      I have reviewed and agree with the history as documented  Social History     Tobacco Use    Smoking status: Former Smoker     Last attempt to quit: 1995     Years since quittin 8    Smokeless tobacco: Never Used    Tobacco comment: quit 20 years ago   Substance Use Topics    Alcohol use: No    Drug use: No        Review of Systems   Constitutional: Negative for chills, diaphoresis, fever and night sweats  HENT: Positive for sore throat, trouble swallowing and voice change  Negative for drooling, ear discharge, ear pain, nosebleeds, postnasal drip and rhinorrhea  Eyes: Negative for discharge and visual disturbance  Respiratory: Positive for shortness of breath  Negative for cough, chest tightness and stridor  Cardiovascular: Negative for chest pain and palpitations  Gastrointestinal: Negative for abdominal pain, nausea and vomiting  Genitourinary: Negative for difficulty urinating and dysuria  Musculoskeletal: Negative for back pain, neck pain and neck stiffness  Skin: Negative for rash  Neurological: Negative for weakness and headaches  Hematological: Positive for adenopathy  Psychiatric/Behavioral: Negative for confusion  All other systems reviewed and are negative  Physical Exam  Physical Exam   Constitutional: She is oriented to person, place, and time  She appears well-developed and well-nourished  No distress  HENT:   Head: Normocephalic and atraumatic  Right Ear: Hearing, tympanic membrane, external ear and ear canal normal    Left Ear: Hearing, tympanic membrane, external ear and ear canal normal    Nose: Nose normal    Mouth/Throat: No trismus in the jaw  Uvula swelling present  Posterior oropharyngeal edema present  Patient is maintaining airway and secretions  There is significant swelling to the posterior pharynx including the uvula that is extending inferiorly  There is no exudates or lesions noted  There is noted muscle change to her voice  There is no stridor  There is no tried potting  There is no brawniness under the tongue  There is no tripodding   Eyes: Pupils are equal, round, and reactive to light  Conjunctivae and EOM are normal    Neck: Normal range of motion  Neck supple  Cardiovascular: Normal rate, regular rhythm, normal heart sounds and intact distal pulses  No murmur heard  Pulses:       Radial pulses are 2+ on the right side, and 2+ on the left side  Dorsalis pedis pulses are 2+ on the right side, and 2+ on the left side  Pulmonary/Chest: Effort normal and breath sounds normal  No stridor  No respiratory distress  Abdominal: Soft  Bowel sounds are normal  She exhibits no distension  There is no tenderness  Musculoskeletal: Normal range of motion  She exhibits no edema  Neurological: She is alert and oriented to person, place, and time  She has normal strength  No cranial nerve deficit or sensory deficit  GCS eye subscore is 4  GCS verbal subscore is 5  GCS motor subscore is 6  No slurred speech  No facial asymmetry  No ataxia   Skin: Skin is warm  Capillary refill takes less than 2 seconds  She is not diaphoretic  Nursing note and vitals reviewed        Vital Signs  ED Triage Vitals [09/08/19 0254]   Temperature Pulse Respirations Blood Pressure SpO2   (!) 95 7 °F (35 4 °C) 90 18 (!) 174/94 99 %      Temp Source Heart Rate Source Patient Position - Orthostatic VS BP Location FiO2 (%)   Temporal Monitor Sitting Left arm --      Pain Score       5           Vitals:    09/08/19 0430 09/08/19 0445 09/08/19 0500 09/08/19 0515   BP: 135/85  121/63 151/77   Pulse: 80 80 94 97   Patient Position - Orthostatic VS:             Visual Acuity      ED Medications  Medications methylPREDNISolone sodium succinate (Solu-MEDROL) injection 125 mg (125 mg Intravenous Given 9/8/19 0322)   sodium chloride 0 9 % bolus 1,000 mL (1,000 mL Intravenous New Bag 9/8/19 0322)   famotidine (PEPCID) injection 20 mg (20 mg Intravenous Given 9/8/19 0322)   diphenhydrAMINE (BENADRYL) injection 25 mg (25 mg Intravenous Given 9/8/19 0322)   iohexol (OMNIPAQUE) 350 MG/ML injection (SINGLE-DOSE) 85 mL (85 mL Intravenous Given 9/8/19 0423)   EPINEPHrine PF (ADRENALIN) 1 mg/mL injection 0 3 mg (0 3 mg Intramuscular Given 9/8/19 0434)   racepinephrine 2 25 % inhalation solution 0 5 mL (0 5 mL Nebulization Given 9/8/19 0457)       Diagnostic Studies  Results Reviewed     Procedure Component Value Units Date/Time    Lactic acid, plasma x2 [[id number]]  (Normal) Collected:  09/08/19 0323    Lab Status:  Final result Specimen:  Blood from Arm, Right Updated:  09/08/19 0351     LACTIC ACID 1 3 mmol/L     Narrative:       Result may be elevated if tourniquet was used during collection      Comprehensive metabolic panel [[id number]]  (Abnormal) Collected:  09/08/19 0323    Lab Status:  Final result Specimen:  Blood from Arm, Right Updated:  09/08/19 0348     Sodium 138 mmol/L      Potassium 3 5 mmol/L      Chloride 105 mmol/L      CO2 25 mmol/L      ANION GAP 8 mmol/L      BUN 15 mg/dL      Creatinine 1 28 mg/dL      Glucose 101 mg/dL      Calcium 9 0 mg/dL      AST 27 U/L      ALT 34 U/L      Alkaline Phosphatase 113 U/L      Total Protein 6 8 g/dL      Albumin 3 3 g/dL      Total Bilirubin 0 50 mg/dL      eGFR 48 ml/min/1 73sq m     Narrative:       Effie guidelines for Chronic Kidney Disease (CKD):     Stage 1 with normal or high GFR (GFR > 90 mL/min/1 73 square meters)    Stage 2 Mild CKD (GFR = 60-89 mL/min/1 73 square meters)    Stage 3A Moderate CKD (GFR = 45-59 mL/min/1 73 square meters)    Stage 3B Moderate CKD (GFR = 30-44 mL/min/1 73 square meters)    Stage 4 Severe CKD (GFR = 15-29 mL/min/1 73 square meters)    Stage 5 End Stage CKD (GFR <15 mL/min/1 73 square meters)  Note: GFR calculation is accurate only with a steady state creatinine    Protime-INR [[id number]]  (Abnormal) Collected:  09/08/19 0323    Lab Status:  Final result Specimen:  Blood from Arm, Right Updated:  09/08/19 0345     Protime 15 5 seconds      INR 1 22    APTT [[id number]]  (Normal) Collected:  09/08/19 0323    Lab Status:  Final result Specimen:  Blood from Arm, Right Updated:  09/08/19 0345     PTT 33 seconds     Rapid Strep A Screen Only, Adults [[id number]]  (Normal) Collected:  09/08/19 0323    Lab Status:  Final result Specimen:  Throat Updated:  09/08/19 0345     Rapid Strep A Screen Negative    Magnesium [[id number]]  (Normal) Collected:  09/08/19 0323    Lab Status:  Final result Specimen:  Blood from Arm, Right Updated:  09/08/19 0342     Magnesium 2 1 mg/dL     CBC and differential [[id number]]  (Abnormal) Collected:  09/08/19 0323    Lab Status:  Final result Specimen:  Blood from Arm, Right Updated:  09/08/19 0333     WBC 7 52 Thousand/uL      RBC 4 48 Million/uL      Hemoglobin 13 5 g/dL      Hematocrit 43 7 %      MCV 98 fL      MCH 30 1 pg      MCHC 30 9 g/dL      RDW 13 2 %      MPV 10 4 fL      Platelets 346 Thousands/uL      nRBC 0 /100 WBCs      Neutrophils Relative 60 %      Immat GRANS % 0 %      Lymphocytes Relative 22 %      Monocytes Relative 9 %      Eosinophils Relative 8 %      Basophils Relative 1 %      Neutrophils Absolute 4 46 Thousands/µL      Immature Grans Absolute 0 02 Thousand/uL      Lymphocytes Absolute 1 68 Thousands/µL      Monocytes Absolute 0 64 Thousand/µL      Eosinophils Absolute 0 62 Thousand/µL      Basophils Absolute 0 10 Thousands/µL     Mononucleosis screen [[id number]] Collected:  09/08/19 0323    Lab Status:   In process Specimen:  Blood from Arm, Right Updated:  09/08/19 0330    Lactic acid, plasma x2 [[id number]]     Lab Status:  No result Specimen:  Blood CT soft tissue neck with contrast   Final Result by Ileana Campbell DO (09/08 6054)      Prominent soft tissue swelling of the soft palate, uvula, and the posterior oral pharyngeal mucosa extending into the hypopharynx and the larynx, could be infectious or inflammatory; no discrete abscess is seen  There is associated narrowing of the oropharynx and the larynx, as described  Other findings as described  Findings discussed with Dr Floridalma Mendoza by Dr Victor M Moore at 4:50 AM on 9/8/2019  Workstation performed: [id number]                    Procedures  CriticalCare Time  Performed by: Ciara Graham DO  Authorized by: Ciara Graham DO     Critical care provider statement:     Critical care time (minutes):  40    Critical care was necessary to treat or prevent imminent or life-threatening deterioration of the following conditions:  Respiratory failure    Critical care was time spent personally by me on the following activities:  Blood draw for specimens, obtaining history from patient or surrogate, development of treatment plan with patient or surrogate, discussions with consultants, discussions with primary provider, evaluation of patient's response to treatment, examination of patient, ordering and performing treatments and interventions, ordering and review of laboratory studies, ordering and review of radiographic studies, re-evaluation of patient's condition and review of old charts           ED Course  ED Course as of Sep 08 0544   Sun Sep 08, 2019   0300 Patient is a 28-year-old female coming in today with complaints of sore throat  On exam she is maintaining airway and secretions  However, clinical exam she does have significant amount of posterior pharyngeal edema without erythema or exudate  This does involve the uvula  There is no brawniness under the tongue, stridor or try potting  There is no drooling    Will obtain basic labs, rapid strep, mono as well as give Solu-Medrol, Pepcid and Benadryl  Will can continue and maintain head of bed upright as well  Differential includes but not limited to:  Viral pharyngitis, strep pharyngitis, medication reaction, mono, new onset hereditary  angioedema, allergic reaction  Portions of the record may have been created with voice recognition software  Occasional wrong word or "sound a like" substitutions may have occurred due to the inherent limitations of voice recognition software  Read the chart carefully and recognize, using context, where substitutions have occurred  0151 Labs stable with no evidence of end-organ damage  Rapid strep negative  Patient clear for CT  Patient updated  On read exam there is no change in edematous posterior pharynx  But she is maintaining airway and secretions  I did discuss with her the option of FFP that can help with angioedema  At this time she does not wish to sign consent for blood work and waits for CT       0423 Quickly reviewed the CT  Will call down for Radiology to read this a sat  Discussed with patient regarding FFP  However, she wishes to refrain  She is agreeable for epinephrine  She has never had any coronary disease  Also paged on-call anesthesia      4087 Airway cart outside of room  Patient placed on monitor as well as 2 L nasal cannula  [id number] PACS called and pending call back      [id number] Patient updated on plan for transfer  She did get upset regarding this  She does state that she feels improved and her  state he she sounds better  She continues to maintain airway and secretions  There is no stridor, brawniness or try potting  There is no drooling  There is no abnormal rhythms in no respiratory distress  Discussed with patient that we will continue down epinephrine and racemic epi route at this time  4442 Received call from radiologist re: Ct  Bad pharyngitis and laryngitis  No drain able  collections/abscess   Moderate to severe edema into larynx  1 Dr Jose Angel Mckeon from [facility] accepts and will fly to [facility]  Did not hear back from anesthesia  Did have our nursing supervisor page anesthesia      0500 Patient receiving racemic epi treatment now  Prior to this I did reexamine patient  She is maintaining airway and secretions without any stridor tried potting  She still continues to have diffuse edema posterior pharynx  Regular rate and rhythm with no respiratory distress  [facility]7 [facility] and patient will be picked up in approximately 15 minutes  She agrees with workup thus far  Will repeat IM epi if needed  1851 It is noted patient had similar episode and 2016 where there is concern there could have been a reaction to Naprosyn  Patient is not on Naprosyn or any NSAIDs at this time  I discussed with patient regarding this episode  She states "I forgot about"      6205 EMS here for transport                                   MDM  Number of Diagnoses or Management Options  Diagnosis management comments:     EKG INTERPRETATION at 4:29 a m  RHYTHM:  Normal sinus rhythm at 77 beats per minute  AXIS:  Normal axis  INTERVALS:  ME interval measured at 174 milliseconds  QRS COMPLEX:  QRS measured at 90 milliseconds  ST SEGMENT:  Nonspecific ST segment changes  Diffuse artifact  QT INTERVAL:  QTC measured at 457 milliseconds  COMPARED WITH PRIOR   Carrie Reichmann   Interpretation by Angelica Wolfe, DO         Amount and/or Complexity of Data Reviewed  Clinical lab tests: ordered and reviewed  Tests in the radiology section of CPT®: ordered and reviewed  Tests in the medicine section of CPT®: ordered and reviewed  Independent visualization of images, tracings, or specimens: yes        Disposition  Final diagnoses:   Airway obstruction   Laryngeal edema   Pharyngeal edema   Sore throat     Time reflects when diagnosis was documented in both MDM as applicable and the Disposition within this note     Time User Action Codes Description Comment    9/8/2019  4:54 AM Andrea Knight L Add [J98 8] Airway obstruction     9/8/2019  4:54 AM Aries Baileyage L Add [J38 4] Laryngeal edema     9/8/2019  4:54 AM BendAries aminage L Add [J39 2] Pharyngeal edema     9/8/2019  4:54 AM John Bailey Add [J02 9] Sore throat       ED Disposition     ED Disposition Condition Date/Time Comment    Transfer to Another Facility-In Network  [place] Sep 8, 2019  4:53 AM Clive Morales should be transferred out to [facility]  Follow-up Information    None         Patient's Medications   Discharge Prescriptions    No medications on file     No discharge procedures on file      ED Provider  Electronically Signed by           Cosme Davila,   09/08/19 [address] [place] [address],   09/08/19 1812

## 2019-09-08 NOTE — PLAN OF CARE
Problem: Prexisting or High Potential for Compromised Skin Integrity  Goal: Skin integrity is maintained or improved  Description  INTERVENTIONS:  - Identify patients at risk for skin breakdown  - Assess and monitor skin integrity  - Assess and monitor nutrition and hydration status  - Monitor labs   - Assess for incontinence   - Turn and reposition patient  - Assist with mobility/ambulation  - Relieve pressure over bony prominences  - Avoid friction and shearing  - Provide appropriate hygiene as needed including keeping skin clean and dry  - Evaluate need for skin moisturizer/barrier cream  - Collaborate with interdisciplinary team   - Patient/family teaching  - Consider wound care consult   Outcome: Progressing     Problem: Potential for Falls  Goal: Patient will remain free of falls  Description  INTERVENTIONS:  - Assess patient frequently for physical needs  -  Identify cognitive and physical deficits and behaviors that affect risk of falls    -  [place] fall precautions as indicated by assessment   - Educate patient/family on patient safety including physical limitations  - Instruct patient to call for assistance with activity based on assessment  - Modify environment to reduce risk of injury  - Consider OT/PT consult to assist with strengthening/mobility  Outcome: Progressing     Problem: NEUROSENSORY - ADULT  Goal: Achieves stable or improved neurological status  Description  INTERVENTIONS  - Monitor and report changes in neurological status  - Monitor vital signs such as temperature, blood pressure, glucose, and any other labs ordered   - Initiate measures to prevent increased intracranial pressure  - Monitor for seizure activity and implement precautions if appropriate      Outcome: Progressing  Goal: Remains free of injury related to seizures activity  Description  INTERVENTIONS  - Maintain airway, patient safety  and administer oxygen as ordered  - Monitor patient for seizure activity, document and report duration and description of seizure to physician/advanced practitioner  - If seizure occurs,  ensure patient safety during seizure  - Reorient patient post seizure  - Seizure pads on all 4 side rails  - Instruct patient/family to notify RN of any seizure activity including if an aura is experienced  - Instruct patient/family to call for assistance with activity based on nursing assessment  - Administer anti-seizure medications if ordered    Outcome: Progressing  Goal: Achieves maximal functionality and self care  Description  INTERVENTIONS  - Monitor swallowing and airway patency with patient fatigue and changes in neurological status  - Encourage and assist patient to increase activity and self care     - Encourage visually impaired, hearing impaired and aphasic patients to use assistive/communication devices  Outcome: Progressing     Problem: CARDIOVASCULAR - ADULT  Goal: Maintains optimal cardiac output and hemodynamic stability  Description  INTERVENTIONS:  - Monitor I/O, vital signs and rhythm  - Monitor for S/S and trends of decreased cardiac output  - Administer and titrate ordered vasoactive medications to optimize hemodynamic stability  - Assess quality of pulses, skin color and temperature  - Assess for signs of decreased coronary artery perfusion  - Instruct patient to report change in severity of symptoms  Outcome: Progressing  Goal: Absence of cardiac dysrhythmias or at baseline rhythm  Description  INTERVENTIONS:  - Continuous cardiac monitoring, vital signs, obtain 12 lead EKG if ordered  - Administer antiarrhythmic and heart rate control medications as ordered  - Monitor electrolytes and administer replacement therapy as ordered  Outcome: Progressing     Problem: RESPIRATORY - ADULT  Goal: Achieves optimal ventilation and oxygenation  Description  INTERVENTIONS:  - Assess for changes in respiratory status  - Assess for changes in mentation and behavior  - Position to facilitate oxygenation and minimize respiratory effort  - Oxygen administered by appropriate delivery if ordered  - Initiate smoking cessation education as indicated  - Encourage broncho-pulmonary hygiene including cough, deep breathe, Incentive Spirometry  - Assess the need for suctioning and aspirate as needed  - Assess and instruct to report SOB or any respiratory difficulty  - Respiratory Therapy support as indicated  Outcome: Progressing     Problem: SKIN/TISSUE INTEGRITY - ADULT  Goal: Skin integrity remains intact  Description  INTERVENTIONS  - Identify patients at risk for skin breakdown  - Assess and monitor skin integrity  - Assess and monitor nutrition and hydration status  - Monitor labs (i e  albumin)  - Assess for incontinence   - Turn and reposition patient  - Assist with mobility/ambulation  - Relieve pressure over bony prominences  - Avoid friction and shearing  - Provide appropriate hygiene as needed including keeping skin clean and dry  - Evaluate need for skin moisturizer/barrier cream  - Collaborate with interdisciplinary team (i e  Nutrition, Rehabilitation, etc )   - Patient/family teaching  Outcome: Progressing  Goal: Incision(s), wounds(s) or drain site(s) healing without S/S of infection  Description  INTERVENTIONS  - Assess and document risk factors for skin impairment   - Assess and document dressing, incision, wound bed, drain sites and surrounding tissue  - Consider nutrition services referral as needed  - Oral mucous membranes remain intact  - Provide patient/ family education  Outcome: Progressing  Goal: Oral mucous membranes remain intact  Description  INTERVENTIONS  - Assess oral mucosa and hygiene practices  - Implement preventative oral hygiene regimen  - Implement oral medicated treatments as ordered  - Initiate Nutrition services referral as needed  Outcome: Progressing     Problem: MUSCULOSKELETAL - ADULT  Goal: Maintain or return mobility to safest level of function  Description  INTERVENTIONS:  - Assess patient's ability to carry out ADLs; assess patient's baseline for ADL function and identify physical deficits which impact ability to perform ADLs (bathing, care of mouth/teeth, toileting, grooming, dressing, etc )  - Assess/evaluate cause of self-care deficits   - Assess range of motion  - Assess patient's mobility  - Assess patient's need for assistive devices and provide as appropriate  - Encourage maximum independence but intervene and supervise when necessary  - Involve family in performance of ADLs  - Assess for home care needs following discharge   - Consider OT consult to assist with ADL evaluation and planning for discharge  - Provide patient education as appropriate  Outcome: Progressing  Goal: Maintain proper alignment of affected body part  Description  INTERVENTIONS:  - Support, maintain and protect limb and body alignment  - Provide patient/ family with appropriate education  Outcome: Progressing     Problem: SAFETY ADULT  Goal: Maintain or return to baseline ADL function  Description  INTERVENTIONS:  -  Assess patient's ability to carry out ADLs; assess patient's baseline for ADL function and identify physical deficits which impact ability to perform ADLs (bathing, care of mouth/teeth, toileting, grooming, dressing, etc )  - Assess/evaluate cause of self-care deficits   - Assess range of motion  - Assess patient's mobility; develop plan if impaired  - Assess patient's need for assistive devices and provide as appropriate  - Encourage maximum independence but intervene and supervise when necessary  - Involve family in performance of ADLs  - Assess for home care needs following discharge   - Consider OT consult to assist with ADL evaluation and planning for discharge  - Provide patient education as appropriate  Outcome: Progressing  Goal: Maintain or return mobility status to optimal level  Description  INTERVENTIONS:  - Assess patient's baseline mobility status (ambulation, transfers, stairs, etc )    - Identify cognitive and physical deficits and behaviors that affect mobility  - Identify mobility aids required to assist with transfers and/or ambulation (gait belt, sit-to-stand, lift, walker, cane, etc )  - [place] fall precautions as indicated by assessment  - Record patient progress and toleration of activity level on Mobility SBAR; progress patient to next Phase/Stage  - Instruct patient to call for assistance with activity based on assessment  - Consider rehabilitation consult to assist with strengthening/weightbearing, etc   Outcome: Progressing     Problem: DISCHARGE PLANNING  Goal: Discharge to home or other facility with appropriate resources  Description  INTERVENTIONS:  - Identify barriers to discharge w/patient and caregiver  - Arrange for needed discharge resources and transportation as appropriate  - Identify discharge learning needs (meds, wound care, etc )  - Arrange for interpretive services to assist at discharge as needed  - Refer to Case Management Department for coordinating discharge planning if the patient needs post-hospital services based on physician/advanced practitioner order or complex needs related to functional status, cognitive ability, or social support system  Outcome: Progressing     Problem: Knowledge Deficit  Goal: Patient/family/caregiver demonstrates understanding of disease process, treatment plan, medications, and discharge instructions  Description  Complete learning assessment and assess knowledge base    Interventions:  - Provide teaching at level of understanding  - Provide teaching via preferred learning methods  Outcome: Progressing     Problem: DECISION MAKING  Goal: Pt/Family able to effectively weigh alternatives and participate in decision making related to treatment and care  Description  INTERVENTIONS:  - Identify decision maker  - Determine when there are differences among patient's view, family's view, and healthcare provider's view of patient condition and care goals  - Facilitate patient/family articulation of goals for care  - Help patient/family identify pros/cons of alternative solutions  - Provide information as requested by patient/family  - Respect patient/family rights related to privacy and sharing information   - Serve as a liaison between patient, family and health care team  - Initiate consults as appropriate (Ethics Team, Palliative Care, Family Care Conference, etc )  Outcome: Progressing

## 2019-09-08 NOTE — H&P
History & Physical Exam - Critical Care   Maribel Roberson 47 y o  female MRN: [id number]  Unit/Bed#: ICU 12 Encounter: [id number]      Assessment/Plan:  1  Angioedema  · Patient was treated [facility] with H2 blocker, steroids, antihistamine  Continue to monitor  2  History pulmonary embolus  · Continue Eliquis  3  Morbid obesity  · Suggest low-fat diet, exercise  4  Coronary artery disease  · Continue statin  5  Asthma:  Updraft nebulizer treatments as needed      Critical Care Time:  minutes  Documented critical care time excludes any procedures documented elsewhere  It also excludes any family updates    _____________________________________________________________________  HPI:    Maribel Roberson is a 47 y o  female who presents to [address]  [facility] with complaints of shortness of breath and sore throat  Patient carries a history angioedema she was intubated 4 years ago secondary to angioedema  Patient was at a music festival in [place] yesterday with her mother  She and her mother had meatball sandwiches and no other food  She went home and felt well, went to bed and woke up with a sore throat  She felt that her throat was swelling and that she was having trouble breathing and therefore presented to [facility]  She was treated appropriately in the emergency department at [facility]  [facility] higher level care  Patient is admitted to the intensive care unit where she will monitor shortness of breath  She will require greater than 2 midnight stay    Review of Systems:  Patient denies headache, visual changes, changes in hearing  Denies chest pain shortness of breath at this time  She denies lower back pain, abdominal pain, nausea or vomiting  She denies lower extremity edema or pain ambulation  Patient states that she is thirsty and would like to eat  Full 12 point review of systems was performed   Aside from what was mentioned in the HPI, it is otherwise negative        Historical Information   Past Medical History:   Diagnosis Date    Abdominal pain, lower     [id number] RESOLVED    Acute renal failure ([facility] [address] )     [id number] RESOLVED    Allergic rhinitis     [id number] RESOLVED    Ankle pain, right     [id number] RESOLVED    Arthritis     06WJX3483 RESOLVED  [id number] RESOLVED    Asthma     Bilateral chronic knee pain     26OXE7566    Bladder pain     79UDC4031 RESOLVED    Blood clot in vein     Bursitis     69CVU0995 RESOLVED    Cardiac disorder     54CZG7212  RESOLVED    Cardiac murmur     06FOI1009 RESOLVED    Chest tightness or pressure     08BPL6791 RESOLVED    COPD (chronic obstructive pulmonary disease) ([facility] [address] [facility]     Coronary artery disease     Curvature of spine     25OVM7627 RESOLVED    GERD (gastroesophageal reflux disease)     Hernia, hiatal     48ZUZ1772 RESOLVED    Hyperlipidemia     50MKK1840 RESOLVED    Hypertension     Inguinal hernia     RIGHT   23MMP7245 RESOLVED    Jaw closure abnormality     68AVN1921 RESOLVED    Lumbar herniated disc     50ACO0403 RESOLVED    Morbid obesity (HCC)     [id number]    Obesity     Osteoarthritis of right knee     37XQP7588 RESOLVED    Patellar tendinitis     13KNX7553    Poor flexibility of tendon     62MHA2028    Pulmonary embolism ([facility] [address] )     51TFW4354 RESOLVED    Sepsis ([facility] [address] )     73MUE0829 RESOLVED    Severe sepsis due to methicillin resistant Staphylococcus aureus (MRSA) with acute organ dysfunction ([facility] [address] )     60EXE2588 RESOLVED    Spider vein, symptomatic     98TYE6336 RESOLVED    Status post arthroscopy of right knee     99NKU0470 RESOLVED    Stomach disorder     25WGN8904 RESOLVED    Tear of medial meniscus of right knee     SUBSEQUENT ENCOUNTER  RESOLVED 65CLQ3762    Thyroid disorder     27DGD6000    Umbilical hernia     82MKH5850 RESOLVED     Past Surgical History:   Procedure Laterality Date    COLONOSCOPY      CYSTOSCOPY  01/1994    WITH BIOPSY      EXPLORATORY LAPAROTOMY      FOOT SURGERY Left     FRACTURE SURGERY      HAND SURGERY Right     cyst    HYSTERECTOMY  2009    INCISION AND DRAINAGE ANTERIOR NECK Right 2017    Procedure: INCISION AND DRAINAGE  (I&D) NECK;  Surgeon: Cleopatra Grajeda MD;  Location:  MAIN OR;  Service:    [facility] HERNIA REPAIR      WITH IMPLANTATION OF MESH  13 MAY 2014  LAST ASSESSED    IR IVC FILTER REMOVAL  2019    OPEN ANTERIOR SHOULDER RECONSTRUCTION Left     OTHER SURGICAL HISTORY      BLOOD VESSEL REPAIR   13 MAY 2014 LAST ASSESSED    PERIPHERALLY INSERTED CENTRAL CATHETER INSERTION      NC KNEE SCOPE,MED/LAT MENISECTOMY Right 2016    Procedure: KNEE ARTHROSCOPY WITH MEDIAL MENISCECTOMY ;  Surgeon: Tera Turner MD;  Location: [facility] MAIN OR;  Service: Orthopedics    SHOULDER SURGERY       Social History   Social History     Substance and Sexual Activity   Alcohol Use No     Social History     Substance and Sexual Activity   Drug Use No     Social History     Tobacco Use   Smoking Status Former Smoker    Last attempt to quit: 1995    Years since quittin 8   Smokeless Tobacco Never Used   Tobacco Comment    quit 20 years ago       Family History:   Family History   Problem Relation Age of Onset    Arthritis Mother     Colon cancer Brother     Lung cancer Father     No Known Problems Sister     No Known Problems Daughter     No Known Problems Maternal Grandmother     No Known Problems Maternal Grandfather     No Known Problems Paternal Grandmother     No Known Problems Paternal Grandfather     Breast cancer Maternal Aunt     Diabetes Maternal Aunt     No Known Problems Sister     No Known Problems Sister     No Known Problems Sister     No Known Problems Sister     No Known Problems Sister     No Known Problems Daughter     Colon cancer Son        Medications:  Pertinent medications were reviewed    Current Facility-Administered Medications:  apixaban 5 mg Oral BID Mikie Alvares, CRNP   atorvastatin 10 mg Oral After Shin Sandman, CRNP   famotidine 20 mg Oral Daily Before Breakfast Mikie Garciaos, CRNP   fluticasone 2 spray Nasal Daily Mikie Alvares, CRNP   levalbuterol 1 25 mg Nebulization Q8H PRN Mikie Garciaos, CRNP   levothyroxine 100 mcg Oral Early Morning [place]urivera Alvares, CRNP         Allergies   Allergen Reactions    Penicillin G Nausea Only    Penicillins GI Intolerance     Nausea and vomiting         Vitals:   /91   Pulse 92   Temp (!) 96 7 °F (35 9 °C) (Temporal)   Resp (!) 34   Ht 5' 3" (1 6 m)   Wt (!) 139 kg (305 lb 8 9 oz)   SpO2 95%   BMI 54 13 kg/m²   Body mass index is 54 13 kg/m²  SpO2: 95 %,   SpO2 Activity: At Rest,   O2 Device: Nasal cannula    No intake or output data in the 24 hours ending 09/08/19 0838  Invasive Devices     Central Venous Catheter Line            CVC Central Lines 01/22/19 Triple Right Femoral 228 days          Peripheral Intravenous Line            Peripheral IV 09/08/19 Right Antecubital less than 1 day                Physical Exam:  Gen:  Pleasant and cooperative  HEENT:  Atraumatic normocephalic pupils equal round reactive light extraocular muscles intact sclerae anicteric oral mucosa is pink and moist   Oropharynx does appear to have some edema  Neck:  Supple no JVD no lymphadenopathy no stridor trachea is midline  No bruits auscultated  Chest:  Clear to auscultation bilaterally respirations even nonlabored  Saturation of peripheral oxygen on 2 L by nasal cannula is 97%  Cor:  Regular rate rhythm no murmurs rubs or gallops appreciated  Abd:  Soft, obese, nontender, positive bowel sounds  Ext:  No clubbing cyanosis or edema  Neuro:  Alert and oriented x3 moves all extremities  Skin:  Warm dry and intact      Diagnostic Data:  Lab: I have personally reviewed pertinent lab results  ,   CBC:  Results from last 7 days   Lab Units 09/08/19  0323   WBC Thousand/uL 7 52   HEMOGLOBIN g/dL 13 5 HEMATOCRIT % 43 7   PLATELETS Thousands/uL 305      CMP: Lab Results   Component Value Date    SODIUM 138 09/08/2019    K 3 5 09/08/2019     09/08/2019    CO2 25 09/08/2019    BUN 15 09/08/2019    CREATININE 1 28 09/08/2019    CALCIUM 9 0 09/08/2019    AST 27 09/08/2019    ALT 34 09/08/2019    ALKPHOS 113 09/08/2019    EGFR 48 09/08/2019   ,   PT/INR:   Lab Results   Component Value Date    INR 1 22 (H) 09/08/2019   ,   Magnesium: No components found for: MAG,  Phosphorous: No results found for: PHOS    ABG: No results found for: PHART, IYA1EVP, PO2ART, ENK8SAM, Z1LZIEAZ, BEART, SOURCE,     Microbiology:  Strep screen negative, MRSA swab pending    Imaging: I have personally reviewed the pertinent imaging studies on the PACS system  CT of the neck:  Prominent soft tissue swelling of the soft palate, uvula, and the posterior oral pharyngeal mucosa extending into the hypopharynx and the larynx, could be infectious or inflammatory; no discrete abscess is seen      There is associated narrowing of the oropharynx and the larynx, as described  Cardiac/EKG/telemetry/Echo:       Normal sinus rhythm      VTE Prophylaxis: Sequential compression device (Venodyne)  and Reason for no pharmacologic prophylaxis Patient is currently anticoagulated    Code Status: Level 1 - Full Code    CARL Cyr    Portions of the record may have been created with voice recognition software  Occasional wrong word or "sound a like" substitutions may have occurred due to the inherent limitations of voice recognition software  Read the chart carefully and recognize, using context, where substitutions have occurred

## 2019-09-09 ENCOUNTER — TRANSITIONAL CARE MANAGEMENT (OUTPATIENT)
Dept: FAMILY MEDICINE CLINIC | Facility: CLINIC | Age: 54
End: 2019-09-09

## 2019-09-09 VITALS
TEMPERATURE: 97.5 F | BODY MASS INDEX: 51.91 KG/M2 | RESPIRATION RATE: 16 BRPM | HEART RATE: 74 BPM | DIASTOLIC BLOOD PRESSURE: 70 MMHG | SYSTOLIC BLOOD PRESSURE: 106 MMHG | WEIGHT: 293 LBS | OXYGEN SATURATION: 95 % | HEIGHT: 63 IN

## 2019-09-09 LAB
ATRIAL RATE: 77 BPM
P AXIS: 70 DEGREES
PR INTERVAL: 174 MS
QRS AXIS: 18 DEGREES
QRSD INTERVAL: 90 MS
QT INTERVAL: 404 MS
QTC INTERVAL: 457 MS
T WAVE AXIS: 45 DEGREES
VENTRICULAR RATE: 77 BPM

## 2019-09-09 PROCEDURE — 99238 HOSP IP/OBS DSCHRG MGMT 30/<: CPT | Performed by: NURSE PRACTITIONER

## 2019-09-09 PROCEDURE — 93010 ELECTROCARDIOGRAM REPORT: CPT | Performed by: INTERNAL MEDICINE

## 2019-09-09 PROCEDURE — 82785 ASSAY OF IGE: CPT | Performed by: NURSE PRACTITIONER

## 2019-09-09 PROCEDURE — NC001 PR NO CHARGE: Performed by: NURSE PRACTITIONER

## 2019-09-09 PROCEDURE — G0379 DIRECT REFER HOSPITAL OBSERV: HCPCS

## 2019-09-09 RX ORDER — PREDNISONE 20 MG/1
40 TABLET ORAL DAILY
Qty: 5 TABLET | Refills: 0 | Status: SHIPPED | OUTPATIENT
Start: 2019-09-09 | End: 2019-09-18

## 2019-09-09 RX ADMIN — LEVOTHYROXINE SODIUM 100 MCG: 100 TABLET ORAL at 06:16

## 2019-09-09 RX ADMIN — FLUTICASONE PROPIONATE 2 SPRAY: 50 SPRAY, METERED NASAL at 09:00

## 2019-09-09 RX ADMIN — FAMOTIDINE 20 MG: 20 TABLET ORAL at 06:16

## 2019-09-09 RX ADMIN — APIXABAN 5 MG: 5 TABLET, FILM COATED ORAL at 09:00

## 2019-09-09 NOTE — UTILIZATION REVIEW
Initial Clinical Review    TRANSFER FROM  [facility]  ED    Admission: Date/Time/Statement: Inpatient Admission Orders (From admission, onward)     Ordered        09/08/19 0707  Inpatient Admission  Once                   Orders Placed This Encounter   Procedures    Inpatient Admission     Standing Status:   Standing     Number of Occurrences:   1     Order Specific Question:   Admitting Physician     Answer: Bird Milan [id number]     Order Specific Question:   Level of Care     Answer:   Critical Care [15]     Order Specific Question:   Estimated length of stay     Answer:   More than 2 Midnights     Order Specific Question:   Certification     Answer:   I certify that inpatient services are medically necessary for this patient for a duration of greater than two midnights  See H&P and MD Progress Notes for additional information about the patient's course of treatment  No chief complaint on file  Assessment/Plan:    47  Y O female  Presents to [facility]  ED from home with sore throat and   SOB  PMH  Is   Intubation  S/P angioedema,     Pe,  Asthma ,  Mo  AND  Cad  Pt  [place]  Well the day PTA  Went to  Sleep and woke with a  Sore throat  And felt her throat  Was swelling with difficulty breathing  Treated  At  [facility]  ED and transferred  To [facility] for higher LOC  Admit  IP  Stepdown  LOC  WITH   Angioedema  And  Plan  Is  Steroids, antihistamine and monitor   resp  Status        ED Triage Vitals   Temperature Pulse Respirations Blood Pressure SpO2   09/08/19 0702 09/08/19 0702 09/08/19 0702 09/08/19 0702 09/08/19 0702   97 5 °F (36 4 °C) 90 18 138/88 97 %      Temp Source Heart Rate Source Patient Position - Orthostatic VS BP Location FiO2 (%)   09/08/19 0804 09/08/19 0800 -- -- --   Temporal Monitor         Pain Score       09/08/19 0800       No Pain        Wt Readings from Last 1 Encounters:   09/09/19 136 kg (298 lb 15 1 oz)     Additional Vital Signs:   /09/19 0800  --  74  16  --  -- --  --   09/09/19 0700  97 5 °F (36 4 °C)  64  25Abnormal   106/70  86  95 %  --   09/09/19 0600  --  52Abnormal   13  100/77  83  99 %  --   09/09/19 0500  --  48Abnormal   22  97/61  73  98 %  --   09/09/19 0405  96 4 °F (35 8 °C)Abnormal   48Abnormal   13  123/74  96  97 %  --   09/09/19 0300  --  62  14  121/88  105  96 %  --   09/09/19 0200  --  62  18  92/65  73  97 %  --   09/09/19 0100  --  62  19  114/62  81  97 %  --   09/09/19 0000  --  54Abnormal   17  132/86  96  97 %  Nasal cannula   09/08/19 2325  --  72  25Abnormal   162/85  110  95 %  --   09/08/19 2200  --  68  18  122/77  88  97 %  --   09/08/19 1923  98 °F (36 7 °C)  --  --  --  --  --  --   09/08/19 1800  --  78  20  93/64  --  97 %  --   09/08/19 1700  --  92  26Abnormal   120/78  --  97 %  Nasal cannula   09/08/19 1600  --  70  21  117/78  --  98 %  Nasal cannula   09/08/19 1500  97 6 °F (36 4 °C)  70  18  112/82  --  97 %  Nasal cannula   09/08/19 1400  --  72  19  135/86  --  97 %  Nasal cannula   09/08/19 1300  --  86  19  124/73  --  97 %  Nasal cannula   09/08/19 1200  --  88  23Abnormal   158/111Abnormal   129  98 %  Nasal cannula   09/08/19 1100  98 3 °F (36 8 °C)  86  20  144/88  --  95 %  Nasal cannula   09/08/19 1000  --  86  21  120/74  --  97 %  Nasal cannula   09/08/19 0900  --  76  20  137/83  --  97 %  Nasal          Pertinent Labs/Diagnostic Test Results:   Results from last 7 days   Lab Units 09/08/19  0323   WBC Thousand/uL 7 52   HEMOGLOBIN g/dL 13 5   HEMATOCRIT % 43 7   PLATELETS Thousands/uL 305   NEUTROS ABS Thousands/µL 4 46         Results from last 7 days   Lab Units 09/08/19  0323   SODIUM mmol/L 138   POTASSIUM mmol/L 3 5   CHLORIDE mmol/L 105   CO2 mmol/L 25   ANION GAP mmol/L 8   BUN mg/dL 15   CREATININE mg/dL 1 28   EGFR ml/min/1 73sq m 48   CALCIUM mg/dL 9 0   MAGNESIUM mg/dL 2 1     Results from last 7 days   Lab Units 09/08/19  0323   AST U/L 27   ALT U/L 34   ALK PHOS U/L 113   TOTAL PROTEIN g/dL 6 8 ALBUMIN g/dL 3 3*   TOTAL BILIRUBIN mg/dL 0 50         Results from last 7 days   Lab Units 09/08/19  0323   GLUCOSE RANDOM mg/dL 101           Results from last 7 days   Lab Units 09/08/19  0323   PROTIME seconds 15 5*   INR  1 22*   PTT seconds 33             Results from last 7 days   Lab Units 09/08/19  0323   LACTIC ACID mmol/L 1 3   EKG  ( 9/8)     NSR  Ct  Soft tissue  Of  Neck  (  9/8)      Prominent soft tissue swelling of the soft palate, uvula, and the posterior oral pharyngeal mucosa extending into the hypopharynx and the larynx, could be infectious or inflammatory; no discrete abscess is seen  There is associated narrowing of the oropharynx and the larynx, as described  Present on Admission:   Angioedema   Mild intermittent asthma without complication   Pulmonary embolism, bilateral (HCC)   Stage 3 chronic kidney disease ([facility] [address] )      Admitting Diagnosis: Angioedema [T78  3XXA]  Age/Sex: 47 y o  female  Admission Orders:    Current Facility-Administered Medications:  apixaban 5 mg Oral BID   atorvastatin 10 mg Oral After Dinner   famotidine 20 mg Oral Daily Before Breakfast   fluticasone 2 spray Nasal Daily   levalbuterol 1 25 mg Nebulization Q8H PRN   levothyroxine 100 mcg Oral Early Morning     IV  S/medrol  X1  9/8  IP CONSULT TO CASE MANAGEMENT   Fall precautions  Neuro  checks  Q shift  Dysphagia  eval    Network Utilization Review Department  Phone: [phone]; Fax [phone]  [email or web address]  ATTENTION: Please call with any questions or concerns to [phone]  and carefully listen to the prompts so that you are directed to the right person  Send all requests for admission clinical reviews, approved or denied determinations and any other requests to fax [phone]   All voicemails are confidential

## 2019-09-09 NOTE — SOCIAL WORK
CM met with the patient to complete a general SW assessment, per the patient, she is discharging today:    HOUSE: lives in a multi-level home, bed and bath on the second floor  LIVES WITH: ex-, her mother, and her ex-step son  INDEPENDENCE: indepenent with adls and amblation  TRANSPORT: drives  HHC: hx in [facility]12 for anticoagulation needs  STR: no history  PCP: Tc Hayden  PHARMACY: [facility] in [address]: no history  DRUG AND ALCOHOL: no history  POA: none - per the patient, she has 6 living children  CM informed her that w/o formal POA, shared decision making goes to her children in the event of an emergency  No needs addressed at this time

## 2019-09-09 NOTE — PLAN OF CARE
Problem: Prexisting or High Potential for Compromised Skin Integrity  Goal: Skin integrity is maintained or improved  Description  INTERVENTIONS:  - Identify patients at risk for skin breakdown  - Assess and monitor skin integrity  - Assess and monitor nutrition and hydration status  - Monitor labs   - Assess for incontinence   - Turn and reposition patient  - Assist with mobility/ambulation  - Relieve pressure over bony prominences  - Avoid friction and shearing  - Provide appropriate hygiene as needed including keeping skin clean and dry  - Evaluate need for skin moisturizer/barrier cream  - Collaborate with interdisciplinary team   - Patient/family teaching  - Consider wound care consult   Outcome: Progressing     Problem: Potential for Falls  Goal: Patient will remain free of falls  Description  INTERVENTIONS:  - Assess patient frequently for physical needs  -  Identify cognitive and physical deficits and behaviors that affect risk of falls    -  [place] fall precautions as indicated by assessment   - Educate patient/family on patient safety including physical limitations  - Instruct patient to call for assistance with activity based on assessment  - Modify environment to reduce risk of injury  - Consider OT/PT consult to assist with strengthening/mobility  Outcome: Progressing     Problem: NEUROSENSORY - ADULT  Goal: Achieves stable or improved neurological status  Description  INTERVENTIONS  - Monitor and report changes in neurological status  - Monitor vital signs such as temperature, blood pressure, glucose, and any other labs ordered   - Initiate measures to prevent increased intracranial pressure  - Monitor for seizure activity and implement precautions if appropriate      Outcome: Progressing  Goal: Remains free of injury related to seizures activity  Description  INTERVENTIONS  - Maintain airway, patient safety  and administer oxygen as ordered  - Monitor patient for seizure activity, document and report duration and description of seizure to physician/advanced practitioner  - If seizure occurs,  ensure patient safety during seizure  - Reorient patient post seizure  - Seizure pads on all 4 side rails  - Instruct patient/family to notify RN of any seizure activity including if an aura is experienced  - Instruct patient/family to call for assistance with activity based on nursing assessment  - Administer anti-seizure medications if ordered    Outcome: Progressing  Goal: Achieves maximal functionality and self care  Description  INTERVENTIONS  - Monitor swallowing and airway patency with patient fatigue and changes in neurological status  - Encourage and assist patient to increase activity and self care     - Encourage visually impaired, hearing impaired and aphasic patients to use assistive/communication devices  Outcome: Progressing     Problem: CARDIOVASCULAR - ADULT  Goal: Maintains optimal cardiac output and hemodynamic stability  Description  INTERVENTIONS:  - Monitor I/O, vital signs and rhythm  - Monitor for S/S and trends of decreased cardiac output  - Administer and titrate ordered vasoactive medications to optimize hemodynamic stability  - Assess quality of pulses, skin color and temperature  - Assess for signs of decreased coronary artery perfusion  - Instruct patient to report change in severity of symptoms  Outcome: Progressing  Goal: Absence of cardiac dysrhythmias or at baseline rhythm  Description  INTERVENTIONS:  - Continuous cardiac monitoring, vital signs, obtain 12 lead EKG if ordered  - Administer antiarrhythmic and heart rate control medications as ordered  - Monitor electrolytes and administer replacement therapy as ordered  Outcome: Progressing     Problem: RESPIRATORY - ADULT  Goal: Achieves optimal ventilation and oxygenation  Description  INTERVENTIONS:  - Assess for changes in respiratory status  - Assess for changes in mentation and behavior  - Position to facilitate oxygenation and minimize respiratory effort  - Oxygen administered by appropriate delivery if ordered  - Initiate smoking cessation education as indicated  - Encourage broncho-pulmonary hygiene including cough, deep breathe, Incentive Spirometry  - Assess the need for suctioning and aspirate as needed  - Assess and instruct to report SOB or any respiratory difficulty  - Respiratory Therapy support as indicated  Outcome: Progressing     Problem: SKIN/TISSUE INTEGRITY - ADULT  Goal: Skin integrity remains intact  Description  INTERVENTIONS  - Identify patients at risk for skin breakdown  - Assess and monitor skin integrity  - Assess and monitor nutrition and hydration status  - Monitor labs (i e  albumin)  - Assess for incontinence   - Turn and reposition patient  - Assist with mobility/ambulation  - Relieve pressure over bony prominences  - Avoid friction and shearing  - Provide appropriate hygiene as needed including keeping skin clean and dry  - Evaluate need for skin moisturizer/barrier cream  - Collaborate with interdisciplinary team (i e  Nutrition, Rehabilitation, etc )   - Patient/family teaching  Outcome: Progressing  Goal: Incision(s), wounds(s) or drain site(s) healing without S/S of infection  Description  INTERVENTIONS  - Assess and document risk factors for skin impairment   - Assess and document dressing, incision, wound bed, drain sites and surrounding tissue  - Consider nutrition services referral as needed  - Oral mucous membranes remain intact  - Provide patient/ family education  Outcome: Progressing  Goal: Oral mucous membranes remain intact  Description  INTERVENTIONS  - Assess oral mucosa and hygiene practices  - Implement preventative oral hygiene regimen  - Implement oral medicated treatments as ordered  - Initiate Nutrition services referral as needed  Outcome: Progressing     Problem: MUSCULOSKELETAL - ADULT  Goal: Maintain or return mobility to safest level of function  Description  INTERVENTIONS:  - Assess patient's ability to carry out ADLs; assess patient's baseline for ADL function and identify physical deficits which impact ability to perform ADLs (bathing, care of mouth/teeth, toileting, grooming, dressing, etc )  - Assess/evaluate cause of self-care deficits   - Assess range of motion  - Assess patient's mobility  - Assess patient's need for assistive devices and provide as appropriate  - Encourage maximum independence but intervene and supervise when necessary  - Involve family in performance of ADLs  - Assess for home care needs following discharge   - Consider OT consult to assist with ADL evaluation and planning for discharge  - Provide patient education as appropriate  Outcome: Progressing  Goal: Maintain proper alignment of affected body part  Description  INTERVENTIONS:  - Support, maintain and protect limb and body alignment  - Provide patient/ family with appropriate education  Outcome: Progressing     Problem: SAFETY ADULT  Goal: Maintain or return to baseline ADL function  Description  INTERVENTIONS:  -  Assess patient's ability to carry out ADLs; assess patient's baseline for ADL function and identify physical deficits which impact ability to perform ADLs (bathing, care of mouth/teeth, toileting, grooming, dressing, etc )  - Assess/evaluate cause of self-care deficits   - Assess range of motion  - Assess patient's mobility; develop plan if impaired  - Assess patient's need for assistive devices and provide as appropriate  - Encourage maximum independence but intervene and supervise when necessary  - Involve family in performance of ADLs  - Assess for home care needs following discharge   - Consider OT consult to assist with ADL evaluation and planning for discharge  - Provide patient education as appropriate  Outcome: Progressing  Goal: Maintain or return mobility status to optimal level  Description  INTERVENTIONS:  - Assess patient's baseline mobility status (ambulation, transfers, stairs, etc )    - Identify cognitive and physical deficits and behaviors that affect mobility  - Identify mobility aids required to assist with transfers and/or ambulation (gait belt, sit-to-stand, lift, walker, cane, etc )  - [place] fall precautions as indicated by assessment  - Record patient progress and toleration of activity level on Mobility SBAR; progress patient to next Phase/Stage  - Instruct patient to call for assistance with activity based on assessment  - Consider rehabilitation consult to assist with strengthening/weightbearing, etc   Outcome: Progressing     Problem: DISCHARGE PLANNING  Goal: Discharge to home or other facility with appropriate resources  Description  INTERVENTIONS:  - Identify barriers to discharge w/patient and caregiver  - Arrange for needed discharge resources and transportation as appropriate  - Identify discharge learning needs (meds, wound care, etc )  - Arrange for interpretive services to assist at discharge as needed  - Refer to Case Management Department for coordinating discharge planning if the patient needs post-hospital services based on physician/advanced practitioner order or complex needs related to functional status, cognitive ability, or social support system  Outcome: Progressing     Problem: Knowledge Deficit  Goal: Patient/family/caregiver demonstrates understanding of disease process, treatment plan, medications, and discharge instructions  Description  Complete learning assessment and assess knowledge base    Interventions:  - Provide teaching at level of understanding  - Provide teaching via preferred learning methods  Outcome: Progressing     Problem: DECISION MAKING  Goal: Pt/Family able to effectively weigh alternatives and participate in decision making related to treatment and care  Description  INTERVENTIONS:  - Identify decision maker  - Determine when there are differences among patient's view, family's view, and healthcare provider's view of patient condition and care goals  - Facilitate patient/family articulation of goals for care  - Help patient/family identify pros/cons of alternative solutions  - Provide information as requested by patient/family  - Respect patient/family rights related to privacy and sharing information   - Serve as a liaison between patient, family and health care team  - Initiate consults as appropriate (Ethics Team, Palliative Care, Family Care Conference, etc )  Outcome: Progressing

## 2019-09-09 NOTE — DISCHARGE INSTRUCTIONS
Angioedema   WHAT YOU NEED TO KNOW:   What is angioedema? Angioedema is sudden swelling caused by fluid that collects in deep layers of the skin  Swelling occurs most often on the face, lips, tongue, or throat, but it can happen anywhere in the body  What increases my risk for angioedema? The exact cause of angioedema is often unknown  The following may increase your risk or trigger symptoms:  · Allergic reactions to foods, insect stings, or latex     · Medicines, such as ACE inhibitors, NSAIDs, and aspirin     · Cold, heat, pressure, trauma, or emotional stress     · A medical condition, such as autoimmune thyroid disease, lupus, or cancer     · A family history of angioedema  What are the signs and symptoms of angioedema? Skin swelling may be the only symptom  Swelling may be on one or both sides of the affected area  You may also have any of the following:  · Pain and burning in the swollen area     · Hives or an itchy rash    · A cough, wheezing, and shortness of breath    · Irritated eyes and nose    · Abdominal pain  How is the cause of angioedema diagnosed? Your healthcare provider will examine you and ask about your symptoms  He may also ask about your family medical history, medicines you take, and foods you eat  Tell your healthcare provider about any recent trauma, stress, or contact with allergens  You may need additional testing if you developed anaphylaxis after you were exposed to a trigger and then exercised  This is called exercise-induced anaphylaxis  You may need any of the following:  · Blood tests  may be used to check for an autoimmune disease, infection, or inflammation  The tests may also be used to check your liver function  · Skin prick tests  are done to check for allergies  How is angioedema treated? Angioedema usually goes away within 3 days without treatment, but it may come back   You may need any of the following:  · Antihistamines  decrease symptoms such as itching or a rash  · Epinephrine  is medicine used to treat severe allergic reactions such as anaphylaxis  · Steroids: This medicine may be given to decrease inflammation  What steps do I need to take for signs or symptoms of anaphylaxis? · Immediately  give 1 shot of epinephrine only into the outer thigh muscle  · Leave the shot in place  as directed  Your healthcare provider may recommend you leave it in place for up to 10 seconds before you remove it  This helps make sure all of the epinephrine is delivered  · Call 911 and go to the emergency department,  even if the shot improved symptoms  Do not drive yourself  Bring the used epinephrine shot with you  What safety precautions do I need to take if I am at risk for anaphylaxis? · Keep 2 shots of epinephrine with you at all times  You may need a second shot, because epinephrine only works for about 20 minutes and symptoms may return  Your healthcare provider can show you and family members how to give the shot  Check the expiration date every month and replace it before it expires  · Create an action plan  Your healthcare provider can help you create a written plan that explains the allergy and an emergency plan to treat a reaction  The plan explains when to give a second epinephrine shot if symptoms return or do not improve after the first  Give copies of the action plan and emergency instructions to family members, work and school staff, and  providers  Show them how to give a shot of epinephrine  · Be careful when you exercise  If you have had exercise-induced anaphylaxis, do not exercise right after you eat  Stop exercising right away if you start to develop any signs or symptoms of anaphylaxis  You may first feel tired, warm, or have itchy skin  Hives, swelling, and severe breathing problems may develop if you continue to exercise  · Carry medical alert identification    Wear medical alert jewelry or carry a card that explains the allergy  Ask your healthcare provider where to get these items  · Keep a symptom diary  Include information on how often symptoms occur, how long they last, and if they are mild or severe  Also keep information on what you ate, what happened, or which medicines you took before the swelling started  · Avoid triggers  Triggers include foods, medicines, and other items that you know cause symptoms  You may need to see a specialist, such as an allergist or dietitian, to learn what to avoid  Call 911 for signs or symptoms of anaphylaxis,  such as trouble breathing, swelling in your mouth or throat, or wheezing  You may also have itching, a rash, hives, or feel like you are going to faint  When should I seek immediate care? · You have sudden behavior changes or irritability  · You are dizzy and your heart is beating faster than usual   When should I contact my healthcare provider? · Your swelling does not improve, even after you take your medicines  · You have questions or concerns about your condition or care  CARE AGREEMENT:   You have the right to help plan your care  Learn about your health condition and how it may be treated  Discuss treatment options with your caregivers to decide what care you want to receive  You always have the right to refuse treatment  The above information is an  only  It is not intended as medical advice for individual conditions or treatments  Talk to your doctor, nurse or pharmacist before following any medical regimen to see if it is safe and effective for you  © 2017 [employer] Information is for End User's use only and may not be sold, redistributed or otherwise used for commercial purposes  All illustrations and images included in CareNotes® are the copyrighted property of [employer]  or [employer]

## 2019-09-09 NOTE — DISCHARGE SUMMARY
Discharge Summary   Nayla Muro 47 y o  female MRN: [id number]  Unit/Bed#: ICU 12 Encounter: [id number]    Admission Date: 9/8/2019     Discharge Date: 9/9/2019    Admitting Diagnoses:   1  angioedema    Discharge Diagnoses:  1  angioedema      Procedures Performed:   No orders of the defined types were placed in this encounter  Hospital Course:   Patient was transferred from [address] [place] [address] due to angioedema  She was treated with steroids, H2 blockers and benadryl with resolution of her symptoms  She was discharged home with a 5 days prescription for prednisone  She improved prior to her 2 midnight stay and was discharged home in stable conditon    Significant Findings, Care, Treatment and Services Provided:   none    Complications:   none    Condition at Discharge: stable     Discharge instructions/Information to patient and family:   See after visit summary for information provided to patient and family  Provisions for Follow-Up Care:  See after visit summary for information related to follow-up care and any pertinent home health orders  Disposition: Home    Discharge Statement   I spent 30 minutes discharging the patient  This time was spent on the day of discharge  I had direct contact with the patient on the day of discharge  Additional documentation is required if more than 30 minutes were spent on discharge  Discharge Medications:  See after visit summary for reconciled discharge medications provided to patient and family

## 2019-09-09 NOTE — PLAN OF CARE
Problem: Prexisting or High Potential for Compromised Skin Integrity  Goal: Skin integrity is maintained or improved  Description  INTERVENTIONS:  - Identify patients at risk for skin breakdown  - Assess and monitor skin integrity  - Assess and monitor nutrition and hydration status  - Monitor labs   - Assess for incontinence   - Turn and reposition patient  - Assist with mobility/ambulation  - Relieve pressure over bony prominences  - Avoid friction and shearing  - Provide appropriate hygiene as needed including keeping skin clean and dry  - Evaluate need for skin moisturizer/barrier cream  - Collaborate with interdisciplinary team   - Patient/family teaching  - Consider wound care consult   Outcome: Progressing     Problem: Potential for Falls  Goal: Patient will remain free of falls  Description  INTERVENTIONS:  - Assess patient frequently for physical needs  -  Identify cognitive and physical deficits and behaviors that affect risk of falls    -  [place] fall precautions as indicated by assessment   - Educate patient/family on patient safety including physical limitations  - Instruct patient to call for assistance with activity based on assessment  - Modify environment to reduce risk of injury  - Consider OT/PT consult to assist with strengthening/mobility  Outcome: Progressing     Problem: NEUROSENSORY - ADULT  Goal: Achieves stable or improved neurological status  Description  INTERVENTIONS  - Monitor and report changes in neurological status  - Monitor vital signs such as temperature, blood pressure, glucose, and any other labs ordered   - Initiate measures to prevent increased intracranial pressure  - Monitor for seizure activity and implement precautions if appropriate      Outcome: Progressing  Goal: Remains free of injury related to seizures activity  Description  INTERVENTIONS  - Maintain airway, patient safety  and administer oxygen as ordered  - Monitor patient for seizure activity, document and report duration and description of seizure to physician/advanced practitioner  - If seizure occurs,  ensure patient safety during seizure  - Reorient patient post seizure  - Seizure pads on all 4 side rails  - Instruct patient/family to notify RN of any seizure activity including if an aura is experienced  - Instruct patient/family to call for assistance with activity based on nursing assessment  - Administer anti-seizure medications if ordered    Outcome: Progressing  Goal: Achieves maximal functionality and self care  Description  INTERVENTIONS  - Monitor swallowing and airway patency with patient fatigue and changes in neurological status  - Encourage and assist patient to increase activity and self care     - Encourage visually impaired, hearing impaired and aphasic patients to use assistive/communication devices  Outcome: Progressing     Problem: CARDIOVASCULAR - ADULT  Goal: Maintains optimal cardiac output and hemodynamic stability  Description  INTERVENTIONS:  - Monitor I/O, vital signs and rhythm  - Monitor for S/S and trends of decreased cardiac output  - Administer and titrate ordered vasoactive medications to optimize hemodynamic stability  - Assess quality of pulses, skin color and temperature  - Assess for signs of decreased coronary artery perfusion  - Instruct patient to report change in severity of symptoms  Outcome: Progressing  Goal: Absence of cardiac dysrhythmias or at baseline rhythm  Description  INTERVENTIONS:  - Continuous cardiac monitoring, vital signs, obtain 12 lead EKG if ordered  - Administer antiarrhythmic and heart rate control medications as ordered  - Monitor electrolytes and administer replacement therapy as ordered  Outcome: Progressing     Problem: RESPIRATORY - ADULT  Goal: Achieves optimal ventilation and oxygenation  Description  INTERVENTIONS:  - Assess for changes in respiratory status  - Assess for changes in mentation and behavior  - Position to facilitate oxygenation and minimize respiratory effort  - Oxygen administered by appropriate delivery if ordered  - Initiate smoking cessation education as indicated  - Encourage broncho-pulmonary hygiene including cough, deep breathe, Incentive Spirometry  - Assess the need for suctioning and aspirate as needed  - Assess and instruct to report SOB or any respiratory difficulty  - Respiratory Therapy support as indicated  Outcome: Progressing     Problem: SKIN/TISSUE INTEGRITY - ADULT  Goal: Skin integrity remains intact  Description  INTERVENTIONS  - Identify patients at risk for skin breakdown  - Assess and monitor skin integrity  - Assess and monitor nutrition and hydration status  - Monitor labs (i e  albumin)  - Assess for incontinence   - Turn and reposition patient  - Assist with mobility/ambulation  - Relieve pressure over bony prominences  - Avoid friction and shearing  - Provide appropriate hygiene as needed including keeping skin clean and dry  - Evaluate need for skin moisturizer/barrier cream  - Collaborate with interdisciplinary team (i e  Nutrition, Rehabilitation, etc )   - Patient/family teaching  Outcome: Progressing  Goal: Incision(s), wounds(s) or drain site(s) healing without S/S of infection  Description  INTERVENTIONS  - Assess and document risk factors for skin impairment   - Assess and document dressing, incision, wound bed, drain sites and surrounding tissue  - Consider nutrition services referral as needed  - Oral mucous membranes remain intact  - Provide patient/ family education  Outcome: Progressing  Goal: Oral mucous membranes remain intact  Description  INTERVENTIONS  - Assess oral mucosa and hygiene practices  - Implement preventative oral hygiene regimen  - Implement oral medicated treatments as ordered  - Initiate Nutrition services referral as needed  Outcome: Progressing     Problem: MUSCULOSKELETAL - ADULT  Goal: Maintain or return mobility to safest level of function  Description  INTERVENTIONS:  - Assess patient's ability to carry out ADLs; assess patient's baseline for ADL function and identify physical deficits which impact ability to perform ADLs (bathing, care of mouth/teeth, toileting, grooming, dressing, etc )  - Assess/evaluate cause of self-care deficits   - Assess range of motion  - Assess patient's mobility  - Assess patient's need for assistive devices and provide as appropriate  - Encourage maximum independence but intervene and supervise when necessary  - Involve family in performance of ADLs  - Assess for home care needs following discharge   - Consider OT consult to assist with ADL evaluation and planning for discharge  - Provide patient education as appropriate  Outcome: Progressing  Goal: Maintain proper alignment of affected body part  Description  INTERVENTIONS:  - Support, maintain and protect limb and body alignment  - Provide patient/ family with appropriate education  Outcome: Progressing     Problem: SAFETY ADULT  Goal: Maintain or return to baseline ADL function  Description  INTERVENTIONS:  -  Assess patient's ability to carry out ADLs; assess patient's baseline for ADL function and identify physical deficits which impact ability to perform ADLs (bathing, care of mouth/teeth, toileting, grooming, dressing, etc )  - Assess/evaluate cause of self-care deficits   - Assess range of motion  - Assess patient's mobility; develop plan if impaired  - Assess patient's need for assistive devices and provide as appropriate  - Encourage maximum independence but intervene and supervise when necessary  - Involve family in performance of ADLs  - Assess for home care needs following discharge   - Consider OT consult to assist with ADL evaluation and planning for discharge  - Provide patient education as appropriate  Outcome: Progressing  Goal: Maintain or return mobility status to optimal level  Description  INTERVENTIONS:  - Assess patient's baseline mobility status (ambulation, transfers, stairs, etc )    - Identify cognitive and physical deficits and behaviors that affect mobility  - Identify mobility aids required to assist with transfers and/or ambulation (gait belt, sit-to-stand, lift, walker, cane, etc )  - [place] fall precautions as indicated by assessment  - Record patient progress and toleration of activity level on Mobility SBAR; progress patient to next Phase/Stage  - Instruct patient to call for assistance with activity based on assessment  - Consider rehabilitation consult to assist with strengthening/weightbearing, etc   Outcome: Progressing     Problem: DISCHARGE PLANNING  Goal: Discharge to home or other facility with appropriate resources  Description  INTERVENTIONS:  - Identify barriers to discharge w/patient and caregiver  - Arrange for needed discharge resources and transportation as appropriate  - Identify discharge learning needs (meds, wound care, etc )  - Arrange for interpretive services to assist at discharge as needed  - Refer to Case Management Department for coordinating discharge planning if the patient needs post-hospital services based on physician/advanced practitioner order or complex needs related to functional status, cognitive ability, or social support system  Outcome: Progressing     Problem: Knowledge Deficit  Goal: Patient/family/caregiver demonstrates understanding of disease process, treatment plan, medications, and discharge instructions  Description  Complete learning assessment and assess knowledge base    Interventions:  - Provide teaching at level of understanding  - Provide teaching via preferred learning methods  Outcome: Progressing     Problem: DECISION MAKING  Goal: Pt/Family able to effectively weigh alternatives and participate in decision making related to treatment and care  Description  INTERVENTIONS:  - Identify decision maker  - Determine when there are differences among patient's view, family's view, and healthcare provider's view of patient condition and care goals  - Facilitate patient/family articulation of goals for care  - Help patient/family identify pros/cons of alternative solutions  - Provide information as requested by patient/family  - Respect patient/family rights related to privacy and sharing information   - Serve as a liaison between patient, family and health care team  - Initiate consults as appropriate (Ethics Team, Palliative Care, Family Care Conference, etc )  Outcome: Progressing

## 2019-09-09 NOTE — PROGRESS NOTES
Progress Note - Critical Care   Milagros Pounds 47 y o  female MRN: [id number]  Unit/Bed#: ICU 12 Encounter: [id number]    Assessment/Plan:  1  Angioedema-improved  · Will continue on steroids and p r n  Benadryl for now  2  History of pulmonary embolus  · Will continue her outpatient Eliquis  3  Morbid obesity  · Would benefit from weight loss  4  Coronary artery disease-on statins as an outpatient  5  Asthma without acute exacerbation  6  Disposition:  Can likely be discharged home today  Critical Care Time:   Documented critical care time excludes any procedures documented elsewhere  It also excludes any family updates    _____________________________________________________________________    HPI/24hr events:   No events overnight  She feels back to her baseline    Medications:    Current Facility-Administered Medications:  apixaban 5 mg Oral BID [facility], CRNP   atorvastatin 10 mg Oral After Siobhane Gales, CRNP   famotidine 20 mg Oral Daily Before Breakfast [facility], CRNP   fluticasone 2 spray Nasal Daily [facility], CRNP   levalbuterol 1 25 mg Nebulization Q8H PRN [facility], CRNP   levothyroxine 100 mcg Oral Early Morning [facility], CRNP              Physical exam:  Vitals: Body mass index is 52 96 kg/m²  Blood pressure 100/77, pulse (!) 52, temperature (!) 96 4 °F (35 8 °C), temperature source Temporal, resp  rate 13, height 5' 3" (1 6 m), weight 136 kg (298 lb 15 1 oz), SpO2 99 %  ,  Temp  Min: 95 7 °F (35 4 °C)  Max: 98 3 °F (36 8 °C)  IBW: 52 4 kg    SpO2: 99 %  SpO2 Activity: At Rest  O2 Device: Nasal cannula      Intake/Output Summary (Last 24 hours) at 9/9/2019 8733  Last data filed at 9/8/2019 2252  Gross per 24 hour   Intake 240 ml   Output 1600 ml   Net -1360 ml       Invasive/non-invasive ventilation settings:   Respiratory    Lab Data (Last 4 hours)    None         O2/Vent Data (Last 4 hours)    None              Invasive Devices     Central Venous Catheter Line CVC Central Lines 01/22/19 Triple Right Femoral 229 days          Peripheral Intravenous Line            Peripheral IV 09/08/19 Right Antecubital 1 day                  Physical Exam:  Gen:  Awake and alert  HEENT:  Pupils are equal round reactive to light  The oropharynx is without erythema and there is no obvious swelling  Neck:  Supple negative for lymphadenopathy  Chest:  Essentially clear to auscultation bilaterally  Cor:  Regular rate and rhythm  Abd:  Soft and nontender  Ext:  There is no edema clubbing or cyanosis  Neuro:  Awake and alert, able to move her extremities  Skin:  Warm and dry      Diagnostic Data:  Lab: I have personally reviewed pertinent lab results  CBC:   Results from last 7 days   Lab Units 09/08/19  0323   WBC Thousand/uL 7 52   HEMOGLOBIN g/dL 13 5   HEMATOCRIT % 43 7   PLATELETS Thousands/uL 305       CMP:   Results from last 7 days   Lab Units 09/08/19  0323   SODIUM mmol/L 138   POTASSIUM mmol/L 3 5   CHLORIDE mmol/L 105   CO2 mmol/L 25   BUN mg/dL 15   CREATININE mg/dL 1 28   CALCIUM mg/dL 9 0   ALK PHOS U/L 113   ALT U/L 34   AST U/L 27     PT/INR:   No results found for: PT, INR,   Magnesium:   Results from last 7 days   Lab Units 09/08/19  0323   MAGNESIUM mg/dL 2 1     Phosphorous:       Microbiology:        Imaging:      Cardiac lab/EKG/telemetry/ECHO:       VTE Prophylaxis:  Eliquis    Code Status: Level 1 - Full Code    Beauty Breath, CRNP    Portions of the record may have been created with voice recognition software  Occasional wrong word or "sound a like" substitutions may have occurred due to the inherent limitations of voice recognition software  Read the chart carefully and recognize, using context, where substitutions have occurred

## 2019-09-10 LAB — TOTAL IGE SMQN RAST: 40.9 KU/L (ref 0–113)

## 2019-09-11 ENCOUNTER — OFFICE VISIT (OUTPATIENT)
Dept: FAMILY MEDICINE CLINIC | Facility: CLINIC | Age: 54
End: 2019-09-11
Payer: COMMERCIAL

## 2019-09-11 VITALS
DIASTOLIC BLOOD PRESSURE: 84 MMHG | HEIGHT: 63 IN | WEIGHT: 293 LBS | SYSTOLIC BLOOD PRESSURE: 122 MMHG | BODY MASS INDEX: 51.91 KG/M2

## 2019-09-11 DIAGNOSIS — Z23 ENCOUNTER FOR IMMUNIZATION: ICD-10-CM

## 2019-09-11 DIAGNOSIS — T78.3XXD ANGIOEDEMA, SUBSEQUENT ENCOUNTER: Primary | ICD-10-CM

## 2019-09-11 PROCEDURE — 99496 TRANSJ CARE MGMT HIGH F2F 7D: CPT | Performed by: FAMILY MEDICINE

## 2019-09-11 PROCEDURE — 90682 RIV4 VACC RECOMBINANT DNA IM: CPT | Performed by: FAMILY MEDICINE

## 2019-09-11 PROCEDURE — 90471 IMMUNIZATION ADMIN: CPT | Performed by: FAMILY MEDICINE

## 2019-09-11 NOTE — PROGRESS NOTES
Assessment/Plan:  Continue when finished her steroids  She will call symptoms continue or increase  She will call Pulmonary for an appointment  Flu shot given today  She will get a TSH checked in October, and we will see her back in 3 months or p r n     Problem List Items Addressed This Visit        Other    Angioedema - Primary      Other Visit Diagnoses     Encounter for immunization        Relevant Orders    influenza vaccine, [id number], quadrivalent, recombinant, PF, 0 5 mL, for patients 18 yr+ (FLUBLOK)           Diagnoses and all orders for this visit:    Angioedema, subsequent encounter    Encounter for immunization  -     influenza vaccine, [id number], quadrivalent, recombinant, PF, 0 5 mL, for patients 18 yr+ (FLUBLOK)        No problem-specific Assessment & Plan notes found for this encounter  Subjective:      Patient ID: Prashanth Jimenez is a 47 y o  female  PATI  Patient was admitted for angioedema of the throat  She is doing better  She is on prednisone daily for a total of 5 days  She is tolerating the prednisone well  No shortness of breath        The following portions of the patient's history were reviewed and updated as appropriate:   She has a past medical history of Abdominal pain, lower, Acute renal failure ([address] ), Allergic rhinitis, Ankle pain, right, Arthritis, Asthma, Bilateral chronic knee pain, Bladder pain, Blood clot in vein, Bursitis, Cardiac disorder, Cardiac murmur, Chest tightness or pressure, COPD (chronic obstructive pulmonary disease) ([address] ), Coronary artery disease, Curvature of spine, GERD (gastroesophageal reflux disease), Hernia, hiatal, Hyperlipidemia, Hypertension, Inguinal hernia, Jaw closure abnormality, Lumbar herniated disc, Morbid obesity ([address] ), Obesity, Osteoarthritis of right knee, Patellar tendinitis, Poor flexibility of tendon, Pulmonary embolism ([address] ), Sepsis ([address] ), Severe sepsis due to methicillin resistant Staphylococcus aureus (MRSA) with acute organ dysfunction ([facility] [address] ), Spider vein, symptomatic, Status post arthroscopy of right knee, Stomach disorder, Tear of medial meniscus of right knee, Thyroid disorder, and Umbilical hernia ,  does not have any pertinent problems on file  ,   has a past surgical history that includes Hysterectomy (2009); Open anterior shoulder reconstruction (Left); Hand surgery (Right); Foot surgery (Left); Colonoscopy; pr knee scope,med/lat menisectomy (Right, 4/11/2016); Exploratory laparotomy; Peripherally inserted central catheter insertion; Incision and drainage anterior neck (Right, 6/8/2017); Other surgical history; Cystoscopy (01/1994); Incisional hernia repair; Shoulder surgery; IR IVC filter removal (1/22/2019); and Fracture surgery  ,  family history includes Arthritis in her mother; Breast cancer in her maternal aunt; Colon cancer in her brother and son; Diabetes in her maternal aunt; Lung cancer in her father; No Known Problems in her daughter, daughter, maternal grandfather, maternal grandmother, paternal grandfather, paternal grandmother, sister, sister, sister, sister, sister, and sister  ,   reports that she quit smoking about 23 years ago  She has never used smokeless tobacco  She reports that she does not drink alcohol or use drugs  ,  is allergic to penicillin g and penicillins     Current Outpatient Medications   Medication Sig Dispense Refill    albuterol (2 5 mg/3 mL) 0 083 % nebulizer solution Take 2 5 mg by nebulization as needed for wheezing        albuterol (PROVENTIL HFA,VENTOLIN HFA) 90 mcg/act inhaler Inhale 2 puffs every 4 (four) hours as needed for wheezing 1 Inhaler 3    apixaban (ELIQUIS) 5 mg Take 1 tablet (5 mg total) by mouth 2 (two) times a day 60 tablet 5    atorvastatin (LIPITOR) 10 mg tablet Take 1 tablet (10 mg total) by mouth daily 30 tablet 5    fluticasone (FLONASE) 50 mcg/act nasal spray 2 sprays into each nostril daily 16 g 5    levothyroxine 100 mcg tablet Take 1 tablet (100 mcg total) by mouth daily 30 tablet 5    predniSONE 20 mg tablet Take 2 tablets (40 mg total) by mouth daily 5 tablet 0    famotidine (PEPCID) 20 mg tablet take 1 tablet by mouth once daily as directed (Patient not taking: Reported on 8/16/2019) 30 tablet 5     No current facility-administered medications for this visit  Review of Systems   Constitutional: Negative  HENT:        As per HPI   Respiratory: Negative  Cardiovascular: Negative  Gastrointestinal: Negative  Genitourinary: Negative  Objective:  Vitals:    09/11/19 1239   BP: 122/84   Weight: (!) 140 kg (308 lb 6 4 oz)   Height: 5' 3" (1 6 m)     Body mass index is 54 63 kg/m²  Physical Exam   Constitutional: She is oriented to person, place, and time  She appears well-developed and well-nourished  No distress  HENT:   Head: Normocephalic and atraumatic  Mouth/Throat: Oropharynx is clear and moist    No oropharynx swelling   Eyes: Conjunctivae are normal    Cardiovascular: Normal rate, regular rhythm and normal heart sounds  Exam reveals no gallop and no friction rub  No murmur heard  Pulmonary/Chest: Effort normal and breath sounds normal  No respiratory distress  She has no wheezes  She has no rales  Musculoskeletal: She exhibits no edema  Neurological: She is alert and oriented to person, place, and time  Skin: She is not diaphoretic  Psychiatric: She has a normal mood and affect  Her behavior is normal  Judgment and thought content normal    Vitals reviewed

## 2019-09-18 ENCOUNTER — OFFICE VISIT (OUTPATIENT)
Dept: FAMILY MEDICINE CLINIC | Facility: CLINIC | Age: 54
End: 2019-09-18
Payer: COMMERCIAL

## 2019-09-18 VITALS
TEMPERATURE: 98.4 F | HEIGHT: 63 IN | SYSTOLIC BLOOD PRESSURE: 128 MMHG | WEIGHT: 293 LBS | OXYGEN SATURATION: 98 % | BODY MASS INDEX: 51.91 KG/M2 | HEART RATE: 91 BPM | DIASTOLIC BLOOD PRESSURE: 76 MMHG

## 2019-09-18 DIAGNOSIS — T78.3XXD ANGIOEDEMA, SUBSEQUENT ENCOUNTER: Primary | ICD-10-CM

## 2019-09-18 PROCEDURE — 3008F BODY MASS INDEX DOCD: CPT | Performed by: FAMILY MEDICINE

## 2019-09-18 PROCEDURE — 99213 OFFICE O/P EST LOW 20 MIN: CPT | Performed by: FAMILY MEDICINE

## 2019-09-18 RX ORDER — PREDNISONE 10 MG/1
TABLET ORAL
Qty: 30 TABLET | Refills: 0 | Status: SHIPPED | OUTPATIENT
Start: 2019-09-18 | End: 2020-01-08

## 2019-09-18 RX ORDER — METHYLPREDNISOLONE ACETATE 40 MG/ML
40 INJECTION, SUSPENSION INTRA-ARTICULAR; INTRALESIONAL; INTRAMUSCULAR; SOFT TISSUE ONCE
Status: COMPLETED | OUTPATIENT
Start: 2019-09-18 | End: 2019-09-18

## 2019-09-18 RX ORDER — MONTELUKAST SODIUM 10 MG/1
10 TABLET ORAL
Qty: 30 TABLET | Refills: 5 | Status: SHIPPED | OUTPATIENT
Start: 2019-09-18 | End: 2020-03-13

## 2019-09-18 RX ADMIN — METHYLPREDNISOLONE ACETATE 40 MG: 40 INJECTION, SUSPENSION INTRA-ARTICULAR; INTRALESIONAL; INTRAMUSCULAR; SOFT TISSUE at 14:23

## 2019-09-18 NOTE — PROGRESS NOTES
Assessment/Plan:  Her exam did not show any swelling in her posterior pharynx  Lung exam was clear  Patient did have postnasal drip, which I think is the majority of her symptoms  Just in case, I gave her 40 mg of Depo-Medrol IM in the right deltoid  Rx for prednisone taper and Singulair  She will pickup Allegra 180 mg daily over-the-counter  Refer to allergy  If her symptoms continue or increase, patient needs to go to the ER, calling 911 if necessary, I stressed this to her, and she understood  Follow-up here as scheduled or p r n  Problem List Items Addressed This Visit        Other    Angioedema - Primary    Relevant Medications    montelukast (SINGULAIR) 10 mg tablet    predniSONE 10 mg tablet    methylPREDNISolone acetate (DEPO-MEDROL) injection 40 mg (Completed)    Other Relevant Orders    Ambulatory referral to Allergy           Diagnoses and all orders for this visit:    Angioedema, subsequent encounter  -     montelukast (SINGULAIR) 10 mg tablet; Take 1 tablet (10 mg total) by mouth daily at bedtime  -     predniSONE 10 mg tablet; 4 tablets daily for 3 days, then 3 tablets daily for 3 days, then 2 tablets daily for 3 days, then 1 tablet daily for 3 days  -     Ambulatory referral to Allergy; Future  -     methylPREDNISolone acetate (DEPO-MEDROL) injection 40 mg        No problem-specific Assessment & Plan notes found for this encounter  Subjective:      Patient ID: Marcia Weinberg is a 47 y o  female  Patient here today stating that last night felt her throat was swelling again  Patient states was able to get through the night without any trouble, this morning still felt like it was swollen  No shortness of breath  No wheezing  No fever chills  Patient did not take anything to try and help with the swelling        The following portions of the patient's history were reviewed and updated as appropriate:   She has a past medical history of Abdominal pain, lower, Acute renal failure ([address] ), Allergic rhinitis, Ankle pain, right, Arthritis, Asthma, Bilateral chronic knee pain, Bladder pain, Blood clot in vein, Bursitis, Cardiac disorder, Cardiac murmur, Chest tightness or pressure, COPD (chronic obstructive pulmonary disease) ([facility] [address] ), Coronary artery disease, Curvature of spine, GERD (gastroesophageal reflux disease), Hernia, hiatal, Hyperlipidemia, Hypertension, Inguinal hernia, Jaw closure abnormality, Lumbar herniated disc, Morbid obesity ([facility] [address] ), Obesity, Osteoarthritis of right knee, Patellar tendinitis, Poor flexibility of tendon, Pulmonary embolism ([facility] [address] ), Sepsis ([facility] [address] ), Severe sepsis due to methicillin resistant Staphylococcus aureus (MRSA) with acute organ dysfunction ([facility] [address] ), Spider vein, symptomatic, Status post arthroscopy of right knee, Stomach disorder, Tear of medial meniscus of right knee, Thyroid disorder, and Umbilical hernia ,  does not have any pertinent problems on file  ,   has a past surgical history that includes Hysterectomy (2009); Open anterior shoulder reconstruction (Left); Hand surgery (Right); Foot surgery (Left); Colonoscopy; pr knee scope,med/lat menisectomy (Right, 4/11/2016); Exploratory laparotomy; Peripherally inserted central catheter insertion; Incision and drainage anterior neck (Right, 6/8/2017); Other surgical history; Cystoscopy (01/1994); Incisional hernia repair; Shoulder surgery; IR IVC filter removal (1/22/2019); and Fracture surgery  ,  family history includes Arthritis in her mother; Breast cancer in her maternal aunt; Colon cancer in her brother and son; Diabetes in her maternal aunt; Lung cancer in her father; No Known Problems in her daughter, daughter, maternal grandfather, maternal grandmother, paternal grandfather, paternal grandmother, sister, sister, sister, sister, sister, and sister  ,   reports that she quit smoking about 23 years ago  She has never used smokeless tobacco  She reports that she drank alcohol  She reports that she does not use drugs  ,  is allergic to penicillin g and penicillins     Current Outpatient Medications   Medication Sig Dispense Refill    albuterol (2 5 mg/3 mL) 0 083 % nebulizer solution Take 2 5 mg by nebulization as needed for wheezing   albuterol (PROVENTIL HFA,VENTOLIN HFA) 90 mcg/act inhaler Inhale 2 puffs every 4 (four) hours as needed for wheezing 1 Inhaler 3    apixaban (ELIQUIS) 5 mg Take 1 tablet (5 mg total) by mouth 2 (two) times a day 60 tablet 5    atorvastatin (LIPITOR) 10 mg tablet Take 1 tablet (10 mg total) by mouth daily 30 tablet 5    fluticasone (FLONASE) 50 mcg/act nasal spray 2 sprays into each nostril daily 16 g 5    levothyroxine 100 mcg tablet Take 1 tablet (100 mcg total) by mouth daily 30 tablet 5    famotidine (PEPCID) 20 mg tablet take 1 tablet by mouth once daily as directed (Patient not taking: Reported on 8/16/2019) 30 tablet 5    montelukast (SINGULAIR) 10 mg tablet Take 1 tablet (10 mg total) by mouth daily at bedtime 30 tablet 5    predniSONE 10 mg tablet 4 tablets daily for 3 days, then 3 tablets daily for 3 days, then 2 tablets daily for 3 days, then 1 tablet daily for 3 days 30 tablet 0     No current facility-administered medications for this visit  Review of Systems   Constitutional: Negative  HENT:        As per HPI   Respiratory: Negative  Cardiovascular: Negative  Gastrointestinal: Negative  Genitourinary: Negative  Objective:  Vitals:    09/18/19 1341   BP: 128/76   Pulse: 91   Temp: 98 4 °F (36 9 °C)   SpO2: 98%   Weight: (!) 140 kg (308 lb 6 4 oz)   Height: 5' 3" (1 6 m)     Body mass index is 54 63 kg/m²  Physical Exam   Constitutional: She is oriented to person, place, and time  She appears well-developed and well-nourished  No distress  HENT:   Head: Normocephalic and atraumatic     Mouth/Throat: Oropharynx is clear and moist    Clear postnasal drip  No swelling seen in the throat   Eyes: Conjunctivae are normal    Cardiovascular: Normal rate, regular rhythm and normal heart sounds  Exam reveals no gallop and no friction rub  No murmur heard  Pulmonary/Chest: Effort normal and breath sounds normal  No respiratory distress  She has no wheezes  She has no rales  Musculoskeletal: She exhibits no edema  Neurological: She is alert and oriented to person, place, and time  Skin: She is not diaphoretic  Psychiatric: She has a normal mood and affect  Her behavior is normal  Judgment and thought content normal    Vitals reviewed

## 2019-10-02 ENCOUNTER — APPOINTMENT (OUTPATIENT)
Dept: LAB | Facility: MEDICAL CENTER | Age: 54
End: 2019-10-02
Payer: COMMERCIAL

## 2019-10-02 ENCOUNTER — TRANSCRIBE ORDERS (OUTPATIENT)
Dept: ADMINISTRATIVE | Facility: HOSPITAL | Age: 54
End: 2019-10-02

## 2019-10-02 DIAGNOSIS — T78.3XXD CYCLIC EDEMA, SUBSEQUENT ENCOUNTER: ICD-10-CM

## 2019-10-02 DIAGNOSIS — E03.8 OTHER SPECIFIED HYPOTHYROIDISM: ICD-10-CM

## 2019-10-02 DIAGNOSIS — Z12.11 SCREENING FOR COLON CANCER: ICD-10-CM

## 2019-10-02 DIAGNOSIS — T78.3XXD CYCLIC EDEMA, SUBSEQUENT ENCOUNTER: Primary | ICD-10-CM

## 2019-10-02 LAB — TSH SERPL DL<=0.05 MIU/L-ACNC: 1.63 UIU/ML (ref 0.36–3.74)

## 2019-10-02 PROCEDURE — 86160 COMPLEMENT ANTIGEN: CPT

## 2019-10-02 PROCEDURE — 84443 ASSAY THYROID STIM HORMONE: CPT

## 2019-10-02 PROCEDURE — 86038 ANTINUCLEAR ANTIBODIES: CPT

## 2019-10-02 PROCEDURE — 86161 COMPLEMENT/FUNCTION ACTIVITY: CPT

## 2019-10-02 PROCEDURE — 36415 COLL VENOUS BLD VENIPUNCTURE: CPT

## 2019-10-02 PROCEDURE — 83520 IMMUNOASSAY QUANT NOS NONAB: CPT

## 2019-10-04 LAB
RYE IGE QN: NEGATIVE
TRYPTASE SERPL-MCNC: 8 UG/L (ref 2.2–13.2)

## 2019-10-07 LAB
C1INH ACT/NOR SERPL: >92 %MEAN NORMAL
C1INH SERPL-MCNC: 34 MG/DL (ref 21–39)

## 2020-01-08 ENCOUNTER — OFFICE VISIT (OUTPATIENT)
Dept: FAMILY MEDICINE CLINIC | Facility: CLINIC | Age: 55
End: 2020-01-08
Payer: COMMERCIAL

## 2020-01-08 VITALS
DIASTOLIC BLOOD PRESSURE: 78 MMHG | OXYGEN SATURATION: 94 % | HEIGHT: 63 IN | TEMPERATURE: 97.8 F | BODY MASS INDEX: 51.91 KG/M2 | WEIGHT: 293 LBS | HEART RATE: 84 BPM | SYSTOLIC BLOOD PRESSURE: 128 MMHG

## 2020-01-08 DIAGNOSIS — J01.90 ACUTE NON-RECURRENT SINUSITIS, UNSPECIFIED LOCATION: Primary | ICD-10-CM

## 2020-01-08 DIAGNOSIS — E66.01 MORBID OBESITY WITH BMI OF 50.0-59.9, ADULT (HCC): ICD-10-CM

## 2020-01-08 PROCEDURE — 99214 OFFICE O/P EST MOD 30 MIN: CPT | Performed by: PHYSICIAN ASSISTANT

## 2020-01-08 PROCEDURE — 3008F BODY MASS INDEX DOCD: CPT | Performed by: PHYSICIAN ASSISTANT

## 2020-01-08 PROCEDURE — 1036F TOBACCO NON-USER: CPT | Performed by: PHYSICIAN ASSISTANT

## 2020-01-08 RX ORDER — AZITHROMYCIN 250 MG/1
TABLET, FILM COATED ORAL
Qty: 6 TABLET | Refills: 0 | Status: SHIPPED | OUTPATIENT
Start: 2020-01-08 | End: 2020-01-12

## 2020-01-08 NOTE — PROGRESS NOTES
Assessment/Plan:    Problem List Items Addressed This Visit     None      Visit Diagnoses     Acute non-recurrent sinusitis, unspecified location    -  Primary    Relevant Medications    azithromycin (ZITHROMAX) 250 mg tablet    Morbid obesity with BMI of 50 0-59 9, adult ([facility] [address] )               Diagnoses and all orders for this visit:    Acute non-recurrent sinusitis, unspecified location  -     azithromycin (ZITHROMAX) 250 mg tablet; Day 1 take 2 tablets, days 2-5 take 1 tablet  Morbid obesity with BMI of 50 0-59 9, adult ([facility] [address] )              Subjective:      Patient ID: Lmaont Vizcarra is a 47 y o  female  Charlotte Gift is here today complaining of cold symptoms x few days  Has sinus drainage (green), feels hot/cold  The following portions of the patient's history were reviewed and updated as appropriate:   She has a past medical history of Abdominal pain, lower, Acute renal failure ([address] ), Allergic rhinitis, Ankle pain, right, Arthritis, Asthma, Bilateral chronic knee pain, Bladder pain, Blood clot in vein, Bursitis, Cardiac disorder, Cardiac murmur, Chest tightness or pressure, COPD (chronic obstructive pulmonary disease) ([address] ), Coronary artery disease, Curvature of spine, GERD (gastroesophageal reflux disease), Hernia, hiatal, Hyperlipidemia, Hypertension, Inguinal hernia, Jaw closure abnormality, Lumbar herniated disc, Morbid obesity ([address] ), Obesity, Osteoarthritis of right knee, Patellar tendinitis, Poor flexibility of tendon, Pulmonary embolism ([address] ), Sepsis ([address] ), Severe sepsis due to methicillin resistant Staphylococcus aureus (MRSA) with acute organ dysfunction ([address] ), Spider vein, symptomatic, Status post arthroscopy of right knee, Stomach disorder, Tear of medial meniscus of right knee, Thyroid disorder, and Umbilical hernia ,  does not have any pertinent problems on file  ,   has a past surgical history that includes Hysterectomy (2009); Open anterior shoulder reconstruction (Left); Hand surgery (Right);  Foot surgery (Left); Colonoscopy; pr knee scope,med/lat menisectomy (Right, 4/11/2016); Exploratory laparotomy; Peripherally inserted central catheter insertion; Incision and drainage anterior neck (Right, 6/8/2017); Other surgical history; Cystoscopy (01/1994); Incisional hernia repair; Shoulder surgery; IR IVC filter removal (1/22/2019); and Fracture surgery  ,  family history includes Arthritis in her mother; Breast cancer in her maternal aunt; Colon cancer in her brother and son; Diabetes in her maternal aunt; Lung cancer in her father; No Known Problems in her daughter, daughter, maternal grandfather, maternal grandmother, paternal grandfather, paternal grandmother, sister, sister, sister, sister, sister, and sister  ,   reports that she quit smoking about 24 years ago  She has never used smokeless tobacco  She reports that she drank alcohol  She reports that she does not use drugs  ,  is allergic to penicillin g and penicillins     Current Outpatient Medications   Medication Sig Dispense Refill    albuterol (2 5 mg/3 mL) 0 083 % nebulizer solution Take 2 5 mg by nebulization as needed for wheezing   albuterol (PROVENTIL HFA,VENTOLIN HFA) 90 mcg/act inhaler Inhale 2 puffs every 4 (four) hours as needed for wheezing 1 Inhaler 3    apixaban (ELIQUIS) 5 mg Take 1 tablet (5 mg total) by mouth 2 (two) times a day 60 tablet 5    atorvastatin (LIPITOR) 10 mg tablet Take 1 tablet (10 mg total) by mouth daily 30 tablet 5    fluticasone (FLONASE) 50 mcg/act nasal spray 2 sprays into each nostril daily 16 g 5    levothyroxine 100 mcg tablet Take 1 tablet (100 mcg total) by mouth daily 30 tablet 5    montelukast (SINGULAIR) 10 mg tablet Take 1 tablet (10 mg total) by mouth daily at bedtime 30 tablet 5    azithromycin (ZITHROMAX) 250 mg tablet Day 1 take 2 tablets, days 2-5 take 1 tablet   6 tablet 0    famotidine (PEPCID) 20 mg tablet take 1 tablet by mouth once daily as directed (Patient not taking: Reported on 8/16/2019) 30 tablet 5     No current facility-administered medications for this visit  Review of Systems   Constitutional: Positive for chills and fever (subjective)  Negative for activity change, appetite change, diaphoresis, fatigue and unexpected weight change  HENT: Positive for congestion, postnasal drip and rhinorrhea  Negative for ear pain, sinus pressure, sinus pain, sneezing, sore throat, tinnitus and voice change  Eyes: Negative for pain, redness and visual disturbance  Respiratory: Positive for cough  Negative for chest tightness, shortness of breath and wheezing  Cardiovascular: Negative for chest pain, palpitations and leg swelling  Gastrointestinal: Negative for abdominal pain, blood in stool, constipation, diarrhea, nausea and vomiting  Genitourinary: Negative for difficulty urinating, dysuria, frequency, hematuria and urgency  Musculoskeletal: Negative for arthralgias, back pain, gait problem, joint swelling, myalgias, neck pain and neck stiffness  Skin: Negative for color change, pallor, rash and wound  Neurological: Negative for dizziness, tremors, weakness, light-headedness and headaches  Psychiatric/Behavioral: Negative for dysphoric mood, self-injury, sleep disturbance and suicidal ideas  The patient is not nervous/anxious  Objective:  Vitals:    01/08/20 1240   BP: 128/78   Pulse: 84   Temp: 97 8 °F (36 6 °C)   SpO2: 94%   Weight: (!) 141 kg (310 lb 6 4 oz)   Height: 5' 3" (1 6 m)     Body mass index is 54 98 kg/m²  Physical Exam   Constitutional: She is oriented to person, place, and time  She appears well-developed and well-nourished  No distress  HENT:   Head: Normocephalic and atraumatic  Right Ear: Hearing, external ear and ear canal normal  Tympanic membrane is injected  Left Ear: Hearing, external ear and ear canal normal  Tympanic membrane is injected  Nose: Mucosal edema and rhinorrhea present     Mouth/Throat: Uvula is midline, oropharynx is clear and moist and mucous membranes are normal  No oropharyngeal exudate, posterior oropharyngeal edema, posterior oropharyngeal erythema or tonsillar abscesses  No tonsillar exudate  Eyes: Conjunctivae are normal  Right eye exhibits no discharge  Left eye exhibits no discharge  No scleral icterus  Neck: Neck supple  Carotid bruit is not present  No thyromegaly present  Cardiovascular: Normal rate, regular rhythm and normal heart sounds  No murmur heard  Pulmonary/Chest: Effort normal and breath sounds normal  No respiratory distress  She has no wheezes  Abdominal: Soft  Bowel sounds are normal  She exhibits no distension and no mass  There is no tenderness  There is no rebound and no guarding  Musculoskeletal: Normal range of motion  She exhibits no edema or tenderness  Lymphadenopathy:     She has cervical adenopathy  Neurological: She is alert and oriented to person, place, and time  Skin: Skin is warm and dry  No rash noted  She is not diaphoretic  No erythema  Psychiatric: She has a normal mood and affect  Her behavior is normal  Judgment and thought content normal    Vitals reviewed  BMI Counseling: Body mass index is 54 98 kg/m²  The BMI is above normal  Nutrition recommendations include reducing portion sizes, decreasing overall calorie intake and 3-5 servings of fruits/vegetables daily  Exercise recommendations include exercising 3-5 times per week

## 2020-03-13 ENCOUNTER — OFFICE VISIT (OUTPATIENT)
Dept: FAMILY MEDICINE CLINIC | Facility: CLINIC | Age: 55
End: 2020-03-13
Payer: COMMERCIAL

## 2020-03-13 VITALS
HEART RATE: 90 BPM | TEMPERATURE: 97.8 F | HEIGHT: 63 IN | BODY MASS INDEX: 51.91 KG/M2 | DIASTOLIC BLOOD PRESSURE: 80 MMHG | SYSTOLIC BLOOD PRESSURE: 130 MMHG | OXYGEN SATURATION: 96 % | WEIGHT: 293 LBS

## 2020-03-13 DIAGNOSIS — I26.99 PULMONARY EMBOLISM, BILATERAL (HCC): ICD-10-CM

## 2020-03-13 DIAGNOSIS — T78.3XXD ANGIOEDEMA, SUBSEQUENT ENCOUNTER: ICD-10-CM

## 2020-03-13 DIAGNOSIS — E78.5 DYSLIPIDEMIA: ICD-10-CM

## 2020-03-13 DIAGNOSIS — J45.20 MILD INTERMITTENT ASTHMA WITHOUT COMPLICATION: Chronic | ICD-10-CM

## 2020-03-13 DIAGNOSIS — J30.1 SEASONAL ALLERGIC RHINITIS DUE TO POLLEN: ICD-10-CM

## 2020-03-13 DIAGNOSIS — J06.9 UPPER RESPIRATORY TRACT INFECTION, UNSPECIFIED TYPE: Primary | ICD-10-CM

## 2020-03-13 DIAGNOSIS — K21.9 GASTROESOPHAGEAL REFLUX DISEASE WITHOUT ESOPHAGITIS: ICD-10-CM

## 2020-03-13 DIAGNOSIS — E03.8 OTHER SPECIFIED HYPOTHYROIDISM: ICD-10-CM

## 2020-03-13 PROCEDURE — 3075F SYST BP GE 130 - 139MM HG: CPT | Performed by: FAMILY MEDICINE

## 2020-03-13 PROCEDURE — 99213 OFFICE O/P EST LOW 20 MIN: CPT | Performed by: FAMILY MEDICINE

## 2020-03-13 PROCEDURE — 3079F DIAST BP 80-89 MM HG: CPT | Performed by: FAMILY MEDICINE

## 2020-03-13 PROCEDURE — 3008F BODY MASS INDEX DOCD: CPT | Performed by: FAMILY MEDICINE

## 2020-03-13 PROCEDURE — 1036F TOBACCO NON-USER: CPT | Performed by: FAMILY MEDICINE

## 2020-03-13 RX ORDER — ALBUTEROL SULFATE 90 UG/1
2 AEROSOL, METERED RESPIRATORY (INHALATION) EVERY 4 HOURS PRN
Qty: 1 INHALER | Refills: 3 | Status: SHIPPED | OUTPATIENT
Start: 2020-03-13 | End: 2020-05-12

## 2020-03-13 RX ORDER — AZITHROMYCIN 250 MG/1
TABLET, FILM COATED ORAL
Qty: 6 TABLET | Refills: 0 | Status: SHIPPED | OUTPATIENT
Start: 2020-03-13 | End: 2020-03-18

## 2020-03-13 RX ORDER — FAMOTIDINE 20 MG/1
20 TABLET, FILM COATED ORAL DAILY
Qty: 30 TABLET | Refills: 5 | Status: ON HOLD | OUTPATIENT
Start: 2020-03-13 | End: 2020-05-03 | Stop reason: CLARIF

## 2020-03-13 RX ORDER — ATORVASTATIN CALCIUM 10 MG/1
10 TABLET, FILM COATED ORAL DAILY
Qty: 30 TABLET | Refills: 5 | Status: SHIPPED | OUTPATIENT
Start: 2020-03-13 | End: 2020-05-12

## 2020-03-13 RX ORDER — ALBUTEROL SULFATE 2.5 MG/3ML
2.5 SOLUTION RESPIRATORY (INHALATION) AS NEEDED
Qty: 60 VIAL | Refills: 5 | Status: SHIPPED | OUTPATIENT
Start: 2020-03-13 | End: 2020-05-12

## 2020-03-13 RX ORDER — FLUTICASONE PROPIONATE 50 MCG
2 SPRAY, SUSPENSION (ML) NASAL DAILY
Qty: 16 G | Refills: 5 | Status: SHIPPED | OUTPATIENT
Start: 2020-03-13 | End: 2020-05-12

## 2020-03-13 RX ORDER — MONTELUKAST SODIUM 10 MG/1
10 TABLET ORAL
Qty: 30 TABLET | Refills: 5 | Status: SHIPPED | OUTPATIENT
Start: 2020-03-13 | End: 2020-05-12

## 2020-03-13 RX ORDER — LEVOTHYROXINE SODIUM 0.1 MG/1
100 TABLET ORAL DAILY
Qty: 30 TABLET | Refills: 5 | Status: SHIPPED | OUTPATIENT
Start: 2020-03-13 | End: 2020-05-12
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
## 2020-03-25 ENCOUNTER — OFFICE VISIT (OUTPATIENT)
Dept: FAMILY MEDICINE CLINIC | Facility: CLINIC | Age: 55
End: 2020-03-25
Payer: COMMERCIAL

## 2020-03-25 ENCOUNTER — TELEPHONE (OUTPATIENT)
Dept: FAMILY MEDICINE CLINIC | Facility: CLINIC | Age: 55
End: 2020-03-25

## 2020-03-25 VITALS
SYSTOLIC BLOOD PRESSURE: 132 MMHG | HEIGHT: 63 IN | BODY MASS INDEX: 51.91 KG/M2 | OXYGEN SATURATION: 99 % | WEIGHT: 293 LBS | TEMPERATURE: 96.4 F | HEART RATE: 99 BPM | DIASTOLIC BLOOD PRESSURE: 90 MMHG

## 2020-03-25 DIAGNOSIS — Z12.11 SCREENING FOR COLON CANCER: ICD-10-CM

## 2020-03-25 DIAGNOSIS — L03.114 CELLULITIS OF LEFT UPPER EXTREMITY: Primary | ICD-10-CM

## 2020-03-25 PROCEDURE — 99214 OFFICE O/P EST MOD 30 MIN: CPT | Performed by: PHYSICIAN ASSISTANT

## 2020-03-25 PROCEDURE — 1036F TOBACCO NON-USER: CPT | Performed by: PHYSICIAN ASSISTANT

## 2020-03-25 PROCEDURE — 3008F BODY MASS INDEX DOCD: CPT | Performed by: PHYSICIAN ASSISTANT

## 2020-03-25 PROCEDURE — 3080F DIAST BP >= 90 MM HG: CPT | Performed by: PHYSICIAN ASSISTANT

## 2020-03-25 PROCEDURE — 3075F SYST BP GE 130 - 139MM HG: CPT | Performed by: PHYSICIAN ASSISTANT

## 2020-03-25 RX ORDER — CLINDAMYCIN HYDROCHLORIDE 300 MG/1
300 CAPSULE ORAL 3 TIMES DAILY
Qty: 21 CAPSULE | Refills: 0 | Status: SHIPPED | OUTPATIENT
Start: 2020-03-25 | End: 2020-04-01

## 2020-03-25 NOTE — PROGRESS NOTES
Assessment/Plan:    Problem List Items Addressed This Visit     None      Visit Diagnoses     Cellulitis of left upper extremity    -  Primary    Relevant Medications    clindamycin (CLEOCIN) 300 MG capsule    Screening for colon cancer        Relevant Orders    Occult Blood, Fecal Immunochemical           Diagnoses and all orders for this visit:    Cellulitis of left upper extremity  -     clindamycin (CLEOCIN) 300 MG capsule; Take 1 capsule (300 mg total) by mouth 3 (three) times a day for 7 days    Screening for colon cancer  -     Occult Blood, Fecal Immunochemical; Future        Will place her on clindamycin  I drew around erythematous area with a marker and advised her to let us know if the redness grows beyond lines  Advised her that she may take an OTC antihistamine and use hydrocortisone cream to help with the itch  Subjective:      Patient ID: Sunni Fregoso is a 54 y o  female  Dulcechase Bell is a 54year old female who is here today complaining of redness of left wrist for the past 2-3 days  She states that the area of redness has been growing  She does not remember any trauma to her wrist  She denies any difficulty moving her wrist, fevers, chills, chest pains, or shortness of breath  It is slightly itchy at times  She has been using different hand sanitizers and soaps, but she does not have a rash on any other part of her body  She did try putting ice on it  She denies any fevers, chills, chest pains, or shortness of breath         The following portions of the patient's history were reviewed and updated as appropriate:   She has a past medical history of Abdominal pain, lower, Acute renal failure ([address] ), Allergic rhinitis, Ankle pain, right, Arthritis, Asthma, Bilateral chronic knee pain, Bladder pain, Blood clot in vein, Bursitis, Cardiac disorder, Cardiac murmur, Chest tightness or pressure, COPD (chronic obstructive pulmonary disease) ([address] ), Coronary artery disease, Curvature of spine, GERD (gastroesophageal reflux disease), Hernia, hiatal, Hyperlipidemia, Hypertension, Inguinal hernia, Jaw closure abnormality, Lumbar herniated disc, Morbid obesity ([address] ), Obesity, Osteoarthritis of right knee, Patellar tendinitis, Poor flexibility of tendon, Pulmonary embolism ([facility] [address] ), Sepsis ([facility] [address] ), Severe sepsis due to methicillin resistant Staphylococcus aureus (MRSA) with acute organ dysfunction ([facility] [address] ), Spider vein, symptomatic, Status post arthroscopy of right knee, Stomach disorder, Tear of medial meniscus of right knee, Thyroid disorder, and Umbilical hernia ,  does not have any pertinent problems on file  ,   has a past surgical history that includes Hysterectomy (2009); Open anterior shoulder reconstruction (Left); Hand surgery (Right); Foot surgery (Left); Colonoscopy; pr knee scope,med/lat menisectomy (Right, 4/11/2016); Exploratory laparotomy; Peripherally inserted central catheter insertion; Incision and drainage anterior neck (Right, 6/8/2017); Other surgical history; Cystoscopy (01/1994); Incisional hernia repair; Shoulder surgery; IR IVC filter removal (1/22/2019); and Fracture surgery  ,  family history includes Arthritis in her mother; Breast cancer in her maternal aunt; Colon cancer in her brother and son; Diabetes in her maternal aunt; Lung cancer in her father; No Known Problems in her daughter, daughter, maternal grandfather, maternal grandmother, paternal grandfather, paternal grandmother, sister, sister, sister, sister, sister, and sister  ,   reports that she quit smoking about 24 years ago  She has never used smokeless tobacco  She reports that she drank alcohol  She reports that she does not use drugs  ,  is allergic to penicillin g and penicillins     Current Outpatient Medications   Medication Sig Dispense Refill    albuterol (2 5 mg/3 mL) 0 083 % nebulizer solution Take 1 vial (2 5 mg total) by nebulization as needed for wheezing 60 vial 5    albuterol (PROVENTIL HFA,VENTOLIN HFA) 90 mcg/act inhaler Inhale 2 puffs every 4 (four) hours as needed for wheezing 1 Inhaler 3    apixaban (ELIQUIS) 5 mg Take 1 tablet (5 mg total) by mouth 2 (two) times a day 60 tablet 5    atorvastatin (LIPITOR) 10 mg tablet Take 1 tablet (10 mg total) by mouth daily 30 tablet 5    famotidine (PEPCID) 20 mg tablet Take 1 tablet (20 mg total) by mouth daily 30 tablet 5    fluticasone (FLONASE) 50 mcg/act nasal spray 2 sprays into each nostril daily 16 g 5    levothyroxine 100 mcg tablet Take 1 tablet (100 mcg total) by mouth daily 30 tablet 5    montelukast (SINGULAIR) 10 mg tablet Take 1 tablet (10 mg total) by mouth daily at bedtime 30 tablet 5    clindamycin (CLEOCIN) 300 MG capsule Take 1 capsule (300 mg total) by mouth 3 (three) times a day for 7 days 21 capsule 0     No current facility-administered medications for this visit  Review of Systems   Constitutional: Negative for chills, diaphoresis, fatigue and fever  HENT: Negative for congestion, ear pain, postnasal drip, rhinorrhea, sneezing, sore throat and trouble swallowing  Eyes: Negative for pain and visual disturbance  Respiratory: Negative for apnea, cough, shortness of breath and wheezing  Cardiovascular: Negative for chest pain and palpitations  Gastrointestinal: Negative for abdominal pain, constipation, diarrhea, nausea and vomiting  Genitourinary: Negative for dysuria and hematuria  Musculoskeletal: Negative for arthralgias, gait problem and myalgias  Skin: Positive for rash (left wrist)  Neurological: Negative for dizziness, syncope, weakness, light-headedness, numbness and headaches  Psychiatric/Behavioral: Negative for suicidal ideas  The patient is not nervous/anxious  Objective:  Vitals:    03/25/20 1202   BP: 132/90   BP Location: Left arm   Patient Position: Sitting   Pulse: 99   Temp: (!) 96 4 °F (35 8 °C)   SpO2: 99%   Weight: (!) 138 kg (304 lb)   Height: 5' 3" (1 6 m)     Body mass index is 53 85 kg/m²  Physical Exam   Constitutional: She is oriented to person, place, and time  She appears well-developed and well-nourished  HENT:   Head: Normocephalic and atraumatic  Right Ear: Tympanic membrane, external ear and ear canal normal    Left Ear: Tympanic membrane, external ear and ear canal normal    Nose: Nose normal    Mouth/Throat: Oropharynx is clear and moist and mucous membranes are normal  No oropharyngeal exudate, posterior oropharyngeal edema or posterior oropharyngeal erythema  Eyes: Pupils are equal, round, and reactive to light  EOM are normal    Neck: Normal range of motion  Neck supple  Cardiovascular: Normal rate, regular rhythm and normal heart sounds  Exam reveals no gallop and no friction rub  No murmur heard  Pulmonary/Chest: Effort normal and breath sounds normal  No respiratory distress  She has no wheezes  She has no rales  Musculoskeletal: Normal range of motion  Left wrist: Normal  She exhibits normal range of motion, no tenderness and no swelling  Lymphadenopathy:     She has no cervical adenopathy  Neurological: She is alert and oriented to person, place, and time  Skin: Skin is warm and dry  Rash (warm, erythematous, flat rash on dorsal surface of left wrist  ) noted  There is erythema  Psychiatric: She has a normal mood and affect  Her behavior is normal  Judgment and thought content normal    Vitals reviewed

## 2020-04-03 ENCOUNTER — TELEMEDICINE (OUTPATIENT)
Dept: FAMILY MEDICINE CLINIC | Facility: CLINIC | Age: 55
End: 2020-04-03
Payer: COMMERCIAL

## 2020-04-03 VITALS — BODY MASS INDEX: 51.91 KG/M2 | HEIGHT: 63 IN | WEIGHT: 293 LBS

## 2020-04-03 DIAGNOSIS — T78.3XXA ANGIOEDEMA OF LIPS, INITIAL ENCOUNTER: Primary | ICD-10-CM

## 2020-04-03 PROCEDURE — 99213 OFFICE O/P EST LOW 20 MIN: CPT | Performed by: PHYSICIAN ASSISTANT

## 2020-04-03 RX ORDER — PREDNISONE 10 MG/1
TABLET ORAL
Qty: 30 TABLET | Refills: 0 | Status: ON HOLD | OUTPATIENT
Start: 2020-04-03 | End: 2020-05-03 | Stop reason: CLARIF

## 2020-05-02 ENCOUNTER — HOSPITAL ENCOUNTER (INPATIENT)
Facility: HOSPITAL | Age: 55
LOS: 5 days | Discharge: HOME | DRG: 134 | End: 2020-05-08
Attending: EMERGENCY MEDICINE | Admitting: HOSPITALIST
Payer: COMMERCIAL

## 2020-05-02 ENCOUNTER — APPOINTMENT (EMERGENCY)
Dept: CT IMAGING | Facility: HOSPITAL | Age: 55
DRG: 134 | End: 2020-05-02
Payer: COMMERCIAL

## 2020-05-02 DIAGNOSIS — I26.99 PULMONARY EMBOLISM (HCC): Primary | ICD-10-CM

## 2020-05-02 LAB
ALBUMIN SERPL BCP-MCNC: 3.4 G/DL (ref 3.5–5.7)
ALP SERPL-CCNC: 74 U/L (ref 40–150)
ALT SERPL W P-5'-P-CCNC: 13 U/L (ref 7–52)
ANION GAP SERPL CALCULATED.3IONS-SCNC: 6 MMOL/L (ref 4–13)
AST SERPL W P-5'-P-CCNC: 16 U/L (ref 13–39)
BASOPHILS # BLD AUTO: 0.1 THOUSANDS/ΜL (ref 0–0.1)
BASOPHILS NFR BLD AUTO: 1 % (ref 0–2)
BILIRUB SERPL-MCNC: 0.3 MG/DL (ref 0.2–1)
BUN SERPL-MCNC: 15 MG/DL (ref 7–25)
CALCIUM SERPL-MCNC: 8.8 MG/DL (ref 8.6–10.5)
CHLORIDE SERPL-SCNC: 106 MMOL/L (ref 98–107)
CO2 SERPL-SCNC: 27 MMOL/L (ref 21–31)
CREAT SERPL-MCNC: 1.03 MG/DL (ref 0.6–1.2)
EOSINOPHIL # BLD AUTO: 0.4 THOUSAND/ΜL (ref 0–0.61)
EOSINOPHIL NFR BLD AUTO: 4 % (ref 0–5)
ERYTHROCYTE [DISTWIDTH] IN BLOOD BY AUTOMATED COUNT: 14.2 % (ref 11.5–14.5)
GFR SERPL CREATININE-BSD FRML MDRD: 61 ML/MIN/1.73SQ M
GLUCOSE SERPL-MCNC: 101 MG/DL (ref 65–99)
HCT VFR BLD AUTO: 38.3 % (ref 42–47)
HGB BLD-MCNC: 12.2 G/DL (ref 12–16)
LYMPHOCYTES # BLD AUTO: 1.8 THOUSANDS/ΜL (ref 0.6–4.47)
LYMPHOCYTES NFR BLD AUTO: 23 % (ref 21–51)
MCH RBC QN AUTO: 30.2 PG (ref 26–34)
MCHC RBC AUTO-ENTMCNC: 31.9 G/DL (ref 31–37)
MCV RBC AUTO: 94 FL (ref 81–99)
MONOCYTES # BLD AUTO: 0.9 THOUSAND/ΜL (ref 0.17–1.22)
MONOCYTES NFR BLD AUTO: 11 % (ref 2–12)
NEUTROPHILS # BLD AUTO: 4.7 THOUSANDS/ΜL (ref 1.4–6.5)
NEUTS SEG NFR BLD AUTO: 60 % (ref 42–75)
PLATELET # BLD AUTO: 327 THOUSANDS/UL (ref 149–390)
PMV BLD AUTO: 7.9 FL (ref 8.6–11.7)
POTASSIUM SERPL-SCNC: 3.6 MMOL/L (ref 3.5–5.5)
PROT SERPL-MCNC: 6.2 G/DL (ref 6.4–8.9)
RBC # BLD AUTO: 4.06 MILLION/UL (ref 3.9–5.2)
SODIUM SERPL-SCNC: 139 MMOL/L (ref 134–143)
TROPONIN I SERPL-MCNC: <0.03 NG/ML
WBC # BLD AUTO: 8 THOUSAND/UL (ref 4.8–10.8)

## 2020-05-02 PROCEDURE — 84484 ASSAY OF TROPONIN QUANT: CPT | Performed by: EMERGENCY MEDICINE

## 2020-05-02 PROCEDURE — 71275 CT ANGIOGRAPHY CHEST: CPT

## 2020-05-02 PROCEDURE — 99284 EMERGENCY DEPT VISIT MOD MDM: CPT

## 2020-05-02 PROCEDURE — 85025 COMPLETE CBC W/AUTO DIFF WBC: CPT | Performed by: EMERGENCY MEDICINE

## 2020-05-02 PROCEDURE — 80053 COMPREHEN METABOLIC PANEL: CPT | Performed by: EMERGENCY MEDICINE

## 2020-05-02 PROCEDURE — 99285 EMERGENCY DEPT VISIT HI MDM: CPT | Performed by: EMERGENCY MEDICINE

## 2020-05-02 PROCEDURE — 36415 COLL VENOUS BLD VENIPUNCTURE: CPT | Performed by: EMERGENCY MEDICINE

## 2020-05-02 RX ORDER — MORPHINE SULFATE 4 MG/ML
4 INJECTION, SOLUTION INTRAMUSCULAR; INTRAVENOUS ONCE
Status: DISCONTINUED | OUTPATIENT
Start: 2020-05-02 | End: 2020-05-03

## 2020-05-02 RX ADMIN — IOHEXOL 85 ML: 350 INJECTION, SOLUTION INTRAVENOUS at 22:44

## 2020-05-03 PROBLEM — I95.9 HYPOTENSION: Status: RESOLVED | Noted: 2017-05-25 | Resolved: 2020-05-03

## 2020-05-03 PROBLEM — N28.1 RENAL CYST, LEFT: Status: ACTIVE | Noted: 2020-05-03

## 2020-05-03 PROBLEM — E78.5 DYSLIPIDEMIA: Chronic | Status: ACTIVE | Noted: 2018-11-09

## 2020-05-03 PROBLEM — S10.93XA HEMATOMA OF NECK: Status: RESOLVED | Noted: 2017-05-26 | Resolved: 2020-05-03

## 2020-05-03 PROBLEM — R55 SYNCOPE: Status: RESOLVED | Noted: 2017-05-25 | Resolved: 2020-05-03

## 2020-05-03 PROBLEM — R93.89 ABNORMAL CT OF THE CHEST: Status: ACTIVE | Noted: 2020-05-03

## 2020-05-03 PROBLEM — I26.99 PULMONARY EMBOLISM, BILATERAL (HCC): Chronic | Status: ACTIVE | Noted: 2017-05-25

## 2020-05-03 PROBLEM — N18.30 STAGE 3 CHRONIC KIDNEY DISEASE (HCC): Chronic | Status: ACTIVE | Noted: 2018-11-07

## 2020-05-03 PROBLEM — N17.9 ACUTE KIDNEY INJURY (HCC): Status: RESOLVED | Noted: 2017-05-25 | Resolved: 2020-05-03

## 2020-05-03 PROBLEM — K21.9 GASTROESOPHAGEAL REFLUX DISEASE WITHOUT ESOPHAGITIS: Chronic | Status: ACTIVE | Noted: 2017-05-25

## 2020-05-03 PROBLEM — R74.8 ELEVATED LIVER ENZYMES: Status: RESOLVED | Noted: 2017-06-05 | Resolved: 2020-05-03

## 2020-05-03 PROBLEM — T80.219A PICC LINE INFECTION: Status: RESOLVED | Noted: 2017-07-12 | Resolved: 2020-05-03

## 2020-05-03 PROBLEM — I21.A1 TYPE 2 MYOCARDIAL INFARCTION WITHOUT ST ELEVATION (HCC): Status: RESOLVED | Noted: 2017-05-25 | Resolved: 2020-05-03

## 2020-05-03 PROBLEM — E66.01 MORBID OBESITY (HCC): Chronic | Status: ACTIVE | Noted: 2017-06-05

## 2020-05-03 LAB
ANION GAP SERPL CALCULATED.3IONS-SCNC: 7 MMOL/L (ref 4–13)
APTT PPP: 32 SECONDS (ref 23–37)
APTT PPP: >210 SECONDS (ref 23–37)
APTT PPP: >210 SECONDS (ref 23–37)
BUN SERPL-MCNC: 11 MG/DL (ref 7–25)
CALCIUM SERPL-MCNC: 8.9 MG/DL (ref 8.6–10.5)
CHLORIDE SERPL-SCNC: 106 MMOL/L (ref 98–107)
CO2 SERPL-SCNC: 29 MMOL/L (ref 21–31)
CREAT SERPL-MCNC: 0.99 MG/DL (ref 0.6–1.2)
ERYTHROCYTE [DISTWIDTH] IN BLOOD BY AUTOMATED COUNT: 14 % (ref 11.5–14.5)
GFR SERPL CREATININE-BSD FRML MDRD: 64 ML/MIN/1.73SQ M
GLUCOSE SERPL-MCNC: 100 MG/DL (ref 65–99)
HCT VFR BLD AUTO: 38.8 % (ref 42–47)
HGB BLD-MCNC: 12.4 G/DL (ref 12–16)
INR PPP: 1.23 (ref 0.84–1.19)
MCH RBC QN AUTO: 30.1 PG (ref 26–34)
MCHC RBC AUTO-ENTMCNC: 31.9 G/DL (ref 31–37)
MCV RBC AUTO: 94 FL (ref 81–99)
PLATELET # BLD AUTO: 329 THOUSANDS/UL (ref 149–390)
PMV BLD AUTO: 7.7 FL (ref 8.6–11.7)
POTASSIUM SERPL-SCNC: 3.6 MMOL/L (ref 3.5–5.5)
PROTHROMBIN TIME: 15 SECONDS (ref 11.6–14.5)
RBC # BLD AUTO: 4.12 MILLION/UL (ref 3.9–5.2)
SODIUM SERPL-SCNC: 142 MMOL/L (ref 134–143)
WBC # BLD AUTO: 5.5 THOUSAND/UL (ref 4.8–10.8)

## 2020-05-03 PROCEDURE — 94664 DEMO&/EVAL PT USE INHALER: CPT

## 2020-05-03 PROCEDURE — 85027 COMPLETE CBC AUTOMATED: CPT | Performed by: HOSPITALIST

## 2020-05-03 PROCEDURE — 85730 THROMBOPLASTIN TIME PARTIAL: CPT | Performed by: EMERGENCY MEDICINE

## 2020-05-03 PROCEDURE — 80048 BASIC METABOLIC PNL TOTAL CA: CPT | Performed by: HOSPITALIST

## 2020-05-03 PROCEDURE — 85610 PROTHROMBIN TIME: CPT | Performed by: EMERGENCY MEDICINE

## 2020-05-03 PROCEDURE — 99223 1ST HOSP IP/OBS HIGH 75: CPT | Performed by: HOSPITALIST

## 2020-05-03 PROCEDURE — 36415 COLL VENOUS BLD VENIPUNCTURE: CPT | Performed by: EMERGENCY MEDICINE

## 2020-05-03 PROCEDURE — 85730 THROMBOPLASTIN TIME PARTIAL: CPT | Performed by: HOSPITALIST

## 2020-05-03 PROCEDURE — 94760 N-INVAS EAR/PLS OXIMETRY 1: CPT

## 2020-05-03 RX ORDER — HEPARIN SODIUM 1000 [USP'U]/ML
10000 INJECTION, SOLUTION INTRAVENOUS; SUBCUTANEOUS ONCE
Status: COMPLETED | OUTPATIENT
Start: 2020-05-03 | End: 2020-05-03

## 2020-05-03 RX ORDER — HEPARIN SODIUM 10000 [USP'U]/100ML
3-30 INJECTION, SOLUTION INTRAVENOUS
Status: DISCONTINUED | OUTPATIENT
Start: 2020-05-03 | End: 2020-05-08

## 2020-05-03 RX ORDER — HEPARIN SODIUM 1000 [USP'U]/ML
10000 INJECTION, SOLUTION INTRAVENOUS; SUBCUTANEOUS
Status: DISCONTINUED | OUTPATIENT
Start: 2020-05-03 | End: 2020-05-08

## 2020-05-03 RX ORDER — ACETAMINOPHEN 325 MG/1
650 TABLET ORAL EVERY 4 HOURS PRN
Status: DISCONTINUED | OUTPATIENT
Start: 2020-05-03 | End: 2020-05-08 | Stop reason: HOSPADM

## 2020-05-03 RX ORDER — WARFARIN SODIUM 5 MG/1
10 TABLET ORAL
Status: DISCONTINUED | OUTPATIENT
Start: 2020-05-03 | End: 2020-05-05

## 2020-05-03 RX ORDER — ATORVASTATIN CALCIUM 10 MG/1
10 TABLET, FILM COATED ORAL DAILY
Status: DISCONTINUED | OUTPATIENT
Start: 2020-05-03 | End: 2020-05-08 | Stop reason: HOSPADM

## 2020-05-03 RX ORDER — WARFARIN SODIUM 5 MG/1
10 TABLET ORAL
Status: DISCONTINUED | OUTPATIENT
Start: 2020-05-03 | End: 2020-05-03 | Stop reason: DRUGHIGH

## 2020-05-03 RX ORDER — ONDANSETRON 2 MG/ML
4 INJECTION INTRAMUSCULAR; INTRAVENOUS EVERY 6 HOURS PRN
Status: DISCONTINUED | OUTPATIENT
Start: 2020-05-03 | End: 2020-05-08 | Stop reason: HOSPADM

## 2020-05-03 RX ORDER — MONTELUKAST SODIUM 10 MG/1
10 TABLET ORAL
Status: DISCONTINUED | OUTPATIENT
Start: 2020-05-03 | End: 2020-05-08 | Stop reason: HOSPADM

## 2020-05-03 RX ORDER — NYSTATIN 100000 [USP'U]/G
POWDER TOPICAL 2 TIMES DAILY
Status: DISCONTINUED | OUTPATIENT
Start: 2020-05-03 | End: 2020-05-08 | Stop reason: HOSPADM

## 2020-05-03 RX ORDER — WARFARIN SODIUM 5 MG/1
10 TABLET ORAL
Status: DISCONTINUED | OUTPATIENT
Start: 2020-05-03 | End: 2020-05-03

## 2020-05-03 RX ORDER — HEPARIN SODIUM 1000 [USP'U]/ML
5000 INJECTION, SOLUTION INTRAVENOUS; SUBCUTANEOUS
Status: DISCONTINUED | OUTPATIENT
Start: 2020-05-03 | End: 2020-05-08

## 2020-05-03 RX ORDER — LEVOTHYROXINE SODIUM 0.1 MG/1
100 TABLET ORAL DAILY
Status: DISCONTINUED | OUTPATIENT
Start: 2020-05-03 | End: 2020-05-08 | Stop reason: HOSPADM

## 2020-05-03 RX ORDER — HYDRALAZINE HYDROCHLORIDE 20 MG/ML
10 INJECTION INTRAMUSCULAR; INTRAVENOUS EVERY 6 HOURS PRN
Status: DISCONTINUED | OUTPATIENT
Start: 2020-05-03 | End: 2020-05-08 | Stop reason: HOSPADM

## 2020-05-03 RX ORDER — WARFARIN SODIUM 5 MG/1
5 TABLET ORAL
Status: DISCONTINUED | OUTPATIENT
Start: 2020-05-03 | End: 2020-05-03

## 2020-05-03 RX ORDER — WARFARIN SODIUM 5 MG/1
5 TABLET ORAL
Status: COMPLETED | OUTPATIENT
Start: 2020-05-03 | End: 2020-05-03

## 2020-05-03 RX ORDER — ALBUTEROL SULFATE 2.5 MG/3ML
2.5 SOLUTION RESPIRATORY (INHALATION) EVERY 4 HOURS PRN
Status: DISCONTINUED | OUTPATIENT
Start: 2020-05-03 | End: 2020-05-08 | Stop reason: HOSPADM

## 2020-05-03 RX ADMIN — HEPARIN SODIUM 10000 UNITS: 1000 INJECTION, SOLUTION INTRAVENOUS; SUBCUTANEOUS at 01:46

## 2020-05-03 RX ADMIN — NYSTATIN: 100000 POWDER TOPICAL at 09:18

## 2020-05-03 RX ADMIN — ATORVASTATIN CALCIUM 10 MG: 10 TABLET, FILM COATED ORAL at 16:51

## 2020-05-03 RX ADMIN — HEPARIN SODIUM AND DEXTROSE 15 UNITS/KG/HR: 10000; 5 INJECTION INTRAVENOUS at 12:50

## 2020-05-03 RX ADMIN — WARFARIN SODIUM 10 MG: 5 TABLET ORAL at 17:40

## 2020-05-03 RX ADMIN — WARFARIN SODIUM 5 MG: 5 TABLET ORAL at 01:47

## 2020-05-03 RX ADMIN — NYSTATIN: 100000 POWDER TOPICAL at 17:45

## 2020-05-03 RX ADMIN — HEPARIN SODIUM AND DEXTROSE 18 UNITS/KG/HR: 10000; 5 INJECTION INTRAVENOUS at 01:47

## 2020-05-03 RX ADMIN — MONTELUKAST SODIUM 10 MG: 10 TABLET, COATED ORAL at 21:04

## 2020-05-03 RX ADMIN — LEVOTHYROXINE SODIUM 100 MCG: 0.1 TABLET ORAL at 06:31

## 2020-05-04 ENCOUNTER — APPOINTMENT (INPATIENT)
Dept: NON INVASIVE DIAGNOSTICS | Facility: HOSPITAL | Age: 55
DRG: 134 | End: 2020-05-04
Payer: COMMERCIAL

## 2020-05-04 LAB
ANION GAP SERPL CALCULATED.3IONS-SCNC: 6 MMOL/L (ref 4–13)
APTT PPP: 166 SECONDS (ref 23–37)
APTT PPP: 68 SECONDS (ref 23–37)
APTT PPP: 86 SECONDS (ref 23–37)
BASOPHILS # BLD AUTO: 0.1 THOUSANDS/ΜL (ref 0–0.1)
BASOPHILS NFR BLD AUTO: 1 % (ref 0–2)
BUN SERPL-MCNC: 14 MG/DL (ref 7–25)
CALCIUM SERPL-MCNC: 8.5 MG/DL (ref 8.6–10.5)
CHLORIDE SERPL-SCNC: 108 MMOL/L (ref 98–107)
CO2 SERPL-SCNC: 24 MMOL/L (ref 21–31)
CREAT SERPL-MCNC: 1.03 MG/DL (ref 0.6–1.2)
EOSINOPHIL # BLD AUTO: 0.3 THOUSAND/ΜL (ref 0–0.61)
EOSINOPHIL NFR BLD AUTO: 6 % (ref 0–5)
ERYTHROCYTE [DISTWIDTH] IN BLOOD BY AUTOMATED COUNT: 14.4 % (ref 11.5–14.5)
GFR SERPL CREATININE-BSD FRML MDRD: 61 ML/MIN/1.73SQ M
GLUCOSE SERPL-MCNC: 94 MG/DL (ref 65–99)
HCT VFR BLD AUTO: 36.5 % (ref 42–47)
HGB BLD-MCNC: 11.4 G/DL (ref 12–16)
INR PPP: 1.27 (ref 0.84–1.19)
LYMPHOCYTES # BLD AUTO: 1.7 THOUSANDS/ΜL (ref 0.6–4.47)
LYMPHOCYTES NFR BLD AUTO: 32 % (ref 21–51)
MCH RBC QN AUTO: 29.4 PG (ref 26–34)
MCHC RBC AUTO-ENTMCNC: 31.3 G/DL (ref 31–37)
MCV RBC AUTO: 94 FL (ref 81–99)
MONOCYTES # BLD AUTO: 0.6 THOUSAND/ΜL (ref 0.17–1.22)
MONOCYTES NFR BLD AUTO: 11 % (ref 2–12)
NEUTROPHILS # BLD AUTO: 2.7 THOUSANDS/ΜL (ref 1.4–6.5)
NEUTS SEG NFR BLD AUTO: 50 % (ref 42–75)
PLATELET # BLD AUTO: 332 THOUSANDS/UL (ref 149–390)
PMV BLD AUTO: 8 FL (ref 8.6–11.7)
POTASSIUM SERPL-SCNC: 3.8 MMOL/L (ref 3.5–5.5)
PROTHROMBIN TIME: 15.4 SECONDS (ref 11.6–14.5)
RBC # BLD AUTO: 3.88 MILLION/UL (ref 3.9–5.2)
SODIUM SERPL-SCNC: 138 MMOL/L (ref 134–143)
WBC # BLD AUTO: 5.4 THOUSAND/UL (ref 4.8–10.8)

## 2020-05-04 PROCEDURE — 80048 BASIC METABOLIC PNL TOTAL CA: CPT | Performed by: HOSPITALIST

## 2020-05-04 PROCEDURE — 99232 SBSQ HOSP IP/OBS MODERATE 35: CPT | Performed by: INTERNAL MEDICINE

## 2020-05-04 PROCEDURE — 93306 TTE W/DOPPLER COMPLETE: CPT | Performed by: INTERNAL MEDICINE

## 2020-05-04 PROCEDURE — 85730 THROMBOPLASTIN TIME PARTIAL: CPT | Performed by: INTERNAL MEDICINE

## 2020-05-04 PROCEDURE — 94760 N-INVAS EAR/PLS OXIMETRY 1: CPT

## 2020-05-04 PROCEDURE — 85025 COMPLETE CBC W/AUTO DIFF WBC: CPT | Performed by: HOSPITALIST

## 2020-05-04 PROCEDURE — 85610 PROTHROMBIN TIME: CPT | Performed by: HOSPITALIST

## 2020-05-04 PROCEDURE — 93306 TTE W/DOPPLER COMPLETE: CPT

## 2020-05-04 PROCEDURE — 85730 THROMBOPLASTIN TIME PARTIAL: CPT | Performed by: HOSPITALIST

## 2020-05-04 PROCEDURE — 87081 CULTURE SCREEN ONLY: CPT | Performed by: HOSPITALIST

## 2020-05-04 RX ADMIN — ATORVASTATIN CALCIUM 10 MG: 10 TABLET, FILM COATED ORAL at 16:05

## 2020-05-04 RX ADMIN — WARFARIN SODIUM 10 MG: 5 TABLET ORAL at 17:06

## 2020-05-04 RX ADMIN — NYSTATIN: 100000 POWDER TOPICAL at 17:07

## 2020-05-04 RX ADMIN — NYSTATIN: 100000 POWDER TOPICAL at 08:00

## 2020-05-04 RX ADMIN — MONTELUKAST SODIUM 10 MG: 10 TABLET, COATED ORAL at 21:10

## 2020-05-04 RX ADMIN — LEVOTHYROXINE SODIUM 100 MCG: 0.1 TABLET ORAL at 05:31

## 2020-05-05 ENCOUNTER — APPOINTMENT (INPATIENT)
Dept: NON INVASIVE DIAGNOSTICS | Facility: HOSPITAL | Age: 55
DRG: 134 | End: 2020-05-05
Payer: COMMERCIAL

## 2020-05-05 LAB
APTT PPP: 65 SECONDS (ref 23–37)
ERYTHROCYTE [DISTWIDTH] IN BLOOD BY AUTOMATED COUNT: 14.3 % (ref 11.5–14.5)
HCT VFR BLD AUTO: 35.9 % (ref 42–47)
HGB BLD-MCNC: 11.7 G/DL (ref 12–16)
INR PPP: 1.5 (ref 0.84–1.19)
MCH RBC QN AUTO: 30.4 PG (ref 26–34)
MCHC RBC AUTO-ENTMCNC: 32.5 G/DL (ref 31–37)
MCV RBC AUTO: 94 FL (ref 81–99)
MRSA NOSE QL CULT: NORMAL
PLATELET # BLD AUTO: 331 THOUSANDS/UL (ref 149–390)
PMV BLD AUTO: 7.9 FL (ref 8.6–11.7)
PROTHROMBIN TIME: 17.5 SECONDS (ref 11.6–14.5)
RBC # BLD AUTO: 3.84 MILLION/UL (ref 3.9–5.2)
WBC # BLD AUTO: 5.4 THOUSAND/UL (ref 4.8–10.8)

## 2020-05-05 PROCEDURE — 85610 PROTHROMBIN TIME: CPT | Performed by: INTERNAL MEDICINE

## 2020-05-05 PROCEDURE — 85027 COMPLETE CBC AUTOMATED: CPT | Performed by: INTERNAL MEDICINE

## 2020-05-05 PROCEDURE — 85730 THROMBOPLASTIN TIME PARTIAL: CPT | Performed by: HOSPITALIST

## 2020-05-05 PROCEDURE — 93970 EXTREMITY STUDY: CPT | Performed by: SURGERY

## 2020-05-05 PROCEDURE — 99232 SBSQ HOSP IP/OBS MODERATE 35: CPT | Performed by: INTERNAL MEDICINE

## 2020-05-05 PROCEDURE — 93970 EXTREMITY STUDY: CPT

## 2020-05-05 RX ORDER — WARFARIN SODIUM 5 MG/1
5 TABLET ORAL
Status: DISCONTINUED | OUTPATIENT
Start: 2020-05-05 | End: 2020-05-06

## 2020-05-05 RX ORDER — OXYCODONE HYDROCHLORIDE 5 MG/1
5 TABLET ORAL EVERY 4 HOURS PRN
Status: DISCONTINUED | OUTPATIENT
Start: 2020-05-05 | End: 2020-05-08 | Stop reason: HOSPADM

## 2020-05-05 RX ADMIN — NYSTATIN: 100000 POWDER TOPICAL at 09:36

## 2020-05-05 RX ADMIN — LEVOTHYROXINE SODIUM 100 MCG: 0.1 TABLET ORAL at 05:14

## 2020-05-05 RX ADMIN — MONTELUKAST SODIUM 10 MG: 10 TABLET, COATED ORAL at 22:10

## 2020-05-05 RX ADMIN — HEPARIN SODIUM AND DEXTROSE 9 UNITS/KG/HR: 10000; 5 INJECTION INTRAVENOUS at 06:00

## 2020-05-05 RX ADMIN — ATORVASTATIN CALCIUM 10 MG: 10 TABLET, FILM COATED ORAL at 17:03

## 2020-05-05 RX ADMIN — WARFARIN SODIUM 5 MG: 5 TABLET ORAL at 17:03

## 2020-05-05 RX ADMIN — MORPHINE SULFATE 2 MG: 2 INJECTION, SOLUTION INTRAMUSCULAR; INTRAVENOUS at 12:42

## 2020-05-05 RX ADMIN — NYSTATIN: 100000 POWDER TOPICAL at 17:03

## 2020-05-06 LAB
APTT PPP: 46 SECONDS (ref 23–37)
APTT PPP: 67 SECONDS (ref 23–37)
APTT PPP: >210 SECONDS (ref 23–37)
INR PPP: 1.71 (ref 0.84–1.19)
INR PPP: 1.83 (ref 0.84–1.19)
PROTHROMBIN TIME: 19.4 SECONDS (ref 11.6–14.5)
PROTHROMBIN TIME: 20.5 SECONDS (ref 11.6–14.5)

## 2020-05-06 PROCEDURE — 85730 THROMBOPLASTIN TIME PARTIAL: CPT | Performed by: INTERNAL MEDICINE

## 2020-05-06 PROCEDURE — 94760 N-INVAS EAR/PLS OXIMETRY 1: CPT

## 2020-05-06 PROCEDURE — 85610 PROTHROMBIN TIME: CPT | Performed by: INTERNAL MEDICINE

## 2020-05-06 PROCEDURE — 99232 SBSQ HOSP IP/OBS MODERATE 35: CPT | Performed by: INTERNAL MEDICINE

## 2020-05-06 RX ORDER — WARFARIN SODIUM 7.5 MG/1
7.5 TABLET ORAL
Status: DISCONTINUED | OUTPATIENT
Start: 2020-05-06 | End: 2020-05-06

## 2020-05-06 RX ORDER — WARFARIN SODIUM 5 MG/1
10 TABLET ORAL
Status: COMPLETED | OUTPATIENT
Start: 2020-05-06 | End: 2020-05-06

## 2020-05-06 RX ADMIN — ATORVASTATIN CALCIUM 10 MG: 10 TABLET, FILM COATED ORAL at 18:14

## 2020-05-06 RX ADMIN — WARFARIN SODIUM 10 MG: 5 TABLET ORAL at 18:14

## 2020-05-06 RX ADMIN — NYSTATIN: 100000 POWDER TOPICAL at 18:14

## 2020-05-06 RX ADMIN — NYSTATIN: 100000 POWDER TOPICAL at 08:00

## 2020-05-06 RX ADMIN — LEVOTHYROXINE SODIUM 100 MCG: 0.1 TABLET ORAL at 05:36

## 2020-05-06 RX ADMIN — HEPARIN SODIUM AND DEXTROSE 9 UNITS/KG/HR: 10000; 5 INJECTION INTRAVENOUS at 04:43

## 2020-05-06 RX ADMIN — MONTELUKAST SODIUM 10 MG: 10 TABLET, COATED ORAL at 22:18

## 2020-05-06 RX ADMIN — HEPARIN SODIUM 5000 UNITS: 1000 INJECTION, SOLUTION INTRAVENOUS; SUBCUTANEOUS at 07:52

## 2020-05-07 LAB
APTT PPP: 42 SECONDS (ref 23–37)
APTT PPP: 59 SECONDS (ref 23–37)
APTT PPP: 80 SECONDS (ref 23–37)
INR PPP: 1.87 (ref 0.84–1.19)
INR PPP: 1.88 (ref 0.84–1.19)
PROTHROMBIN TIME: 20.8 SECONDS (ref 11.6–14.5)
PROTHROMBIN TIME: 21 SECONDS (ref 11.6–14.5)

## 2020-05-07 PROCEDURE — 99232 SBSQ HOSP IP/OBS MODERATE 35: CPT | Performed by: INTERNAL MEDICINE

## 2020-05-07 PROCEDURE — 85610 PROTHROMBIN TIME: CPT | Performed by: INTERNAL MEDICINE

## 2020-05-07 PROCEDURE — 85730 THROMBOPLASTIN TIME PARTIAL: CPT | Performed by: HOSPITALIST

## 2020-05-07 PROCEDURE — 85730 THROMBOPLASTIN TIME PARTIAL: CPT | Performed by: INTERNAL MEDICINE

## 2020-05-07 PROCEDURE — 94760 N-INVAS EAR/PLS OXIMETRY 1: CPT

## 2020-05-07 RX ORDER — WARFARIN SODIUM 5 MG/1
10 TABLET ORAL
Status: COMPLETED | OUTPATIENT
Start: 2020-05-07 | End: 2020-05-07

## 2020-05-07 RX ORDER — WARFARIN SODIUM 5 MG/1
10 TABLET ORAL
Status: DISCONTINUED | OUTPATIENT
Start: 2020-05-07 | End: 2020-05-07

## 2020-05-07 RX ADMIN — NYSTATIN: 100000 POWDER TOPICAL at 08:57

## 2020-05-07 RX ADMIN — NYSTATIN: 100000 POWDER TOPICAL at 17:24

## 2020-05-07 RX ADMIN — HEPARIN SODIUM AND DEXTROSE 8 UNITS/KG/HR: 10000; 5 INJECTION INTRAVENOUS at 03:54

## 2020-05-07 RX ADMIN — LEVOTHYROXINE SODIUM 100 MCG: 0.1 TABLET ORAL at 05:30

## 2020-05-07 RX ADMIN — WARFARIN SODIUM 10 MG: 5 TABLET ORAL at 14:35

## 2020-05-07 RX ADMIN — MONTELUKAST SODIUM 10 MG: 10 TABLET, COATED ORAL at 23:58

## 2020-05-07 RX ADMIN — ATORVASTATIN CALCIUM 10 MG: 10 TABLET, FILM COATED ORAL at 17:23

## 2020-05-08 ENCOUNTER — TRANSITIONAL CARE MANAGEMENT (OUTPATIENT)
Dept: FAMILY MEDICINE CLINIC | Facility: CLINIC | Age: 55
End: 2020-05-08

## 2020-05-08 ENCOUNTER — TELEPHONE (OUTPATIENT)
Dept: FAMILY MEDICINE CLINIC | Facility: CLINIC | Age: 55
End: 2020-05-08

## 2020-05-08 VITALS
TEMPERATURE: 98.4 F | SYSTOLIC BLOOD PRESSURE: 120 MMHG | WEIGHT: 293 LBS | BODY MASS INDEX: 48.82 KG/M2 | OXYGEN SATURATION: 96 % | HEART RATE: 73 BPM | HEIGHT: 65 IN | RESPIRATION RATE: 18 BRPM | DIASTOLIC BLOOD PRESSURE: 76 MMHG

## 2020-05-08 DIAGNOSIS — I26.99 PULMONARY EMBOLISM, BILATERAL (HCC): Primary | Chronic | ICD-10-CM

## 2020-05-08 LAB
APTT PPP: >210 SECONDS (ref 23–37)
INR PPP: 2.52 (ref 0.84–1.19)
PROTHROMBIN TIME: 26.4 SECONDS (ref 11.6–14.5)

## 2020-05-08 PROCEDURE — 85610 PROTHROMBIN TIME: CPT | Performed by: INTERNAL MEDICINE

## 2020-05-08 PROCEDURE — 85730 THROMBOPLASTIN TIME PARTIAL: CPT | Performed by: HOSPITALIST

## 2020-05-08 PROCEDURE — 99239 HOSP IP/OBS DSCHRG MGMT >30: CPT | Performed by: INTERNAL MEDICINE

## 2020-05-08 RX ORDER — WARFARIN SODIUM 5 MG/1
5 TABLET ORAL
Qty: 10 TABLET | Refills: 0 | Status: SHIPPED | OUTPATIENT
Start: 2020-05-08 | End: 2020-05-18 | Stop reason: SDUPTHER

## 2020-05-08 RX ADMIN — HEPARIN SODIUM AND DEXTROSE 14 UNITS/KG/HR: 10000; 5 INJECTION INTRAVENOUS at 06:21

## 2020-05-08 RX ADMIN — NYSTATIN: 100000 POWDER TOPICAL at 10:15

## 2020-05-08 RX ADMIN — LEVOTHYROXINE SODIUM 100 MCG: 0.1 TABLET ORAL at 05:16

## 2020-05-08 RX ADMIN — HEPARIN SODIUM 10000 UNITS: 1000 INJECTION, SOLUTION INTRAVENOUS; SUBCUTANEOUS at 02:10

## 2020-05-11 ENCOUNTER — APPOINTMENT (OUTPATIENT)
Dept: LAB | Facility: MEDICAL CENTER | Age: 55
End: 2020-05-11
Payer: COMMERCIAL

## 2020-05-11 ENCOUNTER — ANTICOAG VISIT (OUTPATIENT)
Dept: FAMILY MEDICINE CLINIC | Facility: CLINIC | Age: 55
End: 2020-05-11

## 2020-05-11 LAB
INR PPP: 2.04 (ref 0.84–1.19)
PROTHROMBIN TIME: 22.5 SECONDS (ref 11.6–14.5)

## 2020-05-11 PROCEDURE — 36415 COLL VENOUS BLD VENIPUNCTURE: CPT

## 2020-05-11 PROCEDURE — 85610 PROTHROMBIN TIME: CPT

## 2020-05-12 ENCOUNTER — OFFICE VISIT (OUTPATIENT)
Dept: FAMILY MEDICINE CLINIC | Facility: CLINIC | Age: 55
End: 2020-05-12
Payer: COMMERCIAL

## 2020-05-12 VITALS
WEIGHT: 293 LBS | TEMPERATURE: 97.1 F | SYSTOLIC BLOOD PRESSURE: 136 MMHG | HEIGHT: 65 IN | DIASTOLIC BLOOD PRESSURE: 86 MMHG | BODY MASS INDEX: 48.82 KG/M2

## 2020-05-12 DIAGNOSIS — R93.89 ABNORMAL CT OF THE CHEST: ICD-10-CM

## 2020-05-12 DIAGNOSIS — J30.1 SEASONAL ALLERGIC RHINITIS DUE TO POLLEN: ICD-10-CM

## 2020-05-12 DIAGNOSIS — T78.3XXD ANGIOEDEMA, SUBSEQUENT ENCOUNTER: ICD-10-CM

## 2020-05-12 DIAGNOSIS — J45.20 MILD INTERMITTENT ASTHMA WITHOUT COMPLICATION: Chronic | ICD-10-CM

## 2020-05-12 DIAGNOSIS — E78.5 DYSLIPIDEMIA: ICD-10-CM

## 2020-05-12 DIAGNOSIS — I26.99 PULMONARY EMBOLISM, BILATERAL (HCC): Primary | Chronic | ICD-10-CM

## 2020-05-12 DIAGNOSIS — E03.8 OTHER SPECIFIED HYPOTHYROIDISM: ICD-10-CM

## 2020-05-12 PROCEDURE — 1111F DSCHRG MED/CURRENT MED MERGE: CPT | Performed by: FAMILY MEDICINE

## 2020-05-12 PROCEDURE — 99496 TRANSJ CARE MGMT HIGH F2F 7D: CPT | Performed by: FAMILY MEDICINE

## 2020-05-12 RX ORDER — LEVOTHYROXINE SODIUM 0.1 MG/1
100 TABLET ORAL DAILY
Qty: 30 TABLET | Refills: 5 | Status: SHIPPED | OUTPATIENT
Start: 2020-05-12 | End: 2020-08-31

## 2020-05-12 RX ORDER — ALBUTEROL SULFATE 2.5 MG/3ML
2.5 SOLUTION RESPIRATORY (INHALATION) AS NEEDED
Qty: 60 VIAL | Refills: 5 | Status: SHIPPED | OUTPATIENT
Start: 2020-05-12 | End: 2021-07-29 | Stop reason: SDUPTHER

## 2020-05-12 RX ORDER — FLUTICASONE PROPIONATE 50 MCG
2 SPRAY, SUSPENSION (ML) NASAL DAILY
Qty: 16 G | Refills: 5 | Status: SHIPPED | OUTPATIENT
Start: 2020-05-12 | End: 2021-03-29

## 2020-05-12 RX ORDER — ALBUTEROL SULFATE 90 UG/1
2 AEROSOL, METERED RESPIRATORY (INHALATION) EVERY 4 HOURS PRN
Qty: 1 INHALER | Refills: 3 | Status: SHIPPED | OUTPATIENT
Start: 2020-05-12 | End: 2020-08-18

## 2020-05-12 RX ORDER — ATORVASTATIN CALCIUM 10 MG/1
10 TABLET, FILM COATED ORAL DAILY
Qty: 30 TABLET | Refills: 5 | Status: SHIPPED | OUTPATIENT
Start: 2020-05-12 | End: 2021-05-12

## 2020-05-12 RX ORDER — MONTELUKAST SODIUM 10 MG/1
10 TABLET ORAL
Qty: 30 TABLET | Refills: 5 | Status: SHIPPED | OUTPATIENT
Start: 2020-05-12 | End: 2020-12-02

## 2020-05-14 ENCOUNTER — APPOINTMENT (OUTPATIENT)
Dept: LAB | Facility: MEDICAL CENTER | Age: 55
End: 2020-05-14
Payer: COMMERCIAL

## 2020-05-14 DIAGNOSIS — I26.99 PULMONARY EMBOLISM (HCC): ICD-10-CM

## 2020-05-14 LAB
ALBUMIN SERPL BCP-MCNC: 3.1 G/DL (ref 3.5–5)
ALP SERPL-CCNC: 98 U/L (ref 46–116)
ALT SERPL W P-5'-P-CCNC: 39 U/L (ref 12–78)
ANION GAP SERPL CALCULATED.3IONS-SCNC: 6 MMOL/L (ref 4–13)
AST SERPL W P-5'-P-CCNC: 16 U/L (ref 5–45)
BASOPHILS # BLD AUTO: 0.1 THOUSANDS/ΜL (ref 0–0.1)
BASOPHILS NFR BLD AUTO: 1 % (ref 0–1)
BILIRUB SERPL-MCNC: 0.42 MG/DL (ref 0.2–1)
BUN SERPL-MCNC: 12 MG/DL (ref 5–25)
CALCIUM SERPL-MCNC: 9 MG/DL (ref 8.3–10.1)
CHLORIDE SERPL-SCNC: 109 MMOL/L (ref 100–108)
CHOLEST SERPL-MCNC: 156 MG/DL (ref 50–200)
CO2 SERPL-SCNC: 26 MMOL/L (ref 21–32)
CREAT SERPL-MCNC: 1.01 MG/DL (ref 0.6–1.3)
EOSINOPHIL # BLD AUTO: 0.2 THOUSAND/ΜL (ref 0–0.61)
EOSINOPHIL NFR BLD AUTO: 3 % (ref 0–6)
ERYTHROCYTE [DISTWIDTH] IN BLOOD BY AUTOMATED COUNT: 13.8 % (ref 11.6–15.1)
GFR SERPL CREATININE-BSD FRML MDRD: 63 ML/MIN/1.73SQ M
GLUCOSE SERPL-MCNC: 99 MG/DL (ref 65–140)
HCT VFR BLD AUTO: 42.1 % (ref 34.8–46.1)
HDLC SERPL-MCNC: 52 MG/DL
HGB BLD-MCNC: 12.9 G/DL (ref 11.5–15.4)
IMM GRANULOCYTES # BLD AUTO: 0.01 THOUSAND/UL (ref 0–0.2)
IMM GRANULOCYTES NFR BLD AUTO: 0 % (ref 0–2)
LDLC SERPL CALC-MCNC: 80 MG/DL (ref 0–100)
LYMPHOCYTES # BLD AUTO: 1.6 THOUSANDS/ΜL (ref 0.6–4.47)
LYMPHOCYTES NFR BLD AUTO: 22 % (ref 14–44)
MCH RBC QN AUTO: 29.9 PG (ref 26.8–34.3)
MCHC RBC AUTO-ENTMCNC: 30.6 G/DL (ref 31.4–37.4)
MCV RBC AUTO: 98 FL (ref 82–98)
MONOCYTES # BLD AUTO: 0.78 THOUSAND/ΜL (ref 0.17–1.22)
MONOCYTES NFR BLD AUTO: 11 % (ref 4–12)
NEUTROPHILS # BLD AUTO: 4.53 THOUSANDS/ΜL (ref 1.85–7.62)
NEUTS SEG NFR BLD AUTO: 63 % (ref 43–75)
NRBC BLD AUTO-RTO: 0 /100 WBCS
PLATELET # BLD AUTO: 363 THOUSANDS/UL (ref 149–390)
PMV BLD AUTO: 11 FL (ref 8.9–12.7)
POTASSIUM SERPL-SCNC: 3.8 MMOL/L (ref 3.5–5.3)
PROT SERPL-MCNC: 6.9 G/DL (ref 6.4–8.2)
RBC # BLD AUTO: 4.32 MILLION/UL (ref 3.81–5.12)
SODIUM SERPL-SCNC: 141 MMOL/L (ref 136–145)
T4 FREE SERPL-MCNC: 1.05 NG/DL (ref 0.76–1.46)
TRIGL SERPL-MCNC: 118 MG/DL
TSH SERPL DL<=0.05 MIU/L-ACNC: 5.12 UIU/ML (ref 0.36–3.74)
WBC # BLD AUTO: 7.22 THOUSAND/UL (ref 4.31–10.16)

## 2020-05-14 PROCEDURE — 80053 COMPREHEN METABOLIC PANEL: CPT | Performed by: FAMILY MEDICINE

## 2020-05-14 PROCEDURE — 84439 ASSAY OF FREE THYROXINE: CPT | Performed by: FAMILY MEDICINE

## 2020-05-14 PROCEDURE — 36415 COLL VENOUS BLD VENIPUNCTURE: CPT | Performed by: FAMILY MEDICINE

## 2020-05-14 PROCEDURE — 80061 LIPID PANEL: CPT | Performed by: FAMILY MEDICINE

## 2020-05-14 PROCEDURE — 85025 COMPLETE CBC W/AUTO DIFF WBC: CPT | Performed by: FAMILY MEDICINE

## 2020-05-14 PROCEDURE — 84443 ASSAY THYROID STIM HORMONE: CPT | Performed by: FAMILY MEDICINE

## 2020-05-18 DIAGNOSIS — I26.99 PULMONARY EMBOLISM (HCC): ICD-10-CM

## 2020-05-18 RX ORDER — WARFARIN SODIUM 5 MG/1
5 TABLET ORAL
Qty: 30 TABLET | Refills: 5 | Status: SHIPPED | OUTPATIENT
Start: 2020-05-18 | End: 2020-06-16

## 2020-05-26 ENCOUNTER — OFFICE VISIT (OUTPATIENT)
Dept: FAMILY MEDICINE CLINIC | Facility: CLINIC | Age: 55
End: 2020-05-26
Payer: COMMERCIAL

## 2020-05-26 ENCOUNTER — APPOINTMENT (OUTPATIENT)
Dept: LAB | Facility: MEDICAL CENTER | Age: 55
End: 2020-05-26
Payer: COMMERCIAL

## 2020-05-26 VITALS
TEMPERATURE: 99.2 F | WEIGHT: 293 LBS | HEIGHT: 65 IN | BODY MASS INDEX: 48.82 KG/M2 | SYSTOLIC BLOOD PRESSURE: 128 MMHG | DIASTOLIC BLOOD PRESSURE: 74 MMHG

## 2020-05-26 DIAGNOSIS — S00.03XA CONTUSION OF SCALP, INITIAL ENCOUNTER: Primary | ICD-10-CM

## 2020-05-26 PROCEDURE — 1111F DSCHRG MED/CURRENT MED MERGE: CPT | Performed by: FAMILY MEDICINE

## 2020-05-26 PROCEDURE — 3074F SYST BP LT 130 MM HG: CPT | Performed by: FAMILY MEDICINE

## 2020-05-26 PROCEDURE — 1036F TOBACCO NON-USER: CPT | Performed by: FAMILY MEDICINE

## 2020-05-26 PROCEDURE — 3078F DIAST BP <80 MM HG: CPT | Performed by: FAMILY MEDICINE

## 2020-05-26 PROCEDURE — 3008F BODY MASS INDEX DOCD: CPT | Performed by: FAMILY MEDICINE

## 2020-05-26 PROCEDURE — 99213 OFFICE O/P EST LOW 20 MIN: CPT | Performed by: FAMILY MEDICINE

## 2020-05-27 ENCOUNTER — ANTICOAG VISIT (OUTPATIENT)
Dept: FAMILY MEDICINE CLINIC | Facility: CLINIC | Age: 55
End: 2020-05-27

## 2020-06-03 RX ORDER — WARFARIN SODIUM 5 MG/1
TABLET ORAL
Qty: 10 TABLET | Refills: 0 | OUTPATIENT
Start: 2020-06-03

## 2020-06-05 ENCOUNTER — APPOINTMENT (OUTPATIENT)
Dept: LAB | Facility: MEDICAL CENTER | Age: 55
End: 2020-06-05
Payer: COMMERCIAL

## 2020-06-08 ENCOUNTER — ANTICOAG VISIT (OUTPATIENT)
Dept: FAMILY MEDICINE CLINIC | Facility: CLINIC | Age: 55
End: 2020-06-08

## 2020-06-16 ENCOUNTER — OFFICE VISIT (OUTPATIENT)
Dept: FAMILY MEDICINE CLINIC | Facility: CLINIC | Age: 55
End: 2020-06-16
Payer: COMMERCIAL

## 2020-06-16 ENCOUNTER — ANTICOAG VISIT (OUTPATIENT)
Dept: FAMILY MEDICINE CLINIC | Facility: CLINIC | Age: 55
End: 2020-06-16

## 2020-06-16 ENCOUNTER — TELEPHONE (OUTPATIENT)
Dept: FAMILY MEDICINE CLINIC | Facility: CLINIC | Age: 55
End: 2020-06-16

## 2020-06-16 ENCOUNTER — APPOINTMENT (OUTPATIENT)
Dept: LAB | Facility: MEDICAL CENTER | Age: 55
End: 2020-06-16
Payer: COMMERCIAL

## 2020-06-16 VITALS
DIASTOLIC BLOOD PRESSURE: 86 MMHG | SYSTOLIC BLOOD PRESSURE: 124 MMHG | HEIGHT: 65 IN | WEIGHT: 293 LBS | TEMPERATURE: 97.2 F | BODY MASS INDEX: 48.82 KG/M2

## 2020-06-16 DIAGNOSIS — I26.99 PULMONARY EMBOLISM (HCC): ICD-10-CM

## 2020-06-16 DIAGNOSIS — Z79.01 CHRONIC ANTICOAGULATION: ICD-10-CM

## 2020-06-16 DIAGNOSIS — J45.20 MILD INTERMITTENT ASTHMA WITHOUT COMPLICATION: Chronic | ICD-10-CM

## 2020-06-16 DIAGNOSIS — I26.99 PULMONARY EMBOLISM, BILATERAL (HCC): Primary | Chronic | ICD-10-CM

## 2020-06-16 PROCEDURE — 1111F DSCHRG MED/CURRENT MED MERGE: CPT | Performed by: FAMILY MEDICINE

## 2020-06-16 PROCEDURE — 3079F DIAST BP 80-89 MM HG: CPT | Performed by: FAMILY MEDICINE

## 2020-06-16 PROCEDURE — 3008F BODY MASS INDEX DOCD: CPT | Performed by: FAMILY MEDICINE

## 2020-06-16 PROCEDURE — 99213 OFFICE O/P EST LOW 20 MIN: CPT | Performed by: FAMILY MEDICINE

## 2020-06-16 PROCEDURE — 3074F SYST BP LT 130 MM HG: CPT | Performed by: FAMILY MEDICINE

## 2020-06-16 PROCEDURE — 1036F TOBACCO NON-USER: CPT | Performed by: FAMILY MEDICINE

## 2020-06-16 RX ORDER — WARFARIN SODIUM 5 MG/1
TABLET ORAL
Qty: 45 TABLET | Refills: 5 | Status: SHIPPED | OUTPATIENT
Start: 2020-06-16 | End: 2021-05-18

## 2020-06-17 ENCOUNTER — TELEPHONE (OUTPATIENT)
Dept: FAMILY MEDICINE CLINIC | Facility: CLINIC | Age: 55
End: 2020-06-17

## 2020-06-18 ENCOUNTER — OFFICE VISIT (OUTPATIENT)
Dept: FAMILY MEDICINE CLINIC | Facility: CLINIC | Age: 55
End: 2020-06-18
Payer: COMMERCIAL

## 2020-06-18 VITALS
HEIGHT: 65 IN | SYSTOLIC BLOOD PRESSURE: 122 MMHG | BODY MASS INDEX: 48.82 KG/M2 | DIASTOLIC BLOOD PRESSURE: 88 MMHG | TEMPERATURE: 96.6 F | WEIGHT: 293 LBS

## 2020-06-18 DIAGNOSIS — T78.40XA ALLERGIC REACTION, INITIAL ENCOUNTER: Primary | ICD-10-CM

## 2020-06-18 PROCEDURE — 99213 OFFICE O/P EST LOW 20 MIN: CPT | Performed by: FAMILY MEDICINE

## 2020-06-18 PROCEDURE — 1111F DSCHRG MED/CURRENT MED MERGE: CPT | Performed by: FAMILY MEDICINE

## 2020-06-18 PROCEDURE — 1036F TOBACCO NON-USER: CPT | Performed by: FAMILY MEDICINE

## 2020-06-18 PROCEDURE — 3074F SYST BP LT 130 MM HG: CPT | Performed by: FAMILY MEDICINE

## 2020-06-18 PROCEDURE — 3008F BODY MASS INDEX DOCD: CPT | Performed by: FAMILY MEDICINE

## 2020-06-18 PROCEDURE — 3079F DIAST BP 80-89 MM HG: CPT | Performed by: FAMILY MEDICINE

## 2020-06-18 RX ORDER — PREDNISONE 10 MG/1
TABLET ORAL
Qty: 30 TABLET | Refills: 0 | Status: SHIPPED | OUTPATIENT
Start: 2020-06-18 | End: 2020-07-23 | Stop reason: ALTCHOICE

## 2020-06-29 ENCOUNTER — APPOINTMENT (OUTPATIENT)
Dept: LAB | Facility: MEDICAL CENTER | Age: 55
End: 2020-06-29
Payer: COMMERCIAL

## 2020-06-29 ENCOUNTER — ANTICOAG VISIT (OUTPATIENT)
Dept: FAMILY MEDICINE CLINIC | Facility: CLINIC | Age: 55
End: 2020-06-29

## 2020-06-29 DIAGNOSIS — I26.99 PULMONARY EMBOLISM, BILATERAL (HCC): Primary | ICD-10-CM

## 2020-07-13 ENCOUNTER — ANTICOAG VISIT (OUTPATIENT)
Dept: FAMILY MEDICINE CLINIC | Facility: CLINIC | Age: 55
End: 2020-07-13

## 2020-07-13 ENCOUNTER — LAB (OUTPATIENT)
Dept: LAB | Facility: MEDICAL CENTER | Age: 55
End: 2020-07-13
Payer: COMMERCIAL

## 2020-07-13 DIAGNOSIS — I26.99 PULMONARY EMBOLISM, BILATERAL (HCC): ICD-10-CM

## 2020-07-13 LAB
INR PPP: 1.39 (ref 0.84–1.19)
PROTHROMBIN TIME: 17.1 SECONDS (ref 11.6–14.5)

## 2020-07-13 PROCEDURE — 85610 PROTHROMBIN TIME: CPT

## 2020-07-13 PROCEDURE — 36415 COLL VENOUS BLD VENIPUNCTURE: CPT

## 2020-07-23 ENCOUNTER — OFFICE VISIT (OUTPATIENT)
Dept: FAMILY MEDICINE CLINIC | Facility: CLINIC | Age: 55
End: 2020-07-23
Payer: COMMERCIAL

## 2020-07-23 VITALS
TEMPERATURE: 96.8 F | SYSTOLIC BLOOD PRESSURE: 132 MMHG | BODY MASS INDEX: 48.82 KG/M2 | DIASTOLIC BLOOD PRESSURE: 84 MMHG | HEIGHT: 65 IN | WEIGHT: 293 LBS

## 2020-07-23 DIAGNOSIS — R60.0 BILATERAL LEG EDEMA: Primary | ICD-10-CM

## 2020-07-23 PROCEDURE — 3075F SYST BP GE 130 - 139MM HG: CPT | Performed by: FAMILY MEDICINE

## 2020-07-23 PROCEDURE — 3008F BODY MASS INDEX DOCD: CPT | Performed by: FAMILY MEDICINE

## 2020-07-23 PROCEDURE — 3079F DIAST BP 80-89 MM HG: CPT | Performed by: FAMILY MEDICINE

## 2020-07-23 PROCEDURE — 1036F TOBACCO NON-USER: CPT | Performed by: FAMILY MEDICINE

## 2020-07-23 PROCEDURE — 99213 OFFICE O/P EST LOW 20 MIN: CPT | Performed by: FAMILY MEDICINE

## 2020-07-23 RX ORDER — FUROSEMIDE 20 MG/1
20 TABLET ORAL DAILY PRN
Qty: 30 TABLET | Refills: 5 | Status: SHIPPED | OUTPATIENT
Start: 2020-07-23 | End: 2022-06-07 | Stop reason: SDUPTHER

## 2020-07-23 RX ORDER — POTASSIUM CHLORIDE 750 MG/1
10 CAPSULE, EXTENDED RELEASE ORAL DAILY
Qty: 30 CAPSULE | Refills: 1 | Status: SHIPPED | OUTPATIENT
Start: 2020-07-23 | End: 2022-06-07 | Stop reason: SDUPTHER

## 2020-07-23 NOTE — PROGRESS NOTES
Assessment/Plan:  I believe her edema is just from venous insufficiency  Patient's echo done in May was normal   Encouraged patient to elevate her legs  Rx put in for furosemide 20 mg to take with potassium daily as needed for the leg edema  If her legs are not swollen, she does not need to take it  She will get a BMP next week with her PT INR  She will call us if symptoms continue or increase  We will see her back as scheduled next month or p r n  Problem List Items Addressed This Visit        Other    Bilateral leg edema - Primary    Relevant Medications    furosemide (LASIX) 20 mg tablet    potassium chloride (MICRO-K) 10 MEQ CR capsule    Other Relevant Orders    Basic metabolic panel           Diagnoses and all orders for this visit:    Bilateral leg edema  -     furosemide (LASIX) 20 mg tablet; Take 1 tablet (20 mg total) by mouth daily as needed (leg swelling) Take with potassium  -     potassium chloride (MICRO-K) 10 MEQ CR capsule; Take 1 capsule (10 mEq total) by mouth daily  -     Basic metabolic panel        No problem-specific Assessment & Plan notes found for this encounter  Subjective:      Patient ID: Delicia Colvin is a 54 y o  female  Patient is here today stating for the last week or so her legs have been swollen  No fever chills  Legs are better in the morning or when she elevates them  She denies any chest pain or shortness of breath         The following portions of the patient's history were reviewed and updated as appropriate:   She has a past medical history of Abdominal pain, lower, Acute renal failure ([address] ), Allergic rhinitis, Ankle pain, right, Arthritis, Asthma, Bilateral chronic knee pain, Bladder pain, Blood clot in vein, Bursitis, Cardiac disorder, Cardiac murmur, Chest tightness or pressure, COPD (chronic obstructive pulmonary disease) ([address] ), Coronary artery disease, Curvature of spine, GERD (gastroesophageal reflux disease), Hernia, hiatal, Hyperlipidemia, Hypertension, Inguinal hernia, Jaw closure abnormality, Lumbar herniated disc, Morbid obesity ([address] ), Obesity, Osteoarthritis of right knee, Patellar tendinitis, Poor flexibility of tendon, Pulmonary embolism ([facility] [address] ), Sepsis ([facility] [address] ), Severe sepsis due to methicillin resistant Staphylococcus aureus (MRSA) with acute organ dysfunction ([facility] [address] ), Spider vein, symptomatic, Status post arthroscopy of right knee, Stomach disorder, Tear of medial meniscus of right knee, Thyroid disorder, and Umbilical hernia ,  does not have any pertinent problems on file  ,   has a past surgical history that includes Hysterectomy (2009); Open anterior shoulder reconstruction (Left); Hand surgery (Right); Foot surgery (Left); Colonoscopy; pr knee scope,med/lat menisectomy (Right, 4/11/2016); Exploratory laparotomy; Peripherally inserted central catheter insertion; Incision and drainage anterior neck (Right, 6/8/2017); Other surgical history; Cystoscopy (01/1994); Incisional hernia repair; Shoulder surgery; IR IVC filter removal (1/22/2019); and Fracture surgery  ,  family history includes Arthritis in her mother; Breast cancer in her maternal aunt; Colon cancer in her brother and son; Diabetes in her maternal aunt; Lung cancer in her father; No Known Problems in her daughter, daughter, maternal grandfather, maternal grandmother, paternal grandfather, paternal grandmother, sister, sister, sister, sister, sister, and sister  ,   reports that she quit smoking about 24 years ago  She has never used smokeless tobacco  She reports that she drank alcohol  She reports that she does not use drugs  ,  is allergic to clindamycin; medical tape; penicillin g; and penicillins     Current Outpatient Medications   Medication Sig Dispense Refill    albuterol (2 5 mg/3 mL) 0 083 % nebulizer solution Take 1 vial (2 5 mg total) by nebulization as needed for wheezing 60 vial 5    albuterol (PROVENTIL HFA,VENTOLIN HFA) 90 mcg/act inhaler Inhale 2 puffs every 4 (four) hours as needed for wheezing 1 Inhaler 3    atorvastatin (LIPITOR) 10 mg tablet Take 1 tablet (10 mg total) by mouth daily 30 tablet 5    fluticasone (FLONASE) 50 mcg/act nasal spray 2 sprays into each nostril daily 16 g 5    levothyroxine 100 mcg tablet Take 1 tablet (100 mcg total) by mouth daily 30 tablet 5    montelukast (SINGULAIR) 10 mg tablet Take 1 tablet (10 mg total) by mouth daily at bedtime 30 tablet 5    warfarin (COUMADIN) 5 mg tablet Take daily as directed by physician 45 tablet 5    furosemide (LASIX) 20 mg tablet Take 1 tablet (20 mg total) by mouth daily as needed (leg swelling) Take with potassium 30 tablet 5    potassium chloride (MICRO-K) 10 MEQ CR capsule Take 1 capsule (10 mEq total) by mouth daily 30 capsule 1     No current facility-administered medications for this visit  Review of Systems   Constitutional: Negative  Respiratory: Negative  Cardiovascular: Positive for leg swelling  Negative for chest pain and palpitations  Gastrointestinal: Negative  Genitourinary: Negative  Objective:  Vitals:    07/23/20 1039   BP: 132/84   Temp: (!) 96 8 °F (36 °C)   Weight: (!) 147 kg (324 lb)   Height: 5' 5" (1 651 m)     Body mass index is 53 92 kg/m²  Physical Exam   Constitutional: She is oriented to person, place, and time  She appears well-developed and well-nourished  No distress  HENT:   Head: Normocephalic and atraumatic  Eyes: Conjunctivae are normal    Cardiovascular: Normal rate, regular rhythm and normal heart sounds  Exam reveals no gallop and no friction rub  No murmur heard  Pulmonary/Chest: Effort normal and breath sounds normal  No respiratory distress  She has no wheezes  She has no rales  Musculoskeletal: She exhibits edema (Positive bilateral edema up to her knees)  Neurological: She is alert and oriented to person, place, and time  Skin: She is not diaphoretic  Psychiatric: She has a normal mood and affect   Her behavior is normal  Judgment and thought content normal    Vitals reviewed

## 2020-07-27 ENCOUNTER — ANTICOAG VISIT (OUTPATIENT)
Dept: FAMILY MEDICINE CLINIC | Facility: CLINIC | Age: 55
End: 2020-07-27

## 2020-07-27 ENCOUNTER — APPOINTMENT (OUTPATIENT)
Dept: LAB | Facility: MEDICAL CENTER | Age: 55
End: 2020-07-27
Payer: COMMERCIAL

## 2020-07-27 LAB
ANION GAP SERPL CALCULATED.3IONS-SCNC: 4 MMOL/L (ref 4–13)
BUN SERPL-MCNC: 13 MG/DL (ref 5–25)
CALCIUM SERPL-MCNC: 8.8 MG/DL (ref 8.3–10.1)
CHLORIDE SERPL-SCNC: 108 MMOL/L (ref 100–108)
CO2 SERPL-SCNC: 29 MMOL/L (ref 21–32)
CREAT SERPL-MCNC: 1.13 MG/DL (ref 0.6–1.3)
GFR SERPL CREATININE-BSD FRML MDRD: 55 ML/MIN/1.73SQ M
GLUCOSE SERPL-MCNC: 88 MG/DL (ref 65–140)
POTASSIUM SERPL-SCNC: 3.7 MMOL/L (ref 3.5–5.3)
SODIUM SERPL-SCNC: 141 MMOL/L (ref 136–145)

## 2020-07-27 PROCEDURE — 80048 BASIC METABOLIC PNL TOTAL CA: CPT | Performed by: FAMILY MEDICINE

## 2020-08-17 ENCOUNTER — APPOINTMENT (OUTPATIENT)
Dept: LAB | Facility: MEDICAL CENTER | Age: 55
End: 2020-08-17
Payer: COMMERCIAL

## 2020-08-18 ENCOUNTER — OFFICE VISIT (OUTPATIENT)
Dept: FAMILY MEDICINE CLINIC | Facility: CLINIC | Age: 55
End: 2020-08-18
Payer: COMMERCIAL

## 2020-08-18 ENCOUNTER — ANTICOAG VISIT (OUTPATIENT)
Dept: FAMILY MEDICINE CLINIC | Facility: CLINIC | Age: 55
End: 2020-08-18

## 2020-08-18 VITALS
SYSTOLIC BLOOD PRESSURE: 130 MMHG | DIASTOLIC BLOOD PRESSURE: 86 MMHG | TEMPERATURE: 97.3 F | HEIGHT: 65 IN | BODY MASS INDEX: 48.82 KG/M2 | WEIGHT: 293 LBS

## 2020-08-18 DIAGNOSIS — I26.99 PULMONARY EMBOLISM, BILATERAL (HCC): Chronic | ICD-10-CM

## 2020-08-18 DIAGNOSIS — Z12.4 SCREENING FOR CERVICAL CANCER: ICD-10-CM

## 2020-08-18 DIAGNOSIS — J45.20 MILD INTERMITTENT ASTHMA WITHOUT COMPLICATION: Primary | Chronic | ICD-10-CM

## 2020-08-18 DIAGNOSIS — E66.01 MORBID OBESITY WITH BMI OF 50.0-59.9, ADULT (HCC): ICD-10-CM

## 2020-08-18 DIAGNOSIS — E03.8 OTHER SPECIFIED HYPOTHYROIDISM: ICD-10-CM

## 2020-08-18 DIAGNOSIS — K21.9 GASTROESOPHAGEAL REFLUX DISEASE WITHOUT ESOPHAGITIS: Chronic | ICD-10-CM

## 2020-08-18 PROCEDURE — 1036F TOBACCO NON-USER: CPT | Performed by: FAMILY MEDICINE

## 2020-08-18 PROCEDURE — 3079F DIAST BP 80-89 MM HG: CPT | Performed by: FAMILY MEDICINE

## 2020-08-18 PROCEDURE — 3075F SYST BP GE 130 - 139MM HG: CPT | Performed by: FAMILY MEDICINE

## 2020-08-18 PROCEDURE — 3008F BODY MASS INDEX DOCD: CPT | Performed by: FAMILY MEDICINE

## 2020-08-18 PROCEDURE — 99214 OFFICE O/P EST MOD 30 MIN: CPT | Performed by: FAMILY MEDICINE

## 2020-08-18 RX ORDER — ALBUTEROL SULFATE 90 UG/1
2 AEROSOL, METERED RESPIRATORY (INHALATION) EVERY 4 HOURS PRN
Qty: 1 INHALER | Refills: 3 | Status: SHIPPED | OUTPATIENT
Start: 2020-08-18 | End: 2021-07-29

## 2020-08-18 NOTE — PATIENT INSTRUCTIONS

## 2020-08-18 NOTE — PROGRESS NOTES
Assessment/Plan:  I reviewed patient's INR results  She will increase her Coumadin to 7 5 mg on Tuesday Thursday Saturday Sunday  5 mg on Monday Wednesday Friday  Recheck INR in 2 weeks  We will recheck a TSH at that time also  Refer to nutrition for her obesity  Continue same medications  Follow-up in 3 months or p r n     Problem List Items Addressed This Visit        Digestive    Gastroesophageal reflux disease without esophagitis (Chronic)       Endocrine    Other specified hypothyroidism (Chronic)    Relevant Orders    TSH, 3rd generation with Free T4 reflex       Respiratory    Mild intermittent asthma without complication - Primary (Chronic)    Relevant Medications    albuterol (PROVENTIL HFA,VENTOLIN HFA) 90 mcg/act inhaler       Cardiovascular and Mediastinum    Pulmonary embolism, bilateral (HCC) (Chronic)      Other Visit Diagnoses     Screening for cervical cancer        Relevant Orders    Ambulatory referral to Gynecology    Morbid obesity with BMI of 50 0-59 9, adult ([facility] [address] )        Relevant Orders    Ambulatory referral to Nutrition Services           Diagnoses and all orders for this visit:    Mild intermittent asthma without complication  -     albuterol (PROVENTIL HFA,VENTOLIN HFA) 90 mcg/act inhaler; Inhale 2 puffs every 4 (four) hours as needed for wheezing    Other specified hypothyroidism  -     TSH, 3rd generation with Free T4 reflex    Screening for cervical cancer  -     Ambulatory referral to Gynecology; Future    Morbid obesity with BMI of 50 0-59 9, adult ([facility] [address] )  -     Cancel: Ambulatory referral to Nutrition Services; Future  -     Ambulatory referral to Nutrition Services; Future    Pulmonary embolism, bilateral (HCC)    Gastroesophageal reflux disease without esophagitis        No problem-specific Assessment & Plan notes found for this encounter  Subjective:      Patient ID: No Jurado is a 54 y o  female  Patient here today for follow-up    Patient denies any chest pain or shortness of breath  Patient's legs still swell, especially when she has been on them all day and it is humid out  Asthma   She complains of wheezing  This is a chronic problem  The problem occurs intermittently  The problem has been unchanged  Her symptoms are aggravated by pollen and change in weather  Her symptoms are alleviated by beta-agonist  She reports significant improvement on treatment  There are no known risk factors for lung disease  Her past medical history is significant for asthma  The following portions of the patient's history were reviewed and updated as appropriate:   She has a past medical history of Abdominal pain, lower, Acute renal failure ([address] ), Allergic rhinitis, Ankle pain, right, Arthritis, Asthma, Bilateral chronic knee pain, Bladder pain, Blood clot in vein, Bursitis, Cardiac disorder, Cardiac murmur, Chest tightness or pressure, COPD (chronic obstructive pulmonary disease) ([address] ), Coronary artery disease, Curvature of spine, GERD (gastroesophageal reflux disease), Hernia, hiatal, Hyperlipidemia, Hypertension, Inguinal hernia, Jaw closure abnormality, Lumbar herniated disc, Morbid obesity ([address] ), Obesity, Osteoarthritis of right knee, Patellar tendinitis, Poor flexibility of tendon, Pulmonary embolism ([address] ), Sepsis ([address] ), Severe sepsis due to methicillin resistant Staphylococcus aureus (MRSA) with acute organ dysfunction ([address] ), Spider vein, symptomatic, Status post arthroscopy of right knee, Stomach disorder, Tear of medial meniscus of right knee, Thyroid disorder, and Umbilical hernia ,  does not have any pertinent problems on file  ,   has a past surgical history that includes Hysterectomy (2009); Open anterior shoulder reconstruction (Left); Hand surgery (Right); Foot surgery (Left); Colonoscopy; pr knee scope,med/lat menisectomy (Right, 4/11/2016); Exploratory laparotomy; Peripherally inserted central catheter insertion;  Incision and drainage anterior neck (Right, 6/8/2017); Other surgical history; Cystoscopy (01/1994); Incisional hernia repair; Shoulder surgery; IR IVC filter removal (1/22/2019); and Fracture surgery  ,  family history includes Arthritis in her mother; Breast cancer in her maternal aunt; Colon cancer in her brother and son; Diabetes in her maternal aunt; Lung cancer in her father; No Known Problems in her daughter, daughter, maternal grandfather, maternal grandmother, paternal grandfather, paternal grandmother, sister, sister, sister, sister, sister, and sister  ,   reports that she quit smoking about 24 years ago  She has never used smokeless tobacco  She reports previous alcohol use  She reports that she does not use drugs  ,  is allergic to clindamycin; medical tape; penicillin g; and penicillins     Current Outpatient Medications   Medication Sig Dispense Refill    albuterol (2 5 mg/3 mL) 0 083 % nebulizer solution Take 1 vial (2 5 mg total) by nebulization as needed for wheezing 60 vial 5    albuterol (PROVENTIL HFA,VENTOLIN HFA) 90 mcg/act inhaler Inhale 2 puffs every 4 (four) hours as needed for wheezing 1 Inhaler 3    atorvastatin (LIPITOR) 10 mg tablet Take 1 tablet (10 mg total) by mouth daily 30 tablet 5    fluticasone (FLONASE) 50 mcg/act nasal spray 2 sprays into each nostril daily 16 g 5    furosemide (LASIX) 20 mg tablet Take 1 tablet (20 mg total) by mouth daily as needed (leg swelling) Take with potassium 30 tablet 5    levothyroxine 100 mcg tablet Take 1 tablet (100 mcg total) by mouth daily 30 tablet 5    montelukast (SINGULAIR) 10 mg tablet Take 1 tablet (10 mg total) by mouth daily at bedtime 30 tablet 5    potassium chloride (MICRO-K) 10 MEQ CR capsule Take 1 capsule (10 mEq total) by mouth daily 30 capsule 1    warfarin (COUMADIN) 5 mg tablet Take daily as directed by physician 45 tablet 5     No current facility-administered medications for this visit  Review of Systems   Constitutional: Negative      Respiratory: Positive for wheezing  Cardiovascular: Positive for leg swelling (Especially in the humidity)  Negative for palpitations  Gastrointestinal: Negative  Genitourinary: Negative  Objective:  Vitals:    08/18/20 0830   BP: 130/86   Temp: (!) 97 3 °F (36 3 °C)   Weight: (!) 148 kg (327 lb)   Height: 5' 5" (1 651 m)     Body mass index is 54 42 kg/m²  Physical Exam  Vitals signs reviewed  Constitutional:       General: She is not in acute distress  Appearance: She is well-developed  She is not diaphoretic  HENT:      Head: Normocephalic and atraumatic  Eyes:      Conjunctiva/sclera: Conjunctivae normal    Cardiovascular:      Rate and Rhythm: Normal rate and regular rhythm  Heart sounds: Normal heart sounds  No murmur  No friction rub  No gallop  Pulmonary:      Effort: Pulmonary effort is normal  No respiratory distress  Breath sounds: Normal breath sounds  No wheezing or rales  Musculoskeletal:      Right lower leg: Edema ( mild) present  Left lower leg: Edema (Mild) present  Neurological:      Mental Status: She is alert and oriented to person, place, and time  Psychiatric:         Behavior: Behavior normal          Thought Content: Thought content normal          Judgment: Judgment normal          BMI Counseling: Body mass index is 54 42 kg/m²  The BMI is above normal  Nutrition recommendations include reducing portion sizes, decreasing overall calorie intake, 3-5 servings of fruits/vegetables daily, reducing fast food intake, consuming healthier snacks, decreasing soda and/or juice intake, moderation in carbohydrate intake, increasing intake of lean protein, reducing intake of saturated fat and trans fat and reducing intake of cholesterol

## 2020-08-31 ENCOUNTER — APPOINTMENT (OUTPATIENT)
Dept: LAB | Facility: MEDICAL CENTER | Age: 55
End: 2020-08-31
Payer: COMMERCIAL

## 2020-08-31 ENCOUNTER — ANTICOAG VISIT (OUTPATIENT)
Dept: FAMILY MEDICINE CLINIC | Facility: CLINIC | Age: 55
End: 2020-08-31

## 2020-08-31 DIAGNOSIS — E03.8 OTHER SPECIFIED HYPOTHYROIDISM: ICD-10-CM

## 2020-08-31 LAB
T4 FREE SERPL-MCNC: 0.84 NG/DL (ref 0.76–1.46)
TSH SERPL DL<=0.05 MIU/L-ACNC: 12 UIU/ML (ref 0.36–3.74)

## 2020-08-31 PROCEDURE — 36415 COLL VENOUS BLD VENIPUNCTURE: CPT | Performed by: FAMILY MEDICINE

## 2020-08-31 PROCEDURE — 84443 ASSAY THYROID STIM HORMONE: CPT | Performed by: FAMILY MEDICINE

## 2020-08-31 PROCEDURE — 84439 ASSAY OF FREE THYROXINE: CPT | Performed by: FAMILY MEDICINE

## 2020-08-31 RX ORDER — LEVOTHYROXINE SODIUM 112 UG/1
112 TABLET ORAL DAILY
Qty: 30 TABLET | Refills: 3 | Status: SHIPPED | OUTPATIENT
Start: 2020-08-31 | End: 2020-12-27 | Stop reason: SDUPTHER

## 2020-09-11 ENCOUNTER — OFFICE VISIT (OUTPATIENT)
Dept: PULMONOLOGY | Facility: CLINIC | Age: 55
End: 2020-09-11
Payer: COMMERCIAL

## 2020-09-11 VITALS
SYSTOLIC BLOOD PRESSURE: 118 MMHG | DIASTOLIC BLOOD PRESSURE: 80 MMHG | HEART RATE: 95 BPM | OXYGEN SATURATION: 97 % | WEIGHT: 293 LBS | HEIGHT: 65 IN | BODY MASS INDEX: 48.82 KG/M2

## 2020-09-11 DIAGNOSIS — I26.99 PULMONARY EMBOLISM, BILATERAL (HCC): Chronic | ICD-10-CM

## 2020-09-11 DIAGNOSIS — J45.20 MILD INTERMITTENT ASTHMA WITHOUT COMPLICATION: Chronic | ICD-10-CM

## 2020-09-11 DIAGNOSIS — E66.01 MORBID OBESITY (HCC): Primary | Chronic | ICD-10-CM

## 2020-09-11 PROCEDURE — 99215 OFFICE O/P EST HI 40 MIN: CPT | Performed by: INTERNAL MEDICINE

## 2020-09-11 RX ORDER — BUDESONIDE AND FORMOTEROL FUMARATE DIHYDRATE 160; 4.5 UG/1; UG/1
2 AEROSOL RESPIRATORY (INHALATION) 2 TIMES DAILY
Qty: 1 INHALER | Refills: 5 | Status: SHIPPED | OUTPATIENT
Start: 2020-09-11 | End: 2020-11-19

## 2020-09-11 NOTE — PROGRESS NOTES
Assessment/Plan:    Pulmonary embolism, bilateral ([address] )  I spent time today reviewing Maryam Alberts is CAT scans  Not only the 1 from May but also the distant once from 2011  It appears that she did have mostly what appears to be chronic pulmonary embolism on the CT scan from May  I do agree that this may have been a failure of Eliquis given that there was some acute component  She is currently on Coumadin I am concerned that her levels are not within therapeutic range  She is on the way to to going from 1 34-1 95 with her new dosing regimen  Will see what her INR is on Monday  I also reviewed her 2D echocardiogram which shows no evidence of pulmonary hypertension or right heart failure  I see no evidence to escalate her therapy to consider surgical evaluation  Mild intermittent asthma without complication  Celina and I discussed her asthma maintenance therapy  She only uses her Flovent or rescue therapy less than once a day  Given the new recommendations from the global asthma initiative I recommended that we change her to Symbicort to use p r n  Naomi Skipper She will stop the Flovent albuterol and Symbicort as needed as long as less than twice daily  Diagnoses and all orders for this visit:    Morbid obesity ([address] )    Mild intermittent asthma without complication  -     Ambulatory referral to Pulmonology  -     budesonide-formoterol (SYMBICORT) 160-4 5 mcg/act inhaler; Inhale 2 puffs 2 (two) times a day Rinse mouth after use  Pulmonary embolism, bilateral ([address] )  -     Ambulatory referral to Pulmonology  -     CTA chest pe study; Future          Subjective:      Patient ID: Clementine Mello is a 54 y o  female  [facility] is here for follow-up of her asthma and pulmonary embolisms  She has had several episodes of PE now for the past 3 years  She had 1 large saddle embolus in 2017 and which she believes to be new blood clots despite Eliquis in May of this year  She has been transition from Eliquis to Coumadin    She is taking her Coumadin every day in getting her INR regularly which is subtherapeutic  In addition she has diagnosis of asthma which she feels is fairly well controlled in the she is exerting herself or out in hot humid air when she uses other Flovent or albuterol  The following portions of the patient's history were reviewed and updated as appropriate: allergies, current medications, past family history, past medical history, past social history, past surgical history and problem list     Review of Systems   Constitutional: Negative  Negative for unexpected weight change  HENT: Negative  Negative for postnasal drip  Eyes: Negative  Respiratory: Negative  Negative for cough, shortness of breath and wheezing  Cardiovascular: Negative  Negative for chest pain and leg swelling  Gastrointestinal: Negative  Endocrine: Negative  Genitourinary: Negative  Musculoskeletal: Negative  Allergic/Immunologic: Negative  Neurological: Negative  Hematological: Negative  Objective:      /80 (BP Location: Left arm, Patient Position: Sitting)   Pulse 95   Ht 5' 5" (1 651 m)   Wt (!) 149 kg (328 lb)   SpO2 97%   BMI 54 58 kg/m²          Physical Exam  Constitutional:       Appearance: She is well-developed  HENT:      Head: Normocephalic  Eyes:      Pupils: Pupils are equal, round, and reactive to light  Neck:      Musculoskeletal: Normal range of motion and neck supple  Cardiovascular:      Rate and Rhythm: Normal rate  Heart sounds: No murmur  Pulmonary:      Effort: Pulmonary effort is normal  No respiratory distress  Breath sounds: Normal breath sounds  No wheezing or rales  Abdominal:      Palpations: Abdomen is soft  Musculoskeletal: Normal range of motion  Skin:     General: Skin is warm and dry  Neurological:      Mental Status: She is alert and oriented to person, place, and time

## 2020-09-11 NOTE — ASSESSMENT & PLAN NOTE
I spent time today reviewing Miguel Angel López is CAT scans  Not only the 1 from May but also the distant once from 2011  It appears that she did have mostly what appears to be chronic pulmonary embolism on the CT scan from May  I do agree that this may have been a failure of Eliquis given that there was some acute component  She is currently on Coumadin I am concerned that her levels are not within therapeutic range  She is on the way to to going from 1 34-1 95 with her new dosing regimen  Will see what her INR is on Monday  I also reviewed her 2D echocardiogram which shows no evidence of pulmonary hypertension or right heart failure  I see no evidence to escalate her therapy to consider surgical evaluation

## 2020-09-11 NOTE — ASSESSMENT & PLAN NOTE
Pati Schultz and I discussed her asthma maintenance therapy  She only uses her Flovent or rescue therapy less than once a day  Given the new recommendations from the global asthma initiative I recommended that we change her to Symbicort to use leonor Franco Cassette She will stop the Flovent albuterol and Symbicort as needed as long as less than twice daily

## 2020-09-11 NOTE — PATIENT INSTRUCTIONS
Stop albuterol and Flovent  Start Symbicort 2 puffs as needed for shortness of breath (Do not take more than 4 puffs in a day)  Keep taking Coumadin to get INR between 2 and 3

## 2020-09-15 ENCOUNTER — OFFICE VISIT (OUTPATIENT)
Dept: FAMILY MEDICINE CLINIC | Facility: CLINIC | Age: 55
End: 2020-09-15
Payer: COMMERCIAL

## 2020-09-15 ENCOUNTER — APPOINTMENT (OUTPATIENT)
Dept: LAB | Facility: MEDICAL CENTER | Age: 55
End: 2020-09-15
Payer: COMMERCIAL

## 2020-09-15 VITALS
TEMPERATURE: 96.8 F | SYSTOLIC BLOOD PRESSURE: 124 MMHG | WEIGHT: 293 LBS | DIASTOLIC BLOOD PRESSURE: 84 MMHG | BODY MASS INDEX: 48.82 KG/M2 | HEIGHT: 65 IN

## 2020-09-15 DIAGNOSIS — E03.8 OTHER SPECIFIED HYPOTHYROIDISM: ICD-10-CM

## 2020-09-15 DIAGNOSIS — R60.0 BILATERAL LEG EDEMA: Primary | ICD-10-CM

## 2020-09-15 DIAGNOSIS — Z23 ENCOUNTER FOR IMMUNIZATION: ICD-10-CM

## 2020-09-15 LAB
ALBUMIN SERPL BCP-MCNC: 3.3 G/DL (ref 3.5–5)
ALP SERPL-CCNC: 96 U/L (ref 46–116)
ALT SERPL W P-5'-P-CCNC: 20 U/L (ref 12–78)
ANION GAP SERPL CALCULATED.3IONS-SCNC: 7 MMOL/L (ref 4–13)
AST SERPL W P-5'-P-CCNC: 14 U/L (ref 5–45)
BASOPHILS # BLD AUTO: 0.07 THOUSANDS/ΜL (ref 0–0.1)
BASOPHILS NFR BLD AUTO: 1 % (ref 0–1)
BILIRUB SERPL-MCNC: 0.5 MG/DL (ref 0.2–1)
BUN SERPL-MCNC: 13 MG/DL (ref 5–25)
CALCIUM SERPL-MCNC: 9.4 MG/DL (ref 8.3–10.1)
CHLORIDE SERPL-SCNC: 107 MMOL/L (ref 100–108)
CO2 SERPL-SCNC: 26 MMOL/L (ref 21–32)
CREAT SERPL-MCNC: 1.05 MG/DL (ref 0.6–1.3)
EOSINOPHIL # BLD AUTO: 0.33 THOUSAND/ΜL (ref 0–0.61)
EOSINOPHIL NFR BLD AUTO: 6 % (ref 0–6)
ERYTHROCYTE [DISTWIDTH] IN BLOOD BY AUTOMATED COUNT: 13.6 % (ref 11.6–15.1)
GFR SERPL CREATININE-BSD FRML MDRD: 60 ML/MIN/1.73SQ M
GLUCOSE P FAST SERPL-MCNC: 85 MG/DL (ref 65–99)
HCT VFR BLD AUTO: 43.6 % (ref 34.8–46.1)
HGB BLD-MCNC: 13.4 G/DL (ref 11.5–15.4)
IMM GRANULOCYTES # BLD AUTO: 0.01 THOUSAND/UL (ref 0–0.2)
IMM GRANULOCYTES NFR BLD AUTO: 0 % (ref 0–2)
LYMPHOCYTES # BLD AUTO: 1.42 THOUSANDS/ΜL (ref 0.6–4.47)
LYMPHOCYTES NFR BLD AUTO: 26 % (ref 14–44)
MCH RBC QN AUTO: 29.8 PG (ref 26.8–34.3)
MCHC RBC AUTO-ENTMCNC: 30.7 G/DL (ref 31.4–37.4)
MCV RBC AUTO: 97 FL (ref 82–98)
MONOCYTES # BLD AUTO: 0.71 THOUSAND/ΜL (ref 0.17–1.22)
MONOCYTES NFR BLD AUTO: 13 % (ref 4–12)
NEUTROPHILS # BLD AUTO: 2.9 THOUSANDS/ΜL (ref 1.85–7.62)
NEUTS SEG NFR BLD AUTO: 54 % (ref 43–75)
NRBC BLD AUTO-RTO: 0 /100 WBCS
PLATELET # BLD AUTO: 351 THOUSANDS/UL (ref 149–390)
PMV BLD AUTO: 10.8 FL (ref 8.9–12.7)
POTASSIUM SERPL-SCNC: 4.1 MMOL/L (ref 3.5–5.3)
PROT SERPL-MCNC: 7.4 G/DL (ref 6.4–8.2)
RBC # BLD AUTO: 4.5 MILLION/UL (ref 3.81–5.12)
SODIUM SERPL-SCNC: 140 MMOL/L (ref 136–145)
TSH SERPL DL<=0.05 MIU/L-ACNC: 2.8 UIU/ML (ref 0.36–3.74)
WBC # BLD AUTO: 5.44 THOUSAND/UL (ref 4.31–10.16)

## 2020-09-15 PROCEDURE — 80053 COMPREHEN METABOLIC PANEL: CPT | Performed by: FAMILY MEDICINE

## 2020-09-15 PROCEDURE — 90471 IMMUNIZATION ADMIN: CPT | Performed by: FAMILY MEDICINE

## 2020-09-15 PROCEDURE — 85025 COMPLETE CBC W/AUTO DIFF WBC: CPT | Performed by: FAMILY MEDICINE

## 2020-09-15 PROCEDURE — 90682 RIV4 VACC RECOMBINANT DNA IM: CPT | Performed by: FAMILY MEDICINE

## 2020-09-15 PROCEDURE — 84443 ASSAY THYROID STIM HORMONE: CPT

## 2020-09-15 PROCEDURE — 99213 OFFICE O/P EST LOW 20 MIN: CPT | Performed by: FAMILY MEDICINE

## 2020-09-15 PROCEDURE — 1036F TOBACCO NON-USER: CPT | Performed by: FAMILY MEDICINE

## 2020-09-15 NOTE — PROGRESS NOTES
Assessment/Plan:  Patient does not have any signs of open wounds or signs of cellulitis  Rx for compression stockings  We will get blood work on her today along with her PT INR  Flu shot given today  Told patient she must moisturize her legs at least once or twice a day  She should elevate them whenever she can  Follow-up in 1 month or p r n  Problem List Items Addressed This Visit        Other    Bilateral leg edema - Primary    Relevant Orders    CBC and differential    Comprehensive metabolic panel    Compression Stocking      Other Visit Diagnoses     Encounter for immunization        Relevant Orders    influenza vaccine, quadrivalent, recombinant, PF, 0 5 mL, for patients 18 yr+ (FLUBLOK)           Diagnoses and all orders for this visit:    Bilateral leg edema  -     CBC and differential  -     Comprehensive metabolic panel  -     Compression Stocking    Encounter for immunization  -     influenza vaccine, quadrivalent, recombinant, PF, 0 5 mL, for patients 18 yr+ (FLUBLOK)        No problem-specific Assessment & Plan notes found for this encounter  Subjective:      Patient ID: Salma Parra is a 54 y o  female  Patient here today for follow-up  Patient states her legs have been swelling, sometimes they get red  They itch  No fever chills  She states they are better than what they were  She does not wear compression stockings        The following portions of the patient's history were reviewed and updated as appropriate:   She has a past medical history of Abdominal pain, lower, Acute renal failure ([address] ), Allergic rhinitis, Ankle pain, right, Arthritis, Asthma, Bilateral chronic knee pain, Bladder pain, Blood clot in vein, Bursitis, Cardiac disorder, Cardiac murmur, Chest tightness or pressure, COPD (chronic obstructive pulmonary disease) ([address] ), Coronary artery disease, Curvature of spine, GERD (gastroesophageal reflux disease), Hernia, hiatal, Hyperlipidemia, Hypertension, Inguinal hernia, Jaw closure abnormality, Lumbar herniated disc, Morbid obesity ([address] ), Obesity, Osteoarthritis of right knee, Patellar tendinitis, Poor flexibility of tendon, Pulmonary embolism ([address] ), Sepsis ([facility] ), Severe sepsis due to methicillin resistant Staphylococcus aureus (MRSA) with acute organ dysfunction ([facility] ), Spider vein, symptomatic, Status post arthroscopy of right knee, Stomach disorder, Tear of medial meniscus of right knee, Thyroid disorder, and Umbilical hernia ,  does not have any pertinent problems on file  ,   has a past surgical history that includes Hysterectomy (2009); Open anterior shoulder reconstruction (Left); Hand surgery (Right); Foot surgery (Left); Colonoscopy; pr knee scope,med/lat menisectomy (Right, 4/11/2016); Exploratory laparotomy; Peripherally inserted central catheter insertion; Incision and drainage anterior neck (Right, 6/8/2017); Other surgical history; Cystoscopy (01/1994); Incisional hernia repair; Shoulder surgery; IR IVC filter removal (1/22/2019); and Fracture surgery  ,  family history includes Arthritis in her mother; Breast cancer in her maternal aunt; Colon cancer in her brother and son; Diabetes in her maternal aunt; Lung cancer in her father; No Known Problems in her daughter, daughter, maternal grandfather, maternal grandmother, paternal grandfather, paternal grandmother, sister, sister, sister, sister, sister, and sister  ,   reports that she quit smoking about 24 years ago  She has never used smokeless tobacco  She reports previous alcohol use  She reports that she does not use drugs  ,  is allergic to clindamycin; medical tape; penicillin g; and penicillins     Current Outpatient Medications   Medication Sig Dispense Refill    albuterol (2 5 mg/3 mL) 0 083 % nebulizer solution Take 1 vial (2 5 mg total) by nebulization as needed for wheezing 60 vial 5    albuterol (PROVENTIL HFA,VENTOLIN HFA) 90 mcg/act inhaler Inhale 2 puffs every 4 (four) hours as needed for wheezing 1 Inhaler 3    atorvastatin (LIPITOR) 10 mg tablet Take 1 tablet (10 mg total) by mouth daily 30 tablet 5    budesonide-formoterol (SYMBICORT) 160-4 5 mcg/act inhaler Inhale 2 puffs 2 (two) times a day Rinse mouth after use  1 Inhaler 5    fluticasone (FLONASE) 50 mcg/act nasal spray 2 sprays into each nostril daily 16 g 5    furosemide (LASIX) 20 mg tablet Take 1 tablet (20 mg total) by mouth daily as needed (leg swelling) Take with potassium 30 tablet 5    levothyroxine 112 mcg tablet Take 1 tablet (112 mcg total) by mouth daily 30 tablet 3    montelukast (SINGULAIR) 10 mg tablet Take 1 tablet (10 mg total) by mouth daily at bedtime 30 tablet 5    potassium chloride (MICRO-K) 10 MEQ CR capsule Take 1 capsule (10 mEq total) by mouth daily 30 capsule 1    warfarin (COUMADIN) 5 mg tablet Take daily as directed by physician 45 tablet 5     No current facility-administered medications for this visit  Review of Systems   Constitutional: Negative  Respiratory: Negative  Cardiovascular: Positive for leg swelling  Negative for chest pain and palpitations  Gastrointestinal: Negative  Genitourinary: Negative  Objective:  Vitals:    09/15/20 0735   BP: 124/84   Temp: (!) 96 8 °F (36 °C)   Weight: (!) 148 kg (325 lb 3 2 oz)   Height: 5' 5" (1 651 m)     Body mass index is 54 12 kg/m²  Physical Exam  Vitals signs reviewed  Constitutional:       General: She is not in acute distress  Appearance: She is well-developed  She is not diaphoretic  HENT:      Head: Normocephalic and atraumatic  Eyes:      Conjunctiva/sclera: Conjunctivae normal    Cardiovascular:      Rate and Rhythm: Normal rate and regular rhythm  Heart sounds: Normal heart sounds  No murmur  No friction rub  No gallop  Pulmonary:      Effort: Pulmonary effort is normal  No respiratory distress  Breath sounds: Normal breath sounds  No wheezing or rales  Musculoskeletal:      Right lower leg: Edema present  Left lower leg: Edema present  Neurological:      Mental Status: She is alert and oriented to person, place, and time  Psychiatric:         Behavior: Behavior normal          Thought Content:  Thought content normal          Judgment: Judgment normal

## 2020-09-16 ENCOUNTER — ANTICOAG VISIT (OUTPATIENT)
Dept: FAMILY MEDICINE CLINIC | Facility: CLINIC | Age: 55
End: 2020-09-16

## 2020-09-28 ENCOUNTER — ANTICOAG VISIT (OUTPATIENT)
Dept: FAMILY MEDICINE CLINIC | Facility: CLINIC | Age: 55
End: 2020-09-28

## 2020-09-28 ENCOUNTER — APPOINTMENT (OUTPATIENT)
Dept: LAB | Facility: MEDICAL CENTER | Age: 55
End: 2020-09-28
Payer: COMMERCIAL

## 2020-10-14 ENCOUNTER — APPOINTMENT (OUTPATIENT)
Dept: LAB | Facility: MEDICAL CENTER | Age: 55
End: 2020-10-14
Payer: COMMERCIAL

## 2020-10-15 ENCOUNTER — ANTICOAG VISIT (OUTPATIENT)
Dept: FAMILY MEDICINE CLINIC | Facility: CLINIC | Age: 55
End: 2020-10-15

## 2020-10-23 ENCOUNTER — OFFICE VISIT (OUTPATIENT)
Dept: OBGYN CLINIC | Facility: CLINIC | Age: 55
End: 2020-10-23
Payer: COMMERCIAL

## 2020-10-23 VITALS
HEIGHT: 65 IN | BODY MASS INDEX: 48.82 KG/M2 | DIASTOLIC BLOOD PRESSURE: 80 MMHG | WEIGHT: 293 LBS | SYSTOLIC BLOOD PRESSURE: 120 MMHG

## 2020-10-23 DIAGNOSIS — Z12.31 ENCOUNTER FOR SCREENING MAMMOGRAM FOR MALIGNANT NEOPLASM OF BREAST: ICD-10-CM

## 2020-10-23 DIAGNOSIS — Z01.419 ENCOUNTER FOR GYNECOLOGICAL EXAMINATION WITHOUT ABNORMAL FINDING: Primary | ICD-10-CM

## 2020-10-23 DIAGNOSIS — Z12.4 SCREENING FOR CERVICAL CANCER: ICD-10-CM

## 2020-10-23 PROCEDURE — G0145 SCR C/V CYTO,THINLAYER,RESCR: HCPCS | Performed by: ADVANCED PRACTICE MIDWIFE

## 2020-10-23 PROCEDURE — 99386 PREV VISIT NEW AGE 40-64: CPT | Performed by: ADVANCED PRACTICE MIDWIFE

## 2020-10-26 ENCOUNTER — LAB (OUTPATIENT)
Dept: LAB | Facility: MEDICAL CENTER | Age: 55
End: 2020-10-26
Payer: COMMERCIAL

## 2020-10-27 ENCOUNTER — ANTICOAG VISIT (OUTPATIENT)
Dept: FAMILY MEDICINE CLINIC | Facility: CLINIC | Age: 55
End: 2020-10-27

## 2020-11-02 LAB
LAB AP GYN PRIMARY INTERPRETATION: NORMAL
Lab: NORMAL

## 2020-11-10 ENCOUNTER — TELEPHONE (OUTPATIENT)
Dept: PULMONOLOGY | Facility: CLINIC | Age: 55
End: 2020-11-10

## 2020-11-11 ENCOUNTER — LAB (OUTPATIENT)
Dept: LAB | Facility: MEDICAL CENTER | Age: 55
End: 2020-11-11
Payer: COMMERCIAL

## 2020-11-12 ENCOUNTER — ANTICOAG VISIT (OUTPATIENT)
Dept: FAMILY MEDICINE CLINIC | Facility: CLINIC | Age: 55
End: 2020-11-12

## 2020-11-13 ENCOUNTER — HOSPITAL ENCOUNTER (OUTPATIENT)
Dept: CT IMAGING | Facility: HOSPITAL | Age: 55
Discharge: HOME | End: 2020-11-13
Payer: COMMERCIAL

## 2020-11-13 DIAGNOSIS — I26.99 PULMONARY EMBOLISM, BILATERAL (HCC): Chronic | ICD-10-CM

## 2020-11-13 PROCEDURE — 71275 CT ANGIOGRAPHY CHEST: CPT

## 2020-11-13 PROCEDURE — G1004 CDSM NDSC: HCPCS

## 2020-11-13 RX ADMIN — IOHEXOL 100 ML: 350 INJECTION, SOLUTION INTRAVENOUS at 13:07

## 2020-11-19 ENCOUNTER — OFFICE VISIT (OUTPATIENT)
Dept: FAMILY MEDICINE CLINIC | Facility: CLINIC | Age: 55
End: 2020-11-19
Payer: COMMERCIAL

## 2020-11-19 VITALS
DIASTOLIC BLOOD PRESSURE: 82 MMHG | SYSTOLIC BLOOD PRESSURE: 128 MMHG | HEART RATE: 80 BPM | HEIGHT: 65 IN | OXYGEN SATURATION: 97 % | WEIGHT: 293 LBS | BODY MASS INDEX: 48.82 KG/M2 | TEMPERATURE: 96.4 F

## 2020-11-19 DIAGNOSIS — E03.8 OTHER SPECIFIED HYPOTHYROIDISM: Primary | Chronic | ICD-10-CM

## 2020-11-19 DIAGNOSIS — J45.20 MILD INTERMITTENT ASTHMA WITHOUT COMPLICATION: Chronic | ICD-10-CM

## 2020-11-19 DIAGNOSIS — E78.5 DYSLIPIDEMIA: Chronic | ICD-10-CM

## 2020-11-19 PROCEDURE — 99213 OFFICE O/P EST LOW 20 MIN: CPT | Performed by: FAMILY MEDICINE

## 2020-11-19 RX ORDER — BUDESONIDE AND FORMOTEROL FUMARATE DIHYDRATE 160; 4.5 UG/1; UG/1
2 AEROSOL RESPIRATORY (INHALATION) 2 TIMES DAILY
Qty: 1 INHALER | Refills: 5 | Status: SHIPPED | OUTPATIENT
Start: 2020-11-19 | End: 2021-02-22 | Stop reason: SDUPTHER

## 2020-11-20 ENCOUNTER — OFFICE VISIT (OUTPATIENT)
Dept: PULMONOLOGY | Facility: CLINIC | Age: 55
End: 2020-11-20
Payer: COMMERCIAL

## 2020-11-20 VITALS
TEMPERATURE: 96.8 F | WEIGHT: 293 LBS | DIASTOLIC BLOOD PRESSURE: 82 MMHG | BODY MASS INDEX: 48.82 KG/M2 | HEART RATE: 94 BPM | HEIGHT: 65 IN | SYSTOLIC BLOOD PRESSURE: 122 MMHG | OXYGEN SATURATION: 96 %

## 2020-11-20 DIAGNOSIS — R06.83 SNORING: ICD-10-CM

## 2020-11-20 DIAGNOSIS — I26.99 PULMONARY EMBOLISM, BILATERAL (HCC): Chronic | ICD-10-CM

## 2020-11-20 DIAGNOSIS — J45.20 MILD INTERMITTENT ASTHMA WITHOUT COMPLICATION: Primary | ICD-10-CM

## 2020-11-20 DIAGNOSIS — E66.01 MORBID OBESITY (HCC): ICD-10-CM

## 2020-11-20 PROCEDURE — 1036F TOBACCO NON-USER: CPT | Performed by: PHYSICIAN ASSISTANT

## 2020-11-20 PROCEDURE — 99214 OFFICE O/P EST MOD 30 MIN: CPT | Performed by: PHYSICIAN ASSISTANT

## 2020-11-20 PROCEDURE — 3008F BODY MASS INDEX DOCD: CPT | Performed by: PHYSICIAN ASSISTANT

## 2020-12-01 ENCOUNTER — LAB (OUTPATIENT)
Dept: LAB | Facility: MEDICAL CENTER | Age: 55
End: 2020-12-01
Payer: COMMERCIAL

## 2020-12-02 ENCOUNTER — ANTICOAG VISIT (OUTPATIENT)
Dept: FAMILY MEDICINE CLINIC | Facility: CLINIC | Age: 55
End: 2020-12-02

## 2020-12-02 ENCOUNTER — OFFICE VISIT (OUTPATIENT)
Dept: FAMILY MEDICINE CLINIC | Facility: CLINIC | Age: 55
End: 2020-12-02
Payer: COMMERCIAL

## 2020-12-02 VITALS
BODY MASS INDEX: 48.82 KG/M2 | SYSTOLIC BLOOD PRESSURE: 128 MMHG | HEIGHT: 65 IN | TEMPERATURE: 97 F | WEIGHT: 293 LBS | DIASTOLIC BLOOD PRESSURE: 76 MMHG

## 2020-12-02 DIAGNOSIS — T78.3XXD ANGIOEDEMA, SUBSEQUENT ENCOUNTER: ICD-10-CM

## 2020-12-02 PROCEDURE — 3008F BODY MASS INDEX DOCD: CPT | Performed by: FAMILY MEDICINE

## 2020-12-02 PROCEDURE — 1036F TOBACCO NON-USER: CPT | Performed by: FAMILY MEDICINE

## 2020-12-02 PROCEDURE — 99213 OFFICE O/P EST LOW 20 MIN: CPT | Performed by: FAMILY MEDICINE

## 2020-12-02 RX ORDER — MONTELUKAST SODIUM 10 MG/1
10 TABLET ORAL
Qty: 30 TABLET | Refills: 5 | Status: SHIPPED | OUTPATIENT
Start: 2020-12-02 | End: 2021-07-29 | Stop reason: SDUPTHER

## 2020-12-02 RX ORDER — PREDNISONE 10 MG/1
TABLET ORAL
Qty: 30 TABLET | Refills: 0 | Status: SHIPPED | OUTPATIENT
Start: 2020-12-02 | End: 2021-03-29

## 2020-12-10 ENCOUNTER — TELEPHONE (OUTPATIENT)
Dept: SLEEP CENTER | Facility: CLINIC | Age: 55
End: 2020-12-10

## 2020-12-22 ENCOUNTER — HOSPITAL ENCOUNTER (OUTPATIENT)
Dept: PULMONOLOGY | Facility: HOSPITAL | Age: 55
Discharge: HOME | End: 2020-12-22
Payer: COMMERCIAL

## 2020-12-22 DIAGNOSIS — J45.20 MILD INTERMITTENT ASTHMA WITHOUT COMPLICATION: ICD-10-CM

## 2020-12-22 PROCEDURE — 94727 GAS DIL/WSHOT DETER LNG VOL: CPT | Performed by: INTERNAL MEDICINE

## 2020-12-22 PROCEDURE — 94727 GAS DIL/WSHOT DETER LNG VOL: CPT

## 2020-12-22 PROCEDURE — 94010 BREATHING CAPACITY TEST: CPT

## 2020-12-22 PROCEDURE — 94760 N-INVAS EAR/PLS OXIMETRY 1: CPT

## 2020-12-22 PROCEDURE — 94729 DIFFUSING CAPACITY: CPT | Performed by: INTERNAL MEDICINE

## 2020-12-22 PROCEDURE — 94761 N-INVAS EAR/PLS OXIMETRY MLT: CPT

## 2020-12-22 PROCEDURE — 94010 BREATHING CAPACITY TEST: CPT | Performed by: INTERNAL MEDICINE

## 2020-12-22 PROCEDURE — 94729 DIFFUSING CAPACITY: CPT

## 2020-12-25 DIAGNOSIS — E03.8 OTHER SPECIFIED HYPOTHYROIDISM: ICD-10-CM

## 2020-12-27 RX ORDER — LEVOTHYROXINE SODIUM 112 UG/1
TABLET ORAL
Qty: 30 TABLET | Refills: 3 | Status: SHIPPED | OUTPATIENT
Start: 2020-12-27 | End: 2021-05-12

## 2020-12-30 ENCOUNTER — HOSPITAL ENCOUNTER (OUTPATIENT)
Dept: SLEEP CENTER | Facility: HOSPITAL | Age: 55
Discharge: HOME | End: 2020-12-30
Payer: COMMERCIAL

## 2020-12-30 DIAGNOSIS — R06.83 SNORING: ICD-10-CM

## 2020-12-30 DIAGNOSIS — E66.01 MORBID OBESITY (HCC): ICD-10-CM

## 2020-12-30 PROCEDURE — 95810 POLYSOM 6/> YRS 4/> PARAM: CPT

## 2020-12-30 PROCEDURE — 95810 POLYSOM 6/> YRS 4/> PARAM: CPT | Performed by: INTERNAL MEDICINE

## 2020-12-31 NOTE — PROGRESS NOTES
Sleep Study Documentation    Pre-Sleep Study       Sleep testing procedure explained to patient:YES    Patient napped prior to study:NO    [address] [place] worker after 12PM   Caffeine use:NO    Alcohol:Dayshift workers after 5PM: Alcohol use:NO    Typical day for patient:NO       Study Documentation    Sleep Study Indications: snoring, BMI>30    Sleep Study: Diagnostic   Snore: Moderate  Supplemental O2: no    O2 flow rate (L/min) range n/a  O2 flow rate (L/min) final n/a  Minimum SaO2 70   Baseline SaO2 94        Mode of Therapy:    EKG abnormalities: no     EEG abnormalities: no    Sleep Study Recorded < 2 hours: N/A    Sleep Study Recorded > 2 hours but incomplete study: N/A    Sleep Study Recorded 6 hours but no sleep obtained: NO    Patient classification: unemployed       Post-Sleep Study    Medication used at bedtime or during sleep study:YES other prescription medications    Patient reports time it took to fall asleep:30 to 60 minutes    Patient reports waking up during study:3 or more times  Patient reports returning to sleep without difficulty  Patient reports sleeping 4 to 6 hours without dreaming  Patient reports sleep during study:typical    Patient rated sleepiness: Not sleepy or tired    PAP treatment:no

## 2021-01-03 DIAGNOSIS — G47.33 OBSTRUCTIVE SLEEP APNEA (ADULT) (PEDIATRIC): Primary | ICD-10-CM

## 2021-01-04 ENCOUNTER — TELEPHONE (OUTPATIENT)
Dept: PULMONOLOGY | Facility: CLINIC | Age: 56
End: 2021-01-04

## 2021-01-04 NOTE — TELEPHONE ENCOUNTER
Called patient to review results of pulmonary function tests with 6 minutes walk and diagnostic sleep study  Pulmonary function tests were consistent with mild restriction likely in the setting of morbid obesity and moderately impaired diffusion capacity however her 6 minutes walk did not show the patient requires supplemental oxygen at rest or with activities  Her diagnostic sleep study revealed that patient had mild obstructive sleep apnea worsened during REM and would benefit from auto CPAP  After review of all this she is agreeable to trialing auto CPAP 5-20 cmH2O and following up with Dr Lorrane Paget in February  She verbalized understanding and agrees to the plan

## 2021-01-07 ENCOUNTER — LAB (OUTPATIENT)
Dept: LAB | Facility: MEDICAL CENTER | Age: 56
End: 2021-01-07
Payer: COMMERCIAL

## 2021-01-08 ENCOUNTER — ANTICOAG VISIT (OUTPATIENT)
Dept: FAMILY MEDICINE CLINIC | Facility: CLINIC | Age: 56
End: 2021-01-08

## 2021-02-15 ENCOUNTER — LAB (OUTPATIENT)
Dept: LAB | Facility: MEDICAL CENTER | Age: 56
End: 2021-02-15
Payer: COMMERCIAL

## 2021-02-16 ENCOUNTER — ANTICOAG VISIT (OUTPATIENT)
Dept: FAMILY MEDICINE CLINIC | Facility: CLINIC | Age: 56
End: 2021-02-16

## 2021-02-22 ENCOUNTER — TELEMEDICINE (OUTPATIENT)
Dept: PULMONOLOGY | Facility: CLINIC | Age: 56
End: 2021-02-22
Payer: COMMERCIAL

## 2021-02-22 VITALS — BODY MASS INDEX: 48.82 KG/M2 | HEIGHT: 65 IN | WEIGHT: 293 LBS

## 2021-02-22 DIAGNOSIS — J98.4 RESTRICTIVE LUNG DISEASE: ICD-10-CM

## 2021-02-22 DIAGNOSIS — G47.33 OBSTRUCTIVE SLEEP APNEA (ADULT) (PEDIATRIC): Primary | ICD-10-CM

## 2021-02-22 DIAGNOSIS — K21.00 GASTROESOPHAGEAL REFLUX DISEASE WITH ESOPHAGITIS WITHOUT HEMORRHAGE: ICD-10-CM

## 2021-02-22 DIAGNOSIS — J45.20 MILD INTERMITTENT ASTHMA WITHOUT COMPLICATION: Chronic | ICD-10-CM

## 2021-02-22 PROCEDURE — G2012 BRIEF CHECK IN BY MD/QHP: HCPCS | Performed by: INTERNAL MEDICINE

## 2021-02-22 RX ORDER — BUDESONIDE AND FORMOTEROL FUMARATE DIHYDRATE 160; 4.5 UG/1; UG/1
2 AEROSOL RESPIRATORY (INHALATION) 2 TIMES DAILY
Qty: 1 INHALER | Refills: 5 | Status: SHIPPED | OUTPATIENT
Start: 2021-02-22 | End: 2021-07-29 | Stop reason: SDUPTHER

## 2021-02-22 RX ORDER — FAMOTIDINE 40 MG/1
40 TABLET, FILM COATED ORAL
Qty: 30 TABLET | Refills: 5 | Status: SHIPPED | OUTPATIENT
Start: 2021-02-22 | End: 2021-11-22

## 2021-02-22 NOTE — ASSESSMENT & PLAN NOTE
I reviewed her Symbicort to use 2 puffs twice a day and use her rescue inhaler as needed  Some of her issues are related to her restrictive lung disease and I think she would benefit from pulmonary rehab which I have referred her to

## 2021-03-02 ENCOUNTER — TELEPHONE (OUTPATIENT)
Dept: FAMILY MEDICINE CLINIC | Facility: CLINIC | Age: 56
End: 2021-03-02

## 2021-03-02 ENCOUNTER — TELEMEDICINE (OUTPATIENT)
Dept: FAMILY MEDICINE CLINIC | Facility: CLINIC | Age: 56
End: 2021-03-02
Payer: COMMERCIAL

## 2021-03-02 VITALS — HEIGHT: 65 IN | BODY MASS INDEX: 48.82 KG/M2 | WEIGHT: 293 LBS

## 2021-03-02 DIAGNOSIS — B34.9 VIRAL INFECTION, UNSPECIFIED: ICD-10-CM

## 2021-03-02 DIAGNOSIS — Z20.822 EXPOSURE TO COVID-19 VIRUS: Primary | ICD-10-CM

## 2021-03-02 PROCEDURE — G2012 BRIEF CHECK IN BY MD/QHP: HCPCS | Performed by: FAMILY MEDICINE

## 2021-03-02 NOTE — PROGRESS NOTES
COVID-19 Virtual Visit     Assessment/Plan:    Problem List Items Addressed This Visit     None      Visit Diagnoses     Exposure to COVID-19 virus    -  Primary    Relevant Orders    Novel Coronavirus (Covid-19),PCR [facility] - Collected in Office    Viral infection, unspecified        Relevant Orders    Novel Coronavirus (Covid-19),PCR [facility] - Collected in Office         Disposition:     I recommended self-quarantine for 14 days and to call back for worsening symptoms or development of shortness of breath  I recommended the patient to come to our office to perform PCR testing for COVID-19  We will test patient for COVID  If her breathing becomes worse, advised her to go to the ER  We will call her with results of COVID test make further recommendations at that time  I have spent 10 minutes directly with the patient  Encounter provider Kevin Wilkinson,     Provider located at [address] [address]    Recent Visits  No visits were found meeting these conditions  Showing recent visits within past 7 days and meeting all other requirements     Today's Visits  Date Type Provider Dept   03/02/21 Telephone Kevin Wilkinson, DO [facility]   03/02/21 Telemedicine Kevin Wilkinson, DO [facility]   Showing today's visits and meeting all other requirements     Future Appointments  No visits were found meeting these conditions  Showing future appointments within next 150 days and meeting all other requirements        Patient agrees to participate in a virtual check in via telephone or video visit instead of presenting to the office to address urgent/immediate medical needs  Patient is aware this is a billable service  After connecting through Telephone, the patient was identified by name and date of birth  Norm Armijo was informed that this was a telemedicine visit and that the exam was being conducted confidentially over secure lines   Norm Armijo acknowledged consent and understanding of privacy and security of the telemedicine visit  I informed the patient that I have reviewed her record in Epic and presented the opportunity for her to ask any questions regarding the visit today  The patient agreed to participate  It was my intent to perform this visit via video technology but the patient was not able to do a video connection so the visit was completed via audio telephone only  Subjective:   Reuben Leal is a 54 y o  female who is concerned about COVID-19  Patient's symptoms include fever, fatigue, sore throat, cough, myalgias and headache  Patient denies chills, malaise, congestion, rhinorrhea, anosmia, loss of taste, shortness of breath, chest tightness, abdominal pain, nausea, vomiting and diarrhea  Date of symptom onset: 2/27/2021  Date of exposure: 2/27/2021    Exposure:   Contact with a person who is under investigation (PUI) for or who is positive for COVID-19 within the last 14 days?: Yes    Hospitalized recently for fever and/or lower respiratory symptoms?: No      Currently a healthcare worker that is involved in direct patient care?: No      Works in a special setting where the risk of COVID-19 transmission may be high? (this may include long-term care, correctional and [facility] facilities; homeless shelters; assisted-living facilities and group homes ): No      Resident in a special setting where the risk of COVID-19 transmission may be high? (this may include long-term care, correctional and [facility] facilities; homeless shelters; assisted-living facilities and group homes ): No        Attempted video visit, converted to telephone  Patient was at a birthday party on 02/27/2021  Later that night, patient started feeling hot and achy  Patient's roommate was diagnosed with COVID   This week  Patient has been feeling feverish  Having a dry cough  No shortness of breath  No nausea vomiting or diarrhea    Still has taste and smell     No results found for: Freeman Hurley, KARLENERONSANJUANITA, [address]  Past Medical History:   Diagnosis Date    Abdominal pain, lower     [id number] RESOLVED    Acute renal failure ([facility] )     [id number] RESOLVED    Allergic rhinitis     [id number] RESOLVED    Ankle pain, right     [id number] RESOLVED    Arthritis     [id number] RESOLVED  [id number] RESOLVED    Asthma     Bilateral chronic knee pain     [id number]    Bladder pain     [id number] RESOLVED    Blood clot in vein     Bursitis     [id number] RESOLVED    Cardiac disorder     [id number]  RESOLVED    Cardiac murmur     [id number] RESOLVED    Chest tightness or pressure     [id number] RESOLVED    COPD (chronic obstructive pulmonary disease) ([facility] )     Coronary artery disease     Curvature of spine     [id number] RESOLVED    GERD (gastroesophageal reflux disease)     Hernia, hiatal     [id number] RESOLVED    Hyperlipidemia     [id number] RESOLVED    Hypertension     Inguinal hernia     RIGHT   [id number] RESOLVED    Jaw closure abnormality     [id number] RESOLVED    Lumbar herniated disc     [id number] RESOLVED    Morbid obesity ([facility] )     [id number]    Obesity     Osteoarthritis of right knee     [id number] RESOLVED    Patellar tendinitis     [id number]    Poor flexibility of tendon     [id number]    Pulmonary embolism ([facility] )     [id number] RESOLVED    [facility]     [id number] RESOLVED    Severe sepsis due to methicillin resistant Staphylococcus aureus (MRSA) with acute organ dysfunction ([facility] )     [id number] RESOLVED    Spider vein, symptomatic     [id number] RESOLVED    Status post arthroscopy of right knee     [id number] RESOLVED    Stomach disorder     [id number] RESOLVED    Tear of medial meniscus of right knee     SUBSEQUENT ENCOUNTER  RESOLVED [id number]    Thyroid disorder     37OYM2226    Umbilical hernia     [id number] RESOLVED     Past Surgical History:   Procedure Laterality Date    COLONOSCOPY      CYSTOSCOPY  01/1994    WITH BIOPSY      EXPLORATORY LAPAROTOMY      FOOT SURGERY Left     FRACTURE SURGERY      HAND SURGERY Right     cyst    HYSTERECTOMY  2009    INCISION AND DRAINAGE ANTERIOR NECK Right 6/8/2017    Procedure: INCISION AND DRAINAGE  (I&D) NECK;  Surgeon: Anjum Duque MD;  Location: [facility];  Service:    Orren Dwight HERNIA REPAIR      WITH IMPLANTATION OF MESH  13 MAY 2014  LAST ASSESSED    IR IVC FILTER REMOVAL  1/22/2019    OPEN ANTERIOR SHOULDER RECONSTRUCTION Left     OTHER SURGICAL HISTORY      BLOOD VESSEL REPAIR   13 MAY 2014 LAST ASSESSED    PERIPHERALLY INSERTED CENTRAL CATHETER INSERTION      ME KNEE SCOPE,MED/LAT MENISECTOMY Right 4/11/2016    Procedure: KNEE ARTHROSCOPY WITH MEDIAL MENISCECTOMY ;  Surgeon: Karely Tony MD;  Location: [facility];  Service: Orthopedics    SHOULDER SURGERY       Current Outpatient Medications   Medication Sig Dispense Refill    albuterol (2 5 mg/3 mL) 0 083 % nebulizer solution Take 1 vial (2 5 mg total) by nebulization as needed for wheezing 60 vial 5    albuterol (PROVENTIL HFA,VENTOLIN HFA) 90 mcg/act inhaler Inhale 2 puffs every 4 (four) hours as needed for wheezing 1 Inhaler 3    atorvastatin (LIPITOR) 10 mg tablet Take 1 tablet (10 mg total) by mouth daily 30 tablet 5    budesonide-formoterol (SYMBICORT) 160-4 5 mcg/act inhaler Inhale 2 puffs 2 (two) times a day Rinse mouth after use   1 Inhaler 5    famotidine (PEPCID) 40 MG tablet Take 1 tablet (40 mg total) by mouth daily at bedtime 30 tablet 5    fluticasone (FLONASE) 50 mcg/act nasal spray 2 sprays into each nostril daily 16 g 5    furosemide (LASIX) 20 mg tablet Take 1 tablet (20 mg total) by mouth daily as needed (leg swelling) Take with potassium 30 tablet 5    levothyroxine 112 mcg tablet take 1 tablet by mouth daily 30 tablet 3    montelukast (SINGULAIR) 10 mg tablet Take 1 tablet (10 mg total) by mouth daily at bedtime 30 tablet 5    potassium chloride (MICRO-K) 10 MEQ CR capsule Take 1 capsule (10 mEq total) by mouth daily 30 capsule 1    warfarin (COUMADIN) 5 mg tablet Take daily as directed by physician 45 tablet 5    predniSONE 10 mg tablet 4 tablets daily for 3 days, then 3 tablets daily for 3 days, then 2 tablets daily for 3 days, then 1 tablet daily for 3 days (Patient not taking: Reported on 3/2/2021) 30 tablet 0     No current facility-administered medications for this visit  Allergies   Allergen Reactions    Clindamycin Angioedema    Medical Tape Itching     Welt and redness    Penicillin G Nausea Only    Penicillins GI Intolerance     Nausea and vomiting       Review of Systems   Constitutional: Positive for fatigue and fever  Negative for chills  HENT: Positive for sore throat  Negative for congestion and rhinorrhea  Respiratory: Positive for cough  Negative for chest tightness and shortness of breath  Cardiovascular: Negative  Gastrointestinal: Negative for abdominal pain, diarrhea, nausea and vomiting  Genitourinary: Negative  Musculoskeletal: Positive for myalgias  Neurological: Positive for headaches  Objective:    Vitals:    03/02/21 1227   Weight: (!) 137 kg (302 lb)   Height: 5' 5" (1 651 m)       Physical Exam  Vitals signs reviewed  Pulmonary:      Comments:  Patient able to talk in full sentences in no distress  Dry cough heard occasionally during visit  Neurological:      Mental Status: She is alert and oriented to person, place, and time  Psychiatric:         Mood and Affect: Mood normal          Behavior: Behavior normal        VIRTUAL VISIT DISCLAIMER    Nayeli Lantigua acknowledges that she has consented to an online visit or consultation  She understands that the online visit is based solely on information provided by her, and that, in the absence of a face-to-face physical evaluation by the physician, the diagnosis she receives is both limited and provisional in terms of accuracy and completeness   This is not intended to replace a full medical face-to-face evaluation by the physician  Shelia Layne understands and accepts these terms

## 2021-03-02 NOTE — TELEPHONE ENCOUNTER
Why does she need a new PE scan? Patient had 1 in November  Patient saw pulmonology in February and they did not order a new scan

## 2021-03-03 PROCEDURE — U0003 INFECTIOUS AGENT DETECTION BY NUCLEIC ACID (DNA OR RNA); SEVERE ACUTE RESPIRATORY SYNDROME CORONAVIRUS 2 (SARS-COV-2) (CORONAVIRUS DISEASE [COVID-19]), AMPLIFIED PROBE TECHNIQUE, MAKING USE OF HIGH THROUGHPUT TECHNOLOGIES AS DESCRIBED BY CMS-2020-01-R: HCPCS | Performed by: FAMILY MEDICINE

## 2021-03-03 PROCEDURE — U0005 INFEC AGEN DETEC AMPLI PROBE: HCPCS | Performed by: FAMILY MEDICINE

## 2021-03-04 LAB — SARS-COV-2 RNA RESP QL NAA+PROBE: POSITIVE

## 2021-03-08 ENCOUNTER — TELEMEDICINE (OUTPATIENT)
Dept: FAMILY MEDICINE CLINIC | Facility: CLINIC | Age: 56
End: 2021-03-08
Payer: COMMERCIAL

## 2021-03-08 ENCOUNTER — TELEPHONE (OUTPATIENT)
Dept: PULMONOLOGY | Facility: CLINIC | Age: 56
End: 2021-03-08

## 2021-03-08 VITALS — WEIGHT: 293 LBS | HEIGHT: 65 IN | BODY MASS INDEX: 48.82 KG/M2

## 2021-03-08 DIAGNOSIS — U07.1 COVID-19: Primary | ICD-10-CM

## 2021-03-08 PROCEDURE — G2012 BRIEF CHECK IN BY MD/QHP: HCPCS | Performed by: FAMILY MEDICINE

## 2021-03-08 NOTE — PROGRESS NOTES
COVID-19 Virtual Visit     Assessment/Plan:    Problem List Items Addressed This Visit     None      Visit Diagnoses     COVID-19    -  Primary         Disposition:     I recommended continued isolation until at least 24 hours have passed since recovery defined as resolution of fever without the use of fever-reducing medications AND improvement in COVID symptoms AND 10 days have passed since onset of symptoms (or 10 days have passed since date of first positive viral diagnostic test for asymptomatic patients)  Patient is doing well  She will remain in isolation until this weekend  She will call us if her breathing starts to become a problem  If severe, she knows she has to go to the ER  She will call us if she starts to have body aches and fever again  Encouraged her to push fluids  She will call us if her urine continues to be a problem  Otherwise we will see her in the office on 03/19/2021  I have spent 10 minutes directly with the patient  Encounter provider Erica Gonsales DO    Provider located at [address] [address]    Recent Visits  Date Type Provider Dept   03/02/21 Telephone Erica Gonsales DO [facility]   03/02/21 Telephone Erica Gonsales, DO [facility]   03/02/21 Telephone Erica Gonsales, DO [facility]   03/02/21 Telemedicine Erica Gonsales,  [facility]   Showing recent visits within past 7 days and meeting all other requirements     Today's Visits  Date Type Provider Dept   03/08/21 Telemedicine Erica Gonsales,  [facility]   Showing today's visits and meeting all other requirements     Future Appointments  No visits were found meeting these conditions  Showing future appointments within next 150 days and meeting all other requirements        Patient agrees to participate in a virtual check in via telephone or video visit instead of presenting to the office to address urgent/immediate medical needs  Patient is aware this is a billable service  After connecting through Telephone, the patient was identified by name and date of birth  Nate Unger was informed that this was a telemedicine visit and that the exam was being conducted confidentially over secure lines  Nate Unger acknowledged consent and understanding of privacy and security of the telemedicine visit  I informed the patient that I have reviewed her record in Epic and presented the opportunity for her to ask any questions regarding the visit today  The patient agreed to participate  It was my intent to perform this visit via video technology but the patient was not able to do a video connection so the visit was completed via audio telephone only  Subjective:   Nate Unger is a 54 y o  female who has been screened for COVID-19  Symptom change since last report: improving  Patient's symptoms include chills (  Yesterday, none today), cough and myalgias ( yesterday, none today)  Patient denies fever, fatigue, malaise, congestion, rhinorrhea, sore throat, anosmia, loss of taste, shortness of breath, chest tightness, abdominal pain, nausea, vomiting, diarrhea and headaches  Donte Hull has been staying home and has isolated themselves in her home  She is taking care to not share personal items and is cleaning all surfaces that are touched often, like counters, tabletops, and doorknobs using household cleaning sprays or wipes  She is wearing a mask when she leaves her room  Date of symptom onset: 2/27/2021  Date of exposure: 2/27/2021  Date of positive COVID-19 PCR: 3/3/2021     Attempted video visit, converted to telephone  Follow-up COVID  Patient is doing better today  Yesterday felt feverish and had chills  Patient does not have a thermometer  Had body aches yesterday  No nausea vomiting or diarrhea  No loss of taste or smell  Patient just states she has a dry cough  No shortness of breath    Patient does state that her urine has been strong in order lately  No dysuria      Lab Results   Component Value Date    SARSCOV2 Positive (A) 03/03/2021     Past Medical History:   Diagnosis Date    Abdominal pain, lower     [id number] RESOLVED    Acute renal failure ([facility] )     [id number] RESOLVED    Allergic rhinitis     [id number] RESOLVED    Ankle pain, right     [id number] RESOLVED    Arthritis     [id number] RESOLVED  [id number] RESOLVED    Asthma     Bilateral chronic knee pain     47LHG2473    Bladder pain     69OMQ8632 RESOLVED    Blood clot in vein     Bursitis     [id number] RESOLVED    Cardiac disorder     [id number]  RESOLVED    Cardiac murmur     [id number] RESOLVED    Chest tightness or pressure     [id number] RESOLVED    COPD [facility]ch[facility] obstructive pulmonary disease) [facility]     Coronary artery disease     Curvature of spine     [id number] RESOLVED    GERD (gastroesophageal reflux disease)     Hernia, hiatal     [id number] RESOLVED    Hyperlipidemia     [id number] RESOLVED    Hypertension     Inguinal hernia     RIGHT   [id number] RESOLVED    Jaw closure abnormality     85CST0520 RESOLVED    Lumbar herniated disc     [id number] RESOLVED    Morbid obesity ([facility])     09KEA9010    Obesity     Osteoarthritis of right knee     45TXS0887 RESOLVED    Patellar tendinitis     75NZC9310    Poor flexibility of tendon     70YVH5426    Pulmonary embolism ([facility] )     [id number] RESOLVED    Sepsis ([facility] )     [id number] RESOLVED    Severe sepsis due to methicillin resistant Staphylococcus aureus (MRSA) with acute organ dysfunction ([facility] )     20GHI3432 RESOLVED    Spider vein, symptomatic     [id number] RESOLVED    Status post arthroscopy of right knee     [id number] RESOLVED    Stomach disorder     93LTF8546 RESOLVED    Tear of medial meniscus of right knee     SUBSEQUENT ENCOUNTER  RESOLVED [id number]    Thyroid disorder     13QRS0494    Umbilical hernia     62UQP5896 RESOLVED     Past Surgical History:   Procedure Laterality Date    COLONOSCOPY  CYSTOSCOPY  01/1994    WITH BIOPSY      EXPLORATORY LAPAROTOMY      FOOT SURGERY Left     FRACTURE SURGERY      HAND SURGERY Right     cyst    HYSTERECTOMY  2009    INCISION AND DRAINAGE ANTERIOR NECK Right 6/8/2017    Procedure: INCISION AND DRAINAGE  (I&D) NECK;  Surgeon: Mayra Burdick MD;  Location:  MAIN OR;  Service:    Juan Miguel Luis HERNIA REPAIR      WITH IMPLANTATION OF MESH  13 MAY 2014  LAST ASSESSED    IR IVC FILTER REMOVAL  1/22/2019    OPEN ANTERIOR SHOULDER RECONSTRUCTION Left     OTHER SURGICAL HISTORY      BLOOD VESSEL REPAIR   13 MAY 2014 LAST ASSESSED    PERIPHERALLY INSERTED CENTRAL CATHETER INSERTION      CO KNEE SCOPE,MED/LAT MENISECTOMY Right 4/11/2016    Procedure: KNEE ARTHROSCOPY WITH MEDIAL MENISCECTOMY ;  Surgeon: Daina Aguilar MD;  Location: [facility];  Service: Orthopedics    SHOULDER SURGERY       Current Outpatient Medications   Medication Sig Dispense Refill    albuterol (2 5 mg/3 mL) 0 083 % nebulizer solution Take 1 vial (2 5 mg total) by nebulization as needed for wheezing 60 vial 5    albuterol (PROVENTIL HFA,VENTOLIN HFA) 90 mcg/act inhaler Inhale 2 puffs every 4 (four) hours as needed for wheezing 1 Inhaler 3    atorvastatin (LIPITOR) 10 mg tablet Take 1 tablet (10 mg total) by mouth daily 30 tablet 5    budesonide-formoterol (SYMBICORT) 160-4 5 mcg/act inhaler Inhale 2 puffs 2 (two) times a day Rinse mouth after use   1 Inhaler 5    famotidine (PEPCID) 40 MG tablet Take 1 tablet (40 mg total) by mouth daily at bedtime 30 tablet 5    fluticasone (FLONASE) 50 mcg/act nasal spray 2 sprays into each nostril daily 16 g 5    furosemide (LASIX) 20 mg tablet Take 1 tablet (20 mg total) by mouth daily as needed (leg swelling) Take with potassium 30 tablet 5    levothyroxine 112 mcg tablet take 1 tablet by mouth daily 30 tablet 3    montelukast (SINGULAIR) 10 mg tablet Take 1 tablet (10 mg total) by mouth daily at bedtime 30 tablet 5    potassium chloride (MICRO-K) 10 MEQ CR capsule Take 1 capsule (10 mEq total) by mouth daily 30 capsule 1    warfarin (COUMADIN) 5 mg tablet Take daily as directed by physician 45 tablet 5    predniSONE 10 mg tablet 4 tablets daily for 3 days, then 3 tablets daily for 3 days, then 2 tablets daily for 3 days, then 1 tablet daily for 3 days (Patient not taking: Reported on 3/2/2021) 30 tablet 0     No current facility-administered medications for this visit  Allergies   Allergen Reactions    Clindamycin Angioedema    Medical Tape Itching     Welt and redness    Penicillin G Nausea Only    Penicillins GI Intolerance     Nausea and vomiting       Review of Systems   Constitutional: Positive for chills (  Yesterday, none today)  Negative for fatigue and fever  HENT: Negative for congestion, rhinorrhea and sore throat  Respiratory: Positive for cough  Negative for chest tightness and shortness of breath  Cardiovascular: Negative  Gastrointestinal: Negative for abdominal pain, diarrhea, nausea and vomiting  Genitourinary: Negative  Musculoskeletal: Positive for myalgias ( yesterday, none today)  Neurological: Negative for headaches  Objective:    Vitals:    03/08/21 0913   Weight: (!) 137 kg (302 lb)   Height: 5' 5" (1 651 m)       Physical Exam  VIRTUAL VISIT DISCLAIMER    Kali Chan acknowledges that she has consented to an online visit or consultation  She understands that the online visit is based solely on information provided by her, and that, in the absence of a face-to-face physical evaluation by the physician, the diagnosis she receives is both limited and provisional in terms of accuracy and completeness  This is not intended to replace a full medical face-to-face evaluation by the physician  Kali Chan understands and accepts these terms

## 2021-03-08 NOTE — TELEPHONE ENCOUNTER
Sharath Quigley from [facility] calling saying she faxed a CMN at the end of February and again today to our [place] fax and still has not received it back  She states if we are having another doctor sign off, please have them put their NPI, initial and date it  Please advise

## 2021-03-22 ENCOUNTER — TELEPHONE (OUTPATIENT)
Dept: FAMILY MEDICINE CLINIC | Facility: CLINIC | Age: 56
End: 2021-03-22

## 2021-03-23 ENCOUNTER — ANTICOAG VISIT (OUTPATIENT)
Dept: FAMILY MEDICINE CLINIC | Facility: CLINIC | Age: 56
End: 2021-03-23

## 2021-03-23 ENCOUNTER — APPOINTMENT (OUTPATIENT)
Dept: LAB | Facility: MEDICAL CENTER | Age: 56
End: 2021-03-23
Payer: COMMERCIAL

## 2021-03-29 ENCOUNTER — OFFICE VISIT (OUTPATIENT)
Dept: FAMILY MEDICINE CLINIC | Facility: CLINIC | Age: 56
End: 2021-03-29
Payer: COMMERCIAL

## 2021-03-29 VITALS
TEMPERATURE: 95.6 F | WEIGHT: 293 LBS | DIASTOLIC BLOOD PRESSURE: 98 MMHG | HEIGHT: 65 IN | SYSTOLIC BLOOD PRESSURE: 136 MMHG | BODY MASS INDEX: 48.82 KG/M2

## 2021-03-29 DIAGNOSIS — E66.01 MORBID OBESITY WITH BMI OF 45.0-49.9, ADULT (HCC): ICD-10-CM

## 2021-03-29 DIAGNOSIS — J30.1 SEASONAL ALLERGIC RHINITIS DUE TO POLLEN: ICD-10-CM

## 2021-03-29 DIAGNOSIS — K21.9 GASTROESOPHAGEAL REFLUX DISEASE WITHOUT ESOPHAGITIS: Chronic | ICD-10-CM

## 2021-03-29 DIAGNOSIS — Z12.11 SCREENING FOR COLON CANCER: ICD-10-CM

## 2021-03-29 DIAGNOSIS — E03.8 OTHER SPECIFIED HYPOTHYROIDISM: Chronic | ICD-10-CM

## 2021-03-29 DIAGNOSIS — E78.5 DYSLIPIDEMIA: Primary | ICD-10-CM

## 2021-03-29 PROCEDURE — 99214 OFFICE O/P EST MOD 30 MIN: CPT | Performed by: FAMILY MEDICINE

## 2021-03-29 PROCEDURE — 3008F BODY MASS INDEX DOCD: CPT | Performed by: FAMILY MEDICINE

## 2021-03-29 RX ORDER — FLUTICASONE PROPIONATE 50 MCG
2 SPRAY, SUSPENSION (ML) NASAL DAILY
Qty: 16 G | Refills: 5 | Status: SHIPPED | OUTPATIENT
Start: 2021-03-29 | End: 2021-09-30

## 2021-03-29 NOTE — PROGRESS NOTES
Assessment/Plan:  Patient will continue same medications  Rx for blood work given  Follow-up in 4 months or p r n     Problem List Items Addressed This Visit        Digestive    Gastroesophageal reflux disease without esophagitis (Chronic)    Relevant Orders    CBC and differential       Endocrine    Other specified hypothyroidism (Chronic)       Respiratory    Seasonal allergic rhinitis due to pollen    Relevant Medications    fluticasone (FLONASE) 50 mcg/act nasal spray    Other Relevant Orders    CBC and differential       Other    Dyslipidemia - Primary (Chronic)    Relevant Orders    Comprehensive metabolic panel    CBC and differential    Lipid Panel with Direct LDL reflex    TSH, 3rd generation with Free T4 reflex      Other Visit Diagnoses     Morbid obesity with BMI of 45 0-49 9, adult ([facility] )        Relevant Orders    TSH, 3rd generation with Free T4 reflex    Screening for colon cancer        Relevant Orders    Occult Blood, Fecal Immunochemical           Diagnoses and all orders for this visit:    Dyslipidemia  -     Comprehensive metabolic panel  -     CBC and differential  -     Lipid Panel with Direct LDL reflex  -     TSH, 3rd generation with Free T4 reflex    Other specified hypothyroidism    Gastroesophageal reflux disease without esophagitis  -     CBC and differential    Seasonal allergic rhinitis due to pollen  -     fluticasone (FLONASE) 50 mcg/act nasal spray; 2 sprays into each nostril daily  -     CBC and differential    Morbid obesity with BMI of 45 0-49 9, adult ([facility])  -     TSH, 3rd generation with Free T4 reflex    Screening for colon cancer  -     Occult Blood, Fecal Immunochemical; Future        No problem-specific Assessment & Plan notes found for this encounter  Subjective:      Patient ID: Sabas Ambrosio is a 64 y o  female  Patient here today for follow-up  Patient denies any chest pain or shortness breath  Still experiencing cough off and on  No fever chills    Patient's allergies are starting to act up now  She needs a refill on her Flonase    Cough  This is a recurrent problem  The problem has been gradually improving  The problem occurs every few minutes  The cough is productive of sputum  Pertinent negatives include no chills, fever, shortness of breath or wheezing  Nothing aggravates the symptoms  She has tried a beta-agonist inhaler and steroid inhaler for the symptoms  The treatment provided moderate relief  Her past medical history is significant for asthma  Hyperlipidemia  This is a chronic problem  The problem is controlled  There are no known factors aggravating her hyperlipidemia  Pertinent negatives include no shortness of breath  Current antihyperlipidemic treatment includes statins  The current treatment provides significant improvement of lipids  There are no compliance problems  Risk factors for coronary artery disease include dyslipidemia and obesity         The following portions of the patient's history were reviewed and updated as appropriate:   She has a past medical history of Abdominal pain, lower, Acute renal failure ([address] ), Allergic rhinitis, Ankle pain, right, Arthritis, Asthma, Bilateral chronic knee pain, Bladder pain, Blood clot in vein, Bursitis, Cardiac disorder, Cardiac murmur, Chest tightness or pressure, COPD (chronic obstructive pulmonary disease) ([address] ), Coronary artery disease, Curvature of spine, GERD (gastroesophageal reflux disease), Hernia, hiatal, Hyperlipidemia, Hypertension, Inguinal hernia, Jaw closure abnormality, Lumbar herniated disc, Morbid obesity ([address] ), Obesity, Osteoarthritis of right knee, Patellar tendinitis, Poor flexibility of tendon, Pulmonary embolism ([address] ), Sepsis ([address] ), Severe sepsis due to methicillin resistant Staphylococcus aureus (MRSA) with acute organ dysfunction ([address] ), Spider vein, symptomatic, Status post arthroscopy of right knee, Stomach disorder, Tear of medial meniscus of right knee, Thyroid disorder, and Umbilical hernia ,  does not have any pertinent problems on file  ,   has a past surgical history that includes Hysterectomy (2009); Open anterior shoulder reconstruction (Left); Hand surgery (Right); Foot surgery (Left); Colonoscopy; pr knee scope,med/lat menisectomy (Right, 4/11/2016); Exploratory laparotomy; Peripherally inserted central catheter insertion; Incision and drainage anterior neck (Right, 6/8/2017); Other surgical history; Cystoscopy (01/1994); Incisional hernia repair; Shoulder surgery; IR IVC filter removal (1/22/2019); and Fracture surgery  ,  family history includes Arthritis in her mother; Breast cancer in her maternal aunt; Colon cancer in her brother and son; Diabetes in her maternal aunt; Lung cancer in her father; No Known Problems in her daughter, daughter, maternal grandfather, maternal grandmother, paternal grandfather, paternal grandmother, sister, sister, sister, sister, sister, and sister  ,   reports that she quit smoking about 25 years ago  She has never used smokeless tobacco  She reports previous alcohol use  She reports that she does not use drugs  ,  is allergic to clindamycin; medical tape; penicillin g; and penicillins     Current Outpatient Medications   Medication Sig Dispense Refill    albuterol (2 5 mg/3 mL) 0 083 % nebulizer solution Take 1 vial (2 5 mg total) by nebulization as needed for wheezing 60 vial 5    albuterol (PROVENTIL HFA,VENTOLIN HFA) 90 mcg/act inhaler Inhale 2 puffs every 4 (four) hours as needed for wheezing 1 Inhaler 3    atorvastatin (LIPITOR) 10 mg tablet Take 1 tablet (10 mg total) by mouth daily 30 tablet 5    budesonide-formoterol (SYMBICORT) 160-4 5 mcg/act inhaler Inhale 2 puffs 2 (two) times a day Rinse mouth after use   1 Inhaler 5    famotidine (PEPCID) 40 MG tablet Take 1 tablet (40 mg total) by mouth daily at bedtime 30 tablet 5    fluticasone (FLONASE) 50 mcg/act nasal spray 2 sprays into each nostril daily 16 g 5    furosemide (LASIX) 20 mg tablet Take 1 tablet (20 mg total) by mouth daily as needed (leg swelling) Take with potassium 30 tablet 5    levothyroxine 112 mcg tablet take 1 tablet by mouth daily 30 tablet 3    montelukast (SINGULAIR) 10 mg tablet Take 1 tablet (10 mg total) by mouth daily at bedtime 30 tablet 5    potassium chloride (MICRO-K) 10 MEQ CR capsule Take 1 capsule (10 mEq total) by mouth daily 30 capsule 1    warfarin (COUMADIN) 5 mg tablet Take daily as directed by physician 45 tablet 5     No current facility-administered medications for this visit  Review of Systems   Constitutional: Negative  Negative for chills and fever  Respiratory: Positive for cough  Negative for shortness of breath and wheezing  Cardiovascular: Negative  Gastrointestinal: Negative  Genitourinary: Negative  Objective:  Vitals:    03/29/21 0754   BP: 136/98   Temp: (!) 95 6 °F (35 3 °C)   Weight: 134 kg (295 lb 12 8 oz)   Height: 5' 5" (1 651 m)     Body mass index is 49 22 kg/m²  Physical Exam  Vitals signs reviewed  Constitutional:       General: She is not in acute distress  Appearance: She is well-developed  She is not diaphoretic  HENT:      Head: Normocephalic and atraumatic  Eyes:      Conjunctiva/sclera: Conjunctivae normal    Cardiovascular:      Rate and Rhythm: Normal rate and regular rhythm  Heart sounds: Normal heart sounds  No murmur  No friction rub  No gallop  Pulmonary:      Effort: Pulmonary effort is normal  No respiratory distress  Breath sounds: Normal breath sounds  No wheezing, rhonchi or rales  Musculoskeletal:      Right lower leg: No edema  Left lower leg: No edema  Neurological:      General: No focal deficit present  Mental Status: She is alert and oriented to person, place, and time  Psychiatric:         Mood and Affect: Mood normal          Behavior: Behavior normal          Thought Content:  Thought content normal          Judgment: Judgment normal  BMI Counseling: Body mass index is 49 22 kg/m²  The BMI is above normal  Nutrition recommendations include reducing portion sizes, decreasing overall calorie intake, 3-5 servings of fruits/vegetables daily, reducing fast food intake, consuming healthier snacks, decreasing soda and/or juice intake, moderation in carbohydrate intake, increasing intake of lean protein, reducing intake of saturated fat and trans fat and reducing intake of cholesterol  Exercise recommendations include exercising 3-5 times per week

## 2021-03-29 NOTE — PATIENT INSTRUCTIONS

## 2021-04-05 NOTE — PROGRESS NOTES
Pulmonary Rehabilitation Plan of Care   Care Plan           Today's date: 2021  Total visits to date:   Patient name: Delicia Colvin      : 1965  Age: 64 y o  MRN: [id number]  Referring Physician: Anastacio Nieto DO  Provider: [employer]  Clinician: ALFREDA Paz    Dx: RLD  COVID 19 Recovered  Hx Recent  Pulmonary PE  Date of onset: 2021    COMMENTS:  Patient seen for initial evaluation this date  She performed a 6 MWT on room air w/correct hemodynamic responses  Patient's resting vitals were HR 84, /72 and 02 Sat 97%  Her peak vitals were HR 97, /76 and 02 Sat 94%  At recovery her vitals were HR 83, /56 and 02 sat 97%  She was able to complete 525 feet and attain a 1 69 MET Level  She rated the test a 4 on a 1-10 RPE Scale  She rated her SOB as 3/10  Med Ruvalcaba is an excellent rehabilitation candidiate due to high prior level of function, willingness to progress and support system  He r program will be progressed as she is able to tolerate  She will receive education specific to her disease process  She was issued the Jamie Batista Pulmonary Guide this session  Med Ruvalcaba is scheduled to begin her CA sessions 2021  Medication compliance: Yes   Comments: Patient admits to 100% medication compliance  Fall Risk: Low   Comments: Patient exhibits good dynamic standing balance and 4/5 BLE strength  Patient denies any recent falls or syncope  Patient able to ambulate short distances w/out an AD  EXERCISE/ACTIVITY    Cardiopulmonary Goals:   Min: 30-35   HR: 20-30 > -115   RPE: 4-6  (moderate to moderately hard exercise)   O2 sat: >90% Supplemental 02 will be applied and titrated as per department policy     Modalities: Treadmill, UBE, NuStep and Recumbent bike  Strength trainin-3 days / week, 12-15 repitations  and 1-2 sets per modality    Modalities: Leg press and UR Free weights    Exercise Progression: Progress as tolerated to maintain RPE 4-6      RPE 4-6  Home activity: Household ambulation w/out AD, light household chores, local driving and personal ADLs  Much difficulty tolerating stairs and elevations  Goals: 10% improvement in functional capacity, home exercise days opposite [place], improved DASI score and >150 mins of exercise/wk  Education: Benefit of exercise, home exercise, RPE scale and O2 saturation monitoring   Plan:home exercise target 30 mins, 2 days opposite [place] and Improved 6MW results  Readiness to change: Preparation:  (Getting ready to change)     NUTRITION    Weight control:    Starting weight: 295        Current Weight: 295     Diabetes: N/A  Goals:BMI <25, improved A1c and Improved Rate Your Plate score  Education:More frequent meals, smaller portions  hydration  weight loss  Plan: Education Class: Healthy Eating  Readiness to change: Preparation:  (Getting ready to change)     PSYCHOSOCIAL    Emotional:  1-4 = Minimal Depression  Social support: Excellent  Goals: Reduce perceived stress to 1-3/10, improved [facility] QOL < 27, Feelings in [facility] Score < 3, Physical Fitness in [facility] Score < 3, Social Support in [facility] Score < 3, Daily Activity in [facility] Score < 3, Social Activities in [place] Score < 3, Increased interest in doing things, improved sleep, increased energy and Feel less anxious  Education: signs/sxs of depression  benefits of positive support system  coping mechanisms  depression and chronic disease  Plan: Education Class: Stress and Your Lungs, Relaxation and Mindfulness and Anxiety and Lung Disease  Readiness to change: Preparation:  (Getting ready to change)     OTHER CORE COMPONENTS     Tobacco:   Social History     Tobacco Use   Smoking Status Former Smoker    Quit date: 1995    Years since quittin 3   Smokeless Tobacco Never Used   Tobacco Comment    quit 20 years ago     Oxygen: Patient utilizes supplemental 02 at 2 lighters to sleep  Patient also has a CPAP machine due to MICHAEL       Blood pressure:    Restin/72   Exercise:   144/76   Recovery: 1187/56    Goals: consistent BP < 130/80, reduced dietary sodium <2300mg, moderate intensity exercise >150 mins/wk and medication compliance  Education: Pulmonary Disease, physiology, Exercise, strength, flexibility, Conservation of energy and oxygen, breathing techniques  Plan: Causes of lung disease, Prevention and treatment, Exercise benefits and Proper nutrition  Readiness to change: Preparation:  (Getting ready to change)     CARDIAC/PULMONARY REHAB ASSESSMENT    Today's date: 2021  Patient name: Wilfredo Leblanc      : 1965       MRN: [id number]  PCP: Kathe Amezquita DO  Pulmonologist: Dr Preston Block DO  Dx: RLD  COVID 19 Recovered  Hx PE  Date of onset: 2021  Cultural needs: None    Height:   65 inches  Weight:    295#  Medical History:   Past Medical History:   Diagnosis Date    Abdominal pain, lower     [id number] RESOLVED    [facility]     [id number] RESOLVED    Allergic rhinitis     [id number] RESOLVED    Ankle pain, right     60XJO0227 RESOLVED    Arthritis     [id number] RESOLVED  [id number] RESOLVED    Asthma     Bilateral chronic knee pain     01IAK2280    Bladder pain     [id number] RESOLVED    Blood clot in vein     Bursitis     [id number] RESOLVED    Cardiac disorder     [id number]  RESOLVED    Cardiac murmur     [id number] RESOLVED    Chest tightness or pressure     [id number] RESOLVED    COPD (chronic obstructive pulmonary disease) [facility]     Coronary artery disease     Curvature of spine     [id number] RESOLVED    GERD (gastroesophageal reflux disease)     Hernia, hiatal     64JDY5141 RESOLVED    Hyperlipidemia     [id number] RESOLVED    Hypertension     Inguinal hernia     RIGHT   [id number] RESOLVED    Jaw closure abnormality     35TBH8124 RESOLVED    Lumbar herniated disc     [id number] RESOLVED    Morbid obesity ([facility] )     72IDV5580    Obesity     Osteoarthritis of right knee     [id number] RESOLVED    Patellar tendinitis     94FHX3972    Poor flexibility of tendon     81CGR2235    Pulmonary embolism ([facility] )     79OLN7929 RESOLVED    Sepsis ([facility] )     20CQL2996 RESOLVED    Severe sepsis due to methicillin resistant Staphylococcus aureus (MRSA) with acute organ dysfunction ([facility] )     22FCF4768 RESOLVED    Spider vein, symptomatic     78GIN2304 RESOLVED    Status post arthroscopy of right knee     45LMF5583 RESOLVED    Stomach disorder     34FIQ2646 RESOLVED    Tear of medial meniscus of right knee     SUBSEQUENT ENCOUNTER  RESOLVED [id number]    Thyroid disorder     28PAI7188    Umbilical hernia     56NSU6664 RESOLVED       Physical Limitations: Patient is limited by ARAUZ and arthritic changes in right knee  Risk Factors   Smoking: Former - quit 1995  HTN: No  DM: No  Obesity: Yes  Inactivity: Yes  Family History:   Family History   Problem Relation Age of Onset    Arthritis Mother     Colon cancer Brother     Lung cancer Father     No Known Problems Sister     No Known Problems Daughter     No Known Problems Maternal Grandmother     No Known Problems Maternal Grandfather     No Known Problems Paternal Grandmother     No Known Problems Paternal Grandfather     Breast cancer Maternal Aunt     Diabetes Maternal Aunt     No Known Problems Sister     No Known Problems Sister     No Known Problems Sister     No Known Problems Sister     No Known Problems Sister     No Known Problems Daughter     Colon cancer Son      Allergies: Allergies   Allergen Reactions    Clindamycin Angioedema    Medical Tape Itching     Welt and redness    Penicillin G Nausea Only    Penicillins GI Intolerance     Nausea and vomiting     Other: Patient now utilizing a CPAP w/2 liters 02 at night as a result of a recent sleep study/MICHAEL      Current Medications:   Current Outpatient Medications   Medication Sig Dispense Refill    albuterol (2 5 mg/3 mL) 0 083 % nebulizer solution Take 1 vial (2 5 mg total) by nebulization as needed for wheezing 60 vial 5    albuterol (PROVENTIL HFA,VENTOLIN HFA) 90 mcg/act inhaler Inhale 2 puffs every 4 (four) hours as needed for wheezing 1 Inhaler 3    atorvastatin (LIPITOR) 10 mg tablet Take 1 tablet (10 mg total) by mouth daily 30 tablet 5    budesonide-formoterol (SYMBICORT) 160-4 5 mcg/act inhaler Inhale 2 puffs 2 (two) times a day Rinse mouth after use  1 Inhaler 5    famotidine (PEPCID) 40 MG tablet Take 1 tablet (40 mg total) by mouth daily at bedtime 30 tablet 5    fluticasone (FLONASE) 50 mcg/act nasal spray 2 sprays into each nostril daily 16 g 5    furosemide (LASIX) 20 mg tablet Take 1 tablet (20 mg total) by mouth daily as needed (leg swelling) Take with potassium 30 tablet 5    levothyroxine 112 mcg tablet take 1 tablet by mouth daily 30 tablet 3    montelukast (SINGULAIR) 10 mg tablet Take 1 tablet (10 mg total) by mouth daily at bedtime 30 tablet 5    potassium chloride (MICRO-K) 10 MEQ CR capsule Take 1 capsule (10 mEq total) by mouth daily 30 capsule 1    warfarin (COUMADIN) 5 mg tablet Take daily as directed by physician 45 tablet 5     No current facility-administered medications for this visit  Functional Status Prior to Diagnosis for Treatment   Occupation: Unemployed  Recreation:   ADLs: Independent w/personal ADLs  [place]: Personal ADLs, local driving , household ambulation w/out AD and light household chores  Exercise: No formal HEP  Other:     Short Term Program Goals: Decrease shortness of breath, improve stamina, increase strength   Long Term Goals: Be able to participate in favorite activity, sport, hobby (golf, hunt, sew, play games, cook) and enjoy family, friends without breathing difficulties    Comments: patient's goals are weight loss, decrease fatigue, decrease ARAUZ, improve strength, increase ability to ambulate on stairs/elevations, improve sleep, start a HEP and attain her maximal level of function       Ability to reach goals/rehabilitation potential: high    Projected return to function: Full    Emotional/Social    Marital status: Single  Patient has support of a significant other  Life Stressors: Patient rates her stress level as 5-7/10  Current health situation and functional decline  Goals: Decrease stress level to 3/10  Comments: Patient presents w/weakness, general debility and ARAUZ  She is very motivated to progress  Patient requires skilled CA interventions in order to attain her goals and maximal level of function

## 2021-04-06 ENCOUNTER — CLINICAL SUPPORT (OUTPATIENT)
Dept: PULMONOLOGY | Facility: HOSPITAL | Age: 56
End: 2021-04-06
Attending: INTERNAL MEDICINE
Payer: COMMERCIAL

## 2021-04-06 DIAGNOSIS — J98.4 RESTRICTIVE LUNG DISEASE: ICD-10-CM

## 2021-04-08 ENCOUNTER — APPOINTMENT (OUTPATIENT)
Dept: LAB | Facility: MEDICAL CENTER | Age: 56
End: 2021-04-08
Payer: COMMERCIAL

## 2021-04-08 LAB
ALBUMIN SERPL BCP-MCNC: 3.2 G/DL (ref 3.5–5)
ALP SERPL-CCNC: 87 U/L (ref 46–116)
ALT SERPL W P-5'-P-CCNC: 19 U/L (ref 12–78)
ANION GAP SERPL CALCULATED.3IONS-SCNC: 6 MMOL/L (ref 4–13)
AST SERPL W P-5'-P-CCNC: 13 U/L (ref 5–45)
BASOPHILS # BLD AUTO: 0.1 THOUSANDS/ΜL (ref 0–0.1)
BASOPHILS NFR BLD AUTO: 2 % (ref 0–1)
BILIRUB SERPL-MCNC: 0.64 MG/DL (ref 0.2–1)
BUN SERPL-MCNC: 16 MG/DL (ref 5–25)
CALCIUM ALBUM COR SERPL-MCNC: 9.6 MG/DL (ref 8.3–10.1)
CALCIUM SERPL-MCNC: 9 MG/DL (ref 8.3–10.1)
CHLORIDE SERPL-SCNC: 108 MMOL/L (ref 100–108)
CHOLEST SERPL-MCNC: 219 MG/DL (ref 50–200)
CO2 SERPL-SCNC: 27 MMOL/L (ref 21–32)
CREAT SERPL-MCNC: 1.1 MG/DL (ref 0.6–1.3)
EOSINOPHIL # BLD AUTO: 0.24 THOUSAND/ΜL (ref 0–0.61)
EOSINOPHIL NFR BLD AUTO: 4 % (ref 0–6)
ERYTHROCYTE [DISTWIDTH] IN BLOOD BY AUTOMATED COUNT: 14.2 % (ref 11.6–15.1)
GFR SERPL CREATININE-BSD FRML MDRD: 56 ML/MIN/1.73SQ M
GLUCOSE P FAST SERPL-MCNC: 77 MG/DL (ref 65–99)
HCT VFR BLD AUTO: 43.4 % (ref 34.8–46.1)
HDLC SERPL-MCNC: 54 MG/DL
HGB BLD-MCNC: 13.2 G/DL (ref 11.5–15.4)
IMM GRANULOCYTES # BLD AUTO: 0.02 THOUSAND/UL (ref 0–0.2)
IMM GRANULOCYTES NFR BLD AUTO: 0 % (ref 0–2)
LDLC SERPL CALC-MCNC: 140 MG/DL (ref 0–100)
LYMPHOCYTES # BLD AUTO: 1.68 THOUSANDS/ΜL (ref 0.6–4.47)
LYMPHOCYTES NFR BLD AUTO: 28 % (ref 14–44)
MCH RBC QN AUTO: 30.3 PG (ref 26.8–34.3)
MCHC RBC AUTO-ENTMCNC: 30.4 G/DL (ref 31.4–37.4)
MCV RBC AUTO: 100 FL (ref 82–98)
MONOCYTES # BLD AUTO: 0.67 THOUSAND/ΜL (ref 0.17–1.22)
MONOCYTES NFR BLD AUTO: 11 % (ref 4–12)
NEUTROPHILS # BLD AUTO: 3.4 THOUSANDS/ΜL (ref 1.85–7.62)
NEUTS SEG NFR BLD AUTO: 55 % (ref 43–75)
NRBC BLD AUTO-RTO: 0 /100 WBCS
PLATELET # BLD AUTO: 287 THOUSANDS/UL (ref 149–390)
PMV BLD AUTO: 11.4 FL (ref 8.9–12.7)
POTASSIUM SERPL-SCNC: 3.9 MMOL/L (ref 3.5–5.3)
PROT SERPL-MCNC: 7.1 G/DL (ref 6.4–8.2)
RBC # BLD AUTO: 4.35 MILLION/UL (ref 3.81–5.12)
SODIUM SERPL-SCNC: 141 MMOL/L (ref 136–145)
T4 FREE SERPL-MCNC: 1.03 NG/DL (ref 0.76–1.46)
TRIGL SERPL-MCNC: 127 MG/DL
TSH SERPL DL<=0.05 MIU/L-ACNC: 4.1 UIU/ML (ref 0.36–3.74)
WBC # BLD AUTO: 6.11 THOUSAND/UL (ref 4.31–10.16)

## 2021-04-08 PROCEDURE — 84443 ASSAY THYROID STIM HORMONE: CPT | Performed by: FAMILY MEDICINE

## 2021-04-08 PROCEDURE — 80053 COMPREHEN METABOLIC PANEL: CPT | Performed by: FAMILY MEDICINE

## 2021-04-08 PROCEDURE — 85025 COMPLETE CBC W/AUTO DIFF WBC: CPT | Performed by: FAMILY MEDICINE

## 2021-04-08 PROCEDURE — 80061 LIPID PANEL: CPT | Performed by: FAMILY MEDICINE

## 2021-04-08 PROCEDURE — 84439 ASSAY OF FREE THYROXINE: CPT | Performed by: FAMILY MEDICINE

## 2021-04-09 ENCOUNTER — ANTICOAG VISIT (OUTPATIENT)
Dept: FAMILY MEDICINE CLINIC | Facility: CLINIC | Age: 56
End: 2021-04-09

## 2021-04-12 ENCOUNTER — CLINICAL SUPPORT (OUTPATIENT)
Dept: PULMONOLOGY | Facility: HOSPITAL | Age: 56
End: 2021-04-12
Attending: INTERNAL MEDICINE
Payer: COMMERCIAL

## 2021-04-12 DIAGNOSIS — J45.20 MILD INTERMITTENT ASTHMA WITHOUT COMPLICATION: ICD-10-CM

## 2021-04-12 DIAGNOSIS — I82.90 NON-OCCLUSIVE THROMBUS: ICD-10-CM

## 2021-04-12 PROCEDURE — G0239 OTH RESP PROC, GROUP: HCPCS

## 2021-04-14 ENCOUNTER — CLINICAL SUPPORT (OUTPATIENT)
Dept: PULMONOLOGY | Facility: HOSPITAL | Age: 56
End: 2021-04-14
Attending: INTERNAL MEDICINE
Payer: COMMERCIAL

## 2021-04-14 DIAGNOSIS — J98.4 RESTRICTIVE LUNG DISEASE: ICD-10-CM

## 2021-04-14 PROCEDURE — G0239 OTH RESP PROC, GROUP: HCPCS

## 2021-04-16 ENCOUNTER — APPOINTMENT (OUTPATIENT)
Dept: LAB | Facility: MEDICAL CENTER | Age: 56
End: 2021-04-16
Payer: COMMERCIAL

## 2021-04-16 ENCOUNTER — CLINICAL SUPPORT (OUTPATIENT)
Dept: PULMONOLOGY | Facility: HOSPITAL | Age: 56
End: 2021-04-16
Attending: INTERNAL MEDICINE
Payer: COMMERCIAL

## 2021-04-16 DIAGNOSIS — J45.20 MILD INTERMITTENT ASTHMA WITHOUT COMPLICATION: ICD-10-CM

## 2021-04-16 DIAGNOSIS — Z12.11 SCREENING FOR COLON CANCER: ICD-10-CM

## 2021-04-16 LAB — HEMOCCULT STL QL IA: NEGATIVE

## 2021-04-16 PROCEDURE — G0239 OTH RESP PROC, GROUP: HCPCS

## 2021-04-16 PROCEDURE — G0328 FECAL BLOOD SCRN IMMUNOASSAY: HCPCS

## 2021-04-19 ENCOUNTER — APPOINTMENT (OUTPATIENT)
Dept: PULMONOLOGY | Facility: HOSPITAL | Age: 56
End: 2021-04-19
Attending: INTERNAL MEDICINE
Payer: COMMERCIAL

## 2021-04-20 ENCOUNTER — ANTICOAG VISIT (OUTPATIENT)
Dept: FAMILY MEDICINE CLINIC | Facility: CLINIC | Age: 56
End: 2021-04-20

## 2021-04-21 ENCOUNTER — CLINICAL SUPPORT (OUTPATIENT)
Dept: PULMONOLOGY | Facility: HOSPITAL | Age: 56
End: 2021-04-21
Attending: INTERNAL MEDICINE
Payer: COMMERCIAL

## 2021-04-21 DIAGNOSIS — J45.20 MILD INTERMITTENT ASTHMA WITHOUT COMPLICATION: ICD-10-CM

## 2021-04-21 PROCEDURE — G0239 OTH RESP PROC, GROUP: HCPCS

## 2021-04-23 ENCOUNTER — CLINICAL SUPPORT (OUTPATIENT)
Dept: PULMONOLOGY | Facility: HOSPITAL | Age: 56
End: 2021-04-23
Attending: INTERNAL MEDICINE
Payer: COMMERCIAL

## 2021-04-23 DIAGNOSIS — J45.20 MILD INTERMITTENT ASTHMA WITHOUT COMPLICATION: ICD-10-CM

## 2021-04-23 PROCEDURE — G0239 OTH RESP PROC, GROUP: HCPCS

## 2021-04-26 ENCOUNTER — CLINICAL SUPPORT (OUTPATIENT)
Dept: PULMONOLOGY | Facility: HOSPITAL | Age: 56
End: 2021-04-26
Attending: INTERNAL MEDICINE
Payer: COMMERCIAL

## 2021-04-26 DIAGNOSIS — J98.4 RESTRICTIVE LUNG DISEASE: ICD-10-CM

## 2021-04-26 PROCEDURE — G0239 OTH RESP PROC, GROUP: HCPCS

## 2021-04-28 ENCOUNTER — CLINICAL SUPPORT (OUTPATIENT)
Dept: PULMONOLOGY | Facility: HOSPITAL | Age: 56
End: 2021-04-28
Attending: INTERNAL MEDICINE
Payer: COMMERCIAL

## 2021-04-28 DIAGNOSIS — J98.4 RESTRICTIVE LUNG DISEASE: ICD-10-CM

## 2021-04-28 PROCEDURE — G0239 OTH RESP PROC, GROUP: HCPCS

## 2021-04-30 ENCOUNTER — CLINICAL SUPPORT (OUTPATIENT)
Dept: PULMONOLOGY | Facility: HOSPITAL | Age: 56
End: 2021-04-30
Attending: INTERNAL MEDICINE
Payer: COMMERCIAL

## 2021-04-30 DIAGNOSIS — J98.4 RESTRICTIVE LUNG DISEASE: ICD-10-CM

## 2021-04-30 PROCEDURE — G0239 OTH RESP PROC, GROUP: HCPCS

## 2021-05-03 ENCOUNTER — CLINICAL SUPPORT (OUTPATIENT)
Dept: PULMONOLOGY | Facility: HOSPITAL | Age: 56
End: 2021-05-03
Attending: INTERNAL MEDICINE
Payer: COMMERCIAL

## 2021-05-03 DIAGNOSIS — J98.4 RESTRICTIVE LUNG DISEASE: ICD-10-CM

## 2021-05-03 PROCEDURE — G0239 OTH RESP PROC, GROUP: HCPCS

## 2021-05-05 ENCOUNTER — CLINICAL SUPPORT (OUTPATIENT)
Dept: PULMONOLOGY | Facility: HOSPITAL | Age: 56
End: 2021-05-05
Attending: INTERNAL MEDICINE
Payer: COMMERCIAL

## 2021-05-05 DIAGNOSIS — J98.4 RESTRICTIVE LUNG DISEASE: ICD-10-CM

## 2021-05-05 PROCEDURE — G0239 OTH RESP PROC, GROUP: HCPCS

## 2021-05-05 NOTE — PROGRESS NOTES
Pulmonary Rehabilitation Plan of Care   30 Day Progress Note          Today's date: 2021   Total visits to date: 11  Patient name: Chirstos Vora      : 1965  Age: 64 y o  MRN: [id number]  Referring Physician: Katia Hampton DO  Provider: [facility]  Clinician: Stevo Avelar RN    Dx:   Encounter Diagnosis   Name Primary?  Restrictive lung disease      Date of onset: 21    COMMENTS:  Baldemar Fonseca is doing 40 min aerobic exercise  Her pulse oximetry ranges from 98-99 at rest to 95-98 with exercise  She is on room air  Her cardiac rhythm is NSr at rest to sinus tachycardia with exercise  She does achieve 60-80% of her THR with exercise 98-131bpm   Her present weight is 287 2  She lost 7lb since her interview  She has had education on a healthy diet  Medication compliance: yes   Comments: takes her medication as ordered  Fall Risk: Low   Comments: She has good standing balance  Denies any falls or syncope    EXERCISE/ACTIVITY    Cardiopulmonary Goals:   Min: 40   HR:    RPE: 4-6  (moderate to moderately hard exercise)   O2 sat: >90%    Modalities: Treadmill, UBE, NuStep and Recumbent bike  Strength trainin-3 days / week, 12-15 repitations  and 1-2 sets per modality    Modalities: Lateral raise and Arm curl    Exercise Progression: Progress as tolerated to maintain RPE 4-5      RPE 4-6  Home activity: walking   Goals: 10% improvement in functional capacity, home exercise days opposite NH and >150 mins of exercise/wk  Education: Benefit of exercise, RPE scale and O2 saturation monitoring   Plan:home exercise target 30 mins, 2 days opposite NH  Readiness to change: Action:  (Changing behavior)    NUTRITION    Weight control:    Starting weight: 295        Current Weight: 287 2     Diabetes: N/A  Goals:decreased body fat% and Improved Rate Your Plate score  Education:More frequent meals, smaller portions  weight loss    Label Reading  Plan: Education Video- Diet and Nutrition and Education Class: Healthy Eating  Readiness to change: Action:  (Changing behavior)    PSYCHOSOCIAL    Emotional:  1-4 = Minimal Depression  Social support: Excellent  Goals: PHQ-9 - reduced severity by one level, Physical Fitness in [facility] Score < 3, Social Support in [facility] Score < 3, Daily Activity in [facility] Score < 3, Overall Health in [facility] Score < 3, improved sleep, improved concentration, improved positive thoughts of well being and increased energy  Education: benefits of positive support system  coping mechanisms  depression and chronic disease  Plan: Relaxation and Mindfulness  Readiness to change: Action:  (Changing behavior)    OTHER CORE COMPONENTS     Tobacco:   Social History     Tobacco Use   Smoking Status Former Smoker    Quit date: 1995    Years since quittin 4   Smokeless Tobacco Never Used   Tobacco Comment    quit 20 years ago     Oxygen: 2l/nc for sleep with CPAP  Blood pressure:    Restin/74   Exercise:  144/80              Recovery; 118/76  Goals: consistent BP < 130/80 and moderate intensity exercise >150 mins/wk  Education: Pulmonary Disease, physiology, Exercise, strength, flexibility and Avoiding Infections  Plan: Causes of lung disease, Exercise benefits and Proper nutrition  Readiness to change: Action:  (Changing behavior)

## 2021-05-07 ENCOUNTER — CLINICAL SUPPORT (OUTPATIENT)
Dept: PULMONOLOGY | Facility: HOSPITAL | Age: 56
End: 2021-05-07
Attending: INTERNAL MEDICINE
Payer: COMMERCIAL

## 2021-05-07 DIAGNOSIS — J98.4 RESTRICTIVE LUNG DISEASE: ICD-10-CM

## 2021-05-07 PROCEDURE — G0239 OTH RESP PROC, GROUP: HCPCS

## 2021-05-10 ENCOUNTER — APPOINTMENT (OUTPATIENT)
Dept: LAB | Facility: MEDICAL CENTER | Age: 56
End: 2021-05-10
Payer: COMMERCIAL

## 2021-05-10 ENCOUNTER — CLINICAL SUPPORT (OUTPATIENT)
Dept: PULMONOLOGY | Facility: HOSPITAL | Age: 56
End: 2021-05-10
Attending: INTERNAL MEDICINE
Payer: COMMERCIAL

## 2021-05-10 DIAGNOSIS — I26.99 PULMONARY EMBOLISM, BILATERAL (HCC): Primary | Chronic | ICD-10-CM

## 2021-05-10 DIAGNOSIS — J45.20 MILD INTERMITTENT ASTHMA WITHOUT COMPLICATION: ICD-10-CM

## 2021-05-10 PROCEDURE — G0239 OTH RESP PROC, GROUP: HCPCS

## 2021-05-11 ENCOUNTER — ANTICOAG VISIT (OUTPATIENT)
Dept: FAMILY MEDICINE CLINIC | Facility: CLINIC | Age: 56
End: 2021-05-11

## 2021-05-12 ENCOUNTER — CLINICAL SUPPORT (OUTPATIENT)
Dept: PULMONOLOGY | Facility: HOSPITAL | Age: 56
End: 2021-05-12
Attending: INTERNAL MEDICINE
Payer: COMMERCIAL

## 2021-05-12 DIAGNOSIS — J98.4 RESTRICTIVE LUNG DISEASE: ICD-10-CM

## 2021-05-12 DIAGNOSIS — E03.8 OTHER SPECIFIED HYPOTHYROIDISM: ICD-10-CM

## 2021-05-12 DIAGNOSIS — E78.5 DYSLIPIDEMIA: ICD-10-CM

## 2021-05-12 PROCEDURE — G0239 OTH RESP PROC, GROUP: HCPCS

## 2021-05-12 RX ORDER — ATORVASTATIN CALCIUM 10 MG/1
TABLET, FILM COATED ORAL
Qty: 30 TABLET | Refills: 5 | Status: SHIPPED | OUTPATIENT
Start: 2021-05-12 | End: 2022-02-21 | Stop reason: SDUPTHER

## 2021-05-12 RX ORDER — LEVOTHYROXINE SODIUM 112 UG/1
TABLET ORAL
Qty: 30 TABLET | Refills: 5 | Status: SHIPPED | OUTPATIENT
Start: 2021-05-12 | End: 2022-01-03 | Stop reason: SDUPTHER

## 2021-05-14 ENCOUNTER — CLINICAL SUPPORT (OUTPATIENT)
Dept: PULMONOLOGY | Facility: HOSPITAL | Age: 56
End: 2021-05-14
Attending: INTERNAL MEDICINE
Payer: COMMERCIAL

## 2021-05-14 DIAGNOSIS — J98.4 RESTRICTIVE LUNG DISEASE: ICD-10-CM

## 2021-05-14 PROCEDURE — G0239 OTH RESP PROC, GROUP: HCPCS

## 2021-05-17 ENCOUNTER — CLINICAL SUPPORT (OUTPATIENT)
Dept: PULMONOLOGY | Facility: HOSPITAL | Age: 56
End: 2021-05-17
Attending: INTERNAL MEDICINE
Payer: COMMERCIAL

## 2021-05-17 DIAGNOSIS — J98.4 RESTRICTIVE LUNG DISEASE: ICD-10-CM

## 2021-05-17 PROCEDURE — G0239 OTH RESP PROC, GROUP: HCPCS

## 2021-05-18 DIAGNOSIS — I26.99 PULMONARY EMBOLISM (HCC): ICD-10-CM

## 2021-05-18 RX ORDER — WARFARIN SODIUM 5 MG/1
TABLET ORAL
Qty: 45 TABLET | Refills: 5 | Status: SHIPPED | OUTPATIENT
Start: 2021-05-18 | End: 2021-06-21

## 2021-05-19 ENCOUNTER — CLINICAL SUPPORT (OUTPATIENT)
Dept: PULMONOLOGY | Facility: HOSPITAL | Age: 56
End: 2021-05-19
Attending: INTERNAL MEDICINE
Payer: COMMERCIAL

## 2021-05-19 DIAGNOSIS — J98.4 RESTRICTIVE LUNG DISEASE: ICD-10-CM

## 2021-05-19 PROCEDURE — G0239 OTH RESP PROC, GROUP: HCPCS

## 2021-05-24 ENCOUNTER — APPOINTMENT (OUTPATIENT)
Dept: PULMONOLOGY | Facility: HOSPITAL | Age: 56
End: 2021-05-24
Attending: INTERNAL MEDICINE
Payer: COMMERCIAL

## 2021-05-24 ENCOUNTER — TELEPHONE (OUTPATIENT)
Dept: CARDIAC REHAB | Facility: HOSPITAL | Age: 56
End: 2021-05-24

## 2021-06-02 NOTE — PROGRESS NOTES
Pulmonary Rehabilitation Plan of Care   60 Day Progress Note          Today's date: 2021   Total visits to date: 16  Patient name: Juanita Giang      : 1965  Age: 64 y o  MRN: [id number]  Referring Physician: Kristine Perez DO  Provider: [facility]  Clinician: Krista Donaldson RN    Dx:   Encounter Diagnosis   Name Primary?  Restrictive lung disease      Date of onset: 21    COMMENTS:Celina hasn`t been to rehab since 21  I have left messages on her phone but she hasn`t called back  Prior to this she had been coming regular to rehab  She is doing 40 min aerobic exercise  Her pulse oximetry ranges from 95-99 at rest to 94-98 with exercise  She is on room air  Her cardiac rhythm is NSr at rest to sinus tachycardia with exercise  She does achieve 60-80% of her THR with exercise 98-140bpm   Her present weight is 288  2  Medication compliance: yes   Comments: takes her medication as ordered  Fall Risk: Low   Comments: She has good standing balance  Denies any falls or syncope    EXERCISE/ACTIVITY    Cardiopulmonary Goals:   Min: 40   HR:    RPE: 4-6  (moderate to moderately hard exercise)   O2 sat: >90%    Modalities: Treadmill, UBE, NuStep and Recumbent bike  Strength trainin-3 days / week, 12-15 repitations  and 1-2 sets per modality    Modalities: Lateral raise and Arm curl    Exercise Progression: Progress as tolerated to maintain RPE 4-5      RPE 4-6  Home activity: walking   Goals: 10% improvement in functional capacity, home exercise days opposite MD and >150 mins of exercise/wk  Education: Benefit of exercise, RPE scale and O2 saturation monitoring   Plan:home exercise target 30 mins, 2 days opposite MD  Readiness to change: Action:  (Changing behavior)    NUTRITION    Weight control:    Starting weight: 295        Current Weight: 288  3     Diabetes: N/A  Goals:decreased body fat% and Improved Rate Your Plate score  Education:More frequent meals, smaller portions  weight loss    Label Reading  Plan: Education Video- Diet and Nutrition and Education Class: Healthy Eating  Readiness to change: Action:  (Changing behavior)    PSYCHOSOCIAL    Emotional:  1-4 = Minimal Depression  Social support: Excellent  Goals: PHQ-9 - reduced severity by one level, Physical Fitness in [facility] Score < 3, Social Support in [place] Score < 3, Daily Activity in [place] Score < 3, [facility] Health in [facility] Score < 3, improved sleep, improved concentration, improved positive thoughts of well being and increased energy  Education: benefits of positive support system  coping mechanisms  depression and chronic disease  Plan: Relaxation and Mindfulness  Readiness to change: Action:  (Changing behavior)    OTHER CORE COMPONENTS     Tobacco:   Social History     Tobacco Use   Smoking Status Former Smoker    Quit date: 1995    Years since quittin 5   Smokeless Tobacco Never Used   Tobacco Comment    quit 20 years ago     Oxygen: 2l/nc for sleep with CPAP  Blood pressure:    Restin/76   Exercise:  130/70              Recovery; 102/70  Goals: consistent BP < 130/80 and moderate intensity exercise >150 mins/wk  Education: Pulmonary Disease, physiology, Exercise, strength, flexibility and Avoiding Infections  Plan: Causes of lung disease, Exercise benefits and Proper nutrition  Readiness to change: Action:  (Changing behavior)

## 2021-06-04 ENCOUNTER — APPOINTMENT (OUTPATIENT)
Dept: RADIOLOGY | Facility: MEDICAL CENTER | Age: 56
End: 2021-06-04
Payer: COMMERCIAL

## 2021-06-04 ENCOUNTER — OFFICE VISIT (OUTPATIENT)
Dept: FAMILY MEDICINE CLINIC | Facility: CLINIC | Age: 56
End: 2021-06-04
Payer: COMMERCIAL

## 2021-06-04 VITALS
DIASTOLIC BLOOD PRESSURE: 72 MMHG | BODY MASS INDEX: 48.22 KG/M2 | HEART RATE: 72 BPM | SYSTOLIC BLOOD PRESSURE: 118 MMHG | HEIGHT: 65 IN | OXYGEN SATURATION: 97 % | TEMPERATURE: 96.3 F | WEIGHT: 289.4 LBS

## 2021-06-04 DIAGNOSIS — R07.81 RIB PAIN ON RIGHT SIDE: ICD-10-CM

## 2021-06-04 DIAGNOSIS — Z12.31 ENCOUNTER FOR SCREENING MAMMOGRAM FOR BREAST CANCER: ICD-10-CM
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
## 2021-06-04 PROCEDURE — 71101 X-RAY EXAM UNILAT RIBS/CHEST: CPT

## 2021-06-04 PROCEDURE — 1036F TOBACCO NON-USER: CPT | Performed by: PHYSICIAN ASSISTANT

## 2021-06-04 PROCEDURE — 3725F SCREEN DEPRESSION PERFORMED: CPT | Performed by: PHYSICIAN ASSISTANT

## 2021-06-04 PROCEDURE — 99214 OFFICE O/P EST MOD 30 MIN: CPT | Performed by: PHYSICIAN ASSISTANT

## 2021-06-04 PROCEDURE — 3008F BODY MASS INDEX DOCD: CPT | Performed by: PHYSICIAN ASSISTANT

## 2021-06-04 NOTE — PROGRESS NOTES
Assessment/Plan:    Problem List Items Addressed This Visit     None      Visit Diagnoses     Fall, initial encounter    -  Primary    Rib pain on right side        Relevant Orders    XR ribs right w pa chest min 3 views    Encounter for screening mammogram for breast cancer        Relevant Orders    Mammo screening bilateral w 3d & cad         Diagnoses and all orders for this visit:    Fall, initial encounter    Rib pain on right side  -     XR ribs right w pa chest min 3 views; Future    Encounter for screening mammogram for breast cancer  -     Mammo screening bilateral w 3d & cad; Future        Will get an xray of chest and ribs  Recommended that she continue rest, ice, and tyelnol as needed for pain  She will notify us if symptoms do not improve or worsen  Subjective:      Patient ID: Janet Estevez is a 64 y o  female  [address] [facility] Derrick is a 64year old female who is here today complaining of right sided rib and back pain after sustaining a fall  She was sitting on a step-stool and fell back and struck the right side of her back against the frame of a wooden day bed  She did notice a bruise  She denies any shortness of breath, difficulty breathing, lower extremity numbness/tingling, gait problem, or saddle anesthesia  She has been taking tylenol, which does help  She did not apply any ice to the area         The following portions of the patient's history were reviewed and updated as appropriate:   She has a past medical history of Abdominal pain, lower, Acute renal failure ([address] ), Allergic rhinitis, Ankle pain, right, Arthritis, Asthma, Bilateral chronic knee pain, Bladder pain, Blood clot in vein, Bursitis, Cardiac disorder, Cardiac murmur, Chest tightness or pressure, COPD (chronic obstructive pulmonary disease) ([address] ), Coronary artery disease, Curvature of spine, GERD (gastroesophageal reflux disease), Hernia, hiatal, Hyperlipidemia, Hypertension, Inguinal hernia, Jaw closure abnormality, Lumbar herniated disc, Morbid obesity ([facility] ), Obesity, Osteoarthritis of right knee, Patellar tendinitis, Poor flexibility of tendon, Pulmonary embolism ([facility] [address] ), Sepsis ([facility] [address] ), Severe sepsis due to methicillin resistant Staphylococcus aureus (MRSA) with acute organ dysfunction ([facility] [address] ), Spider vein, symptomatic, Status post arthroscopy of right knee, Stomach disorder, Tear of medial meniscus of right knee, Thyroid disorder, and Umbilical hernia ,  does not have any pertinent problems on file  ,   has a past surgical history that includes Hysterectomy (2009); Open anterior shoulder reconstruction (Left); Hand surgery (Right); Foot surgery (Left); Colonoscopy; pr knee scope,med/lat menisectomy (Right, 4/11/2016); Exploratory laparotomy; Peripherally inserted central catheter insertion; Incision and drainage anterior neck (Right, 6/8/2017); Other surgical history; Cystoscopy (01/1994); Incisional hernia repair; Shoulder surgery; IR IVC filter removal (1/22/2019); and Fracture surgery  ,  family history includes Arthritis in her mother; Breast cancer in her maternal aunt; Colon cancer in her brother and son; Diabetes in her maternal aunt; Lung cancer in her father; No Known Problems in her daughter, daughter, maternal grandfather, maternal grandmother, paternal grandfather, paternal grandmother, sister, sister, sister, sister, sister, and sister  ,   reports that she quit smoking about 25 years ago  She has never used smokeless tobacco  She reports that she does not drink alcohol or use drugs  ,  is allergic to clindamycin; medical tape; penicillin g; and penicillins     Current Outpatient Medications   Medication Sig Dispense Refill    albuterol (2 5 mg/3 mL) 0 083 % nebulizer solution Take 1 vial (2 5 mg total) by nebulization as needed for wheezing 60 vial 5    albuterol (PROVENTIL HFA,VENTOLIN HFA) 90 mcg/act inhaler Inhale 2 puffs every 4 (four) hours as needed for wheezing 1 Inhaler 3    atorvastatin (LIPITOR) 10 mg tablet take 1 tablet by mouth once daily 30 tablet 5    budesonide-formoterol (SYMBICORT) 160-4 5 mcg/act inhaler Inhale 2 puffs 2 (two) times a day Rinse mouth after use  1 Inhaler 5    famotidine (PEPCID) 40 MG tablet Take 1 tablet (40 mg total) by mouth daily at bedtime 30 tablet 5    fluticasone (FLONASE) 50 mcg/act nasal spray 2 sprays into each nostril daily 16 g 5    furosemide (LASIX) 20 mg tablet Take 1 tablet (20 mg total) by mouth daily as needed (leg swelling) Take with potassium 30 tablet 5    levothyroxine 112 mcg tablet take 1 tablet by mouth daily 30 tablet 5    montelukast (SINGULAIR) 10 mg tablet Take 1 tablet (10 mg total) by mouth daily at bedtime 30 tablet 5    potassium chloride (MICRO-K) 10 MEQ CR capsule Take 1 capsule (10 mEq total) by mouth daily 30 capsule 1    warfarin (COUMADIN) 5 mg tablet TAKE DAILY AS DIRECTED BY PHYSICIAN 45 tablet 5     No current facility-administered medications for this visit  Review of Systems   Constitutional: Negative for chills, diaphoresis, fatigue and fever  HENT: Negative for congestion, ear pain, postnasal drip, rhinorrhea, sneezing, sore throat and trouble swallowing  Eyes: Negative for pain and visual disturbance  Respiratory: Negative for apnea, cough, shortness of breath and wheezing  Cardiovascular: Negative for chest pain and palpitations  Gastrointestinal: Negative for abdominal pain, constipation, diarrhea, nausea and vomiting  Genitourinary: Negative for dysuria and hematuria  Musculoskeletal: Positive for arthralgias  Negative for gait problem and myalgias  Skin: Positive for color change  Neurological: Negative for dizziness, syncope, weakness, light-headedness, numbness and headaches  Psychiatric/Behavioral: Negative for suicidal ideas  The patient is not nervous/anxious            Objective:  Vitals:    06/04/21 1320   BP: 118/72   Pulse: 72   Temp: (!) 96 3 °F (35 7 °C)   SpO2: 97%   Weight: 131 kg (289 lb 6 4 oz)   Height: 5' 5" (1 651 m)     Body mass index is 48 16 kg/m²  Physical Exam  Vitals signs and nursing note reviewed  Constitutional:       Appearance: She is well-developed  HENT:      Head: Normocephalic and atraumatic  Right Ear: Tympanic membrane, ear canal and external ear normal       Left Ear: Tympanic membrane, ear canal and external ear normal       Nose: Nose normal       Mouth/Throat:      Pharynx: No oropharyngeal exudate or posterior oropharyngeal erythema  Eyes:      Pupils: Pupils are equal, round, and reactive to light  Neck:      Musculoskeletal: Normal range of motion and neck supple  Cardiovascular:      Rate and Rhythm: Normal rate and regular rhythm  Heart sounds: Normal heart sounds  No murmur  No friction rub  No gallop  Pulmonary:      Effort: Pulmonary effort is normal  No respiratory distress  Breath sounds: Normal breath sounds  No wheezing or rales  Abdominal:      General: Bowel sounds are normal       Palpations: Abdomen is soft  Tenderness: There is no abdominal tenderness  There is no guarding or rebound  Musculoskeletal: Normal range of motion  Back:    Lymphadenopathy:      Cervical: No cervical adenopathy  Skin:     General: Skin is warm and dry  Neurological:      Mental Status: She is alert and oriented to person, place, and time  Psychiatric:         Behavior: Behavior normal          Thought Content:  Thought content normal          Judgment: Judgment normal

## 2021-06-07 ENCOUNTER — TELEPHONE (OUTPATIENT)
Dept: CARDIAC REHAB | Facility: HOSPITAL | Age: 56
End: 2021-06-07

## 2021-06-14 ENCOUNTER — LAB (OUTPATIENT)
Dept: LAB | Facility: MEDICAL CENTER | Age: 56
End: 2021-06-14
Payer: COMMERCIAL

## 2021-06-16 ENCOUNTER — ANTICOAG VISIT (OUTPATIENT)
Dept: FAMILY MEDICINE CLINIC | Facility: CLINIC | Age: 56
End: 2021-06-16

## 2021-06-19 ENCOUNTER — HOSPITAL ENCOUNTER (EMERGENCY)
Facility: HOSPITAL | Age: 56
Discharge: HOME | End: 2021-06-19
Attending: EMERGENCY MEDICINE
Payer: COMMERCIAL

## 2021-06-19 ENCOUNTER — APPOINTMENT (EMERGENCY)
Dept: CT IMAGING | Facility: HOSPITAL | Age: 56
End: 2021-06-19
Payer: COMMERCIAL

## 2021-06-19 ENCOUNTER — NURSE TRIAGE (OUTPATIENT)
Dept: OTHER | Facility: OTHER | Age: 56
End: 2021-06-19

## 2021-06-19 VITALS
WEIGHT: 290.57 LBS | HEART RATE: 73 BPM | OXYGEN SATURATION: 95 % | DIASTOLIC BLOOD PRESSURE: 82 MMHG | RESPIRATION RATE: 18 BRPM | SYSTOLIC BLOOD PRESSURE: 158 MMHG | TEMPERATURE: 98.6 F | HEIGHT: 65 IN | BODY MASS INDEX: 48.41 KG/M2

## 2021-06-19 DIAGNOSIS — R79.1 SUBTHERAPEUTIC INTERNATIONAL NORMALIZED RATIO (INR): ICD-10-CM

## 2021-06-19 DIAGNOSIS — S22.31XA FRACTURE OF ONE RIB, RIGHT SIDE, INITIAL ENCOUNTER FOR CLOSED FRACTURE: Primary | ICD-10-CM

## 2021-06-19 LAB
ANION GAP SERPL CALCULATED.3IONS-SCNC: 9 MMOL/L (ref 4–13)
APTT PPP: 31 SECONDS (ref 23–37)
BASOPHILS # BLD AUTO: 0.09 THOUSANDS/ΜL (ref 0–0.1)
BASOPHILS NFR BLD AUTO: 1 % (ref 0–1)
BUN SERPL-MCNC: 16 MG/DL (ref 5–25)
CALCIUM SERPL-MCNC: 9.2 MG/DL (ref 8.3–10.1)
CHLORIDE SERPL-SCNC: 108 MMOL/L (ref 100–108)
CO2 SERPL-SCNC: 25 MMOL/L (ref 21–32)
CREAT SERPL-MCNC: 1.16 MG/DL (ref 0.6–1.3)
EOSINOPHIL # BLD AUTO: 0.18 THOUSAND/ΜL (ref 0–0.61)
EOSINOPHIL NFR BLD AUTO: 2 % (ref 0–6)
ERYTHROCYTE [DISTWIDTH] IN BLOOD BY AUTOMATED COUNT: 13.6 % (ref 11.6–15.1)
GFR SERPL CREATININE-BSD FRML MDRD: 53 ML/MIN/1.73SQ M
GLUCOSE SERPL-MCNC: 102 MG/DL (ref 65–140)
HCT VFR BLD AUTO: 45.6 % (ref 34.8–46.1)
HGB BLD-MCNC: 14.2 G/DL (ref 11.5–15.4)
IMM GRANULOCYTES # BLD AUTO: 0.03 THOUSAND/UL (ref 0–0.2)
IMM GRANULOCYTES NFR BLD AUTO: 0 % (ref 0–2)
INR PPP: 1.42 (ref 0.84–1.19)
LYMPHOCYTES # BLD AUTO: 1.66 THOUSANDS/ΜL (ref 0.6–4.47)
LYMPHOCYTES NFR BLD AUTO: 21 % (ref 14–44)
MCH RBC QN AUTO: 30 PG (ref 26.8–34.3)
MCHC RBC AUTO-ENTMCNC: 31.1 G/DL (ref 31.4–37.4)
MCV RBC AUTO: 96 FL (ref 82–98)
MONOCYTES # BLD AUTO: 0.78 THOUSAND/ΜL (ref 0.17–1.22)
MONOCYTES NFR BLD AUTO: 10 % (ref 4–12)
NEUTROPHILS # BLD AUTO: 5.02 THOUSANDS/ΜL (ref 1.85–7.62)
NEUTS SEG NFR BLD AUTO: 66 % (ref 43–75)
NRBC BLD AUTO-RTO: 0 /100 WBCS
PLATELET # BLD AUTO: 379 THOUSANDS/UL (ref 149–390)
PMV BLD AUTO: 10 FL (ref 8.9–12.7)
POTASSIUM SERPL-SCNC: 4 MMOL/L (ref 3.5–5.3)
PROTHROMBIN TIME: 17.1 SECONDS (ref 11.6–14.5)
RBC # BLD AUTO: 4.74 MILLION/UL (ref 3.81–5.12)
SODIUM SERPL-SCNC: 142 MMOL/L (ref 136–145)
WBC # BLD AUTO: 7.76 THOUSAND/UL (ref 4.31–10.16)

## 2021-06-19 PROCEDURE — 96372 THER/PROPH/DIAG INJ SC/IM: CPT

## 2021-06-19 PROCEDURE — 85730 THROMBOPLASTIN TIME PARTIAL: CPT | Performed by: EMERGENCY MEDICINE

## 2021-06-19 PROCEDURE — 99285 EMERGENCY DEPT VISIT HI MDM: CPT

## 2021-06-19 PROCEDURE — 85025 COMPLETE CBC W/AUTO DIFF WBC: CPT | Performed by: EMERGENCY MEDICINE

## 2021-06-19 PROCEDURE — 96376 TX/PRO/DX INJ SAME DRUG ADON: CPT

## 2021-06-19 PROCEDURE — 99285 EMERGENCY DEPT VISIT HI MDM: CPT | Performed by: EMERGENCY MEDICINE

## 2021-06-19 PROCEDURE — G1004 CDSM NDSC: HCPCS

## 2021-06-19 PROCEDURE — 85610 PROTHROMBIN TIME: CPT | Performed by: EMERGENCY MEDICINE

## 2021-06-19 PROCEDURE — 36415 COLL VENOUS BLD VENIPUNCTURE: CPT | Performed by: EMERGENCY MEDICINE

## 2021-06-19 PROCEDURE — 71260 CT THORAX DX C+: CPT

## 2021-06-19 PROCEDURE — 80048 BASIC METABOLIC PNL TOTAL CA: CPT | Performed by: EMERGENCY MEDICINE

## 2021-06-19 PROCEDURE — 74177 CT ABD & PELVIS W/CONTRAST: CPT

## 2021-06-19 PROCEDURE — 93005 ELECTROCARDIOGRAM TRACING: CPT

## 2021-06-19 PROCEDURE — 96374 THER/PROPH/DIAG INJ IV PUSH: CPT

## 2021-06-19 RX ORDER — HYDROMORPHONE HCL/PF 1 MG/ML
1 SYRINGE (ML) INJECTION ONCE
Status: COMPLETED | OUTPATIENT
Start: 2021-06-19 | End: 2021-06-19

## 2021-06-19 RX ORDER — HYDROCODONE BITARTRATE AND ACETAMINOPHEN 5; 325 MG/1; MG/1
1 TABLET ORAL EVERY 6 HOURS PRN
Qty: 20 TABLET | Refills: 0 | Status: SHIPPED | OUTPATIENT
Start: 2021-06-19 | End: 2021-06-29

## 2021-06-19 RX ADMIN — ENOXAPARIN SODIUM 135 MG: 150 INJECTION SUBCUTANEOUS at 14:50

## 2021-06-19 RX ADMIN — HYDROMORPHONE HYDROCHLORIDE 1 MG: 1 INJECTION, SOLUTION INTRAMUSCULAR; INTRAVENOUS; SUBCUTANEOUS at 12:40

## 2021-06-19 RX ADMIN — IOHEXOL 100 ML: 350 INJECTION, SOLUTION INTRAVENOUS at 13:09

## 2021-06-19 RX ADMIN — HYDROMORPHONE HYDROCHLORIDE 1 MG: 1 INJECTION, SOLUTION INTRAMUSCULAR; INTRAVENOUS; SUBCUTANEOUS at 14:47

## 2021-06-19 NOTE — ED NOTES
Patient return demonstrated use of incentive spirometry  Patient instructed on how to use while at home to promote lung expansion        Huong Camargo RN  06/19/21 9748

## 2021-06-19 NOTE — TELEPHONE ENCOUNTER
Reason for Disposition   [1] Difficulty breathing AND [2] not severe    Answer Assessment - Initial Assessment Questions  1  MECHANISM: "How did the injury happen?"     She fell one week ago on her right side , had xray it was rulled to be muscular injury but still has a lot of pain that is worse  2  ONSET: "When did the injury happen?" (Minutes or hours ago)      One week + ago  3  LOCATION: "Where on the chest is the injury located?"      Rt side  4  APPEARANCE: "What does the injury look like?"      There is no bruising and no breakage shown on the xray just bruising on the xray as per th pt   5  BLEEDING: "Is there any bleeding now? If so, ask: How long has it been bleeding?"      No bleeding  6  SEVERITY: "Any difficulty with breathing?"      Hurts to take a deep breath  7  SIZE: For cuts, bruises, or swelling, ask: "How large is it?" (e g , inches or centimeters)      Not visisable  8  PAIN: "Is there pain?" If so, ask: "How bad is the pain?"   (e g , Scale 1-10; or mild, moderate, severe)      15/10 to get up and move in different position  Jabbing feeling  9  TETANUS: For any breaks in the skin, ask: "When was the last tetanus booster?"        The pain has gotten worse      Protocols used: CHEST INJURY-ADULT-

## 2021-06-19 NOTE — ED PROVIDER NOTES
History  Chief Complaint   Patient presents with    Rib Pain     pt reports falling last week on a stool; fell into a couch on right side; now increase pain and sob ; told by pcp to come to ED    Shortness of Breath     Patient is a 41-year-old female who was on Coumadin for a pulmonary embolism presenting after she fell approximately 9 days ago on daily stool striking the right side of her lower ribcage  She states he pain for several days that subsided however yesterday she moved a certain way while lying down and felt the pain reoccur and the right lower ribcage  She has pain has been consistent and steady and actually increasing since yesterday afternoon  Pain is worsened with any type of movement, twisting, deep breathing  No zoster rash  No other recent trauma  Prior to Admission Medications   Prescriptions Last Dose Informant Patient Reported? Taking?    albuterol (2 5 mg/3 mL) 0 083 % nebulizer solution   No Yes   Sig: Take 1 vial (2 5 mg total) by nebulization as needed for wheezing   albuterol (PROVENTIL HFA,VENTOLIN HFA) 90 mcg/act inhaler   No Yes   Sig: Inhale 2 puffs every 4 (four) hours as needed for wheezing   atorvastatin (LIPITOR) 10 mg tablet   No Yes   Sig: take 1 tablet by mouth once daily   budesonide-formoterol (SYMBICORT) 160-4 5 mcg/act inhaler   No Yes   Sig: Inhale 2 puffs 2 (two) times a day Rinse mouth after use    famotidine (PEPCID) 40 MG tablet   No Yes   Sig: Take 1 tablet (40 mg total) by mouth daily at bedtime   fluticasone (FLONASE) 50 mcg/act nasal spray   No Yes   Si sprays into each nostril daily   furosemide (LASIX) 20 mg tablet   No Yes   Sig: Take 1 tablet (20 mg total) by mouth daily as needed (leg swelling) Take with potassium   levothyroxine 112 mcg tablet   No Yes   Sig: take 1 tablet by mouth daily   montelukast (SINGULAIR) 10 mg tablet   No Yes   Sig: Take 1 tablet (10 mg total) by mouth daily at bedtime   potassium chloride (MICRO-K) 10 MEQ CR capsule   No Yes   Sig: Take 1 capsule (10 mEq total) by mouth daily   warfarin (COUMADIN) 5 mg tablet   No Yes   Sig: TAKE DAILY AS DIRECTED BY PHYSICIAN      Facility-Administered Medications: None       Past Medical History:   Diagnosis Date    Abdominal pain, lower     [id number] RESOLVED    Acute renal failure ([facility] [address] )     [id number] RESOLVED    Allergic rhinitis     67ZIH0914 RESOLVED    Ankle pain, right     35INH3332 RESOLVED    Arthritis     90CTI5512 RESOLVED  [id number] RESOLVED    Asthma     Bilateral chronic knee pain     [id number]    Bladder pain     38GCI6358 RESOLVED    Blood clot in vein     Bursitis     76WHU2530 RESOLVED    Cardiac disorder     [id number]  RESOLVED    Cardiac murmur     28KPH9592 RESOLVED    Chest tightness or pressure     72JHW9639 RESOLVED    COPD (chronic obstructive pulmonary disease) (HCC)     Coronary artery disease     Curvature of spine     [id number] RESOLVED    GERD (gastroesophageal reflux disease)     Hernia, hiatal     84RNE6883 RESOLVED    Hyperlipidemia     73DST0196 RESOLVED    Hypertension     Inguinal hernia     RIGHT   [id number] RESOLVED    Jaw closure abnormality     85NOP2817 RESOLVED    Lumbar herniated disc     93SSW1271 RESOLVED    Morbid obesity ([facility] [address] )     42TSW5986    Obesity     Osteoarthritis of right knee     06MIV8473 RESOLVED    Patellar tendinitis     80EDV7680    Poor flexibility of tendon     73IHI3447    Pulmonary embolism ([facility] [address] )     24LVS0691 RESOLVED    Sepsis ([facility] [address] )     76ISG1016 RESOLVED    Severe sepsis due to methicillin resistant Staphylococcus aureus (MRSA) with acute organ dysfunction ([facility] [address] )     93HQZ0862 RESOLVED    Spider vein, symptomatic     69UJR4910 RESOLVED    Status post arthroscopy of right knee     79SFN1494 RESOLVED    Stomach disorder     61MVP5392 RESOLVED    Tear of medial meniscus of right knee     SUBSEQUENT ENCOUNTER  RESOLVED [id number]    Thyroid disorder     38ITZ4725    Umbilical hernia [id number] RESOLVED       Past Surgical History:   Procedure Laterality Date    COLONOSCOPY      CYSTOSCOPY  1994    WITH BIOPSY      EXPLORATORY LAPAROTOMY      FOOT SURGERY Left     FRACTURE SURGERY      HAND SURGERY Right     cyst    HYSTERECTOMY  2009    INCISION AND DRAINAGE ANTERIOR NECK Right 2017    Procedure: INCISION AND DRAINAGE  (I&D) NECK;  Surgeon: Alysia Adams MD;  Location:  MAIN OR;  Service:    Hester Hamper HERNIA REPAIR      WITH IMPLANTATION OF MESH  13 MAY 2014  LAST ASSESSED    IR IVC FILTER REMOVAL  2019    OPEN ANTERIOR SHOULDER RECONSTRUCTION Left     OTHER SURGICAL HISTORY      BLOOD VESSEL REPAIR   13 MAY 2014 LAST ASSESSED    PERIPHERALLY INSERTED CENTRAL CATHETER INSERTION      [place] KNEE SCOPE,MED/LAT MENISECTOMY Right 2016    Procedure: KNEE ARTHROSCOPY WITH MEDIAL MENISCECTOMY ;  Surgeon: Skip Jacob MD;  Location: [facility];  Service: Orthopedics    SHOULDER SURGERY         Family History   Problem Relation Age of Onset    Arthritis Mother     Colon cancer Brother     Lung cancer Father     No Known Problems Sister     No Known Problems Daughter     No Known Problems Maternal Grandmother     No Known Problems Maternal Grandfather     No Known Problems Paternal Grandmother     No Known Problems Paternal Grandfather     Breast cancer Maternal Aunt     Diabetes Maternal Aunt     No Known Problems Sister     No Known Problems Sister     No Known Problems Sister     No Known Problems Sister     No Known Problems Sister     No Known Problems Daughter     Colon cancer Son      I have reviewed and agree with the history as documented      E-Cigarette/Vaping    E-Cigarette Use Never User      E-Cigarette/Vaping Substances     Social History     Tobacco Use    Smoking status: Former Smoker     Quit date: 1995     Years since quittin 5    Smokeless tobacco: Never Used    Tobacco comment: quit 20 years ago   Vaping Use    Vaping Use: Never used   Substance Use Topics    Alcohol use: Never    Drug use: Never       Review of Systems   Constitutional: Negative for appetite change, chills, fatigue, fever and unexpected weight change  HENT: Negative for congestion, ear pain, rhinorrhea and sore throat  Eyes: Negative for pain and visual disturbance  Respiratory: Negative for cough, chest tightness, shortness of breath and wheezing  Cardiovascular: Negative for chest pain, palpitations and leg swelling  Right lower rib pain   Gastrointestinal: Negative for abdominal pain, constipation, diarrhea, nausea and vomiting  Genitourinary: Negative for difficulty urinating, dysuria, flank pain, frequency, hematuria, menstrual problem, pelvic pain, vaginal bleeding and vaginal discharge  Musculoskeletal: Negative for arthralgias, back pain and neck pain  Skin: Negative for color change and rash  Neurological: Negative for dizziness, seizures, syncope, light-headedness and headaches  Psychiatric/Behavioral: Negative for confusion and sleep disturbance  All other systems reviewed and are negative  Physical Exam  Physical Exam  Vitals and nursing note reviewed  Constitutional:       General: She is not in acute distress  Appearance: She is well-developed  She is obese  She is not ill-appearing, toxic-appearing or diaphoretic  HENT:      Head: Normocephalic and atraumatic  Mouth/Throat:      Mouth: Mucous membranes are moist       Pharynx: Oropharynx is clear  No pharyngeal swelling or oropharyngeal exudate  Eyes:      General: No scleral icterus  Extraocular Movements: Extraocular movements intact  Conjunctiva/sclera: Conjunctivae normal    Cardiovascular:      Rate and Rhythm: Normal rate and regular rhythm  Heart sounds: Normal heart sounds  No murmur heard  No friction rub  No gallop      Pulmonary:      Effort: Pulmonary effort is normal  No tachypnea, accessory muscle usage or respiratory distress  Breath sounds: Normal breath sounds  No stridor  No decreased breath sounds, wheezing, rhonchi or rales  Chest:      Chest wall: Tenderness present  No deformity or crepitus  Abdominal:      Palpations: Abdomen is soft  There is no mass  Tenderness: There is no abdominal tenderness  There is no guarding or rebound  Hernia: No hernia is present  Musculoskeletal:         General: Normal range of motion  Cervical back: Normal range of motion and neck supple  Skin:     General: Skin is warm and dry  Coloration: Skin is not pale  Neurological:      Mental Status: She is alert and oriented to person, place, and time           Vital Signs  ED Triage Vitals [06/19/21 1217]   Temperature Pulse Respirations Blood Pressure SpO2   98 6 °F (37 °C) 93 18 (!) 185/126 96 %      Temp Source Heart Rate Source Patient Position - Orthostatic VS BP Location FiO2 (%)   Temporal Monitor Sitting Right arm --      Pain Score       Worst Possible Pain           Vitals:    06/19/21 1245 06/19/21 1330 06/19/21 1400 06/19/21 1430   BP: (!) 177/94 160/88 164/72 158/82   Pulse: 78 64 62 73   Patient Position - Orthostatic VS: Lying Lying Sitting Sitting         Visual Acuity      ED Medications  Medications   enoxaparin (LOVENOX) subcutaneous injection 135 mg (has no administration in time range)   HYDROmorphone (DILAUDID) injection 1 mg (1 mg Intravenous Given 6/19/21 1240)   iohexol (OMNIPAQUE) 350 MG/ML injection (SINGLE-DOSE) 100 mL (100 mL Intravenous Given 6/19/21 1309)   HYDROmorphone (DILAUDID) injection 1 mg (1 mg Intravenous Given 6/19/21 1447)       Diagnostic Studies  Results Reviewed     Procedure Component Value Units Date/Time    Protime-INR [[id number]]  (Abnormal) Collected: 06/19/21 1238    Lab Status: Final result Specimen: Blood from Arm, Right Updated: 06/19/21 1254     Protime 17 1 seconds      INR 1 42    APTT [[id number]]  (Normal) Collected: 06/19/21 1238    Lab Status: Final result Specimen: Blood from Arm, Right Updated: 06/19/21 1254     PTT 31 seconds     Basic metabolic panel [[id number]] Collected: 06/19/21 1238    Lab Status: Final result Specimen: Blood from Arm, Right Updated: 06/19/21 1253     Sodium 142 mmol/L      Potassium 4 0 mmol/L      Chloride 108 mmol/L      CO2 25 mmol/L      ANION GAP 9 mmol/L      BUN 16 mg/dL      Creatinine 1 16 mg/dL      Glucose 102 mg/dL      Calcium 9 2 mg/dL      eGFR 53 ml/min/1 73sq m     Narrative:      [place] guidelines for Chronic Kidney Disease (CKD):     Stage 1 with normal or high GFR (GFR > 90 mL/min/1 73 square meters)    Stage 2 Mild CKD (GFR = 60-89 mL/min/1 73 square meters)    Stage 3A Moderate CKD (GFR = 45-59 mL/min/1 73 square meters)    Stage 3B Moderate CKD (GFR = 30-44 mL/min/1 73 square meters)    Stage 4 Severe CKD (GFR = 15-29 mL/min/1 73 square meters)    Stage 5 End Stage CKD (GFR <15 mL/min/1 73 square meters)  Note: GFR calculation is accurate only with a steady state creatinine    CBC and differential [[id number]]  (Abnormal) Collected: 06/19/21 1238    Lab Status: Final result Specimen: Blood from Arm, Right Updated: 06/19/21 1246     WBC 7 76 Thousand/uL      RBC 4 74 Million/uL      Hemoglobin 14 2 g/dL      Hematocrit 45 6 %      MCV 96 fL      MCH 30 0 pg      MCHC 31 1 g/dL      RDW 13 6 %      MPV 10 0 fL      Platelets 943 Thousands/uL      nRBC 0 /100 WBCs      Neutrophils Relative 66 %      Immat GRANS % 0 %      Lymphocytes Relative 21 %      Monocytes Relative 10 %      Eosinophils Relative 2 %      Basophils Relative 1 %      Neutrophils Absolute 5 02 Thousands/µL      Immature Grans Absolute 0 03 Thousand/uL      Lymphocytes Absolute 1 66 Thousands/µL      Monocytes Absolute 0 78 Thousand/µL      Eosinophils Absolute 0 18 Thousand/µL      Basophils Absolute 0 09 Thousands/µL                  CT chest abdomen pelvis w contrast   Final Result by Bakari Cabrera Jimmie Randall MD (06/19 1341)      Nondisplaced right 10th rib fracture  No other evidence of acute pathology  Moderate hiatal hernia  Colonic diverticulosis  Workstation performed: [id number]                    Procedures  ECG 12 Lead Documentation Only    Date/Time: 6/19/2021 1:05 PM  Performed by: Juli Hurtado DO  Authorized by: Juli Hurtado DO     Indications / Diagnosis:  Right ribcage tenderness  ECG reviewed by me, the ED Provider: yes    Patient location:  ED  Rate:     ECG rate:  90    ECG rate assessment: normal    Rhythm:     Rhythm: sinus rhythm    Ectopy:     Ectopy: none    QRS:     QRS axis:  Normal    QRS intervals:  Normal  Conduction:     Conduction: normal    ST segments:     ST segments:  Normal  T waves:     T waves: normal               ED Course                             SBIRT 22yo+      Most Recent Value   SBIRT (24 yo +)   In order to provide better care to our patients, we are screening all of our patients for alcohol and drug use  Would it be okay to ask you these screening questions? Yes Filed at: 06/19/2021 1218   Initial Alcohol Screen: US AUDIT-C    1  How often do you have a drink containing alcohol?  0 Filed at: 06/19/2021 1218   2  How many drinks containing alcohol do you have on a typical day you are drinking? 0 Filed at: 06/19/2021 1218   3a  Male UNDER 65: How often do you have five or more drinks on one occasion? 0 Filed at: 06/19/2021 1218   3b  FEMALE Any Age, or MALE 65+: How often do you have 4 or more drinks on one occassion? 0 Filed at: 06/19/2021 1218   Audit-C Score  0 Filed at: 06/19/2021 1218   JASIEL: How many times in the past year have you    Used an illegal drug or used a prescription medication for non-medical reasons?   Never Filed at: 06/19/2021 1218                    MDM    Disposition  Final diagnoses:   Fracture of one rib, right side, initial encounter for closed fracture   Subtherapeutic international normalized ratio (INR) Time reflects when diagnosis was documented in both MDM as applicable and the Disposition within this note     Time User Action Codes Description Comment    6/19/2021  2:43 PM Juju LE Add [S22 31XA] Fracture of one rib, right side, initial encounter for closed fracture     6/19/2021  2:43 PM Macie Markgail LE Add [R79 1] Subtherapeutic international normalized ratio (INR)       ED Disposition     ED Disposition Condition Date/Time Comment    Discharge Stable Sat Jun 19, 2021  2:43 PM Jenna Omkar discharge to home/self care  Follow-up Information    None         Patient's Medications   Discharge Prescriptions    HYDROCODONE-ACETAMINOPHEN (NORCO) 5-325 MG PER TABLET    Take 1 tablet by mouth every 6 (six) hours as needed for pain for up to 10 daysMax Daily Amount: 4 tablets       Start Date: 6/19/2021 End Date: 6/29/2021       Order Dose: 1 tablet       Quantity: 20 tablet    Refills: 0     No discharge procedures on file      PDMP Review     None          ED Provider  Electronically Signed by           Pete Lee DO  06/19/21 8884

## 2021-06-19 NOTE — ED NOTES
Patient to follow up with PCP regarding INR  Lovenox administered in ED  Patient verbalized understanding of discharge instructions        Charline Booth RN  06/19/21 3008

## 2021-06-19 NOTE — DISCHARGE INSTRUCTIONS
INR 1 42 today  You received Lovenox 1 mg/kg SQ in the ER today  Continue current dosing and call your physician immediately for further dosing instructions regarding your Coumadin  Use your incentive spirometer as directed 10 times every hour while awake x7 days

## 2021-06-21 DIAGNOSIS — I26.99 PULMONARY EMBOLISM (HCC): ICD-10-CM

## 2021-06-21 LAB
ATRIAL RATE: 91 BPM
P AXIS: 64 DEGREES
PR INTERVAL: 160 MS
QRS AXIS: 46 DEGREES
QRSD INTERVAL: 84 MS
QT INTERVAL: 354 MS
QTC INTERVAL: 435 MS
T WAVE AXIS: 54 DEGREES
VENTRICULAR RATE: 91 BPM

## 2021-06-21 PROCEDURE — 93010 ELECTROCARDIOGRAM REPORT: CPT | Performed by: INTERNAL MEDICINE

## 2021-06-21 RX ORDER — WARFARIN SODIUM 5 MG/1
TABLET ORAL
Qty: 45 TABLET | Refills: 5 | Status: SHIPPED | OUTPATIENT
Start: 2021-06-21 | End: 2022-06-10

## 2021-06-21 NOTE — TELEPHONE ENCOUNTER
Hurts on the right side when she breathes, feels like air going up to the shoulder  Sounds today like it is weezing

## 2021-06-21 NOTE — TELEPHONE ENCOUNTER
If she is getting that feeling towards her shoulder and her breathing is no better, should go back to the ER to make sure she does not have a pneumothorax

## 2021-06-23 NOTE — PROGRESS NOTES
Pulmonary Rehabilitation Plan of Care   Discharge Note          Today's date: 2021   Total visits to date: 16  Patient name: Alejandra Kaur      : 1965  Age: 64 y o  MRN: [id number]  Referring Physician: Robert Rodrigues DO  Provider: [facility]  Clinician: Brooklyn Serrano RN    Dx:   Encounter Diagnosis   Name Primary?  Restrictive lung disease      Date of onset: 21    COMMENTS:  Pedro Capone  has not been to Pulmonary rehab since 21  She has not returned phone calls  Medication compliance: yes   Comments: takes her medication as ordered  Fall Risk: Low   Comments: She has good standing balance  Denies any falls or syncope    EXERCISE/ACTIVITY    Cardiopulmonary Goals:   Min: 40   HR:    RPE: 4-6  (moderate to moderately hard exercise)   O2 sat: >90%    Modalities: Treadmill, UBE, NuStep and Recumbent bike  Strength trainin-3 days / week, 12-15 repitations  and 1-2 sets per modality    Modalities: Lateral raise and Arm curl    Exercise Progression: Progress as tolerated to maintain RPE 4-5      RPE 4-6  Home activity: walking   Goals: 10% improvement in functional capacity, home exercise days opposite ID and >150 mins of exercise/wk  Education: Benefit of exercise, RPE scale and O2 saturation monitoring   Plan:home exercise target 30 mins, 2 days opposite ID  Readiness to change: Action:  (Changing behavior)    NUTRITION    Weight control:    Starting weight: 295        Current Weight: 288  3     Diabetes: N/A  Goals:decreased body fat% and Improved Rate Your Plate score  Education:More frequent meals, smaller portions  weight loss    Label Reading  Plan: Education Video- Diet and Nutrition and Education Class: Healthy Eating  Readiness to change: Action:  (Changing behavior)    PSYCHOSOCIAL    Emotional:  1-4 = Minimal Depression  Social support: Excellent  Goals: PHQ-9 - reduced severity by one level, Physical Fitness in [facility] Score < 3, Social Support in [facility] Score < 3, Daily Activity in [facility] Score < 3, Overall Health in [facility] Score < 3, improved sleep, improved concentration, improved positive thoughts of well being and increased energy  Education: benefits of positive support system  coping mechanisms  depression and chronic disease  Plan: Relaxation and Mindfulness  Readiness to change: Action:  (Changing behavior)    OTHER CORE COMPONENTS     Tobacco:   Social History     Tobacco Use   Smoking Status Former Smoker    Quit date: 1995    Years since quittin 5   Smokeless Tobacco Never Used   Tobacco Comment    quit 20 years ago     Oxygen: 2l/nc for sleep with CPAP  Blood pressure:    Restin/76   Exercise:  130/70              Recovery; 102/70  Goals: consistent BP < 130/80 and moderate intensity exercise >150 mins/wk  Education: Pulmonary Disease, physiology, Exercise, strength, flexibility and Avoiding Infections  Plan: Causes of lung disease, Exercise benefits and Proper nutrition  Readiness to change: Action:  (Changing behavior)

## 2021-07-01 ENCOUNTER — APPOINTMENT (OUTPATIENT)
Dept: LAB | Facility: MEDICAL CENTER | Age: 56
End: 2021-07-01
Payer: COMMERCIAL

## 2021-07-01 DIAGNOSIS — I26.99 PULMONARY EMBOLISM, BILATERAL (HCC): Chronic | ICD-10-CM

## 2021-07-01 LAB
INR PPP: 1.28 (ref 0.84–1.19)
PROTHROMBIN TIME: 16 SECONDS (ref 11.6–14.5)

## 2021-07-01 PROCEDURE — 85610 PROTHROMBIN TIME: CPT

## 2021-07-01 PROCEDURE — 36415 COLL VENOUS BLD VENIPUNCTURE: CPT

## 2021-07-06 ENCOUNTER — ANTICOAG VISIT (OUTPATIENT)
Dept: FAMILY MEDICINE CLINIC | Facility: CLINIC | Age: 56
End: 2021-07-06

## 2021-07-29 ENCOUNTER — OFFICE VISIT (OUTPATIENT)
Dept: FAMILY MEDICINE CLINIC | Facility: CLINIC | Age: 56
End: 2021-07-29
Payer: COMMERCIAL

## 2021-07-29 VITALS
WEIGHT: 286.6 LBS | OXYGEN SATURATION: 95 % | HEART RATE: 60 BPM | SYSTOLIC BLOOD PRESSURE: 118 MMHG | HEIGHT: 65 IN | BODY MASS INDEX: 47.75 KG/M2 | TEMPERATURE: 96.2 F | DIASTOLIC BLOOD PRESSURE: 80 MMHG

## 2021-07-29 DIAGNOSIS — J45.20 MILD INTERMITTENT ASTHMA WITHOUT COMPLICATION: Primary | ICD-10-CM

## 2021-07-29 DIAGNOSIS — I82.90 NON-OCCLUSIVE THROMBUS: ICD-10-CM

## 2021-07-29 DIAGNOSIS — R73.09 ELEVATED GLUCOSE: ICD-10-CM

## 2021-07-29 DIAGNOSIS — S22.31XA FRACTURE OF ONE RIB, RIGHT SIDE, INITIAL ENCOUNTER FOR CLOSED FRACTURE: ICD-10-CM

## 2021-07-29 DIAGNOSIS — T78.3XXD ANGIOEDEMA, SUBSEQUENT ENCOUNTER: ICD-10-CM

## 2021-07-29 DIAGNOSIS — E03.8 OTHER SPECIFIED HYPOTHYROIDISM: ICD-10-CM

## 2021-07-29 DIAGNOSIS — J45.20 MILD INTERMITTENT ASTHMA WITHOUT COMPLICATION: Chronic | ICD-10-CM

## 2021-07-29 PROCEDURE — 99214 OFFICE O/P EST MOD 30 MIN: CPT | Performed by: FAMILY MEDICINE

## 2021-07-29 PROCEDURE — 3008F BODY MASS INDEX DOCD: CPT | Performed by: FAMILY MEDICINE

## 2021-07-29 PROCEDURE — 1036F TOBACCO NON-USER: CPT | Performed by: FAMILY MEDICINE

## 2021-07-29 RX ORDER — ALBUTEROL SULFATE 90 UG/1
2 AEROSOL, METERED RESPIRATORY (INHALATION) EVERY 4 HOURS PRN
Qty: 18 G | Refills: 3 | Status: SHIPPED | OUTPATIENT
Start: 2021-07-29 | End: 2022-04-25 | Stop reason: SDUPTHER

## 2021-07-29 RX ORDER — MONTELUKAST SODIUM 10 MG/1
10 TABLET ORAL
Qty: 30 TABLET | Refills: 5 | Status: SHIPPED | OUTPATIENT
Start: 2021-07-29 | End: 2021-09-30

## 2021-07-29 RX ORDER — ALBUTEROL SULFATE 2.5 MG/3ML
2.5 SOLUTION RESPIRATORY (INHALATION) AS NEEDED
Qty: 3 ML | Refills: 5 | Status: SHIPPED | OUTPATIENT
Start: 2021-07-29

## 2021-07-29 RX ORDER — BUDESONIDE AND FORMOTEROL FUMARATE DIHYDRATE 160; 4.5 UG/1; UG/1
2 AEROSOL RESPIRATORY (INHALATION) 2 TIMES DAILY
Qty: 10.2 G | Refills: 3 | Status: SHIPPED | OUTPATIENT
Start: 2021-07-29 | End: 2022-03-22

## 2021-07-29 NOTE — PROGRESS NOTES
Assessment/Plan:  Patient will get her blood work done today for her PT INR  We will also check a TSH and A1c on her  Reviewed her medications  She was not using her Symbicort properly  I told her to take 2 individual inhalations twice a day  Follow-up in 4 months or p r n     Problem List Items Addressed This Visit        Endocrine    Other specified hypothyroidism (Chronic)    Relevant Orders    TSH, 3rd generation with Free T4 reflex       Respiratory    Mild intermittent asthma without complication - Primary    Relevant Medications    albuterol (PROVENTIL HFA,VENTOLIN HFA) 90 mcg/act inhaler       Cardiovascular and Mediastinum    Non-occlusive thrombus    Relevant Orders    Protime-INR      Other Visit Diagnoses     Fracture of one rib, right side, initial encounter for closed fracture        Relevant Orders    DXA bone density spine hip and pelvis    Elevated glucose        Relevant Orders    Hemoglobin A1C           Diagnoses and all orders for this visit:    Mild intermittent asthma without complication  -     albuterol (PROVENTIL HFA,VENTOLIN HFA) 90 mcg/act inhaler; Inhale 2 puffs every 4 (four) hours as needed for wheezing    Fracture of one rib, right side, initial encounter for closed fracture  -     DXA bone density spine hip and pelvis; Future    Other specified hypothyroidism  -     TSH, 3rd generation with Free T4 reflex    Non-occlusive thrombus  -     Protime-INR    Elevated glucose  -     Hemoglobin A1C        No problem-specific Assessment & Plan notes found for this encounter  Subjective:      Patient ID: Kirit Duran is a 64 y o  female  Patient here today for follow-up  Patient occasionally gets some intermittent right chest pain  Does not get worse with exertion  Patient recently fractured her right 10th rib  That is healing  Patient occasionally needs her albuterol    Reviewing her medications, patient has been taking 2 puffs at once of her Symbicort rather than to individual puffs twice a day  It does not seem that she always takes it every day either  Asthma  She complains of wheezing  There is no shortness of breath  This is a chronic problem  The problem occurs every several days  The problem has been unchanged  Her symptoms are aggravated by nothing  Her symptoms are alleviated by beta-agonist and steroid inhaler  She reports significant improvement on treatment  There are no known risk factors for lung disease  Her past medical history is significant for asthma  The following portions of the patient's history were reviewed and updated as appropriate:   She has a past medical history of Abdominal pain, lower, Acute renal failure ([address] ), Allergic rhinitis, Ankle pain, right, Arthritis, Asthma, Bilateral chronic knee pain, Bladder pain, Blood clot in vein, Bursitis, Cardiac disorder, Cardiac murmur, Chest tightness or pressure, COPD (chronic obstructive pulmonary disease) ([address] ), Coronary artery disease, Curvature of spine, GERD (gastroesophageal reflux disease), Hernia, hiatal, Hyperlipidemia, Hypertension, Inguinal hernia, Jaw closure abnormality, Lumbar herniated disc, Morbid obesity ([address] ), Obesity, Osteoarthritis of right knee, Patellar tendinitis, Poor flexibility of tendon, Pulmonary embolism ([address] ), Sepsis ([address] ), Severe sepsis due to methicillin resistant Staphylococcus aureus (MRSA) with acute organ dysfunction ([address] ), Spider vein, symptomatic, Status post arthroscopy of right knee, Stomach disorder, Tear of medial meniscus of right knee, Thyroid disorder, and Umbilical hernia ,  does not have any pertinent problems on file  ,   has a past surgical history that includes Hysterectomy (2009); Open anterior shoulder reconstruction (Left); Hand surgery (Right); Foot surgery (Left); Colonoscopy; pr knee scope,med/lat menisectomy (Right, 4/11/2016); Exploratory laparotomy; Peripherally inserted central catheter insertion;  Incision and drainage anterior neck (Right, 6/8/2017); Other surgical history; Cystoscopy (01/1994); Incisional hernia repair; Shoulder surgery; IR IVC filter removal (1/22/2019); and Fracture surgery  ,  family history includes Arthritis in her mother; Breast cancer in her maternal aunt; Colon cancer in her brother and son; Diabetes in her maternal aunt; Lung cancer in her father; No Known Problems in her daughter, daughter, maternal grandfather, maternal grandmother, paternal grandfather, paternal grandmother, sister, sister, sister, sister, sister, and sister  ,   reports that she quit smoking about 25 years ago  She has never used smokeless tobacco  She reports that she does not drink alcohol and does not use drugs  ,  is allergic to clindamycin, medical tape, penicillin g, and penicillins     Current Outpatient Medications   Medication Sig Dispense Refill    albuterol (PROVENTIL HFA,VENTOLIN HFA) 90 mcg/act inhaler Inhale 2 puffs every 4 (four) hours as needed for wheezing 18 g 3    atorvastatin (LIPITOR) 10 mg tablet take 1 tablet by mouth once daily 30 tablet 5    famotidine (PEPCID) 40 MG tablet Take 1 tablet (40 mg total) by mouth daily at bedtime 30 tablet 5    fluticasone (FLONASE) 50 mcg/act nasal spray 2 sprays into each nostril daily 16 g 5    furosemide (LASIX) 20 mg tablet Take 1 tablet (20 mg total) by mouth daily as needed (leg swelling) Take with potassium 30 tablet 5    levothyroxine 112 mcg tablet take 1 tablet by mouth daily 30 tablet 5    potassium chloride (MICRO-K) 10 MEQ CR capsule Take 1 capsule (10 mEq total) by mouth daily 30 capsule 1    warfarin (COUMADIN) 5 mg tablet TAKE DAILY AS DIRECTED BY PHYSICIAN 45 tablet 5    albuterol (2 5 mg/3 mL) 0 083 % nebulizer solution Take 3 mL (2 5 mg total) by nebulization as needed for wheezing 3 mL 5    budesonide-formoterol (SYMBICORT) 160-4 5 mcg/act inhaler Inhale 2 puffs 2 (two) times a day Rinse mouth after use   10 2 g 3    montelukast (SINGULAIR) 10 mg tablet Take 1 tablet (10 mg total) by mouth daily at bedtime 30 tablet 5     No current facility-administered medications for this visit  Review of Systems   Constitutional: Negative  Respiratory: Positive for wheezing  Negative for shortness of breath  Cardiovascular: Negative  Gastrointestinal: Negative  Genitourinary: Negative  Objective:  Vitals:    07/29/21 0835 07/29/21 0853   BP: 118/80    Pulse:  60   Temp: (!) 96 2 °F (35 7 °C)    SpO2:  95%   Weight: 130 kg (286 lb 9 6 oz)    Height: 5' 5" (1 651 m)      Body mass index is 47 69 kg/m²  Physical Exam  Vitals reviewed  Constitutional:       General: She is not in acute distress  Appearance: Normal appearance  She is well-developed  She is not diaphoretic  HENT:      Head: Normocephalic and atraumatic  Eyes:      Conjunctiva/sclera: Conjunctivae normal    Cardiovascular:      Rate and Rhythm: Normal rate and regular rhythm  Heart sounds: Normal heart sounds  No murmur heard  No friction rub  No gallop  Pulmonary:      Effort: Pulmonary effort is normal  No respiratory distress  Breath sounds: Normal breath sounds  No wheezing, rhonchi or rales  Musculoskeletal:      Right lower leg: No edema  Left lower leg: No edema  Neurological:      General: No focal deficit present  Mental Status: She is alert and oriented to person, place, and time  Psychiatric:         Mood and Affect: Mood normal          Behavior: Behavior normal          Thought Content:  Thought content normal          Judgment: Judgment normal

## 2021-08-02 ENCOUNTER — APPOINTMENT (OUTPATIENT)
Dept: LAB | Facility: MEDICAL CENTER | Age: 56
End: 2021-08-02
Payer: COMMERCIAL

## 2021-08-02 LAB
EST. AVERAGE GLUCOSE BLD GHB EST-MCNC: 120 MG/DL
HBA1C MFR BLD: 5.8 %
INR PPP: 2.52 (ref 0.84–1.19)
PROTHROMBIN TIME: 27 SECONDS (ref 11.6–14.5)
TSH SERPL DL<=0.05 MIU/L-ACNC: 1.96 UIU/ML (ref 0.36–3.74)

## 2021-08-02 PROCEDURE — 85610 PROTHROMBIN TIME: CPT | Performed by: FAMILY MEDICINE

## 2021-08-02 PROCEDURE — 83036 HEMOGLOBIN GLYCOSYLATED A1C: CPT | Performed by: FAMILY MEDICINE

## 2021-08-02 PROCEDURE — 84443 ASSAY THYROID STIM HORMONE: CPT | Performed by: FAMILY MEDICINE

## 2021-08-02 PROCEDURE — 36415 COLL VENOUS BLD VENIPUNCTURE: CPT | Performed by: FAMILY MEDICINE

## 2021-08-03 ENCOUNTER — ANTICOAG VISIT (OUTPATIENT)
Dept: FAMILY MEDICINE CLINIC | Facility: CLINIC | Age: 56
End: 2021-08-03

## 2021-08-16 ENCOUNTER — APPOINTMENT (OUTPATIENT)
Dept: LAB | Facility: MEDICAL CENTER | Age: 56
End: 2021-08-16
Payer: COMMERCIAL

## 2021-08-16 DIAGNOSIS — I26.99 PULMONARY EMBOLISM, BILATERAL (HCC): Chronic | ICD-10-CM

## 2021-08-16 LAB
INR PPP: 1.9 (ref 0.84–1.19)
PROTHROMBIN TIME: 21.7 SECONDS (ref 11.6–14.5)

## 2021-08-16 PROCEDURE — 36415 COLL VENOUS BLD VENIPUNCTURE: CPT

## 2021-08-16 PROCEDURE — 85610 PROTHROMBIN TIME: CPT

## 2021-08-17 ENCOUNTER — ANTICOAG VISIT (OUTPATIENT)
Dept: FAMILY MEDICINE CLINIC | Facility: CLINIC | Age: 56
End: 2021-08-17

## 2021-09-27 ENCOUNTER — APPOINTMENT (OUTPATIENT)
Dept: LAB | Facility: MEDICAL CENTER | Age: 56
End: 2021-09-27
Payer: COMMERCIAL

## 2021-09-30 ENCOUNTER — TELEPHONE (OUTPATIENT)
Dept: FAMILY MEDICINE CLINIC | Facility: CLINIC | Age: 56
End: 2021-09-30

## 2021-09-30 ENCOUNTER — OFFICE VISIT (OUTPATIENT)
Dept: FAMILY MEDICINE CLINIC | Facility: CLINIC | Age: 56
End: 2021-09-30
Payer: COMMERCIAL

## 2021-09-30 VITALS
BODY MASS INDEX: 47.78 KG/M2 | WEIGHT: 286.8 LBS | HEIGHT: 65 IN | HEART RATE: 84 BPM | TEMPERATURE: 97.2 F | OXYGEN SATURATION: 97 % | SYSTOLIC BLOOD PRESSURE: 140 MMHG | DIASTOLIC BLOOD PRESSURE: 86 MMHG

## 2021-09-30 DIAGNOSIS — T78.3XXD ANGIOEDEMA, SUBSEQUENT ENCOUNTER: ICD-10-CM

## 2021-09-30 DIAGNOSIS — J30.1 SEASONAL ALLERGIC RHINITIS DUE TO POLLEN: ICD-10-CM

## 2021-09-30 DIAGNOSIS — S22.31XA FRACTURE OF ONE RIB, RIGHT SIDE, INITIAL ENCOUNTER FOR CLOSED FRACTURE: ICD-10-CM

## 2021-09-30 DIAGNOSIS — E66.01 MORBID OBESITY WITH BMI OF 45.0-49.9, ADULT (HCC): ICD-10-CM

## 2021-09-30 DIAGNOSIS — Z23 NEED FOR INFLUENZA VACCINATION: ICD-10-CM

## 2021-09-30 DIAGNOSIS — M25.472 LEFT ANKLE SWELLING: Primary | ICD-10-CM

## 2021-09-30 PROCEDURE — 90471 IMMUNIZATION ADMIN: CPT | Performed by: FAMILY MEDICINE

## 2021-09-30 PROCEDURE — 99214 OFFICE O/P EST MOD 30 MIN: CPT | Performed by: FAMILY MEDICINE

## 2021-09-30 PROCEDURE — 90682 RIV4 VACC RECOMBINANT DNA IM: CPT | Performed by: FAMILY MEDICINE

## 2021-09-30 RX ORDER — FLUTICASONE PROPIONATE 50 MCG
2 SPRAY, SUSPENSION (ML) NASAL DAILY
Qty: 16 G | Refills: 5 | Status: SHIPPED | OUTPATIENT
Start: 2021-09-30 | End: 2022-06-10

## 2021-09-30 RX ORDER — LORATADINE 10 MG/1
10 TABLET ORAL DAILY
Qty: 30 TABLET | Refills: 5 | Status: SHIPPED | OUTPATIENT
Start: 2021-09-30

## 2021-09-30 RX ORDER — MONTELUKAST SODIUM 10 MG/1
10 TABLET ORAL
Qty: 30 TABLET | Refills: 5 | Status: SHIPPED | OUTPATIENT
Start: 2021-09-30 | End: 2022-06-10

## 2021-09-30 NOTE — PROGRESS NOTES
Assessment/Plan:    Left ankle exam appears benign  She will call us if it starts to bother anymore  She will get her Coumadin checked at the end of  October  Reordered her allergy medicationsWe will try to schedule her DEXA scan  Continue to watch her diet  Follow-up in November as scheduled or p r n  [place] Flu shot given today  Problem List Items Addressed This Visit        Respiratory    Seasonal allergic rhinitis due to pollen    Relevant Medications    loratadine (CLARITIN) 10 mg tablet    fluticasone (FLONASE) 50 mcg/act nasal spray      Other Visit Diagnoses     Left ankle swelling    -  Primary    Fracture of one rib, right side, initial encounter for closed fracture        Need for influenza vaccination        Relevant Orders    influenza vaccine, quadrivalent, recombinant, PF, 0 5 mL, for patients 18 yr+ (FLUBLOK) (Completed)    Angioedema, subsequent encounter        Relevant Medications    montelukast (SINGULAIR) 10 mg tablet    Morbid obesity with BMI of 45 0-49 9, adult ([facility] )               Diagnoses and all orders for this visit:    Left ankle swelling    Fracture of one rib, right side, initial encounter for closed fracture    Need for influenza vaccination  -     influenza vaccine, quadrivalent, recombinant, PF, 0 5 mL, for patients 18 yr+ (FLUBLOK)    Seasonal allergic rhinitis due to pollen  -     loratadine (CLARITIN) 10 mg tablet; Take 1 tablet (10 mg total) by mouth daily  -     fluticasone (FLONASE) 50 mcg/act nasal spray; 2 sprays into each nostril daily    Angioedema, subsequent encounter  -     montelukast (SINGULAIR) 10 mg tablet; Take 1 tablet (10 mg total) by mouth daily at bedtime    Morbid obesity with BMI of 45 0-49 9, adult ([facility] )    Other orders  -     Cancel: DXA bone density spine hip and pelvis; Future        No problem-specific Assessment & Plan notes found for this encounter  Subjective:      Patient ID: Azam Chávez is a 64 y o  female       Patient here today stating that she has noticed some swelling on her left lateral ankle  Denies any recent trauma  It bothers her every now and then  Allergies are acting up  Needs refills on her allergy medications  The following portions of the patient's history were reviewed and updated as appropriate:   She has a past medical history of Abdominal pain, lower, Acute renal failure ([address] ), Allergic rhinitis, Ankle pain, right, Arthritis, Asthma, Bilateral chronic knee pain, Bladder pain, Blood clot in vein, Bursitis, Cardiac disorder, Cardiac murmur, Chest tightness or pressure, COPD (chronic obstructive pulmonary disease) ([address] ), Coronary artery disease, Curvature of spine, GERD (gastroesophageal reflux disease), Hernia, hiatal, Hyperlipidemia, Hypertension, Inguinal hernia, Jaw closure abnormality, Lumbar herniated disc, Morbid obesity ([address] ), Obesity, Osteoarthritis of right knee, Patellar tendinitis, Poor flexibility of tendon, Pulmonary embolism ([address] ), Sepsis ([address] ), Severe sepsis due to methicillin resistant Staphylococcus aureus (MRSA) with acute organ dysfunction ([address] ), Spider vein, symptomatic, Status post arthroscopy of right knee, Stomach disorder, Tear of medial meniscus of right knee, Thyroid disorder, and Umbilical hernia ,  does not have any pertinent problems on file  ,   has a past surgical history that includes Hysterectomy (2009); Open anterior shoulder reconstruction (Left); Hand surgery (Right); Foot surgery (Left); Colonoscopy; pr knee scope,med/lat menisectomy (Right, 4/11/2016); Exploratory laparotomy; Peripherally inserted central catheter insertion; Incision and drainage anterior neck (Right, 6/8/2017); Other surgical history; Cystoscopy (01/1994); Incisional hernia repair; Shoulder surgery; IR IVC filter removal (1/22/2019); and Fracture surgery  ,  family history includes Arthritis in her mother; Breast cancer in her maternal aunt; Colon cancer in her brother and son; Diabetes in her maternal aunt;  Lung cancer in her father; No Known Problems in her daughter, daughter, maternal grandfather, maternal grandmother, paternal grandfather, paternal grandmother, sister, sister, sister, sister, sister, and sister  ,   reports that she quit smoking about 25 years ago  She has never used smokeless tobacco  She reports that she does not drink alcohol and does not use drugs  ,  is allergic to clindamycin, medical tape, penicillin g, and penicillins     Current Outpatient Medications   Medication Sig Dispense Refill    albuterol (2 5 mg/3 mL) 0 083 % nebulizer solution Take 3 mL (2 5 mg total) by nebulization as needed for wheezing 3 mL 5    albuterol (PROVENTIL HFA,VENTOLIN HFA) 90 mcg/act inhaler Inhale 2 puffs every 4 (four) hours as needed for wheezing 18 g 3    atorvastatin (LIPITOR) 10 mg tablet take 1 tablet by mouth once daily 30 tablet 5    budesonide-formoterol (SYMBICORT) 160-4 5 mcg/act inhaler Inhale 2 puffs 2 (two) times a day Rinse mouth after use  10 2 g 3    famotidine (PEPCID) 40 MG tablet Take 1 tablet (40 mg total) by mouth daily at bedtime 30 tablet 5    fluticasone (FLONASE) 50 mcg/act nasal spray 2 sprays into each nostril daily 16 g 5    furosemide (LASIX) 20 mg tablet Take 1 tablet (20 mg total) by mouth daily as needed (leg swelling) Take with potassium 30 tablet 5    levothyroxine 112 mcg tablet take 1 tablet by mouth daily 30 tablet 5    montelukast (SINGULAIR) 10 mg tablet Take 1 tablet (10 mg total) by mouth daily at bedtime 30 tablet 5    potassium chloride (MICRO-K) 10 MEQ CR capsule Take 1 capsule (10 mEq total) by mouth daily 30 capsule 1    warfarin (COUMADIN) 5 mg tablet TAKE DAILY AS DIRECTED BY PHYSICIAN 45 tablet 5    loratadine (CLARITIN) 10 mg tablet Take 1 tablet (10 mg total) by mouth daily 30 tablet 5     No current facility-administered medications for this visit  Review of Systems   Constitutional: Negative  Respiratory: Negative  Cardiovascular: Negative  Gastrointestinal: Negative  Genitourinary: Negative  Objective:  Vitals:    09/30/21 1236   BP: 140/86   Pulse: 84   Temp: (!) 97 2 °F (36 2 °C)   SpO2: 97%   Weight: 130 kg (286 lb 12 8 oz)   Height: 5' 5" (1 651 m)     Body mass index is 47 73 kg/m²  Physical Exam  Vitals reviewed  Constitutional:       General: She is not in acute distress  Appearance: Normal appearance  She is well-developed  She is not ill-appearing, toxic-appearing or diaphoretic  HENT:      Head: Normocephalic and atraumatic  Eyes:      Conjunctiva/sclera: Conjunctivae normal    Cardiovascular:      Rate and Rhythm: Normal rate and regular rhythm  Heart sounds: Normal heart sounds  No murmur heard  No friction rub  No gallop  Pulmonary:      Effort: Pulmonary effort is normal  No respiratory distress  Breath sounds: Normal breath sounds  No wheezing, rhonchi or rales  Musculoskeletal:         General: Swelling ( mild swelling on her left lateral ankle, no erythema, nontender, no ecchymosis) present  No tenderness  Right lower leg: No edema  Left lower leg: No edema  Neurological:      General: No focal deficit present  Mental Status: She is alert and oriented to person, place, and time  Psychiatric:         Mood and Affect: Mood normal          Behavior: Behavior normal          Thought Content: Thought content normal          Judgment: Judgment normal          BMI Counseling: Body mass index is 47 73 kg/m²  The BMI is above normal  Nutrition recommendations include reducing portion sizes, decreasing overall calorie intake, 3-5 servings of fruits/vegetables daily, reducing fast food intake, consuming healthier snacks, decreasing soda and/or juice intake, moderation in carbohydrate intake, increasing intake of lean protein, reducing intake of saturated fat and trans fat and reducing intake of cholesterol

## 2021-10-01 ENCOUNTER — TELEPHONE (OUTPATIENT)
Dept: FAMILY MEDICINE CLINIC | Facility: CLINIC | Age: 56
End: 2021-10-01

## 2021-10-01 ENCOUNTER — ANTICOAG VISIT (OUTPATIENT)
Dept: FAMILY MEDICINE CLINIC | Facility: CLINIC | Age: 56
End: 2021-10-01

## 2021-10-20 ENCOUNTER — HOSPITAL ENCOUNTER (OUTPATIENT)
Dept: BONE DENSITY | Facility: HOSPITAL | Age: 56
Discharge: HOME | End: 2021-10-20
Payer: COMMERCIAL

## 2021-10-20 DIAGNOSIS — S22.31XA FRACTURE OF ONE RIB, RIGHT SIDE, INITIAL ENCOUNTER FOR CLOSED FRACTURE: ICD-10-CM

## 2021-10-20 PROCEDURE — 77080 DXA BONE DENSITY AXIAL: CPT

## 2021-11-20 DIAGNOSIS — K21.00 GASTROESOPHAGEAL REFLUX DISEASE WITH ESOPHAGITIS WITHOUT HEMORRHAGE: ICD-10-CM

## 2021-11-22 RX ORDER — FAMOTIDINE 40 MG/1
TABLET, FILM COATED ORAL
Qty: 30 TABLET | Refills: 5 | Status: SHIPPED | OUTPATIENT
Start: 2021-11-22 | End: 2022-02-18

## 2022-01-03 DIAGNOSIS — E03.8 OTHER SPECIFIED HYPOTHYROIDISM: ICD-10-CM

## 2022-01-03 RX ORDER — LEVOTHYROXINE SODIUM 112 UG/1
112 TABLET ORAL DAILY
Qty: 30 TABLET | Refills: 5 | Status: SHIPPED | OUTPATIENT
Start: 2022-01-03 | End: 2022-02-21 | Stop reason: SDUPTHER

## 2022-02-18 ENCOUNTER — OFFICE VISIT (OUTPATIENT)
Dept: FAMILY MEDICINE CLINIC | Facility: CLINIC | Age: 57
End: 2022-02-18
Payer: COMMERCIAL

## 2022-02-18 ENCOUNTER — APPOINTMENT (OUTPATIENT)
Dept: LAB | Facility: MEDICAL CENTER | Age: 57
End: 2022-02-18
Payer: COMMERCIAL

## 2022-02-18 ENCOUNTER — ANTICOAG VISIT (OUTPATIENT)
Dept: FAMILY MEDICINE CLINIC | Facility: CLINIC | Age: 57
End: 2022-02-18

## 2022-02-18 VITALS
DIASTOLIC BLOOD PRESSURE: 80 MMHG | WEIGHT: 289.8 LBS | HEART RATE: 68 BPM | BODY MASS INDEX: 48.28 KG/M2 | TEMPERATURE: 97.1 F | HEIGHT: 65 IN | SYSTOLIC BLOOD PRESSURE: 122 MMHG | OXYGEN SATURATION: 97 %

## 2022-02-18 DIAGNOSIS — E03.8 OTHER SPECIFIED HYPOTHYROIDISM: ICD-10-CM

## 2022-02-18 DIAGNOSIS — Z78.0 POSTMENOPAUSAL: ICD-10-CM

## 2022-02-18 DIAGNOSIS — G47.33 OBSTRUCTIVE SLEEP APNEA (ADULT) (PEDIATRIC): Primary | ICD-10-CM

## 2022-02-18 DIAGNOSIS — N18.30 STAGE 3 CHRONIC KIDNEY DISEASE, UNSPECIFIED WHETHER STAGE 3A OR 3B CKD (HCC): Chronic | ICD-10-CM

## 2022-02-18 DIAGNOSIS — K21.9 GASTROESOPHAGEAL REFLUX DISEASE WITHOUT ESOPHAGITIS: Chronic | ICD-10-CM

## 2022-02-18 DIAGNOSIS — J45.20 MILD INTERMITTENT ASTHMA WITHOUT COMPLICATION: ICD-10-CM

## 2022-02-18 DIAGNOSIS — E78.5 DYSLIPIDEMIA: Chronic | ICD-10-CM

## 2022-02-18 DIAGNOSIS — Z79.01 CHRONIC ANTICOAGULATION: ICD-10-CM

## 2022-02-18 DIAGNOSIS — S22.31XA FRACTURE OF ONE RIB, RIGHT SIDE, INITIAL ENCOUNTER FOR CLOSED FRACTURE: ICD-10-CM

## 2022-02-18 LAB
ALBUMIN SERPL BCP-MCNC: 3 G/DL (ref 3.5–5)
ALP SERPL-CCNC: 77 U/L (ref 46–116)
ALT SERPL W P-5'-P-CCNC: 17 U/L (ref 12–78)
ANION GAP SERPL CALCULATED.3IONS-SCNC: 6 MMOL/L (ref 4–13)
AST SERPL W P-5'-P-CCNC: 15 U/L (ref 5–45)
BASOPHILS # BLD AUTO: 0.09 THOUSANDS/ΜL (ref 0–0.1)
BASOPHILS NFR BLD AUTO: 1 % (ref 0–1)
BILIRUB SERPL-MCNC: 0.94 MG/DL (ref 0.2–1)
BUN SERPL-MCNC: 14 MG/DL (ref 5–25)
CALCIUM ALBUM COR SERPL-MCNC: 9.9 MG/DL (ref 8.3–10.1)
CALCIUM SERPL-MCNC: 9.1 MG/DL (ref 8.3–10.1)
CHLORIDE SERPL-SCNC: 110 MMOL/L (ref 100–108)
CHOLEST SERPL-MCNC: 225 MG/DL
CO2 SERPL-SCNC: 24 MMOL/L (ref 21–32)
CREAT SERPL-MCNC: 1.07 MG/DL (ref 0.6–1.3)
EOSINOPHIL # BLD AUTO: 0.22 THOUSAND/ΜL (ref 0–0.61)
EOSINOPHIL NFR BLD AUTO: 4 % (ref 0–6)
ERYTHROCYTE [DISTWIDTH] IN BLOOD BY AUTOMATED COUNT: 13.3 % (ref 11.6–15.1)
EST. AVERAGE GLUCOSE BLD GHB EST-MCNC: 114 MG/DL
GFR SERPL CREATININE-BSD FRML MDRD: 58 ML/MIN/1.73SQ M
GLUCOSE P FAST SERPL-MCNC: 87 MG/DL (ref 65–99)
HBA1C MFR BLD: 5.6 %
HCT VFR BLD AUTO: 43.8 % (ref 34.8–46.1)
HDLC SERPL-MCNC: 43 MG/DL
HGB BLD-MCNC: 13.1 G/DL (ref 11.5–15.4)
IMM GRANULOCYTES # BLD AUTO: 0.01 THOUSAND/UL (ref 0–0.2)
IMM GRANULOCYTES NFR BLD AUTO: 0 % (ref 0–2)
LDLC SERPL CALC-MCNC: 159 MG/DL (ref 0–100)
LYMPHOCYTES # BLD AUTO: 1.44 THOUSANDS/ΜL (ref 0.6–4.47)
LYMPHOCYTES NFR BLD AUTO: 23 % (ref 14–44)
MCH RBC QN AUTO: 29.4 PG (ref 26.8–34.3)
MCHC RBC AUTO-ENTMCNC: 29.9 G/DL (ref 31.4–37.4)
MCV RBC AUTO: 98 FL (ref 82–98)
MONOCYTES # BLD AUTO: 0.66 THOUSAND/ΜL (ref 0.17–1.22)
MONOCYTES NFR BLD AUTO: 10 % (ref 4–12)
NEUTROPHILS # BLD AUTO: 3.9 THOUSANDS/ΜL (ref 1.85–7.62)
NEUTS SEG NFR BLD AUTO: 62 % (ref 43–75)
NRBC BLD AUTO-RTO: 0 /100 WBCS
PLATELET # BLD AUTO: 319 THOUSANDS/UL (ref 149–390)
PMV BLD AUTO: 10.9 FL (ref 8.9–12.7)
POTASSIUM SERPL-SCNC: 4.2 MMOL/L (ref 3.5–5.3)
PROT SERPL-MCNC: 7.1 G/DL (ref 6.4–8.2)
RBC # BLD AUTO: 4.45 MILLION/UL (ref 3.81–5.12)
SODIUM SERPL-SCNC: 140 MMOL/L (ref 136–145)
T4 FREE SERPL-MCNC: 0.88 NG/DL (ref 0.76–1.46)
TRIGL SERPL-MCNC: 113 MG/DL
TSH SERPL DL<=0.05 MIU/L-ACNC: 9.45 UIU/ML (ref 0.36–3.74)
WBC # BLD AUTO: 6.32 THOUSAND/UL (ref 4.31–10.16)

## 2022-02-18 PROCEDURE — 99214 OFFICE O/P EST MOD 30 MIN: CPT | Performed by: FAMILY MEDICINE

## 2022-02-18 PROCEDURE — 85025 COMPLETE CBC W/AUTO DIFF WBC: CPT | Performed by: FAMILY MEDICINE

## 2022-02-18 PROCEDURE — 84439 ASSAY OF FREE THYROXINE: CPT | Performed by: FAMILY MEDICINE

## 2022-02-18 PROCEDURE — 80053 COMPREHEN METABOLIC PANEL: CPT | Performed by: FAMILY MEDICINE

## 2022-02-18 PROCEDURE — 80061 LIPID PANEL: CPT | Performed by: FAMILY MEDICINE

## 2022-02-18 PROCEDURE — 84443 ASSAY THYROID STIM HORMONE: CPT | Performed by: FAMILY MEDICINE

## 2022-02-18 PROCEDURE — 83036 HEMOGLOBIN GLYCOSYLATED A1C: CPT | Performed by: FAMILY MEDICINE

## 2022-02-18 RX ORDER — OMEPRAZOLE 40 MG/1
40 CAPSULE, DELAYED RELEASE ORAL
Qty: 30 CAPSULE | Refills: 5 | Status: SHIPPED | OUTPATIENT
Start: 2022-02-18 | End: 2022-06-10

## 2022-02-18 NOTE — PROGRESS NOTES
Assessment/Plan:    New CPAP machine ordered  Blood work ordered  I will change her from Pepcid to omeprazole  Referral to pulmonology  DEXA scan ordered  Patient would benefit from a nebulizer, so she could use nebulized albuterol to help better control her asthma  Follow up in 3 months or p r n     Problem List Items Addressed This Visit        Digestive    Gastroesophageal reflux disease without esophagitis (Chronic)    Relevant Medications    omeprazole (PriLOSEC) 40 MG capsule       Endocrine    Other specified hypothyroidism (Chronic)    Relevant Orders    Hemoglobin A1C    TSH, 3rd generation with Free T4 reflex       Respiratory    Mild intermittent asthma without complication    Relevant Orders    Ambulatory Referral to Pulmonology    Obstructive sleep apnea (adult) (pediatric) - Primary    Relevant Orders    Cpap New DME    Ambulatory Referral to Pulmonology       Genitourinary    Stage 3 chronic kidney disease (HCC) (Chronic)    Relevant Orders    CBC and differential    Comprehensive metabolic panel    Hemoglobin A1C       Other    Dyslipidemia (Chronic)    Relevant Orders    Comprehensive metabolic panel    Lipid Panel with Direct LDL reflex    Chronic anticoagulation    Relevant Orders    CBC and differential      Other Visit Diagnoses     Fracture of one rib, right side, initial encounter for closed fracture        Relevant Orders    DXA bone density spine hip and pelvis    Postmenopausal        Relevant Orders    DXA bone density spine hip and pelvis    TSH, 3rd generation with Free T4 reflex           Diagnoses and all orders for this visit:    Obstructive sleep apnea (adult) (pediatric)  -     Cpap New DME  -     Ambulatory Referral to Pulmonology;  Future    Other specified hypothyroidism  -     Hemoglobin A1C  -     TSH, 3rd generation with Free T4 reflex    Chronic anticoagulation  -     CBC and differential  -     Cancel: Protime-INR    Dyslipidemia  -     Comprehensive metabolic panel  - Lipid Panel with Direct LDL reflex    Stage 3 chronic kidney disease, unspecified whether stage 3a or 3b CKD (HCC)  -     CBC and differential  -     Comprehensive metabolic panel  -     Hemoglobin A1C    Gastroesophageal reflux disease without esophagitis  -     omeprazole (PriLOSEC) 40 MG capsule; Take 1 capsule (40 mg total) by mouth daily before breakfast    Fracture of one rib, right side, initial encounter for closed fracture  -     DXA bone density spine hip and pelvis; Future    Postmenopausal  -     DXA bone density spine hip and pelvis; Future  -     TSH, 3rd generation with Free T4 reflex    Mild intermittent asthma without complication  -     Ambulatory Referral to Pulmonology; Future        No problem-specific Assessment & Plan notes found for this encounter  Subjective:      Patient ID: Jagdeep Flores is a 64 y o  female  Patient here today for follow-up  Patient states her CPAP machine has been recalled  She does not always wear it  He is due to see pulmonology  Patient continues to have GERD symptoms despite taking Pepcid at nighttime        The following portions of the patient's history were reviewed and updated as appropriate:   She has a past medical history of Abdominal pain, lower, Acute renal failure ([address] ), Allergic rhinitis, Ankle pain, right, Arthritis, Asthma, Bilateral chronic knee pain, Bladder pain, Blood clot in vein, Bursitis, Cardiac disorder, Cardiac murmur, Chest tightness or pressure, COPD (chronic obstructive pulmonary disease) ([address] ), Coronary artery disease, Curvature of spine, GERD (gastroesophageal reflux disease), Hernia, hiatal, Hyperlipidemia, Hypertension, Inguinal hernia, Jaw closure abnormality, Lumbar herniated disc, Morbid obesity ([address] ), Obesity, Osteoarthritis of right knee, Patellar tendinitis, Poor flexibility of tendon, Pulmonary embolism ([address] ), Sepsis ([address] ), Severe sepsis due to methicillin resistant Staphylococcus aureus (MRSA) with acute organ dysfunction ([facility] ), Spider vein, symptomatic, Status post arthroscopy of right knee, Stomach disorder, Tear of medial meniscus of right knee, Thyroid disorder, and Umbilical hernia ,  does not have any pertinent problems on file  ,   has a past surgical history that includes Hysterectomy (2009); Open anterior shoulder reconstruction (Left); Hand surgery (Right); Foot surgery (Left); Colonoscopy; pr knee scope,med/lat menisectomy (Right, 4/11/2016); Exploratory laparotomy; Peripherally inserted central catheter insertion; Incision and drainage anterior neck (Right, 6/8/2017); Other surgical history; Cystoscopy (01/1994); Incisional hernia repair; Shoulder surgery; IR IVC filter removal (1/22/2019); and Fracture surgery  ,  family history includes Arthritis in her mother; Breast cancer in her maternal aunt; Colon cancer in her brother and son; Diabetes in her maternal aunt; Lung cancer in her father; No Known Problems in her daughter, daughter, maternal grandfather, maternal grandmother, paternal grandfather, paternal grandmother, sister, sister, sister, sister, sister, and sister  ,   reports that she quit smoking about 26 years ago  She has never used smokeless tobacco  She reports that she does not drink alcohol and does not use drugs  ,  is allergic to clindamycin, medical tape, penicillin g, and penicillins     Current Outpatient Medications   Medication Sig Dispense Refill    albuterol (2 5 mg/3 mL) 0 083 % nebulizer solution Take 3 mL (2 5 mg total) by nebulization as needed for wheezing 3 mL 5    albuterol (PROVENTIL HFA,VENTOLIN HFA) 90 mcg/act inhaler Inhale 2 puffs every 4 (four) hours as needed for wheezing 18 g 3    atorvastatin (LIPITOR) 10 mg tablet take 1 tablet by mouth once daily 30 tablet 5    budesonide-formoterol (SYMBICORT) 160-4 5 mcg/act inhaler Inhale 2 puffs 2 (two) times a day Rinse mouth after use   10 2 g 3    fluticasone (FLONASE) 50 mcg/act nasal spray 2 sprays into each nostril daily 16 g 5    furosemide (LASIX) 20 mg tablet Take 1 tablet (20 mg total) by mouth daily as needed (leg swelling) Take with potassium 30 tablet 5    levothyroxine 112 mcg tablet Take 1 tablet (112 mcg total) by mouth daily 30 tablet 5    loratadine (CLARITIN) 10 mg tablet Take 1 tablet (10 mg total) by mouth daily 30 tablet 5    montelukast (SINGULAIR) 10 mg tablet Take 1 tablet (10 mg total) by mouth daily at bedtime 30 tablet 5    potassium chloride (MICRO-K) 10 MEQ CR capsule Take 1 capsule (10 mEq total) by mouth daily 30 capsule 1    warfarin (COUMADIN) 5 mg tablet TAKE DAILY AS DIRECTED BY PHYSICIAN 45 tablet 5    omeprazole (PriLOSEC) 40 MG capsule Take 1 capsule (40 mg total) by mouth daily before breakfast 30 capsule 5     No current facility-administered medications for this visit  Review of Systems   Constitutional: Negative  Respiratory: Negative  Cardiovascular: Negative  Gastrointestinal:         Still gets reflux, especially at night   Genitourinary: Negative  Objective:  Vitals:    02/18/22 0818 02/18/22 0841   BP: 122/80    Pulse: 81 68   Temp: (!) 97 1 °F (36 2 °C)    SpO2:  97%   Weight: 131 kg (289 lb 12 8 oz)    Height: 5' 5" (1 651 m)      Body mass index is 48 23 kg/m²  Physical Exam  Vitals reviewed  Constitutional:       General: She is not in acute distress  Appearance: Normal appearance  She is well-developed  She is not ill-appearing, toxic-appearing or diaphoretic  HENT:      Head: Normocephalic and atraumatic  Eyes:      Conjunctiva/sclera: Conjunctivae normal    Cardiovascular:      Rate and Rhythm: Normal rate and regular rhythm  Heart sounds: Normal heart sounds  No murmur heard  No friction rub  No gallop  Pulmonary:      Effort: Pulmonary effort is normal  No respiratory distress  Breath sounds: Normal breath sounds  No wheezing, rhonchi or rales  Musculoskeletal:      Right lower leg: No edema  Left lower leg: No edema  Neurological:      General: No focal deficit present  Mental Status: She is alert and oriented to person, place, and time  Psychiatric:         Mood and Affect: Mood normal          Behavior: Behavior normal          Thought Content:  Thought content normal          Judgment: Judgment normal

## 2022-02-21 DIAGNOSIS — E78.5 DYSLIPIDEMIA: ICD-10-CM

## 2022-02-21 DIAGNOSIS — E03.8 OTHER SPECIFIED HYPOTHYROIDISM: ICD-10-CM

## 2022-02-22 RX ORDER — ATORVASTATIN CALCIUM 10 MG/1
10 TABLET, FILM COATED ORAL DAILY
Qty: 30 TABLET | Refills: 5 | Status: SHIPPED | OUTPATIENT
Start: 2022-02-22 | End: 2022-06-10

## 2022-02-22 RX ORDER — LEVOTHYROXINE SODIUM 112 UG/1
112 TABLET ORAL DAILY
Qty: 30 TABLET | Refills: 5 | Status: SHIPPED | OUTPATIENT
Start: 2022-02-22 | End: 2022-06-08 | Stop reason: SDUPTHER

## 2022-02-28 ENCOUNTER — OFFICE VISIT (OUTPATIENT)
Dept: PULMONOLOGY | Facility: CLINIC | Age: 57
End: 2022-02-28
Payer: COMMERCIAL

## 2022-02-28 VITALS
SYSTOLIC BLOOD PRESSURE: 123 MMHG | BODY MASS INDEX: 46.62 KG/M2 | WEIGHT: 279.8 LBS | OXYGEN SATURATION: 93 % | HEART RATE: 73 BPM | TEMPERATURE: 97 F | HEIGHT: 65 IN | DIASTOLIC BLOOD PRESSURE: 89 MMHG

## 2022-02-28 DIAGNOSIS — K44.9 HIATAL HERNIA: ICD-10-CM

## 2022-02-28 DIAGNOSIS — G47.33 OSA (OBSTRUCTIVE SLEEP APNEA): Primary | ICD-10-CM

## 2022-02-28 DIAGNOSIS — E66.01 MORBID OBESITY (HCC): Chronic | ICD-10-CM

## 2022-02-28 DIAGNOSIS — K21.9 GASTROESOPHAGEAL REFLUX DISEASE WITHOUT ESOPHAGITIS: Chronic | ICD-10-CM

## 2022-02-28 DIAGNOSIS — J45.40 MODERATE PERSISTENT ASTHMA, UNSPECIFIED WHETHER COMPLICATED: ICD-10-CM

## 2022-02-28 DIAGNOSIS — I26.99 PULMONARY EMBOLISM, BILATERAL (HCC): Chronic | ICD-10-CM

## 2022-02-28 DIAGNOSIS — Z23 NEED FOR COVID-19 VACCINE: ICD-10-CM

## 2022-02-28 PROBLEM — Z95.828 PRESENCE OF IVC FILTER: Status: RESOLVED | Noted: 2018-10-12 | Resolved: 2022-02-28

## 2022-02-28 PROCEDURE — 99214 OFFICE O/P EST MOD 30 MIN: CPT | Performed by: INTERNAL MEDICINE

## 2022-02-28 NOTE — ASSESSMENT & PLAN NOTE
Patient on Symbicort- only taking 2 puffs once a day and then using albuterol 2 puffs at night  Patient was unclear on dosing and medications    Educated her- use Symbicort 160-4 5 mcg 2 puffs q12 with PRN albuterol inhaler or neb  She is on singulair  Her MDI technique is adequate

## 2022-02-28 NOTE — ASSESSMENT & PLAN NOTE
Mild MICHAEL  Was doing well with auto-CPAP but then it went on recall  We will call her DME company today to assure that she is on a list to get a new machine

## 2022-02-28 NOTE — PROGRESS NOTES
Pulmonary Outpatient Note   Albina Ramos 64 y o  female MRN: [id number]  2/28/2022      Referring Physician: Natalia Duane, DO    Reason for Consultation:    Chief Complaint   Patient presents with    Sleep Apnea         Assessment/Plan:    1  MICHAEL (obstructive sleep apnea)  Assessment & Plan:  Mild MICHAEL  Was doing well with auto-CPAP but then it went on recall  We will call her [employer] company today to assure that she is on a list to get a new machine  Orders:  -     Ambulatory Referral to Pulmonology    2  Moderate persistent asthma, unspecified whether complicated  Assessment & Plan:  Patient on Symbicort- only taking 2 puffs once a day and then using albuterol 2 puffs at night  Patient was unclear on dosing and medications    Educated her- use Symbicort 160-4 5 mcg 2 puffs q12 with PRN albuterol inhaler or neb  She is on singulair  Her MDI technique is adequate  Orders:  -     Ambulatory Referral to Pulmonology    3  Pulmonary embolism, bilateral (HCC)  Comments:  Multiple past PE, on warfarin  History prior IVC filter which was removed    4  Gastroesophageal reflux disease without esophagitis  Comments:  on PPI    5  Morbid obesity ([address] )  Comments:  Would benefit from weight loss    6  Need for COVID-19 vaccine  Comments:  Counseled on safety and efficacy of vaccine    7  Hiatal hernia        Health Maintenance  Immunization History   Administered Date(s) Administered    INFLUENZA 09/29/2014, 09/28/2015, 11/27/2016, 11/30/2016, 05/29/2017    Influenza Quadrivalent Preservative Free 3 years and older IM 11/30/2016    Influenza Quadrivalent, 6-35 Months IM 08/29/2017    Influenza, Quadrivalent (nasal) 11/27/2016    Influenza, recombinant, quadrivalent,injectable, preservative free 10/12/2018, 09/11/2019, 09/15/2020, 09/30/2021    Influenza, seasonal, injectable 09/29/2014    Pneumococcal Polysaccharide PPV23 08/29/2017          Return in about 6 months (around 8/28/2022)      History of Present Illness   HPI:  Albina Ramos is a 64 y o  female who presents for follow up for her mild MICHAEL, asthma, and recurrent PE  No flares of asthma  Is on symbicort 2 puffs only once daily(160-4 5)  Is on singulair  Uses her albuterol inhaler every day  I educated her that Symbicort is maintenance inhaler- use this twice a day  Does not have a spacer    On lifelong A/C for recurrent VTE  Is on warfarin  Has a history of IVC filter that was removed  Got a CPAP machine last year- Auto CPAP 5-20  Was helping her  She stopped using it due to the recall  [address]  COVID- has not had vaccine  Reports that she had COVID twice  She is not interested in the vaccine  Review of Systems   Constitutional: Negative for chills and fever  HENT: Negative for ear pain and sore throat  Eyes: Negative for pain and visual disturbance  Respiratory: Positive for cough and shortness of breath  Negative for wheezing  Cardiovascular: Negative for chest pain and palpitations  Gastrointestinal: Negative for abdominal pain and vomiting  Genitourinary: Negative for dysuria and hematuria  Musculoskeletal: Negative for arthralgias and back pain  Skin: Negative for color change and rash  Neurological: Negative for seizures and syncope  All other systems reviewed and are negative            Historical Information   Past Medical History:   Diagnosis Date    Abdominal pain, lower     [id number] RESOLVED    Acute renal failure ([address] )     [id number] RESOLVED    Allergic rhinitis     51VLN8721 RESOLVED    Ankle pain, right     83SMZ0569 RESOLVED    Arthritis     67AXP8608 RESOLVED  067YRX3400 RESOLVED    Asthma     Bilateral chronic knee pain     75ISE3950    Bladder pain     70JVU9687 RESOLVED    Blood clot in vein     Bursitis     08OYQ1760 RESOLVED    Cardiac disorder     35ADM1361  RESOLVED    Cardiac murmur     [id number] RESOLVED    Chest tightness or pressure     [id number] RESOLVED    COPD (chronic obstructive pulmonary disease) ([facility]     Coronary artery disease     Curvature of spine     02PCJ0479 RESOLVED    GERD (gastroesophageal reflux disease)     Hernia, hiatal     87UEF2493 RESOLVED    Hyperlipidemia     45RSM2511 RESOLVED    Hypertension     Inguinal hernia     RIGHT   60FYH3139 RESOLVED    Jaw closure abnormality     31UUQ6724 RESOLVED    Lumbar herniated disc     70XWH7691 RESOLVED    Morbid obesity ([facility] )     83GRC3734    Obesity     Osteoarthritis of right knee     05MSP0363 RESOLVED    Patellar tendinitis     01YMT1588    Poor flexibility of tendon     80BTO4127    Presence of IVC filter 10/12/2018    Pulmonary embolism ([facility] )     23OCT2017 RESOLVED    Sepsis [facility] )     29WCO5071 RESOLVED    Severe sepsis due to methicillin resistant Staphylococcus aureus (MRSA) with acute organ dysfunction ([facility] )     02GXG0029 RESOLVED    Sleep apnea, obstructive     Spider vein, symptomatic     39KVJ1665 RESOLVED    Status post arthroscopy of right knee     74ATM3231 RESOLVED    Stomach disorder     98QYI9320 RESOLVED    Tear of medial meniscus of right knee     SUBSEQUENT ENCOUNTER  RESOLVED [id number]    Thyroid disorder     16CJN6601    Umbilical hernia     33JVA1257 RESOLVED     Past Surgical History:   Procedure Laterality Date    COLONOSCOPY      CYSTOSCOPY  01/1994    WITH BIOPSY      EXPLORATORY LAPAROTOMY      FOOT SURGERY Left     FRACTURE SURGERY      HAND SURGERY Right     cyst    HYSTERECTOMY  2009    INCISION AND DRAINAGE ANTERIOR NECK Right 6/8/2017    Procedure: INCISION AND DRAINAGE  (I&D) NECK;  Surgeon: Joon Obregon MD;  Location: [facility];  Service:    Flor Lemme HERNIA REPAIR      WITH IMPLANTATION OF MESH  13 MAY 2014  LAST ASSESSED    IR IVC FILTER REMOVAL  1/22/2019    OPEN ANTERIOR SHOULDER RECONSTRUCTION Left     OTHER SURGICAL HISTORY      BLOOD VESSEL REPAIR   13 MAY 2014 LAST ASSESSED    PERIPHERALLY INSERTED CENTRAL CATHETER INSERTION      SD KNEE SCOPE,MED/LAT MENISECTOMY Right 4/11/2016    Procedure: KNEE ARTHROSCOPY WITH MEDIAL MENISCECTOMY ;  Surgeon: Davi Gautam MD;  Location: [facility];  Service: Orthopedics    SHOULDER SURGERY       Family History   Problem Relation Age of Onset    Arthritis Mother     Colon cancer Brother     Lung cancer Father     No Known Problems Sister     No Known Problems Daughter     No Known Problems Maternal Grandmother     No Known Problems Maternal Grandfather     No Known Problems Paternal Grandmother     No Known Problems Paternal Grandfather     Breast cancer Maternal Aunt     Diabetes Maternal Aunt     No Known Problems Sister     No Known Problems Sister     No Known Problems Sister     No Known Problems Sister     No Known Problems Sister     No Known Problems Daughter     Colon cancer Son          Meds/Allergies     Current Outpatient Medications:     albuterol (2 5 mg/3 mL) 0 083 % nebulizer solution, Take 3 mL (2 5 mg total) by nebulization as needed for wheezing, Disp: 3 mL, Rfl: 5    albuterol (PROVENTIL HFA,VENTOLIN HFA) 90 mcg/act inhaler, Inhale 2 puffs every 4 (four) hours as needed for wheezing, Disp: 18 g, Rfl: 3    atorvastatin (LIPITOR) 10 mg tablet, Take 1 tablet (10 mg total) by mouth daily, Disp: 30 tablet, Rfl: 5    budesonide-formoterol (SYMBICORT) 160-4 5 mcg/act inhaler, Inhale 2 puffs 2 (two) times a day Rinse mouth after use , Disp: 10 2 g, Rfl: 3    fluticasone (FLONASE) 50 mcg/act nasal spray, 2 sprays into each nostril daily, Disp: 16 g, Rfl: 5    furosemide (LASIX) 20 mg tablet, Take 1 tablet (20 mg total) by mouth daily as needed (leg swelling) Take with potassium, Disp: 30 tablet, Rfl: 5    levothyroxine 112 mcg tablet, Take 1 tablet (112 mcg total) by mouth daily, Disp: 30 tablet, Rfl: 5    loratadine (CLARITIN) 10 mg tablet, Take 1 tablet (10 mg total) by mouth daily, Disp: 30 tablet, Rfl: 5    montelukast (SINGULAIR) 10 mg tablet, Take 1 tablet (10 mg total) by mouth daily at bedtime, Disp: 30 tablet, Rfl: 5    omeprazole (PriLOSEC) 40 MG capsule, Take 1 capsule (40 mg total) by mouth daily before breakfast, Disp: 30 capsule, Rfl: 5    potassium chloride (MICRO-K) 10 MEQ CR capsule, Take 1 capsule (10 mEq total) by mouth daily, Disp: 30 capsule, Rfl: 1    warfarin (COUMADIN) 5 mg tablet, TAKE DAILY AS DIRECTED BY PHYSICIAN, Disp: 45 tablet, Rfl: 5  Allergies   Allergen Reactions    Clindamycin Angioedema    Medical Tape Itching     Welt and redness    Penicillin G Nausea Only    Penicillins GI Intolerance     Nausea and vomiting       Vitals: Blood pressure 123/89, pulse 73, temperature (!) 97 °F (36 1 °C), temperature source Tympanic, height 5' 5" (1 651 m), weight 127 kg (279 lb 12 8 oz), SpO2 93 %, not currently breastfeeding  Body mass index is 46 56 kg/m²  Oxygen Therapy  SpO2: 93 %  Oxygen Therapy: None (Room air)      Physical Exam  Physical Exam  Vitals and nursing note reviewed  Constitutional:       General: She is not in acute distress  Appearance: She is well-developed  She is obese  HENT:      Head: Normocephalic and atraumatic  Nose: Nose normal  No congestion  Mouth/Throat:      Mouth: Mucous membranes are moist       Pharynx: No oropharyngeal exudate  Eyes:      Conjunctiva/sclera: Conjunctivae normal    Cardiovascular:      Rate and Rhythm: Normal rate and regular rhythm  Heart sounds: No murmur heard  Pulmonary:      Effort: Pulmonary effort is normal  No respiratory distress  Breath sounds: Normal breath sounds  Abdominal:      Palpations: Abdomen is soft  Tenderness: There is no abdominal tenderness  Musculoskeletal:      Cervical back: Neck supple  Right lower leg: No edema  Left lower leg: No edema  Skin:     General: Skin is warm and dry  Neurological:      General: No focal deficit present        Mental Status: She is alert and oriented to person, place, and time  Psychiatric:         Mood and Affect: Mood normal          Behavior: Behavior normal          Labs: I have personally reviewed pertinent lab results  ABG:   Lab Results   Component Value Date    PHART 7 368 06/05/2017    APG3QMF 29 2 (LL) 06/05/2017    PO2ART 124 1 06/05/2017    TON6NKH 16 4 (L) 06/05/2017    BEART -7 8 06/05/2017    SOURCE Radial, Right 06/05/2017   ,   BNP: No results found for: BNP,   CBC:  Lab Results   Component Value Date    WBC 6 32 02/18/2022    HGB 13 1 02/18/2022    HCT 43 8 02/18/2022    MCV 98 02/18/2022     02/18/2022    EOSPCT 4 02/18/2022    EOSABS 0 22 02/18/2022    NEUTOPHILPCT 62 02/18/2022    LYMPHOPCT 23 02/18/2022   ,   CMP:   Lab Results   Component Value Date    SODIUM 140 02/18/2022    K 4 2 02/18/2022     (H) 02/18/2022    CO2 24 02/18/2022    ANIONGAP 8 01/07/2016    BUN 14 02/18/2022    CREATININE 1 07 02/18/2022    GLUCOSE 102 06/01/2017    CALCIUM 9 1 02/18/2022    AST 15 02/18/2022    ALT 17 02/18/2022    ALKPHOS 77 02/18/2022    PROT 6 8 01/07/2016    BILITOT 0 40 01/07/2016    EGFR 58 02/18/2022   ,   PT/INR:   Lab Results   Component Value Date    INR 1 50 (H) 02/18/2022   ,   Troponin:   Lab Results   Component Value Date    TROPONINI <0 03 05/02/2020         Imaging and other studies: I have personally reviewed pertinent reports  and I have personally reviewed pertinent films in PACS    CT Chest 6/2021- lungs clear other than minimal bibasilar atelectasis  + 10th R rib fracture  Moderate hiatal hernia      Pulmonary function testing:   Pulmonary Functions Testing Results:    11/20/2020  Spirometry:  FEV1/FVC Ratio is 81 %  FEV1 is 2 29 L/93 % of predicted  FVC is 2 83 L/86 % of predicted      Lung volumes:  RV 1 10 L/58 % of predicted, TLC 4 05 L/79 % of predicted, RV/TLC ratio 27 %     Diffusing capacity:  57 % of predicted     IMPRESSION:  1  Normal spirometry with no obstruction, normal vital capacity  2   Decreased lung volumes indicating very mild restriction  3  Moderately impaired diffusion capacity  4  Normal flow volume loop       Sleep study 11/2020- mild MICHAEL AHI 10 6    EKG, Pathology, and Other Studies: I have personally reviewed pertinent reports  and I have personally reviewed pertinent films in PACS    TTE  SUMMARY     LEFT VENTRICLE:  Systolic function was normal  Ejection fraction was estimated to be 65 %  There were no regional wall motion abnormalities      RIGHT VENTRICLE:  The size was normal   Systolic function was normal      MITRAL VALVE:  There was trace regurgitation      TRICUSPID VALVE:  There was trace regurgitation  Pulmonary artery systolic pressure was within the normal range          BETTIE Santamaria [facility]

## 2022-03-22 DIAGNOSIS — K21.00 GASTROESOPHAGEAL REFLUX DISEASE WITH ESOPHAGITIS WITHOUT HEMORRHAGE: ICD-10-CM

## 2022-03-22 DIAGNOSIS — J45.20 MILD INTERMITTENT ASTHMA WITHOUT COMPLICATION: Chronic | ICD-10-CM

## 2022-03-22 RX ORDER — FAMOTIDINE 40 MG/1
TABLET, FILM COATED ORAL
Qty: 30 TABLET | Refills: 5 | Status: SHIPPED | OUTPATIENT
Start: 2022-03-22 | End: 2022-06-10

## 2022-03-22 RX ORDER — BUDESONIDE AND FORMOTEROL FUMARATE DIHYDRATE 160; 4.5 UG/1; UG/1
AEROSOL RESPIRATORY (INHALATION)
Qty: 10.2 G | Refills: 3 | Status: SHIPPED | OUTPATIENT
Start: 2022-03-22 | End: 2022-06-10

## 2022-03-23 ENCOUNTER — VBI (OUTPATIENT)
Dept: ADMINISTRATIVE | Facility: OTHER | Age: 57
End: 2022-03-23

## 2022-03-25 ENCOUNTER — APPOINTMENT (OUTPATIENT)
Dept: LAB | Facility: MEDICAL CENTER | Age: 57
End: 2022-03-25
Payer: COMMERCIAL

## 2022-03-28 ENCOUNTER — ANTICOAG VISIT (OUTPATIENT)
Dept: FAMILY MEDICINE CLINIC | Facility: CLINIC | Age: 57
End: 2022-03-28

## 2022-04-25 ENCOUNTER — TELEPHONE (OUTPATIENT)
Dept: PULMONOLOGY | Facility: CLINIC | Age: 57
End: 2022-04-25

## 2022-04-25 DIAGNOSIS — J45.20 MILD INTERMITTENT ASTHMA WITHOUT COMPLICATION: Primary | ICD-10-CM

## 2022-04-25 DIAGNOSIS — J45.20 MILD INTERMITTENT ASTHMA WITHOUT COMPLICATION: ICD-10-CM

## 2022-04-25 RX ORDER — AZITHROMYCIN 250 MG/1
TABLET, FILM COATED ORAL
Qty: 6 TABLET | Refills: 0 | Status: SHIPPED | OUTPATIENT
Start: 2022-04-25 | End: 2022-04-29

## 2022-04-25 RX ORDER — ALBUTEROL SULFATE 90 UG/1
2 AEROSOL, METERED RESPIRATORY (INHALATION) EVERY 4 HOURS PRN
Qty: 18 G | Refills: 3 | Status: SHIPPED | OUTPATIENT
Start: 2022-04-25 | End: 2022-06-10

## 2022-04-25 NOTE — TELEPHONE ENCOUNTER
Sekou Parks would like a return call regarding     What is the reason for the call/chief complaint? cough    What additional symptoms are present or absent? SOB, yes   Are they on O2? Yes, describe: AT NIGHT  IS USING DURING DAY SINCE NEBULIZER BROKE Pulse O2 resting N/A When walking N/A   chest pain/tightness, yes  wheezing, yes  Cough, yes  mucous production,yes  What color is it YELLOW/ GREEN  fevers/chills, no  weight gain, no  Swelling yes  Pain no, does it hurt when breathing or all the time? N/A    When did they start/how long have they been going on? 1 WEEK AGO    Constant or intermittent; if intermittent describe yes? What makes it better or worse? MOVING AROUND MAKES IT WORST    Have you been exposed to anyone that is sick? No    Check current pulmonary medications SYMBICORT   frequency of use 2X DAILY    Have they had any other treatment or testing for this problem elsewhere? N/A    Recent steroids? No    Recent Antibiotics? No     Last office visit? 02/28/22  [id number], [address] [id number]   Patient pharmacy?     30 or 90 day supply

## 2022-04-25 NOTE — TELEPHONE ENCOUNTER
Pt called re: since the last COVID issue in 1/22-2/22 she has a productive cough which is producing very thick mucous colored yellow/green/clear at times  She is coughing more and has heavy breathing    Please advise: [phone]

## 2022-04-25 NOTE — TELEPHONE ENCOUNTER
Pt requesting a script for a new nebulizer  Said her's is not working right    Please send to [facility]

## 2022-04-25 NOTE — TELEPHONE ENCOUNTER
I sent order of Zithromax to her pharmacy    I also put in an order of a new nebulizer please have it delivered to her house

## 2022-04-26 ENCOUNTER — TELEPHONE (OUTPATIENT)
Dept: FAMILY MEDICINE CLINIC | Facility: CLINIC | Age: 57
End: 2022-04-26

## 2022-04-26 NOTE — TELEPHONE ENCOUNTER
Roz from [facility] needs chart note for the Nebulizer order  Would you please addend your last office visit note to reflect her needing the nebulizer and supplies please   Tania Tanisha to [facility]: [phone]

## 2022-04-27 LAB
DME PARACHUTE DELIVERY DATE ACTUAL: NORMAL
DME PARACHUTE DELIVERY DATE REQUESTED: NORMAL
DME PARACHUTE ITEM DESCRIPTION: NORMAL
DME PARACHUTE ORDER STATUS: NORMAL
DME PARACHUTE SUPPLIER NAME: NORMAL
DME PARACHUTE SUPPLIER PHONE: NORMAL

## 2022-04-29 ENCOUNTER — OFFICE VISIT (OUTPATIENT)
Dept: FAMILY MEDICINE CLINIC | Facility: CLINIC | Age: 57
End: 2022-04-29
Payer: COMMERCIAL

## 2022-04-29 VITALS
DIASTOLIC BLOOD PRESSURE: 82 MMHG | BODY MASS INDEX: 48.62 KG/M2 | WEIGHT: 291.8 LBS | OXYGEN SATURATION: 95 % | HEIGHT: 65 IN | TEMPERATURE: 97.2 F | SYSTOLIC BLOOD PRESSURE: 136 MMHG | HEART RATE: 75 BPM

## 2022-04-29 DIAGNOSIS — J40 BRONCHITIS: Primary | ICD-10-CM

## 2022-04-29 PROCEDURE — 99213 OFFICE O/P EST LOW 20 MIN: CPT | Performed by: PHYSICIAN ASSISTANT

## 2022-04-29 NOTE — PROGRESS NOTES
Assessment/Plan:    Problem List Items Addressed This Visit     None      Visit Diagnoses     Bronchitis    -  Primary           Diagnoses and all orders for this visit:    Bronchitis        Recommended she  prescription for zithromax that was called in by her pulmonologist    Continue nebulizer and inhalers as directed  Continue allergy medications  Continue symptomatic relief  She will notify us if symptoms do not improve or worsen  Subjective:      Patient ID: Sekou Parks is a 62 y o  female  [address] is a 62year old female who is here today for cold-like symptoms  She started with a cough, wheezing, scratchy throat, runny nose, and congestion about a week ago  She has been taking OTC Robitussin  She was called in Zithromax and albuterol nebulizer by her pulmonologist  She got the nebulizer, which is helping  However, she did not know that Zithromax was called in for her  She denies any fevers or chills  She does have some rib pain from coughing  She had COVID in January 2022 and in 2021         The following portions of the patient's history were reviewed and updated as appropriate:   She has a past medical history of Abdominal pain, lower, Acute renal failure ([address] ), Allergic rhinitis, Ankle pain, right, Arthritis, Asthma, Bilateral chronic knee pain, Bladder pain, Blood clot in vein, Bursitis, Cardiac disorder, Cardiac murmur, Chest tightness or pressure, COPD (chronic obstructive pulmonary disease) ([address] ), Coronary artery disease, Curvature of spine, GERD (gastroesophageal reflux disease), Hernia, hiatal, Hyperlipidemia, Hypertension, Inguinal hernia, Jaw closure abnormality, Lumbar herniated disc, Morbid obesity ([address] ), Obesity, Osteoarthritis of right knee, Patellar tendinitis, Poor flexibility of tendon, Presence of IVC filter (10/12/2018), Pulmonary embolism ([address] ), Sepsis ([facility] [address] ), Severe sepsis due to methicillin resistant Staphylococcus aureus (MRSA) with acute organ dysfunction [facility]), Sleep apnea, obstructive, Spider vein, symptomatic, Status post arthroscopy of right knee, Stomach disorder, Tear of medial meniscus of right knee, Thyroid disorder, and Umbilical hernia ,  does not have any pertinent problems on file  ,   has a past surgical history that includes Hysterectomy (2009); Open anterior shoulder reconstruction (Left); Hand surgery (Right); Foot surgery (Left); Colonoscopy; pr knee scope,med/lat menisectomy (Right, 4/11/2016); Exploratory laparotomy; Peripherally inserted central catheter insertion; Incision and drainage anterior neck (Right, 6/8/2017); Other surgical history; Cystoscopy (01/1994); Incisional hernia repair; Shoulder surgery; IR IVC filter removal (1/22/2019); and Fracture surgery  ,  family history includes Arthritis in her mother; Breast cancer in her maternal aunt; Colon cancer in her brother and son; Diabetes in her maternal aunt; Lung cancer in her father; No Known Problems in her daughter, daughter, maternal grandfather, maternal grandmother, paternal grandfather, paternal grandmother, sister, sister, sister, sister, sister, and sister  ,   reports that she quit smoking about 26 years ago  She has never used smokeless tobacco  She reports that she does not drink alcohol and does not use drugs  ,  is allergic to clindamycin, medical tape, penicillin g, and penicillins     Current Outpatient Medications   Medication Sig Dispense Refill    albuterol (2 5 mg/3 mL) 0 083 % nebulizer solution Take 3 mL (2 5 mg total) by nebulization as needed for wheezing 3 mL 5    albuterol (PROVENTIL HFA,VENTOLIN HFA) 90 mcg/act inhaler Inhale 2 puffs every 4 (four) hours as needed for wheezing 18 g 3    atorvastatin (LIPITOR) 10 mg tablet Take 1 tablet (10 mg total) by mouth daily 30 tablet 5    azithromycin (ZITHROMAX) 250 mg tablet Take 2 tablets today then 1 tablet daily x 4 days 6 tablet 0    famotidine (PEPCID) 40 MG tablet take 1 tablet by mouth at bedtime 30 tablet 5    fluticasone (FLONASE) 50 mcg/act nasal spray 2 sprays into each nostril daily 16 g 5    furosemide (LASIX) 20 mg tablet Take 1 tablet (20 mg total) by mouth daily as needed (leg swelling) Take with potassium 30 tablet 5    levothyroxine 112 mcg tablet Take 1 tablet (112 mcg total) by mouth daily 30 tablet 5    loratadine (CLARITIN) 10 mg tablet Take 1 tablet (10 mg total) by mouth daily 30 tablet 5    montelukast (SINGULAIR) 10 mg tablet Take 1 tablet (10 mg total) by mouth daily at bedtime 30 tablet 5    omeprazole (PriLOSEC) 40 MG capsule Take 1 capsule (40 mg total) by mouth daily before breakfast 30 capsule 5    potassium chloride (MICRO-K) 10 MEQ CR capsule Take 1 capsule (10 mEq total) by mouth daily 30 capsule 1    Symbicort 160-4 5 MCG/ACT inhaler inhale 2 puffs by mouth twice a day Rinse mouth after use 10 2 g 3    warfarin (COUMADIN) 5 mg tablet TAKE DAILY AS DIRECTED BY PHYSICIAN 45 tablet 5     No current facility-administered medications for this visit  Review of Systems   Constitutional: Negative for chills, diaphoresis, fatigue and fever  HENT: Positive for congestion, rhinorrhea and sore throat  Negative for ear pain, postnasal drip, sneezing and trouble swallowing  Eyes: Negative for pain and visual disturbance  Respiratory: Positive for cough and wheezing  Negative for apnea and shortness of breath  Cardiovascular: Positive for chest pain (rib pain)  Negative for palpitations  Gastrointestinal: Negative for abdominal pain, constipation, diarrhea, nausea and vomiting  Genitourinary: Negative for dysuria and hematuria  Musculoskeletal: Negative for arthralgias, gait problem and myalgias  Neurological: Negative for dizziness, syncope, weakness, light-headedness, numbness and headaches  Psychiatric/Behavioral: Negative for suicidal ideas  The patient is not nervous/anxious            Objective:  Vitals:    04/29/22 0709   BP: 136/82   Pulse: 75   Temp: (!) 97 2 °F (36 2 °C)   SpO2: 95%   Weight: 132 kg (291 lb 12 8 oz)   Height: 5' 5" (1 651 m)     Body mass index is 48 56 kg/m²  Physical Exam  Constitutional:       Appearance: She is well-developed  HENT:      Head: Normocephalic and atraumatic  Right Ear: Tympanic membrane, ear canal and external ear normal       Left Ear: Tympanic membrane, ear canal and external ear normal       Nose: Congestion and rhinorrhea present  Mouth/Throat:      Pharynx: Posterior oropharyngeal erythema (mild) present  No oropharyngeal exudate  Eyes:      Extraocular Movements: Extraocular movements intact  Cardiovascular:      Rate and Rhythm: Normal rate and regular rhythm  Heart sounds: Normal heart sounds  No murmur heard  No friction rub  No gallop  Pulmonary:      Effort: Pulmonary effort is normal  No respiratory distress  Breath sounds: Wheezing (scattered in all lung fields) present  No rales  Musculoskeletal:         General: Normal range of motion  Cervical back: Normal range of motion and neck supple  Lymphadenopathy:      Cervical: No cervical adenopathy  Skin:     General: Skin is warm and dry  Neurological:      Mental Status: She is alert and oriented to person, place, and time  Psychiatric:         Behavior: Behavior normal          Thought Content:  Thought content normal          Judgment: Judgment normal

## 2022-05-10 ENCOUNTER — APPOINTMENT (OUTPATIENT)
Dept: LAB | Facility: MEDICAL CENTER | Age: 57
End: 2022-05-10
Payer: COMMERCIAL

## 2022-05-10 ENCOUNTER — ANTICOAG VISIT (OUTPATIENT)
Dept: FAMILY MEDICINE CLINIC | Facility: CLINIC | Age: 57
End: 2022-05-10

## 2022-05-19 ENCOUNTER — TELEPHONE (OUTPATIENT)
Dept: FAMILY MEDICINE CLINIC | Facility: CLINIC | Age: 57
End: 2022-05-19

## 2022-06-07 ENCOUNTER — OFFICE VISIT (OUTPATIENT)
Dept: FAMILY MEDICINE CLINIC | Facility: CLINIC | Age: 57
End: 2022-06-07
Payer: COMMERCIAL

## 2022-06-07 ENCOUNTER — APPOINTMENT (OUTPATIENT)
Dept: LAB | Facility: MEDICAL CENTER | Age: 57
End: 2022-06-07
Payer: COMMERCIAL

## 2022-06-07 ENCOUNTER — APPOINTMENT (OUTPATIENT)
Dept: RADIOLOGY | Facility: MEDICAL CENTER | Age: 57
End: 2022-06-07
Payer: COMMERCIAL

## 2022-06-07 ENCOUNTER — ANTICOAG VISIT (OUTPATIENT)
Dept: FAMILY MEDICINE CLINIC | Facility: CLINIC | Age: 57
End: 2022-06-07

## 2022-06-07 VITALS
TEMPERATURE: 96.1 F | HEIGHT: 65 IN | SYSTOLIC BLOOD PRESSURE: 122 MMHG | WEIGHT: 285 LBS | BODY MASS INDEX: 47.48 KG/M2 | HEART RATE: 71 BPM | DIASTOLIC BLOOD PRESSURE: 84 MMHG | OXYGEN SATURATION: 98 %

## 2022-06-07 DIAGNOSIS — Z12.11 SCREENING FOR COLON CANCER: ICD-10-CM

## 2022-06-07 DIAGNOSIS — M25.542 PAIN INVOLVING JOINT OF FINGER OF LEFT HAND: ICD-10-CM

## 2022-06-07 DIAGNOSIS — N18.30 STAGE 3 CHRONIC KIDNEY DISEASE, UNSPECIFIED WHETHER STAGE 3A OR 3B CKD (HCC): Chronic | ICD-10-CM

## 2022-06-07 DIAGNOSIS — E03.8 OTHER SPECIFIED HYPOTHYROIDISM: ICD-10-CM

## 2022-06-07 DIAGNOSIS — R25.2 CRAMP IN LOWER LEG: ICD-10-CM

## 2022-06-07 DIAGNOSIS — Z79.01 CHRONIC ANTICOAGULATION: ICD-10-CM

## 2022-06-07 DIAGNOSIS — R60.0 BILATERAL LEG EDEMA: Primary | ICD-10-CM

## 2022-06-07 DIAGNOSIS — Z12.31 ENCOUNTER FOR SCREENING MAMMOGRAM FOR BREAST CANCER: ICD-10-CM

## 2022-06-07 DIAGNOSIS — E78.5 DYSLIPIDEMIA: Chronic | ICD-10-CM

## 2022-06-07 DIAGNOSIS — R60.0 BILATERAL LEG EDEMA: ICD-10-CM

## 2022-06-07 DIAGNOSIS — J45.20 MILD INTERMITTENT ASTHMA WITHOUT COMPLICATION: ICD-10-CM

## 2022-06-07 LAB
ANION GAP SERPL CALCULATED.3IONS-SCNC: 6 MMOL/L (ref 4–13)
BUN SERPL-MCNC: 14 MG/DL (ref 5–25)
CALCIUM SERPL-MCNC: 9 MG/DL (ref 8.3–10.1)
CHLORIDE SERPL-SCNC: 114 MMOL/L (ref 100–108)
CO2 SERPL-SCNC: 24 MMOL/L (ref 21–32)
CREAT SERPL-MCNC: 1.13 MG/DL (ref 0.6–1.3)
GFR SERPL CREATININE-BSD FRML MDRD: 54 ML/MIN/1.73SQ M
GLUCOSE P FAST SERPL-MCNC: 89 MG/DL (ref 65–99)
LDLC SERPL DIRECT ASSAY-MCNC: 128 MG/DL (ref 0–100)
MAGNESIUM SERPL-MCNC: 2.4 MG/DL (ref 1.6–2.6)
PHOSPHATE SERPL-MCNC: 3.1 MG/DL (ref 2.7–4.5)
POTASSIUM SERPL-SCNC: 4.3 MMOL/L (ref 3.5–5.3)
SODIUM SERPL-SCNC: 144 MMOL/L (ref 136–145)
T4 FREE SERPL-MCNC: 0.91 NG/DL (ref 0.76–1.46)
TSH SERPL DL<=0.05 MIU/L-ACNC: 9.87 UIU/ML (ref 0.45–4.5)

## 2022-06-07 PROCEDURE — 83735 ASSAY OF MAGNESIUM: CPT | Performed by: FAMILY MEDICINE

## 2022-06-07 PROCEDURE — 84100 ASSAY OF PHOSPHORUS: CPT | Performed by: FAMILY MEDICINE

## 2022-06-07 PROCEDURE — 83721 ASSAY OF BLOOD LIPOPROTEIN: CPT | Performed by: FAMILY MEDICINE

## 2022-06-07 PROCEDURE — 99214 OFFICE O/P EST MOD 30 MIN: CPT | Performed by: FAMILY MEDICINE

## 2022-06-07 PROCEDURE — 84443 ASSAY THYROID STIM HORMONE: CPT | Performed by: FAMILY MEDICINE

## 2022-06-07 PROCEDURE — 73140 X-RAY EXAM OF FINGER(S): CPT

## 2022-06-07 PROCEDURE — 80048 BASIC METABOLIC PNL TOTAL CA: CPT | Performed by: FAMILY MEDICINE

## 2022-06-07 PROCEDURE — 84439 ASSAY OF FREE THYROXINE: CPT | Performed by: FAMILY MEDICINE

## 2022-06-07 RX ORDER — POTASSIUM CHLORIDE 750 MG/1
10 CAPSULE, EXTENDED RELEASE ORAL DAILY
Qty: 30 CAPSULE | Refills: 5 | Status: SHIPPED | OUTPATIENT
Start: 2022-06-07

## 2022-06-07 RX ORDER — FUROSEMIDE 20 MG/1
20 TABLET ORAL DAILY PRN
Qty: 30 TABLET | Refills: 5 | Status: SHIPPED | OUTPATIENT
Start: 2022-06-07

## 2022-06-07 NOTE — PROGRESS NOTES
Assessment/Plan: For her leg cramps, I will get blood work on her  Encouraged her to drink more water  For her left finger DIP joint pain, I will check an x-ray  Refilled her medications  Will call her with results of her blood work an x-ray and make further recommendations at that time  We will see her back in 3-4 months or p r n     Problem List Items Addressed This Visit        Endocrine    Other specified hypothyroidism (Chronic)    Relevant Orders    TSH, 3rd generation with Free T4 reflex       Respiratory    Mild intermittent asthma without complication       Genitourinary    Stage 3 chronic kidney disease (HCC) (Chronic)       Other    Dyslipidemia (Chronic)    Relevant Orders    LDL cholesterol, direct    Chronic anticoagulation    Relevant Orders    Protime-INR    Bilateral leg edema - Primary    Relevant Orders    Magnesium    Basic metabolic panel    Phosphorus      Other Visit Diagnoses     Pain involving joint of finger of left hand        Relevant Orders    XR finger left second digit-index    Encounter for screening mammogram for breast cancer        Relevant Orders    Mammo screening bilateral w 3d & cad    Screening for colon cancer        Relevant Orders    Cologuard    Cramp in lower leg        Relevant Orders    Magnesium    Basic metabolic panel    Phosphorus           Diagnoses and all orders for this visit:    Bilateral leg edema  -     Magnesium  -     Basic metabolic panel  -     Phosphorus    Pain involving joint of finger of left hand  -     XR finger left second digit-index;  Future    Other specified hypothyroidism  -     TSH, 3rd generation with Free T4 reflex    Encounter for screening mammogram for breast cancer  -     Mammo screening bilateral w 3d & cad; Future    Screening for colon cancer  -     Cologuard    Stage 3 chronic kidney disease, unspecified whether stage 3a or 3b CKD (HCC)    Mild intermittent asthma without complication    Dyslipidemia  -     LDL cholesterol, direct    Chronic anticoagulation  -     Protime-INR    Cramp in lower leg  -     Magnesium  -     Basic metabolic panel  -     Phosphorus        No problem-specific Assessment & Plan notes found for this encounter  Subjective:      Patient ID: Mary Lou Eddy is a 62 y o  female  Patient here today for follow-up  Patient states been getting bilateral shin cramps at times  Mostly at bedtime  Patient states when she increased her water intake, that has seem to help  Also getting pain in her left 2nd finger at the DIP joint  No known trauma  Patient states her asthma has been under better control  Not needing her albuterol much    Hand Pain   There was no injury mechanism  The pain is present in the left fingers  The quality of the pain is described as aching  The pain does not radiate  The pain is moderate  The pain has been fluctuating since the incident  The symptoms are aggravated by movement and palpation  She has tried nothing for the symptoms         The following portions of the patient's history were reviewed and updated as appropriate:   She has a past medical history of Abdominal pain, lower, Acute renal failure ([address] ), Allergic rhinitis, Ankle pain, right, Arthritis, Asthma, Bilateral chronic knee pain, Bladder pain, Blood clot in vein, Bursitis, Cardiac disorder, Cardiac murmur, Chest tightness or pressure, COPD (chronic obstructive pulmonary disease) ([address] ), Coronary artery disease, Curvature of spine, GERD (gastroesophageal reflux disease), Hernia, hiatal, Hyperlipidemia, Hypertension, Inguinal hernia, Jaw closure abnormality, Lumbar herniated disc, Morbid obesity ([address] ), Obesity, Osteoarthritis of right knee, Patellar tendinitis, Poor flexibility of tendon, Presence of IVC filter (10/12/2018), Pulmonary embolism ([address] ), Sepsis ([address] ), Severe sepsis due to methicillin resistant Staphylococcus aureus (MRSA) with acute organ dysfunction ([address] ), Sleep apnea, obstructive, Spider vein, symptomatic, Status post arthroscopy of right knee, Stomach disorder, Tear of medial meniscus of right knee, Thyroid disorder, and Umbilical hernia ,  does not have any pertinent problems on file  ,   has a past surgical history that includes Hysterectomy (2009); Open anterior shoulder reconstruction (Left); Hand surgery (Right); Foot surgery (Left); Colonoscopy; pr knee scope,med/lat menisectomy (Right, 4/11/2016); Exploratory laparotomy; Peripherally inserted central catheter insertion; Incision and drainage anterior neck (Right, 6/8/2017); Other surgical history; Cystoscopy (01/1994); Incisional hernia repair; Shoulder surgery; IR IVC filter removal (1/22/2019); and Fracture surgery  ,  family history includes Arthritis in her mother; Breast cancer in her maternal aunt; Colon cancer in her brother and son; Diabetes in her maternal aunt; Lung cancer in her father; No Known Problems in her daughter, daughter, maternal grandfather, maternal grandmother, paternal grandfather, paternal grandmother, sister, sister, sister, sister, sister, and sister  ,   reports that she quit smoking about 26 years ago  She has never used smokeless tobacco  She reports that she does not drink alcohol and does not use drugs  ,  is allergic to clindamycin, medical tape, penicillin g, and penicillins     Current Outpatient Medications   Medication Sig Dispense Refill    albuterol (2 5 mg/3 mL) 0 083 % nebulizer solution Take 3 mL (2 5 mg total) by nebulization as needed for wheezing 3 mL 5    albuterol (PROVENTIL HFA,VENTOLIN HFA) 90 mcg/act inhaler Inhale 2 puffs every 4 (four) hours as needed for wheezing 18 g 3    atorvastatin (LIPITOR) 10 mg tablet Take 1 tablet (10 mg total) by mouth daily 30 tablet 5    famotidine (PEPCID) 40 MG tablet take 1 tablet by mouth at bedtime 30 tablet 5    fluticasone (FLONASE) 50 mcg/act nasal spray 2 sprays into each nostril daily 16 g 5    levothyroxine 112 mcg tablet Take 1 tablet (112 mcg total) by mouth daily 30 tablet 5    loratadine (CLARITIN) 10 mg tablet Take 1 tablet (10 mg total) by mouth daily 30 tablet 5    montelukast (SINGULAIR) 10 mg tablet Take 1 tablet (10 mg total) by mouth daily at bedtime 30 tablet 5    omeprazole (PriLOSEC) 40 MG capsule Take 1 capsule (40 mg total) by mouth daily before breakfast 30 capsule 5    Symbicort 160-4 5 MCG/ACT inhaler inhale 2 puffs by mouth twice a day Rinse mouth after use 10 2 g 3    warfarin (COUMADIN) 5 mg tablet TAKE DAILY AS DIRECTED BY PHYSICIAN 45 tablet 5    furosemide (LASIX) 20 mg tablet Take 1 tablet (20 mg total) by mouth daily as needed (leg swelling) Take with potassium 30 tablet 5    potassium chloride (MICRO-K) 10 MEQ CR capsule Take 1 capsule (10 mEq total) by mouth daily 30 capsule 5     No current facility-administered medications for this visit  Review of Systems   Constitutional: Negative  Respiratory: Negative  Cardiovascular: Negative  Gastrointestinal: Negative  Genitourinary: Negative  Musculoskeletal: Positive for arthralgias  Objective:  Vitals:    06/07/22 0705   BP: 122/84   Pulse: 71   Temp: (!) 96 1 °F (35 6 °C)   SpO2: 98%   Weight: 129 kg (285 lb)   Height: 5' 5" (1 651 m)     Body mass index is 47 43 kg/m²  Physical Exam  Vitals reviewed  Constitutional:       General: She is not in acute distress  Appearance: Normal appearance  She is well-developed  She is not ill-appearing, toxic-appearing or diaphoretic  HENT:      Head: Normocephalic and atraumatic  Eyes:      Conjunctiva/sclera: Conjunctivae normal    Cardiovascular:      Rate and Rhythm: Normal rate and regular rhythm  Heart sounds: Normal heart sounds  No murmur heard  No friction rub  No gallop  Pulmonary:      Effort: Pulmonary effort is normal  No respiratory distress  Breath sounds: Normal breath sounds  No wheezing, rhonchi or rales     Musculoskeletal:         General: Tenderness (Left 2nd finger DIP joint) present  Right lower leg: No edema  Left lower leg: No edema  Neurological:      General: No focal deficit present  Mental Status: She is alert and oriented to person, place, and time  Psychiatric:         Mood and Affect: Mood normal          Behavior: Behavior normal          Thought Content:  Thought content normal          Judgment: Judgment normal

## 2022-06-08 ENCOUNTER — TELEPHONE (OUTPATIENT)
Dept: FAMILY MEDICINE CLINIC | Facility: CLINIC | Age: 57
End: 2022-06-08

## 2022-06-08 DIAGNOSIS — E03.8 OTHER SPECIFIED HYPOTHYROIDISM: ICD-10-CM

## 2022-06-08 RX ORDER — LEVOTHYROXINE SODIUM 0.12 MG/1
125 TABLET ORAL DAILY
Qty: 30 TABLET | Refills: 5 | Status: SHIPPED | OUTPATIENT
Start: 2022-06-08 | End: 2022-06-10

## 2022-06-10 DIAGNOSIS — J45.20 MILD INTERMITTENT ASTHMA WITHOUT COMPLICATION: ICD-10-CM

## 2022-06-10 DIAGNOSIS — E03.8 OTHER SPECIFIED HYPOTHYROIDISM: ICD-10-CM

## 2022-06-10 DIAGNOSIS — J30.1 SEASONAL ALLERGIC RHINITIS DUE TO POLLEN: ICD-10-CM

## 2022-06-10 DIAGNOSIS — E78.5 DYSLIPIDEMIA: ICD-10-CM

## 2022-06-10 DIAGNOSIS — K21.9 GASTROESOPHAGEAL REFLUX DISEASE WITHOUT ESOPHAGITIS: Chronic | ICD-10-CM

## 2022-06-10 DIAGNOSIS — K21.00 GASTROESOPHAGEAL REFLUX DISEASE WITH ESOPHAGITIS WITHOUT HEMORRHAGE: ICD-10-CM

## 2022-06-10 DIAGNOSIS — T78.3XXD ANGIOEDEMA, SUBSEQUENT ENCOUNTER: ICD-10-CM

## 2022-06-10 DIAGNOSIS — I26.99 PULMONARY EMBOLISM (HCC): ICD-10-CM

## 2022-06-10 RX ORDER — LEVOTHYROXINE SODIUM 0.12 MG/1
125 TABLET ORAL DAILY
Qty: 90 TABLET | Refills: 3 | Status: SHIPPED | OUTPATIENT
Start: 2022-06-10 | End: 2022-06-16 | Stop reason: SDUPTHER

## 2022-06-10 RX ORDER — FAMOTIDINE 40 MG/1
40 TABLET, FILM COATED ORAL
Qty: 90 TABLET | Refills: 3 | Status: SHIPPED | OUTPATIENT
Start: 2022-06-10 | End: 2022-06-16 | Stop reason: SDUPTHER

## 2022-06-10 RX ORDER — ATORVASTATIN CALCIUM 10 MG/1
10 TABLET, FILM COATED ORAL DAILY
Qty: 90 TABLET | Refills: 3 | Status: SHIPPED | OUTPATIENT
Start: 2022-06-10

## 2022-06-10 RX ORDER — BUDESONIDE AND FORMOTEROL FUMARATE DIHYDRATE 160; 4.5 UG/1; UG/1
2 AEROSOL RESPIRATORY (INHALATION) 2 TIMES DAILY
Qty: 30.6 G | Refills: 3 | Status: SHIPPED | OUTPATIENT
Start: 2022-06-10 | End: 2022-06-16 | Stop reason: SDUPTHER

## 2022-06-10 RX ORDER — ALBUTEROL SULFATE 90 UG/1
2 AEROSOL, METERED RESPIRATORY (INHALATION) EVERY 4 HOURS PRN
Qty: 54 G | Refills: 3 | Status: SHIPPED | OUTPATIENT
Start: 2022-06-10 | End: 2022-06-16 | Stop reason: SDUPTHER

## 2022-06-10 RX ORDER — FLUTICASONE PROPIONATE 50 MCG
2 SPRAY, SUSPENSION (ML) NASAL DAILY
Qty: 48 G | Refills: 3 | Status: SHIPPED | OUTPATIENT
Start: 2022-06-10 | End: 2022-06-16 | Stop reason: SDUPTHER

## 2022-06-10 RX ORDER — MONTELUKAST SODIUM 10 MG/1
10 TABLET ORAL
Qty: 90 TABLET | Refills: 3 | Status: SHIPPED | OUTPATIENT
Start: 2022-06-10 | End: 2022-06-16 | Stop reason: SDUPTHER

## 2022-06-10 RX ORDER — OMEPRAZOLE 40 MG/1
40 CAPSULE, DELAYED RELEASE ORAL
Qty: 90 CAPSULE | Refills: 3 | Status: SHIPPED | OUTPATIENT
Start: 2022-06-10 | End: 2022-06-16 | Stop reason: SDUPTHER

## 2022-06-10 RX ORDER — WARFARIN SODIUM 5 MG/1
TABLET ORAL
Qty: 135 TABLET | Refills: 3 | Status: SHIPPED | OUTPATIENT
Start: 2022-06-10 | End: 2022-06-16 | Stop reason: SDUPTHER

## 2022-06-14 ENCOUNTER — TELEPHONE (OUTPATIENT)
Dept: FAMILY MEDICINE CLINIC | Facility: CLINIC | Age: 57
End: 2022-06-14

## 2022-06-14 DIAGNOSIS — M25.542 PAIN INVOLVING JOINT OF FINGER OF LEFT HAND: Primary | ICD-10-CM

## 2022-06-14 NOTE — TELEPHONE ENCOUNTER
Pt still c/o left pointer finger pain and swelling  Was told to call back in a few days if no better

## 2022-06-16 DIAGNOSIS — K21.00 GASTROESOPHAGEAL REFLUX DISEASE WITH ESOPHAGITIS WITHOUT HEMORRHAGE: ICD-10-CM

## 2022-06-16 DIAGNOSIS — I26.99 PULMONARY EMBOLISM (HCC): ICD-10-CM

## 2022-06-16 DIAGNOSIS — T78.3XXD ANGIOEDEMA, SUBSEQUENT ENCOUNTER: ICD-10-CM

## 2022-06-16 DIAGNOSIS — J30.1 SEASONAL ALLERGIC RHINITIS DUE TO POLLEN: ICD-10-CM

## 2022-06-16 DIAGNOSIS — E03.8 OTHER SPECIFIED HYPOTHYROIDISM: ICD-10-CM

## 2022-06-16 DIAGNOSIS — J45.20 MILD INTERMITTENT ASTHMA WITHOUT COMPLICATION: ICD-10-CM

## 2022-06-16 DIAGNOSIS — K21.9 GASTROESOPHAGEAL REFLUX DISEASE WITHOUT ESOPHAGITIS: Chronic | ICD-10-CM

## 2022-06-16 RX ORDER — WARFARIN SODIUM 5 MG/1
TABLET ORAL
Qty: 135 TABLET | Refills: 3 | Status: SHIPPED | OUTPATIENT
Start: 2022-06-16

## 2022-06-16 RX ORDER — OMEPRAZOLE 40 MG/1
40 CAPSULE, DELAYED RELEASE ORAL
Qty: 90 CAPSULE | Refills: 3 | Status: SHIPPED | OUTPATIENT
Start: 2022-06-16

## 2022-06-16 RX ORDER — ALBUTEROL SULFATE 90 UG/1
2 AEROSOL, METERED RESPIRATORY (INHALATION) EVERY 4 HOURS PRN
Qty: 54 G | Refills: 3 | Status: SHIPPED | OUTPATIENT
Start: 2022-06-16

## 2022-06-16 RX ORDER — FLUTICASONE PROPIONATE 50 MCG
2 SPRAY, SUSPENSION (ML) NASAL DAILY
Qty: 48 G | Refills: 3 | Status: SHIPPED | OUTPATIENT
Start: 2022-06-16

## 2022-06-16 RX ORDER — BUDESONIDE AND FORMOTEROL FUMARATE DIHYDRATE 160; 4.5 UG/1; UG/1
2 AEROSOL RESPIRATORY (INHALATION) 2 TIMES DAILY
Qty: 30.6 G | Refills: 3 | Status: SHIPPED | OUTPATIENT
Start: 2022-06-16

## 2022-06-16 RX ORDER — FAMOTIDINE 40 MG/1
40 TABLET, FILM COATED ORAL
Qty: 90 TABLET | Refills: 3 | Status: SHIPPED | OUTPATIENT
Start: 2022-06-16

## 2022-06-16 RX ORDER — MONTELUKAST SODIUM 10 MG/1
10 TABLET ORAL
Qty: 90 TABLET | Refills: 3 | Status: SHIPPED | OUTPATIENT
Start: 2022-06-16

## 2022-06-16 RX ORDER — LEVOTHYROXINE SODIUM 0.12 MG/1
125 TABLET ORAL DAILY
Qty: 90 TABLET | Refills: 3 | Status: SHIPPED | OUTPATIENT
Start: 2022-06-16

## 2022-06-24 NOTE — PROGRESS NOTES
62 y o female presents for initial orthopedic evaluation of left hand 2nd digit pain  She had x-rays done 06/07/2022 which were "normal"  She was seen and evaluated by for PCP who ordered the x-rays and now has referred her for evaluation  She denies known trauma  She is right-hand dominant  She notes some stiffness tightness in the finger at times  Denies redness or warmth  Denies numbness or tingling PE states the pain is only at the last joint of the finger and notes no other parts of finger are painful       Review of Systems  Review of systems negative unless otherwise specified in HPI    Past Medical History  Past Medical History:   Diagnosis Date    Abdominal pain, lower     [id number] RESOLVED    Acute renal failure ([facility] [address] )     [id number] RESOLVED    Allergic rhinitis     [id number] RESOLVED    Ankle pain, right     [id number] RESOLVED    Arthritis     [id number] RESOLVED  [id number] RESOLVED    Asthma     Bilateral chronic knee pain     [id number]    Bladder pain     [id number] RESOLVED    Blood clot in vein     Bursitis     [id number] RESOLVED    Cardiac disorder     [id number]  RESOLVED    Cardiac murmur     [id number] RESOLVED    Chest tightness or pressure     [id number] RESOLVED    COPD (chronic obstructive pulmonary disease) ([facility] [address] )     Coronary artery disease     Curvature of spine     [id number] RESOLVED    GERD (gastroesophageal reflux disease)     Hernia, hiatal     [id number] RESOLVED    Hyperlipidemia     [id number] RESOLVED    Hypertension     Inguinal hernia     RIGHT   [id number] RESOLVED    Jaw closure abnormality     [id number] RESOLVED    Lumbar herniated disc     [id number] RESOLVED    Morbid obesity ([facility] [address] )     [id number]    Obesity     Osteoarthritis of right knee     [id number] RESOLVED    Patellar tendinitis     [id number]    Poor flexibility of tendon     [id number]    Presence of IVC filter 10/12/2018    Pulmonary embolism (HCC)     [id number] RESOLVED    Sepsis ([facility] [address] ) [id number] RESOLVED    Severe sepsis due to methicillin resistant Staphylococcus aureus (MRSA) with acute organ dysfunction ([facility] [address] )     [id number] RESOLVED    Sleep apnea, obstructive     Spider vein, symptomatic     [id number] RESOLVED    Status post arthroscopy of right knee     [id number] RESOLVED    Stomach disorder     [id number] RESOLVED    Tear of medial meniscus of right knee     SUBSEQUENT ENCOUNTER  RESOLVED [id number]    Thyroid disorder     [id number]    Umbilical hernia     [id number] RESOLVED     Past Surgical History  Past Surgical History:   Procedure Laterality Date    COLONOSCOPY      CYSTOSCOPY  01/1994    WITH BIOPSY      EXPLORATORY LAPAROTOMY      FOOT SURGERY Left     FRACTURE SURGERY      HAND SURGERY Right     cyst    HYSTERECTOMY  2009    INCISION AND DRAINAGE ANTERIOR NECK Right 6/8/2017    Procedure: INCISION AND DRAINAGE  (I&D) NECK;  Surgeon: Ad Perdomo MD;  Location:  MAIN OR;  Service:    Naina Pester HERNIA REPAIR      WITH IMPLANTATION OF MESH  13 MAY 2014  LAST ASSESSED    IR IVC FILTER REMOVAL  1/22/2019    OPEN ANTERIOR SHOULDER RECONSTRUCTION Left     OTHER SURGICAL HISTORY      BLOOD VESSEL REPAIR   13 MAY 2014 LAST ASSESSED    PERIPHERALLY INSERTED CENTRAL CATHETER INSERTION      MA KNEE SCOPE,MED/LAT MENISECTOMY Right 4/11/2016    Procedure: KNEE ARTHROSCOPY WITH MEDIAL MENISCECTOMY ;  Surgeon: Bijal Guajardo MD;  Location: [facility]IN [facility];  Service: Orthopedics    SHOULDER SURGERY       Current Medications  Current Outpatient Medications on File Prior to Visit   Medication Sig Dispense Refill    albuterol (2 5 mg/3 mL) 0 083 % nebulizer solution Take 3 mL (2 5 mg total) by nebulization as needed for wheezing 3 mL 5    albuterol (PROVENTIL HFA,VENTOLIN HFA) 90 mcg/act inhaler Inhale 2 puffs every 4 (four) hours as needed for wheezing 54 g 3    atorvastatin (LIPITOR) 10 mg tablet Take 1 tablet (10 mg total) by mouth daily 90 tablet 3    budesonide-formoterol (Symbicort) 160-4 5 mcg/act inhaler Inhale 2 puffs 2 (two) times a day Rinse mouth after use 30 6 g 3    famotidine (PEPCID) 40 MG tablet Take 1 tablet (40 mg total) by mouth daily at bedtime 90 tablet 3    fluticasone (FLONASE) 50 mcg/act nasal spray 2 sprays into each nostril daily 48 g 3    furosemide (LASIX) 20 mg tablet Take 1 tablet (20 mg total) by mouth daily as needed (leg swelling) Take with potassium 30 tablet 5    levothyroxine 125 mcg tablet Take 1 tablet (125 mcg total) by mouth daily 90 tablet 3    loratadine (CLARITIN) 10 mg tablet Take 1 tablet (10 mg total) by mouth daily 30 tablet 5    montelukast (SINGULAIR) 10 mg tablet Take 1 tablet (10 mg total) by mouth daily at bedtime 90 tablet 3    omeprazole (PriLOSEC) 40 MG capsule Take 1 capsule (40 mg total) by mouth daily before breakfast 90 capsule 3    potassium chloride (MICRO-K) 10 MEQ CR capsule Take 1 capsule (10 mEq total) by mouth daily 30 capsule 5    warfarin (COUMADIN) 5 mg tablet Take as directed by physician 135 tablet 3     No current facility-administered medications on file prior to visit  Recent Labs [facility])  0   Lab Value Date/Time    HCT 43 8 02/18/2022 0904    HCT 43 1 01/07/2016 0820    HGB 13 1 02/18/2022 0904    HGB 13 5 01/07/2016 0820    WBC 6 32 02/18/2022 0904    WBC 6 57 01/07/2016 0820    INR 3 53 (H) 06/07/2022 0746    INR 1 06 08/08/2014 1230    ESR 29 (H) 10/17/2016 1333    ESR 25 (H) 08/08/2014 1230    CRP 6 7 (H) 09/06/2016 0838    CRP Negative 08/08/2014 1221    GLUCOSE 102 06/01/2017 1631    GLUCOSE 86 01/07/2016 0820    HGBA1C 5 6 02/18/2022 0904    HGBA1C 5 9 (H) 05/14/2015 1228     Physical exam  Body mass index is 47 43 kg/m²    · General: Awake, Alert, Oriented  · Eyes: Pupils equal, round and reactive to light  · Heart: regular rate and rhythm  · Lungs: No audible wheezing  · Abdomen: soft  left hand:  Patient locates her tenderness at her PIP joint of her left index finger  She notes some tightness with attempting to flex the finger  She denies any numbness  Distal sensation is intact  Capillary refill is brisk  There is no gross laxity the radial or ulnar collateral ligaments at the IP joint or D IP joint  She is able oppose her finger to her thumb  There is no mallet deformity or any deformity  Procedure  None today  Imaging  06/07/2022 x-rays read as:     No acute osseous abnormality  There is a very tiny less than 0 5 mm osseous structures the radial side of the distal end of the middle phalanx of the index finger and some very tiny spurring laterally      1  Degenerative arthritis of index finger of left hand    2  Pain involving joint of finger of left hand      Assessment:  left hand index finger DIP joint arthritis    Plan: Will get the patient started in physical/occupation therapy  Tylenol as needed  May use moist heat and ice alternatively  Re-evaluate in 2 months  If not improved would refer patient to hand surgery  This note was created with voice recognition software

## 2022-06-28 ENCOUNTER — TELEPHONE (OUTPATIENT)
Dept: FAMILY MEDICINE CLINIC | Facility: CLINIC | Age: 57
End: 2022-06-28

## 2022-06-28 NOTE — TELEPHONE ENCOUNTER
They got a prescription for Warfarin 5 mg tablets, take as directed, qty 135  They wanted specific direction and also if script was for 30 days or 90  I called pt to see if she had any other doses at home besides the 5 mg  Pt states she only has the 5 mg tablets and that she has been taking 1 1/2 tablets of the 5 mg on Tuesday, Thursday, Saturday and Sunday and 2 tablets on Monday, Wed and Friday instead of the 1 5 mg and 2 mg she is supposed to be taking

## 2022-06-28 NOTE — TELEPHONE ENCOUNTER
Please call them, and tell them it is a 90 day supply  Please give them the directions on how she is taking it now   Please document in flow sheet, and have her get an INR ASAP

## 2022-06-29 ENCOUNTER — APPOINTMENT (OUTPATIENT)
Dept: LAB | Facility: MEDICAL CENTER | Age: 57
End: 2022-06-29
Payer: COMMERCIAL

## 2022-06-29 ENCOUNTER — ANTICOAG VISIT (OUTPATIENT)
Dept: FAMILY MEDICINE CLINIC | Facility: CLINIC | Age: 57
End: 2022-06-29

## 2022-06-29 ENCOUNTER — OFFICE VISIT (OUTPATIENT)
Dept: OBGYN CLINIC | Facility: CLINIC | Age: 57
End: 2022-06-29
Payer: COMMERCIAL

## 2022-06-29 VITALS — HEIGHT: 65 IN | WEIGHT: 285 LBS | BODY MASS INDEX: 47.48 KG/M2

## 2022-06-29 DIAGNOSIS — M19.042 DEGENERATIVE ARTHRITIS OF INDEX FINGER OF LEFT HAND: Primary | ICD-10-CM

## 2022-06-29 DIAGNOSIS — Z79.01 CHRONIC ANTICOAGULATION: ICD-10-CM

## 2022-06-29 DIAGNOSIS — M25.542 PAIN INVOLVING JOINT OF FINGER OF LEFT HAND: ICD-10-CM

## 2022-06-29 DIAGNOSIS — E03.8 OTHER SPECIFIED HYPOTHYROIDISM: ICD-10-CM

## 2022-06-29 LAB
INR PPP: 1.26 (ref 0.84–1.19)
PROTHROMBIN TIME: 15.2 SECONDS (ref 11.6–14.5)
TSH SERPL DL<=0.05 MIU/L-ACNC: 3.47 UIU/ML (ref 0.45–4.5)

## 2022-06-29 PROCEDURE — 85610 PROTHROMBIN TIME: CPT

## 2022-06-29 PROCEDURE — 84443 ASSAY THYROID STIM HORMONE: CPT

## 2022-06-29 PROCEDURE — 99203 OFFICE O/P NEW LOW 30 MIN: CPT | Performed by: PHYSICIAN ASSISTANT

## 2022-06-29 PROCEDURE — 36415 COLL VENOUS BLD VENIPUNCTURE: CPT

## 2022-06-29 NOTE — PATIENT INSTRUCTIONS
Will get the patient started in physical/occupation therapy  Tylenol as needed  May use moist heat and ice alternatively  Re-evaluate in 2 months  If not improved would refer patient to hand surgery

## 2022-06-30 LAB — SL AMB POCT INR: 3

## 2022-07-05 ENCOUNTER — ANTICOAG VISIT (OUTPATIENT)
Dept: FAMILY MEDICINE CLINIC | Facility: CLINIC | Age: 57
End: 2022-07-05

## 2022-07-06 ENCOUNTER — ANTICOAG VISIT (OUTPATIENT)
Dept: FAMILY MEDICINE CLINIC | Facility: CLINIC | Age: 57
End: 2022-07-06

## 2022-07-20 ENCOUNTER — APPOINTMENT (OUTPATIENT)
Dept: LAB | Facility: MEDICAL CENTER | Age: 57
End: 2022-07-20
Payer: COMMERCIAL

## 2022-07-20 DIAGNOSIS — Z79.01 CHRONIC ANTICOAGULATION: ICD-10-CM

## 2022-07-20 LAB
INR PPP: 2.25 (ref 0.84–1.19)
PROTHROMBIN TIME: 25.1 SECONDS (ref 11.6–14.5)

## 2022-07-20 PROCEDURE — 85610 PROTHROMBIN TIME: CPT

## 2022-07-20 PROCEDURE — 36415 COLL VENOUS BLD VENIPUNCTURE: CPT

## 2022-07-21 ENCOUNTER — ANTICOAG VISIT (OUTPATIENT)
Dept: FAMILY MEDICINE CLINIC | Facility: CLINIC | Age: 57
End: 2022-07-21

## 2022-08-03 ENCOUNTER — APPOINTMENT (OUTPATIENT)
Dept: LAB | Facility: MEDICAL CENTER | Age: 57
End: 2022-08-03
Payer: COMMERCIAL

## 2022-08-03 DIAGNOSIS — Z79.01 CHRONIC ANTICOAGULATION: ICD-10-CM

## 2022-08-03 LAB
INR PPP: 3.88 (ref 0.84–1.19)
PROTHROMBIN TIME: 38.3 SECONDS (ref 11.6–14.5)

## 2022-08-03 PROCEDURE — 85610 PROTHROMBIN TIME: CPT

## 2022-08-03 PROCEDURE — 36415 COLL VENOUS BLD VENIPUNCTURE: CPT

## 2022-08-04 ENCOUNTER — ANTICOAG VISIT (OUTPATIENT)
Dept: FAMILY MEDICINE CLINIC | Facility: CLINIC | Age: 57
End: 2022-08-04

## 2022-08-10 ENCOUNTER — OFFICE VISIT (OUTPATIENT)
Dept: FAMILY MEDICINE CLINIC | Facility: CLINIC | Age: 57
End: 2022-08-10
Payer: COMMERCIAL

## 2022-08-10 ENCOUNTER — APPOINTMENT (OUTPATIENT)
Dept: RADIOLOGY | Facility: MEDICAL CENTER | Age: 57
End: 2022-08-10
Payer: COMMERCIAL

## 2022-08-10 VITALS
OXYGEN SATURATION: 97 % | BODY MASS INDEX: 48.12 KG/M2 | WEIGHT: 288.8 LBS | SYSTOLIC BLOOD PRESSURE: 132 MMHG | TEMPERATURE: 97.2 F | HEART RATE: 99 BPM | HEIGHT: 65 IN | DIASTOLIC BLOOD PRESSURE: 86 MMHG

## 2022-08-10 DIAGNOSIS — M25.561 ACUTE PAIN OF RIGHT KNEE: ICD-10-CM

## 2022-08-10 DIAGNOSIS — M25.512 CHRONIC LEFT SHOULDER PAIN: ICD-10-CM

## 2022-08-10 DIAGNOSIS — G89.29 CHRONIC LEFT SHOULDER PAIN: ICD-10-CM
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
## 2022-08-10 DIAGNOSIS — Z98.890 HISTORY OF SHOULDER SURGERY: ICD-10-CM

## 2022-08-10 PROCEDURE — 73030 X-RAY EXAM OF SHOULDER: CPT

## 2022-08-10 PROCEDURE — 73564 X-RAY EXAM KNEE 4 OR MORE: CPT

## 2022-08-10 PROCEDURE — 99213 OFFICE O/P EST LOW 20 MIN: CPT | Performed by: FAMILY MEDICINE

## 2022-08-10 NOTE — PROGRESS NOTES
Assessment/Plan:  I will check an x-ray of her right knee and left shoulder  Referral to physical therapy  She may take Tylenol p r n  Mariel Wheat Apply ice to the areas  Follow-up as scheduled in October p r n     Problem List Items Addressed This Visit        Other    History of shoulder surgery      Other Visit Diagnoses     Fall from slip, trip, or stumble, initial encounter    -  Primary    Relevant Orders    XR shoulder 2+ vw left    XR knee 4+ vw right injury    Acute pain of right knee        Relevant Orders    XR knee 4+ vw right injury    Ambulatory Referral to Physical Therapy    Chronic left shoulder pain        Relevant Orders    XR shoulder 2+ vw left    Ambulatory Referral to Physical Therapy           Diagnoses and all orders for this visit:    Fall from slip, trip, or stumble, initial encounter  -     XR shoulder 2+ vw left; Future  -     XR knee 4+ vw right injury; Future    Acute pain of right knee  -     XR knee 4+ vw right injury; Future  -     Ambulatory Referral to Physical Therapy; Future    Chronic left shoulder pain  -     XR shoulder 2+ vw left; Future  -     Ambulatory Referral to Physical Therapy; Future    History of shoulder surgery      No problem-specific Assessment & Plan notes found for this encounter  Subjective:      Patient ID: Manuel Leo is a 62 y o  female  Patient here today for follow-up from a trip and fall that happened about 4-5 days ago  Patient tripped over her phone cord  She hit her right knee and left shoulder  She did not hit her head  No loss of consciousness  Patient has pain in her right knee, feels a movable lump in it also  Patient also has chronic left shoulder pain, but it is worse now since the fall        The following portions of the patient's history were reviewed and updated as appropriate:   She has a past medical history of Abdominal pain, lower, Acute renal failure ([address] ), Allergic rhinitis, Ankle pain, right, Arthritis, Asthma, Bilateral chronic knee pain, Bladder pain, Blood clot in vein, Bursitis, Cardiac disorder, Cardiac murmur, Chest tightness or pressure, COPD (chronic obstructive pulmonary disease) ([facility] ), Coronary artery disease, Curvature of spine, GERD (gastroesophageal reflux disease), Hernia, hiatal, Hyperlipidemia, Hypertension, Inguinal hernia, Jaw closure abnormality, Lumbar herniated disc, Morbid obesity ([facility] ), Obesity, Osteoarthritis of right knee, Patellar tendinitis, Poor flexibility of tendon, Presence of IVC filter (10/12/2018), Pulmonary embolism ([facility] [facility] ), Sepsis ([facility] ), Severe sepsis due to methicillin resistant Staphylococcus aureus (MRSA) with acute organ dysfunction ([facility] ), Sleep apnea, obstructive, Spider vein, symptomatic, Status post arthroscopy of right knee, Stomach disorder, Tear of medial meniscus of right knee, Thyroid disorder, and Umbilical hernia ,  does not have any pertinent problems on file  ,   has a past surgical history that includes Hysterectomy (2009); Open anterior shoulder reconstruction (Left); Hand surgery (Right); Foot surgery (Left); Colonoscopy; pr knee scope,med/lat menisectomy (Right, 4/11/2016); Exploratory laparotomy; Peripherally inserted central catheter insertion; Incision and drainage anterior neck (Right, 6/8/2017); Other surgical history; Cystoscopy (01/1994); Incisional hernia repair; Shoulder surgery; IR IVC filter removal (1/22/2019); and Fracture surgery  ,  family history includes Arthritis in her mother; Breast cancer in her maternal aunt; Colon cancer in her brother and son; Diabetes in her maternal aunt; Lung cancer in her father; No Known Problems in her daughter, daughter, maternal grandfather, maternal grandmother, paternal grandfather, paternal grandmother, sister, sister, sister, sister, sister, and sister  ,   reports that she quit smoking about 26 years ago  She has never used smokeless tobacco  She reports that she does not drink alcohol and does not use drugs  ,  is allergic to clindamycin, medical tape, penicillin g, and penicillins     Current Outpatient Medications   Medication Sig Dispense Refill    albuterol (2 5 mg/3 mL) 0 083 % nebulizer solution Take 3 mL (2 5 mg total) by nebulization as needed for wheezing 3 mL 5    albuterol (PROVENTIL HFA,VENTOLIN HFA) 90 mcg/act inhaler Inhale 2 puffs every 4 (four) hours as needed for wheezing 54 g 3    atorvastatin (LIPITOR) 10 mg tablet Take 1 tablet (10 mg total) by mouth daily 90 tablet 3    budesonide-formoterol (Symbicort) 160-4 5 mcg/act inhaler Inhale 2 puffs 2 (two) times a day Rinse mouth after use 30 6 g 3    famotidine (PEPCID) 40 MG tablet Take 1 tablet (40 mg total) by mouth daily at bedtime 90 tablet 3    fluticasone (FLONASE) 50 mcg/act nasal spray 2 sprays into each nostril daily 48 g 3    furosemide (LASIX) 20 mg tablet Take 1 tablet (20 mg total) by mouth daily as needed (leg swelling) Take with potassium 30 tablet 5    levothyroxine 125 mcg tablet Take 1 tablet (125 mcg total) by mouth daily 90 tablet 3    loratadine (CLARITIN) 10 mg tablet Take 1 tablet (10 mg total) by mouth daily 30 tablet 5    montelukast (SINGULAIR) 10 mg tablet Take 1 tablet (10 mg total) by mouth daily at bedtime 90 tablet 3    omeprazole (PriLOSEC) 40 MG capsule Take 1 capsule (40 mg total) by mouth daily before breakfast 90 capsule 3    potassium chloride (MICRO-K) 10 MEQ CR capsule Take 1 capsule (10 mEq total) by mouth daily 30 capsule 5    warfarin (COUMADIN) 5 mg tablet Take as directed by physician 135 tablet 3     No current facility-administered medications for this visit  Review of Systems   Constitutional: Negative  Respiratory: Negative  Cardiovascular: Negative  Gastrointestinal: Negative  Genitourinary: Negative      Musculoskeletal:        As per HPI         Objective:  Vitals:    08/10/22 1557   BP: 132/86   Pulse: 99   Temp: (!) 97 2 °F (36 2 °C)   SpO2: 97%   Weight: 131 kg (288 lb 12 8 oz)   Height: 5' 5" (1 651 m)     Body mass index is 48 06 kg/m²  Physical Exam  Vitals reviewed  Constitutional:       General: She is not in acute distress  Appearance: She is well-developed  She is not diaphoretic  HENT:      Head: Normocephalic and atraumatic  Eyes:      Conjunctiva/sclera: Conjunctivae normal    Musculoskeletal:         General: Tenderness (Right knee and left shoulder) present  Neurological:      General: No focal deficit present  Mental Status: She is alert and oriented to person, place, and time  Psychiatric:         Mood and Affect: Mood normal          Behavior: Behavior normal          Thought Content:  Thought content normal          Judgment: Judgment normal

## 2022-08-17 ENCOUNTER — APPOINTMENT (OUTPATIENT)
Dept: LAB | Facility: MEDICAL CENTER | Age: 57
End: 2022-08-17
Payer: COMMERCIAL

## 2022-08-17 DIAGNOSIS — Z79.01 CHRONIC ANTICOAGULATION: ICD-10-CM

## 2022-08-17 LAB
INR PPP: 4.05 (ref 0.84–1.19)
PROTHROMBIN TIME: 39.7 SECONDS (ref 11.6–14.5)

## 2022-08-17 PROCEDURE — 36415 COLL VENOUS BLD VENIPUNCTURE: CPT

## 2022-08-17 PROCEDURE — 85610 PROTHROMBIN TIME: CPT

## 2022-08-18 ENCOUNTER — ANTICOAG VISIT (OUTPATIENT)
Dept: FAMILY MEDICINE CLINIC | Facility: CLINIC | Age: 57
End: 2022-08-18

## 2022-08-25 NOTE — PROGRESS NOTES
62 y o female presents for follow-up of left index finger DJD  She is right-hand dominant  She was last seen 06/29/2022 with x-rays and was supposed to get sent for occupational therapy  However she never went to therapy  She has been bending doing hand exercises at home  She notes the finger is feeling somewhat better  Denies numbness or tingling      Review of Systems  Review of systems negative unless otherwise specified in HPI    Past Medical History  Past Medical History:   Diagnosis Date    Abdominal pain, lower     [id number] RESOLVED    [facility] )     [id number] RESOLVED    Allergic rhinitis     [id number] RESOLVED    Ankle pain, right     [id number] RESOLVED    Arthritis     [id number] RESOLVED  [id number] RESOLVED    Asthma     Bilateral chronic knee pain     [id number]    Bladder pain     [id number] RESOLVED    Blood clot in vein     Bursitis     [id number] RESOLVED    Cardiac disorder     [id number]  RESOLVED    Cardiac murmur     [id number] RESOLVED    Chest tightness or pressure     [id number] RESOLVED    COPD (chronic obstructive pulmonary disease) ([facility] )     Coronary artery disease     Curvature of spine     [id number] RESOLVED    GERD (gastroesophageal reflux disease)     Hernia, hiatal     [id number] RESOLVED    Hyperlipidemia     [id number] RESOLVED    Hypertension     Inguinal hernia     RIGHT   [id number] RESOLVED    Jaw closure abnormality     [id number] RESOLVED    Lumbar herniated disc     [id number] RESOLVED    Morbid obesity ([facility] )     [id number]    Obesity     Osteoarthritis of right knee     [id number] RESOLVED    Patellar tendinitis     [id number]    Poor flexibility of tendon     [id number]    Presence of IVC filter 10/12/2018    Pulmonary embolism ([facility] )     [id number] RESOLVED    Sepsis ([facility] )     [id number] RESOLVED    Severe sepsis due to methicillin resistant Staphylococcus aureus (MRSA) with acute organ dysfunction ([facility] )     [id number] RESOLVED    Sleep apnea, obstructive     Spider vein, symptomatic     [id number] RESOLVED    Status post arthroscopy of right knee     [id number] RESOLVED    Stomach disorder     [id number] RESOLVED    Tear of medial meniscus of right knee     SUBSEQUENT ENCOUNTER  RESOLVED [id number]    Thyroid disorder     [id number]    Umbilical hernia     [id number] RESOLVED     Past Surgical History  Past Surgical History:   Procedure Laterality Date    COLONOSCOPY      CYSTOSCOPY  01/1994    WITH BIOPSY      EXPLORATORY LAPAROTOMY      FOOT SURGERY Left     FRACTURE SURGERY      HAND SURGERY Right     cyst    HYSTERECTOMY  2009    INCISION AND DRAINAGE ANTERIOR NECK Right 6/8/2017    Procedure: INCISION AND DRAINAGE  (I&D) NECK;  Surgeon: Janett Lantigua MD;  Location:  MAIN OR;  Service:    Milla Daub HERNIA REPAIR      WITH IMPLANTATION OF MESH  13 MAY 2014  LAST ASSESSED    IR IVC FILTER REMOVAL  1/22/2019    OPEN ANTERIOR SHOULDER RECONSTRUCTION Left     OTHER SURGICAL HISTORY      BLOOD VESSEL REPAIR   13 MAY 2014 LAST ASSESSED    PERIPHERALLY INSERTED CENTRAL CATHETER INSERTION      [place] KNEE SCOPE,MED/LAT MENISECTOMY Right 4/11/2016    Procedure: KNEE ARTHROSCOPY WITH MEDIAL MENISCECTOMY ;  Surgeon: Lorna Pena MD;  Location: [facility];  Service: Orthopedics    SHOULDER SURGERY       Current Medications  Current Outpatient Medications on File Prior to Visit   Medication Sig Dispense Refill    albuterol (2 5 mg/3 mL) 0 083 % nebulizer solution Take 3 mL (2 5 mg total) by nebulization as needed for wheezing 3 mL 5    albuterol (PROVENTIL HFA,VENTOLIN HFA) 90 mcg/act inhaler Inhale 2 puffs every 4 (four) hours as needed for wheezing 54 g 3    atorvastatin (LIPITOR) 10 mg tablet Take 1 tablet (10 mg total) by mouth daily 90 tablet 3    budesonide-formoterol (Symbicort) 160-4 5 mcg/act inhaler Inhale 2 puffs 2 (two) times a day Rinse mouth after use 30 6 g 3    famotidine (PEPCID) 40 MG tablet Take 1 tablet (40 mg total) by mouth daily at bedtime 90 tablet 3    fluticasone (FLONASE) 50 mcg/act nasal spray 2 sprays into each nostril daily 48 g 3    furosemide (LASIX) 20 mg tablet Take 1 tablet (20 mg total) by mouth daily as needed (leg swelling) Take with potassium 30 tablet 5    levothyroxine 125 mcg tablet Take 1 tablet (125 mcg total) by mouth daily 90 tablet 3    loratadine (CLARITIN) 10 mg tablet Take 1 tablet (10 mg total) by mouth daily 30 tablet 5    montelukast (SINGULAIR) 10 mg tablet Take 1 tablet (10 mg total) by mouth daily at bedtime 90 tablet 3    omeprazole (PriLOSEC) 40 MG capsule Take 1 capsule (40 mg total) by mouth daily before breakfast 90 capsule 3    potassium chloride (MICRO-K) 10 MEQ CR capsule Take 1 capsule (10 mEq total) by mouth daily 30 capsule 5    warfarin (COUMADIN) 5 mg tablet Take as directed by physician 135 tablet 3     No current facility-administered medications on file prior to visit  Recent Labs [facility])  0   Lab Value Date/Time    HCT 43 8 02/18/2022 0904    HCT 43 1 01/07/2016 0820    HGB 13 1 02/18/2022 0904    HGB 13 5 01/07/2016 0820    WBC 6 32 02/18/2022 0904    WBC 6 57 01/07/2016 0820    INR 4 05 (H) 08/17/2022 1028    INR 1 06 08/08/2014 1230    ESR 29 (H) 10/17/2016 1333    ESR 25 (H) 08/08/2014 1230    CRP 6 7 (H) 09/06/2016 0838    CRP Negative 08/08/2014 1221    GLUCOSE 102 06/01/2017 1631    GLUCOSE 86 01/07/2016 0820    HGBA1C 5 6 02/18/2022 0904    HGBA1C 5 9 (H) 05/14/2015 1228     Physical exam  Body mass index is 46 79 kg/m²  · General: Awake, Alert, Oriented  · Eyes: Pupils equal, round and reactive to light  · Heart: regular rate and rhythm  · Lungs: No audible wheezing  · Abdomen: soft  left hand:  No swelling erythema or warmth  No gross long bone deformity  No tendinous clicks  No malalignment  Slightly decreased range of motion D IP joint the left index and long fingers  No ligamentous instability    Capillary refill brisk     Procedure  None today    Imaging  None today    1  Degenerative arthritis of index finger of left hand      Assessment:  left index and long finger DIP joint DJD    Plan:  New order for occupational therapy put in computer  Patient is to 10 occupational therapy  Recheck in 2 months  If not better would referred to hand surgery  Continue work on range of motion  Use ice or heat as needed  This note was created with voice recognition software

## 2022-08-29 ENCOUNTER — OFFICE VISIT (OUTPATIENT)
Dept: PULMONOLOGY | Facility: CLINIC | Age: 57
End: 2022-08-29
Payer: COMMERCIAL

## 2022-08-29 VITALS
HEIGHT: 65 IN | DIASTOLIC BLOOD PRESSURE: 88 MMHG | SYSTOLIC BLOOD PRESSURE: 132 MMHG | BODY MASS INDEX: 46.65 KG/M2 | OXYGEN SATURATION: 98 % | WEIGHT: 280 LBS | HEART RATE: 98 BPM

## 2022-08-29 DIAGNOSIS — G47.33 OSA (OBSTRUCTIVE SLEEP APNEA): Primary | ICD-10-CM

## 2022-08-29 DIAGNOSIS — I26.99 PULMONARY EMBOLISM, BILATERAL (HCC): Chronic | ICD-10-CM

## 2022-08-29 DIAGNOSIS — E66.01 MORBID OBESITY (HCC): Chronic | ICD-10-CM

## 2022-08-29 DIAGNOSIS — J45.40 MODERATE PERSISTENT ASTHMA WITHOUT COMPLICATION: ICD-10-CM

## 2022-08-29 DIAGNOSIS — K44.9 HIATAL HERNIA: ICD-10-CM

## 2022-08-29 PROCEDURE — 99214 OFFICE O/P EST MOD 30 MIN: CPT | Performed by: PHYSICIAN ASSISTANT

## 2022-08-29 NOTE — PROGRESS NOTES
Pulmonary Follow Up Note   Christine Beasley 62 y o  female MRN: [id number]  8/29/2022      Assessment:    Moderate persistent asthma without complication  Continue Symbicort 2 puffs twice daily  Reminded to rinse her mouth out after each use  Continue p r n  Albuterol HFA/nebs  Continue singular  Recommended patient have COVID vaccine  Discussed benefits vs risks of vaccination in detail during today's visit  MICHAEL (obstructive sleep apnea)  Patient received new machine but never received who is is or mask for it  Will sent order requesting new PAP supply to her DME company  What she has appropriate equipment will resume auto CPAP during all hours of sleep  Pulmonary embolism, bilateral (HCC)  Continue Coumadin indefinitely  Morbid obesity ([address] )  Weight loss encouraged  Lifestyle modifications including decreased caloric intake, healthier up on increase activity recommended  Hiatal hernia  Noted  Continue PPI  Plan:    Diagnoses and all orders for this visit:    MICHAEL (obstructive sleep apnea)  -     PAP DME Resupply/Reorder    Morbid obesity ([address] )    Pulmonary embolism, bilateral ([address] )    Moderate persistent asthma without complication    Hiatal hernia        Return in about 6 months (around 2/28/2023)  History of Present Illness   HPI:  Christine Beasley is a 62 y o  female who presents the office today for routine follow-up  Patient last saw Dr Karl Noyola in February for moderate persistent asthma, history of pulmonary embolism, morbid obesity, MICHAEL, GERD, hiatal hernia  In that time frame patient has not required systemic steroids, antibiotics or been hospitalized for respiratory related illness  Patient reports that for the most part she does well but still has issues with exertion  Also reports that humidity and heat worsen her symptoms  She denies significant cough but does note persistent postnasal drip required clear throat and bring up clear to white sputum 1st thing in the morning    No hemoptysis  Patient has off and on wheezing usually related to level of activity  Patient denies fevers, chills, headache or dizziness  No substernal chest pressure/pain  Does have sharp stabbing pains that she on her left side that she attributes to past shoulder surgery on that side  Pain only last a few seconds and resolves on its own  Usually worse with movement  Review of Systems   All other systems reviewed and are negative        Historical Information   Past Medical History:   Diagnosis Date    Abdominal pain, lower     [id number] RESOLVED    Acute renal failure ([facility] [address] )     [id number] RESOLVED    Allergic rhinitis     [id number] RESOLVED    Ankle pain, right     [id number] RESOLVED    Arthritis     [id number] RESOLVED  [id number] RESOLVED    Asthma     Bilateral chronic knee pain     [id number]    Bladder pain     [id number] RESOLVED    Blood clot in vein     Bursitis     [id number] RESOLVED    Cardiac disorder     14OTL6191  RESOLVED    Cardiac murmur     [id number] RESOLVED    Chest tightness or pressure     [id number] RESOLVED    COPD (chronic obstructive pulmonary disease) ([facility] [address] [facility]     Coronary artery disease     Curvature of spine     [id number] RESOLVED    GERD (gastroesophageal reflux disease)     Hernia, hiatal     09XHI0095 RESOLVED    Hyperlipidemia     72ETT8321 RESOLVED    Hypertension     Inguinal hernia     RIGHT   [id number] RESOLVED    Jaw closure abnormality     69QLY2632 RESOLVED    Lumbar herniated disc     65MBO2941 RESOLVED    Morbid obesity (HCC)     72AGP1532    Obesity     Osteoarthritis of right knee     43GOG6699 RESOLVED    Patellar tendinitis     40YII7342    Poor flexibility of tendon     48AKE3485    Presence of IVC filter 10/12/2018    Pulmonary embolism ([facility] [address] )     [id number] RESOLVED    Sepsis ([facility] )     80EMO0385 RESOLVED    Severe sepsis due to methicillin resistant Staphylococcus aureus (MRSA) with acute organ dysfunction ([facility] )     [id number] RESOLVED    Sleep apnea, obstructive     Spider vein, symptomatic     45YKL6170 RESOLVED    Status post arthroscopy of right knee     07MKV1527 RESOLVED    Stomach disorder     49NES4239 RESOLVED    Tear of medial meniscus of right knee     SUBSEQUENT ENCOUNTER  RESOLVED [id number]    Thyroid disorder     [id number]    Umbilical hernia     [id number] RESOLVED     Past Surgical History:   Procedure Laterality Date    COLONOSCOPY      CYSTOSCOPY  01/1994    WITH BIOPSY      EXPLORATORY LAPAROTOMY      FOOT SURGERY Left     FRACTURE SURGERY      HAND SURGERY Right     cyst    HYSTERECTOMY  2009    INCISION AND DRAINAGE ANTERIOR NECK Right 6/8/2017    Procedure: INCISION AND DRAINAGE  (I&D) NECK;  Surgeon: Marie Young MD;  Location:  MAIN OR;  Service:    Onalee Benita HERNIA REPAIR      WITH IMPLANTATION OF MESH  13 MAY 2014  LAST ASSESSED    IR IVC FILTER REMOVAL  1/22/2019    OPEN ANTERIOR SHOULDER RECONSTRUCTION Left     OTHER SURGICAL HISTORY      BLOOD VESSEL REPAIR   13 MAY 2014 LAST ASSESSED    PERIPHERALLY INSERTED CENTRAL CATHETER INSERTION      NC KNEE SCOPE,MED/LAT MENISECTOMY Right 4/11/2016    Procedure: KNEE ARTHROSCOPY WITH MEDIAL MENISCECTOMY ;  Surgeon: Octavio Lenz MD;  Location: [facility];  Service: Orthopedics    SHOULDER SURGERY       Family History   Problem Relation Age of Onset    Arthritis Mother     Colon cancer Brother     Lung cancer Father     No Known Problems Sister     No Known Problems Daughter     No Known Problems Maternal Grandmother     No Known Problems Maternal Grandfather     No Known Problems Paternal Grandmother     No Known Problems Paternal Grandfather     Breast cancer Maternal Aunt     Diabetes Maternal Aunt     No Known Problems Sister     No Known Problems Sister     No Known Problems Sister     No Known Problems Sister     No Known Problems Sister     No Known Problems Daughter     Colon cancer Son        Social History     Tobacco Use Smoking Status Former Smoker    Quit date: 1995    Years since quittin 7   Smokeless Tobacco Never Used   Tobacco Comment    quit 25 years ago         Meds/Allergies     Current Outpatient Medications:     albuterol (2 5 mg/3 mL) 0 083 % nebulizer solution, Take 3 mL (2 5 mg total) by nebulization as needed for wheezing, Disp: 3 mL, Rfl: 5    albuterol (PROVENTIL HFA,VENTOLIN HFA) 90 mcg/act inhaler, Inhale 2 puffs every 4 (four) hours as needed for wheezing, Disp: 54 g, Rfl: 3    atorvastatin (LIPITOR) 10 mg tablet, Take 1 tablet (10 mg total) by mouth daily, Disp: 90 tablet, Rfl: 3    budesonide-formoterol (Symbicort) 160-4 5 mcg/act inhaler, Inhale 2 puffs 2 (two) times a day Rinse mouth after use, Disp: 30 6 g, Rfl: 3    famotidine (PEPCID) 40 MG tablet, Take 1 tablet (40 mg total) by mouth daily at bedtime, Disp: 90 tablet, Rfl: 3    fluticasone (FLONASE) 50 mcg/act nasal spray, 2 sprays into each nostril daily, Disp: 48 g, Rfl: 3    furosemide (LASIX) 20 mg tablet, Take 1 tablet (20 mg total) by mouth daily as needed (leg swelling) Take with potassium, Disp: 30 tablet, Rfl: 5    levothyroxine 125 mcg tablet, Take 1 tablet (125 mcg total) by mouth daily, Disp: 90 tablet, Rfl: 3    loratadine (CLARITIN) 10 mg tablet, Take 1 tablet (10 mg total) by mouth daily, Disp: 30 tablet, Rfl: 5    montelukast (SINGULAIR) 10 mg tablet, Take 1 tablet (10 mg total) by mouth daily at bedtime, Disp: 90 tablet, Rfl: 3    omeprazole (PriLOSEC) 40 MG capsule, Take 1 capsule (40 mg total) by mouth daily before breakfast, Disp: 90 capsule, Rfl: 3    potassium chloride (MICRO-K) 10 MEQ CR capsule, Take 1 capsule (10 mEq total) by mouth daily, Disp: 30 capsule, Rfl: 5    warfarin (COUMADIN) 5 mg tablet, Take as directed by physician, Disp: 135 tablet, Rfl: 3  Allergies   Allergen Reactions    Clindamycin Angioedema    Medical Tape Itching     Welt and redness    Penicillin G Nausea Only    Penicillins GI Intolerance     Nausea and vomiting       Vitals: Blood pressure 132/88, pulse 98, height 5' 5" (1 651 m), weight 127 kg (280 lb), SpO2 98 %, not currently breastfeeding  Body mass index is 46 59 kg/m²  Oxygen Therapy  SpO2: 98 %    Physical Exam  Physical Exam  Vitals reviewed  Constitutional:       Appearance: Normal appearance  She is well-developed  HENT:      Head: Normocephalic and atraumatic  Nose: Nose normal       Mouth/Throat:      Mouth: Mucous membranes are moist       Pharynx: Oropharynx is clear  Eyes:      Extraocular Movements: Extraocular movements intact  Cardiovascular:      Rate and Rhythm: Normal rate and regular rhythm  Heart sounds: Normal heart sounds  Pulmonary:      Effort: Pulmonary effort is normal  No respiratory distress  Breath sounds: No wheezing, rhonchi or rales  Musculoskeletal:         General: No swelling  Cervical back: Normal range of motion and neck supple  Skin:     General: Skin is warm and dry  Neurological:      General: No focal deficit present  Mental Status: She is alert  Mental status is at baseline  Psychiatric:         Mood and Affect: Mood normal          Behavior: Behavior normal          Labs: I have personally reviewed pertinent lab results  , ABG: No results found for: PHART, KBH8NOK, PO2ART, TAF1DOW, I8COVMXR, BEART, SOURCE, BNP: No results found for: BNP, CBC: No results found for: WBC, HGB, HCT, MCV, PLT, ADJUSTEDWBC, MCH, MCHC, RDW, MPV, NRBC, CMP: No results found for: SODIUM, K, CL, CO2, ANIONGAP, BUN, CREATININE, GLUCOSE, CALCIUM, AST, ALT, ALKPHOS, PROT, BILITOT, EGFR, PT/INR: No results found for: PT, INR, Troponin: No results found for: TROPONINI  Lab Results   Component Value Date    WBC 6 32 02/18/2022    HGB 13 1 02/18/2022    HCT 43 8 02/18/2022    MCV 98 02/18/2022     02/18/2022     Lab Results   Component Value Date    GLUCOSE 102 06/01/2017    CALCIUM 9 0 06/07/2022     01/07/2016    K 4 3 06/07/2022    CO2 24 06/07/2022     (H) 06/07/2022    BUN 14 06/07/2022    CREATININE 1 13 06/07/2022     Lab Results   Component Value Date    IGE 40 9 09/09/2019     Lab Results   Component Value Date    ALT 17 02/18/2022    AST 15 02/18/2022    ALKPHOS 77 02/18/2022    BILITOT 0 40 01/07/2016       Imaging and other studies: I have personally reviewed pertinent reports  and I have personally reviewed pertinent films in PACS     CT chest abdomen pelvis with contrast 06/19/2021  Nondisplaced right 10th rib fracture  Minimal bibasilar atelectasis or scarring  No pneumothorax  No tracheal or endobronchial lesions  No pleural effusion  No other evidence of acute pathology  Moderate hiatal hernia  Colonic diverticulosis  Pulmonary function testing:  Performed 12/22/2020  FEV1/FVC ratio 81%   FEV1 93% predicted  FVC 86% predicted  no response to bronchodilators  TLC 79 % predicted  RV 58 % predicted  DLCO corrected for hemoglobin 27 % predicted  Normal spirometry  No change with bronchodilator  Decreased lung volumes indicative of very mild restriction  Mildly impaired diffusion capacity  Other Studies: I have personally reviewed pertinent reports  Echocardiogram 05/04/2020  EF 65%  Right ventricular size and systolic function normal   Trace mitral regurgitation  Trace tricuspid regurgitation  Pulmonary artery systolic pressure within normal limits

## 2022-08-29 NOTE — ASSESSMENT & PLAN NOTE
Patient received new machine but never received who is is or mask for it  Will sent order requesting new PAP supply to her [employer] company  What she has appropriate equipment will resume auto CPAP during all hours of sleep

## 2022-08-29 NOTE — ASSESSMENT & PLAN NOTE
Weight loss encouraged  Lifestyle modifications including decreased caloric intake, healthier up on increase activity recommended

## 2022-08-29 NOTE — ASSESSMENT & PLAN NOTE
Continue Symbicort 2 puffs twice daily  Reminded to rinse her mouth out after each use  Continue p r n  Albuterol HFA/nebs  Continue singular  Recommended patient have COVID vaccine  Discussed benefits vs risks of vaccination in detail during today's visit

## 2022-08-30 ENCOUNTER — ANTICOAG VISIT (OUTPATIENT)
Dept: FAMILY MEDICINE CLINIC | Facility: CLINIC | Age: 57
End: 2022-08-30

## 2022-08-30 ENCOUNTER — OFFICE VISIT (OUTPATIENT)
Dept: OBGYN CLINIC | Facility: CLINIC | Age: 57
End: 2022-08-30
Payer: COMMERCIAL

## 2022-08-30 ENCOUNTER — APPOINTMENT (OUTPATIENT)
Dept: LAB | Facility: MEDICAL CENTER | Age: 57
End: 2022-08-30
Payer: COMMERCIAL

## 2022-08-30 VITALS
HEIGHT: 65 IN | DIASTOLIC BLOOD PRESSURE: 92 MMHG | SYSTOLIC BLOOD PRESSURE: 166 MMHG | WEIGHT: 281.2 LBS | BODY MASS INDEX: 46.85 KG/M2

## 2022-08-30 DIAGNOSIS — Z79.01 CHRONIC ANTICOAGULATION: ICD-10-CM

## 2022-08-30 DIAGNOSIS — M19.042 DEGENERATIVE ARTHRITIS OF INDEX FINGER OF LEFT HAND: Primary | ICD-10-CM

## 2022-08-30 LAB
INR PPP: 1.45 (ref 0.84–1.19)
PROTHROMBIN TIME: 17.9 SECONDS (ref 11.6–14.5)

## 2022-08-30 PROCEDURE — 99212 OFFICE O/P EST SF 10 MIN: CPT | Performed by: PHYSICIAN ASSISTANT

## 2022-08-30 PROCEDURE — 85610 PROTHROMBIN TIME: CPT

## 2022-08-30 PROCEDURE — 36415 COLL VENOUS BLD VENIPUNCTURE: CPT

## 2022-09-19 ENCOUNTER — APPOINTMENT (OUTPATIENT)
Dept: LAB | Facility: MEDICAL CENTER | Age: 57
End: 2022-09-19
Payer: COMMERCIAL

## 2022-09-19 DIAGNOSIS — Z79.01 CHRONIC ANTICOAGULATION: ICD-10-CM

## 2022-09-19 LAB
INR PPP: 2.29 (ref 0.84–1.19)
PROTHROMBIN TIME: 25.5 SECONDS (ref 11.6–14.5)

## 2022-09-19 PROCEDURE — 85610 PROTHROMBIN TIME: CPT

## 2022-09-19 PROCEDURE — 36415 COLL VENOUS BLD VENIPUNCTURE: CPT

## 2022-09-20 ENCOUNTER — EVALUATION (OUTPATIENT)
Dept: PHYSICAL THERAPY | Facility: CLINIC | Age: 57
End: 2022-09-20
Payer: COMMERCIAL

## 2022-09-20 ENCOUNTER — ANTICOAG VISIT (OUTPATIENT)
Dept: FAMILY MEDICINE CLINIC | Facility: CLINIC | Age: 57
End: 2022-09-20

## 2022-09-20 DIAGNOSIS — M25.561 ACUTE PAIN OF RIGHT KNEE: Primary | ICD-10-CM

## 2022-09-20 DIAGNOSIS — G89.29 CHRONIC LEFT SHOULDER PAIN: ICD-10-CM

## 2022-09-20 DIAGNOSIS — M25.512 CHRONIC LEFT SHOULDER PAIN: ICD-10-CM

## 2022-09-20 PROCEDURE — 97535 SELF CARE MNGMENT TRAINING: CPT | Performed by: PHYSICAL THERAPIST

## 2022-09-20 PROCEDURE — 97161 PT EVAL LOW COMPLEX 20 MIN: CPT | Performed by: PHYSICAL THERAPIST

## 2022-09-20 PROCEDURE — 97110 THERAPEUTIC EXERCISES: CPT | Performed by: PHYSICAL THERAPIST

## 2022-09-20 PROCEDURE — 97140 MANUAL THERAPY 1/> REGIONS: CPT | Performed by: PHYSICAL THERAPIST

## 2022-09-20 NOTE — PROGRESS NOTES
PT Evaluation     Today's date: 2022  Patient name: Cash Delarosa  : 1965  MRN: [id number]  Referring provider: Agatha Gaucher, DO  Dx:   Encounter Diagnosis     ICD-10-CM    1  Acute pain of right knee  M25 561 Ambulatory Referral to Physical Therapy   2  Chronic left shoulder pain  M25 512 Ambulatory Referral to Physical Therapy    G89 29                   Assessment  Understanding of Dx/Px/POC: good   Prognosis: good    Goals  STGs: To be complete within 4 weeks  - Decrease pain to < 2/10 at worst  - Increase AROM to WNL  - Increase strength to > 4+/5  - Improve gait to WNL for distances < 6 blocks    LTGs: To be complete within 6 weeks  - Able to walk for any extended amount of time/distance without pain or limitation for increased safety and functional capacity with ADLs and home-related duty  - Able to repetitively squat without pain or limitation for increased safety and functional capacity with ADLs and home-related duty  - Able to repetitively ascend/descend a full flight of stairs without pain or limitation for increased safety and functional capacity with ADLs and home-related duty  - Able to repetitively complete transfers without pain or limitation for increased safety and functional capacity with ADLs and home-related duty  - Able to keel for any extended amount of time without pain or limitation for increased safety and functional capacity with ADLs and home-related duty    Plan  Planned therapy interventions: manual therapy, neuromuscular re-education, patient education, self care, therapeutic exercise, therapeutic activities and home exercise program  Frequency: 2x week  Duration in weeks: 6       Pt is a 62 y o  Female with chronic R knee pain who presents with functional deficits including decreased capacity with walking, squatting, kneeling, stairs, and transfers  Upon completion of today's initial evaluation, Celina's sx remain consistent with R Knee OA and PFPS   Patient will benefit from skilled physical therapy to address current deficits  Subjective Evaluation    Pain  Current pain ratin  At best pain ratin  At worst pain ratin  Location: R Knee    Patient Goals  Patient goals for therapy: increased strength, decreased pain and increased motion         Pt reports she has been dealing with chronic R Knee pain for many years  Reports her sx have continued to negatively effect her overall safety and functional capacity with ADLs and home-related duty          Objective Pain level ranges 2-8/10  AROM: R Knee 0-110 degrees; L Knee 0-120 degrees  Strength: R Quad and HS 3+/5; L Quad and HS 4+/5  Gait: Shortened step length, R knee extension lag, decreased R LE heel strike  Swelling: R Knee (Mod), will continue to monitor  FOTO: 55; GOAL: 62  Unable to walk without pain and limitation  Unable to squat without pain and limitation  Unable complete transfers without pain and limitation  Unable to ascend/descend stairs without pain and limitation  Unable to kneel without pain and limitation             Precautions: None at this time    Daily Treatment Diary    HEP: Handout provided and discussed      Manuals 22            ART             PROM/Stretch  Next session           IASTM             STM/Triggerpoint             JM                          Neuro Re-Ed             Standing 1/2 Roll calf stretch  Next session           SL Ecc HR                                                                                           Ther Ex             HS into QS 2x10            HR/TR  Next session           TB TKE  Next session           TB Heel to Toes  Next session           Sit to stand  Next session           Bridge with Add squeeze  Next session           SL Bridge             Clam shells             SL Sit to Stand             BOSU SLB             Upside down BOSU SL squat holds             TB Lat Walks  Next session           Step ups/downs  Next session Ther Activity             TM             Bike             NuStep x10' L1            Forward/backward heel to toe walk                          Gait Training              x10' (Cue heel through Toe)                                                   Modalities             MHP             CP x10'            US/Stim

## 2022-09-22 ENCOUNTER — APPOINTMENT (OUTPATIENT)
Dept: PHYSICAL THERAPY | Facility: CLINIC | Age: 57
End: 2022-09-22
Payer: COMMERCIAL

## 2022-09-23 ENCOUNTER — OFFICE VISIT (OUTPATIENT)
Dept: PHYSICAL THERAPY | Facility: CLINIC | Age: 57
End: 2022-09-23
Payer: COMMERCIAL

## 2022-09-23 DIAGNOSIS — M25.561 ACUTE PAIN OF RIGHT KNEE: Primary | ICD-10-CM

## 2022-09-23 DIAGNOSIS — M25.512 CHRONIC LEFT SHOULDER PAIN: ICD-10-CM

## 2022-09-23 DIAGNOSIS — G89.29 CHRONIC LEFT SHOULDER PAIN: ICD-10-CM

## 2022-09-23 PROCEDURE — 97110 THERAPEUTIC EXERCISES: CPT

## 2022-09-23 PROCEDURE — 97530 THERAPEUTIC ACTIVITIES: CPT

## 2022-09-23 NOTE — PROGRESS NOTES
Daily Note     Today's date: 2022  Patient name: Katya Riley  : 1965  MRN: [id number]  Referring provider: Michelle Parham DO  Dx:   Encounter Diagnosis     ICD-10-CM    1  Acute pain of right knee  M25 561    2  Chronic left shoulder pain  M25 512     G89 29                   Subjective: Pt reports pain is a little better with HEP  States cont pain intermittently  Objective: See treatment diary below      Assessment: Tolerated treatment well  Patient exhibited good technique with therapeutic exercises  Pt cont to have notable pain limitations with activity as noted with program  Pt with ant knee pain today with exercises  Plan: Continue per plan of care        Precautions: None at this time    Daily Treatment Diary    HEP: Handout provided and discussed      Manuals 22           ART             PROM/Stretch  Next session           IASTM             STM/Triggerpoint             JM                          Neuro Re-Ed             Standing 1/ Roll calf stretch  "            SL Ecc HR                                                                                           Ther Ex             HS into QS 2x10 2/10           HR/TR  2/10           TB TKE  L2 2/10           TB Heel to Toes  Next session           Sit to stand  5x           Bridge with Add squeeze  Next session           SL Bridge             Clam shells             SL Sit to Stand             BOSU SLB             Upside down BOSU SL squat holds             TB Lat Walks  5 laps           Step ups/downs  Next session                                     Ther Activity             TM             Bike             NuStep x10' L1 10' L1           Forward/backward heel to toe walk  5 laps                        Gait Training              x10' (Cue heel through Toe)                                                   Modalities             MHP             CP x10' 10'           US/Stim

## 2022-09-26 ENCOUNTER — OFFICE VISIT (OUTPATIENT)
Dept: PHYSICAL THERAPY | Facility: CLINIC | Age: 57
End: 2022-09-26
Payer: COMMERCIAL

## 2022-09-26 DIAGNOSIS — M25.512 CHRONIC LEFT SHOULDER PAIN: ICD-10-CM

## 2022-09-26 DIAGNOSIS — M25.561 ACUTE PAIN OF RIGHT KNEE: Primary | ICD-10-CM

## 2022-09-26 DIAGNOSIS — G89.29 CHRONIC LEFT SHOULDER PAIN: ICD-10-CM

## 2022-09-26 PROCEDURE — 97110 THERAPEUTIC EXERCISES: CPT

## 2022-09-26 NOTE — PROGRESS NOTES
Daily Note     Today's date: 2022  Patient name: Cathy Nelson  : 1965  MRN: [id number]  Referring provider: Gwyn Kwok DO  Dx:   Encounter Diagnosis     ICD-10-CM    1  Acute pain of right knee  M25 561    2  Chronic left shoulder pain  M25 512     G89 29                   Subjective: reports cont pain in R knee  States limited milo to exercises  Objective: See treatment diary below      Assessment: Tolerated treatment well  Patient exhibited good technique with therapeutic exercises  Pt with cont c/o ant R knee pain with exercises  Cont to demo slow cautious gait with program        Plan: Continue per plan of care        Precautions: None at this time    Daily Treatment Diary    HEP: Handout provided and discussed      Manuals 22          ART             PROM/Stretch  Next session           IASTM             STM/Triggerpoint             JM                          Neuro Re-Ed             Standing 1/2 Roll calf stretch  "  420"          SL Ecc HR                                                                                           Ther Ex             HS into QS 2x10 2/10 2/10 seated          HR/TR  2/10 2/10          TB TKE  L2 2/10 L2 2/10          TB Heel to Toes  Next session           Sit to stand  5x 5x          Bridge with Add squeeze  Next session           SL Bridge             Clam shells             SL Sit to Stand             BOSU SLB             Upside down BOSU SL squat holds             TB Lat Walks  5 laps 5 laps          Step ups/downs  Next session                                     Ther Activity             TM             Bike             NuStep x10' L1 10' L1 10' L3          Forward/backward heel to toe walk  5 laps 5 laps                       Gait Training              x10' (Cue heel through Toe)                                                   Modalities             MHP             CP x10' 10'           US/Stim

## 2022-09-29 ENCOUNTER — OFFICE VISIT (OUTPATIENT)
Dept: PHYSICAL THERAPY | Facility: CLINIC | Age: 57
End: 2022-09-29
Payer: COMMERCIAL

## 2022-09-29 DIAGNOSIS — G89.29 CHRONIC LEFT SHOULDER PAIN: ICD-10-CM

## 2022-09-29 DIAGNOSIS — M25.561 ACUTE PAIN OF RIGHT KNEE: Primary | ICD-10-CM

## 2022-09-29 DIAGNOSIS — M25.512 CHRONIC LEFT SHOULDER PAIN: ICD-10-CM

## 2022-09-29 PROCEDURE — 97110 THERAPEUTIC EXERCISES: CPT

## 2022-09-29 NOTE — PROGRESS NOTES
Daily Note     Today's date: 2022  Patient name: Tony Melendez  : 1965  MRN: [id number]  Referring provider: Priya Cheng DO  Dx:   Encounter Diagnosis     ICD-10-CM    1  Acute pain of right knee  M25 561    2  Chronic left shoulder pain  M25 512     G89 29                   Subjective: Pt reports cont pain along ant R knee  Reports able to move around better today  Objective: See treatment diary below      Assessment: Tolerated treatment well  Patient exhibited good technique with therapeutic exercises  Pt cont to complete program with cont pain along ant and medial knee  Cont to be limited with cont pain  Plan: Continue per plan of care        Precautions: None at this time    Daily Treatment Diary    HEP: Handout provided and discussed      Manuals 22         ART             PROM/Stretch  Next session           IASTM             STM/Triggerpoint             JM                          Neuro Re-Ed             Standing 1/2 Roll calf stretch  "  " ''         SL Ecc HR                                                                                           Ther Ex             HS into QS 2x10 2/10 2/10 seated 2/10 supine         HR/TR  /10 2/10 2/10         TB TKE  L2 2/10 L2 2/10 L2 2/10         TB Heel to Toes  Next session           Sit to stand  5x 5x 5x         Bridge with Add squeeze  Next session           SL Bridge             Clam shells             SL Sit to Stand             BOSU SLB             Upside down BOSU SL squat holds             TB Lat Walks  5 laps 5 laps 5 laps         Step ups/downs  Next session                                     Ther Activity             TM             Bike             NuStep x10' L1 10' L1 10' L3 10'         Forward/backward heel to toe walk  5 laps 5 laps 5 laps                      Gait Training              x10' (Cue heel through Toe)                                                   Modalities             P CP x10' 10'           US/Stim

## 2022-10-03 ENCOUNTER — OFFICE VISIT (OUTPATIENT)
Dept: PHYSICAL THERAPY | Facility: CLINIC | Age: 57
End: 2022-10-03
Payer: COMMERCIAL

## 2022-10-03 DIAGNOSIS — G89.29 CHRONIC LEFT SHOULDER PAIN: ICD-10-CM

## 2022-10-03 DIAGNOSIS — M25.512 CHRONIC LEFT SHOULDER PAIN: ICD-10-CM

## 2022-10-03 DIAGNOSIS — M25.561 ACUTE PAIN OF RIGHT KNEE: Primary | ICD-10-CM

## 2022-10-03 PROCEDURE — 97110 THERAPEUTIC EXERCISES: CPT

## 2022-10-03 NOTE — PROGRESS NOTES
Daily Note     Today's date: 10/3/2022  Patient name: Albina Ramos  : 1965  MRN: [id number]  Referring provider: Natalia Duane, DO  Dx:   Encounter Diagnosis     ICD-10-CM    1  Acute pain of right knee  M25 561    2  Chronic left shoulder pain  M25 512     G89 29                   Subjective: Pt cont to c/o R knee pain along ant R knee  Reports some improved sx overall  Objective: See treatment diary below      Assessment: Tolerated treatment well  Patient exhibited good technique with therapeutic exercises  Pt with cont c/o pain with end range knee ext  Pt cont to complete program with cont pain  Plan: Continue per plan of care        Precautions: None at this time    Daily Treatment Diary    HEP: Handout provided and discussed      Manuals 9/20/22 9/23 9/26 9/29 10/3        ART             PROM/Stretch  Next session           IASTM             STM/Triggerpoint             JM                          Neuro Re-Ed             Standing / Roll calf stretch  /20"  /20" '' "         SL Ecc HR                                                                                           Ther Ex             HS into QS 2x10 2/10 2/10 seated 2/10 supine 2/10 supine        HR/TR  2/10 2/10 2/10 2/10        TB TKE  L2 2/10 L2 2/10 L2 2/10 L2 2/10        TB Heel to Toes  Next session           Sit to stand  5x 5x 5x 5x        Bridge with Add squeeze  Next session           SL Bridge             Clam shells             SL Sit to Stand             BOSU SLB             Upside down BOSU SL squat holds             TB Lat Walks  5 laps 5 laps 5 laps 5 laps        Step ups/downs  Next session                                     Ther Activity             TM             Bike             NuStep x10' L1 10' L1 10' L3 10' 10'        Forward/backward heel to toe walk  5 laps 5 laps 5 laps 5 laps                     Gait Training              x10' (Cue heel through Toe) Modalities             MHP             CP x10' 10'           US/Stim

## 2022-10-07 ENCOUNTER — OFFICE VISIT (OUTPATIENT)
Dept: FAMILY MEDICINE CLINIC | Facility: CLINIC | Age: 57
End: 2022-10-07
Payer: COMMERCIAL

## 2022-10-07 ENCOUNTER — APPOINTMENT (OUTPATIENT)
Dept: LAB | Facility: MEDICAL CENTER | Age: 57
End: 2022-10-07
Payer: COMMERCIAL

## 2022-10-07 VITALS
TEMPERATURE: 96.6 F | WEIGHT: 289.2 LBS | BODY MASS INDEX: 48.18 KG/M2 | HEART RATE: 71 BPM | DIASTOLIC BLOOD PRESSURE: 90 MMHG | HEIGHT: 65 IN | SYSTOLIC BLOOD PRESSURE: 126 MMHG | OXYGEN SATURATION: 96 %

## 2022-10-07 DIAGNOSIS — R30.0 DYSURIA: ICD-10-CM

## 2022-10-07 DIAGNOSIS — Z12.31 ENCOUNTER FOR SCREENING MAMMOGRAM FOR BREAST CANCER: ICD-10-CM

## 2022-10-07 DIAGNOSIS — Z23 NEED FOR INFLUENZA VACCINATION: ICD-10-CM

## 2022-10-07 DIAGNOSIS — E03.8 OTHER SPECIFIED HYPOTHYROIDISM: Primary | Chronic | ICD-10-CM

## 2022-10-07 DIAGNOSIS — Z79.01 CHRONIC ANTICOAGULATION: ICD-10-CM

## 2022-10-07 DIAGNOSIS — E66.01 MORBID OBESITY WITH BMI OF 45.0-49.9, ADULT (HCC): ICD-10-CM

## 2022-10-07 DIAGNOSIS — Z86.711 HISTORY OF PULMONARY EMBOLUS (PE): ICD-10-CM

## 2022-10-07 LAB
BACTERIA UR QL AUTO: ABNORMAL /HPF
BILIRUB UR QL STRIP: NEGATIVE
CLARITY UR: CLEAR
COLOR UR: YELLOW
GLUCOSE UR STRIP-MCNC: NEGATIVE MG/DL
HGB UR QL STRIP.AUTO: NEGATIVE
INR PPP: 2.94 (ref 0.84–1.19)
KETONES UR STRIP-MCNC: NEGATIVE MG/DL
LEUKOCYTE ESTERASE UR QL STRIP: ABNORMAL
MUCOUS THREADS UR QL AUTO: ABNORMAL
NITRITE UR QL STRIP: NEGATIVE
NON-SQ EPI CELLS URNS QL MICRO: ABNORMAL /HPF
PH UR STRIP.AUTO: 5.5 [PH]
PROT UR STRIP-MCNC: ABNORMAL MG/DL
PROTHROMBIN TIME: 30.9 SECONDS (ref 11.6–14.5)
RBC #/AREA URNS AUTO: ABNORMAL /HPF
SP GR UR STRIP.AUTO: 1.03 (ref 1–1.03)
TSH SERPL DL<=0.05 MIU/L-ACNC: 4.08 UIU/ML (ref 0.45–4.5)
UROBILINOGEN UR STRIP-ACNC: <2 MG/DL
WBC #/AREA URNS AUTO: ABNORMAL /HPF

## 2022-10-07 PROCEDURE — 84443 ASSAY THYROID STIM HORMONE: CPT | Performed by: FAMILY MEDICINE

## 2022-10-07 PROCEDURE — 99214 OFFICE O/P EST MOD 30 MIN: CPT | Performed by: FAMILY MEDICINE

## 2022-10-07 PROCEDURE — 90682 RIV4 VACC RECOMBINANT DNA IM: CPT | Performed by: FAMILY MEDICINE

## 2022-10-07 PROCEDURE — 87147 CULTURE TYPE IMMUNOLOGIC: CPT | Performed by: FAMILY MEDICINE

## 2022-10-07 PROCEDURE — 36415 COLL VENOUS BLD VENIPUNCTURE: CPT | Performed by: FAMILY MEDICINE

## 2022-10-07 PROCEDURE — 90471 IMMUNIZATION ADMIN: CPT | Performed by: FAMILY MEDICINE

## 2022-10-07 PROCEDURE — 81001 URINALYSIS AUTO W/SCOPE: CPT | Performed by: FAMILY MEDICINE

## 2022-10-07 PROCEDURE — 87086 URINE CULTURE/COLONY COUNT: CPT | Performed by: FAMILY MEDICINE

## 2022-10-07 PROCEDURE — 85610 PROTHROMBIN TIME: CPT | Performed by: FAMILY MEDICINE

## 2022-10-07 NOTE — PATIENT INSTRUCTIONS

## 2022-10-07 NOTE — PROGRESS NOTES
Name: Mesha Hill      : 1965      MRN: [id number]  Encounter Provider: Shaka Chanel DO  Encounter Date: 10/7/2022   Encounter department: [address] [place] [address]    Flu shot given today  She will get her PT INR done today  I will also check a UA C&S and TSH on her  Continue same medications  Continue to try and watch diet  Follow-up in 4 months or p r n  1  Other specified hypothyroidism  -     TSH, 3rd generation with Free T4 reflex    2  Dysuria  -     UA (URINE) with reflex to Scope  -     Urine culture    3  Chronic anticoagulation  -     Protime-INR    4  Encounter for screening mammogram for breast cancer  -     Mammo screening bilateral w 3d & cad; Future; Expected date: 10/07/2022    5  Need for influenza vaccination  -     influenza vaccine, quadrivalent, recombinant, PF, 0 5 mL, for patients 18 yr+ (FLUBLOK)    6  Morbid obesity with BMI of 45 0-49 9, adult ([facility] [address] )    7  History of pulmonary embolus (PE)  -     Protime-INR    BMI Counseling: Body mass index is 48 13 kg/m²  The BMI is above normal  Nutrition recommendations include decreasing portion sizes, encouraging healthy choices of fruits and vegetables, decreasing fast food intake, consuming healthier snacks, limiting drinks that contain sugar, moderation in carbohydrate intake, increasing intake of lean protein, reducing intake of saturated and trans fat and reducing intake of cholesterol  No pharmacotherapy was ordered  Rationale for BMI follow-up plan is due to patient being overweight or obese  Depression Screening and Follow-up Plan: Patient was screened for depression during today's encounter  They screened negative with a PHQ-2 score of 0  Subjective     Patient here today for follow-up  Patient denies any chest pain or any increased shortness of breath  Patient states when she urinates, she feels it is warm all the time  No fever chills  She is due for PT INR      Review of Systems Constitutional: Negative  Respiratory: Negative  Cardiovascular: Negative  Gastrointestinal: Negative  Genitourinary: Negative  Musculoskeletal: Positive for arthralgias         Past Medical History:   Diagnosis Date    Abdominal pain, lower     [id number] RESOLVED    Acute renal failure ([facility] [address] )     [id number] RESOLVED    Allergic rhinitis     91UMH7525 RESOLVED    Ankle pain, right     [id number] RESOLVED    Arthritis     79HSE7011 RESOLVED  [id number] RESOLVED    Asthma     Bilateral chronic knee pain     [id number]    Bladder pain     72YWH3109 RESOLVED    Blood clot in vein     Bursitis     96VGN3144 RESOLVED    Cardiac disorder     69AWF7834  RESOLVED    Cardiac murmur     [id number] RESOLVED    Chest tightness or pressure     [id number] RESOLVED    COPD (chronic obstructive pulmonary disease) ([facility] [address] )     Coronary artery disease     Curvature of spine     44QLC0618 RESOLVED    GERD (gastroesophageal reflux disease)     Hernia, hiatal     23MVM4052 RESOLVED    Hyperlipidemia     12AMH4454 RESOLVED    Hypertension     Inguinal hernia     RIGHT   [id number] RESOLVED    Jaw closure abnormality     90PWQ6127 RESOLVED    Lumbar herniated disc     [id number] RESOLVED    Morbid obesity ([facility]     22PWE7447    Obesity     Osteoarthritis of right knee     60UHL8121 RESOLVED    Patellar tendinitis     68NQB3789    Poor flexibility of tendon     31PWS4522    Presence of IVC filter 10/12/2018    Pulmonary embolism ([facility] [address] )     60XZQ8154 RESOLVED    Sepsis ([facility] )     94GMQ2084 RESOLVED    Severe sepsis due to methicillin resistant Staphylococcus aureus (MRSA) with acute organ dysfunction ([facility] )     37VWO7160 RESOLVED    Sleep apnea, obstructive     Spider vein, symptomatic     [id number] RESOLVED    Status post arthroscopy of right knee     59PWI3323 RESOLVED    Stomach disorder     35TFV5954 RESOLVED    Tear of medial meniscus of right knee     SUBSEQUENT ENCOUNTER  RESOLVED [id number]    Thyroid disorder     02YMW0710    Umbilical hernia     11IOY4733 RESOLVED     Past Surgical History:   Procedure Laterality Date    COLONOSCOPY      CYSTOSCOPY  01/1994    WITH BIOPSY      EXPLORATORY LAPAROTOMY      FOOT SURGERY Left     FRACTURE SURGERY      HAND SURGERY Right     cyst    HYSTERECTOMY  2009    INCISION AND DRAINAGE ANTERIOR NECK Right 6/8/2017    Procedure: INCISION AND DRAINAGE  (I&D) NECK;  Surgeon: Alvino Aviles MD;  Location:  MAIN OR;  Service:    Yumiko Gambler HERNIA REPAIR      WITH IMPLANTATION OF MESH  13 MAY 2014  LAST ASSESSED    IR IVC FILTER REMOVAL  1/22/2019    OPEN ANTERIOR SHOULDER RECONSTRUCTION Left     OTHER SURGICAL HISTORY      BLOOD VESSEL REPAIR   13 MAY 2014 LAST ASSESSED    PERIPHERALLY INSERTED CENTRAL CATHETER INSERTION      ID KNEE SCOPE,MED/LAT MENISECTOMY Right 4/11/2016    Procedure: KNEE ARTHROSCOPY WITH MEDIAL MENISCECTOMY ;  Surgeon: Nima Tucker MD;  Location: [facility];  Service: Orthopedics    SHOULDER SURGERY       Family History   Problem Relation Age of Onset    Arthritis Mother     Colon cancer Brother     Lung cancer Father     No Known Problems Sister     No Known Problems Daughter     No Known Problems Maternal Grandmother     No Known Problems Maternal Grandfather     No Known Problems Paternal Grandmother     No Known Problems Paternal Grandfather     Breast cancer Maternal Aunt     Diabetes Maternal Aunt     No Known Problems Sister     No Known Problems Sister     No Known Problems Sister     No Known Problems Sister     No Known Problems Sister     No Known Problems Daughter     Colon cancer Son      Social History     Socioeconomic History    Marital status: Single     Spouse name: None    Number of children: None    Years of education: None    Highest education level: None   Occupational History    Occupation: unemployed   Tobacco Use    Smoking status: Former Smoker     Quit date: 11/25/1995 Years since quittin 8    Smokeless tobacco: Never Used    Tobacco comment: quit 25 years ago   Vaping Use    Vaping Use: Never used   Substance and Sexual Activity    Alcohol use: Never    Drug use: Never    Sexual activity: Never   Other Topics Concern    None   Social History Narrative    ALWAYS USES SEAT BELT         Social Determinants of Health     Financial Resource Strain: Not on file   Food Insecurity: Not on file   Transportation Needs: Not on file   Physical Activity: Not on file   Stress: Not on file   Social Connections: Not on file   Intimate Partner Violence: Not on file   Housing Stability: Not on file     Current Outpatient Medications on File Prior to Visit   Medication Sig    albuterol (2 5 mg/3 mL) 0 083 % nebulizer solution Take 3 mL (2 5 mg total) by nebulization as needed for wheezing    albuterol (PROVENTIL HFA,VENTOLIN HFA) 90 mcg/act inhaler Inhale 2 puffs every 4 (four) hours as needed for wheezing    atorvastatin (LIPITOR) 10 mg tablet Take 1 tablet (10 mg total) by mouth daily    budesonide-formoterol (Symbicort) 160-4 5 mcg/act inhaler Inhale 2 puffs 2 (two) times a day Rinse mouth after use    famotidine (PEPCID) 40 MG tablet Take 1 tablet (40 mg total) by mouth daily at bedtime    fluticasone (FLONASE) 50 mcg/act nasal spray 2 sprays into each nostril daily    furosemide (LASIX) 20 mg tablet Take 1 tablet (20 mg total) by mouth daily as needed (leg swelling) Take with potassium    levothyroxine 125 mcg tablet Take 1 tablet (125 mcg total) by mouth daily    loratadine (CLARITIN) 10 mg tablet Take 1 tablet (10 mg total) by mouth daily    montelukast (SINGULAIR) 10 mg tablet Take 1 tablet (10 mg total) by mouth daily at bedtime    omeprazole (PriLOSEC) 40 MG capsule Take 1 capsule (40 mg total) by mouth daily before breakfast    potassium chloride (MICRO-K) 10 MEQ CR capsule Take 1 capsule (10 mEq total) by mouth daily    warfarin (COUMADIN) 5 mg tablet Take as directed by physician     Allergies   Allergen Reactions    Clindamycin Angioedema    Medical Tape Itching     Welt and redness    Penicillin G Nausea Only    Penicillins GI Intolerance     Nausea and vomiting     Immunization History   Administered Date(s) Administered    INFLUENZA 09/29/2014, 09/28/2015, 11/27/2016, 11/30/2016, 05/29/2017    Influenza Quadrivalent Preservative Free 3 years and older IM 11/30/2016    Influenza Quadrivalent, 6-35 Months IM 08/29/2017    Influenza, Quadrivalent (nasal) 11/27/2016    Influenza, recombinant, quadrivalent,injectable, preservative free 10/12/2018, 09/11/2019, 09/15/2020, 09/30/2021, 10/07/2022    Influenza, seasonal, injectable 09/29/2014    Pneumococcal Polysaccharide PPV23 08/29/2017       Objective     /90   Pulse 71   Temp (!) 96 6 °F (35 9 °C)   Ht 5' 5" (1 651 m)   Wt 131 kg (289 lb 3 2 oz)   LMP  (LMP Unknown)   SpO2 96%   BMI 48 13 kg/m²     Physical Exam  Vitals reviewed  Constitutional:       General: She is not in acute distress  Appearance: Normal appearance  She is well-developed  She is not ill-appearing, toxic-appearing or diaphoretic  HENT:      Head: Normocephalic and atraumatic  Eyes:      Conjunctiva/sclera: Conjunctivae normal    Cardiovascular:      Rate and Rhythm: Normal rate and regular rhythm  Heart sounds: Normal heart sounds  No murmur heard  No friction rub  No gallop  Pulmonary:      Effort: Pulmonary effort is normal  No respiratory distress  Breath sounds: Normal breath sounds  No wheezing, rhonchi or rales  Musculoskeletal:      Right lower leg: No edema  Left lower leg: No edema  Neurological:      General: No focal deficit present  Mental Status: She is alert and oriented to person, place, and time  Psychiatric:         Mood and Affect: Mood normal          Behavior: Behavior normal          Thought Content:  Thought content normal          Judgment: Judgment normal  Feilsha Stringer, DO  BMI Counseling: Body mass index is 48 13 kg/m²  The BMI is above normal  Nutrition recommendations include reducing portion sizes, decreasing overall calorie intake, 3-5 servings of fruits/vegetables daily, reducing fast food intake, consuming healthier snacks, decreasing soda and/or juice intake, moderation in carbohydrate intake, increasing intake of lean protein, reducing intake of saturated fat and trans fat and reducing intake of cholesterol

## 2022-10-09 LAB
BACTERIA UR CULT: ABNORMAL
BACTERIA UR CULT: ABNORMAL

## 2022-10-10 ENCOUNTER — ANTICOAG VISIT (OUTPATIENT)
Dept: FAMILY MEDICINE CLINIC | Facility: CLINIC | Age: 57
End: 2022-10-10

## 2022-10-10 ENCOUNTER — OFFICE VISIT (OUTPATIENT)
Dept: PHYSICAL THERAPY | Facility: CLINIC | Age: 57
End: 2022-10-10
Payer: COMMERCIAL

## 2022-10-10 DIAGNOSIS — M25.512 CHRONIC LEFT SHOULDER PAIN: ICD-10-CM

## 2022-10-10 DIAGNOSIS — G89.29 CHRONIC LEFT SHOULDER PAIN: ICD-10-CM

## 2022-10-10 DIAGNOSIS — R30.0 DYSURIA: Primary | ICD-10-CM

## 2022-10-10 DIAGNOSIS — M25.561 ACUTE PAIN OF RIGHT KNEE: Primary | ICD-10-CM

## 2022-10-10 PROCEDURE — 97110 THERAPEUTIC EXERCISES: CPT | Performed by: PHYSICAL THERAPIST

## 2022-10-10 NOTE — PROGRESS NOTES
Daily Note     Today's date: 10/10/2022  Patient name: Cash Delarosa  : 1965  MRN: [id number]  Referring provider: Agatha Gaucher, DO  Dx:   Encounter Diagnosis     ICD-10-CM    1  Acute pain of right knee  M25 561    2  Chronic left shoulder pain  M25 512     G89 29                   Subjective: Patient reprots no significant changes since starting PT       Objective: See treatment diary below      Assessment: Tolerated treatment well  Patient demonstrated fatigue post treatment, exhibited good technique with therapeutic exercises and would benefit from continued PT      Plan: Continue per plan of care        Precautions: None at this time    Daily Treatment Diary    HEP: Handout provided and discussed      Manuals 9/20/22 9/23 9/26 9/29 10/3 10/10       ART             PROM/Stretch  Next session    RR Flex Ext HS        IASTM             STM/Triggerpoint             JM                          Neuro Re-Ed             Standing 1/ Roll calf stretch  /"  4/20" 4/20'' 20"  30''4x       SL Ecc HR                                                                                           Ther Ex             HS into QS 2x10 2/10 2/10 seated 2/10 supine 2/10 supine 2x10 supine        HR/TR  2/10 2/10 2/10 2/10 2x10       TB TKE  L2 2/10 L2 2/10 L2 2/10 L2 2/10 Green 2x10        TB Heel to Toes  Next session           Sit to stand  5x 5x 5x 5x [id number]       Bridge with Add squeeze  Next session           SL Bridge             Clam shells             SL Sit to Stand             BOSU SLB             Upside down BOSU SL squat holds             TB Lat Walks  5 laps 5 laps 5 laps 5 laps 5 laps       Step ups/downs  Next session                                     Ther Activity             TM             Bike             NuStep x10' L1 10' L1 10' L3 10' 10' 10 min L2        Forward/backward heel to toe walk  5 laps 5 laps 5 laps 5 laps 5 Laps                     Gait Training              x10' (Cue heel through Toe) Modalities             MHP             CP x10' 10'    Defers        US/Stim

## 2022-10-13 ENCOUNTER — OFFICE VISIT (OUTPATIENT)
Dept: PHYSICAL THERAPY | Facility: CLINIC | Age: 57
End: 2022-10-13
Payer: COMMERCIAL

## 2022-10-13 DIAGNOSIS — G89.29 CHRONIC LEFT SHOULDER PAIN: ICD-10-CM

## 2022-10-13 DIAGNOSIS — M25.512 CHRONIC LEFT SHOULDER PAIN: ICD-10-CM

## 2022-10-13 DIAGNOSIS — M25.561 ACUTE PAIN OF RIGHT KNEE: Primary | ICD-10-CM

## 2022-10-13 PROCEDURE — 97530 THERAPEUTIC ACTIVITIES: CPT

## 2022-10-13 PROCEDURE — 97110 THERAPEUTIC EXERCISES: CPT

## 2022-10-13 NOTE — PROGRESS NOTES
Daily Note     Today's date: 10/13/2022  Patient name: Alayna Salgado  : 1965  MRN: [id number]  Referring provider: Jenny Adam DO  Dx:   Encounter Diagnosis     ICD-10-CM    1  Acute pain of right knee  M25 561    2  Chronic left shoulder pain  M25 512     G89 29                   Subjective: pt reports cont pain in R knee  States feeling worse today with weather  Objective: See treatment diary below      Assessment: Tolerated treatment well  Patient demonstrated fatigue post treatment  Pt with cont limited milo at times with cont c/o pain along ant and medial R knee  Pt cont to c/o pain throughout program        Plan: Continue per plan of care        Precautions: None at this time    Daily Treatment Diary    HEP: Handout provided and discussed      Manuals 9/20/22 9/23 9/26 9/29 10/3 10/10 10/13      ART             PROM/Stretch  Next session    RR Flex Ext HS        IASTM             STM/Triggerpoint             JM                          Neuro Re-Ed             Standing 1/ Roll calf stretch  4/20"  4/20" 4/20'' 420"  30''4x 30"/4      SL Ecc HR                                                                                           Ther Ex             HS into QS 2x10 2/10 2/10 seated 2/10 supine 2/10 supine 2x10 supine  2/10 supine      HR/TR  2/10 2/10 2/10 2/10 2x10 2/10      TB TKE  L2 2/10 L2 2/10 L2 2/10 L2 2/10 Green 2x10  L3 2/10      TB Heel to Toes  Next session           Sit to stand  5x 5x 5x 5x [id number] 10x       Bridge with Add squeeze  Next session           SL Bridge             Clam shells             SL Sit to Stand             BOSU SLB             Upside down BOSU SL squat holds             TB Lat Walks  5 laps 5 laps 5 laps 5 laps 5 laps 5 laps      Step ups/downs  Next session                                     Ther Activity             TM             Bike             NuStep x10' L1 10' L1 10' L3 10' 10' 10 min L2  10' L4      Forward/backward heel to toe walk  5 laps 5 laps 5 laps 5 laps 5 Laps  5 laps                   Gait Training              x10' (Cue heel through Toe)                                                   Modalities             MHP             CP x10' 10'    Defers        US/Stim

## 2022-10-14 ENCOUNTER — APPOINTMENT (OUTPATIENT)
Dept: LAB | Facility: MEDICAL CENTER | Age: 57
End: 2022-10-14
Payer: COMMERCIAL

## 2022-10-14 LAB
BACTERIA UR QL AUTO: ABNORMAL /HPF
BILIRUB UR QL STRIP: NEGATIVE
CLARITY UR: CLEAR
COLOR UR: YELLOW
GLUCOSE UR STRIP-MCNC: NEGATIVE MG/DL
HGB UR QL STRIP.AUTO: NEGATIVE
KETONES UR STRIP-MCNC: NEGATIVE MG/DL
LEUKOCYTE ESTERASE UR QL STRIP: NEGATIVE
MUCOUS THREADS UR QL AUTO: ABNORMAL
NITRITE UR QL STRIP: NEGATIVE
NON-SQ EPI CELLS URNS QL MICRO: ABNORMAL /HPF
PH UR STRIP.AUTO: 6 [PH]
PROT UR STRIP-MCNC: ABNORMAL MG/DL
RBC #/AREA URNS AUTO: ABNORMAL /HPF
SP GR UR STRIP.AUTO: 1.03 (ref 1–1.03)
UROBILINOGEN UR STRIP-ACNC: <2 MG/DL
WBC #/AREA URNS AUTO: ABNORMAL /HPF

## 2022-10-14 PROCEDURE — 81001 URINALYSIS AUTO W/SCOPE: CPT | Performed by: FAMILY MEDICINE

## 2022-10-14 PROCEDURE — 87086 URINE CULTURE/COLONY COUNT: CPT | Performed by: FAMILY MEDICINE

## 2022-10-15 LAB — BACTERIA UR CULT: NORMAL

## 2022-10-17 ENCOUNTER — APPOINTMENT (OUTPATIENT)
Dept: PHYSICAL THERAPY | Facility: CLINIC | Age: 57
End: 2022-10-17

## 2022-10-20 ENCOUNTER — APPOINTMENT (OUTPATIENT)
Dept: PHYSICAL THERAPY | Facility: CLINIC | Age: 57
End: 2022-10-20

## 2022-10-24 ENCOUNTER — OFFICE VISIT (OUTPATIENT)
Dept: PHYSICAL THERAPY | Facility: CLINIC | Age: 57
End: 2022-10-24
Payer: COMMERCIAL

## 2022-10-24 ENCOUNTER — LAB (OUTPATIENT)
Dept: LAB | Facility: MEDICAL CENTER | Age: 57
End: 2022-10-24
Payer: COMMERCIAL

## 2022-10-24 DIAGNOSIS — M25.512 CHRONIC LEFT SHOULDER PAIN: ICD-10-CM

## 2022-10-24 DIAGNOSIS — G89.29 CHRONIC LEFT SHOULDER PAIN: ICD-10-CM

## 2022-10-24 DIAGNOSIS — Z79.01 CHRONIC ANTICOAGULATION: ICD-10-CM

## 2022-10-24 DIAGNOSIS — M25.561 ACUTE PAIN OF RIGHT KNEE: Primary | ICD-10-CM

## 2022-10-24 LAB
INR PPP: 2.75 (ref 0.84–1.19)
PROTHROMBIN TIME: 29.4 SECONDS (ref 11.6–14.5)

## 2022-10-24 PROCEDURE — 36415 COLL VENOUS BLD VENIPUNCTURE: CPT

## 2022-10-24 PROCEDURE — 97140 MANUAL THERAPY 1/> REGIONS: CPT

## 2022-10-24 PROCEDURE — 97530 THERAPEUTIC ACTIVITIES: CPT

## 2022-10-24 PROCEDURE — 85610 PROTHROMBIN TIME: CPT

## 2022-10-24 PROCEDURE — 97110 THERAPEUTIC EXERCISES: CPT

## 2022-10-24 NOTE — PROGRESS NOTES
Daily Note     Today's date: 10/24/2022  Patient name: Bryce Patel  : 1965  MRN: [id number]  Referring provider: Heaven Navarro DO  Dx:   Encounter Diagnosis     ICD-10-CM    1  Acute pain of right knee  M25 561    2  Chronic left shoulder pain  M25 512     G89 29        Start Time: 1046  Stop Time: 1146  Total time in clinic (min): 60 minutes    Subjective: Shania Beam reports that (R) knee pain is improving  She states that HEP has helped reduce symptoms  Objective: See treatment diary below      Assessment: Patient responded well to treatment  Most challenge in extension on the (R) LE with limitations demonstrated 2* to pain at apex of patella  Patient would benefit from continued PT to increase (R) knee strength and improve function  Plan: Continue per plan of care        Precautions: None at this time    Daily Treatment Diary    HEP: Handout provided and discussed      Manuals 9/20/22 9/23 9/26 9/29 10/3 10/10 10/13 10/24     ART             PROM/Stretch  Next session    RR Flex Ext HS   LQ Flex/ Ext in sitting     IASTM             STM/Triggerpoint             JM                          Neuro Re-Ed             Standing 1/ Roll calf stretch  4/20"  4/20" 4/20'' 4/20"  30''4x 30"/4 30"/4     SL Ecc HR                                                                                           Ther Ex             HS into QS 2x10 2/10 2/10 seated 2/10 supine 2/10 supine 2x10 supine  2/10 supine 2/10 supine     HR/TR  2/10 2/10 2/10 2/10 2x10 2/10 x15     TB TKE  L2 2/10 L2 2/10 L2 2/10 L2 2/10 Green 2x10  L3 2/10 L3 2/10     TB Heel to Toes  Next session           Sit to stand  5x 5x 5x 5x [id number] 10x  2x5      Bridge with Add squeeze  Next session           SL Bridge             Clam shells             SL Sit to Stand             BOSU SLB             Upside down BOSU SL squat holds             TB Lat Walks  5 laps 5 laps 5 laps 5 laps 5 laps 5 laps 5 laps     Step ups/downs  Next session Ther Activity             TM             Bike             NuStep x10' L1 10' L1 10' L3 10' 10' 10 min L2  10' L4 10' L2     Forward/backward heel to toe walk  5 laps 5 laps 5 laps 5 laps 5 Laps  5 laps 5 laps                  Gait Training              x10' (Cue heel through Toe)                                                   Modalities             MHP             CP x10' 10'    Defers        US/Stim

## 2022-10-25 ENCOUNTER — ANTICOAG VISIT (OUTPATIENT)
Dept: FAMILY MEDICINE CLINIC | Facility: CLINIC | Age: 57
End: 2022-10-25

## 2022-10-27 ENCOUNTER — OFFICE VISIT (OUTPATIENT)
Dept: PHYSICAL THERAPY | Facility: CLINIC | Age: 57
End: 2022-10-27
Payer: COMMERCIAL

## 2022-10-27 DIAGNOSIS — M25.561 ACUTE PAIN OF RIGHT KNEE: Primary | ICD-10-CM

## 2022-10-27 DIAGNOSIS — M25.512 CHRONIC LEFT SHOULDER PAIN: ICD-10-CM

## 2022-10-27 DIAGNOSIS — G89.29 CHRONIC LEFT SHOULDER PAIN: ICD-10-CM

## 2022-10-27 PROCEDURE — 97140 MANUAL THERAPY 1/> REGIONS: CPT

## 2022-10-27 PROCEDURE — 97530 THERAPEUTIC ACTIVITIES: CPT

## 2022-10-27 PROCEDURE — 97110 THERAPEUTIC EXERCISES: CPT

## 2022-10-27 NOTE — PROGRESS NOTES
Daily Note     Today's date: 10/27/2022  Patient name: Radha Dubose  : 1965  MRN: [id number]  Referring provider: Yadiel Tesfaye DO  Dx:   Encounter Diagnosis     ICD-10-CM    1  Acute pain of right knee  M25 561    2  Chronic left shoulder pain  M25 512     G89 29                   Subjective: Pt reports she is doing well  Reports improved milo to stairs with taking less rest breaks  Objective: See treatment diary below      Assessment: Tolerated treatment well  Patient exhibited good technique with therapeutic exercises  Pt with cont pain along proximal portion of patella with exercises  Pt with pain at [facility]  Demonstrates full knee flx PROM  Plan: Continue per plan of care        Precautions: None at this time    Daily Treatment Diary    HEP: Handout provided and discussed      Manuals 10/27 9/23 9/26 9/29 10/3 10/10 10/13 10/24   ART           PROM/Stretch Flex/ext supine JV Next session    RR Flex Ext HS   LQ Flex/ Ext in sitting   IASTM           STM/Triggerpoint           JM                      Neuro Re-Ed           Standing 1/ Roll calf stretch 4/20"  4/20"  4/20" 4/20'' 4/20"  30''4x 30"/4 30"/4   SL Ecc HR                                                                             Ther Ex           HS into QS 2x10 supine 2/10 2/10 seated 2/10 supine 2/10 supine 2x10 supine  2/10 supine 2/10 supine   HR/TR 15x 2/10 2/10 2/10 2/10 2x10 2/10 x15   TB TKE L3 2/10 L2 2/10 L2 2/10 L2 2/10 L2 2/10 Green 2x10  L3 2/10 L3 2/10   TB Heel to Toes  Next session         Sit to stand 2/5 5x 5x 5x 5x [id number] 10x  2x5    Bridge with Add squeeze  Next session         SL Bridge           Clam shells           SL Sit to Stand           BOSU SLB           Upside down BOSU SL squat holds           TB Lat Walks 5 laps 5 laps 5 laps 5 laps 5 laps 5 laps 5 laps 5 laps   Step ups/downs  Next session                               Ther Activity           TM           Bike           NuStep x10' L3 10' L1 10' L3 10' 10' 10 min L2  10' L4 10' L2   Forward/backward heel to toe walk 5 laps 5 laps 5 laps 5 laps 5 laps 5 Laps  5 laps 5 laps              Gait Training                                                       Modalities           MHP           CP x10' 10'    Defers      US/Stim

## 2022-10-28 ENCOUNTER — HOSPITAL ENCOUNTER (OUTPATIENT)
Dept: MAMMOGRAPHY | Facility: HOSPITAL | Age: 57
Discharge: HOME | End: 2022-10-28
Payer: COMMERCIAL

## 2022-10-28 VITALS — HEIGHT: 65 IN | BODY MASS INDEX: 48.15 KG/M2 | WEIGHT: 289 LBS

## 2022-10-28 DIAGNOSIS — Z12.31 ENCOUNTER FOR SCREENING MAMMOGRAM FOR BREAST CANCER: ICD-10-CM

## 2022-10-28 PROCEDURE — 77063 BREAST TOMOSYNTHESIS BI: CPT

## 2022-10-28 PROCEDURE — 77067 SCR MAMMO BI INCL CAD: CPT

## 2022-10-31 ENCOUNTER — APPOINTMENT (OUTPATIENT)
Dept: PHYSICAL THERAPY | Facility: CLINIC | Age: 57
End: 2022-10-31

## 2022-11-03 ENCOUNTER — OFFICE VISIT (OUTPATIENT)
Dept: PHYSICAL THERAPY | Facility: CLINIC | Age: 57
End: 2022-11-03

## 2022-11-03 DIAGNOSIS — G89.29 CHRONIC LEFT SHOULDER PAIN: ICD-10-CM

## 2022-11-03 DIAGNOSIS — M25.512 CHRONIC LEFT SHOULDER PAIN: ICD-10-CM

## 2022-11-03 DIAGNOSIS — M25.561 ACUTE PAIN OF RIGHT KNEE: Primary | ICD-10-CM

## 2022-11-03 NOTE — PROGRESS NOTES
Daily Note     Today's date: 11/3/2022  Patient name: Jocy Carroll  : 1965  MRN: [id number]  Referring provider: Maral Ramesh DO  Dx:   Encounter Diagnosis     ICD-10-CM    1  Acute pain of right knee  M25 561    2  Chronic left shoulder pain  M25 512     G89 29                   Subjective: pt reports therapy is starting to help  Reports feeling stronger  Cont to reports pain with stairs  Objective: See treatment diary below      Assessment: Tolerated treatment well  Patient exhibited good technique with therapeutic exercises  Pt cont to make steady gains with program  Cont to demonstrate fatigue with step ups  Pt would like to try 1x per week at this time  Plan: Continue per plan of care        Precautions: None at this time    Daily Treatment Diary    HEP: Handout provided and discussed      Manuals 10/27 11/3 9/26 9/29 10/3 10/10 10/13 10/24   ART           PROM/Stretch Flex/ext supine JV Flx/ext supine JV    RR Flex Ext HS   LQ Flex/ Ext in sitting   IASTM           STM/Triggerpoint           JM                      Neuro Re-Ed           Standing 1/ Roll calf stretch 4/20"  4/20"  4/20" 4/20'' 4/20"  30''4x 30"/4 30"/4   SL Ecc HR                                                                             Ther Ex           HS into QS 2x10 supine 2/10 2/10 seated 2/10 supine 2/10 supine 2x10 supine  2/10 supine 2/10 supine   HR/TR 15x 2/10 2/10 2/10 2/10 2x10 2/10 x15   TB TKE L3 2/10 L3 2/10 L2 2/10 L2 2/10 L2 2/10 Green 2x10  L3 2/10 L3 2/10   TB Heel to Toes           Sit to stand 2/5 5x 5x 5x 5x [id number] 10x  2x5    Bridge with Add squeeze  10x pain         SL Bridge           Clam shells           SL Sit to Stand           BOSU SLB           Upside down BOSU SL squat holds           TB Lat Walks 5 laps 10 laps 5 laps 5 laps 5 laps 5 laps 5 laps 5 laps   Step ups/downs  4" 10x                               Ther Activity           TM           Bike           NuStep x10' L3 10' L4 10' L3 10' 10' 10 min L2  10' L4 10' L2   Forward/backward heel to toe walk 5 laps 5 laps 5 laps 5 laps 5 laps 5 Laps  5 laps 5 laps              Gait Training                                                       Modalities           MHP           CP x10'     Defers      US/Stim

## 2022-11-10 ENCOUNTER — OFFICE VISIT (OUTPATIENT)
Dept: PHYSICAL THERAPY | Facility: CLINIC | Age: 57
End: 2022-11-10

## 2022-11-10 DIAGNOSIS — M25.561 ACUTE PAIN OF RIGHT KNEE: Primary | ICD-10-CM

## 2022-11-10 DIAGNOSIS — M25.512 CHRONIC LEFT SHOULDER PAIN: ICD-10-CM

## 2022-11-10 DIAGNOSIS — G89.29 CHRONIC LEFT SHOULDER PAIN: ICD-10-CM

## 2022-11-10 NOTE — PROGRESS NOTES
Daily Note     Today's date: 11/10/2022  Patient name: Adria Stein  : 1965  MRN: [id number]  Referring provider: Collette Reding, DO  Dx:   Encounter Diagnosis     ICD-10-CM    1  Acute pain of right knee  M25 561    2  Chronic left shoulder pain  M25 512     G89 29                   Subjective: Pt reports she is doing much better but would like to cont to improve with stair climbing  Reports pain overall improved and walking  Objective: See treatment diary below  Improved FOTO scores  Assessment: Tolerated treatment well  Patient exhibited good technique with therapeutic exercises  Pt cont to make steady gains with program  Cont to c/o ant R knee pain at times  C/o pain along ant knee with step ups  Plan: Continue per plan of care        Precautions: None at this time    Daily Treatment Diary    HEP: Handout provided and discussed      Manuals 10/27 11/3 11/10 9/29 10/3 10/10 10/13 10/24   ART           PROM/Stretch Flex/ext supine JV Flx/ext supine JV Flx/ext supine JV   RR Flex Ext HS   LQ Flex/ Ext in sitting   IASTM           STM/Triggerpoint           JM                      Neuro Re-Ed           Standing 1/ Roll calf stretch 4/20"  4/20"  4/20" 4/20'' 4/20"  30''4x 30"/4 30"/4   SL Ecc HR                                                                             Ther Ex           HS into QS 2x10 supine 2/10 2/10 seated 2/10 supine 2/10 supine 2x10 supine  2/10 supine 2/10 supine   HR/TR 15x 2/10 2/10 2/10 2/10 2x10 2/10 x15   TB TKE L3 2/10 L3 2/10 L3 2/10 L2 2/10 L2 2/10 Green 2x10  L3 2/10 L3 2/10   TB Heel to Toes           Sit to stand 2/5 5x 5x 5x 5x [id number] 10x  2x5    Bridge with Add squeeze  10x pain         SL Bridge           Clam shells           SL Sit to Stand           BOSU SLB           Upside down BOSU SL squat holds           TB Lat Walks 5 laps 10 laps 10 laps 5 laps 5 laps 5 laps 5 laps 5 laps   Step ups/downs  4" 10x 4" 10x                              Ther Activity           TM           Bike           NuStep x10' L3 10' L4 10' L4 10' 10' 10 min L2  10' L4 10' L2   Forward/backward heel to toe walk 5 laps 5 laps 10 laps 5 laps 5 laps 5 Laps  5 laps 5 laps              Gait Training                                                       Modalities           MHP           CP x10'  10'   Defers      US/Stim

## 2022-11-10 NOTE — PROGRESS NOTES
PT Re-Evaluation     Today's date: 11/10/2022  Patient name: Adria Stein  : 1965  MRN: [id number]  Referring provider: Collette Reding, DO  Dx:   Encounter Diagnosis     ICD-10-CM    1  Acute pain of right knee  M25 561    2  Chronic left shoulder pain  M25 512     G89 29        Start Time: 1040  Stop Time: 1145  Total time in clinic (min): 65 minutes    Assessment  Understanding of Dx/Px/POC: good   Prognosis: good    Goals  STGs: To be complete within 2-3 weeks (partially met)  - Decrease pain to < 2/10 at worst  - Increase AROM to WNL  - Increase strength to > 4+/5  - Improve gait to WNL for distances < 6 blocks    LTGs: To be complete within 4 weeks (partially met)  - Able to walk for any extended amount of time/distance without pain or limitation for increased safety and functional capacity with ADLs and home-related duty  - Able to repetitively squat without pain or limitation for increased safety and functional capacity with ADLs and home-related duty  - Able to repetitively ascend/descend a full flight of stairs without pain or limitation for increased safety and functional capacity with ADLs and home-related duty  - Able to repetitively complete transfers without pain or limitation for increased safety and functional capacity with ADLs and home-related duty  - Able to keel for any extended amount of time without pain or limitation for increased safety and functional capacity with ADLs and home-related duty    Plan  Planned therapy interventions: manual therapy, neuromuscular re-education, patient education, self care, therapeutic exercise, therapeutic activities and home exercise program  Frequency: 2x week  Duration in weeks: 6       Upon completion of today's re-evaluation, as evidenced by present objective and subjective measures, Celina's sx remain consistent with continued, fair-paced progress from R Knee OA and PFPS   Patient will benefit from continued skilled physical therapy to address current deficits  Subjective Evaluation    Pain  Current pain rating: 3  At best pain ratin  At worst pain ratin  Location: R Knee    Patient Goals  Patient goals for therapy: increased strength, decreased pain and increased motion         Pt reports her sx have continued show some improvement overall  Reports about 25-50% overall improvement  Definitely feels benefit from continuing with physical therapy          Objective Pain level ranges 1-5/10  AROM: R Knee 0-120 degrees; L Knee 0-120 degrees  Strength: R Quad and HS 4/5; L Quad and HS 4+/5  Gait: Shortened step length, R knee extension lag, decreased R LE heel strike  Swelling: R Knee (Mild), will continue to monitor  FOTO: 61; GOAL: 62  Unable to walk without pain and limitation, but improving  Unable to squat without pain and limitation, but improving  Unable complete transfers without pain and limitation, but improving  Unable to ascend/descend stairs without pain and limitation, but improving  Unable to kneel without pain and limitation, but improving

## 2022-11-14 ENCOUNTER — OFFICE VISIT (OUTPATIENT)
Dept: PHYSICAL THERAPY | Facility: CLINIC | Age: 57
End: 2022-11-14

## 2022-11-14 DIAGNOSIS — M25.561 ACUTE PAIN OF RIGHT KNEE: Primary | ICD-10-CM

## 2022-11-14 DIAGNOSIS — M25.512 CHRONIC LEFT SHOULDER PAIN: ICD-10-CM

## 2022-11-14 DIAGNOSIS — G89.29 CHRONIC LEFT SHOULDER PAIN: ICD-10-CM

## 2022-11-14 NOTE — PROGRESS NOTES
Daily Note     Today's date: 2022  Patient name: Nathaly Lewis  : 1965  MRN: [id number]  Referring provider: Chad Love DO  Dx:   Encounter Diagnosis     ICD-10-CM    1  Acute pain of right knee  M25 561    2  Chronic left shoulder pain  M25 512     G89 29                   Subjective: Pt reports increased R knee pain today especially with stairs  Objective: See treatment diary below      Assessment: Tolerated treatment well  Patient demonstrated fatigue post treatment  Pt with cont pain along ant knee today  Pt with increased pain and antalgic gait at times  PTA held step ups today secondary to pain limitations  Plan: Continue per plan of care        Manuals 10/27 11/3 11/10 11/14 10/3 10/10 10/13 10/24   ART                   PROM/Stretch Flex/ext supine JV Flx/ext supine JV Flx/ext supine JV  Flx/ext JV pat mobs   RR Flex Ext HS    LQ Flex/ Ext in sitting   IASTM                   STM/Triggerpoint                   JM                                       Neuro Re-Ed                   Standing / Roll calf stretch 4/20"  4/20"  4/20" 4/20'' 420"  30''4x 30"/4 30"/4   SL Ecc HR                                                                                                                                           Ther Ex                   HS into QS 2x10 supine 2/10 2/10 seated 2/10 supine 2/10 supine 2x10 supine  2/10 supine 2/10 supine   HR/TR 15x 2/10 2/10 2/10 2/10 2x10 2/10 x15   TB TKE L3 2/10 L3 2/10 L3 2/10 L3 2/10 L2 2/10 Green 2x10  L3 2/10 L3 2/10   TB Heel to Toes                   Sit to stand 2/5 5x 5x 5x 5x [id number] 10x  2x5    Bridge with Add squeeze   10x pain               SL Bridge                   Clam shells                   SL Sit to Stand                   BOSU SLB                   Upside down BOSU SL squat holds                   TB Lat Walks 5 laps 10 laps 10 laps 10 laps 5 laps 5 laps 5 laps 5 laps   Step ups/downs   4" 10x 4" 10x  held today                                                   Ther Activity                   TM                   Bike                   NuStep x10' L3 10' L4 10' L4 10' L4 10' 10 min L2  10' L4 10' L2   Forward/backward heel to toe walk 5 laps 5 laps 10 laps 10 laps 5 laps 5 Laps  5 laps 5 laps                       Gait Training                                                                                                   Modalities                   MHP                   CP x10'   10'  10'   Defers        US/Stim

## 2022-11-15 ENCOUNTER — OFFICE VISIT (OUTPATIENT)
Dept: FAMILY MEDICINE CLINIC | Facility: CLINIC | Age: 57
End: 2022-11-15

## 2022-11-15 ENCOUNTER — HOSPITAL ENCOUNTER (OUTPATIENT)
Dept: NON INVASIVE DIAGNOSTICS | Facility: HOSPITAL | Age: 57
Discharge: HOME | End: 2022-11-15

## 2022-11-15 VITALS
HEART RATE: 70 BPM | WEIGHT: 288.8 LBS | OXYGEN SATURATION: 96 % | HEIGHT: 65 IN | TEMPERATURE: 97 F | BODY MASS INDEX: 48.12 KG/M2 | SYSTOLIC BLOOD PRESSURE: 110 MMHG | DIASTOLIC BLOOD PRESSURE: 72 MMHG

## 2022-11-15 DIAGNOSIS — M79.604 RIGHT LEG PAIN: ICD-10-CM

## 2022-11-15 DIAGNOSIS — M79.604 RIGHT LEG PAIN: Primary | ICD-10-CM

## 2022-11-15 NOTE — PROGRESS NOTES
Assessment/Plan:    Problem List Items Addressed This Visit    None     Visit Diagnoses     Right leg pain    -  Primary    Rule out DVT with stat venous doppler R LE  Relevant Orders    VAS lower limb venous duplex study, unilateral/limited           Diagnoses and all orders for this visit:    Right leg pain  Comments:  Rule out DVT with stat venous doppler R LE  Orders:  -     VAS lower limb venous duplex study, unilateral/limited; Future          Subjective:      Patient ID: Mesha Hill is a 62 y o  female  Chito Prado is here today complaining of pain in R LE since striking leg off of cabinet door 1 week ago  Denies CP/SOB  The following portions of the patient's history were reviewed and updated as appropriate:   She has a past medical history of Abdominal pain, lower, Acute renal failure ([address] ), Allergic rhinitis, Ankle pain, right, Arthritis, Asthma, Bilateral chronic knee pain, Bladder pain, Blood clot in vein, Bursitis, Cardiac disorder, Cardiac murmur, Chest tightness or pressure, COPD (chronic obstructive pulmonary disease) ([address] ), Coronary artery disease, Curvature of spine, GERD (gastroesophageal reflux disease), Hernia, hiatal, Hyperlipidemia, Hypertension, Inguinal hernia, Jaw closure abnormality, Lumbar herniated disc, Morbid obesity ([address] ), Obesity, Osteoarthritis of right knee, Patellar tendinitis, Poor flexibility of tendon, Presence of IVC filter (10/12/2018), Pulmonary embolism ([address] ), Sepsis ([address] ), Severe sepsis due to methicillin resistant Staphylococcus aureus (MRSA) with acute organ dysfunction ([address] ), Sleep apnea, obstructive, Spider vein, symptomatic, Status post arthroscopy of right knee, Stomach disorder, Tear of medial meniscus of right knee, Thyroid disorder, and Umbilical hernia ,  does not have any pertinent problems on file  ,   has a past surgical history that includes Hysterectomy (2009); Open anterior shoulder reconstruction (Left); Hand surgery (Right);  Foot surgery (Left); Colonoscopy; pr knee scope,med/lat menisectomy (Right, 4/11/2016); Exploratory laparotomy; Peripherally inserted central catheter insertion; Incision and drainage anterior neck (Right, 6/8/2017); Other surgical history; Cystoscopy (01/1994); Incisional hernia repair; Shoulder surgery; IR IVC filter removal (1/22/2019); and Fracture surgery  ,  family history includes Arthritis in her mother; Breast cancer in her maternal aunt; Colon cancer in her brother and son; Diabetes in her maternal aunt; Lung cancer in her father; No Known Problems in her daughter, daughter, maternal grandfather, maternal grandmother, paternal grandfather, paternal grandmother, sister, sister, sister, sister, sister, and sister  ,   reports that she quit smoking about 26 years ago  She has never used smokeless tobacco  She reports that she does not drink alcohol and does not use drugs  ,  is allergic to clindamycin, medical tape, penicillin g, and penicillins     Current Outpatient Medications   Medication Sig Dispense Refill   • albuterol (2 5 mg/3 mL) 0 083 % nebulizer solution Take 3 mL (2 5 mg total) by nebulization as needed for wheezing 3 mL 5   • albuterol (PROVENTIL HFA,VENTOLIN HFA) 90 mcg/act inhaler Inhale 2 puffs every 4 (four) hours as needed for wheezing 54 g 3   • atorvastatin (LIPITOR) 10 mg tablet Take 1 tablet (10 mg total) by mouth daily 90 tablet 3   • budesonide-formoterol (Symbicort) 160-4 5 mcg/act inhaler Inhale 2 puffs 2 (two) times a day Rinse mouth after use 30 6 g 3   • famotidine (PEPCID) 40 MG tablet Take 1 tablet (40 mg total) by mouth daily at bedtime 90 tablet 3   • fluticasone (FLONASE) 50 mcg/act nasal spray 2 sprays into each nostril daily 48 g 3   • furosemide (LASIX) 20 mg tablet Take 1 tablet (20 mg total) by mouth daily as needed (leg swelling) Take with potassium 30 tablet 5   • levothyroxine 125 mcg tablet Take 1 tablet (125 mcg total) by mouth daily 90 tablet 3   • loratadine (CLARITIN) 10 mg tablet Take 1 tablet (10 mg total) by mouth daily 30 tablet 5   • montelukast (SINGULAIR) 10 mg tablet Take 1 tablet (10 mg total) by mouth daily at bedtime 90 tablet 3   • omeprazole (PriLOSEC) 40 MG capsule Take 1 capsule (40 mg total) by mouth daily before breakfast 90 capsule 3   • potassium chloride (MICRO-K) 10 MEQ CR capsule Take 1 capsule (10 mEq total) by mouth daily 30 capsule 5   • warfarin (COUMADIN) 5 mg tablet Take as directed by physician 135 tablet 3     No current facility-administered medications for this visit  Review of Systems   Constitutional: Negative for activity change, appetite change, chills, diaphoresis, fatigue, fever and unexpected weight change  HENT: Negative for congestion, ear pain, postnasal drip, rhinorrhea, sinus pressure, sinus pain, sneezing, sore throat, tinnitus and voice change  Eyes: Negative for pain, redness and visual disturbance  Respiratory: Negative for cough, chest tightness, shortness of breath and wheezing  Cardiovascular: Negative for chest pain, palpitations and leg swelling  Gastrointestinal: Negative for abdominal pain, blood in stool, constipation, diarrhea, nausea and vomiting  Genitourinary: Negative for difficulty urinating, dysuria, frequency, hematuria and urgency  Musculoskeletal: Negative for arthralgias, back pain, gait problem, joint swelling, myalgias, neck pain and neck stiffness  Complains of pain in R LE   Skin: Negative for color change, pallor, rash and wound  Neurological: Negative for dizziness, tremors, weakness, light-headedness and headaches  Psychiatric/Behavioral: Negative for dysphoric mood, self-injury, sleep disturbance and suicidal ideas  The patient is not nervous/anxious  Objective:  Vitals:    11/15/22 1330   BP: 110/72   Pulse: 70   Temp: (!) 97 °F (36 1 °C)   SpO2: 96%   Weight: 131 kg (288 lb 12 8 oz)   Height: 5' 5" (1 651 m)     Body mass index is 48 06 kg/m²  Physical Exam  Vitals reviewed  Constitutional:       General: She is not in acute distress  Appearance: She is well-developed  She is not diaphoretic  HENT:      Head: Normocephalic and atraumatic  Right Ear: Hearing, tympanic membrane, ear canal and external ear normal       Left Ear: Hearing, tympanic membrane, ear canal and external ear normal       Mouth/Throat:      Pharynx: Uvula midline  No oropharyngeal exudate  Eyes:      General: No scleral icterus  Right eye: No discharge  Left eye: No discharge  Conjunctiva/sclera: Conjunctivae normal    Neck:      Thyroid: No thyromegaly  Vascular: No carotid bruit  Cardiovascular:      Rate and Rhythm: Normal rate and regular rhythm  Heart sounds: Normal heart sounds  No murmur heard  Pulmonary:      Effort: Pulmonary effort is normal  No respiratory distress  Breath sounds: Normal breath sounds  No wheezing  Abdominal:      General: Bowel sounds are normal  There is no distension  Palpations: Abdomen is soft  There is no mass  Tenderness: There is no abdominal tenderness  There is no guarding or rebound  Musculoskeletal:         General: Tenderness (R LE tenderness is not proportional with exam) present  Normal range of motion  Cervical back: Neck supple  Right lower leg: Edema (chronic) present  Left lower leg: Edema (chronic) present  Lymphadenopathy:      Cervical: No cervical adenopathy  Skin:     General: Skin is warm and dry  Findings: Rash (anterior R shin with pink, roughened skin patch approximately  3x6 cm, appears eczematous on exam  No bruising present ) present  No erythema  Neurological:      Mental Status: She is alert and oriented to person, place, and time  Psychiatric:         Behavior: Behavior normal          Thought Content:  Thought content normal          Judgment: Judgment normal

## 2022-11-17 ENCOUNTER — OFFICE VISIT (OUTPATIENT)
Dept: PHYSICAL THERAPY | Facility: CLINIC | Age: 57
End: 2022-11-17

## 2022-11-17 DIAGNOSIS — G89.29 CHRONIC LEFT SHOULDER PAIN: ICD-10-CM

## 2022-11-17 DIAGNOSIS — M25.512 CHRONIC LEFT SHOULDER PAIN: ICD-10-CM

## 2022-11-17 DIAGNOSIS — M25.561 ACUTE PAIN OF RIGHT KNEE: Primary | ICD-10-CM

## 2022-11-17 NOTE — PROGRESS NOTES
Daily Note     Today's date: 2022  Patient name: Aris Alvarez  : 1965  MRN: [id number]  Referring provider: Elier Lujan DO  Dx:   Encounter Diagnosis     ICD-10-CM    1  Acute pain of right knee  M25 561       2  Chronic left shoulder pain  M25 512     G89 29                      Subjective: Pt reports some improvement in function with stairs  Reports making slow steady gains  Cont to reports ant knee pain with increased activity  Objective: See treatment diary below      Assessment: Tolerated treatment well  Patient exhibited good technique with therapeutic exercises  Pt making slow steady progress today with cont c/o ant knee pain with knee flx/ext end range  Pt with improved overall milo to exercises however cont to have discomfort in R knee  Plan: Continue per plan of care        Manuals 10/27 11/3 11/10 11/14 11/17 10/10 10/13 10/24   ART                   PROM/Stretch Flex/ext supine JV Flx/ext supine JV Flx/ext supine JV  Flx/ext JV pat mobs  Flx/ext JV pat mobs RR Flex Ext HS    LQ Flex/ Ext in sitting   IASTM                   STM/Triggerpoint                   JM                                       Neuro Re-Ed                   Standing / Roll calf stretch 4/20"  4/20"  4/20" 4/20'' 4/20"  30''4x 30"/4 30"/4   SL Ecc HR                                                                                                                                           Ther Ex                   HS into QS 2x10 supine 2/10 2/10 seated 2/10 supine 2/10 supine 2x10 supine  2/10 supine 2/10 supine   HR/TR 15x 2/10 2/10 2/10 2/10 2x10 2/10 x15   TB TKE L3 2/10 L3 2/10 L3 2/10 L3 2/10 L3 2/10 Green 2x10  L3 2/10 L3 2/10   TB Heel to Toes                   Sit to stand 2/5 5x 5x 5x 5x [id number] 10x  2x5    Bridge with Add squeeze   10x pain               SL Bridge                   Clam shells                   SL Sit to Stand                   BOSU SLB                   Upside down BOSU SL squat holds                   TB Lat Walks 5 laps 10 laps 10 laps 10 laps 10 laps 5 laps 5 laps 5 laps   Step ups/downs   4" 10x 4" 10x  held today  NV                                                 Ther Activity                   TM                   Bike                   NuStep x10' L3 10' L4 10' L4 10' L4 10' L4 10 min L2  10' L4 10' L2   Forward/backward heel to toe walk 5 laps 5 laps 10 laps 10 laps 10 laps 5 Laps  5 laps 5 laps                       Gait Training                                                                                                   Modalities                   MHP                   CP x10'   10'  10'   Defers        US/Stim

## 2022-11-21 ENCOUNTER — OFFICE VISIT (OUTPATIENT)
Dept: PHYSICAL THERAPY | Facility: CLINIC | Age: 57
End: 2022-11-21

## 2022-11-21 DIAGNOSIS — G89.29 CHRONIC LEFT SHOULDER PAIN: ICD-10-CM

## 2022-11-21 DIAGNOSIS — M25.561 ACUTE PAIN OF RIGHT KNEE: Primary | ICD-10-CM

## 2022-11-21 DIAGNOSIS — M25.512 CHRONIC LEFT SHOULDER PAIN: ICD-10-CM

## 2022-11-21 NOTE — PROGRESS NOTES
Daily Note     Today's date: 2022  Patient name: Margarita Goldmann  : 1965  MRN: [id number]  Referring provider: Sherice Christie DO  Dx:   Encounter Diagnosis     ICD-10-CM    1  Acute pain of right knee  M25 561       2  Chronic left shoulder pain  M25 512     G89 29                      Subjective: Pt reports her R knee cont to slowly improve  Pleased with progress  Objective: See treatment diary below      Assessment: Tolerated treatment well  Patient demonstrated fatigue post treatment  Pt making cont slow steady gains with program  Cont to have pain at times with step ups  Demonstrates improved strength and function  Plan: Continue per plan of care        Manuals 10/27 11/3 11/10 11/14 11/17 11/21 10/13 10/24   ART                   PROM/Stretch Flex/ext supine JV Flx/ext supine JV Flx/ext supine JV  Flx/ext JV pat mobs  Flx/ext JV pat mobs NV Flex Ext HS    LQ Flex/ Ext in sitting   IASTM                   STM/Triggerpoint                   JM                                       Neuro Re-Ed                   Standing 1/ Roll calf stretch 4/20"  4/20"  4/20" 4/20'' 4/20"  30''4x 30"/4 30"/4   SL Ecc HR                                                                                                                                           Ther Ex                   HS into QS 2x10 supine 2/10 2/10 seated 2/10 supine 2/10 supine 2x10 supine  2/10 supine 2/10 supine   HR/TR 15x 2/10 2/10 2/10 2/10 2x10 2/10 x15   TB TKE L3 2/10 L3 2/10 L3 2/10 L3 2/10 L3 2/10 Green 2x10  L3 2/10 L3 2/10   TB Heel to Toes                   Sit to stand 2/5 5x 5x 5x 5x 10x 10x  2x5    Bridge with Add squeeze   10x pain               SL Bridge                   Clam shells                   SL Sit to Stand                   BOSU SLB                   Upside down BOSU SL squat holds                   TB Lat Walks 5 laps 10 laps 10 laps 10 laps 10 laps 10 laps 5 laps 5 laps   Step ups/downs   4" 10x 4" 10x  held today  NV  4' 10x                                               Ther Activity                   TM                   Bike                   NuStep x10' L3 10' L4 10' L4 10' L4 10' L4 10 min L4  10' L4 10' L2   Forward/backward heel to toe walk 5 laps 5 laps 10 laps 10 laps 10 laps 5 Laps  5 laps 5 laps                       Gait Training                                                                                                   Modalities                   MHP                   CP x10'   10'  10'   10'       US/Stim

## 2022-11-23 ENCOUNTER — OFFICE VISIT (OUTPATIENT)
Dept: PHYSICAL THERAPY | Facility: CLINIC | Age: 57
End: 2022-11-23

## 2022-11-23 DIAGNOSIS — M25.512 CHRONIC LEFT SHOULDER PAIN: ICD-10-CM

## 2022-11-23 DIAGNOSIS — G89.29 CHRONIC LEFT SHOULDER PAIN: ICD-10-CM

## 2022-11-23 DIAGNOSIS — M25.561 ACUTE PAIN OF RIGHT KNEE: Primary | ICD-10-CM

## 2022-11-23 NOTE — PROGRESS NOTES
Daily Note     Today's date: 2022  Patient name: Tamar Curry  : 1965  MRN: [id number]  Referring provider: Emily Viveros DO  Dx:   Encounter Diagnosis     ICD-10-CM    1  Acute pain of right knee  M25 561       2  Chronic left shoulder pain  M25 512     G89 29                      Subjective: Patient notes her R knee is a little sore today  Objective: See treatment diary below      Assessment: Tolerated treatment fair with some increased R knee discomfort  She required frequent seated breaks for rest in order to tolerate her standing exercises  Fair tolerance to R knee PROM with empty end feel limited by discomfort in both directions  Patient would benefit from continued PT to increase R knee strength and mobility for improved function in ADLs  Plan: Continue per plan of care        Manuals 10/27 11/3 11/10 11/14 11/17 11/21 11/23    ART                 PROM/Stretch Flex/ext supine JV Flx/ext supine JV Flx/ext supine JV  Flx/ext JV pat mobs  Flx/ext JV pat mobs NV Flex Ext HS  Flex/ext/HS/ gastroc AS    IASTM                 STM/Triggerpoint                 JM                                   Neuro Re-Ed                 Standing / Roll calf stretch 4/20"  4/20"  4/20" 4/20'' 4/20"  30''4x 30''4x    SL Ecc HR                                                                                                                             Ther Ex                 HS into QS 2x10 supine 2/10 2/10 seated 2/10 supine 2/10 supine 2x10 supine  2x10 supine     HR/TR 15x 2/10 2/10 2/10 2/10 2x10 3x10    TB TKE L3 2/10 L3 2/10 L3 2/10 L3 2/10 L3 2/10 Green 2x10  Green 3x10     TB Heel to Toes                 Sit to stand 2/5 5x 5x 5x 5x 10x 10x    Bridge with Add squeeze   10x pain             SL Bridge                 Clam shells                 SL Sit to Stand                 BOSU SLB                 Upside down BOSU SL squat holds                 TB Lat Walks 5 laps 10 laps 10 laps 10 laps 10 laps 10 laps 10 laps    Step ups/downs   4" 10x 4" 10x  held today  NV  4' 10x 4' 10x                                        Ther Activity                 TM                 Bike                 NuStep x10' L3 10' L4 10' L4 10' L4 10' L4 10 min L4  L4 10'    Forward/backward heel to toe walk 5 laps 5 laps 10 laps 10 laps 10 laps 5 Laps  10 laps                      Gait Training                                                                                                   Modalities                   MHP                   CP x10'   10'  10'   10'  10'     US/Stim

## 2022-12-01 ENCOUNTER — APPOINTMENT (OUTPATIENT)
Dept: PHYSICAL THERAPY | Facility: CLINIC | Age: 57
End: 2022-12-01

## 2022-12-01 NOTE — PROGRESS NOTES
Daily Note     Today's date: 2022  Patient name: Darlean Osgood  : 1965  MRN: [id number]  Referring provider: Desiree Jett DO  Dx: No diagnosis found  Subjective: ***      Objective: See treatment diary below      Assessment: Tolerated treatment {Tolerated treatment :8207047103}   Patient {assessment:1187620244}      Plan: {PLAN:1100173994}     Manuals 10/27 11/3 11/10 11/14 11/17 11/21 11/23    ART                 PROM/Stretch Flex/ext supine JV Flx/ext supine JV Flx/ext supine JV  Flx/ext JV pat mobs  Flx/ext JV pat mobs NV Flex Ext HS  Flex/ext/HS/ gastroc AS    IASTM                 STM/Triggerpoint                 JM                                   Neuro Re-Ed                 Standing 1/ Roll calf stretch /20"  20"  4/20" /20'' 4/20"  30''4x 30''4x    SL Ecc HR                                                                                                                             Ther Ex                 HS into QS 2x10 supine 2/10 2/10 seated 2/10 supine 2/10 supine 2x10 supine  2x10 supine     HR/TR 15x 2/10 2/10 2/10 2/10 2x10 3x10    TB TKE L3 2/10 L3 2/10 L3 2/10 L3 2/10 L3 2/10 Green 2x10  Green 3x10     TB Heel to Toes                 Sit to stand 2/5 5x 5x 5x 5x 10x 10x    Bridge with Add squeeze   10x pain             SL Bridge                 Clam shells                 SL Sit to Stand                 BOSU SLB                 Upside down BOSU SL squat holds                 TB Lat Walks 5 laps 10 laps 10 laps 10 laps 10 laps 10 laps 10 laps    Step ups/downs   4" 10x 4" 10x  held today  NV  4' 10x 4' 10x                                        Ther Activity                 TM                 Bike                 NuStep x10' L3 10' L4 10' L4 10' L4 10' L4 10 min L4  L4 10'    Forward/backward heel to toe walk 5 laps 5 laps 10 laps 10 laps 10 laps 5 Laps  10 laps                      Gait Training                                                                                                   Modalities                   MHP                   CP x10'   10'  10'   10'  10'     US/Stim

## 2022-12-05 ENCOUNTER — APPOINTMENT (OUTPATIENT)
Dept: PHYSICAL THERAPY | Facility: CLINIC | Age: 57
End: 2022-12-05

## 2022-12-08 ENCOUNTER — APPOINTMENT (OUTPATIENT)
Dept: PHYSICAL THERAPY | Facility: CLINIC | Age: 57
End: 2022-12-08

## 2022-12-08 ENCOUNTER — ANTICOAG VISIT (OUTPATIENT)
Dept: FAMILY MEDICINE CLINIC | Facility: CLINIC | Age: 57
End: 2022-12-08

## 2022-12-08 ENCOUNTER — APPOINTMENT (OUTPATIENT)
Dept: LAB | Facility: MEDICAL CENTER | Age: 57
End: 2022-12-08

## 2022-12-08 ENCOUNTER — TELEPHONE (OUTPATIENT)
Dept: FAMILY MEDICINE CLINIC | Facility: CLINIC | Age: 57
End: 2022-12-08

## 2022-12-08 ENCOUNTER — OFFICE VISIT (OUTPATIENT)
Dept: FAMILY MEDICINE CLINIC | Facility: CLINIC | Age: 57
End: 2022-12-08

## 2022-12-08 VITALS
SYSTOLIC BLOOD PRESSURE: 142 MMHG | DIASTOLIC BLOOD PRESSURE: 90 MMHG | HEART RATE: 66 BPM | OXYGEN SATURATION: 95 % | TEMPERATURE: 96.9 F | HEIGHT: 65 IN | WEIGHT: 286.4 LBS | BODY MASS INDEX: 47.72 KG/M2

## 2022-12-08 DIAGNOSIS — I26.99 PULMONARY EMBOLISM, UNSPECIFIED CHRONICITY, UNSPECIFIED PULMONARY EMBOLISM TYPE, UNSPECIFIED WHETHER ACUTE COR PULMONALE PRESENT (HCC): Primary | ICD-10-CM

## 2022-12-08 DIAGNOSIS — Z91.048 ALLERGY TO ADHESIVE TAPE: ICD-10-CM

## 2022-12-08 DIAGNOSIS — M79.672 LEFT FOOT PAIN: Primary | ICD-10-CM

## 2022-12-08 DIAGNOSIS — Z79.01 CHRONIC ANTICOAGULATION: ICD-10-CM

## 2022-12-08 LAB
INR PPP: 2.18 (ref 0.84–1.19)
PROTHROMBIN TIME: 24.5 SECONDS (ref 11.6–14.5)

## 2022-12-08 RX ORDER — DIAPER,BRIEF,INFANT-TODD,DISP
EACH MISCELLANEOUS 2 TIMES DAILY
Qty: 30 G | Refills: 3 | Status: SHIPPED | OUTPATIENT
Start: 2022-12-08

## 2022-12-08 NOTE — TELEPHONE ENCOUNTER
Wilton Rowe will  the concentrator patient said she is no longer using if you do a letter   Fax it to [phone]

## 2022-12-08 NOTE — PROGRESS NOTES
Name: Nellie Severino      : 1965      MRN: [id number]  Encounter Provider: Elayne Camacho DO  Encounter Date: 2022   Encounter department: [address]   I will get an x-ray of her left foot and refer to podiatry  For her left arm reaction, Rx for hydrocortisone cream   She will call if symptoms continue or increase  1  Left foot pain  -     XR foot 3+ vw left; Future; Expected date: 2022  -     Ambulatory Referral to Podiatry; Future    2  Allergy to adhesive tape  -     hydrocortisone 0 5 % cream; Apply topically 2 (two) times a day         Subjective     Patient here today stating for the last 2 weeks has had pain on the left lateral foot just below her left fifth toe  It hurts her to walk  Denies any trauma  Patient also has an itchy antecubital fossa where she had her blood work done today  Patient is allergic to adhesive tape, and they used it today  Arm Pain       Review of Systems   Constitutional: Negative  Respiratory: Negative  Cardiovascular: Negative  Gastrointestinal: Negative  Genitourinary: Negative      Musculoskeletal:        As per HPI   Skin:        As per HPI       Past Medical History:   Diagnosis Date   • Abdominal pain, lower     [id number] RESOLVED   • Acute renal failure ([facility] [address] )     [id number] RESOLVED   • Allergic rhinitis     51EQM4675 RESOLVED   • Ankle pain, right     38PYO2166 RESOLVED   • Arthritis     2017 RESOLVED  [id number] RESOLVED   • Asthma    • Bilateral chronic knee pain     [id number]   • Bladder pain     60QBB7548 RESOLVED   • Blood clot in vein    • Bursitis     59NVV3784 RESOLVED   • Cardiac disorder     2017  RESOLVED   • Cardiac murmur     2017 RESOLVED   • Chest tightness or pressure     2017 RESOLVED   • COPD (chronic obstructive pulmonary disease) (HCC)    • Coronary artery disease    • Curvature of spine     08VQX7770 RESOLVED   • GERD (gastroesophageal reflux disease)    • Hernia, hiatal     43VGL0474 RESOLVED   • Hyperlipidemia     15AMB3909 RESOLVED   • Hypertension    • Inguinal hernia     RIGHT   87ZXB0396 RESOLVED   • Jaw closure abnormality     12QFB6962 RESOLVED   • Lumbar herniated disc     36FRA7623 RESOLVED   • Morbid obesity ([facility] [address] )     87JMY6722   • Obesity    • Osteoarthritis of right knee     73HSJ8087 RESOLVED   • Patellar tendinitis     17CDU5935   • Poor flexibility of tendon     66FEV7035   • Presence of IVC filter 10/12/2018   • Pulmonary embolism ([facility] )     [id number] RESOLVED   • Sepsis ([facility]  )     20GSV9992 RESOLVED   • Severe sepsis due to methicillin resistant Staphylococcus aureus (MRSA) with acute organ dysfunction ([facility]  )     09VQI3275 RESOLVED   • Sleep apnea, obstructive    • Spider vein, symptomatic     73ZUM8998 RESOLVED   • Status post arthroscopy of right knee     40YVA4990 RESOLVED   • Stomach disorder     62AEI1489 RESOLVED   • Tear of medial meniscus of right knee     SUBSEQUENT ENCOUNTER  RESOLVED [id number]   • Thyroid disorder     40FYQ7734   • Umbilical hernia     24GOI5133 RESOLVED     Past Surgical History:   Procedure Laterality Date   • COLONOSCOPY     • CYSTOSCOPY  01/1994    WITH BIOPSY     • EXPLORATORY LAPAROTOMY     • FOOT SURGERY Left    • FRACTURE SURGERY     • HAND SURGERY Right     cyst   • HYSTERECTOMY  2009   • INCISION AND DRAINAGE ANTERIOR NECK Right 6/8/2017    Procedure: INCISION AND DRAINAGE  (I&D) NECK;  Surgeon: Leigha Juan MD;  Location:  MAIN OR;  Service:    • INCISIONAL HERNIA REPAIR      WITH IMPLANTATION OF MESH  13 MAY 2014  LAST ASSESSED   • IR IVC FILTER REMOVAL  1/22/2019   • OPEN ANTERIOR SHOULDER RECONSTRUCTION Left    • OTHER SURGICAL HISTORY      BLOOD VESSEL REPAIR   13 MAY 2014 LAST ASSESSED   • PERIPHERALLY INSERTED CENTRAL CATHETER INSERTION     • WA KNEE SCOPE,MED/LAT MENISECTOMY Right 4/11/2016    Procedure: KNEE ARTHROSCOPY WITH MEDIAL MENISCECTOMY ;  Surgeon: Yuridia Infante MD;  Location: [facility];  Service: Orthopedics   • SHOULDER SURGERY       Family History   Problem Relation Age of Onset   • Arthritis Mother    • Colon cancer Brother    • Lung cancer Father    • No Known Problems Sister    • No Known Problems Daughter    • No Known Problems Maternal Grandmother    • No Known Problems Maternal Grandfather    • No Known Problems Paternal Grandmother    • No Known Problems Paternal Grandfather    • Breast cancer Maternal Aunt    • Diabetes Maternal Aunt    • No Known Problems Sister    • No Known Problems Sister    • No Known Problems Sister    • No Known Problems Sister    • No Known Problems Sister    • No Known Problems Daughter    • Colon cancer Son      Social History     Socioeconomic History   • Marital status: Single     Spouse name: None   • Number of children: None   • Years of education: None   • Highest education level: None   Occupational History   • Occupation: unemployed   Tobacco Use   • Smoking status: Former     Types: Cigarettes     Quit date: 1995     Years since quittin 0   • Smokeless tobacco: Never   • Tobacco comments:     quit 25 years ago   Vaping Use   • Vaping Use: Never used   Substance and Sexual Activity   • Alcohol use: Never   • Drug use: Never   • Sexual activity: Never   Other Topics Concern   • None   Social History Narrative    ALWAYS USES SEAT BELT         Social Determinants of Health     Financial Resource Strain: Not on file   Food Insecurity: Not on file   Transportation Needs: Not on file   Physical Activity: Not on file   Stress: Not on file   Social Connections: Not on file   Intimate Partner Violence: Not on file   Housing Stability: Not on file     Current Outpatient Medications on File Prior to Visit   Medication Sig   • albuterol (2 5 mg/3 mL) 0 083 % nebulizer solution Take 3 mL (2 5 mg total) by nebulization as needed for wheezing   • albuterol (PROVENTIL HFA,VENTOLIN HFA) 90 mcg/act inhaler Inhale 2 puffs every 4 (four) hours as needed for wheezing • atorvastatin (LIPITOR) 10 mg tablet Take 1 tablet (10 mg total) by mouth daily   • budesonide-formoterol (Symbicort) 160-4 5 mcg/act inhaler Inhale 2 puffs 2 (two) times a day Rinse mouth after use   • famotidine (PEPCID) 40 MG tablet Take 1 tablet (40 mg total) by mouth daily at bedtime   • fluticasone (FLONASE) 50 mcg/act nasal spray 2 sprays into each nostril daily   • furosemide (LASIX) 20 mg tablet Take 1 tablet (20 mg total) by mouth daily as needed (leg swelling) Take with potassium   • levothyroxine 125 mcg tablet Take 1 tablet (125 mcg total) by mouth daily   • loratadine (CLARITIN) 10 mg tablet Take 1 tablet (10 mg total) by mouth daily   • montelukast (SINGULAIR) 10 mg tablet Take 1 tablet (10 mg total) by mouth daily at bedtime   • omeprazole (PriLOSEC) 40 MG capsule Take 1 capsule (40 mg total) by mouth daily before breakfast   • potassium chloride (MICRO-K) 10 MEQ CR capsule Take 1 capsule (10 mEq total) by mouth daily   • warfarin (COUMADIN) 5 mg tablet Take as directed by physician     Allergies   Allergen Reactions   • Clindamycin Angioedema   • Medical Tape Itching     Welt and redness   • Penicillin G Nausea Only   • Penicillins GI Intolerance     Nausea and vomiting     Immunization History   Administered Date(s) Administered   • INFLUENZA 09/29/2014, 09/28/2015, 11/27/2016, 11/30/2016, 05/29/2017, 10/07/2022   • Influenza Quadrivalent Preservative Free 3 years and older IM 11/30/2016   • Influenza Quadrivalent, 6-35 Months IM 08/29/2017   • Influenza, Quadrivalent (nasal) 11/27/2016   • Influenza, recombinant, quadrivalent,injectable, preservative free 10/12/2018, 09/11/2019, 09/15/2020, 09/30/2021, 10/07/2022   • Influenza, seasonal, injectable 09/29/2014   • Pneumococcal Polysaccharide PPV23 08/29/2017       Objective     /90   Pulse 66   Temp (!) 96 9 °F (36 1 °C)   Ht 5' 5" (1 651 m)   Wt 130 kg (286 lb 6 4 oz)   LMP  (LMP Unknown)   SpO2 95%   BMI 47 66 kg/m²     Physical Exam  Vitals reviewed  Constitutional:       General: She is not in acute distress  Appearance: Normal appearance  She is well-developed  She is not diaphoretic  HENT:      Head: Normocephalic and atraumatic  Eyes:      Conjunctiva/sclera: Conjunctivae normal    Musculoskeletal:         General: Tenderness ( Left lateral foot just below the left fifth toe, no sign of infection) present  Skin:     Findings: Rash (Maculopapular rash on her right antecubital fossa  ) present  Neurological:      General: No focal deficit present  Mental Status: She is alert and oriented to person, place, and time  Psychiatric:         Mood and Affect: Mood normal          Behavior: Behavior normal          Thought Content:  Thought content normal          Judgment: Judgment normal        Melecio Calhoun,

## 2022-12-09 ENCOUNTER — APPOINTMENT (OUTPATIENT)
Dept: RADIOLOGY | Facility: MEDICAL CENTER | Age: 57
End: 2022-12-09

## 2022-12-09 DIAGNOSIS — M79.672 LEFT FOOT PAIN: ICD-10-CM

## 2022-12-12 ENCOUNTER — OFFICE VISIT (OUTPATIENT)
Dept: PHYSICAL THERAPY | Facility: CLINIC | Age: 57
End: 2022-12-12

## 2022-12-12 DIAGNOSIS — M25.561 ACUTE PAIN OF RIGHT KNEE: Primary | ICD-10-CM

## 2022-12-12 NOTE — PROGRESS NOTES
Daily Note     Today's date: 2022  Patient name: Albina Ramos  : 1965  MRN: [id number]  Referring provider: Natalia Duane, DO  Dx:   Encounter Diagnosis     ICD-10-CM    1  Acute pain of right knee  M25 561                      Subjective: Patient states that she is having 4/10 pain in the front of the right knee today  Objective: See treatment diary below      Assessment: Tolerated treatment fair, having mild discomfort in the front of the knee during several exercises  She also requires rest periods to complete her program  Patient would benefit from continued PT      Plan: Progress treatment as tolerated         Manuals 10/27 11/3 11/10 11/14 11/17 11/21 11/23 12/12   ART                 PROM/Stretch Flex/ext supine JV Flx/ext supine JV Flx/ext supine JV  Flx/ext JV pat mobs  Flx/ext JV pat mobs NV Flex Ext HS  Flex/ext/HS/ gastroc AS RK   IASTM                 STM/Triggerpoint                 JM                                   Neuro Re-Ed                 Standing / Roll calf stretch 4/20"  4/20"  4/20" 4/20'' 420"  30''4x 30''4x 30"x4   SL Ecc HR                                                                                                                             Ther Ex                 HS into QS 2x10 supine 2/10 2/10 seated 2/10 supine 2/10 supine 2x10 supine  2x10 supine  2/10 supine   HR/TR 15x 2/10 2/10 2/10 2/10 2x10 3x10 3/10   TB TKE L3 2/10 L3 2/10 L3 2/10 L3 2/10 L3 2/10 Green 2x10  Green 3x10  L3 3/10   TB Heel to Toes                 Sit to stand 2/5 5x 5x 5x 5x 10x 10x 3x   Bridge with Add squeeze   10x pain             SL Bridge                 Clam shells                 SL Sit to Stand                 BOSU SLB                 Upside down BOSU SL squat holds                 TB Lat Walks 5 laps 10 laps 10 laps 10 laps 10 laps 10 laps 10 laps 10 laps   Step ups/downs   4" 10x 4" 10x  held today  NV  4' 10x 4' 10x 4" 10x                                       Ther Activity                 TM                 Bike                 NuStep x10' L3 10' L4 10' L4 10' L4 10' L4 10 min L4  L4 10' L4 10 min   Forward/backward heel to toe walk 5 laps 5 laps 10 laps 10 laps 10 laps 5 Laps  10 laps 10 laps                     Gait Training                                                                                                   Modalities                   MHP                   CP x10'   10'  10'   10'  10'  10'   US/Stim

## 2022-12-15 ENCOUNTER — APPOINTMENT (OUTPATIENT)
Dept: PHYSICAL THERAPY | Facility: CLINIC | Age: 57
End: 2022-12-15

## 2022-12-19 ENCOUNTER — OFFICE VISIT (OUTPATIENT)
Dept: PHYSICAL THERAPY | Facility: CLINIC | Age: 57
End: 2022-12-19

## 2022-12-19 DIAGNOSIS — M25.561 ACUTE PAIN OF RIGHT KNEE: Primary | ICD-10-CM

## 2022-12-19 NOTE — PROGRESS NOTES
Daily Note     Today's date: 2022  Patient name: Bridgette Michelle  : 1965  MRN: [id number]  Referring provider: Jaci Andrews DO  Dx:   Encounter Diagnosis     ICD-10-CM    1  Acute pain of right knee  M25 561                      Subjective: Pt reports cont pain along ant knee  Objective: See treatment diary below      Assessment: Tolerated treatment fair  Patient demonstrated fatigue post treatment  Pt ed on f/u with MD for further evaluation and care  Pt cont to have notable c/o anterior knee pain with activity  No recent change in sx  Plan: Continue per plan of care        Manuals 12/19 11/3 11/10 11/14 11/17 11/21 11/23 12/12   ART                 PROM/Stretch Flex/ext supine JV Flx/ext supine JV Flx/ext supine JV  Flx/ext JV pat mobs  Flx/ext JV pat mobs NV Flex Ext HS  Flex/ext/HS/ gastroc AS RK   IASTM                 STM/Triggerpoint                 JM                                   Neuro Re-Ed                 Standing 1/ Roll calf stretch /20"  4/20"  4/20" 4/20'' 4/20"  30''4x 30''4x 30"x4   SL Ecc HR                                                                                                                             Ther Ex                 HS into QS 2x10 supine 2/10 2/10 seated 2/10 supine 2/10 supine 2x10 supine  2x10 supine  2/10 supine   HR/TR 15x 2/10 2/10 2/10 2/10 2x10 3x10 3/10   TB TKE L3 2/10 L3 2/10 L3 2/10 L3 2/10 L3 2/10 Green 2x10  Green 3x10  L3 3/10   TB Heel to Toes                 Sit to stand 2/5 5x 5x 5x 5x 10x 10x 3x   Bridge with Add squeeze   10x pain             SL Bridge                 Clam shells                 SL Sit to Stand                 BOSU SLB                 Upside down BOSU SL squat holds                 TB Lat Walks 10 laps 10 laps 10 laps 10 laps 10 laps 10 laps 10 laps 10 laps   Step ups/downs  4" 10x pain 4" 10x 4" 10x  held today  NV  4' 10x 4' 10x 4" 10x                                       Ther Activity                 TM                 Bike                 NuStep x10' L3 10' L4 10' L4 10' L4 10' L4 10 min L4  L4 10' L4 10 min   Forward/backward heel to toe walk 10 laps 5 laps 10 laps 10 laps 10 laps 5 Laps  10 laps 10 laps                     Gait Training                                                                                                   Modalities                   MHP                   CP x10'   10'  10'   10'  10'  10'   US/Stim

## 2022-12-20 ENCOUNTER — OFFICE VISIT (OUTPATIENT)
Dept: PODIATRY | Facility: CLINIC | Age: 57
End: 2022-12-20

## 2022-12-20 VITALS
WEIGHT: 286 LBS | HEART RATE: 77 BPM | SYSTOLIC BLOOD PRESSURE: 129 MMHG | DIASTOLIC BLOOD PRESSURE: 101 MMHG | HEIGHT: 65 IN | BODY MASS INDEX: 47.65 KG/M2

## 2022-12-20 DIAGNOSIS — M79.672 LEFT FOOT PAIN: Primary | ICD-10-CM

## 2022-12-20 NOTE — PROGRESS NOTES
Assessment/Plan:     Diagnoses and all orders for this visit:    Left foot pain  -     Ambulatory Referral to Podiatry  -     MRI foot/forefoot toes left wo contrast; Future  -     Cam Boot          Imaging Reviewed at this visit (I personally reviewed/independently interpreted images and reports in PACS)  · XR left foot 3v WB 12/20/22: No acute osseous abnormality noted  Mod-severe HAV without significant elevation or elongation of 1st ray  Decreased calcaneal inclination and increased talar declination angle  IMPRESSION:  · Left foot pain  Differential diagnosis include 5th metatarsal stress fracture, lateral column overload, PVD (venous stasis)   · Elevate BMI     PLAN:  · I reviewed clinical exam and radiographic imaging (XR) with patient in detail today  I have discussed with the patient the pathophysiology of this diagnosis and reviewed how the examination correlates with this diagnosis  · MRI left foot ordered to assess for stress fracture of 5th metatarsal  · CAM boot applied/dispensed WBAT  · RICE  · BMI 47 59kg/m2  Above normal (overweight)  Recommended behavior modifications such as healthy diet and exercise  Nutrition recommendations include decreasing portion sizes, encouraging healthy choices of fruits and vegetables, decreasing fast food intake, consuming healthier snacks, limiting drinks that contain sugar, moderation in carbohydrate intake, increasing intake of lean protein, reducing intake of saturated and trans fat and reducing intake of cholesterol  Exercise recommendations include vigorous physical activity 75 minutes/week  · F/u 2 weeks for MRI review      Subjective:      Patient ID: Anny Apple is a 62 y o  female  [place] presents today concerning left foot pain present for months  Pain mainly on outside of foot toward 5th toe  Pain with ambulation and pressure  Dull achy pain that lingers  Tylenol helps a bit (cannot take ibuprofen due to blood thinners)   Hurts barefoot and with shoes        The following portions of the patient's history were reviewed and updated as appropriate: allergies, current medications, past family history, past medical history, past social history, past surgical history and problem list     Review of Systems   Constitutional: Negative for activity change, chills and fever  HENT: Negative  Respiratory: Negative for cough, chest tightness and shortness of breath  Cardiovascular: Negative for chest pain and leg swelling  Endocrine: Negative  Genitourinary: Negative  Musculoskeletal: Positive for gait problem (Left lateral foot pain)  Neurological: Negative for numbness  Psychiatric/Behavioral: Negative  Negative for agitation and behavioral problems  Objective:      BP (!) 129/101 (BP Location: Left arm, Patient Position: Sitting, Cuff Size: Adult)   Pulse 77   Ht 5' 5" (1 651 m)   Wt 130 kg (286 lb)   LMP  (LMP Unknown)   BMI 47 59 kg/m²          Physical Exam  Constitutional:       General: She is not in acute distress  Appearance: Normal appearance  She is not ill-appearing  Cardiovascular:      Pulses: Normal pulses  Comments: Bilateral DP & PT pulses 2/4  CRT WNL  Pedal hair present  Feet warm and well perfused  B/L LE edema with varicosities  Pulmonary:      Effort: No respiratory distress  Musculoskeletal:         General: Swelling (Left dorsal-lateral foot), tenderness (Left dorsal lateral foot over 4th and 5th distal metatarsals extending to 4th IMS  There is swelling noted to this area  ) and deformity (Pes planus and HAV ) present  Comments: Bilateral ankle, STJ, TNJ, TMTJ, MTPJ ROM WNL and without pain  LE muscle strength WNL  No pain with palpation to peroneal tendons LLE  No pain with ROM 4/5MTPJs  Mild pain with palpation to insertion of plantar fascia  Skin:     General: Skin is warm  Capillary Refill: Capillary refill takes less than 2 seconds  Findings: No erythema or lesion  Neurological:      General: No focal deficit present  Mental Status: She is alert and oriented to person, place, and time  Sensory: No sensory deficit  Comments: Gross sensation intact  Denies N/T/B to B/L feet      Psychiatric:         Mood and Affect: Mood normal          Behavior: Behavior normal

## 2022-12-20 NOTE — PATIENT INSTRUCTIONS
Foot Pain Home Therapy    Stretch!! Place painful foot in back with heel on ground and lean against wall  Do not lift heel off of ground  You will feel the stretch in the calf muscles and possibly the heel  Hold the stretch for 20-30 seconds  Do this at least 5-6 times per day  It is best done after exercise when your muscles are warm  Local ice massage  Freeze a 20oz bottle of water  Roll your foot over the ice bottle along the arch  Try this for 20 minutes, 2-3 times per day  Wear supportive shoes at all times  Avoid flip-flops, flat sandals, barefoot walking (never walk barefoot, even at home)  Generally avoid shoes that are too flexible and bend in the arch  Your shoes should only slightly bend in the toe area, not the middle  Running sneakers are often the best choice  Supportive sneaker brands: Jillian Hilario, New Balance, [employer]ics, [employer]  Supportive daily shoe brands: Vionic, Orthofeet Kellie, Dansko, [place] branch, Kenneth Alfredo, Birkenstock  Supportive home shoes: Neela Jacobson (recovery slides)  Purchase over the counter topical pain creams such as Voltarin gel, biofreeze, or CBD cream - will need to apply 2-3 times per day for benefit  Achilles Tendon Stretching Exercises    A) Standing Gastrocnemius stretch  Place hands on wall or chair  If using wall, put your hands at eye level  Step the leg you want to stretch behind you  Keep your back heel on the floor and point your toes straight ahead or slightly inward towards the heel of the opposite foot  Bend your knee toward the wall while keeping your back leg straight  Lean toward the wall until you feel a gentle stretch in you calf of the straight leg  Don't lean so far that you feel pain  Hold for 15 seconds  Complete 3 reps  B) Standing soleus stretch  Place your hands on the wall or chair  If using wall, put your hands at eye level    Step the leg you want to stretch behind you (your back foot will need to be closer to the front foot than the above stretch)  Keep your back heel on the floor and point your toes straight ahead or slightly inward towards the heel of the opposite foot  Bend both your front and back knee at the same time (may help to stick your butt out)  You do not need to lean towards the wall, just bend the knees  Lean toward the wall until you feel a gentle stretch in you calf of the straight leg  Don't lean so far that you feel pain  Hold for 15 seconds  Complete 3 reps  Keep these tips and tricks in mind to get the most out of your stretching; Take your time - move slowly, whether you are deepening into a stretch or changing positions  This will limit the risk of injury & discomfort  Avoid bouncing - quick sudden movements will only worsen achilles tendon issues  Stay relaxed during stretch  Keep your heel down and toes straight ahead or slightly inward - this will allow the achilles tendon to stretch properly  Stop if you feel pain - Don't strain or force your muscle  If you feel sharp pain, stop immediately

## 2022-12-22 ENCOUNTER — APPOINTMENT (OUTPATIENT)
Dept: PHYSICAL THERAPY | Facility: CLINIC | Age: 57
End: 2022-12-22

## 2022-12-29 ENCOUNTER — OFFICE VISIT (OUTPATIENT)
Dept: PHYSICAL THERAPY | Facility: CLINIC | Age: 57
End: 2022-12-29

## 2022-12-29 DIAGNOSIS — M25.561 ACUTE PAIN OF RIGHT KNEE: Primary | ICD-10-CM

## 2022-12-29 NOTE — PROGRESS NOTES
Daily Note     Today's date: 2022  Patient name: Shyla Freedman  : 1965  MRN: [id number]  Referring provider: Erlin Velazquez DO  Dx:   Encounter Diagnosis     ICD-10-CM    1  Acute pain of right knee  M25 561                      Subjective: Pt reports cont to have R knee pain at times  Objective: See treatment diary below      Assessment: Tolerated treatment well  Patient demonstrated fatigue post treatment  Pt cont to have empty end feel with pain during R knee Flx/ext  Pt ed on contacting MD for f/u with R knee and DC PT in upcoming visit  Plan: Continue per plan of care        Manuals 12/19 12/29 11/10 11/14 11/17 11/21 11/23 12/12   ART                 PROM/Stretch Flex/ext supine JV Flx/ext supine JV Flx/ext supine JV  Flx/ext JV pat mobs  Flx/ext JV pat mobs NV Flex Ext HS  Flex/ext/HS/ gastroc AS RK   IASTM                 STM/Triggerpoint                 JM                                   Neuro Re-Ed                 Standing 1/2 Roll calf stretch 4/20"  4/20"  4/20" 4/20'' 4/20"  30''4x 30''4x 30"x4   SL Ecc HR                                                                                                                             Ther Ex                 HS into QS 2x10 supine 2/10 supine 2/10 seated 2/10 supine 2/10 supine 2x10 supine  2x10 supine  2/10 supine   HR/TR 15x 2/10 2/10 2/10 2/10 2x10 3x10 3/10   TB TKE L3 2/10 L3 2/10 L3 2/10 L3 2/10 L3 2/10 Green 2x10  Green 3x10  L3 3/10   TB Heel to Toes                 Sit to stand 2/5 5x 5x 5x 5x 10x 10x 3x   Bridge with Add squeeze                SL Bridge                 Clam shells                 SL Sit to Stand                 BOSU SLB                 Upside down BOSU SL squat holds                 TB Lat Walks 10 laps Held pt wearing cam boot 10 laps 10 laps 10 laps 10 laps 10 laps 10 laps   Step ups/downs  4" 10x pain 4" 10x 4" 10x  held today  NV  4' 10x 4' 10x 4" 10x                                       Ther Activity                 TM                 Bike                 NuStep x10' L3 10' L4 10' L4 10' L4 10' L4 10 min L4  L4 10' L4 10 min   Forward/backward heel to toe walk 10 laps Held Pt wearing cam boot    10 laps 10 laps 10 laps 5 Laps  10 laps 10 laps                     Gait Training                                                                                                   Modalities                   MHP                   CP x10'   10'  10'   10'  10'  10'   US/Stim

## 2022-12-30 ENCOUNTER — TELEPHONE (OUTPATIENT)
Dept: OBGYN CLINIC | Facility: CLINIC | Age: 57
End: 2022-12-30

## 2023-01-03 ENCOUNTER — OFFICE VISIT (OUTPATIENT)
Dept: PODIATRY | Facility: CLINIC | Age: 58
End: 2023-01-03

## 2023-01-03 VITALS
BODY MASS INDEX: 47.65 KG/M2 | HEIGHT: 65 IN | SYSTOLIC BLOOD PRESSURE: 136 MMHG | DIASTOLIC BLOOD PRESSURE: 93 MMHG | WEIGHT: 286 LBS | HEART RATE: 82 BPM

## 2023-01-03 DIAGNOSIS — M20.12 HALLUX ABDUCTO VALGUS, LEFT: ICD-10-CM

## 2023-01-03 DIAGNOSIS — M79.672 LEFT FOOT PAIN: Primary | ICD-10-CM

## 2023-01-03 NOTE — PROGRESS NOTES
Assessment/Plan:     Diagnoses and all orders for this visit:    Left foot pain    Hallux abducto valgus, left          IMPRESSION:  · Left foot pain  Differential diagnosis include 5th metatarsal stress fracture, lateral column overload, PVD (venous stasis)   · Obesity  · L HAV w/ medial eminence irritation     PLAN:  · MRI left foot pending to assess for stress fracture of 5th metatarsal (patient missed it last week), new appt tomorrow  · C/w CAM boot WBAT  RICE  · Rec compression stockings  Rec weight loss  · Rec allevyn foam border pad to left medial eminence to avoid rubbing  · F/u 1 week for MRI review      Subjective:      Patient ID: Manuel Leo is a 62 y o  female  Sebastián Hammersmith presents today concerning left foot pain (present for months)  Has not gotten her MRI yet  Notes she has redness to the inner side of the 1st toe due to rubbing that she would like to discuss today  Has bene wearing her CAM boot but not at all times - when she does wear it, it helps with her pain significantly  The following portions of the patient's history were reviewed and updated as appropriate: allergies, current medications, past family history, past medical history, past social history, past surgical history and problem list     Review of Systems   Constitutional: Negative for activity change, chills and fever  HENT: Negative  Respiratory: Negative for cough, chest tightness and shortness of breath  Cardiovascular: Negative for chest pain and leg swelling  Endocrine: Negative  Genitourinary: Negative  Musculoskeletal: Positive for gait problem (Left lateral foot pain)  Neurological: Negative for numbness  Psychiatric/Behavioral: Negative  Negative for agitation and behavioral problems           Objective:      /93 (BP Location: Right arm, Patient Position: Sitting, Cuff Size: Large)   Pulse 82   Ht 5' 5" (1 651 m)   Wt 130 kg (286 lb)   LMP  (LMP Unknown)   BMI 47 59 kg/m²          Physical Exam  Constitutional:       General: She is not in acute distress  Appearance: Normal appearance  She is not ill-appearing  Cardiovascular:      Pulses: Normal pulses  Comments: Bilateral DP & PT pulses 2/4  CRT WNL  Pedal hair present  Feet warm and well perfused  B/L LE edema with varicosities  Pulmonary:      Effort: No respiratory distress  Musculoskeletal:         General: Swelling (Left dorsal-lateral foot), tenderness (Left dorsal lateral foot over 4th and 5th distal metatarsals extending to 4th IMS  There is swelling noted to this area  ) and deformity (Pes planus and HAV ) present  Comments: Bilateral ankle, STJ, TNJ, TMTJ, MTPJ ROM WNL and without pain  LE muscle strength WNL  No pain with palpation to peroneal tendons LLE  No pain with ROM 4/5MTPJs  Mild pain with palpation to insertion of plantar fascia  Skin:     General: Skin is warm  Capillary Refill: Capillary refill takes less than 2 seconds  Findings: No erythema or lesion  Comments: Left hallux medial eminence with irritation erythema, no open wound  Neurological:      General: No focal deficit present  Mental Status: She is alert and oriented to person, place, and time  Sensory: No sensory deficit  Comments: Gross sensation intact  Denies N/T/B to B/L feet      Psychiatric:         Mood and Affect: Mood normal          Behavior: Behavior normal

## 2023-01-04 ENCOUNTER — HOSPITAL ENCOUNTER (OUTPATIENT)
Dept: MRI IMAGING | Facility: HOSPITAL | Age: 58
Discharge: HOME | End: 2023-01-04
Attending: STUDENT IN AN ORGANIZED HEALTH CARE EDUCATION/TRAINING PROGRAM

## 2023-01-04 DIAGNOSIS — M79.672 LEFT FOOT PAIN: ICD-10-CM

## 2023-01-05 ENCOUNTER — APPOINTMENT (OUTPATIENT)
Dept: LAB | Facility: MEDICAL CENTER | Age: 58
End: 2023-01-05

## 2023-01-05 ENCOUNTER — ANTICOAG VISIT (OUTPATIENT)
Dept: FAMILY MEDICINE CLINIC | Facility: CLINIC | Age: 58
End: 2023-01-05

## 2023-01-05 ENCOUNTER — OFFICE VISIT (OUTPATIENT)
Dept: PHYSICAL THERAPY | Facility: CLINIC | Age: 58
End: 2023-01-05

## 2023-01-05 DIAGNOSIS — M25.561 ACUTE PAIN OF RIGHT KNEE: Primary | ICD-10-CM

## 2023-01-05 DIAGNOSIS — Z79.01 CHRONIC ANTICOAGULATION: ICD-10-CM

## 2023-01-05 LAB
INR PPP: 1.95 (ref 0.84–1.19)
PROTHROMBIN TIME: 22.5 SECONDS (ref 11.6–14.5)

## 2023-01-05 NOTE — PROGRESS NOTES
Daily Note     Today's date: 2023  Patient name: Evan Sandy  : 1965  MRN: [id number]  Referring provider: Neelima Sanchez DO  Dx:   Encounter Diagnosis     ICD-10-CM    1  Acute pain of right knee  M25 561                      Subjective: Pt reports she cont to wear cam boot  Reports will f/u with MD regarding R knee  Objective: See treatment diary below      Assessment: Tolerated treatment well  Patient exhibited good technique with therapeutic exercises  Pt with cont pain with knee flx/ext and step ups with R LE  Pt will f/u with MD regarding cont pain in R knee  Plan: Continue per plan of care        Manuals    ART                 PROM/Stretch Flex/ext supine JV Flx/ext supine JV Flx/ext supine JV  Flx/ext JV pat mobs  Flx/ext JV pat mobs NV Flex Ext HS  Flex/ext/HS/ gastroc AS RK   IASTM                 STM/Triggerpoint                 JM                                   Neuro Re-Ed                 Standing / Roll calf stretch 4/20"  4/20"  4/20" 4/20'' 4/20"  30''4x 30''4x 30"x4   SL Ecc HR                                                                                                                             Ther Ex                 HS into QS 2x10 supine 2/10 supine 2/10 seated 2/10 supine 2/10 supine 2x10 supine  2x10 supine  2/10 supine   HR/TR 15x 2/10 2/10 2/10 2/10 2x10 3x10 3/10   TB TKE L3 2/10 L3 2/10 L3 2/10 L3 2/10 L3 2/10 Green 2x10  Green 3x10  L3 3/10   TB Heel to Toes                 Sit to stand 2/5 5x 5x 5x 5x 10x 10x 3x   Bridge with Add squeeze                SL Bridge                 Clam shells                 SL Sit to Stand                 BOSU SLB                 Upside down BOSU SL squat holds                 TB Lat Walks 10 laps Held pt wearing cam boot  10 laps 10 laps 10 laps 10 laps 10 laps   Step ups/downs  4" 10x pain 4" 10x 4" 10x  held today  NV  4' 10x 4' 10x 4" 10x                                   Ther Activity                 TM                 Bike                 NuStep x10' L3 10' L4 10' L4 10' L4 10' L4 10 min L4  L4 10' L4 10 min   Forward/backward heel to toe walk 10 laps Held Pt wearing cam boot    10 laps 10 laps 10 laps 5 Laps  10 laps 10 laps                     Gait Training                                                                                                   Modalities                   MHP                   CP x10'   10'  10'   10'  10'  10'   US/Stim

## 2023-01-10 ENCOUNTER — OFFICE VISIT (OUTPATIENT)
Dept: PODIATRY | Facility: CLINIC | Age: 58
End: 2023-01-10

## 2023-01-10 VITALS — BODY MASS INDEX: 47.65 KG/M2 | WEIGHT: 286 LBS | HEIGHT: 65 IN

## 2023-01-10 DIAGNOSIS — M21.42 PES PLANUS OF LEFT FOOT: ICD-10-CM

## 2023-01-10 DIAGNOSIS — M20.12 HALLUX ABDUCTO VALGUS, LEFT: ICD-10-CM

## 2023-01-10 DIAGNOSIS — M24.572 EQUINUS CONTRACTURE OF LEFT ANKLE: ICD-10-CM

## 2023-01-10 DIAGNOSIS — M79.672 LEFT FOOT PAIN: Primary | ICD-10-CM

## 2023-01-10 DIAGNOSIS — M77.42 METATARSALGIA OF LEFT FOOT: ICD-10-CM

## 2023-01-10 NOTE — PROGRESS NOTES
Assessment/Plan:     Diagnoses and all orders for this visit:    Left foot pain  -     Ambulatory referral to Physical Therapy; Future  -     Orthotics B/L    Hallux abducto valgus, left    Metatarsalgia of left foot    Equinus contracture of left ankle    Pes planus of left foot          IMAGES REVIEWED AT THIS VISIT:  - MRI left foot 1/4/23: no evidence of stress fracture to 4/5 metatarsals  There is minimal fatty atrophy of abd dig minimi that may suggest chronic koo's neuropathy    IMPRESSION:  · Left foot pain  Lateral column overload and metatarsalgia, PVD (venous stasis)   · Obesity  · L HAV w/ medial eminence irritation     PLAN:  · MRI left foot w/o stress fracture  Her pain is likely due to body habitus, pes planus, diminished forefoot fat pad and equinus  · C/w CAM boot WBAT however should transition out of into a supportive sneaker and arch support  · I rx'd CMOs for her today (report to Dr Lonnie Lozano for fitting)  · I rx'd PT today  · Rec compression stockings  Rec weight loss  · Rec allevyn foam border pad to left medial eminence to avoid rubbing  · F/u 2 months      Subjective:      Patient ID: Jagdeep Flores is a 62 y o  female  Will Reges presents today concerning left foot pain (present for months)  Has not gotten her MRI yet  Notes she has redness to the inner side of the 1st toe due to rubbing that she would like to discuss today  Has bene wearing her CAM boot but not at all times - when she does wear it, it helps with her pain significantly  The following portions of the patient's history were reviewed and updated as appropriate: allergies, current medications, past family history, past medical history, past social history, past surgical history and problem list     Review of Systems   Constitutional: Negative for activity change, chills and fever  HENT: Negative  Respiratory: Negative for cough, chest tightness and shortness of breath      Cardiovascular: Negative for chest pain and leg swelling  Endocrine: Negative  Genitourinary: Negative  Musculoskeletal: Positive for gait problem (Left lateral foot pain)  Neurological: Negative for numbness  Psychiatric/Behavioral: Negative  Negative for agitation and behavioral problems  Objective:      Ht 5' 5" (1 651 m)   Wt 130 kg (286 lb)   LMP  (LMP Unknown)   BMI 47 59 kg/m²          Physical Exam  Constitutional:       General: She is not in acute distress  Appearance: Normal appearance  She is not ill-appearing  Cardiovascular:      Pulses: Normal pulses  Comments: Bilateral DP & PT pulses 2/4  CRT WNL  Pedal hair present  Feet warm and well perfused  B/L LE edema with varicosities  Pulmonary:      Effort: No respiratory distress  Musculoskeletal:         General: Swelling (Left dorsal-lateral foot), tenderness (Left dorsal lateral foot over 4th and 5th distal metatarsals extending to 4th IMS  PLantar metatarsal heads left foot 1-5 ) and deformity (Pes planus and HAV ) present  Comments: Bilateral ankle, STJ, TNJ, TMTJ, MTPJ ROM WNL and without pain  LE muscle strength WNL  No pain with palpation to peroneal tendons LLE  No pain with ROM 4/5MTPJs  No current pain with palpation to insertion of plantar fascia  No tingling to toes 4/5  Limited ankle DF with knee extended and flexed indicating gastroc-soleal equinus  Skin:     General: Skin is warm  Capillary Refill: Capillary refill takes less than 2 seconds  Findings: No erythema or lesion  Comments: Left hallux medial eminence with irritation erythema, no open wound  Neurological:      General: No focal deficit present  Mental Status: She is alert and oriented to person, place, and time  Sensory: No sensory deficit  Comments: Gross sensation intact  Denies N/T/B to B/L feet      Psychiatric:         Mood and Affect: Mood normal          Behavior: Behavior normal

## 2023-01-10 NOTE — PATIENT INSTRUCTIONS
Foot Pain Home Therapy    Stretch!! Place painful foot in back with heel on ground and lean against wall  Do not lift heel off of ground  You will feel the stretch in the calf muscles and possibly the heel  Hold the stretch for 20-30 seconds  Do this at least 5-6 times per day  It is best done after exercise when your muscles are warm  Metatarsal pads, Dr Eladio June Gels  Allevyn pads   Wear supportive shoes at all times  Avoid flip-flops, flat sandals, barefoot walking (never walk barefoot, even at home)  Generally avoid shoes that are too flexible and bend in the arch  Your shoes should only slightly bend in the toe area, not the middle  Running sneakers are often the best choice  Supportive sneaker brands: [place], New Balance, [employer]ics, [employer]  Supportive daily shoe brands: Vionic, Orthofeet Kellie, Dansko, [place] branch, Wyline Herder, [employer]  Supportive home shoes: Aman Richey (recovery slides)  Purchase over the counter topical pain creams such as Voltarin gel, biofreeze, or CBD cream - will need to apply 2-3 times per day for benefit

## 2023-01-12 ENCOUNTER — OFFICE VISIT (OUTPATIENT)
Dept: PHYSICAL THERAPY | Facility: CLINIC | Age: 58
End: 2023-01-12

## 2023-01-12 DIAGNOSIS — M25.561 ACUTE PAIN OF RIGHT KNEE: Primary | ICD-10-CM

## 2023-01-12 NOTE — PROGRESS NOTES
PT Discharge    Today's date: 2023  Patient name: Evan Sandy  : 1965  MRN: [id number]  Referring provider: Neelima Sanchez DO  Dx:   Encounter Diagnosis     ICD-10-CM    1  Acute pain of right knee  M25 561           Start Time: 1055  Stop Time: 1150  Total time in clinic (min): 55 minutes    Assessment  Understanding of Dx/Px/POC: good  Goals  STGs: To be complete within 2-3 weeks   - Decrease pain to < 2/10 at worst - partially met  - Increase AROM to WNL - met  - Increase strength to > 4+/5 - met  - Improve gait to WNL for distances < 6 blocks - partially met     LTGs: To be complete within 4 weeks (partially met)  - Able to walk for any extended amount of time/distance without pain or limitation for increased safety and functional capacity with ADLs and home-related duty  - Able to repetitively squat without pain or limitation for increased safety and functional capacity with ADLs and home-related duty  - Able to repetitively ascend/descend a full flight of stairs without pain or limitation for increased safety and functional capacity with ADLs and home-related duty  - Able to repetitively complete transfers without pain or limitation for increased safety and functional capacity with ADLs and home-related duty  - Able to keel for any extended amount of time without pain or limitation for increased safety and functional capacity with ADLs and home-related duty    Plan  Plan of Care beginning date: 2023  Plan of Care expiration date: 2023       The patient has been seen in PT for a total of 20 visits since [facility]  She has made progress and met some of her goals  At this time she has reached plateau with PT and is no longer requiring skilled PT services  She is going back to see the doctor next week for her follow up appointment  She is going to continue with her HEP, has demonstrated understanding of HEP for ROM, stretching, strengthening and flexibility    She may continue to show progress through continued HEP  D/C PT              Subjective Evaluation    Pain  Current pain rating: 3  At best pain ratin  At worst pain ratin  Location: R Knee    Patient Goals  Patient goals for therapy: increased strength, decreased pain and increased motion              Objective Pain level ranges 1-5/10  AROM: R Knee 0-120 degrees; L Knee 0-120 degrees  Strength: R Quad and HS 4+/5; L Quad and HS 4+/5  Gait: Shortened step length, R knee extension lag, decreased R LE heel strike  Swelling: R Knee (Mild), will continue to monitor  FOTO: 61; GOAL: 62  Unable to walk without pain and limitation, but improving  Unable to squat without pain and limitation, but improving  Unable complete transfers without pain and limitation, but improving  Unable to ascend/descend stairs without pain and limitation, but improving  Unable to kneel without pain and limitation, but improving

## 2023-01-12 NOTE — PROGRESS NOTES
Daily Note     Today's date: 2023  Patient name: Bishop Gillespie  : 1965  MRN: [id number]  Referring provider: Ellen Graham DO  Dx:   Encounter Diagnosis     ICD-10-CM    1  Acute pain of right knee  M25 561                      Subjective: pt reports will f/u with MD next week with hopes to schedule possible injection in R knee  Reports improved sx in the past with knee injections  Reports cont pain at times and difficulty walking long distances  Objective: See treatment diary below  Stagnant FOTO scores  Assessment: Tolerated treatment well  Patient demonstrated fatigue post treatment  Pt with cont pain with end range knee flx and ext  Pt with notable pain with step ups today on R LE  Pt will DC to HEP and will f/u with MD next week  Plan: Continue per plan of care  DC to HEP       Manuals    ART                 PROM/Stretch Flex/ext supine JV Flx/ext supine JV Flx/ext supine JV  Flx/ext JV pat mobs  Flx/ext JV pat mobs NV Flex Ext HS  Flex/ext/HS/ gastroc AS RK   IASTM                 STM/Triggerpoint                 JM                                   Neuro Re-Ed                 Standing 1/ Roll calf stretch 4/20"  4/20"  4/20" 4/20'' 4/20"  30''4x 30''4x 30"x4   SL Ecc HR                                                                                                                             Ther Ex                 HS into QS 2x10 supine 2/10 supine 2/10 seated 2/10 supine 2/10 supine 2x10 supine  2x10 supine  2/10 supine   HR/TR 15x 2/10 2/10 2/10 2/10 2x10 3x10 3/10   TB TKE L3 2/10 L3 2/10 L3 2/10 L3 2/10 L3 2/10 Green 2x10  Green 3x10  L3 3/10   TB Heel to Toes                 Sit to stand 2/5 5x 5x 5x 5x 10x 10x 3x   Bridge with Add squeeze                SL Bridge                 Clam shells                 SL Sit to Stand                 BOSU SLB                 Upside down BOSU SL squat holds                 TB Lat Walks 10 laps Held pt wearing cam boot  10 laps 10 laps 10 laps 10 laps 10 laps   Step ups/downs  4" 10x pain 4" 10x 4" 10x 4" 2/10  NV  4' 10x 4' 10x 4" 10x                                       Ther Activity                 TM                 Bike                 NuStep x10' L3 10' L4 10' L4 10' L4 10' L4 10 min L4  L4 10' L4 10 min   Forward/backward heel to toe walk 10 laps Held Pt wearing cam boot    10 laps 10 laps 10 laps 5 Laps  10 laps 10 laps                     Gait Training                                                                                                   Modalities                   MHP                   CP x10'   10'  10'   10'  10'  10'   US/Stim

## 2023-01-13 DIAGNOSIS — T78.3XXD ANGIOEDEMA, SUBSEQUENT ENCOUNTER: ICD-10-CM

## 2023-01-13 RX ORDER — MONTELUKAST SODIUM 10 MG/1
10 TABLET ORAL
Qty: 90 TABLET | Refills: 3 | Status: SHIPPED | OUTPATIENT
Start: 2023-01-13 | End: 2023-01-16 | Stop reason: SDUPTHER

## 2023-01-16 ENCOUNTER — OFFICE VISIT (OUTPATIENT)
Dept: FAMILY MEDICINE CLINIC | Facility: CLINIC | Age: 58
End: 2023-01-16

## 2023-01-16 VITALS
HEIGHT: 65 IN | BODY MASS INDEX: 47.92 KG/M2 | WEIGHT: 287.6 LBS | TEMPERATURE: 97.3 F | DIASTOLIC BLOOD PRESSURE: 88 MMHG | OXYGEN SATURATION: 98 % | HEART RATE: 94 BPM | SYSTOLIC BLOOD PRESSURE: 126 MMHG

## 2023-01-16 DIAGNOSIS — B34.9 VIRAL INFECTION, UNSPECIFIED: Primary | ICD-10-CM

## 2023-01-16 DIAGNOSIS — J30.1 SEASONAL ALLERGIC RHINITIS DUE TO POLLEN: ICD-10-CM

## 2023-01-16 DIAGNOSIS — T78.3XXD ANGIOEDEMA, SUBSEQUENT ENCOUNTER: ICD-10-CM

## 2023-01-16 DIAGNOSIS — R60.0 BILATERAL LEG EDEMA: ICD-10-CM

## 2023-01-16 RX ORDER — POTASSIUM CHLORIDE 750 MG/1
10 CAPSULE, EXTENDED RELEASE ORAL DAILY
Qty: 30 CAPSULE | Refills: 5 | Status: SHIPPED | OUTPATIENT
Start: 2023-01-16

## 2023-01-16 RX ORDER — MONTELUKAST SODIUM 10 MG/1
10 TABLET ORAL
Qty: 90 TABLET | Refills: 3 | Status: SHIPPED | OUTPATIENT
Start: 2023-01-16

## 2023-01-16 RX ORDER — FUROSEMIDE 20 MG/1
20 TABLET ORAL DAILY PRN
Qty: 30 TABLET | Refills: 5 | Status: SHIPPED | OUTPATIENT
Start: 2023-01-16

## 2023-01-16 RX ORDER — AZITHROMYCIN 250 MG/1
TABLET, FILM COATED ORAL
Qty: 6 TABLET | Refills: 0 | Status: SHIPPED | OUTPATIENT
Start: 2023-01-16 | End: 2023-01-20

## 2023-01-16 RX ORDER — LORATADINE 10 MG/1
10 TABLET ORAL DAILY
Qty: 30 TABLET | Refills: 5 | Status: SHIPPED | OUTPATIENT
Start: 2023-01-16

## 2023-01-16 NOTE — PROGRESS NOTES
Name: Yue Perkins      : 1965      MRN: [id number]  Encounter Provider: Mellisa Constantino DO  Encounter Date: 2023   Encounter department: [address]   I tested her for COVID/flu  Rx put in for Zithromax  Continue taking her Flonase, Claritin and Robitussin  Call if symptoms continue or increase  1  Viral infection, unspecified  -     Covid/Flu- Office Collect  -     azithromycin (ZITHROMAX) 250 mg tablet; Take 2 tablets today then 1 tablet daily x 4 days    2  Seasonal allergic rhinitis due to pollen  -     loratadine (CLARITIN) 10 mg tablet; Take 1 tablet (10 mg total) by mouth daily    3  Bilateral leg edema  -     furosemide (LASIX) 20 mg tablet; Take 1 tablet (20 mg total) by mouth daily as needed (leg swelling) Take with potassium  -     potassium chloride (MICRO-K) 10 MEQ CR capsule; Take 1 capsule (10 mEq total) by mouth daily         Subjective     Patient here today stating that yesterday woke up with a cough and congestion  She feels her eyes are watering  Nose is running  Has postnasal drip  Cough mildly productive of mucus  She has been taking Robitussin over-the-counter  T-max at home was 98 3  Cough  Associated symptoms include postnasal drip, rhinorrhea and a sore throat  Pertinent negatives include no shortness of breath  Generalized Body Aches  Associated symptoms include congestion, rhinorrhea, a sore throat, fatigue and coughing  Pertinent negatives include no shortness of breath  Review of Systems   Constitutional: Positive for fatigue  HENT: Positive for congestion, postnasal drip, rhinorrhea and sore throat  Respiratory: Positive for cough  Negative for shortness of breath  Cardiovascular: Negative  Gastrointestinal: Negative  Genitourinary: Negative          Past Medical History:   Diagnosis Date   • Abdominal pain, lower     [id number] RESOLVED   • Acute renal failure ([facility] [address] )     [id number] RESOLVED   • Allergic rhinitis     91XFO8070 RESOLVED   • Ankle pain, right     [id number] RESOLVED   • Arthritis     23OCT2017 RESOLVED  001WXH0111 RESOLVED   • Asthma    • Bilateral chronic knee pain     60UVP3144   • Bladder pain     47TNZ4466 RESOLVED   • Blood clot in vein    • Bursitis     58KIL7981 RESOLVED   • Cardiac disorder     82YQK0422  RESOLVED   • Cardiac murmur     23OCT2017 RESOLVED   • Chest tightness or pressure     23OCT2017 RESOLVED   • COPD (chronic obstructive pulmonary disease) ([facility])    • Coronary artery disease    • Curvature of spine     [id number] RESOLVED   • GERD (gastroesophageal reflux disease)    • Hernia, hiatal     51KYE9082 RESOLVED   • Hyperlipidemia     23OCT2017 RESOLVED   • Hypertension    • Inguinal hernia     RIGHT   54SWW2066 RESOLVED   • Jaw closure abnormality     91WTT2466 RESOLVED   • Lumbar herniated disc     83SWP9679 RESOLVED   • Morbid obesity ([facility] [address] )     72BHT3282   • Obesity    • Osteoarthritis of right knee     42YNC9329 RESOLVED   • Patellar tendinitis     51VYB5808   • Poor flexibility of tendon     22YBA5511   • Presence of IVC filter 10/12/2018   • Pulmonary embolism ([facility] [address] )     [id number] RESOLVED   • Sepsis ([facility] [address] )     52KKL2390 RESOLVED   • Severe sepsis due to methicillin resistant Staphylococcus aureus (MRSA) with acute organ dysfunction ([facility] [address] )     16RYY4707 RESOLVED   • Sleep apnea, obstructive    • Spider vein, symptomatic     45TLT1500 RESOLVED   • Status post arthroscopy of right knee     15LWB3071 RESOLVED   • Stomach disorder     79ZPL4933 RESOLVED   • Tear of medial meniscus of right knee     SUBSEQUENT ENCOUNTER  RESOLVED [id number]   • Thyroid disorder     10HQT9555   • Umbilical hernia     43YQO3936 RESOLVED     Past Surgical History:   Procedure Laterality Date   • COLONOSCOPY     • CYSTOSCOPY  01/1994    WITH BIOPSY     • EXPLORATORY LAPAROTOMY     • FOOT SURGERY Left    • FRACTURE SURGERY     • HAND SURGERY Right     cyst   • HYSTERECTOMY  2009   • INCISION AND DRAINAGE ANTERIOR NECK Right 2017    Procedure: INCISION AND DRAINAGE  (I&D) NECK;  Surgeon: Krissy Walsh MD;  Location:  MAIN OR;  Service:    • INCISIONAL HERNIA REPAIR      WITH IMPLANTATION OF MESH  13 MAY 2014  LAST ASSESSED   • IR IVC FILTER REMOVAL  2019   • OPEN ANTERIOR SHOULDER RECONSTRUCTION Left    • OTHER SURGICAL HISTORY      BLOOD VESSEL REPAIR   13 MAY 2014 LAST ASSESSED   • PERIPHERALLY INSERTED CENTRAL CATHETER INSERTION     • MT ARTHRS KNE SURG W/MENISCECTOMY MED/LAT W/SHVG Right 2016    Procedure: KNEE ARTHROSCOPY WITH MEDIAL MENISCECTOMY ;  Surgeon: Donna Peres MD;  Location: [facility];  Service: Orthopedics   • SHOULDER SURGERY       Family History   Problem Relation Age of Onset   • Arthritis Mother    • Colon cancer Brother    • Lung cancer Father    • No Known Problems Sister    • No Known Problems Daughter    • No Known Problems Maternal Grandmother    • No Known Problems Maternal Grandfather    • No Known Problems Paternal Grandmother    • No Known Problems Paternal Grandfather    • Breast cancer Maternal Aunt    • Diabetes Maternal Aunt    • No Known Problems Sister    • No Known Problems Sister    • No Known Problems Sister    • No Known Problems Sister    • No Known Problems Sister    • No Known Problems Daughter    • Colon cancer Son      Social History     Socioeconomic History   • Marital status: Single     Spouse name: None   • Number of children: None   • Years of education: None   • Highest education level: None   Occupational History   • Occupation: unemployed   Tobacco Use   • Smoking status: Former     Types: Cigarettes     Quit date: 1995     Years since quittin 1   • Smokeless tobacco: Never   • Tobacco comments:     quit 25 years ago   Vaping Use   • Vaping Use: Never used   Substance and Sexual Activity   • Alcohol use: Never   • Drug use: Never   • Sexual activity: Never   Other Topics Concern   • None   Social History Narrative    ALWAYS USES SEAT BELT         Social Determinants of Health     Financial Resource Strain: Not on file   Food Insecurity: Not on file   Transportation Needs: Not on file   Physical Activity: Not on file   Stress: Not on file   Social Connections: Not on file   Intimate Partner Violence: Not on file   Housing Stability: Not on file     Current Outpatient Medications on File Prior to Visit   Medication Sig   • albuterol (2 5 mg/3 mL) 0 083 % nebulizer solution Take 3 mL (2 5 mg total) by nebulization as needed for wheezing   • albuterol (PROVENTIL HFA,VENTOLIN HFA) 90 mcg/act inhaler Inhale 2 puffs every 4 (four) hours as needed for wheezing   • atorvastatin (LIPITOR) 10 mg tablet Take 1 tablet (10 mg total) by mouth daily   • budesonide-formoterol (Symbicort) 160-4 5 mcg/act inhaler Inhale 2 puffs 2 (two) times a day Rinse mouth after use   • famotidine (PEPCID) 40 MG tablet Take 1 tablet (40 mg total) by mouth daily at bedtime   • fluticasone (FLONASE) 50 mcg/act nasal spray 2 sprays into each nostril daily   • hydrocortisone 0 5 % cream Apply topically 2 (two) times a day   • levothyroxine 125 mcg tablet Take 1 tablet (125 mcg total) by mouth daily   • montelukast (SINGULAIR) 10 mg tablet Take 1 tablet (10 mg total) by mouth daily at bedtime   • omeprazole (PriLOSEC) 40 MG capsule Take 1 capsule (40 mg total) by mouth daily before breakfast   • warfarin (COUMADIN) 5 mg tablet Take as directed by physician   • [DISCONTINUED] furosemide (LASIX) 20 mg tablet Take 1 tablet (20 mg total) by mouth daily as needed (leg swelling) Take with potassium   • [DISCONTINUED] loratadine (CLARITIN) 10 mg tablet Take 1 tablet (10 mg total) by mouth daily   • [DISCONTINUED] potassium chloride (MICRO-K) 10 MEQ CR capsule Take 1 capsule (10 mEq total) by mouth daily     Allergies   Allergen Reactions   • Clindamycin Angioedema   • Medical Tape Itching     Welt and redness   • Penicillin G Nausea Only   • Penicillins GI Intolerance Nausea and vomiting     Immunization History   Administered Date(s) Administered   • INFLUENZA 09/29/2014, 09/28/2015, 11/27/2016, 11/30/2016, 05/29/2017, 10/07/2022   • Influenza Quadrivalent Preservative Free 3 years and older IM 11/30/2016   • Influenza Quadrivalent, 6-35 Months IM 08/29/2017   • Influenza, Quadrivalent (nasal) 11/27/2016   • Influenza, recombinant, quadrivalent,injectable, preservative free 10/12/2018, 09/11/2019, 09/15/2020, 09/30/2021, 10/07/2022   • Influenza, seasonal, injectable 09/29/2014   • Pneumococcal Polysaccharide PPV23 08/29/2017       Objective     /88   Pulse 94   Temp (!) 97 3 °F (36 3 °C)   Ht 5' 5" (1 651 m)   Wt 130 kg (287 lb 9 6 oz)   LMP  (LMP Unknown)   SpO2 98%   BMI 47 86 kg/m²     Physical Exam  Vitals reviewed  Constitutional:       General: She is not in acute distress  Appearance: Normal appearance  She is well-developed  She is not ill-appearing, toxic-appearing or diaphoretic  HENT:      Head: Normocephalic and atraumatic  Right Ear: Tympanic membrane normal       Left Ear: Tympanic membrane is bulging  Tympanic membrane is not erythematous  Nose: Congestion and rhinorrhea present  Mouth/Throat:      Pharynx: No posterior oropharyngeal erythema  Comments: Clear postnasal drip  Eyes:      Conjunctiva/sclera: Conjunctivae normal    Cardiovascular:      Rate and Rhythm: Normal rate and regular rhythm  Heart sounds: Normal heart sounds  No murmur heard  No friction rub  No gallop  Pulmonary:      Effort: Pulmonary effort is normal  No respiratory distress  Breath sounds: Normal breath sounds  No wheezing, rhonchi or rales  Musculoskeletal:      Right lower leg: No edema  Left lower leg: No edema  Neurological:      General: No focal deficit present  Mental Status: She is alert and oriented to person, place, and time     Psychiatric:         Mood and Affect: Mood normal          Behavior: Behavior normal          Thought Content:  Thought content normal          Judgment: Judgment normal        Naz Irizarry, DO

## 2023-01-17 ENCOUNTER — OFFICE VISIT (OUTPATIENT)
Dept: OBGYN CLINIC | Facility: CLINIC | Age: 58
End: 2023-01-17

## 2023-01-17 VITALS
WEIGHT: 287 LBS | HEIGHT: 65 IN | DIASTOLIC BLOOD PRESSURE: 85 MMHG | SYSTOLIC BLOOD PRESSURE: 123 MMHG | BODY MASS INDEX: 47.82 KG/M2 | HEART RATE: 80 BPM

## 2023-01-17 DIAGNOSIS — M17.11 PRIMARY OSTEOARTHRITIS OF RIGHT KNEE: Primary | ICD-10-CM

## 2023-01-17 LAB
FLUAV RNA RESP QL NAA+PROBE: NEGATIVE
FLUBV RNA RESP QL NAA+PROBE: NEGATIVE
SARS-COV-2 RNA RESP QL NAA+PROBE: NEGATIVE

## 2023-01-17 NOTE — PROGRESS NOTES
ORTHOPEDICS FOLLOW UP VISIT    Assessment:   No diagnosis found  Plan:     Problem List Items Addressed This Visit    None       Discussion:  Discussion had with patient that as she is currently sick and on antibiotics, a corticosteroid injection is not recommended  We can recommend voltaren gel today, and can provide a corticosteroid injection next week if she is covid negative and not symptomatic  Procedures:  No Injection planned at this time  Surgery:  No surgery is recommended at this point, continue with conservative treatment plan as noted  Imaging:  Study type: XRAY right knee(s)  Date: 8/10/22  I have personally reviewed all relevant imaging  I have read and agree with the radiologist report  My impression is as follows:  degenerative changes noted to the knee with joint space narrowing and osteophyte formation    No follow-ups on file  Subjective:       Chief Complaint:  Yael Root is a 62 y o  female  who presents in consultation for pain in the right knee  Patient has a history of right knee arthroscopy in 2016  She has been doing physical therapy since September 2022 with some improvement in her symptoms  She has a history of tripping over her phone cord and landing on her knee back in August 2022  She denies any other acute complaints  Allergies:   Allergies   Allergen Reactions   • Clindamycin Angioedema   • Medical Tape Itching     Welt and redness   • Penicillin G Nausea Only   • Penicillins GI Intolerance     Nausea and vomiting     Medications:  all current active meds have been reviewed  Past Medical History:  Past Medical History:   Diagnosis Date   • Abdominal pain, lower     [id number] RESOLVED   • Acute renal failure ([facility] [address] )     [id number] RESOLVED   • Allergic rhinitis     36YZJ5758 RESOLVED   • Ankle pain, right     42XEB1853 RESOLVED   • Arthritis     31GAE2338 RESOLVED  615DHJ3635 RESOLVED   • Asthma    • Bilateral chronic knee pain     89ORX2754   • Bladder pain 91YLC5434 RESOLVED   • Blood clot in vein    • Bursitis     49LGW7375 RESOLVED   • Cardiac disorder     23OCT2017  RESOLVED   • Cardiac murmur     23OCT2017 RESOLVED   • Chest tightness or pressure     23OCT2017 RESOLVED   • [facility]    • Coronary artery disease    • Curvature of spine     95EHY1318 RESOLVED   • GERD (gastroesophageal reflux disease)    • Hernia, hiatal     33HPA8430 RESOLVED   • Hyperlipidemia     23OCT2017 RESOLVED   • Hypertension    • Inguinal hernia     RIGHT   65WEX3943 RESOLVED   • Jaw closure abnormality     89LYB4120 RESOLVED   • Lumbar herniated disc     77VPY0292 RESOLVED   • Morbid obesity ([place] [address] )     83NSH3476   • Obesity    • Osteoarthritis of right knee     41NNE0129 RESOLVED   • Patellar tendinitis     01AUG2017   • Poor flexibility of tendon     27OHE6588   • Presence of IVC filter 10/12/2018   • Pulmonary embolism ([place] [address] )     47WIM3738 RESOLVED   • Sepsis ([place] [address] )     43FAK0520 RESOLVED   • Severe sepsis due to methicillin resistant Staphylococcus aureus (MRSA) with acute organ dysfunction ([place] [address] )     03SXL0041 RESOLVED   • Sleep apnea, obstructive    • Spider vein, symptomatic     47QRY7558 RESOLVED   • Status post arthroscopy of right knee     55IUC1482 RESOLVED   • Stomach disorder     01AUG2017 RESOLVED   • Tear of medial meniscus of right knee     SUBSEQUENT ENCOUNTER  RESOLVED 18MEO4103   • Thyroid disorder     17KJU1304   • Umbilical hernia     21KWN2860 RESOLVED     Past Surgical History:  Past Surgical History:   Procedure Laterality Date   • COLONOSCOPY     • CYSTOSCOPY  01/1994    WITH BIOPSY     • EXPLORATORY LAPAROTOMY     • FOOT SURGERY Left    • FRACTURE SURGERY     • HAND SURGERY Right     cyst   • HYSTERECTOMY  2009   • INCISION AND DRAINAGE ANTERIOR NECK Right 6/8/2017    Procedure: INCISION AND DRAINAGE  (I&D) NECK;  Surgeon: Mathew Wooten MD;  Location: [facility];  Service:    • INCISIONAL HERNIA REPAIR      WITH IMPLANTATION OF MESH  13 MAY 2014  LAST ASSESSED   • IR IVC FILTER REMOVAL  2019   • OPEN ANTERIOR SHOULDER RECONSTRUCTION Left    • OTHER SURGICAL HISTORY      BLOOD VESSEL REPAIR   13 MAY 2014 LAST ASSESSED   • PERIPHERALLY INSERTED CENTRAL CATHETER INSERTION     • TX ARTHRS KNE SURG W/MENISCECTOMY MED/LAT W/SHVG Right 2016    Procedure: KNEE ARTHROSCOPY WITH MEDIAL MENISCECTOMY ;  Surgeon: Peg Skelton MD;  Location: [facility];  Service: Orthopedics   • SHOULDER SURGERY       Family History:  Family History   Problem Relation Age of Onset   • Arthritis Mother    • Colon cancer Brother    • Lung cancer Father    • No Known Problems Sister    • No Known Problems Daughter    • No Known Problems Maternal Grandmother    • No Known Problems Maternal Grandfather    • No Known Problems Paternal Grandmother    • No Known Problems Paternal Grandfather    • Breast cancer Maternal Aunt    • Diabetes Maternal Aunt    • No Known Problems Sister    • No Known Problems Sister    • No Known Problems Sister    • No Known Problems Sister    • No Known Problems Sister    • No Known Problems Daughter    • Colon cancer Son      Social History:  Social History     Substance and Sexual Activity   Alcohol Use Never     Social History     Substance and Sexual Activity   Drug Use Never     Social History     Tobacco Use   Smoking Status Former   • Types: Cigarettes   • Quit date: 1995   • Years since quittin 1   Smokeless Tobacco Never   Tobacco Comments    quit 25 years ago     Review of Systems:  Review of Systems  Constitutional: Negative for fatigue, fever or loss of appetite  HENT: Cough, congestion, post nasal drip  PENDING COVID testing  Respiratory: Negative for shortness of breath, dyspnea  Cardiovascular: Negative for chest pain/tightness  Gastrointestinal: Negative for abdominal pain, N/V  Endocrine: Negative for cold/heat intolerance, unexplained weight loss/gain     Genitourinary: Negative for flank pain, dysuria, hematuria  Musculoskeletal: Positive for arthralgia   Skin: Negative for rash  Neurological: Negative for numbness or tingling  Psychiatric/Behavioral: Negative for agitation  Objective:  Height 5' 5" (1 651 m), weight 130 kg (287 lb), not currently breastfeeding  Estimated body mass index is 47 76 kg/m² as calculated from the following:    Height as of this encounter: 5' 5" (1 651 m)  Weight as of this encounter: 130 kg (287 lb)  Exam:   Constitutional: Oriented to person, place, and time  Appears well-developed and well-nourished  No distress  Head: Normocephalic  Eyes: Conjunctivae are normal  Right eye exhibits no discharge  Left eye exhibits no discharge  No scleral icterus  Cardiovascular: Normal rate  Pulmonary/Chest: Effort normal    Neurological: Alert and oriented to person, place, and time  Skin: Skin is warm and dry  No rash noted  Not diaphoretic  No erythema  No pallor  Psychiatric: Normal mood and affect   Behavior is normal  Judgment and thought content normal       Focused Exam:  Right Knee:   No visible deformities    Deferred full physical exam due to pending COVID testing today           25 minutes was spent in the coordination of care, reviewing of imaging and with the patient on the date of service    Scribe Attestation    I,:   am acting as a scribe while in the presence of the attending physician :       I,:   personally performed the services described in this documentation    as scribed in my presence :

## 2023-01-19 ENCOUNTER — TELEPHONE (OUTPATIENT)
Dept: ADMINISTRATIVE | Facility: OTHER | Age: 58
End: 2023-01-19

## 2023-01-19 ENCOUNTER — LAB (OUTPATIENT)
Dept: LAB | Facility: MEDICAL CENTER | Age: 58
End: 2023-01-19

## 2023-01-19 ENCOUNTER — ANTICOAG VISIT (OUTPATIENT)
Dept: FAMILY MEDICINE CLINIC | Facility: CLINIC | Age: 58
End: 2023-01-19

## 2023-01-19 DIAGNOSIS — Z79.01 CHRONIC ANTICOAGULATION: ICD-10-CM

## 2023-01-19 LAB
INR PPP: 2.7 (ref 0.84–1.19)
PROTHROMBIN TIME: 29 SECONDS (ref 11.6–14.5)

## 2023-01-19 NOTE — TELEPHONE ENCOUNTER
01/19/23 2:21 PM    The patient was called and a message was left to call the ordering provider's office regarding an open order  Thank you    Charlene Cintron  [facility]

## 2023-02-02 ENCOUNTER — APPOINTMENT (OUTPATIENT)
Dept: LAB | Facility: MEDICAL CENTER | Age: 58
End: 2023-02-02

## 2023-02-02 DIAGNOSIS — I82.90 NON-OCCLUSIVE THROMBUS: Primary | ICD-10-CM

## 2023-02-02 DIAGNOSIS — I26.99 PULMONARY EMBOLISM, UNSPECIFIED CHRONICITY, UNSPECIFIED PULMONARY EMBOLISM TYPE, UNSPECIFIED WHETHER ACUTE COR PULMONALE PRESENT (HCC): ICD-10-CM

## 2023-02-02 LAB
INR PPP: 1.58 (ref 0.84–1.19)
PROTHROMBIN TIME: 19.1 SECONDS (ref 11.6–14.5)

## 2023-02-02 PROCEDURE — 85610 PROTHROMBIN TIME: CPT

## 2023-02-02 PROCEDURE — 36415 COLL VENOUS BLD VENIPUNCTURE: CPT

## 2023-02-03 ENCOUNTER — ANTICOAG VISIT (OUTPATIENT)
Dept: FAMILY MEDICINE CLINIC | Facility: CLINIC | Age: 58
End: 2023-02-03

## 2023-02-07 ENCOUNTER — OFFICE VISIT (OUTPATIENT)
Dept: OBGYN CLINIC | Facility: CLINIC | Age: 58
End: 2023-02-07

## 2023-02-07 VITALS
DIASTOLIC BLOOD PRESSURE: 85 MMHG | SYSTOLIC BLOOD PRESSURE: 124 MMHG | BODY MASS INDEX: 47.02 KG/M2 | WEIGHT: 282.2 LBS | HEIGHT: 65 IN | HEART RATE: 77 BPM

## 2023-02-07 DIAGNOSIS — M17.11 PRIMARY OSTEOARTHRITIS OF RIGHT KNEE: Primary | ICD-10-CM

## 2023-02-07 RX ORDER — BUPIVACAINE HYDROCHLORIDE 2.5 MG/ML
4 INJECTION, SOLUTION INFILTRATION; PERINEURAL
Status: COMPLETED | OUTPATIENT
Start: 2023-02-07 | End: 2023-02-07

## 2023-02-07 RX ORDER — TRIAMCINOLONE ACETONIDE 40 MG/ML
40 INJECTION, SUSPENSION INTRA-ARTICULAR; INTRAMUSCULAR
Status: COMPLETED | OUTPATIENT
Start: 2023-02-07 | End: 2023-02-07

## 2023-02-07 RX ADMIN — BUPIVACAINE HYDROCHLORIDE 4 ML: 2.5 INJECTION, SOLUTION INFILTRATION; PERINEURAL at 10:31

## 2023-02-07 RX ADMIN — TRIAMCINOLONE ACETONIDE 40 MG: 40 INJECTION, SUSPENSION INTRA-ARTICULAR; INTRAMUSCULAR at 10:31

## 2023-02-07 NOTE — PROGRESS NOTES
Assessment:   Diagnosis ICD-10-CM Associated Orders   1  Primary osteoarthritis of right knee  M17 11 Ambulatory Referral to Weight Management     Ambulatory Referral to Physical Therapy     Ambulatory Referral to Nutrition Services          Plan: It was explained to the patient that based upon the clinical and radiographic findings, at this point in time, this is not a surgical problem  Treatment for the above diagnoses and associated symptoms of this nature is generally conservative including a physician directed physical therapy program, improved fitness/weight loss, anti-inflammatories, and potentially various injections  In order to obtain a BMI of 40, the patient was instructed that she would need to lose about 42 lbs  She was offered, accepted, performed an injection(s) of cortisone to her Right knee for symptomatic relief of pain and inflammation  Patient tolerated the treatment(s) well  Ice and post injection protocol advised  Weightbearing activities as tolerated  She will be seen for follow-up in 3 months for re-evaluation and consideration for repeat injections as necessary  Amb referral to physical therapy to improve ROM and strengthening  Amb referral to weight management and nutritional services  Patient expresses understanding and is in agreement with this treatment plan  The patient was given the opportunity to ask questions or present concerns  To do next visit:  Return in about 3 months (around 5/7/2023) for Recheck  The above stated was discussed in layman's terms and the patient expressed understanding  All questions were answered to the patient's satisfaction         Scribe Attestation    I,:  Bessie Handy am acting as a scribe while in the presence of the attending physician :       I,:  Haroon Courtney, DO personally performed the services described in this documentation    as scribed in my presence :             Subjective:   Yael Root is a 62 y o  female who presents today for a repeat evaluation of her right knee due to chronic pain, known primary osteoarthritis  Patient is doing well but reports pain with prolonged sedentary positions and weight-bearing activities especially ambulating stairs  The patient states that She is point tender along the medial and lateral joint line  Patient would like an injection of cortisone to her right knee  She denies any recent bruising, numbness, tingling, or feelings of instability  She also denies any fevers, chills or shortness of breath  Review of systems negative unless otherwise specified in HPI  Review of Systems   Constitutional: Negative for chills and fever  HENT: Negative for ear pain and sore throat  Eyes: Negative for pain and visual disturbance  Respiratory: Negative for cough and shortness of breath  Cardiovascular: Negative for chest pain and palpitations  Gastrointestinal: Negative for abdominal pain and vomiting  Genitourinary: Negative for dysuria and hematuria  Musculoskeletal: Negative for arthralgias and back pain  Skin: Negative for color change and rash  Neurological: Negative for seizures and syncope  All other systems reviewed and are negative        Past Medical History:   Diagnosis Date   • Abdominal pain, lower     [id number] RESOLVED   • Acute renal failure ([facility] [address] )     [id number] RESOLVED   • Allergic rhinitis     29SNT9516 RESOLVED   • Ankle pain, right     63XBN5235 RESOLVED   • Arthritis     12TNL5198 RESOLVED  [id number] RESOLVED   • Asthma    • Bilateral chronic knee pain     31GQV7332   • Bladder pain     36IZI4638 RESOLVED   • Blood clot in vein    • Bursitis     24LQY4744 RESOLVED   • Cardiac disorder     15NQZ6271  RESOLVED   • Cardiac murmur     23OCT2017 RESOLVED   • Chest tightness or pressure     23OCT2017 RESOLVED   • COPD (chronic obstructive pulmonary disease) ([facility])    • Coronary artery disease    • Curvature of spine     06XBQ1150 RESOLVED   • GERD (gastroesophageal reflux disease)    • Hernia, hiatal     34IQN6183 RESOLVED   • Hyperlipidemia     19DIE9145 RESOLVED   • Hypertension    • Inguinal hernia     RIGHT   85WQQ8933 RESOLVED   • Jaw closure abnormality     93SQB6377 RESOLVED   • Lumbar herniated disc     01MFO6263 RESOLVED   • Morbid obesity ([facility] [address] )     47PWV0523   • Obesity    • Osteoarthritis of right knee     40IRR2020 RESOLVED   • Patellar tendinitis     63FEU1829   • Poor flexibility of tendon     91KFN8002   • Presence of IVC filter 10/12/2018   • Pulmonary embolism ([facility] [address] )     33ERA1899 RESOLVED   • Sepsis ([facility] )     54NVH6343 RESOLVED   • Severe sepsis due to methicillin resistant Staphylococcus aureus (MRSA) with acute organ dysfunction ([facility]  )     18ZLW9157 RESOLVED   • Sleep apnea, obstructive    • Spider vein, symptomatic     18SSX4013 RESOLVED   • Status post arthroscopy of right knee     00ZHQ9926 RESOLVED   • Stomach disorder     99SHL6437 RESOLVED   • Tear of medial meniscus of right knee     SUBSEQUENT ENCOUNTER  RESOLVED [id number]   • Thyroid disorder     20VTK9196   • Umbilical hernia     11UMW5392 RESOLVED       Past Surgical History:   Procedure Laterality Date   • COLONOSCOPY     • CYSTOSCOPY  01/1994    WITH BIOPSY     • EXPLORATORY LAPAROTOMY     • FOOT SURGERY Left    • FRACTURE SURGERY     • HAND SURGERY Right     cyst   • HYSTERECTOMY  2009   • INCISION AND DRAINAGE ANTERIOR NECK Right 6/8/2017    Procedure: INCISION AND DRAINAGE  (I&D) NECK;  Surgeon: German Rosado MD;  Location: [facility];  Service:    • INCISIONAL HERNIA REPAIR      WITH IMPLANTATION OF MESH  13 MAY 2014  LAST ASSESSED   • IR IVC FILTER REMOVAL  1/22/2019   • OPEN ANTERIOR SHOULDER RECONSTRUCTION Left    • OTHER SURGICAL HISTORY      BLOOD VESSEL REPAIR   13 MAY 2014 LAST ASSESSED   • PERIPHERALLY INSERTED CENTRAL CATHETER INSERTION     • MD ARTHRS KNE SURG W/MENISCECTOMY MED/LAT W/SHVG Right 4/11/2016    Procedure: KNEE ARTHROSCOPY WITH MEDIAL MENISCECTOMY ;  Surgeon: Jose Pro MD;  Location: [facility];  Service: Orthopedics   • SHOULDER SURGERY         Family History   Problem Relation Age of Onset   • Arthritis Mother    • Colon cancer Brother    • Lung cancer Father    • No Known Problems Sister    • No Known Problems Daughter    • No Known Problems Maternal Grandmother    • No Known Problems Maternal Grandfather    • No Known Problems Paternal Grandmother    • No Known Problems Paternal Grandfather    • Breast cancer Maternal Aunt    • Diabetes Maternal Aunt    • No Known Problems Sister    • No Known Problems Sister    • No Known Problems Sister    • No Known Problems Sister    • No Known Problems Sister    • No Known Problems Daughter    • Colon cancer Son        Social History     Occupational History   • Occupation: unemployed   Tobacco Use   • Smoking status: Former     Types: Cigarettes     Quit date: 1995     Years since quittin 2   • Smokeless tobacco: Never   • Tobacco comments:     quit 25 years ago   Vaping Use   • Vaping Use: Never used   Substance and Sexual Activity   • Alcohol use: Never   • Drug use: Never   • Sexual activity: Never         Current Outpatient Medications:   •  albuterol (2 5 mg/3 mL) 0 083 % nebulizer solution, Take 3 mL (2 5 mg total) by nebulization as needed for wheezing, Disp: 3 mL, Rfl: 5  •  albuterol (PROVENTIL HFA,VENTOLIN HFA) 90 mcg/act inhaler, Inhale 2 puffs every 4 (four) hours as needed for wheezing, Disp: 54 g, Rfl: 3  •  atorvastatin (LIPITOR) 10 mg tablet, Take 1 tablet (10 mg total) by mouth daily, Disp: 90 tablet, Rfl: 3  •  budesonide-formoterol (Symbicort) 160-4 5 mcg/act inhaler, Inhale 2 puffs 2 (two) times a day Rinse mouth after use, Disp: 30 6 g, Rfl: 3  •  Diclofenac Sodium (VOLTAREN) 1 %, Apply 4 g topically 4 (four) times a day, Disp: 350 g, Rfl: 0  •  famotidine (PEPCID) 40 MG tablet, Take 1 tablet (40 mg total) by mouth daily at bedtime, Disp: 90 tablet, Rfl: 3  •  fluticasone (FLONASE) 50 mcg/act nasal spray, 2 sprays into each nostril daily, Disp: 48 g, Rfl: 3  •  furosemide (LASIX) 20 mg tablet, Take 1 tablet (20 mg total) by mouth daily as needed (leg swelling) Take with potassium, Disp: 30 tablet, Rfl: 5  •  hydrocortisone 0 5 % cream, Apply topically 2 (two) times a day, Disp: 30 g, Rfl: 3  •  levothyroxine 125 mcg tablet, Take 1 tablet (125 mcg total) by mouth daily, Disp: 90 tablet, Rfl: 3  •  loratadine (CLARITIN) 10 mg tablet, Take 1 tablet (10 mg total) by mouth daily, Disp: 30 tablet, Rfl: 5  •  montelukast (SINGULAIR) 10 mg tablet, Take 1 tablet (10 mg total) by mouth daily at bedtime, Disp: 90 tablet, Rfl: 3  •  omeprazole (PriLOSEC) 40 MG capsule, Take 1 capsule (40 mg total) by mouth daily before breakfast, Disp: 90 capsule, Rfl: 3  •  potassium chloride (MICRO-K) 10 MEQ CR capsule, Take 1 capsule (10 mEq total) by mouth daily, Disp: 30 capsule, Rfl: 5  •  warfarin (COUMADIN) 5 mg tablet, Take as directed by physician, Disp: 135 tablet, Rfl: 3    Allergies   Allergen Reactions   • Clindamycin Angioedema   • Medical Tape Itching     Welt and redness   • Penicillin G Nausea Only   • Penicillins GI Intolerance     Nausea and vomiting          Vitals:    02/07/23 1001   BP: 124/85   Pulse: 77       Objective:                    Ortho Exam  right knee(s) -   Patient ambulates with antalgic gait pattern  Uses No assistive device  Genu Varus anatomical deformity  Skin is warm and dry to touch with no signs of erythema, ecchymosis, or infection  No soft tissue swelling or effusion noted  ROM (5° - 115°)  MMT: 4/5 throughout  TTP over medial joint line, TTP over lateral joint line, TTP over pes anserine bursa, no popliteal fullness appreciated on exam   Flexor and extensor mechanisms are intact   Knee is stable to varus and valgus stress  - Lachman's  - Anterior Drawer, - Posterior Drawer  - Medial Osiel's, - Lateral Osiel's  - Pivot Shift  Patella tracks centrally with palpable crepitus  Calf compartments are soft and supple  - Juan's sign  2+ DP and PT pulses with brisk capillary refill to the toes  Sural, saphenous, tibial, superficial, and deep peroneal motor and sensory distributions intact  Sensation light touch intact distally    Diagnostics, reviewed and taken today if performed as documented:    None performed    Procedures, if performed today:    Large joint arthrocentesis: R knee  Universal Protocol:  Consent: Verbal consent obtained  Risks and benefits: risks, benefits and alternatives were discussed  Consent given by: patient  Time out: Immediately prior to procedure a "time out" was called to verify the correct patient, procedure, equipment, support staff and site/side marked as required  Timeout called at: 2/7/2023 10:29 AM   Patient understanding: patient states understanding of the procedure being performed  Site marked: the operative site was marked  Patient identity confirmed: verbally with patient    Supporting Documentation  Indications: pain and diagnostic evaluation   Procedure Details  Location: knee - R knee  Needle size: 22 G  Ultrasound guidance: no  Approach: anterolateral  Medications administered: 4 mL bupivacaine 0 25 %; 40 mg triamcinolone acetonide 40 mg/mL    Patient tolerance: patient tolerated the procedure well with no immediate complications  Dressing:  Sterile dressing applied      Portions of the record may have been created with voice recognition software  Occasional wrong word or "sound a like" substitutions may have occurred due to the inherent limitations of voice recognition software  Read the chart carefully and recognize, using context, where substitutions have occurred

## 2023-02-08 ENCOUNTER — OFFICE VISIT (OUTPATIENT)
Dept: FAMILY MEDICINE CLINIC | Facility: CLINIC | Age: 58
End: 2023-02-08

## 2023-02-08 VITALS
HEIGHT: 65 IN | HEART RATE: 75 BPM | SYSTOLIC BLOOD PRESSURE: 128 MMHG | WEIGHT: 282 LBS | DIASTOLIC BLOOD PRESSURE: 98 MMHG | OXYGEN SATURATION: 94 % | TEMPERATURE: 96.2 F | BODY MASS INDEX: 46.98 KG/M2

## 2023-02-08 DIAGNOSIS — E03.8 OTHER SPECIFIED HYPOTHYROIDISM: Chronic | ICD-10-CM

## 2023-02-08 DIAGNOSIS — N18.30 STAGE 3 CHRONIC KIDNEY DISEASE, UNSPECIFIED WHETHER STAGE 3A OR 3B CKD (HCC): Chronic | ICD-10-CM

## 2023-02-08 DIAGNOSIS — H53.2 DIPLOPIA: Primary | ICD-10-CM

## 2023-02-08 DIAGNOSIS — E78.5 DYSLIPIDEMIA: Chronic | ICD-10-CM

## 2023-02-08 NOTE — PROGRESS NOTES
Name: Daniel Baires      : 1965      MRN: [id number]  Encounter Provider: Ruba Barkley DO  Encounter Date: 2023   Encounter department: [address] [place] [address]   For her diplopia, referral to ophthalmology  I will get blood work on her next week  Continue same medications  Decrease salt intake  Follow-up in 3 months or as needed  1  Diplopia  -     Ambulatory Referral to Ophthalmology; Future  -     Hemoglobin A1C    2  Other specified hypothyroidism  -     CBC and differential  -     TSH, 3rd generation with Free T4 reflex    3  Stage 3 chronic kidney disease, unspecified whether stage 3a or 3b CKD (HCC)  -     CBC and differential  -     Comprehensive metabolic panel    4  Dyslipidemia  -     Comprehensive metabolic panel  -     Lipid Panel with Direct LDL reflex    BMI Counseling: Body mass index is 46 93 kg/m²  The BMI is above normal  Nutrition recommendations include decreasing portion sizes, encouraging healthy choices of fruits and vegetables, decreasing fast food intake, consuming healthier snacks, limiting drinks that contain sugar, moderation in carbohydrate intake, increasing intake of lean protein, reducing intake of saturated and trans fat and reducing intake of cholesterol  No pharmacotherapy was ordered  Rationale for BMI follow-up plan is due to patient being overweight or obese  Depression Screening and Follow-up Plan: Patient was screened for depression during today's encounter  They screened negative with a PHQ-2 score of 0  Subjective     Patient here today for follow-up  Patient states a month ago had some intermittent diplopia  Now for the past couple weeks its been pretty much chronic  No headache  No hearing difficulties,no dysarthria  No weakness  Patient does have a history of cataracts  Has not seen an ophthalmologist in a couple years  Otherwise she is doing well  No chest pain or shortness of breath    Patient admits that she has been using more salt on her food lately  Review of Systems   Constitutional: Negative  Eyes: Positive for visual disturbance  Respiratory: Negative  Cardiovascular: Negative  Gastrointestinal: Negative  Genitourinary: Negative          Past Medical History:   Diagnosis Date   • Abdominal pain, lower     [id number] RESOLVED   • Acute renal failure ([facility] [address] )     23OCT2017 RESOLVED   • Allergic rhinitis     11CIH9992 RESOLVED   • Ankle pain, right     39EZW2175 RESOLVED   • Arthritis     23OCT2017 RESOLVED  [id number] RESOLVED   • Asthma    • Bilateral chronic knee pain     30JUN2017   • Bladder pain     30JUN2017 RESOLVED   • Blood clot in vein    • Bursitis     63ZZU6718 RESOLVED   • Cardiac disorder     23OCT2017  RESOLVED   • Cardiac murmur     23OCT2017 RESOLVED   • Chest tightness or pressure     23OCT2017 RESOLVED   • COPD (chronic obstructive pulmonary disease) ([facility] [address] )    • Coronary artery disease    • Curvature of spine     01NEV5994 RESOLVED   • GERD (gastroesophageal reflux disease)    • Hernia, hiatal     89CPV4015 RESOLVED   • Hyperlipidemia     23OCT2017 RESOLVED   • Hypertension    • Inguinal hernia     RIGHT   48UTC0529 RESOLVED   • Jaw closure abnormality     21AER3423 RESOLVED   • Lumbar herniated disc     44CUO9946 RESOLVED   • Mor[facility] obesity [facility] )     21AGC4435   • Obesity    • Osteoarthritis of right knee     57ACR1403 RESOLVED   • Patellar tendinitis     09FQW8896   • Poor flexibility of tendon     18TRQ4299   • Presence of IVC filter 10/12/2018   • Pulmonary embolism ([facility]  )     19ENX5971 RESOLVED   • Sepsis ([place] [address] )     90SZN2347 RESOLVED   • Severe sepsis due to methicillin resistant Staphylococcus aureus (MRSA) with acute organ dysfunction ([facility] )     12XNL7411 RESOLVED   • Sleep apnea, obstructive    • Spider vein, symptomatic     13JCY0836 RESOLVED   • Status post arthroscopy of right knee     97FRY6915 RESOLVED   • Stomach disorder     97EZZ9738 RESOLVED   • Tear of medial meniscus of right knee     SUBSEQUENT ENCOUNTER  RESOLVED 89DLO1048   • Thyroid disorder     28MWX2571   • Umbilical hernia     75CFC8061 RESOLVED     Past Surgical History:   Procedure Laterality Date   • COLONOSCOPY     • CYSTOSCOPY  01/1994    WITH BIOPSY     • EXPLORATORY LAPAROTOMY     • FOOT SURGERY Left    • FRACTURE SURGERY     • HAND SURGERY Right     cyst   • HYSTERECTOMY  2009   • INCISION AND DRAINAGE ANTERIOR NECK Right 6/8/2017    Procedure: INCISION AND DRAINAGE  (I&D) NECK;  Surgeon: Freddie Bravo MD;  Location:  MAIN OR;  Service:    • INCISIONAL HERNIA REPAIR      WITH IMPLANTATION OF MESH  13 MAY 2014  LAST ASSESSED   • IR IVC FILTER REMOVAL  1/22/2019   • OPEN ANTERIOR SHOULDER RECONSTRUCTION Left    • OTHER SURGICAL HISTORY      BLOOD VESSEL REPAIR   13 MAY 2014 LAST ASSESSED   • PERIPHERALLY INSERTED CENTRAL CATHETER INSERTION     • OH ARTHRS KNE SURG W/MENISCECTOMY MED/LAT W/SHVG Right 4/11/2016    Procedure: KNEE ARTHROSCOPY WITH MEDIAL MENISCECTOMY ;  Surgeon: Doc Gar MD;  Location: [facility];  Service: Orthopedics   • SHOULDER SURGERY       Family History   Problem Relation Age of Onset   • Arthritis Mother    • Colon cancer Brother    • Lung cancer Father    • No Known Problems Sister    • No Known Problems Daughter    • No Known Problems Maternal Grandmother    • No Known Problems Maternal Grandfather    • No Known Problems Paternal Grandmother    • No Known Problems Paternal Grandfather    • Breast cancer Maternal Aunt    • Diabetes Maternal Aunt    • No Known Problems Sister    • No Known Problems Sister    • No Known Problems Sister    • No Known Problems Sister    • No Known Problems Sister    • No Known Problems Daughter    • Colon cancer Son      Social History     Socioeconomic History   • Marital status: Single     Spouse name: None   • Number of children: None   • Years of education: None   • Highest education level: None   Occupational History   • Occupation: unemployed Tobacco Use   • Smoking status: Former     Types: Cigarettes     Quit date: 1995     Years since quittin 2   • Smokeless tobacco: Never   • Tobacco comments:     quit 25 years ago   Vaping Use   • Vaping Use: Never used   Substance and Sexual Activity   • Alcohol use: Never   • Drug use: Never   • Sexual activity: Never   Other Topics Concern   • None   Social History Narrative    ALWAYS USES SEAT BELT         Social Determinants of Health     Financial Resource Strain: Not on file   Food Insecurity: Not on file   Transportation Needs: Not on file   Physical Activity: Not on file   Stress: Not on file   Social Connections: Not on file   Intimate Partner Violence: Not on file   Housing Stability: Not on file     Current Outpatient Medications on File Prior to Visit   Medication Sig   • albuterol (2 5 mg/3 mL) 0 083 % nebulizer solution Take 3 mL (2 5 mg total) by nebulization as needed for wheezing   • albuterol (PROVENTIL HFA,VENTOLIN HFA) 90 mcg/act inhaler Inhale 2 puffs every 4 (four) hours as needed for wheezing   • atorvastatin (LIPITOR) 10 mg tablet Take 1 tablet (10 mg total) by mouth daily   • budesonide-formoterol (Symbicort) 160-4 5 mcg/act inhaler Inhale 2 puffs 2 (two) times a day Rinse mouth after use   • Diclofenac Sodium (VOLTAREN) 1 % Apply 4 g topically 4 (four) times a day   • famotidine (PEPCID) 40 MG tablet Take 1 tablet (40 mg total) by mouth daily at bedtime   • fluticasone (FLONASE) 50 mcg/act nasal spray 2 sprays into each nostril daily   • furosemide (LASIX) 20 mg tablet Take 1 tablet (20 mg total) by mouth daily as needed (leg swelling) Take with potassium   • hydrocortisone 0 5 % cream Apply topically 2 (two) times a day   • levothyroxine 125 mcg tablet Take 1 tablet (125 mcg total) by mouth daily   • loratadine (CLARITIN) 10 mg tablet Take 1 tablet (10 mg total) by mouth daily   • montelukast (SINGULAIR) 10 mg tablet Take 1 tablet (10 mg total) by mouth daily at bedtime   • omeprazole (PriLOSEC) 40 MG capsule Take 1 capsule (40 mg total) by mouth daily before breakfast   • potassium chloride (MICRO-K) 10 MEQ CR capsule Take 1 capsule (10 mEq total) by mouth daily   • warfarin (COUMADIN) 5 mg tablet Take as directed by physician     Allergies   Allergen Reactions   • Clindamycin Angioedema   • Medical Tape Itching     Welt and redness   • Penicillin G Nausea Only   • Penicillins GI Intolerance     Nausea and vomiting     Immunization History   Administered Date(s) Administered   • INFLUENZA 09/29/2014, 09/28/2015, 11/27/2016, 11/30/2016, 05/29/2017, 10/07/2022   • Influenza Quadrivalent Preservative Free 3 years and older IM 11/30/2016   • Influenza Quadrivalent, 6-35 Months IM 08/29/2017   • Influenza, Quadrivalent (nasal) 11/27/2016   • Influenza, recombinant, quadrivalent,injectable, preservative free 10/12/2018, 09/11/2019, 09/15/2020, 09/30/2021, 10/07/2022   • Influenza, seasonal, injectable 09/29/2014   • Pneumococcal Polysaccharide PPV23 08/29/2017       Objective     /98 (BP Location: Left arm, Patient Position: Sitting, Cuff Size: Large)   Pulse 75   Temp (!) 96 2 °F (35 7 °C)   Ht 5' 5" (1 651 m)   Wt 128 kg (282 lb)   LMP  (LMP Unknown)   SpO2 94%   BMI 46 93 kg/m²     Physical Exam  Vitals reviewed  Constitutional:       General: She is not in acute distress  Appearance: Normal appearance  She is well-developed  She is not ill-appearing, toxic-appearing or diaphoretic  HENT:      Head: Normocephalic and atraumatic  Eyes:      Extraocular Movements: Extraocular movements intact  Conjunctiva/sclera: Conjunctivae normal       Pupils: Pupils are equal, round, and reactive to light  Cardiovascular:      Rate and Rhythm: Normal rate and regular rhythm  Heart sounds: Normal heart sounds  No murmur heard  No friction rub  No gallop  Pulmonary:      Effort: Pulmonary effort is normal  No respiratory distress        Breath sounds: Normal breath sounds  No wheezing, rhonchi or rales  Musculoskeletal:      Right lower leg: No edema  Left lower leg: No edema  Neurological:      General: No focal deficit present  Mental Status: She is alert and oriented to person, place, and time  Psychiatric:         Mood and Affect: Mood normal          Behavior: Behavior normal          Thought Content:  Thought content normal          Judgment: Judgment normal        Paola Loser, DO

## 2023-02-17 ENCOUNTER — ANTICOAG VISIT (OUTPATIENT)
Dept: FAMILY MEDICINE CLINIC | Facility: CLINIC | Age: 58
End: 2023-02-17

## 2023-02-17 ENCOUNTER — APPOINTMENT (OUTPATIENT)
Dept: LAB | Facility: MEDICAL CENTER | Age: 58
End: 2023-02-17

## 2023-02-17 DIAGNOSIS — I26.99 PULMONARY EMBOLISM, UNSPECIFIED CHRONICITY, UNSPECIFIED PULMONARY EMBOLISM TYPE, UNSPECIFIED WHETHER ACUTE COR PULMONALE PRESENT (HCC): ICD-10-CM

## 2023-02-17 LAB
ALBUMIN SERPL BCP-MCNC: 2.9 G/DL (ref 3.5–5)
ALP SERPL-CCNC: 80 U/L (ref 46–116)
ALT SERPL W P-5'-P-CCNC: 21 U/L (ref 12–78)
ANION GAP SERPL CALCULATED.3IONS-SCNC: 7 MMOL/L (ref 4–13)
AST SERPL W P-5'-P-CCNC: 16 U/L (ref 5–45)
BASOPHILS # BLD AUTO: 0.07 THOUSANDS/ÂΜL (ref 0–0.1)
BASOPHILS NFR BLD AUTO: 1 % (ref 0–1)
BILIRUB SERPL-MCNC: 0.68 MG/DL (ref 0.2–1)
BUN SERPL-MCNC: 17 MG/DL (ref 5–25)
CALCIUM ALBUM COR SERPL-MCNC: 10 MG/DL (ref 8.3–10.1)
CALCIUM SERPL-MCNC: 9.1 MG/DL (ref 8.3–10.1)
CHLORIDE SERPL-SCNC: 110 MMOL/L (ref 96–108)
CHOLEST SERPL-MCNC: 187 MG/DL
CO2 SERPL-SCNC: 24 MMOL/L (ref 21–32)
CREAT SERPL-MCNC: 1.09 MG/DL (ref 0.6–1.3)
EOSINOPHIL # BLD AUTO: 0.19 THOUSAND/ÂΜL (ref 0–0.61)
EOSINOPHIL NFR BLD AUTO: 3 % (ref 0–6)
ERYTHROCYTE [DISTWIDTH] IN BLOOD BY AUTOMATED COUNT: 13.4 % (ref 11.6–15.1)
GFR SERPL CREATININE-BSD FRML MDRD: 56 ML/MIN/1.73SQ M
GLUCOSE P FAST SERPL-MCNC: 78 MG/DL (ref 65–99)
HCT VFR BLD AUTO: 44.1 % (ref 34.8–46.1)
HDLC SERPL-MCNC: 48 MG/DL
HGB BLD-MCNC: 13.3 G/DL (ref 11.5–15.4)
IMM GRANULOCYTES # BLD AUTO: 0.01 THOUSAND/UL (ref 0–0.2)
IMM GRANULOCYTES NFR BLD AUTO: 0 % (ref 0–2)
INR PPP: 1.42 (ref 0.84–1.19)
LDLC SERPL CALC-MCNC: 127 MG/DL (ref 0–100)
LYMPHOCYTES # BLD AUTO: 1.62 THOUSANDS/ÂΜL (ref 0.6–4.47)
LYMPHOCYTES NFR BLD AUTO: 23 % (ref 14–44)
MCH RBC QN AUTO: 29.8 PG (ref 26.8–34.3)
MCHC RBC AUTO-ENTMCNC: 30.2 G/DL (ref 31.4–37.4)
MCV RBC AUTO: 99 FL (ref 82–98)
MONOCYTES # BLD AUTO: 0.59 THOUSAND/ÂΜL (ref 0.17–1.22)
MONOCYTES NFR BLD AUTO: 8 % (ref 4–12)
NEUTROPHILS # BLD AUTO: 4.56 THOUSANDS/ÂΜL (ref 1.85–7.62)
NEUTS SEG NFR BLD AUTO: 65 % (ref 43–75)
NRBC BLD AUTO-RTO: 0 /100 WBCS
PLATELET # BLD AUTO: 348 THOUSANDS/UL (ref 149–390)
PMV BLD AUTO: 10.5 FL (ref 8.9–12.7)
POTASSIUM SERPL-SCNC: 3.9 MMOL/L (ref 3.5–5.3)
PROT SERPL-MCNC: 6.6 G/DL (ref 6.4–8.4)
PROTHROMBIN TIME: 17.6 SECONDS (ref 11.6–14.5)
RBC # BLD AUTO: 4.47 MILLION/UL (ref 3.81–5.12)
SODIUM SERPL-SCNC: 141 MMOL/L (ref 135–147)
TRIGL SERPL-MCNC: 59 MG/DL
TSH SERPL DL<=0.05 MIU/L-ACNC: 1.64 UIU/ML (ref 0.45–4.5)
WBC # BLD AUTO: 7.04 THOUSAND/UL (ref 4.31–10.16)

## 2023-02-19 LAB
EST. AVERAGE GLUCOSE BLD GHB EST-MCNC: 114 MG/DL
HBA1C MFR BLD: 5.6 %

## 2023-02-20 ENCOUNTER — TELEPHONE (OUTPATIENT)
Dept: FAMILY MEDICINE CLINIC | Facility: CLINIC | Age: 58
End: 2023-02-20

## 2023-03-01 ENCOUNTER — OFFICE VISIT (OUTPATIENT)
Dept: PULMONOLOGY | Facility: CLINIC | Age: 58
End: 2023-03-01

## 2023-03-01 VITALS
HEIGHT: 65 IN | OXYGEN SATURATION: 95 % | TEMPERATURE: 96.6 F | DIASTOLIC BLOOD PRESSURE: 81 MMHG | HEART RATE: 70 BPM | BODY MASS INDEX: 46.55 KG/M2 | WEIGHT: 279.4 LBS | SYSTOLIC BLOOD PRESSURE: 143 MMHG

## 2023-03-01 DIAGNOSIS — J45.40 MODERATE PERSISTENT ASTHMA, UNSPECIFIED WHETHER COMPLICATED: Primary | ICD-10-CM

## 2023-03-01 DIAGNOSIS — G47.33 OSA (OBSTRUCTIVE SLEEP APNEA): ICD-10-CM

## 2023-03-01 DIAGNOSIS — Z86.711 HISTORY OF PULMONARY EMBOLUS (PE): ICD-10-CM

## 2023-03-01 RX ORDER — BUDESONIDE AND FORMOTEROL FUMARATE DIHYDRATE 160; 4.5 UG/1; UG/1
2 AEROSOL RESPIRATORY (INHALATION) 2 TIMES DAILY
Qty: 30.6 G | Refills: 3 | Status: SHIPPED | OUTPATIENT
Start: 2023-03-01

## 2023-03-01 RX ORDER — PREDNISONE 10 MG/1
40 TABLET ORAL DAILY
Qty: 20 TABLET | Refills: 0 | Status: SHIPPED | OUTPATIENT
Start: 2023-03-01

## 2023-03-01 NOTE — ASSESSMENT & PLAN NOTE
· No updated compliance data for me to review today  Will asked to contact medical supply company to correct this  · Patient endorses compliance on new CPAP  Denies any issues with it  · Continue CPAP and previously prescribed settings during all hours of sleep

## 2023-03-01 NOTE — PROGRESS NOTES
Pulmonary Follow Up Note   Jayro Malone 62 y o  female MRN: [id number]  3/1/2023      Assessment:    Moderate persistent asthma  · Continue Symbicort 2 puffs twice daily  She was reminded to rinse her mouth after each use  · Continue albuterol HFA/nebs as needed  · Given prednisone burst script to have on hand in case of emergency  MICHAEL (obstructive sleep apnea)  · No updated compliance data for me to review today  Will asked to contact medical supply company to correct this  · Patient endorses compliance on new CPAP  Denies any issues with it  · Continue CPAP and previously prescribed settings during all hours of sleep  History of pulmonary embolus (PE)  · Has been recommended to continue Coumadin indefinitely  Will continue this  No bleeding issues  Plan:    Diagnoses and all orders for this visit:    Moderate persistent asthma, unspecified whether complicated  -     predniSONE 10 mg tablet; Take 4 tablets (40 mg total) by mouth daily  -     budesonide-formoterol (Symbicort) 160-4 5 mcg/act inhaler; Inhale 2 puffs 2 (two) times a day Rinse mouth after use    MICHAEL (obstructive sleep apnea)    History of pulmonary embolus (PE)        Return in about 6 months (around 9/1/2023)  History of Present Illness   HPI:  Jayro Malone is a 62 y o  female who presents to the office today for routine follow-up  Patient was last seen in our office in August   In the last 6 months patient has not required systemic steroids, antibiotics or been hospitalized for wrist related illness  She follows with pulmonary for asthma, MICHAEL, history of pulmonary embolism bilaterally on Coumadin indefinitely  Presently, patient does feel that she has continual issues with dyspnea on exertion especially when climbing multiple flights of stairs  She also endorses chronic cough that is productive of light green/yellow sputum  Denies hemoptysis  She endorses wheezing usually depending on level of activity   Reports wearing CPAP every night for at least 4 hours every night  Unfortunately compliance data is way outdated from 2021  Review of Systems   All other systems reviewed and are negative        Historical Information   Past Medical History:   Diagnosis Date   • Abdominal pain, lower     [id number] RESOLVED   • Acute renal failure ([facility] )     23OCT2017 RESOLVED   • Allergic rhinitis     [id number] RESOLVED   • Ankle pain, right     [id number] RESOLVED   • Arthritis     23OCT2017 RESOLVED  [id number] RESOLVED   • Asthma    • Bilateral chronic knee pain     30JUN2017   • Bladder pain     30JUN2017 RESOLVED   • Blood clot in vein    • Bursitis     [id number] RESOLVED   • Cardiac disorder     23OCT2017  RESOLVED   • Cardiac murmur     23OCT2017 RESOLVED   • Chest tightness or pressure     23OCT2017 RESOLVED   • COPD (chronic obstructive pulmonary disease) ([facility] )    • Coronary artery disease    • Curvature of spine     01AUG2017 RESOLVED   • GERD (gastroesophageal reflux disease)    • Hernia, hiatal     64JBW4011 RESOLVED   • Hyperlipidemia     23OCT2017 RESOLVED   • Hypertension    • Inguinal hernia     RIGHT   [id number] RESOLVED   • Jaw closure abnormality     30AEM4677 RESOLVED   • Lumbar herniated disc     62URM5658 RESOLVED   • Morbid obesity ([facility] )     78XJN7449   • Obesity    • Osteoarthritis of right knee     80BXB7282 RESOLVED   • Patellar tendinitis     66SRO3202   • Poor flexibility of tendon     99BWD6046   • Presence of IVC filter 10/12/2018   • Pulmonary embolism ([facility]  )     17TAL0386 RESOLVED   • Sepsis ([place] [address] )     [id number] RESOLVED   • Severe sepsis due to methicillin resistant Staphylococcus aureus (MRSA) with acute organ dysfunction ([place] [address] )     25KLC2867 RESOLVED   • Sleep apnea, obstructive    • Spider vein, symptomatic     89HEF4207 RESOLVED   • Status post arthroscopy of right knee     78YPU1711 RESOLVED   • Stomach disorder     85QHX7744 RESOLVED   • Tear of medial meniscus of right knee     SUBSEQUENT ENCOUNTER  RESOLVED [id number]   • Thyroid disorder     80TMF4394   • Umbilical hernia     68RUL7835 RESOLVED     Past Surgical History:   Procedure Laterality Date   • COLONOSCOPY     • CYSTOSCOPY  1994    WITH BIOPSY     • EXPLORATORY LAPAROTOMY     • FOOT SURGERY Left    • FRACTURE SURGERY     • HAND SURGERY Right     cyst   • HYSTERECTOMY     • INCISION AND DRAINAGE ANTERIOR NECK Right 2017    Procedure: INCISION AND DRAINAGE  (I&D) NECK;  Surgeon: Alberteen Lanes, MD;  Location:  MAIN OR;  Service:    • INCISIONAL HERNIA REPAIR      WITH IMPLANTATION OF MESH  13 MAY 2014  LAST ASSESSED   • IR IVC FILTER REMOVAL  2019   • OPEN ANTERIOR SHOULDER RECONSTRUCTION Left    • OTHER SURGICAL HISTORY      BLOOD VESSEL REPAIR   13 MAY 2014 LAST ASSESSED   • PERIPHERALLY INSERTED CENTRAL CATHETER INSERTION     • MS ARTHRS KNE SURG W/MENISCECTOMY MED/LAT W/SHVG Right 2016    Procedure: KNEE ARTHROSCOPY WITH MEDIAL MENISCECTOMY ;  Surgeon: Danielle Haq MD;  Location: [facility];  Service: Orthopedics   • SHOULDER SURGERY       Family History   Problem Relation Age of Onset   • Arthritis Mother    • Colon cancer Brother    • Lung cancer Father    • No Known Problems Sister    • No Known Problems Daughter    • No Known Problems Maternal Grandmother    • No Known Problems Maternal Grandfather    • No Known Problems Paternal Grandmother    • No Known Problems Paternal Grandfather    • Breast cancer Maternal Aunt    • Diabetes Maternal Aunt    • No Known Problems Sister    • No Known Problems Sister    • No Known Problems Sister    • No Known Problems Sister    • No Known Problems Sister    • No Known Problems Daughter    • Colon cancer Son        Social History     Tobacco Use   Smoking Status Former   • Types: Cigarettes   • Quit date: 1995   • Years since quittin 2   Smokeless Tobacco Never   Tobacco Comments    quit 25 years ago         Meds/Allergies     Current Outpatient Medications:   •  albuterol (2 5 mg/3 mL) 0 083 % nebulizer solution, Take 3 mL (2 5 mg total) by nebulization as needed for wheezing, Disp: 3 mL, Rfl: 5  •  albuterol (PROVENTIL HFA,VENTOLIN HFA) 90 mcg/act inhaler, Inhale 2 puffs every 4 (four) hours as needed for wheezing, Disp: 54 g, Rfl: 3  •  atorvastatin (LIPITOR) 10 mg tablet, Take 1 tablet (10 mg total) by mouth daily, Disp: 90 tablet, Rfl: 3  •  budesonide-formoterol (Symbicort) 160-4 5 mcg/act inhaler, Inhale 2 puffs 2 (two) times a day Rinse mouth after use, Disp: 30 6 g, Rfl: 3  •  Diclofenac Sodium (VOLTAREN) 1 %, Apply 4 g topically 4 (four) times a day, Disp: 350 g, Rfl: 0  •  famotidine (PEPCID) 40 MG tablet, Take 1 tablet (40 mg total) by mouth daily at bedtime, Disp: 90 tablet, Rfl: 3  •  fluticasone (FLONASE) 50 mcg/act nasal spray, 2 sprays into each nostril daily, Disp: 48 g, Rfl: 3  •  furosemide (LASIX) 20 mg tablet, Take 1 tablet (20 mg total) by mouth daily as needed (leg swelling) Take with potassium, Disp: 30 tablet, Rfl: 5  •  hydrocortisone 0 5 % cream, Apply topically 2 (two) times a day, Disp: 30 g, Rfl: 3  •  levothyroxine 125 mcg tablet, Take 1 tablet (125 mcg total) by mouth daily, Disp: 90 tablet, Rfl: 3  •  loratadine (CLARITIN) 10 mg tablet, Take 1 tablet (10 mg total) by mouth daily, Disp: 30 tablet, Rfl: 5  •  montelukast (SINGULAIR) 10 mg tablet, Take 1 tablet (10 mg total) by mouth daily at bedtime, Disp: 90 tablet, Rfl: 3  •  omeprazole (PriLOSEC) 40 MG capsule, Take 1 capsule (40 mg total) by mouth daily before breakfast, Disp: 90 capsule, Rfl: 3  •  potassium chloride (MICRO-K) 10 MEQ CR capsule, Take 1 capsule (10 mEq total) by mouth daily, Disp: 30 capsule, Rfl: 5  •  predniSONE 10 mg tablet, Take 4 tablets (40 mg total) by mouth daily, Disp: 20 tablet, Rfl: 0  •  warfarin (COUMADIN) 5 mg tablet, Take as directed by physician, Disp: 135 tablet, Rfl: 3  Allergies   Allergen Reactions   • Clindamycin Angioedema   • Medical Tape Itching     Welt and redness • Penicillin G Nausea Only   • Penicillins GI Intolerance     Nausea and vomiting       Vitals: Blood pressure 143/81, pulse 70, temperature (!) 96 6 °F (35 9 °C), temperature source Tympanic, height 5' 5" (1 651 m), weight 127 kg (279 lb 6 4 oz), SpO2 95 %, not currently breastfeeding  Body mass index is 46 49 kg/m²  Oxygen Therapy  SpO2: 95 %  Oxygen Therapy: None (Room air)    Physical Exam  Physical Exam  Vitals reviewed  Constitutional:       Appearance: Normal appearance  She is well-developed  HENT:      Head: Normocephalic and atraumatic  Nose: Nose normal       Mouth/Throat:      Mouth: Mucous membranes are moist    Eyes:      Extraocular Movements: Extraocular movements intact  Cardiovascular:      Rate and Rhythm: Normal rate and regular rhythm  Heart sounds: Normal heart sounds  Pulmonary:      Effort: Pulmonary effort is normal  No respiratory distress  Breath sounds: No wheezing, rhonchi or rales  Musculoskeletal:         General: No swelling  Cervical back: Normal range of motion and neck supple  Skin:     General: Skin is warm and dry  Neurological:      General: No focal deficit present  Mental Status: She is alert  Mental status is at baseline  Psychiatric:         Mood and Affect: Mood normal          Behavior: Behavior normal          Labs: I have personally reviewed pertinent lab results  , ABG: No results found for: PHART, SQU5WZV, PO2ART, XDG5DKD, X8ZQUXQQ, BEART, SOURCE, BNP: No results found for: BNP, CBC: No results found for: WBC, HGB, HCT, MCV, PLT, ADJUSTEDWBC, MCH, MCHC, RDW, MPV, NRBC, CMP: No results found for: SODIUM, K, CL, CO2, ANIONGAP, BUN, CREATININE, GLUCOSE, CALCIUM, AST, ALT, ALKPHOS, PROT, BILITOT, EGFR, PT/INR: No results found for: PT, INR, Troponin: No results found for: TROPONINI  Lab Results   Component Value Date    WBC 7 04 02/17/2023    HGB 13 3 02/17/2023    HCT 44 1 02/17/2023    MCV 99 (H) 02/17/2023     02/17/2023 Lab Results   Component Value Date    GLUCOSE 102 06/01/2017    CALCIUM 9 1 02/17/2023     01/07/2016    K 3 9 02/17/2023    CO2 24 02/17/2023     (H) 02/17/2023    BUN 17 02/17/2023    CREATININE 1 09 02/17/2023     Lab Results   Component Value Date    IGE 40 9 09/09/2019     Lab Results   Component Value Date    ALT 21 02/17/2023    AST 16 02/17/2023    ALKPHOS 80 02/17/2023    BILITOT 0 40 01/07/2016       Imaging and other studies: I have personally reviewed pertinent reports  and I have personally reviewed pertinent films in PACS     CT chest abdomen pelvis 6/19/2021  Minimal bibasilar atelectasis without acute infiltrate, pneumothorax, pleural effusion  Pulmonary function testing:  Performed 12/22/2020   FEV1/FVC ratio 81%   FEV1 93% predicted  FVC 86% predicted  no response to bronchodilators  TLC 79 % predicted  RV 58 % predicted  DLCO corrected for hemoglobin 57 % predicted  Normal spirometry with no obstruction  Normal vital capacity  Decreased lung volumes indicative of very mild restriction  Moderately impaired diffusion capacity  Other Studies: I have personally reviewed pertinent reports  Echo 5/4/2020  EF 65%  Trace mitral regurgitation  Trace tricuspid regurgitation  Estimated peak PA pressure 25 mmHg

## 2023-03-01 NOTE — ASSESSMENT & PLAN NOTE
· Continue Symbicort 2 puffs twice daily  She was reminded to rinse her mouth after each use  · Continue albuterol HFA/nebs as needed  · Given prednisone burst script to have on hand in case of emergency

## 2023-03-10 ENCOUNTER — APPOINTMENT (OUTPATIENT)
Dept: LAB | Facility: MEDICAL CENTER | Age: 58
End: 2023-03-10

## 2023-03-10 ENCOUNTER — TELEPHONE (OUTPATIENT)
Dept: FAMILY MEDICINE CLINIC | Facility: CLINIC | Age: 58
End: 2023-03-10

## 2023-03-10 DIAGNOSIS — I26.99 PULMONARY EMBOLISM, UNSPECIFIED CHRONICITY, UNSPECIFIED PULMONARY EMBOLISM TYPE, UNSPECIFIED WHETHER ACUTE COR PULMONALE PRESENT (HCC): ICD-10-CM

## 2023-03-10 DIAGNOSIS — Z92.89 HISTORY OF MRI: ICD-10-CM

## 2023-03-10 DIAGNOSIS — E07.9 THYROID DISEASE: ICD-10-CM

## 2023-03-10 DIAGNOSIS — G70.00 MYASTHENIA GRAVIS WITHOUT EXACERBATION (HCC): ICD-10-CM

## 2023-03-10 LAB
BUN SERPL-MCNC: 16 MG/DL (ref 5–25)
CREAT SERPL-MCNC: 0.99 MG/DL (ref 0.6–1.3)
GFR SERPL CREATININE-BSD FRML MDRD: 63 ML/MIN/1.73SQ M
INR PPP: 2.04 (ref 0.84–1.19)
PROTHROMBIN TIME: 23.3 SECONDS (ref 11.6–14.5)
T3 SERPL-MCNC: 0.9 NG/ML (ref 0.6–1.8)
T4 SERPL-MCNC: 10.4 UG/DL (ref 4.7–13.3)
TSH SERPL DL<=0.05 MIU/L-ACNC: 0.74 UIU/ML (ref 0.45–4.5)

## 2023-03-10 NOTE — TELEPHONE ENCOUNTER
Patient said someone called her and said Dr Adalberto Jacome wants her to have a test done, she did not understand the number to call back  I don't see anything that was ordered  Do you know what she needs?

## 2023-03-13 ENCOUNTER — ANTICOAG VISIT (OUTPATIENT)
Dept: FAMILY MEDICINE CLINIC | Facility: CLINIC | Age: 58
End: 2023-03-13

## 2023-03-13 LAB — ACHR BIND AB SER-SCNC: <0.03 NMOL/L (ref 0–0.24)

## 2023-03-14 LAB — ACHR BLOCK AB/ACHR TOTAL SFR SER: 20 % (ref 0–25)

## 2023-03-17 LAB — ACHR MOD AB/ACHR TOTAL SFR SER: 0 % (ref 0–45)

## 2023-03-20 ENCOUNTER — HOSPITAL ENCOUNTER (OUTPATIENT)
Dept: MRI IMAGING | Facility: HOSPITAL | Age: 58
Discharge: HOME | End: 2023-03-20

## 2023-03-20 DIAGNOSIS — C71.9 MALIGNANT NEOPLASM OF BRAIN, UNSPECIFIED LOCATION (HCC): ICD-10-CM

## 2023-03-20 DIAGNOSIS — I67.1 CEREBRAL ANEURYSM: ICD-10-CM

## 2023-03-20 DIAGNOSIS — E05.01 GRAVES' DISEASE WITH THYROTOXIC CRISIS: ICD-10-CM

## 2023-03-20 RX ADMIN — GADOBUTROL 12 ML: 604.72 INJECTION INTRAVENOUS at 10:44

## 2023-03-25 ENCOUNTER — HOSPITAL ENCOUNTER (EMERGENCY)
Facility: HOSPITAL | Age: 58
Discharge: HOME | End: 2023-03-25
Attending: STUDENT IN AN ORGANIZED HEALTH CARE EDUCATION/TRAINING PROGRAM | Admitting: STUDENT IN AN ORGANIZED HEALTH CARE EDUCATION/TRAINING PROGRAM

## 2023-03-25 ENCOUNTER — APPOINTMENT (EMERGENCY)
Dept: RADIOLOGY | Facility: HOSPITAL | Age: 58
End: 2023-03-25

## 2023-03-25 VITALS
RESPIRATION RATE: 21 BRPM | OXYGEN SATURATION: 97 % | HEART RATE: 64 BPM | SYSTOLIC BLOOD PRESSURE: 128 MMHG | TEMPERATURE: 98.8 F | DIASTOLIC BLOOD PRESSURE: 79 MMHG

## 2023-03-25 DIAGNOSIS — R55 SYNCOPE: Primary | ICD-10-CM

## 2023-03-25 LAB
2HR DELTA HS TROPONIN: 3 NG/L
ANION GAP SERPL CALCULATED.3IONS-SCNC: 9 MMOL/L (ref 4–13)
ATRIAL RATE: 62 BPM
ATRIAL RATE: 70 BPM
BASOPHILS # BLD AUTO: 0.08 THOUSANDS/ÂΜL (ref 0–0.1)
BASOPHILS NFR BLD AUTO: 1 % (ref 0–1)
BUN SERPL-MCNC: 17 MG/DL (ref 5–25)
CALCIUM SERPL-MCNC: 9.1 MG/DL (ref 8.4–10.2)
CARDIAC TROPONIN I PNL SERPL HS: 3 NG/L
CARDIAC TROPONIN I PNL SERPL HS: 6 NG/L
CHLORIDE SERPL-SCNC: 103 MMOL/L (ref 96–108)
CO2 SERPL-SCNC: 24 MMOL/L (ref 21–32)
CREAT SERPL-MCNC: 1.18 MG/DL (ref 0.6–1.3)
EOSINOPHIL # BLD AUTO: 0.13 THOUSAND/ÂΜL (ref 0–0.61)
EOSINOPHIL NFR BLD AUTO: 2 % (ref 0–6)
ERYTHROCYTE [DISTWIDTH] IN BLOOD BY AUTOMATED COUNT: 13.8 % (ref 11.6–15.1)
GFR SERPL CREATININE-BSD FRML MDRD: 50 ML/MIN/1.73SQ M
GLUCOSE SERPL-MCNC: 110 MG/DL (ref 65–140)
HCT VFR BLD AUTO: 44.4 % (ref 34.8–46.1)
HGB BLD-MCNC: 14.1 G/DL (ref 11.5–15.4)
IMM GRANULOCYTES # BLD AUTO: 0.02 THOUSAND/UL (ref 0–0.2)
IMM GRANULOCYTES NFR BLD AUTO: 0 % (ref 0–2)
LYMPHOCYTES # BLD AUTO: 1.4 THOUSANDS/ÂΜL (ref 0.6–4.47)
LYMPHOCYTES NFR BLD AUTO: 18 % (ref 14–44)
MCH RBC QN AUTO: 30.7 PG (ref 26.8–34.3)
MCHC RBC AUTO-ENTMCNC: 31.8 G/DL (ref 31.4–37.4)
MCV RBC AUTO: 97 FL (ref 82–98)
MONOCYTES # BLD AUTO: 0.69 THOUSAND/ÂΜL (ref 0.17–1.22)
MONOCYTES NFR BLD AUTO: 9 % (ref 4–12)
NEUTROPHILS # BLD AUTO: 5.35 THOUSANDS/ÂΜL (ref 1.85–7.62)
NEUTS SEG NFR BLD AUTO: 70 % (ref 43–75)
NRBC BLD AUTO-RTO: 0 /100 WBCS
P AXIS: 61 DEGREES
P AXIS: 62 DEGREES
PLATELET # BLD AUTO: 357 THOUSANDS/UL (ref 149–390)
PMV BLD AUTO: 10.2 FL (ref 8.9–12.7)
POTASSIUM SERPL-SCNC: 3.5 MMOL/L (ref 3.5–5.3)
PR INTERVAL: 162 MS
PR INTERVAL: 162 MS
QRS AXIS: 47 DEGREES
QRS AXIS: 55 DEGREES
QRSD INTERVAL: 84 MS
QRSD INTERVAL: 88 MS
QT INTERVAL: 398 MS
QT INTERVAL: 410 MS
QTC INTERVAL: 403 MS
QTC INTERVAL: 442 MS
RBC # BLD AUTO: 4.59 MILLION/UL (ref 3.81–5.12)
SODIUM SERPL-SCNC: 136 MMOL/L (ref 135–147)
T WAVE AXIS: 52 DEGREES
T WAVE AXIS: 54 DEGREES
TSH SERPL DL<=0.05 MIU/L-ACNC: 1.78 UIU/ML (ref 0.45–4.5)
VENTRICULAR RATE: 62 BPM
VENTRICULAR RATE: 70 BPM
WBC # BLD AUTO: 7.67 THOUSAND/UL (ref 4.31–10.16)

## 2023-03-25 RX ORDER — SODIUM CHLORIDE 9 MG/ML
3 INJECTION INTRAVENOUS
Status: DISCONTINUED | OUTPATIENT
Start: 2023-03-25 | End: 2023-03-25 | Stop reason: HOSPADM

## 2023-03-25 NOTE — ED PROVIDER NOTES
History  Chief Complaint   Patient presents with   • Syncope     Pt reports she felt dizzy in the shower and now weak with chest pain      HPI this a 77-year-old female who presents to the emergency department accompanied by her friends for concerns of near syncopal episode  Patient states she was in the shower when she started to experience dizziness  Patient states she was able to call for her friend who was able to catch her before she fell and gently lowered her down to the ground  She denies any head strikes  Currently she is reporting substernal chest pain shortness of breath  She does endorse that she is on anticoagulation secondary to pulmonary embolism  Prior to Admission Medications   Prescriptions Last Dose Informant Patient Reported? Taking?    Diclofenac Sodium (VOLTAREN) 1 %   No No   Sig: Apply 4 g topically 4 (four) times a day   albuterol (2 5 mg/3 mL) 0 083 % nebulizer solution   No No   Sig: Take 3 mL (2 5 mg total) by nebulization as needed for wheezing   albuterol (PROVENTIL HFA,VENTOLIN HFA) 90 mcg/act inhaler   No No   Sig: Inhale 2 puffs every 4 (four) hours as needed for wheezing   atorvastatin (LIPITOR) 10 mg tablet   No No   Sig: Take 1 tablet (10 mg total) by mouth daily   budesonide-formoterol (Symbicort) 160-4 5 mcg/act inhaler   No No   Sig: Inhale 2 puffs 2 (two) times a day Rinse mouth after use   famotidine (PEPCID) 40 MG tablet   No No   Sig: Take 1 tablet (40 mg total) by mouth daily at bedtime   fluticasone (FLONASE) 50 mcg/act nasal spray   No No   Si sprays into each nostril daily   furosemide (LASIX) 20 mg tablet   No No   Sig: Take 1 tablet (20 mg total) by mouth daily as needed (leg swelling) Take with potassium   hydrocortisone 0 5 % cream   No No   Sig: Apply topically 2 (two) times a day   levothyroxine 125 mcg tablet   No No   Sig: Take 1 tablet (125 mcg total) by mouth daily   loratadine (CLARITIN) 10 mg tablet   No No   Sig: Take 1 tablet (10 mg total) by mouth daily   montelukast (SINGULAIR) 10 mg tablet   No No   Sig: Take 1 tablet (10 mg total) by mouth daily at bedtime   omeprazole (PriLOSEC) 40 MG capsule   No No   Sig: Take 1 capsule (40 mg total) by mouth daily before breakfast   potassium chloride (MICRO-K) 10 MEQ CR capsule   No No   Sig: Take 1 capsule (10 mEq total) by mouth daily   predniSONE 10 mg tablet   No No   Sig: Take 4 tablets (40 mg total) by mouth daily   warfarin (COUMADIN) 5 mg tablet   No No   Sig: Take as directed by physician      Facility-Administered Medications: None       Past Medical History:   Diagnosis Date   • Abdominal pain, lower     [id number] RESOLVED   • Acute renal failure ([facility] [address] )     [id number] RESOLVED   • Allergic rhinitis     [id number] RESOLVED   • Ankle pain, right     [id number] RESOLVED   • Arthritis     23OCT2017 RESOLVED  [id number] RESOLVED   • Asthma    • Bilateral chronic knee pain     [id number]   • Bladder pain     30JUN2017 RESOLVED   • Blood clot in vein    • Bursitis     [id number] RESOLVED   • Cardiac disorder     23OCT2017  RESOLVED   • Cardiac murmur     23OCT2017 RESOLVED   • Chest tightness or pressure     23OCT2017 RESOLVED   • COPD (chronic obstructive pulmonary disease) (HCC)    • Coronary artery disease    • Curvature of spine     [id number] RESOLVED   • GERD (gastroesophageal reflux disease)    • Hernia, hiatal     [id number] RESOLVED   • Hyperlipidemia     23OCT2017 RESOLVED   • Hypertension    • Inguinal hernia     RIGHT   01AUG2017 RESOLVED   • Jaw closure abnormality     [id number] RESOLVED   • Lumbar herniated disc     51BAU8459 RESOLVED   • Morbid obesity ([facility] [address] )     [id number]   • Obesity    • Osteoarthritis of right knee     53XTU6941 RESOLVED   • Patellar tendinitis     01AUG2017   • Poor flexibility of tendon     87QSK2678   • Presence of IVC filter 10/12/2018   • Pulmonary embolism ([facility] [address] )     68UWU1367 RESOLVED   • Sepsis ([facility] [address] )     [id number] RESOLVED   • Severe sepsis due to methicillin resistant Staphylococcus aureus (MRSA) with acute organ dysfunction ([facility] [address] )     78VZQ0757 RESOLVED   • Sleep apnea, obstructive    • Spider vein, symptomatic     59JHB5217 RESOLVED   • Status post arthroscopy of right knee     [id number] RESOLVED   • Stomach disorder     77UMT0952 RESOLVED   • Tear of medial meniscus of right knee     SUBSEQUENT ENCOUNTER  RESOLVED [id number]   • Thyroid disorder     15BUK8396   • Umbilical hernia     32EDI7538 RESOLVED       Past Surgical History:   Procedure Laterality Date   • COLONOSCOPY     • CYSTOSCOPY  01/1994    WITH BIOPSY     • EXPLORATORY LAPAROTOMY     • FOOT SURGERY Left    • FRACTURE SURGERY     • HAND SURGERY Right     cyst   • HYSTERECTOMY  2009   • INCISION AND DRAINAGE ANTERIOR NECK Right 6/8/2017    Procedure: INCISION AND DRAINAGE  (I&D) NECK;  Surgeon: Arnulfo Bennett MD;  Location:  MAIN OR;  Service:    • INCISIONAL HERNIA REPAIR      WITH IMPLANTATION OF MESH  13 MAY 2014  LAST ASSESSED   • IR IVC FILTER REMOVAL  1/22/2019   • OPEN ANTERIOR SHOULDER RECONSTRUCTION Left    • OTHER SURGICAL HISTORY      BLOOD VESSEL REPAIR   13 MAY 2014 LAST ASSESSED   • PERIPHERALLY INSERTED CENTRAL CATHETER INSERTION     • VT ARTHRS KNE SURG W/MENISCECTOMY MED/LAT W/SHVG Right 4/11/2016    Procedure: KNEE ARTHROSCOPY WITH MEDIAL MENISCECTOMY ;  Surgeon: Zaid Mcknight MD;  Location: [facility];  Service: Orthopedics   • SHOULDER SURGERY         Family History   Problem Relation Age of Onset   • Arthritis Mother    • Colon cancer Brother    • Lung cancer Father    • No Known Problems Sister    • No Known Problems Daughter    • No Known Problems Maternal Grandmother    • No Known Problems Maternal Grandfather    • No Known Problems Paternal Grandmother    • No Known Problems Paternal Grandfather    • Breast cancer Maternal Aunt    • Diabetes Maternal Aunt    • No Known Problems Sister    • No Known Problems Sister    • No Known Problems Sister    • No Known Problems Sister    • No Known Problems Sister    • No Known Problems Daughter    • Colon cancer Son      I have reviewed and agree with the history as documented  E-Cigarette/Vaping   • E-Cigarette Use Never User      E-Cigarette/Vaping Substances   • Nicotine No    • THC No    • CBD No    • Flavoring No    • Other No    • Unknown No      Social History     Tobacco Use   • Smoking status: Former     Types: Cigarettes     Quit date: 1995     Years since quittin 3   • Smokeless tobacco: Never   • Tobacco comments:     quit 25 years ago   Vaping Use   • Vaping Use: Never used   Substance Use Topics   • Alcohol use: Never   • Drug use: Never       Review of Systems   Constitutional: Negative for activity change, appetite change, chills, fatigue and fever  HENT: Negative for congestion, dental problem, drooling, ear discharge, ear pain, facial swelling, postnasal drip, rhinorrhea and sinus pain  Eyes: Negative for photophobia, pain, discharge and itching  Respiratory: Positive for chest tightness and shortness of breath  Negative for apnea and cough  Cardiovascular: Negative for chest pain and leg swelling  Gastrointestinal: Negative for abdominal distention, abdominal pain, anal bleeding, constipation, diarrhea and nausea  Endocrine: Negative for cold intolerance, heat intolerance and polydipsia  Genitourinary: Negative for difficulty urinating  Musculoskeletal: Negative for arthralgias, gait problem, joint swelling and myalgias  Skin: Negative for color change and pallor  Allergic/Immunologic: Negative for immunocompromised state  Neurological: Positive for dizziness  Negative for seizures, facial asymmetry, weakness, light-headedness, numbness and headaches  Psychiatric/Behavioral: Negative for agitation, behavioral problems, confusion, decreased concentration and dysphoric mood  All other systems reviewed and are negative  Physical Exam  Physical Exam  Vitals and nursing note reviewed  Constitutional:       General: She is not in acute distress  Appearance: She is well-developed  She is obese  She is not ill-appearing or diaphoretic  HENT:      Head: Normocephalic and atraumatic  Eyes:      Conjunctiva/sclera: Conjunctivae normal       Pupils: Pupils are equal, round, and reactive to light  Cardiovascular:      Rate and Rhythm: Normal rate and regular rhythm  Heart sounds: Normal heart sounds  No murmur heard  No friction rub  Pulmonary:      Effort: Pulmonary effort is normal       Breath sounds: Normal breath sounds  Abdominal:      General: Bowel sounds are normal       Palpations: Abdomen is soft  Musculoskeletal:         General: Normal range of motion  Cervical back: Normal range of motion and neck supple  Skin:     General: Skin is warm  Capillary Refill: Capillary refill takes less than 2 seconds  Neurological:      Mental Status: She is alert and oriented to person, place, and time  Motor: No abnormal muscle tone  Coordination: Coordination normal       Comments: Cranial nerves II through XII intact  Psychiatric:         Behavior: Behavior normal          Thought Content:  Thought content normal          Vital Signs  ED Triage Vitals   Temperature Pulse Respirations Blood Pressure SpO2   03/25/23 1400 03/25/23 1400 03/25/23 1400 03/25/23 1400 03/25/23 1400   98 8 °F (37 1 °C) 65 20 128/64 97 %      Temp Source Heart Rate Source Patient Position - Orthostatic VS BP Location FiO2 (%)   03/25/23 1400 03/25/23 1430 -- 03/25/23 1400 --   Temporal Monitor  Right arm       Pain Score       --                  Vitals:    03/25/23 1400 03/25/23 1430   BP: 128/64 128/79   Pulse: 65 64         Visual Acuity  Visual Acuity    Flowsheet Row Most Recent Value   L Pupil Size (mm) 3   R Pupil Size (mm) 3          ED Medications  Medications   sodium chloride (PF) 0 9 % injection 3 mL (has no administration in time range)       Diagnostic Studies  Results Reviewed     Procedure Component Value Units Date/Time    HS Troponin I 2hr [[id number]]  (Normal) Collected: 03/25/23 1526    Lab Status: Final result Specimen: Blood from Arm, Left Updated: 03/25/23 1603     hs TnI 2hr 6 ng/L      Delta 2hr hsTnI 3 ng/L     HS Troponin I 4hr [[id number]]     Lab Status: No result Specimen: Blood     TSH [[id number]]  (Normal) Collected: 03/25/23 1340    Lab Status: Final result Specimen: Blood from Arm, Right Updated: 03/25/23 1417     TSH 3RD GENERATON 1 785 uIU/mL     HS Troponin 0hr (reflex protocol) [[id number]]  (Normal) Collected: 03/25/23 1340    Lab Status: Final result Specimen: Blood from Arm, Right Updated: 03/25/23 1409     hs TnI 0hr 3 ng/L     Basic metabolic panel [[id number]] Collected: 03/25/23 1340    Lab Status: Final result Specimen: Blood from Arm, Right Updated: 03/25/23 1402     Sodium 136 mmol/L      Potassium 3 5 mmol/L      Chloride 103 mmol/L      CO2 24 mmol/L      ANION GAP 9 mmol/L      BUN 17 mg/dL      Creatinine 1 18 mg/dL      Glucose 110 mg/dL      Calcium 9 1 mg/dL      eGFR 50 ml/min/1 73sq m     Narrative:      [place] guidelines for Chronic Kidney Disease (CKD):   •  Stage 1 with normal or high GFR (GFR > 90 mL/min/1 73 square meters)  •  Stage 2 Mild CKD (GFR = 60-89 mL/min/1 73 square meters)  •  Stage 3A Moderate CKD (GFR = 45-59 mL/min/1 73 square meters)  •  Stage 3B Moderate CKD (GFR = 30-44 mL/min/1 73 square meters)  •  Stage 4 Severe CKD (GFR = 15-29 mL/min/1 73 square meters)  •  Stage 5 End Stage CKD (GFR <15 mL/min/1 73 square meters)  Note: GFR calculation is accurate only with a steady state creatinine    CBC and differential [[id number]] Collected: 03/25/23 1340    Lab Status: Final result Specimen: Blood from Arm, Right Updated: 03/25/23 1347     WBC 7 67 Thousand/uL      RBC 4 59 Million/uL      Hemoglobin 14 1 g/dL      Hematocrit 44 4 %      MCV 97 fL      MCH 30 7 pg      MCHC 31 8 g/dL      RDW 13 8 % MPV 10 2 fL      Platelets 230 Thousands/uL      nRBC 0 /100 WBCs      Neutrophils Relative 70 %      Immat GRANS % 0 %      Lymphocytes Relative 18 %      Monocytes Relative 9 %      Eosinophils Relative 2 %      Basophils Relative 1 %      Neutrophils Absolute 5 35 Thousands/µL      Immature Grans Absolute 0 02 Thousand/uL      Lymphocytes Absolute 1 40 Thousands/µL      Monocytes Absolute 0 69 Thousand/µL      Eosinophils Absolute 0 13 Thousand/µL      Basophils Absolute 0 08 Thousands/µL                  X-ray chest 1 view portable   Final Result by Abram Thompson MD (03/25 1346)      No acute cardiopulmonary disease  Workstation performed: [id number]                    Procedures  ECG 12 Lead Documentation Only    Date/Time: 3/25/2023 1:24 PM  Performed by: Titus Hurley MD  Authorized by: Titus Hurley MD     Indications / Diagnosis:  Syncope  ECG reviewed by me, the ED Provider: yes    Patient location:  ED  Previous ECG:     Previous ECG:  Unavailable  Interpretation:     Interpretation: normal    Rate:     ECG rate:  70    ECG rate assessment: normal    Rhythm:     Rhythm: sinus rhythm    Ectopy:     Ectopy: none    QRS:     QRS axis:  Normal  Conduction:     Conduction: normal    ST segments:     ST segments:  Normal  T waves:     T waves: normal               ED Course  ED Course as of 03/25/23 1607   Sat Mar 25, 2023   1349 Chest x-ray as interpreted by myself shows no acute intrathoracic pathology     1411 hs TnI 0hr: 3   1419 TSH 3RD GENERATON: 1 785   1419 Red Blood Cell Count: 4 59             HEART Risk Score    Flowsheet Row Most Recent Value   Heart Score Risk Calculator    History 0 Filed at: 03/25/2023 1411   ECG 1 Filed at: 03/25/2023 1411   Age 1 Filed at: 03/25/2023 1411   Risk Factors 1 Filed at: 03/25/2023 1411   Troponin 0 Filed at: 03/25/2023 1411   HEART Score 3 Filed at: 03/25/2023 1411                                      Medical Decision Making  59-year-old "female presents to the emergency department with complaints of near syncope followed by substernal chest pain  Patient has multiple underlying comorbidities we will proceed with ACS work-up at this time  No indication to repeat PE study as patient currently anticoagulated, is not tachypneic, tachycardic or hypoxic  She does report ongoing \"dizziness\" which has been present for several months and is being worked up in the outpatient setting  Recent MRI of the head reveals findings that might indicate a idiopathic intracranial hypertension  Nonetheless we will proceed with the ACS/near syncopal episode  If no improvement in symptoms consider consultation to neurology regarding the IIH  Amount and/or Complexity of Data Reviewed  Labs: ordered  Radiology: ordered  Risk  Prescription drug management  Disposition  Final diagnoses:   Syncope     Time reflects when diagnosis was documented in both MDM as applicable and the Disposition within this note     Time User Action Codes Description Comment    3/25/2023  4:05 PM Matteo Garber Add [R55] Syncope       ED Disposition     ED Disposition   Discharge    Condition   Stable    Date/Time   Sat Mar 25, 2023  4:05 PM    Yrn Dietz Adjutant discharge to home/self care  Follow-up Information    None         Patient's Medications   Discharge Prescriptions    No medications on file       No discharge procedures on file      PDMP Review     None          ED Provider  Electronically Signed by           Nishi Reyes MD  03/25/23 6666    "

## 2023-03-27 LAB
ATRIAL RATE: 62 BPM
ATRIAL RATE: 70 BPM
P AXIS: 61 DEGREES
P AXIS: 62 DEGREES
PR INTERVAL: 162 MS
PR INTERVAL: 162 MS
QRS AXIS: 47 DEGREES
QRS AXIS: 55 DEGREES
QRSD INTERVAL: 84 MS
QRSD INTERVAL: 88 MS
QT INTERVAL: 398 MS
QT INTERVAL: 410 MS
QTC INTERVAL: 403 MS
QTC INTERVAL: 442 MS
T WAVE AXIS: 52 DEGREES
T WAVE AXIS: 54 DEGREES
VENTRICULAR RATE: 62 BPM
VENTRICULAR RATE: 70 BPM

## 2023-04-28 DIAGNOSIS — E78.5 DYSLIPIDEMIA: ICD-10-CM

## 2023-04-28 RX ORDER — ATORVASTATIN CALCIUM 10 MG/1
10 TABLET, FILM COATED ORAL DAILY
Qty: 90 TABLET | Refills: 3 | Status: SHIPPED | OUTPATIENT
Start: 2023-04-28

## 2023-05-09 ENCOUNTER — APPOINTMENT (OUTPATIENT)
Dept: LAB | Facility: MEDICAL CENTER | Age: 58
End: 2023-05-09

## 2023-05-09 ENCOUNTER — ANTICOAG VISIT (OUTPATIENT)
Dept: FAMILY MEDICINE CLINIC | Facility: CLINIC | Age: 58
End: 2023-05-09

## 2023-05-09 ENCOUNTER — OFFICE VISIT (OUTPATIENT)
Dept: FAMILY MEDICINE CLINIC | Facility: CLINIC | Age: 58
End: 2023-05-09

## 2023-05-09 VITALS
SYSTOLIC BLOOD PRESSURE: 126 MMHG | WEIGHT: 277.8 LBS | OXYGEN SATURATION: 97 % | DIASTOLIC BLOOD PRESSURE: 78 MMHG | TEMPERATURE: 97.7 F | HEIGHT: 65 IN | HEART RATE: 91 BPM | BODY MASS INDEX: 46.28 KG/M2

## 2023-05-09 DIAGNOSIS — R42 LIGHTHEADED: Primary | ICD-10-CM

## 2023-05-09 DIAGNOSIS — K21.9 GASTROESOPHAGEAL REFLUX DISEASE WITHOUT ESOPHAGITIS: Chronic | ICD-10-CM

## 2023-05-09 DIAGNOSIS — J45.40 MODERATE PERSISTENT ASTHMA WITHOUT COMPLICATION: ICD-10-CM

## 2023-05-09 RX ORDER — OMEPRAZOLE 40 MG/1
40 CAPSULE, DELAYED RELEASE ORAL
Qty: 90 CAPSULE | Refills: 3 | Status: SHIPPED | OUTPATIENT
Start: 2023-05-09

## 2023-05-09 NOTE — PROGRESS NOTES
Name: Jennifer Fernandez      : 1965      MRN: [id number]  Encounter Provider: Jesi Smith DO  Encounter Date: 2023   Encounter department: [address] [place] [address]   Patient will be following up with neurology tomorrow  I encouraged her not to miss that appointment  She will get her INR done today  I refilled her omeprazole for her GERD  I will see her back in 3 months or as needed  1  Lightheaded    2  Gastroesophageal reflux disease without esophagitis  -     omeprazole (PriLOSEC) 40 MG capsule; Take 1 capsule (40 mg total) by mouth daily before breakfast    3  Moderate persistent asthma without complication         Subjective     Patient here today for follow-up  Patient was seen in the emergency room in March for syncope  MRI of the head showed possible intracranial hypertension  She is following up with neurology tomorrow  She states mostly in the morning she feels lightheaded  No nausea or vomiting  No chest pain or shortness of breath  She is due to get her INR done  Needs a refill on her omeprazole to help with her GERD  Patient is asthma has been under good control  Has not needed rescue inhaler  Review of Systems   Constitutional: Negative  Respiratory: Negative  Cardiovascular: Negative  Gastrointestinal: Negative  Genitourinary: Negative  Neurological: Positive for light-headedness         Past Medical History:   Diagnosis Date   • Abdominal pain, lower     [id number] RESOLVED   • Acute renal failure ([facility] [address] )     [id number] RESOLVED   • Allergic rhinitis     63VJU4036 RESOLVED   • Ankle pain, right     76PXL2871 RESOLVED   • Arthritis     2017 RESOLVED  [id number] RESOLVED   • Asthma    • Bilateral chronic knee pain     81JFP7266   • Bladder pain     [id number] RESOLVED   • Blood clot in vein    • Bursitis     41EMB2491 RESOLVED   • Cardiac disorder     2017  RESOLVED   • Cardiac murmur     2017 RESOLVED   • Chest tightness or pressure     23OCT2017 RESOLVED   • COPD (chronic obstructive pulmonary disease) (HCC)    • Coronary artery disease    • Curvature of spine     30YGE9440 RESOLVED   • GERD (gastroesophageal reflux disease)    • Hernia, hiatal     76BSU4180 RESOLVED   • Hyperlipidemia     23OCT2017 RESOLVED   • Hypertension    • Inguinal hernia     RIGHT   01AUG2017 RESOLVED   • Jaw closure abnormality     81VTH7756 RESOLVED   • Lumbar herniated disc     31MPO0764 RESOLVED   • Morbid obesity ([facility] [address] )     52CSB9304   • Obesity    • Osteoarthritis of right knee     61QQE1993 RESOLVED   • Patellar tendinitis     01AUG2017   • Poor flexibility of tendon     01AUG2017   • Presence of IVC filter 10/12/2018   • Pulmonary embolism ([facility] [address] )     [id number] RESOLVED   • Sepsis [facility] )     35ICF8011 RESOLVED   • Severe sepsis due to methicillin resistant Staphylococcus aureus (MRSA) with acute organ dysfunction ([facility] )     [id number] RESOLVED   • Sleep apnea, obstructive    • Spider vein, symptomatic     84WUJ5904 RESOLVED   • Status post arthroscopy of right knee     15RSK6016 RESOLVED   • Stomach disorder     01AUG2017 RESOLVED   • Tear of medial meniscus of right knee     SUBSEQUENT ENCOUNTER  RESOLVED [id number]   • Thyroid disorder     92YHR0994   • Umbilical hernia     49TQG7041 RESOLVED     Past Surgical History:   Procedure Laterality Date   • COLONOSCOPY     • CYSTOSCOPY  01/1994    WITH BIOPSY     • EXPLORATORY LAPAROTOMY     • FOOT SURGERY Left    • FRACTURE SURGERY     • HAND SURGERY Right     cyst   • HYSTERECTOMY  2009   • INCISION AND DRAINAGE ANTERIOR NECK Right 6/8/2017    Procedure: INCISION AND DRAINAGE  (I&D) NECK;  Surgeon: Nicki Contreras MD;  [facility]: [facility];  Service:    • INCISIONAL HERNIA REPAIR      WITH IMPLANTATION OF MESH  13 MAY 2014  LAST ASSESSED   • IR IVC FILTER REMOVAL  1/22/2019   • OPEN ANTERIOR SHOULDER RECONSTRUCTION Left    • OTHER SURGICAL HISTORY      BLOOD VESSEL REPAIR   13 MAY 2014 LAST ASSESSED   • PERIPHERALLY INSERTED CENTRAL CATHETER INSERTION     • NC ARTHRS KNE SURG W/MENISCECTOMY MED/LAT W/SHVG Right 2016    Procedure: KNEE ARTHROSCOPY WITH MEDIAL MENISCECTOMY ;  Surgeon: Zeina Metzger MD;  Location: [facility];  Service: Orthopedics   • SHOULDER SURGERY       Family History   Problem Relation Age of Onset   • Arthritis Mother    • Colon cancer Brother    • Lung cancer Father    • No Known Problems Sister    • No Known Problems Daughter    • No Known Problems Maternal Grandmother    • No Known Problems Maternal Grandfather    • No Known Problems Paternal Grandmother    • No Known Problems Paternal Grandfather    • Breast cancer Maternal Aunt    • Diabetes Maternal Aunt    • No Known Problems Sister    • No Known Problems Sister    • No Known Problems Sister    • No Known Problems Sister    • No Known Problems Sister    • No Known Problems Daughter    • Colon cancer Son      Social History     Socioeconomic History   • Marital status: Single     Spouse name: None   • Number of children: None   • Years of education: None   • Highest education level: None   Occupational History   • Occupation: unemployed   Tobacco Use   • Smoking status: Former     Types: Cigarettes     Quit date: 1995     Years since quittin 4   • Smokeless tobacco: Never   • Tobacco comments:     quit 25 years ago   Vaping Use   • Vaping Use: Never used   Substance and Sexual Activity   • Alcohol use: Never   • Drug use: Never   • Sexual activity: Never   Other Topics Concern   • None   Social History Narrative    ALWAYS USES SEAT BELT         Social Determinants of Health     Financial Resource Strain: Not on file   Food Insecurity: Not on file   Transportation Needs: Not on file   Physical Activity: Not on file   Stress: Not on file   Social Connections: Not on file   Intimate Partner Violence: Not on file   Housing Stability: Not on file     Current Outpatient Medications on File Prior to Visit   Medication Sig • albuterol (2 5 mg/3 mL) 0 083 % nebulizer solution Take 3 mL (2 5 mg total) by nebulization as needed for wheezing   • albuterol (PROVENTIL HFA,VENTOLIN HFA) 90 mcg/act inhaler Inhale 2 puffs every 4 (four) hours as needed for wheezing   • atorvastatin (LIPITOR) 10 mg tablet Take 1 tablet (10 mg total) by mouth daily   • budesonide-formoterol (Symbicort) 160-4 5 mcg/act inhaler Inhale 2 puffs 2 (two) times a day Rinse mouth after use   • Diclofenac Sodium (VOLTAREN) 1 % Apply 4 g topically 4 (four) times a day   • famotidine (PEPCID) 40 MG tablet Take 1 tablet (40 mg total) by mouth daily at bedtime   • fluticasone (FLONASE) 50 mcg/act nasal spray 2 sprays into each nostril daily   • furosemide (LASIX) 20 mg tablet Take 1 tablet (20 mg total) by mouth daily as needed (leg swelling) Take with potassium   • hydrocortisone 0 5 % cream Apply topically 2 (two) times a day   • levothyroxine 125 mcg tablet TAKE ONE TABLET BY MOUTH DAILY AT 8AM (vial)   • loratadine (CLARITIN) 10 mg tablet Take 1 tablet (10 mg total) by mouth daily   • montelukast (SINGULAIR) 10 mg tablet Take 1 tablet (10 mg total) by mouth daily at bedtime   • potassium chloride (MICRO-K) 10 MEQ CR capsule Take 1 capsule (10 mEq total) by mouth daily   • warfarin (COUMADIN) 5 mg tablet TAKE ONE AND ONE-HALF TABLETS BY MOUTH ON TUESDAYS,THURSDAYS, SATURDAYS AND SUNDAYS   TAKE TWO TABLETS ON MONDAYS, WEDNESDAYS AND FRIDAYS (VIAL)   • [DISCONTINUED] omeprazole (PriLOSEC) 40 MG capsule Take 1 capsule (40 mg total) by mouth daily before breakfast   • predniSONE 10 mg tablet Take 4 tablets (40 mg total) by mouth daily (Patient not taking: Reported on 5/9/2023)     Allergies   Allergen Reactions   • Clindamycin Angioedema   • Medical Tape Itching     Welt and redness   • Penicillin G Nausea Only   • Penicillins GI Intolerance     Nausea and vomiting     Immunization History   Administered Date(s) Administered   • INFLUENZA 09/29/2014, 09/28/2015, 11/27/2016, "11/30/2016, 05/29/2017, 10/07/2022   • Influenza Quadrivalent Preservative Free 3 years and older IM 11/30/2016   • Influenza Quadrivalent, 6-35 Months IM 08/29/2017   • Influenza, Quadrivalent (nasal) 11/27/2016   • Influenza, recombinant, quadrivalent,injectable, preservative free 10/12/2018, 09/11/2019, 09/15/2020, 09/30/2021, 10/07/2022   • Influenza, seasonal, injectable 09/29/2014   • Pneumococcal Polysaccharide PPV23 08/29/2017       Objective     /78   Pulse 91   Temp 97 7 °F (36 5 °C)   Ht 5' 5\" (1 651 m)   Wt 126 kg (277 lb 12 8 oz)   LMP  (LMP Unknown)   SpO2 97%   BMI 46 23 kg/m²     Physical Exam  Vitals reviewed  Constitutional:       General: She is not in acute distress  Appearance: Normal appearance  She is well-developed  She is not ill-appearing, toxic-appearing or diaphoretic  HENT:      Head: Normocephalic and atraumatic  Eyes:      Conjunctiva/sclera: Conjunctivae normal    Cardiovascular:      Rate and Rhythm: Normal rate and regular rhythm  Heart sounds: Normal heart sounds  No murmur heard  No friction rub  No gallop  Pulmonary:      Effort: Pulmonary effort is normal  No respiratory distress  Breath sounds: Normal breath sounds  No wheezing, rhonchi or rales  Musculoskeletal:      Right lower leg: No edema  Left lower leg: No edema  Neurological:      General: No focal deficit present  Mental Status: She is alert and oriented to person, place, and time  Psychiatric:         Mood and Affect: Mood normal          Behavior: Behavior normal          Thought Content:  Thought content normal          Judgment: Judgment normal        Parminder Po, DO  "

## 2023-05-24 ENCOUNTER — APPOINTMENT (OUTPATIENT)
Dept: LAB | Facility: MEDICAL CENTER | Age: 58
End: 2023-05-24

## 2023-05-25 ENCOUNTER — ANTICOAG VISIT (OUTPATIENT)
Dept: FAMILY MEDICINE CLINIC | Facility: CLINIC | Age: 58
End: 2023-05-25

## 2023-06-01 ENCOUNTER — APPOINTMENT (OUTPATIENT)
Dept: LAB | Facility: MEDICAL CENTER | Age: 58
End: 2023-06-01
Payer: COMMERCIAL

## 2023-06-01 DIAGNOSIS — I26.99 PULMONARY EMBOLISM, UNSPECIFIED CHRONICITY, UNSPECIFIED PULMONARY EMBOLISM TYPE, UNSPECIFIED WHETHER ACUTE COR PULMONALE PRESENT (HCC): ICD-10-CM

## 2023-06-01 LAB
INR PPP: 2.01 (ref 0.84–1.19)
PROTHROMBIN TIME: 23.1 SECONDS (ref 11.6–14.5)

## 2023-06-01 PROCEDURE — 36415 COLL VENOUS BLD VENIPUNCTURE: CPT

## 2023-06-01 PROCEDURE — 85610 PROTHROMBIN TIME: CPT

## 2023-06-02 ENCOUNTER — ANTICOAG VISIT (OUTPATIENT)
Dept: FAMILY MEDICINE CLINIC | Facility: CLINIC | Age: 58
End: 2023-06-02

## 2023-06-02 DIAGNOSIS — K21.00 GASTROESOPHAGEAL REFLUX DISEASE WITH ESOPHAGITIS WITHOUT HEMORRHAGE: ICD-10-CM

## 2023-06-02 DIAGNOSIS — J30.1 SEASONAL ALLERGIC RHINITIS DUE TO POLLEN: ICD-10-CM

## 2023-06-02 RX ORDER — FAMOTIDINE 40 MG/1
TABLET, FILM COATED ORAL
Qty: 90 TABLET | Refills: 11 | Status: SHIPPED | OUTPATIENT
Start: 2023-06-02

## 2023-06-02 RX ORDER — FLUTICASONE PROPIONATE 50 MCG
SPRAY, SUSPENSION (ML) NASAL
Qty: 48 G | Refills: 11 | Status: SHIPPED | OUTPATIENT
Start: 2023-06-02

## 2023-06-16 ENCOUNTER — APPOINTMENT (OUTPATIENT)
Dept: LAB | Facility: MEDICAL CENTER | Age: 58
End: 2023-06-16
Payer: COMMERCIAL

## 2023-06-16 DIAGNOSIS — I26.99 PULMONARY EMBOLISM, UNSPECIFIED CHRONICITY, UNSPECIFIED PULMONARY EMBOLISM TYPE, UNSPECIFIED WHETHER ACUTE COR PULMONALE PRESENT (HCC): ICD-10-CM

## 2023-06-16 LAB
INR PPP: 1.7 (ref 0.84–1.19)
PROTHROMBIN TIME: 20.2 SECONDS (ref 11.6–14.5)

## 2023-06-16 PROCEDURE — 36415 COLL VENOUS BLD VENIPUNCTURE: CPT

## 2023-06-16 PROCEDURE — 85610 PROTHROMBIN TIME: CPT

## 2023-06-19 ENCOUNTER — ANTICOAG VISIT (OUTPATIENT)
Dept: FAMILY MEDICINE CLINIC | Facility: CLINIC | Age: 58
End: 2023-06-19

## 2023-07-06 ENCOUNTER — LAB (OUTPATIENT)
Dept: LAB | Facility: MEDICAL CENTER | Age: 58
End: 2023-07-06
Payer: COMMERCIAL

## 2023-07-06 DIAGNOSIS — I26.99 PULMONARY EMBOLISM, UNSPECIFIED CHRONICITY, UNSPECIFIED PULMONARY EMBOLISM TYPE, UNSPECIFIED WHETHER ACUTE COR PULMONALE PRESENT (HCC): ICD-10-CM

## 2023-07-06 LAB
INR PPP: 3 (ref 0.84–1.19)
PROTHROMBIN TIME: 31.4 SECONDS (ref 11.6–14.5)

## 2023-07-06 PROCEDURE — 85610 PROTHROMBIN TIME: CPT

## 2023-07-06 PROCEDURE — 36415 COLL VENOUS BLD VENIPUNCTURE: CPT

## 2023-07-07 ENCOUNTER — ANTICOAG VISIT (OUTPATIENT)
Dept: FAMILY MEDICINE CLINIC | Facility: CLINIC | Age: 58
End: 2023-07-07

## 2023-07-10 ENCOUNTER — ANTICOAG VISIT (OUTPATIENT)
Dept: FAMILY MEDICINE CLINIC | Facility: CLINIC | Age: 58
End: 2023-07-10

## 2023-07-10 DIAGNOSIS — I26.99 PULMONARY EMBOLISM, UNSPECIFIED CHRONICITY, UNSPECIFIED PULMONARY EMBOLISM TYPE, UNSPECIFIED WHETHER ACUTE COR PULMONALE PRESENT (HCC): Primary | ICD-10-CM

## 2023-07-10 LAB — INR PPP: 3 (ref 0.84–1.19)

## 2023-07-10 RX ORDER — WARFARIN SODIUM 1 MG/1
TABLET ORAL
Qty: 12 TABLET | Refills: 2 | Status: SHIPPED | OUTPATIENT
Start: 2023-07-10

## 2023-07-13 ENCOUNTER — VBI (OUTPATIENT)
Dept: ADMINISTRATIVE | Facility: OTHER | Age: 58
End: 2023-07-13

## 2023-07-24 ENCOUNTER — ANTICOAG VISIT (OUTPATIENT)
Dept: FAMILY MEDICINE CLINIC | Facility: CLINIC | Age: 58
End: 2023-07-24

## 2023-07-24 ENCOUNTER — APPOINTMENT (OUTPATIENT)
Dept: LAB | Facility: MEDICAL CENTER | Age: 58
End: 2023-07-24
Payer: COMMERCIAL

## 2023-07-24 DIAGNOSIS — I26.99 PULMONARY EMBOLISM, UNSPECIFIED CHRONICITY, UNSPECIFIED PULMONARY EMBOLISM TYPE, UNSPECIFIED WHETHER ACUTE COR PULMONALE PRESENT (HCC): ICD-10-CM

## 2023-07-24 LAB
INR PPP: 2.33 (ref 0.84–1.19)
PROTHROMBIN TIME: 25.9 SECONDS (ref 11.6–14.5)

## 2023-07-24 PROCEDURE — 85610 PROTHROMBIN TIME: CPT

## 2023-07-24 PROCEDURE — 36415 COLL VENOUS BLD VENIPUNCTURE: CPT

## 2023-07-24 RX ORDER — WARFARIN SODIUM 1 MG/1
TABLET ORAL
Qty: 36 TABLET | Refills: 2 | Status: SHIPPED | OUTPATIENT
Start: 2023-07-24

## 2023-07-24 NOTE — PROGRESS NOTES
Called Pt with message - She asking for a refill of her 1 mg Coumadin - Pt said that Rx never went through    Rx resubmitted

## 2023-08-07 ENCOUNTER — APPOINTMENT (OUTPATIENT)
Dept: LAB | Facility: MEDICAL CENTER | Age: 58
End: 2023-08-07
Payer: COMMERCIAL

## 2023-08-07 DIAGNOSIS — I26.99 PULMONARY EMBOLISM, UNSPECIFIED CHRONICITY, UNSPECIFIED PULMONARY EMBOLISM TYPE, UNSPECIFIED WHETHER ACUTE COR PULMONALE PRESENT (HCC): ICD-10-CM

## 2023-08-07 LAB
INR PPP: 2.98 (ref 0.84–1.19)
PROTHROMBIN TIME: 31.2 SECONDS (ref 11.6–14.5)

## 2023-08-07 PROCEDURE — 36415 COLL VENOUS BLD VENIPUNCTURE: CPT

## 2023-08-07 PROCEDURE — 85610 PROTHROMBIN TIME: CPT

## 2023-08-08 ENCOUNTER — ANTICOAG VISIT (OUTPATIENT)
Dept: FAMILY MEDICINE CLINIC | Facility: CLINIC | Age: 58
End: 2023-08-08

## 2023-08-14 ENCOUNTER — OFFICE VISIT (OUTPATIENT)
Dept: FAMILY MEDICINE CLINIC | Facility: CLINIC | Age: 58
End: 2023-08-14
Payer: COMMERCIAL

## 2023-08-14 VITALS
TEMPERATURE: 96.8 F | SYSTOLIC BLOOD PRESSURE: 140 MMHG | DIASTOLIC BLOOD PRESSURE: 98 MMHG | HEART RATE: 85 BPM | BODY MASS INDEX: 47.52 KG/M2 | HEIGHT: 65 IN | OXYGEN SATURATION: 97 % | WEIGHT: 285.2 LBS

## 2023-08-14 DIAGNOSIS — E03.8 OTHER SPECIFIED HYPOTHYROIDISM: Chronic | ICD-10-CM

## 2023-08-14 DIAGNOSIS — E66.01 MORBID OBESITY WITH BMI OF 45.0-49.9, ADULT (HCC): ICD-10-CM

## 2023-08-14 DIAGNOSIS — Z79.01 CHRONIC ANTICOAGULATION: ICD-10-CM

## 2023-08-14 DIAGNOSIS — J45.40 MODERATE PERSISTENT ASTHMA WITHOUT COMPLICATION: Primary | ICD-10-CM

## 2023-08-14 DIAGNOSIS — Z12.11 SCREENING FOR COLON CANCER: ICD-10-CM

## 2023-08-14 PROBLEM — G93.2 PSEUDOTUMOR CEREBRI: Status: ACTIVE | Noted: 2023-05-10

## 2023-08-14 PROCEDURE — 99214 OFFICE O/P EST MOD 30 MIN: CPT | Performed by: FAMILY MEDICINE

## 2023-08-14 NOTE — PROGRESS NOTES
Name: [facility]      : 1965      MRN: [id number]  Encounter Provider: Mile Gregg DO  Encounter Date: 2023   Encounter department: [address]   Continue to watch salt intake. Continue other medications as prescribed. She will get her INR done next week. Ear exam was normal.  She will call if they continue to bother her. Continue to take her allergy medications. Follow-up with specialist as scheduled. Follow-up here in 4 months or as needed  1. Moderate persistent asthma without complication    2. Screening for colon cancer  -     Cologuard    3. Other specified hypothyroidism    4. Chronic anticoagulation    5. Morbid obesity with BMI of 45.0-49.9, adult ([address] [facility])        Depression Screening and Follow-up Plan: Patient was screened for depression during today's encounter. They screened negative with a PHQ-2 score of 0. Subjective     Patient here today for follow-up. She denies any chest pain or shortness of breath. Patiently get some bilateral sharp ear pains that come and go. She has been taking her allergy meds. Patient did have a lot of salt yesterday. Had a new bag of chips. Review of Systems   Constitutional: Negative. Respiratory: Negative. Cardiovascular: Negative. Gastrointestinal: Negative. Genitourinary: Negative.         Past Medical History:   Diagnosis Date   • Abdominal pain, lower     [id number] RESOLVED   • Acute renal failure ([address] [facility])     52UPZ9888 RESOLVED   • Allergic rhinitis     86TTP4064 RESOLVED   • Ankle pain, right     62ECQ4902 RESOLVED   • Arthritis     2017 RESOLVED  [id number] RESOLVED   • Asthma    • Bilateral chronic knee pain     [id number]   • Bladder pain     52POG6892 RESOLVED   • Blood clot in vein    • Bursitis     64RMX4630 RESOLVED   • Cardiac disorder     2017  RESOLVED   • Cardiac murmur     2017 RESOLVED   • Chest tightness or pressure     2017 RESOLVED   • COPD (chronic obstructive pulmonary disease) ([address])    • Coronary artery disease    • Curvature of spine     [id number] RESOLVED   • GERD (gastroesophageal reflux disease)    • Hernia, hiatal     52PXJ9712 RESOLVED   • Hyperlipidemia     23OCT2017 RESOLVED   • Hypertension    • Inguinal hernia     RIGHT   79LCE5257 RESOLVED   • Jaw closure abnormality     45AZD1026 RESOLVED   • Lumbar herniated disc     32TBF6472 RESOLVED   • Morbid obesity ([address])     30EYB3972   • Obesity    • Osteoarthritis of right knee     01NEW3806 RESOLVED   • Patellar tendinitis     51XDC3591   • Poor flexibility of tendon     28KNQ2563   • Presence of IVC filter 10/12/2018   • Pulmonary embolism ([address])     [id number] RESOLVED   • Sepsis ([address])     02JZZ3842 RESOLVED   • Severe sepsis due to methicillin resistant Staphylococcus aureus (MRSA) with acute organ dysfunction ([address])     [id number] RESOLVED   • Sleep apnea, obstructive    • Spider vein, symptomatic     67KZF1997 RESOLVED   • Status post arthroscopy of right knee     04OSO3654 RESOLVED   • Stomach disorder     43HEE6963 RESOLVED   • Tear of medial meniscus of right knee     SUBSEQUENT ENCOUNTER  RESOLVED [id number]   • Thyroid disorder     38ZCB4173   • Umbilical hernia     32XGG0846 RESOLVED     Past Surgical History:   Procedure Laterality Date   • COLONOSCOPY     • CYSTOSCOPY  01/1994    WITH BIOPSY     • EXPLORATORY LAPAROTOMY     • FOOT SURGERY Left    • FRACTURE SURGERY     • HAND SURGERY Right     cyst   • HYSTERECTOMY  2009   • INCISION AND DRAINAGE ANTERIOR NECK Right 6/8/2017    Procedure: INCISION AND DRAINAGE  (I&D) NECK;  Surgeon: Haroon Patiño MD;  [facility]: [facility];  Service:    • INCISIONAL HERNIA REPAIR      WITH IMPLANTATION OF MESH  13 MAY 2014  LAST ASSESSED   • IR IVC FILTER REMOVAL  1/22/2019   • OPEN ANTERIOR SHOULDER RECONSTRUCTION Left    • OTHER SURGICAL HISTORY      BLOOD VESSEL REPAIR   13 MAY 2014 LAST ASSESSED   • PERIPHERALLY INSERTED CENTRAL CATHETER INSERTION     • SC ARTHRS KNE SURG W/MENISCECTOMY MED/LAT W/SHVG Right 2016    Procedure: KNEE ARTHROSCOPY WITH MEDIAL MENISCECTOMY ;  Surgeon: Aye Bueno MD;  Location: [facility];  Service: Orthopedics   • SHOULDER SURGERY       Family History   Problem Relation Age of Onset   • Arthritis Mother    • Colon cancer Brother    • Lung cancer Father    • No Known Problems Sister    • No Known Problems Daughter    • No Known Problems Maternal Grandmother    • No Known Problems Maternal Grandfather    • No Known Problems Paternal Grandmother    • No Known Problems Paternal Grandfather    • Breast cancer Maternal Aunt    • Diabetes Maternal Aunt    • No Known Problems Sister    • No Known Problems Sister    • No Known Problems Sister    • No Known Problems Sister    • No Known Problems Sister    • No Known Problems Daughter    • Colon cancer Son      Social History     Socioeconomic History   • Marital status: Single     Spouse name: None   • Number of children: None   • Years of education: None   • Highest education level: None   Occupational History   • Occupation: unemployed   Tobacco Use   • Smoking status: Former     Types: Cigarettes     Quit date: 1995     Years since quittin.7   • Smokeless tobacco: Never   • Tobacco comments:     quit 25 years ago   Vaping Use   • Vaping Use: Never used   Substance and Sexual Activity   • Alcohol use: Never   • Drug use: Never   • Sexual activity: Never   Other Topics Concern   • None   Social History Narrative    ALWAYS USES SEAT BELT         Social Determinants of Health     Financial Resource Strain: Not on file   Food Insecurity: Not on file   Transportation Needs: Not on file   Physical Activity: Not on file   Stress: Not on file   Social Connections: Not on file   Intimate Partner Violence: Not on file   Housing Stability: Not on file     Current Outpatient Medications on File Prior to Visit   Medication Sig   • albuterol (2.5 mg/3 mL) 0.083 % nebulizer solution Take 3 mL (2.5 mg total) by nebulization as needed for wheezing   • albuterol (PROVENTIL HFA,VENTOLIN HFA) 90 mcg/act inhaler Inhale 2 puffs every 4 (four) hours as needed for wheezing   • atorvastatin (LIPITOR) 10 mg tablet Take 1 tablet (10 mg total) by mouth daily   • budesonide-formoterol (Symbicort) 160-4.5 mcg/act inhaler Inhale 2 puffs 2 (two) times a day Rinse mouth after use   • Diclofenac Sodium (VOLTAREN) 1 % Apply 4 g topically 4 (four) times a day   • famotidine (PEPCID) 40 MG tablet TAKE ONE TABLET BY MOUTH DAILY AT 9PM AT BEDTIME   • fluticasone (FLONASE) 50 mcg/act nasal spray INSTILL 2 SPRAYS IN EACH NOSTRIL DAILY (BULK)   • furosemide (LASIX) 20 mg tablet Take 1 tablet (20 mg total) by mouth daily as needed (leg swelling) Take with potassium   • hydrocortisone 0.5 % cream Apply topically 2 (two) times a day   • levothyroxine 125 mcg tablet TAKE ONE TABLET BY MOUTH DAILY AT 8AM (vial)   • loratadine (CLARITIN) 10 mg tablet Take 1 tablet (10 mg total) by mouth daily   • montelukast (SINGULAIR) 10 mg tablet Take 1 tablet (10 mg total) by mouth daily at bedtime   • omeprazole (PriLOSEC) 40 MG capsule Take 1 capsule (40 mg total) by mouth daily before breakfast   • potassium chloride (MICRO-K) 10 MEQ CR capsule Take 1 capsule (10 mEq total) by mouth daily   • warfarin (Coumadin) 1 mg tablet To take 8 mg on Monday (5mg tab and three 1 mg tablets) and 10 mg t,w,t,f,s,s   • warfarin (COUMADIN) 5 mg tablet TAKE ONE AND ONE-HALF TABLETS BY MOUTH ON TUESDAYS,THURSDAYS, SATURDAYS AND SUNDAYS.  TAKE TWO TABLETS ON MONDAYS, WEDNESDAYS AND FRIDAYS (VIAL)   • [DISCONTINUED] predniSONE 10 mg tablet Take 4 tablets (40 mg total) by mouth daily (Patient not taking: Reported on 5/9/2023)     Allergies   Allergen Reactions   • Clindamycin Angioedema   • Medical Tape Itching     Welt and redness   • Penicillin G Nausea Only   • Penicillins GI Intolerance     Nausea and vomiting     Immunization History   Administered Date(s) Administered   • INFLUENZA 09/29/2014, 09/28/2015, 11/27/2016, 11/30/2016, 05/29/2017, 10/07/2022   • Influenza Quadrivalent Preservative Free 3 years and older IM 11/30/2016   • Influenza Quadrivalent, 6-35 Months IM 08/29/2017   • Influenza, Quadrivalent (nasal) 11/27/2016   • Influenza, recombinant, quadrivalent,injectable, preservative free 10/12/2018, 09/11/2019, 09/15/2020, 09/30/2021, 10/07/2022   • Influenza, seasonal, injectable 09/29/2014   • Pneumococcal Polysaccharide PPV23 08/29/2017       Objective     /98   Pulse 85   Temp (!) 96.8 °F (36 °C)   Ht 5' 5" (1.651 m)   Wt 129 kg (285 lb 3.2 oz)   LMP  (LMP Unknown)   SpO2 97%   BMI 47.46 kg/m²     Physical Exam  Vitals reviewed. Constitutional:       General: She is not in acute distress. Appearance: Normal appearance. She is well-developed. She is not ill-appearing, toxic-appearing or diaphoretic. HENT:      Head: Normocephalic and atraumatic. Right Ear: Tympanic membrane, ear canal and external ear normal.      Left Ear: Tympanic membrane, ear canal and external ear normal.   Eyes:      Conjunctiva/sclera: Conjunctivae normal.   Cardiovascular:      Rate and Rhythm: Normal rate and regular rhythm. Heart sounds: Normal heart sounds. No murmur heard. No friction rub. No gallop. Pulmonary:      Effort: Pulmonary effort is normal. No respiratory distress. Breath sounds: Normal breath sounds. No wheezing, rhonchi or rales. Musculoskeletal:      Right lower leg: No edema. Left lower leg: No edema. Neurological:      General: No focal deficit present. Mental Status: She is alert and oriented to person, place, and time. Psychiatric:         Mood and Affect: Mood normal.         Behavior: Behavior normal.         Thought Content: Thought content normal.         Judgment: Judgment normal.       Jessica Mcwilliams DO  BMI Counseling: Body mass index is 47.46 kg/m².  The BMI is above normal. Nutrition recommendations include reducing portion sizes, decreasing overall calorie intake, 3-5 servings of fruits/vegetables daily, reducing fast food intake, consuming healthier snacks, decreasing soda and/or juice intake, moderation in carbohydrate intake, increasing intake of lean protein, reducing intake of saturated fat and trans fat and reducing intake of cholesterol.

## 2023-08-14 NOTE — PATIENT INSTRUCTIONS
Heart Healthy Diet   AMBULATORY CARE:   A heart healthy diet  is an eating plan low in unhealthy fats and sodium (salt). The plan is high in healthy fats and fiber. A heart healthy diet helps improve your cholesterol levels and lowers your risk for heart disease and stroke. A dietitian will teach you how to read and understand food labels. Heart healthy diet guidelines to follow:   • Choose foods that contain healthy fats:      ? Unsaturated fats  include monounsaturated and polyunsaturated fats. Unsaturated fat is found in foods such as soybean, canola, olive, corn, and safflower oils. It is also found in soft tub margarine that is made with liquid vegetable oil. ? Omega-3 fat  is found in certain fish, such as salmon, tuna, and trout, and in walnuts and flaxseed. Eat fish high in omega-3 fats at least 2 times a week. • Limit or do not have unhealthy fats:      ? Cholesterol  is found in animal foods, such as eggs and lobster, and in dairy products made from whole milk. Limit cholesterol to less than 200 mg each day. ? Saturated fat  is found in meats, such as brown and hamburger. It is also found in chicken or turkey skin, whole milk, and butter. Limit saturated fat to less than 7% of your total daily calories. ? Trans fat  is found in packaged foods, such as potato chips and cookies. It is also in hard margarine, some fried foods, and shortening. Do not eat foods that contain trans fats. • Get 20 to 30 grams of fiber each day. Fruits, vegetables, whole-grain foods, and legumes (cooked beans) are good sources of fiber. • Limit sodium as directed. You may be told to limit sodium, such as to 2,000 mg or less each day. Choose low-sodium or no-salt-added foods. Add little or no salt to food you prepare. Use herbs and spices in place of salt.        Include the following in your heart healthy plan:  Ask your dietitian or healthcare provider how many servings to have each day from the following food groups:  • Grains:      ? Whole-wheat breads, cereals, and pastas, and brown rice    ? Low-fat, low-sodium crackers and chips    • Vegetables:      ? Broccoli, green beans, green peas, and spinach    ? Collards, kale, and lima beans    ? Carrots, sweet potatoes, tomatoes, and peppers    ? Canned vegetables with no salt added    • Fruits:      ? Bananas, peaches, pears, and pineapple    ? Grapes, raisins, and dates    ? Oranges, tangerines, grapefruit, orange juice, and grapefruit juice    ? Apricots, mangoes, melons, and papaya    ? Raspberries and strawberries    ? Canned fruit with no added sugar    • Low-fat dairy:      ? Nonfat (skim) milk, 1% milk, and low-fat almond, cashew, or soy milks fortified with calcium    ? Low-fat cheese, regular or frozen yogurt, and cottage cheese    • Meats and proteins:      ? Lean cuts of beef and pork (loin, leg, round), skinless chicken and turkey    ? Legumes, soy products, egg whites, or nuts    Limit or do not include the following in your heart healthy plan:   • Foods and liquids that contain unhealthy fats and oils:      ? Whole or 2% milk, cream cheese, sour cream, or cheese    ? High-fat cuts of beef (T-bone steaks, ribs), chicken or turkey with skin, and organ meats such as liver    ? Butter, stick margarine, shortening, and cooking oils such as coconut or palm oil    • Foods and liquids high in sodium:      ? Packaged foods, such as frozen dinners, cookies, macaroni and cheese, and cereals with more than 300 mg of sodium per serving    ? Vegetables with added sodium, such as instant potatoes, vegetables with added sauces, or regular canned vegetables    ? Cured or smoked meats, such as hot dogs, brown, and sausage    ? High-sodium ketchup, barbecue sauce, salad dressing, pickles, olives, soy sauce, or miso    • Foods and liquids high in sugar:      ? Candy, cake, cookies, pies, or doughnuts    ?  Soft drinks (soda), sports drinks, or sweetened tea    ? Canned or dry mixes for cakes, soups, sauces, or gravies    Other healthy heart guidelines:   • Do not smoke. Nicotine and other chemicals in cigarettes and cigars can cause lung and heart damage. Ask your healthcare provider for information if you currently smoke and need help to quit. E-cigarettes or smokeless tobacco still contain nicotine. Talk to your provider before you use these products. • Limit or do not drink alcohol as directed. Alcohol can damage your heart and raise your blood pressure. Your healthcare provider may give you specific daily and weekly limits. The general recommended limit is 1 drink a day for women 21 or older and for men 72 or older. Do not have more than 3 drinks within 24 hours or 7 within a week. The recommended limit is 2 drinks a day for men 24to 59years of age. Do not have more than 4 drinks within 24 hours or 14 within a week. A drink of alcohol is 12 ounces of beer, 5 ounces of wine, or 1½ ounces of liquor. • Maintain a healthy weight. Extra body weight makes your heart work harder. Ask your provider what a healthy weight is for you. He or she can help you create a safe weight loss plan, if needed. • Exercise regularly. Exercise can help you maintain a healthy weight and improve your blood pressure and cholesterol levels. Regular exercise can also decrease your risk for heart problems. Ask your provider about the best exercise plan for you. Do not start an exercise program without asking your provider. Follow up with your doctor or cardiologist as directed:  Write down your questions so you remember to ask them during your visits. © Copyright [employer] 2022 Information is for End User's use only and may not be sold, redistributed or otherwise used for commercial purposes. The above information is an  only. It is not intended as medical advice for individual conditions or treatments.  Talk to your doctor, nurse or pharmacist before following any medical regimen to see if it is safe and effective for you.

## 2023-08-23 ENCOUNTER — APPOINTMENT (OUTPATIENT)
Dept: LAB | Facility: MEDICAL CENTER | Age: 58
End: 2023-08-23
Payer: COMMERCIAL

## 2023-08-23 DIAGNOSIS — I26.99 PULMONARY EMBOLISM, UNSPECIFIED CHRONICITY, UNSPECIFIED PULMONARY EMBOLISM TYPE, UNSPECIFIED WHETHER ACUTE COR PULMONALE PRESENT (HCC): ICD-10-CM

## 2023-08-23 LAB
INR PPP: 1.74 (ref 0.84–1.19)
PROTHROMBIN TIME: 20.6 SECONDS (ref 11.6–14.5)

## 2023-08-23 PROCEDURE — 36415 COLL VENOUS BLD VENIPUNCTURE: CPT

## 2023-08-23 PROCEDURE — 85610 PROTHROMBIN TIME: CPT

## 2023-08-24 ENCOUNTER — ANTICOAG VISIT (OUTPATIENT)
Dept: FAMILY MEDICINE CLINIC | Facility: CLINIC | Age: 58
End: 2023-08-24

## 2023-09-06 ENCOUNTER — APPOINTMENT (OUTPATIENT)
Dept: LAB | Facility: MEDICAL CENTER | Age: 58
End: 2023-09-06
Payer: COMMERCIAL

## 2023-09-06 DIAGNOSIS — I26.99 PULMONARY EMBOLISM, UNSPECIFIED CHRONICITY, UNSPECIFIED PULMONARY EMBOLISM TYPE, UNSPECIFIED WHETHER ACUTE COR PULMONALE PRESENT (HCC): ICD-10-CM

## 2023-09-06 LAB
INR PPP: 1.77 (ref 0.84–1.19)
PROTHROMBIN TIME: 20.9 SECONDS (ref 11.6–14.5)

## 2023-09-06 PROCEDURE — 85610 PROTHROMBIN TIME: CPT

## 2023-09-06 PROCEDURE — 36415 COLL VENOUS BLD VENIPUNCTURE: CPT

## 2023-09-06 NOTE — PROGRESS NOTES
Exercise Session Details Cantharidin Pregnancy And Lactation Text: This medication has not been proven safe during pregnancy. It is unknown if this medication is excreted in breast milk.

## 2023-09-07 ENCOUNTER — ANTICOAG VISIT (OUTPATIENT)
Dept: FAMILY MEDICINE CLINIC | Facility: CLINIC | Age: 58
End: 2023-09-07

## 2023-09-18 ENCOUNTER — OFFICE VISIT (OUTPATIENT)
Dept: FAMILY MEDICINE CLINIC | Facility: CLINIC | Age: 58
End: 2023-09-18
Payer: COMMERCIAL

## 2023-09-18 ENCOUNTER — APPOINTMENT (OUTPATIENT)
Dept: LAB | Facility: MEDICAL CENTER | Age: 58
End: 2023-09-18
Payer: COMMERCIAL

## 2023-09-18 ENCOUNTER — CONSULT (OUTPATIENT)
Dept: SURGERY | Facility: CLINIC | Age: 58
End: 2023-09-18
Payer: COMMERCIAL

## 2023-09-18 VITALS
HEART RATE: 102 BPM | WEIGHT: 289 LBS | BODY MASS INDEX: 48.15 KG/M2 | SYSTOLIC BLOOD PRESSURE: 132 MMHG | TEMPERATURE: 97.4 F | DIASTOLIC BLOOD PRESSURE: 86 MMHG | HEIGHT: 65 IN | OXYGEN SATURATION: 99 %

## 2023-09-18 VITALS
SYSTOLIC BLOOD PRESSURE: 136 MMHG | BODY MASS INDEX: 48.22 KG/M2 | TEMPERATURE: 97.8 F | HEART RATE: 77 BPM | WEIGHT: 289.4 LBS | DIASTOLIC BLOOD PRESSURE: 88 MMHG | OXYGEN SATURATION: 96 % | HEIGHT: 65 IN

## 2023-09-18 DIAGNOSIS — Z23 NEED FOR INFLUENZA VACCINATION: ICD-10-CM

## 2023-09-18 DIAGNOSIS — Z79.01 CHRONIC ANTICOAGULATION: ICD-10-CM

## 2023-09-18 DIAGNOSIS — K42.9 PERIUMBILICAL HERNIA: ICD-10-CM

## 2023-09-18 DIAGNOSIS — K42.9 PERIUMBILICAL HERNIA: Primary | ICD-10-CM

## 2023-09-18 DIAGNOSIS — T78.3XXD ANGIOEDEMA, SUBSEQUENT ENCOUNTER: ICD-10-CM

## 2023-09-18 DIAGNOSIS — K46.0 INCARCERATED HERNIA: Primary | ICD-10-CM

## 2023-09-18 LAB
ALBUMIN SERPL BCP-MCNC: 3.7 G/DL (ref 3.5–5)
ALP SERPL-CCNC: 72 U/L (ref 34–104)
ALT SERPL W P-5'-P-CCNC: 11 U/L (ref 7–52)
ANION GAP SERPL CALCULATED.3IONS-SCNC: 8 MMOL/L
AST SERPL W P-5'-P-CCNC: 16 U/L (ref 13–39)
BASOPHILS # BLD AUTO: 0.09 THOUSANDS/ÂΜL (ref 0–0.1)
BASOPHILS NFR BLD AUTO: 2 % (ref 0–1)
BILIRUB SERPL-MCNC: 0.5 MG/DL (ref 0.2–1)
BUN SERPL-MCNC: 11 MG/DL (ref 5–25)
CALCIUM SERPL-MCNC: 9.1 MG/DL (ref 8.4–10.2)
CHLORIDE SERPL-SCNC: 106 MMOL/L (ref 96–108)
CO2 SERPL-SCNC: 27 MMOL/L (ref 21–32)
CREAT SERPL-MCNC: 1.05 MG/DL (ref 0.6–1.3)
EOSINOPHIL # BLD AUTO: 0.2 THOUSAND/ÂΜL (ref 0–0.61)
EOSINOPHIL NFR BLD AUTO: 4 % (ref 0–6)
ERYTHROCYTE [DISTWIDTH] IN BLOOD BY AUTOMATED COUNT: 13.9 % (ref 11.6–15.1)
GFR SERPL CREATININE-BSD FRML MDRD: 58 ML/MIN/1.73SQ M
GLUCOSE P FAST SERPL-MCNC: 74 MG/DL (ref 65–99)
HCT VFR BLD AUTO: 45 % (ref 34.8–46.1)
HGB BLD-MCNC: 13.8 G/DL (ref 11.5–15.4)
IMM GRANULOCYTES # BLD AUTO: 0.01 THOUSAND/UL (ref 0–0.2)
IMM GRANULOCYTES NFR BLD AUTO: 0 % (ref 0–2)
INR PPP: 1.46 (ref 0.84–1.19)
LYMPHOCYTES # BLD AUTO: 1.52 THOUSANDS/ÂΜL (ref 0.6–4.47)
LYMPHOCYTES NFR BLD AUTO: 28 % (ref 14–44)
MCH RBC QN AUTO: 30.2 PG (ref 26.8–34.3)
MCHC RBC AUTO-ENTMCNC: 30.7 G/DL (ref 31.4–37.4)
MCV RBC AUTO: 99 FL (ref 82–98)
MONOCYTES # BLD AUTO: 0.62 THOUSAND/ÂΜL (ref 0.17–1.22)
MONOCYTES NFR BLD AUTO: 11 % (ref 4–12)
NEUTROPHILS # BLD AUTO: 3.07 THOUSANDS/ÂΜL (ref 1.85–7.62)
NEUTS SEG NFR BLD AUTO: 55 % (ref 43–75)
NRBC BLD AUTO-RTO: 0 /100 WBCS
PLATELET # BLD AUTO: 349 THOUSANDS/UL (ref 149–390)
PMV BLD AUTO: 10.6 FL (ref 8.9–12.7)
POTASSIUM SERPL-SCNC: 4 MMOL/L (ref 3.5–5.3)
PROT SERPL-MCNC: 6.8 G/DL (ref 6.4–8.4)
PROTHROMBIN TIME: 18 SECONDS (ref 11.6–14.5)
RBC # BLD AUTO: 4.57 MILLION/UL (ref 3.81–5.12)
SODIUM SERPL-SCNC: 141 MMOL/L (ref 135–147)
WBC # BLD AUTO: 5.51 THOUSAND/UL (ref 4.31–10.16)

## 2023-09-18 PROCEDURE — 99244 OFF/OP CNSLTJ NEW/EST MOD 40: CPT | Performed by: SURGERY

## 2023-09-18 PROCEDURE — 99213 OFFICE O/P EST LOW 20 MIN: CPT | Performed by: FAMILY MEDICINE

## 2023-09-18 PROCEDURE — 90686 IIV4 VACC NO PRSV 0.5 ML IM: CPT | Performed by: FAMILY MEDICINE

## 2023-09-18 PROCEDURE — 90471 IMMUNIZATION ADMIN: CPT | Performed by: FAMILY MEDICINE

## 2023-09-18 PROCEDURE — 85025 COMPLETE CBC W/AUTO DIFF WBC: CPT | Performed by: FAMILY MEDICINE

## 2023-09-18 PROCEDURE — 85610 PROTHROMBIN TIME: CPT | Performed by: FAMILY MEDICINE

## 2023-09-18 PROCEDURE — 80053 COMPREHEN METABOLIC PANEL: CPT | Performed by: FAMILY MEDICINE

## 2023-09-18 PROCEDURE — 36415 COLL VENOUS BLD VENIPUNCTURE: CPT | Performed by: FAMILY MEDICINE

## 2023-09-18 RX ORDER — MONTELUKAST SODIUM 10 MG/1
10 TABLET ORAL
Qty: 90 TABLET | Refills: 3 | Status: SHIPPED | OUTPATIENT
Start: 2023-09-18

## 2023-09-18 NOTE — PROGRESS NOTES
Name: Nicole Mcmahon      : 1965      MRN: [id number]  Encounter Provider: Fito Chavis DO  Encounter Date: 2023   Encounter department: [address]   Patient has a tender periumbilical hernia. I will refer her to surgery ASAP. She will get her lab work done today. She will call us if she has not able to see surgery soon. I told her if her pain becomes much worse, she needs to go to the ER. Follow-up here scheduled or as needed  1. Periumbilical hernia  -     Ambulatory Referral to General Surgery; Future  -     Protime-INR  -     CBC and differential  -     Comprehensive metabolic panel    2. Need for influenza vaccination  -     influenza vaccine, quadrivalent, 0.5 mL, preservative-free, for adult and pediatric patients 6 mos+ (AFLURIA, FLUARIX, FLULAVAL, FLUZONE)    3. Angioedema, subsequent encounter  -     montelukast (SINGULAIR) 10 mg tablet; Take 1 tablet (10 mg total) by mouth daily at bedtime  -     CBC and differential  -     Comprehensive metabolic panel    4. Chronic anticoagulation  -     Protime-INR           Subjective     Patient here today stating that last night while lying down had pain in her bellybutton area. She felt a lump there. It is tender to touch. It seems worse when she is getting up and down from seated positions. No nausea or vomiting. No fevers or chills. No change in bowels. Patient was told in the past that she has an umbilical hernia and a benign inguinal hernia. The inguinal hernia does not bother her. Review of Systems   Constitutional: Negative. Respiratory: Negative. Cardiovascular: Negative. Gastrointestinal: Positive for abdominal pain. Genitourinary: Negative.         Past Medical History:   Diagnosis Date   • Abdominal pain, lower     [id number] RESOLVED   • Acute renal failure ([address])     [id number] RESOLVED   • Allergic rhinitis     [id number] RESOLVED   • Ankle pain, right     [id number] RESOLVED   • Arthritis     [id number] RESOLVED  [id number] RESOLVED   • Asthma    • Bilateral chronic knee pain     [id number]   • Bladder pain     [id number] RESOLVED   • Blood clot in vein    • Bursitis     [id number] RESOLVED   • Cardiac disorder     [id number]  RESOLVED   • Cardiac murmur     23OCT2017 RESOLVED   • Chest tightness or pressure     23OCT2017 RESOLVED   • COPD (chronic obstructive pulmonary disease) (HCC)    • Coronary artery disease    • Curvature of spine     [id number] RESOLVED   • GERD (gastroesophageal reflux disease)    • Hernia, hiatal     [id number] RESOLVED   • Hyperlipidemia     23OCT2017 RESOLVED   • Hypertension    • Inguinal hernia     RIGHT   01AUG2017 RESOLVED   • Jaw closure abnormality     [id number] RESOLVED   • Lumbar herniated disc     [id number] RESOLVED   • Morbid obesity ([address])     [id number]   • Obesity    • Osteoarthritis of right knee     09NIH1035 RESOLVED   • Patellar tendinitis     01AUG2017   • Poor flexibility of tendon     [id number]   • Presence of IVC filter 10/12/2018   • Pulmonary embolism ([address])     23OCT2017 RESOLVED   • Sepsis ([address])     [id number] RESOLVED   • Severe sepsis due to methicillin resistant Staphylococcus aureus (MRSA) with acute organ dysfunction ([address])     [id number] RESOLVED   • Sleep apnea, obstructive    • Spider vein, symptomatic     [id number] RESOLVED   • Status post arthroscopy of right knee     [id number] RESOLVED   • Stomach disorder     01AUG2017 RESOLVED   • Tear of medial meniscus of right knee     SUBSEQUENT ENCOUNTER  RESOLVED [id number]   • Thyroid disorder     [id number]   • Umbilical hernia     [id number] RESOLVED     Past Surgical History:   Procedure Laterality Date   • COLONOSCOPY     • CYSTOSCOPY  01/1994    WITH BIOPSY     • EXPLORATORY LAPAROTOMY     • FOOT SURGERY Left    • FRACTURE SURGERY     • HAND SURGERY Right     cyst   • HYSTERECTOMY  2009   • INCISION AND DRAINAGE ANTERIOR NECK Right 6/8/2017    Procedure: INCISION AND DRAINAGE  (I&D) NECK; Surgeon: Jesus Gu MD;  Location:  MAIN OR;  Service:    • INCISIONAL HERNIA REPAIR      WITH IMPLANTATION OF MESH  13 MAY 2014  LAST ASSESSED   • IR IVC FILTER REMOVAL  2019   • OPEN ANTERIOR SHOULDER RECONSTRUCTION Left    • OTHER SURGICAL HISTORY      BLOOD VESSEL REPAIR   13 MAY 2014 LAST ASSESSED   • PERIPHERALLY INSERTED CENTRAL CATHETER INSERTION     • ME ARTHRS KNE SURG W/MENISCECTOMY MED/LAT W/SHVG Right 2016    Procedure: KNEE ARTHROSCOPY WITH MEDIAL MENISCECTOMY ;  Surgeon: Jodie Watson MD;  Location: [facility];  Service: Orthopedics   • SHOULDER SURGERY       Family History   Problem Relation Age of Onset   • Arthritis Mother    • Colon cancer Brother    • Lung cancer Father    • No Known Problems Sister    • No Known Problems Daughter    • No Known Problems Maternal Grandmother    • No Known Problems Maternal Grandfather    • No Known Problems Paternal Grandmother    • No Known Problems Paternal Grandfather    • Breast cancer Maternal Aunt    • Diabetes Maternal Aunt    • No Known Problems Sister    • No Known Problems Sister    • No Known Problems Sister    • No Known Problems Sister    • No Known Problems Sister    • No Known Problems Daughter    • Colon cancer Son      Social History     Socioeconomic History   • Marital status: Single     Spouse name: None   • Number of children: None   • Years of education: None   • Highest education level: None   Occupational History   • Occupation: unemployed   Tobacco Use   • Smoking status: Former     Types: Cigarettes     Quit date: 1995     Years since quittin.8   • Smokeless tobacco: Never   • Tobacco comments:     quit 25 years ago   Vaping Use   • Vaping Use: Never used   Substance and Sexual Activity   • Alcohol use: Never   • Drug use: Never   • Sexual activity: Never   Other Topics Concern   • None   Social History Narrative    ALWAYS USES SEAT BELT         Social Determinants of Health     Financial Resource Strain: Not on file   Food Insecurity: Not on file   Transportation Needs: Not on file   Physical Activity: Not on file   Stress: Not on file   Social Connections: Not on file   Intimate Partner Violence: Not on file   Housing Stability: Not on file     Current Outpatient Medications on File Prior to Visit   Medication Sig   • albuterol (2.5 mg/3 mL) 0.083 % nebulizer solution Take 3 mL (2.5 mg total) by nebulization as needed for wheezing   • albuterol (PROVENTIL HFA,VENTOLIN HFA) 90 mcg/act inhaler Inhale 2 puffs every 4 (four) hours as needed for wheezing   • atorvastatin (LIPITOR) 10 mg tablet Take 1 tablet (10 mg total) by mouth daily   • budesonide-formoterol (Symbicort) 160-4.5 mcg/act inhaler Inhale 2 puffs 2 (two) times a day Rinse mouth after use   • Diclofenac Sodium (VOLTAREN) 1 % Apply 4 g topically 4 (four) times a day   • famotidine (PEPCID) 40 MG tablet TAKE ONE TABLET BY MOUTH DAILY AT 9PM AT BEDTIME   • fluticasone (FLONASE) 50 mcg/act nasal spray INSTILL 2 SPRAYS IN EACH NOSTRIL DAILY (BULK)   • furosemide (LASIX) 20 mg tablet Take 1 tablet (20 mg total) by mouth daily as needed (leg swelling) Take with potassium   • hydrocortisone 0.5 % cream Apply topically 2 (two) times a day   • levothyroxine 125 mcg tablet TAKE ONE TABLET BY MOUTH DAILY AT 8AM (vial)   • loratadine (CLARITIN) 10 mg tablet Take 1 tablet (10 mg total) by mouth daily   • omeprazole (PriLOSEC) 40 MG capsule Take 1 capsule (40 mg total) by mouth daily before breakfast   • potassium chloride (MICRO-K) 10 MEQ CR capsule Take 1 capsule (10 mEq total) by mouth daily   • warfarin (Coumadin) 1 mg tablet To take 8 mg on Monday (5mg tab and three 1 mg tablets) and 10 mg t,w,t,f,s,s   • warfarin (COUMADIN) 5 mg tablet TAKE ONE AND ONE-HALF TABLETS BY MOUTH ON TUESDAYS,THURSDAYS, SATURDAYS AND SUNDAYS.  TAKE TWO TABLETS ON MONDAYS, WEDNESDAYS AND FRIDAYS (VIAL)   • [DISCONTINUED] montelukast (SINGULAIR) 10 mg tablet Take 1 tablet (10 mg total) by mouth daily at bedtime     Allergies   Allergen Reactions   • Clindamycin Angioedema   • Medical Tape Itching     Welt and redness   • Penicillin G Nausea Only   • Penicillins GI Intolerance     Nausea and vomiting     Immunization History   Administered Date(s) Administered   • INFLUENZA 09/29/2014, 09/28/2015, 11/27/2016, 11/30/2016, 05/29/2017, 10/07/2022   • Influenza Quadrivalent Preservative Free 3 years and older IM 11/30/2016   • Influenza Quadrivalent, 6-35 Months IM 08/29/2017   • Influenza, Quadrivalent (nasal) 11/27/2016   • Influenza, injectable, quadrivalent, preservative free 0.5 mL 09/18/2023   • Influenza, recombinant, quadrivalent,injectable, preservative free 10/12/2018, 09/11/2019, 09/15/2020, 09/30/2021, 10/07/2022   • Influenza, seasonal, injectable 09/29/2014   • Pneumococcal Polysaccharide PPV23 08/29/2017       Objective     /88   Pulse 77   Temp 97.8 °F (36.6 °C)   Ht 5' 5" (1.651 m)   Wt 131 kg (289 lb 6.4 oz)   LMP  (LMP Unknown)   SpO2 96%   BMI 48.16 kg/m²     Physical Exam  Vitals reviewed. Constitutional:       General: She is not in acute distress. Appearance: Normal appearance. She is well-developed. She is not ill-appearing, toxic-appearing or diaphoretic. HENT:      Head: Normocephalic and atraumatic. Eyes:      Conjunctiva/sclera: Conjunctivae normal.   Cardiovascular:      Rate and Rhythm: Normal rate and regular rhythm. Heart sounds: Normal heart sounds. No murmur heard. No friction rub. No gallop. Pulmonary:      Effort: Pulmonary effort is normal. No respiratory distress. Breath sounds: Normal breath sounds. No wheezing, rhonchi or rales. Abdominal:      Tenderness: There is abdominal tenderness. Hernia: A hernia (Periumbilical hernia superior to the umbilicus. Tender) is present. Musculoskeletal:      Right lower leg: No edema. Left lower leg: No edema. Neurological:      General: No focal deficit present. Mental Status: She is alert and oriented to person, place, and time. Psychiatric:         Mood and Affect: Mood normal.         Behavior: Behavior normal.         Thought Content:  Thought content normal.         Judgment: Judgment normal.       Ju Montanez,

## 2023-09-19 ENCOUNTER — ANTICOAG VISIT (OUTPATIENT)
Dept: FAMILY MEDICINE CLINIC | Facility: CLINIC | Age: 58
End: 2023-09-19

## 2023-09-19 NOTE — PROGRESS NOTES
Assessment/Plan:    Incarcerated hernia  Symptomatic incarcerated periumbilical hernia. Suspect this could be an incisional hernia given her previous abdominal surgery. However previous surgery included hysterectomy and exploratory laparotomy and incisional hernia pair with mesh however no obvious exploratory laparotomy scar is noted on exam.  Previous other scars are difficult to elucidate. However just above the umbilicus there is a palpable hard mass likely consistent with incarcerated hernia. She denies any signs of obstruction or, or strangulation. Previous CT scan was reviewed showing a ventral hernia containing fat. Given the new onset of abdominal discomfort. We will repeat a CT scan to further evaluate for worsening or enlargement of the hernia and/or change of hernia contents which could include bowel. More likely that the hernia contains fat given your lack of any other systemic symptoms. I will call patient with the results. Chronic anticoagulation  Patient on Coumadin given previous history of saddle pulmonary embolus. Failed Eliquis in the past.  Continue anticoagulation and management of INR as per PCP. If surgical intervention is warranted patient will need to hold anticoagulation will likely need to be bridged. Diagnoses and all orders for this visit:    Incarcerated hernia  -     CT abdomen pelvis w contrast; Future    Periumbilical hernia  -     Ambulatory Referral to General Surgery    Chronic anticoagulation          Subjective:      Patient ID: Sue Sibley is a 62 y.o. female. 19-year-old female with numerous comorbidities to include pulmonary embolism currently on Coumadin, COPD, hypertension, CAD, obesity, CKD, hyperlipidemia, presents today in consultation for a ventral hernia. Likely more of an incisional hernia given her previous surgeries. She does not have a history showing a previous hysterectomy, exploratory laparotomy, and hernia repair with mesh.   Patient is a poor historian but does discuss previous history of a big pulmonary embolus. She states that she has had some abdominal surgery before. Unclear about the hernia. She states that recently she has developed some pretty significant abdominal pain near her [facility]. The pain is rather constant and worse with increased activity. Denies nausea vomiting. No fevers or chills. No signs or symptoms of obstruction. No changes in bowel or bladder habits. No chest pain or worsening shortness of breath. The following portions of the patient's history were reviewed and updated as appropriate:   She  has a past medical history of Abdominal pain, lower, Acute renal failure ([address]), Allergic rhinitis, Ankle pain, right, Arthritis, Asthma, Bilateral chronic knee pain, Bladder pain, Blood clot in vein, Bursitis, Cardiac disorder, Cardiac murmur, Chest tightness or pressure, COPD (chronic obstructive pulmonary disease) ([address]), Coronary artery disease, Curvature of spine, GERD (gastroesophageal reflux disease), Hernia, hiatal, Hyperlipidemia, Hypertension, Inguinal hernia, Jaw closure abnormality, Lumbar herniated disc, Morbid obesity ([address]), Obesity, Osteoarthritis of right knee, Patellar tendinitis, Poor flexibility of tendon, Presence of IVC filter (10/12/2018), Pulmonary embolism ([address]), Sepsis ([address]), Severe sepsis due to methicillin resistant Staphylococcus aureus (MRSA) with acute organ dysfunction ([address]), Sleep apnea, obstructive, Spider vein, symptomatic, Status post arthroscopy of right knee, Stomach disorder, Tear of medial meniscus of right knee, Thyroid disorder, and Umbilical hernia.   She   Patient Active Problem List    Diagnosis Date Noted   • Incarcerated hernia 09/18/2023   • Pseudotumor cerebri 05/10/2023   • Left foot pain 12/08/2022   • History of shoulder surgery 08/10/2022   • Hiatal hernia 02/28/2022   • MICHAEL (obstructive sleep apnea)    • Snoring 11/20/2020   • Bilateral leg edema 07/23/2020   • Abnormal CT of the chest 05/03/2020   • Renal cyst, left 05/03/2020   • Non-occlusive thrombus 12/04/2018   • Dyslipidemia 11/09/2018   • Stage 3 chronic kidney disease ([address]) 11/07/2018   • Seasonal allergic rhinitis due to pollen 07/10/2018   • History of methicillin resistant staphylococcus aureus (MRSA) 07/10/2017   • Chronic anticoagulation 07/10/2017   • Cough 07/10/2017   • Morbid obesity ([address]) 06/05/2017   • Secondary pulmonary hypertension 05/26/2017   • History of pulmonary embolus (PE) 05/25/2017   • Gastroesophageal reflux disease without esophagitis 05/25/2017   • NSTEMI (non-ST elevated myocardial infarction) ([address]) 05/25/2017   • Moderate persistent asthma 11/26/2016   • Other specified hypothyroidism 05/28/2015     She  has a past surgical history that includes Hysterectomy (2009); Open anterior shoulder reconstruction (Left); Hand surgery (Right); Foot surgery (Left); Colonoscopy; pr arthrs kne surg w/meniscectomy med/lat w/shvg (Right, 4/11/2016); Exploratory laparotomy; Peripherally inserted central catheter insertion; Incision and drainage anterior neck (Right, 6/8/2017); Other surgical history; Cystoscopy (01/1994); Incisional hernia repair; Shoulder surgery; IR IVC filter removal (1/22/2019); and Fracture surgery. Her family history includes Arthritis in her mother; Breast cancer in her maternal aunt; Colon cancer in her brother and son; Diabetes in her maternal aunt; Lung cancer in her father; No Known Problems in her daughter, daughter, maternal grandfather, maternal grandmother, paternal grandfather, paternal grandmother, sister, sister, sister, sister, sister, and sister. She  reports that she quit smoking about 27 years ago. Her smoking use included cigarettes. She has never used smokeless tobacco. She reports that she does not drink alcohol and does not use drugs.   Current Outpatient Medications   Medication Sig Dispense Refill   • albuterol (2.5 mg/3 mL) 0.083 % nebulizer solution Take 3 mL (2.5 mg total) by nebulization as needed for wheezing 3 mL 5   • albuterol (PROVENTIL HFA,VENTOLIN HFA) 90 mcg/act inhaler Inhale 2 puffs every 4 (four) hours as needed for wheezing 54 g 3   • atorvastatin (LIPITOR) 10 mg tablet Take 1 tablet (10 mg total) by mouth daily 90 tablet 3   • budesonide-formoterol (Symbicort) 160-4.5 mcg/act inhaler Inhale 2 puffs 2 (two) times a day Rinse mouth after use 30.6 g 3   • Diclofenac Sodium (VOLTAREN) 1 % Apply 4 g topically 4 (four) times a day 350 g 0   • famotidine (PEPCID) 40 MG tablet TAKE ONE TABLET BY MOUTH DAILY AT 9PM AT BEDTIME 90 tablet 11   • fluticasone (FLONASE) 50 mcg/act nasal spray INSTILL 2 SPRAYS IN EACH NOSTRIL DAILY (BULK) 48 g 11   • furosemide (LASIX) 20 mg tablet Take 1 tablet (20 mg total) by mouth daily as needed (leg swelling) Take with potassium 30 tablet 5   • hydrocortisone 0.5 % cream Apply topically 2 (two) times a day 30 g 3   • levothyroxine 125 mcg tablet TAKE ONE TABLET BY MOUTH DAILY AT 8AM (vial) 90 tablet 3   • loratadine (CLARITIN) 10 mg tablet Take 1 tablet (10 mg total) by mouth daily 30 tablet 5   • montelukast (SINGULAIR) 10 mg tablet Take 1 tablet (10 mg total) by mouth daily at bedtime 90 tablet 3   • omeprazole (PriLOSEC) 40 MG capsule Take 1 capsule (40 mg total) by mouth daily before breakfast 90 capsule 3   • potassium chloride (MICRO-K) 10 MEQ CR capsule Take 1 capsule (10 mEq total) by mouth daily 30 capsule 5   • warfarin (Coumadin) 1 mg tablet To take 8 mg on Monday (5mg tab and three 1 mg tablets) and 10 mg t,w,t,f,s,s 36 tablet 2   • warfarin (COUMADIN) 5 mg tablet TAKE ONE AND ONE-HALF TABLETS BY MOUTH ON TUESDAYS,THURSDAYS, SATURDAYS AND SUNDAYS. TAKE TWO TABLETS ON MONDAYS, WEDNESDAYS AND FRIDAYS (VIAL) 135 tablet 3     No current facility-administered medications for this visit. She is allergic to clindamycin, medical tape, penicillin g, and penicillins. .    Review of Systems      Review of systems completed, all negative except as noted above HPI    Objective:      /86 (BP Location: Left arm, Patient Position: Sitting, Cuff Size: Standard)   Pulse 102   Temp (!) 97.4 °F (36.3 °C) (Tympanic)   Ht 5' 5" (1.651 m)   Wt 131 kg (289 lb)   LMP  (LMP Unknown)   SpO2 99%   BMI 48.09 kg/m²          Physical Exam  Vitals reviewed. Constitutional:       General: She is not in acute distress. Appearance: Normal appearance. She is obese. She is not ill-appearing, toxic-appearing or diaphoretic. HENT:      Head: Normocephalic and atraumatic. Right Ear: External ear normal.      Left Ear: External ear normal.   Eyes:      General: No scleral icterus. Right eye: No discharge. Left eye: No discharge. Cardiovascular:      Rate and Rhythm: Normal rate. Heart sounds: Normal heart sounds. No friction rub. No gallop. Pulmonary:      Effort: Pulmonary effort is normal. No respiratory distress. Breath sounds: Normal breath sounds. No stridor. No wheezing or rhonchi. Abdominal:      General: There is no distension. Palpations: Abdomen is soft. Tenderness: There is abdominal tenderness. There is no guarding or rebound. Hernia: A hernia (incisional periumbilical) is present. Comments: Supraumbilical likely incisional hernia which appears to be incarcerated. Pain on palpation. Unable to read do's. No skin manifestations. Musculoskeletal:         General: No swelling or deformity. Normal range of motion. Cervical back: Normal range of motion. Skin:     General: Skin is warm. Coloration: Skin is not jaundiced or pale. Findings: No bruising or erythema. Neurological:      General: No focal deficit present. Mental Status: She is alert and oriented to person, place, and time. Cranial Nerves: No cranial nerve deficit. Psychiatric:         Mood and Affect: Mood normal.         Behavior: Behavior normal.         Thought Content:  Thought content normal.         Judgment: Judgment normal.           Imaging:    Previous CT scan of the chest abdomen pelvis dated June 19, 2021 was personally reviewed with me and also reviewed and discussed with the patient. Blood work:    Blood work from today which included CBC, CMP and PT INR was reviewed by me. Notes:    PCP note dated September 18, 2023 was personally reviewed by me.

## 2023-09-19 NOTE — ASSESSMENT & PLAN NOTE
Symptomatic incarcerated periumbilical hernia. Suspect this could be an incisional hernia given her previous abdominal surgery. However previous surgery included hysterectomy and exploratory laparotomy and incisional hernia pair with mesh however no obvious exploratory laparotomy scar is noted on exam.  Previous other scars are difficult to elucidate. However just above the umbilicus there is a palpable hard mass likely consistent with incarcerated hernia. She denies any signs of obstruction or, or strangulation. Previous CT scan was reviewed showing a ventral hernia containing fat. Given the new onset of abdominal discomfort. We will repeat a CT scan to further evaluate for worsening or enlargement of the hernia and/or change of hernia contents which could include bowel. More likely that the hernia contains fat given your lack of any other systemic symptoms. I will call patient with the results.

## 2023-09-19 NOTE — ASSESSMENT & PLAN NOTE
Patient on Coumadin given previous history of saddle pulmonary embolus. Failed Eliquis in the past.  Continue anticoagulation and management of INR as per PCP. If surgical intervention is warranted patient will need to hold anticoagulation will likely need to be bridged.

## 2023-09-22 ENCOUNTER — HOSPITAL ENCOUNTER (OUTPATIENT)
Dept: CT IMAGING | Facility: HOSPITAL | Age: 58
Discharge: HOME | End: 2023-09-22
Attending: SURGERY
Payer: COMMERCIAL

## 2023-09-22 DIAGNOSIS — K46.0 INCARCERATED HERNIA: ICD-10-CM

## 2023-09-22 PROCEDURE — 74177 CT ABD & PELVIS W/CONTRAST: CPT

## 2023-09-22 PROCEDURE — G1004 CDSM NDSC: HCPCS

## 2023-09-22 RX ADMIN — IOHEXOL 100 ML: 350 INJECTION, SOLUTION INTRAVENOUS at 08:05

## 2023-10-02 ENCOUNTER — TELEPHONE (OUTPATIENT)
Dept: SURGERY | Facility: CLINIC | Age: 58
End: 2023-10-02

## 2023-10-02 NOTE — TELEPHONE ENCOUNTER
Phone at her request.  Results of the CAT scan reviewed. The diagnosis of a ventral hernia was explained. All questions answered to the satisfaction of the patient. I have advised that she contact Dr. Adri Michael office to schedule a follow-up consultation.

## 2023-10-03 ENCOUNTER — TELEPHONE (OUTPATIENT)
Dept: SURGERY | Facility: CLINIC | Age: 58
End: 2023-10-03

## 2023-10-03 NOTE — TELEPHONE ENCOUNTER
----- Message from Augusta Block DO sent at 10/2/2023  3:46 PM EDT -----  Benjamin Mullins,    Please call the patient let her that I sent her a message regarding her CT scan. It does not appear to be emergent. I am happy to see her in the office for further evaluation and to discuss surgical repair of the hernia. I see that you sent this message to Dr. Talya Chandler as well if you could just reach out to him and let him know that its been taking care of.     Yvette Martinez  ----- Message -----  From: Pattie, Radiology Results In  Sent: 9/29/2023   6:17 PM EDT  To: Augusta Block DO

## 2023-10-05 ENCOUNTER — TELEPHONE (OUTPATIENT)
Dept: FAMILY MEDICINE CLINIC | Facility: CLINIC | Age: 58
End: 2023-10-05

## 2023-10-05 NOTE — TELEPHONE ENCOUNTER
She is requesting recent chart notes that show she's using and benefiting from her oxygen faxed over to  at [phone]   ?addendeum

## 2023-10-23 ENCOUNTER — OFFICE VISIT (OUTPATIENT)
Dept: SURGERY | Facility: CLINIC | Age: 58
End: 2023-10-23
Payer: COMMERCIAL

## 2023-10-23 VITALS
OXYGEN SATURATION: 96 % | HEART RATE: 82 BPM | DIASTOLIC BLOOD PRESSURE: 72 MMHG | TEMPERATURE: 98 F | SYSTOLIC BLOOD PRESSURE: 128 MMHG | BODY MASS INDEX: 48.82 KG/M2 | WEIGHT: 293 LBS | HEIGHT: 65 IN

## 2023-10-23 DIAGNOSIS — K46.0 INCARCERATED HERNIA: Primary | ICD-10-CM

## 2023-10-23 DIAGNOSIS — Z79.01 CHRONIC ANTICOAGULATION: ICD-10-CM

## 2023-10-23 PROCEDURE — 99214 OFFICE O/P EST MOD 30 MIN: CPT | Performed by: SURGERY

## 2023-10-25 RX ORDER — SODIUM CHLORIDE, SODIUM LACTATE, POTASSIUM CHLORIDE, CALCIUM CHLORIDE 600; 310; 30; 20 MG/100ML; MG/100ML; MG/100ML; MG/100ML
125 INJECTION, SOLUTION INTRAVENOUS CONTINUOUS
OUTPATIENT
Start: 2023-10-25

## 2023-10-25 RX ORDER — CHLORHEXIDINE GLUCONATE 4 G/100ML
SOLUTION TOPICAL DAILY PRN
OUTPATIENT
Start: 2023-10-25

## 2023-10-25 RX ORDER — HEPARIN SODIUM 5000 [USP'U]/ML
7500 INJECTION, SOLUTION INTRAVENOUS; SUBCUTANEOUS ONCE
OUTPATIENT
Start: 2023-10-25 | End: 2023-10-25

## 2023-10-26 ENCOUNTER — TELEPHONE (OUTPATIENT)
Dept: FAMILY MEDICINE CLINIC | Facility: HOME HEALTHCARE | Age: 58
End: 2023-10-26

## 2023-10-26 ENCOUNTER — TELEPHONE (OUTPATIENT)
Dept: FAMILY MEDICINE CLINIC | Facility: CLINIC | Age: 58
End: 2023-10-26

## 2023-10-26 NOTE — H&P
Assessment/Plan:    Chronic anticoagulation  On Coumadin given previous history of saddle embolus. Failed Eliquis in the past.  No known etiology of PE. Unclear if provoked. Patient will need to hold anticoagulation for surgery. Will discuss with PCP regarding bridging. Incarcerated hernia  Symptomatic incarcerated periumbilical hernia. Patient now wishes to have this repaired. I think this is reasonable. She did undergo CT scan for further evaluation. CT scan was reviewed personally by me. The results were discussed with the patient and reviewed with her in the office. Given the size location and contents of the hernia I do feel that primary repair is warranted. The hernia does have a small neck and contains fat. Given the extensive abdominal wall I do feel that laparoscopic repair with mesh could potentially lead to a large seroma. Patient will be scheduled for an open hernia repair, possible mesh. The procedure itself including all associated risks including bleeding infection, recurrence, hematoma, injury to surrounding structures, were discussed at great length with the patient. The patient verbalized understands risks and is willing to proceed, consent was signed. Recent blood work reviewed. Patient will need a repeat EKG and medical optimization     Diagnoses and all orders for this visit:    Incarcerated hernia    Chronic anticoagulation          Blood work:    Recent blood work including CBC and CMP dated September 18, 2023 was personally reviewed by me. Imaging:    CT the abdomen pelvis dated September 22, 2023 was personally reviewed by me and interpreted/reviewed with the patient in the office. Subjective:      Patient ID: Israel Muro is a 62 y.o. female.     HPI    The following portions of the patient's history were reviewed and updated as appropriate: She  has a past medical history of Abdominal pain, lower, Acute renal failure ([address]), Allergic rhinitis, Ankle pain, right, Arthritis, Asthma, Bilateral chronic knee pain, Bladder pain, Blood clot in vein, Bursitis, Cardiac disorder, Cardiac murmur, Chest tightness or pressure, COPD (chronic obstructive pulmonary disease) ([address]), Coronary artery disease, Curvature of spine, GERD (gastroesophageal reflux disease), Hernia, hiatal, Hyperlipidemia, Hypertension, Inguinal hernia, Jaw closure abnormality, Lumbar herniated disc, Morbid obesity ([address]), Obesity, Osteoarthritis of right knee, Patellar tendinitis, Poor flexibility of tendon, Presence of IVC filter (10/12/2018), Pulmonary embolism ([address]), Sepsis ([address]), Severe sepsis due to methicillin resistant Staphylococcus aureus (MRSA) with acute organ dysfunction ([address]), Sleep apnea, obstructive, Spider vein, symptomatic, Status post arthroscopy of right knee, Stomach disorder, Tear of medial meniscus of right knee, Thyroid disorder, and Umbilical hernia.   She   Patient Active Problem List    Diagnosis Date Noted    Incarcerated hernia 09/18/2023    Pseudotumor cerebri 05/10/2023    Left foot pain 12/08/2022    History of shoulder surgery 08/10/2022    Hiatal hernia 02/28/2022    MICHAEL (obstructive sleep apnea)     Snoring 11/20/2020    Bilateral leg edema 07/23/2020    Abnormal CT of the chest 05/03/2020    Renal cyst, left 05/03/2020    Non-occlusive thrombus 12/04/2018    Dyslipidemia 11/09/2018    Stage 3 chronic kidney disease ([address]) 11/07/2018    Seasonal allergic rhinitis due to pollen 07/10/2018    History of methicillin resistant staphylococcus aureus (MRSA) 07/10/2017    Chronic anticoagulation 07/10/2017    Cough 07/10/2017    Morbid obesity ([address]) 06/05/2017    Secondary pulmonary hypertension 05/26/2017    History of pulmonary embolus (PE) 05/25/2017    Gastroesophageal reflux disease without esophagitis 05/25/2017    NSTEMI (non-ST elevated myocardial infarction) ([address]) 05/25/2017    Moderate persistent asthma 11/26/2016    Other specified hypothyroidism 05/28/2015     She  has a past surgical history that includes Hysterectomy (2009); Open anterior shoulder reconstruction (Left); Hand surgery (Right); Foot surgery (Left); Colonoscopy; pr arthrs kne surg w/meniscectomy med/lat w/shvg (Right, 4/11/2016); Exploratory laparotomy; Peripherally inserted central catheter insertion; Incision and drainage anterior neck (Right, 6/8/2017); Other surgical history; Cystoscopy (01/1994); Incisional hernia repair; Shoulder surgery; IR IVC filter removal (1/22/2019); and Fracture surgery. Her family history includes Arthritis in her mother; Breast cancer in her maternal aunt; Colon cancer in her brother and son; Diabetes in her maternal aunt; Lung cancer in her father; No Known Problems in her daughter, daughter, maternal grandfather, maternal grandmother, paternal grandfather, paternal grandmother, sister, sister, sister, sister, sister, and sister. She  reports that she quit smoking about 27 years ago. Her smoking use included cigarettes. She has never used smokeless tobacco. She reports that she does not drink alcohol and does not use drugs.   Current Outpatient Medications   Medication Sig Dispense Refill    albuterol (2.5 mg/3 mL) 0.083 % nebulizer solution Take 3 mL (2.5 mg total) by nebulization as needed for wheezing 3 mL 5    albuterol (PROVENTIL HFA,VENTOLIN HFA) 90 mcg/act inhaler Inhale 2 puffs every 4 (four) hours as needed for wheezing 54 g 3    atorvastatin (LIPITOR) 10 mg tablet Take 1 tablet (10 mg total) by mouth daily 90 tablet 3    budesonide-formoterol (Symbicort) 160-4.5 mcg/act inhaler Inhale 2 puffs 2 (two) times a day Rinse mouth after use 30.6 g 3    Diclofenac Sodium (VOLTAREN) 1 % Apply 4 g topically 4 (four) times a day 350 g 0    famotidine (PEPCID) 40 MG tablet TAKE ONE TABLET BY MOUTH DAILY AT 9PM AT BEDTIME 90 tablet 11    fluticasone (FLONASE) 50 mcg/act nasal spray INSTILL 2 SPRAYS IN EACH NOSTRIL DAILY (BULK) 48 g 11    furosemide (LASIX) 20 mg tablet Take 1 tablet (20 mg total) by mouth daily as needed (leg swelling) Take with potassium 30 tablet 5    hydrocortisone 0.5 % cream Apply topically 2 (two) times a day 30 g 3    levothyroxine 125 mcg tablet TAKE ONE TABLET BY MOUTH DAILY AT 8AM (vial) 90 tablet 3    loratadine (CLARITIN) 10 mg tablet Take 1 tablet (10 mg total) by mouth daily 30 tablet 5    montelukast (SINGULAIR) 10 mg tablet Take 1 tablet (10 mg total) by mouth daily at bedtime 90 tablet 3    omeprazole (PriLOSEC) 40 MG capsule Take 1 capsule (40 mg total) by mouth daily before breakfast 90 capsule 3    potassium chloride (MICRO-K) 10 MEQ CR capsule Take 1 capsule (10 mEq total) by mouth daily 30 capsule 5    warfarin (Coumadin) 1 mg tablet To take 8 mg on Monday (5mg tab and three 1 mg tablets) and 10 mg t,w,t,f,s,s 36 tablet 2    warfarin (COUMADIN) 5 mg tablet TAKE ONE AND ONE-HALF TABLETS BY MOUTH ON TUESDAYS,THURSDAYS, SATURDAYS AND SUNDAYS. TAKE TWO TABLETS ON MONDAYS, WEDNESDAYS AND FRIDAYS (VIAL) 135 tablet 3     No current facility-administered medications for this visit. She is allergic to clindamycin, medical tape, penicillin g, and penicillins. .    Review of Systems      Objective:      /72 (BP Location: Right arm, Patient Position: Sitting, Cuff Size: Standard)   Pulse 82   Temp 98 °F (36.7 °C) (Temporal)   Ht 5' 5" (1.651 m)   Wt 133 kg (294 lb)   LMP  (LMP Unknown)   SpO2 96%   BMI 48.92 kg/m²          Physical Exam

## 2023-10-26 NOTE — TELEPHONE ENCOUNTER
Patient is going to have an umbilical hernia repair. She will need to stop her Coumadin before that surgery. She should be bridged with Lovenox. This means that she has to give herself an injection in the belly twice a day. Ask her if she is willing to do that.

## 2023-10-26 NOTE — PROGRESS NOTES
Assessment/Plan:    Chronic anticoagulation  On Coumadin given previous history of saddle embolus. Failed Eliquis in the past.  No known etiology of PE. Unclear if provoked. Patient will need to hold anticoagulation for surgery. Will discuss with PCP regarding bridging. Incarcerated hernia  Symptomatic incarcerated periumbilical hernia. Patient now wishes to have this repaired. I think this is reasonable. She did undergo CT scan for further evaluation. CT scan was reviewed personally by me. The results were discussed with the patient and reviewed with her in the office. Given the size location and contents of the hernia I do feel that primary repair is warranted. The hernia does have a small neck and contains fat. Given the extensive abdominal wall I do feel that laparoscopic repair with mesh could potentially lead to a large seroma. Patient will be scheduled for an open hernia repair, possible mesh. The procedure itself including all associated risks including bleeding infection, recurrence, hematoma, injury to surrounding structures, were discussed at great length with the patient. The patient verbalized understands risks and is willing to proceed, consent was signed. Recent blood work reviewed. Patient will need a repeat EKG and medical optimization     Diagnoses and all orders for this visit:    Incarcerated hernia    Chronic anticoagulation          Blood work:    Recent blood work including CBC and CMP dated September 18, 2023 was personally reviewed by me. Imaging:    CT the abdomen pelvis dated September 22, 2023 was personally reviewed by me and interpreted/reviewed with the patient in the office. Subjective:      Patient ID: Krystal Ro is a 62 y.o. female.     HPI    The following portions of the patient's history were reviewed and updated as appropriate: She  has a past medical history of Abdominal pain, lower, Acute renal failure ([address]), Allergic rhinitis, Ankle pain, right, Arthritis, Asthma, Bilateral chronic knee pain, Bladder pain, Blood clot in vein, Bursitis, Cardiac disorder, Cardiac murmur, Chest tightness or pressure, COPD (chronic obstructive pulmonary disease) ([address]), Coronary artery disease, Curvature of spine, GERD (gastroesophageal reflux disease), Hernia, hiatal, Hyperlipidemia, Hypertension, Inguinal hernia, Jaw closure abnormality, Lumbar herniated disc, Morbid obesity ([address]), Obesity, Osteoarthritis of right knee, Patellar tendinitis, Poor flexibility of tendon, Presence of IVC filter (10/12/2018), Pulmonary embolism ([address]), Sepsis ([address]), Severe sepsis due to methicillin resistant Staphylococcus aureus (MRSA) with acute organ dysfunction ([address]), Sleep apnea, obstructive, Spider vein, symptomatic, Status post arthroscopy of right knee, Stomach disorder, Tear of medial meniscus of right knee, Thyroid disorder, and Umbilical hernia.   She   Patient Active Problem List    Diagnosis Date Noted    Incarcerated hernia 09/18/2023    Pseudotumor cerebri 05/10/2023    Left foot pain 12/08/2022    History of shoulder surgery 08/10/2022    Hiatal hernia 02/28/2022    MICHAEL (obstructive sleep apnea)     Snoring 11/20/2020    Bilateral leg edema 07/23/2020    Abnormal CT of the chest 05/03/2020    Renal cyst, left 05/03/2020    Non-occlusive thrombus 12/04/2018    Dyslipidemia 11/09/2018    Stage 3 chronic kidney disease ([address]) 11/07/2018    Seasonal allergic rhinitis due to pollen 07/10/2018    History of methicillin resistant staphylococcus aureus (MRSA) 07/10/2017    Chronic anticoagulation 07/10/2017    Cough 07/10/2017    Morbid obesity ([address]) 06/05/2017    Secondary pulmonary hypertension 05/26/2017    History of pulmonary embolus (PE) 05/25/2017    Gastroesophageal reflux disease without esophagitis 05/25/2017    NSTEMI (non-ST elevated myocardial infarction) ([address]) 05/25/2017    Moderate persistent asthma 11/26/2016    Other specified hypothyroidism 05/28/2015     She  has a past surgical history that includes Hysterectomy (2009); Open anterior shoulder reconstruction (Left); Hand surgery (Right); Foot surgery (Left); Colonoscopy; pr arthrs kne surg w/meniscectomy med/lat w/shvg (Right, 4/11/2016); Exploratory laparotomy; Peripherally inserted central catheter insertion; Incision and drainage anterior neck (Right, 6/8/2017); Other surgical history; Cystoscopy (01/1994); Incisional hernia repair; Shoulder surgery; IR IVC filter removal (1/22/2019); and Fracture surgery. Her family history includes Arthritis in her mother; Breast cancer in her maternal aunt; Colon cancer in her brother and son; Diabetes in her maternal aunt; Lung cancer in her father; No Known Problems in her daughter, daughter, maternal grandfather, maternal grandmother, paternal grandfather, paternal grandmother, sister, sister, sister, sister, sister, and sister. She  reports that she quit smoking about 27 years ago. Her smoking use included cigarettes. She has never used smokeless tobacco. She reports that she does not drink alcohol and does not use drugs.   Current Outpatient Medications   Medication Sig Dispense Refill    albuterol (2.5 mg/3 mL) 0.083 % nebulizer solution Take 3 mL (2.5 mg total) by nebulization as needed for wheezing 3 mL 5    albuterol (PROVENTIL HFA,VENTOLIN HFA) 90 mcg/act inhaler Inhale 2 puffs every 4 (four) hours as needed for wheezing 54 g 3    atorvastatin (LIPITOR) 10 mg tablet Take 1 tablet (10 mg total) by mouth daily 90 tablet 3    budesonide-formoterol (Symbicort) 160-4.5 mcg/act inhaler Inhale 2 puffs 2 (two) times a day Rinse mouth after use 30.6 g 3    Diclofenac Sodium (VOLTAREN) 1 % Apply 4 g topically 4 (four) times a day 350 g 0    famotidine (PEPCID) 40 MG tablet TAKE ONE TABLET BY MOUTH DAILY AT 9PM AT BEDTIME 90 tablet 11    fluticasone (FLONASE) 50 mcg/act nasal spray INSTILL 2 SPRAYS IN EACH NOSTRIL DAILY (BULK) 48 g 11    furosemide (LASIX) 20 mg tablet Take 1 tablet (20 mg total) by mouth daily as needed (leg swelling) Take with potassium 30 tablet 5    hydrocortisone 0.5 % cream Apply topically 2 (two) times a day 30 g 3    levothyroxine 125 mcg tablet TAKE ONE TABLET BY MOUTH DAILY AT 8AM (vial) 90 tablet 3    loratadine (CLARITIN) 10 mg tablet Take 1 tablet (10 mg total) by mouth daily 30 tablet 5    montelukast (SINGULAIR) 10 mg tablet Take 1 tablet (10 mg total) by mouth daily at bedtime 90 tablet 3    omeprazole (PriLOSEC) 40 MG capsule Take 1 capsule (40 mg total) by mouth daily before breakfast 90 capsule 3    potassium chloride (MICRO-K) 10 MEQ CR capsule Take 1 capsule (10 mEq total) by mouth daily 30 capsule 5    warfarin (Coumadin) 1 mg tablet To take 8 mg on Monday (5mg tab and three 1 mg tablets) and 10 mg t,w,t,f,s,s 36 tablet 2    warfarin (COUMADIN) 5 mg tablet TAKE ONE AND ONE-HALF TABLETS BY MOUTH ON TUESDAYS,THURSDAYS, SATURDAYS AND SUNDAYS. TAKE TWO TABLETS ON MONDAYS, WEDNESDAYS AND FRIDAYS (VIAL) 135 tablet 3     No current facility-administered medications for this visit. She is allergic to clindamycin, medical tape, penicillin g, and penicillins. .    Review of Systems      Objective:      /72 (BP Location: Right arm, Patient Position: Sitting, Cuff Size: Standard)   Pulse 82   Temp 98 °F (36.7 °C) (Temporal)   Ht 5' 5" (1.651 m)   Wt 133 kg (294 lb)   LMP  (LMP Unknown)   SpO2 96%   BMI 48.92 kg/m²          Physical Exam

## 2023-10-26 NOTE — H&P (VIEW-ONLY)
Assessment/Plan:    Chronic anticoagulation  On Coumadin given previous history of saddle embolus. Failed Eliquis in the past.  No known etiology of PE. Unclear if provoked. Patient will need to hold anticoagulation for surgery. Will discuss with PCP regarding bridging. Incarcerated hernia  Symptomatic incarcerated periumbilical hernia. Patient now wishes to have this repaired. I think this is reasonable. She did undergo CT scan for further evaluation. CT scan was reviewed personally by me. The results were discussed with the patient and reviewed with her in the office. Given the size location and contents of the hernia I do feel that primary repair is warranted. The hernia does have a small neck and contains fat. Given the extensive abdominal wall I do feel that laparoscopic repair with mesh could potentially lead to a large seroma. Patient will be scheduled for an open hernia repair, possible mesh. The procedure itself including all associated risks including bleeding infection, recurrence, hematoma, injury to surrounding structures, were discussed at great length with the patient. The patient verbalized understands risks and is willing to proceed, consent was signed. Recent blood work reviewed. Patient will need a repeat EKG and medical optimization     Diagnoses and all orders for this visit:    Incarcerated hernia    Chronic anticoagulation          Blood work:    Recent blood work including CBC and CMP dated September 18, 2023 was personally reviewed by me. Imaging:    CT the abdomen pelvis dated September 22, 2023 was personally reviewed by me and interpreted/reviewed with the patient in the office. Subjective:      Patient ID: Rony Moore is a 62 y.o. female.     HPI    The following portions of the patient's history were reviewed and updated as appropriate: She  has a past medical history of Abdominal pain, lower, Acute renal failure ([address]), Allergic rhinitis, Ankle pain, right, Arthritis, Asthma, Bilateral chronic knee pain, Bladder pain, Blood clot in vein, Bursitis, Cardiac disorder, Cardiac murmur, Chest tightness or pressure, COPD (chronic obstructive pulmonary disease) ([address]), Coronary artery disease, Curvature of spine, GERD (gastroesophageal reflux disease), Hernia, hiatal, Hyperlipidemia, Hypertension, Inguinal hernia, Jaw closure abnormality, Lumbar herniated disc, Morbid obesity ([address]), Obesity, Osteoarthritis of right knee, Patellar tendinitis, Poor flexibility of tendon, Presence of IVC filter (10/12/2018), Pulmonary embolism ([address]), Sepsis ([address]), Severe sepsis due to methicillin resistant Staphylococcus aureus (MRSA) with acute organ dysfunction ([address]), Sleep apnea, obstructive, Spider vein, symptomatic, Status post arthroscopy of right knee, Stomach disorder, Tear of medial meniscus of right knee, Thyroid disorder, and Umbilical hernia.   She   Patient Active Problem List    Diagnosis Date Noted    Incarcerated hernia 09/18/2023    Pseudotumor cerebri 05/10/2023    Left foot pain 12/08/2022    History of shoulder surgery 08/10/2022    Hiatal hernia 02/28/2022    MICHAEL (obstructive sleep apnea)     Snoring 11/20/2020    Bilateral leg edema 07/23/2020    Abnormal CT of the chest 05/03/2020    Renal cyst, left 05/03/2020    Non-occlusive thrombus 12/04/2018    Dyslipidemia 11/09/2018    Stage 3 chronic kidney disease ([address]) 11/07/2018    Seasonal allergic rhinitis due to pollen 07/10/2018    History of methicillin resistant staphylococcus aureus (MRSA) 07/10/2017    Chronic anticoagulation 07/10/2017    Cough 07/10/2017    Morbid obesity ([address]) 06/05/2017    Secondary pulmonary hypertension 05/26/2017    History of pulmonary embolus (PE) 05/25/2017    Gastroesophageal reflux disease without esophagitis 05/25/2017    NSTEMI (non-ST elevated myocardial infarction) ([address]) 05/25/2017    Moderate persistent asthma 11/26/2016    Other specified hypothyroidism 05/28/2015     She  has a past surgical history that includes Hysterectomy (2009); Open anterior shoulder reconstruction (Left); Hand surgery (Right); Foot surgery (Left); Colonoscopy; pr arthrs kne surg w/meniscectomy med/lat w/shvg (Right, 4/11/2016); Exploratory laparotomy; Peripherally inserted central catheter insertion; Incision and drainage anterior neck (Right, 6/8/2017); Other surgical history; Cystoscopy (01/1994); Incisional hernia repair; Shoulder surgery; IR IVC filter removal (1/22/2019); and Fracture surgery. Her family history includes Arthritis in her mother; Breast cancer in her maternal aunt; Colon cancer in her brother and son; Diabetes in her maternal aunt; Lung cancer in her father; No Known Problems in her daughter, daughter, maternal grandfather, maternal grandmother, paternal grandfather, paternal grandmother, sister, sister, sister, sister, sister, and sister. She  reports that she quit smoking about 27 years ago. Her smoking use included cigarettes. She has never used smokeless tobacco. She reports that she does not drink alcohol and does not use drugs.   Current Outpatient Medications   Medication Sig Dispense Refill    albuterol (2.5 mg/3 mL) 0.083 % nebulizer solution Take 3 mL (2.5 mg total) by nebulization as needed for wheezing 3 mL 5    albuterol (PROVENTIL HFA,VENTOLIN HFA) 90 mcg/act inhaler Inhale 2 puffs every 4 (four) hours as needed for wheezing 54 g 3    atorvastatin (LIPITOR) 10 mg tablet Take 1 tablet (10 mg total) by mouth daily 90 tablet 3    budesonide-formoterol (Symbicort) 160-4.5 mcg/act inhaler Inhale 2 puffs 2 (two) times a day Rinse mouth after use 30.6 g 3    Diclofenac Sodium (VOLTAREN) 1 % Apply 4 g topically 4 (four) times a day 350 g 0    famotidine (PEPCID) 40 MG tablet TAKE ONE TABLET BY MOUTH DAILY AT 9PM AT BEDTIME 90 tablet 11    fluticasone (FLONASE) 50 mcg/act nasal spray INSTILL 2 SPRAYS IN EACH NOSTRIL DAILY (BULK) 48 g 11    furosemide (LASIX) 20 mg tablet Take 1 tablet (20 mg total) by mouth daily as needed (leg swelling) Take with potassium 30 tablet 5    hydrocortisone 0.5 % cream Apply topically 2 (two) times a day 30 g 3    levothyroxine 125 mcg tablet TAKE ONE TABLET BY MOUTH DAILY AT 8AM (vial) 90 tablet 3    loratadine (CLARITIN) 10 mg tablet Take 1 tablet (10 mg total) by mouth daily 30 tablet 5    montelukast (SINGULAIR) 10 mg tablet Take 1 tablet (10 mg total) by mouth daily at bedtime 90 tablet 3    omeprazole (PriLOSEC) 40 MG capsule Take 1 capsule (40 mg total) by mouth daily before breakfast 90 capsule 3    potassium chloride (MICRO-K) 10 MEQ CR capsule Take 1 capsule (10 mEq total) by mouth daily 30 capsule 5    warfarin (Coumadin) 1 mg tablet To take 8 mg on Monday (5mg tab and three 1 mg tablets) and 10 mg t,w,t,f,s,s 36 tablet 2    warfarin (COUMADIN) 5 mg tablet TAKE ONE AND ONE-HALF TABLETS BY MOUTH ON TUESDAYS,THURSDAYS, SATURDAYS AND SUNDAYS. TAKE TWO TABLETS ON MONDAYS, WEDNESDAYS AND FRIDAYS (VIAL) 135 tablet 3     No current facility-administered medications for this visit. She is allergic to clindamycin, medical tape, penicillin g, and penicillins. .    Review of Systems      Objective:      /72 (BP Location: Right arm, Patient Position: Sitting, Cuff Size: Standard)   Pulse 82   Temp 98 °F (36.7 °C) (Temporal)   Ht 5' 5" (1.651 m)   Wt 133 kg (294 lb)   LMP  (LMP Unknown)   SpO2 96%   BMI 48.92 kg/m²          Physical Exam

## 2023-10-26 NOTE — ASSESSMENT & PLAN NOTE
On Coumadin given previous history of saddle embolus. Failed Eliquis in the past.  No known etiology of PE. Unclear if provoked. Patient will need to hold anticoagulation for surgery. Will discuss with PCP regarding bridging.

## 2023-10-26 NOTE — TELEPHONE ENCOUNTER
Hi, my name is Emerita Common calling from [facility] on patient Jocelyne Montez. Her date of birth is [birth year]. She is a patient of Doctor Sarabia. I'm just calling to verify that you received coagulation clearance for her. She is scheduled for umbilical hernia on November 15th. We just need to know if she should be bridged. If she can hold. I just want to make sure that you received the clearance that was faxed a call back number if you would need [phone]. My name again is Emerita Kramer. Thank you.

## 2023-10-26 NOTE — ASSESSMENT & PLAN NOTE
Symptomatic incarcerated periumbilical hernia. Patient now wishes to have this repaired. I think this is reasonable. She did undergo CT scan for further evaluation. CT scan was reviewed personally by me. The results were discussed with the patient and reviewed with her in the office. Given the size location and contents of the hernia I do feel that primary repair is warranted. The hernia does have a small neck and contains fat. Given the extensive abdominal wall I do feel that laparoscopic repair with mesh could potentially lead to a large seroma. Patient will be scheduled for an open hernia repair, possible mesh. The procedure itself including all associated risks including bleeding infection, recurrence, hematoma, injury to surrounding structures, were discussed at great length with the patient. The patient verbalized understands risks and is willing to proceed, consent was signed. Recent blood work reviewed.   Patient will need a repeat EKG and medical optimization

## 2023-10-26 NOTE — TELEPHONE ENCOUNTER
I believe she should be bridged with Lovenox. We will contact the patient to see if she is willing to give herself injections.

## 2023-10-26 NOTE — TELEPHONE ENCOUNTER
She's not sure she can do it but she will try. She said if that's what has to be done she will have to do it.

## 2023-10-27 ENCOUNTER — CONSULT (OUTPATIENT)
Dept: FAMILY MEDICINE CLINIC | Facility: CLINIC | Age: 58
End: 2023-10-27
Payer: COMMERCIAL

## 2023-10-27 ENCOUNTER — OFFICE VISIT (OUTPATIENT)
Dept: LAB | Facility: HOSPITAL | Age: 58
End: 2023-10-27
Payer: COMMERCIAL

## 2023-10-27 ENCOUNTER — ANTICOAG VISIT (OUTPATIENT)
Dept: FAMILY MEDICINE CLINIC | Facility: CLINIC | Age: 58
End: 2023-10-27

## 2023-10-27 ENCOUNTER — APPOINTMENT (OUTPATIENT)
Dept: LAB | Facility: HOSPITAL | Age: 58
End: 2023-10-27
Payer: COMMERCIAL

## 2023-10-27 VITALS
HEIGHT: 65 IN | BODY MASS INDEX: 48.82 KG/M2 | TEMPERATURE: 96.6 F | OXYGEN SATURATION: 97 % | DIASTOLIC BLOOD PRESSURE: 88 MMHG | WEIGHT: 293 LBS | HEART RATE: 77 BPM | SYSTOLIC BLOOD PRESSURE: 138 MMHG

## 2023-10-27 DIAGNOSIS — K46.0 INCARCERATED HERNIA: ICD-10-CM

## 2023-10-27 DIAGNOSIS — Z01.818 PRE-OP EXAMINATION: Primary | ICD-10-CM

## 2023-10-27 DIAGNOSIS — J45.40 MODERATE PERSISTENT ASTHMA WITHOUT COMPLICATION: ICD-10-CM

## 2023-10-27 DIAGNOSIS — Z79.01 CHRONIC ANTICOAGULATION: ICD-10-CM

## 2023-10-27 LAB
INR PPP: 3.2 (ref 0.84–1.19)
PROTHROMBIN TIME: 32.1 SECONDS (ref 11.6–14.5)

## 2023-10-27 PROCEDURE — 93005 ELECTROCARDIOGRAM TRACING: CPT

## 2023-10-27 PROCEDURE — 99243 OFF/OP CNSLTJ NEW/EST LOW 30: CPT | Performed by: FAMILY MEDICINE

## 2023-10-27 PROCEDURE — 85610 PROTHROMBIN TIME: CPT | Performed by: FAMILY MEDICINE

## 2023-10-27 PROCEDURE — 36415 COLL VENOUS BLD VENIPUNCTURE: CPT | Performed by: FAMILY MEDICINE

## 2023-10-27 RX ORDER — ENOXAPARIN SODIUM 100 MG/ML
40 INJECTION SUBCUTANEOUS 2 TIMES DAILY
Qty: 8 ML | Refills: 0 | Status: SHIPPED | OUTPATIENT
Start: 2023-11-11 | End: 2023-11-21

## 2023-10-27 NOTE — PROGRESS NOTES
Subjective:     Lex Colmenares is a 62 y.o. female who presents to the office today for a preoperative consultation at the request of surgeon Dr. Severino Galdamez who plans on performing umbilical hernia repair on November 15. This consultation is requested for the specific conditions prompting preoperative evaluation (i.e. because of potential affect on operative risk): Chronic anticoagulation. Planned anesthesia: general. The patient has the following known anesthesia issues:  none . Patients bleeding risk:  on coumadin . The following portions of the patient's history were reviewed and updated as appropriate: She  has a past medical history of Abdominal pain, lower, Acute renal failure ([address]), Allergic rhinitis, Ankle pain, right, Arthritis, Asthma, Bilateral chronic knee pain, Bladder pain, Blood clot in vein, Bursitis, Cardiac disorder, Cardiac murmur, Chest tightness or pressure, COPD (chronic obstructive pulmonary disease) ([address]), Coronary artery disease, Curvature of spine, GERD (gastroesophageal reflux disease), Hernia, hiatal, Hyperlipidemia, Hypertension, Inguinal hernia, Jaw closure abnormality, Lumbar herniated disc, Morbid obesity ([address]), Obesity, Osteoarthritis of right knee, Patellar tendinitis, Poor flexibility of tendon, Presence of IVC filter (10/12/2018), Pulmonary embolism ([address]), Sepsis ([address]), Severe sepsis due to methicillin resistant Staphylococcus aureus (MRSA) with acute organ dysfunction ([address]), Sleep apnea, obstructive, Spider vein, symptomatic, Status post arthroscopy of right knee, Stomach disorder, Tear of medial meniscus of right knee, Thyroid disorder, and Umbilical hernia.   She   Patient Active Problem List    Diagnosis Date Noted   • Incarcerated hernia 09/18/2023   • Pseudotumor cerebri 05/10/2023   • Left foot pain 12/08/2022   • History of shoulder surgery 08/10/2022   • Hiatal hernia 02/28/2022   • MICHAEL (obstructive sleep apnea)    • Snoring 11/20/2020   • Bilateral leg edema 07/23/2020   • Abnormal CT of the chest 05/03/2020   • Renal cyst, left 05/03/2020   • Non-occlusive thrombus 12/04/2018   • Dyslipidemia 11/09/2018   • Stage 3 chronic kidney disease ([address]) 11/07/2018   • Seasonal allergic rhinitis due to pollen 07/10/2018   • History of methicillin resistant staphylococcus aureus (MRSA) 07/10/2017   • Chronic anticoagulation 07/10/2017   • Cough 07/10/2017   • Morbid obesity ([address]) 06/05/2017   • Secondary pulmonary hypertension 05/26/2017   • History of pulmonary embolus (PE) 05/25/2017   • Gastroesophageal reflux disease without esophagitis 05/25/2017   • NSTEMI (non-ST elevated myocardial infarction) ([address]) 05/25/2017   • Moderate persistent asthma 11/26/2016   • Other specified hypothyroidism 05/28/2015     She  has a past surgical history that includes Hysterectomy (2009); Open anterior shoulder reconstruction (Left); Hand surgery (Right); Foot surgery (Left); Colonoscopy; pr arthrs kne surg w/meniscectomy med/lat w/shvg (Right, 4/11/2016); Exploratory laparotomy; Peripherally inserted central catheter insertion; Incision and drainage anterior neck (Right, 6/8/2017); Other surgical history; Cystoscopy (01/1994); Incisional hernia repair; Shoulder surgery; IR IVC filter removal (1/22/2019); and Fracture surgery. Her family history includes Arthritis in her mother; Breast cancer in her maternal aunt; Colon cancer in her brother and son; Diabetes in her maternal aunt; Lung cancer in her father; No Known Problems in her daughter, daughter, maternal grandfather, maternal grandmother, paternal grandfather, paternal grandmother, sister, sister, sister, sister, sister, and sister. She  reports that she quit smoking about 27 years ago. Her smoking use included cigarettes. She has never used smokeless tobacco. She reports that she does not drink alcohol and does not use drugs.   Current Outpatient Medications   Medication Sig Dispense Refill   • albuterol (2.5 mg/3 mL) 0.083 % nebulizer solution Take 3 mL (2.5 mg total) by nebulization as needed for wheezing 3 mL 5   • albuterol (PROVENTIL HFA,VENTOLIN HFA) 90 mcg/act inhaler Inhale 2 puffs every 4 (four) hours as needed for wheezing 54 g 3   • atorvastatin (LIPITOR) 10 mg tablet Take 1 tablet (10 mg total) by mouth daily 90 tablet 3   • budesonide-formoterol (Symbicort) 160-4.5 mcg/act inhaler Inhale 2 puffs 2 (two) times a day Rinse mouth after use 30.6 g 3   • Diclofenac Sodium (VOLTAREN) 1 % Apply 4 g topically 4 (four) times a day 350 g 0   • [START ON 11/11/2023] enoxaparin (LOVENOX) 40 mg/0.4 mL Inject 0.4 mL (40 mg total) under the skin 2 (two) times a day for 10 days Do not start before November 11, 2023. 8 mL 0   • famotidine (PEPCID) 40 MG tablet TAKE ONE TABLET BY MOUTH DAILY AT 9PM AT BEDTIME 90 tablet 11   • fluticasone (FLONASE) 50 mcg/act nasal spray INSTILL 2 SPRAYS IN EACH NOSTRIL DAILY (BULK) 48 g 11   • furosemide (LASIX) 20 mg tablet Take 1 tablet (20 mg total) by mouth daily as needed (leg swelling) Take with potassium 30 tablet 5   • hydrocortisone 0.5 % cream Apply topically 2 (two) times a day 30 g 3   • levothyroxine 125 mcg tablet TAKE ONE TABLET BY MOUTH DAILY AT 8AM (vial) 90 tablet 3   • loratadine (CLARITIN) 10 mg tablet Take 1 tablet (10 mg total) by mouth daily 30 tablet 5   • montelukast (SINGULAIR) 10 mg tablet Take 1 tablet (10 mg total) by mouth daily at bedtime 90 tablet 3   • omeprazole (PriLOSEC) 40 MG capsule Take 1 capsule (40 mg total) by mouth daily before breakfast 90 capsule 3   • potassium chloride (MICRO-K) 10 MEQ CR capsule Take 1 capsule (10 mEq total) by mouth daily 30 capsule 5   • warfarin (Coumadin) 1 mg tablet To take 8 mg on Monday (5mg tab and three 1 mg tablets) and 10 mg t,w,t,f,s,s 36 tablet 2   • warfarin (COUMADIN) 5 mg tablet TAKE ONE AND ONE-HALF TABLETS BY MOUTH ON TUESDAYS,THURSDAYS, SATURDAYS AND SUNDAYS.  TAKE TWO TABLETS ON MONDAYS, WEDNESDAYS AND FRIDAYS (VIAL) 135 tablet 3     No current facility-administered medications for this visit. Current Outpatient Medications on File Prior to Visit   Medication Sig   • albuterol (2.5 mg/3 mL) 0.083 % nebulizer solution Take 3 mL (2.5 mg total) by nebulization as needed for wheezing   • albuterol (PROVENTIL HFA,VENTOLIN HFA) 90 mcg/act inhaler Inhale 2 puffs every 4 (four) hours as needed for wheezing   • atorvastatin (LIPITOR) 10 mg tablet Take 1 tablet (10 mg total) by mouth daily   • budesonide-formoterol (Symbicort) 160-4.5 mcg/act inhaler Inhale 2 puffs 2 (two) times a day Rinse mouth after use   • Diclofenac Sodium (VOLTAREN) 1 % Apply 4 g topically 4 (four) times a day   • famotidine (PEPCID) 40 MG tablet TAKE ONE TABLET BY MOUTH DAILY AT 9PM AT BEDTIME   • fluticasone (FLONASE) 50 mcg/act nasal spray INSTILL 2 SPRAYS IN EACH NOSTRIL DAILY (BULK)   • furosemide (LASIX) 20 mg tablet Take 1 tablet (20 mg total) by mouth daily as needed (leg swelling) Take with potassium   • hydrocortisone 0.5 % cream Apply topically 2 (two) times a day   • levothyroxine 125 mcg tablet TAKE ONE TABLET BY MOUTH DAILY AT 8AM (vial)   • loratadine (CLARITIN) 10 mg tablet Take 1 tablet (10 mg total) by mouth daily   • montelukast (SINGULAIR) 10 mg tablet Take 1 tablet (10 mg total) by mouth daily at bedtime   • omeprazole (PriLOSEC) 40 MG capsule Take 1 capsule (40 mg total) by mouth daily before breakfast   • potassium chloride (MICRO-K) 10 MEQ CR capsule Take 1 capsule (10 mEq total) by mouth daily   • warfarin (Coumadin) 1 mg tablet To take 8 mg on Monday (5mg tab and three 1 mg tablets) and 10 mg t,w,t,f,s,s   • warfarin (COUMADIN) 5 mg tablet TAKE ONE AND ONE-HALF TABLETS BY MOUTH ON TUESDAYS,THURSDAYS, SATURDAYS AND SUNDAYS. TAKE TWO TABLETS ON MONDAYS, WEDNESDAYS AND FRIDAYS (VIAL)     No current facility-administered medications on file prior to visit. She is allergic to clindamycin, medical tape, penicillin g, and penicillins. .    Review of Systems  Pertinent items are noted in HPI. Objective:  Physical Exam  Vitals reviewed. Constitutional:       General: She is not in acute distress. Appearance: Normal appearance. She is well-developed. She is not ill-appearing, toxic-appearing or diaphoretic. HENT:      Head: Normocephalic and atraumatic. Eyes:      Conjunctiva/sclera: Conjunctivae normal.   Cardiovascular:      Rate and Rhythm: Normal rate and regular rhythm. Heart sounds: Normal heart sounds. No murmur heard. No friction rub. No gallop. Pulmonary:      Effort: Pulmonary effort is normal. No respiratory distress. Breath sounds: Normal breath sounds. No wheezing, rhonchi or rales. Musculoskeletal:      Right lower leg: No edema. Left lower leg: No edema. Neurological:      General: No focal deficit present. Mental Status: She is alert and oriented to person, place, and time. Psychiatric:         Mood and Affect: Mood normal.         Behavior: Behavior normal.         Thought Content:  Thought content normal.         Judgment: Judgment normal.         Cardiographics  ECG:  ordered  Echocardiogram: not done    Imaging  Chest x-ray:  not needed      Lab Review   Office Visit on 09/18/2023   Component Date Value   • Protime 09/18/2023 18.0 (H)    • INR 09/18/2023 1.46 (H)    • WBC 09/18/2023 5.51    • RBC 09/18/2023 4.57    • Hemoglobin 09/18/2023 13.8    • Hematocrit 09/18/2023 45.0    • MCV 09/18/2023 99 (H)    • MCH 09/18/2023 30.2    • MCHC 09/18/2023 30.7 (L)    • RDW 09/18/2023 13.9    • MPV 09/18/2023 10.6    • Platelets 99/27/9341 349    • nRBC 09/18/2023 0    • Neutrophils Relative 09/18/2023 55    • Immat GRANS % 09/18/2023 0    • Lymphocytes Relative 09/18/2023 28    • Monocytes Relative 09/18/2023 11    • Eosinophils Relative 09/18/2023 4    • Basophils Relative 09/18/2023 2 (H)    • Neutrophils Absolute 09/18/2023 3.07    • Immature Grans Absolute 09/18/2023 0.01    • Lymphocytes Absolute 09/18/2023 1.52    • Monocytes Absolute 09/18/2023 0.62    • Eosinophils Absolute 09/18/2023 0.20    • Basophils Absolute 09/18/2023 0.09    • Sodium 09/18/2023 141    • Potassium 09/18/2023 4.0    • Chloride 09/18/2023 106    • CO2 09/18/2023 27    • ANION GAP 09/18/2023 8    • BUN 09/18/2023 11    • Creatinine 09/18/2023 1.05    • Glucose, Fasting 09/18/2023 74    • Calcium 09/18/2023 9.1    • AST 09/18/2023 16    • ALT 09/18/2023 11    • Alkaline Phosphatase 09/18/2023 72    • Total Protein 09/18/2023 6.8    • Albumin 09/18/2023 3.7    • Total Bilirubin 09/18/2023 0.50    • eGFR 09/18/2023 58    Appointment on 09/06/2023   Component Date Value   • Protime 09/06/2023 20.9 (H)    • INR 09/06/2023 1.77 (H)         Assessment:     62 y.o. female with planned surgery as above. Known risk factors for perioperative complications: Chronic pulmonary disease    Difficulty with intubation is not anticipated. Current medications which may produce withdrawal symptoms if withheld perioperatively: none     Plan:  Medically cleared for umbilical hernia repair on 11/15/2023 by Dr. Norma Baird. Last dose of Coumadin will be on 11/10/2023. On 11/11/2023 she will start Lovenox injections, 40 mg subcutaneous twice a day. If okay with surgery, she should resume her Coumadin the day of surgery. Her last injection will be on 11/20/2023. 1. Preoperative workup as follows ECG. 2. Change in medication regimen before surgery:  see above . 3.  Deep vein thrombosis prophylaxis postoperatively:regimen to be chosen by surgical team.  4. Other measures:  none

## 2023-10-30 ENCOUNTER — TELEPHONE (OUTPATIENT)
Dept: SURGERY | Facility: CLINIC | Age: 58
End: 2023-10-30

## 2023-10-30 LAB
ATRIAL RATE: 77 BPM
P AXIS: 80 DEGREES
PR INTERVAL: 162 MS
QRS AXIS: 12 DEGREES
QRSD INTERVAL: 76 MS
QT INTERVAL: 394 MS
QTC INTERVAL: 445 MS
T WAVE AXIS: 38 DEGREES
VENTRICULAR RATE: 77 BPM

## 2023-10-30 PROCEDURE — 93010 ELECTROCARDIOGRAM REPORT: CPT | Performed by: INTERNAL MEDICINE

## 2023-10-30 NOTE — TELEPHONE ENCOUNTER
----- Message from Marcela White DO sent at 10/27/2023  7:49 PM EDT -----  Looks like Sultana Kaleb wrote a nice note and cleared for surgery and is also going to take care of the Lovenox injections.  ----- Message -----  From: Donald Patel DO  Sent: 10/27/2023  11:51 AM EDT  To: Marcela White DO

## 2023-11-03 ENCOUNTER — ANESTHESIA EVENT (OUTPATIENT)
Dept: PERIOP | Facility: HOSPITAL | Age: 58
End: 2023-11-03
Payer: COMMERCIAL

## 2023-11-06 NOTE — PRE-PROCEDURE INSTRUCTIONS
Pre-Surgery Instructions:   Medication Instructions    albuterol (2.5 mg/3 mL) 0.083 % nebulizer solution Uses PRN- OK to take day of surgery    albuterol (PROVENTIL HFA,VENTOLIN HFA) 90 mcg/act inhaler Uses PRN- OK to take day of surgery    atorvastatin (LIPITOR) 10 mg tablet Take day of surgery. budesonide-formoterol (Symbicort) 160-4.5 mcg/act inhaler Take day of surgery. Diclofenac Sodium (VOLTAREN) 1 % Hold day of surgery. famotidine (PEPCID) 40 MG tablet Take day of surgery. fluticasone (FLONASE) 50 mcg/act nasal spray Take day of surgery. furosemide (LASIX) 20 mg tablet Hold day of surgery. hydrocortisone 0.5 % cream Hold day of surgery. levothyroxine 125 mcg tablet Take day of surgery. loratadine (CLARITIN) 10 mg tablet Take day of surgery. montelukast (SINGULAIR) 10 mg tablet Take night before surgery    omeprazole (PriLOSEC) 40 MG capsule Take day of surgery. potassium chloride (MICRO-K) 10 MEQ CR capsule Hold day of surgery. warfarin (Coumadin) 1 mg tablet Instructions provided by MD. Patient is aware last dose on 11/10/23. Start Lovenox on 11/11/23    warfarin (COUMADIN) 5 mg tablet Instructions provided by MD Patient is aware last dose 11/10/23. Start Lovenox on 11/11/23      Medication instructions for day surgery reviewed. Please use only a sip of water to take your instructed medications. Avoid all over the counter vitamins, supplements and NSAIDS for one week prior to surgery per anesthesia guidelines. Tylenol is ok to take as needed. You will receive a call one business day prior to surgery with an arrival time and hospital directions. If your surgery is scheduled on a Monday, the hospital will be calling you on the Friday prior to your surgery. If you have not heard from anyone by 8pm, please call the hospital supervisor through the hospital  at [phone]. Leonard Morales [phone]).     Do not eat or drink anything after midnight the night before your surgery, including candy, mints, lifesavers, or chewing gum. Do not drink alcohol 24hrs before your surgery. Try not to smoke at least 24hrs before your surgery. Follow the pre surgery showering instructions as listed in the [facility] Surgical Experience Booklet” or otherwise provided by your surgeon's office. Do not use a blade to shave the surgical area 1 week before surgery. It is okay to use a clean electric clippers up to 24 hours before surgery. Do not apply any lotions, creams, including makeup, cologne, deodorant, or perfumes after showering on the day of your surgery. Do not use dry shampoo, hair spray, hair gel, or any type of hair products. No contact lenses, eye make-up, or artificial eyelashes. Remove nail polish, including gel polish, and any artificial, gel, or acrylic nails if possible. Remove all jewelry including rings and body piercing jewelry. Wear causal clothing that is easy to take on and off. Consider your type of surgery. Keep any valuables, jewelry, piercings at home. Please bring any specially ordered equipment (sling, braces) if indicated. Arrange for a responsible person to drive you to and from the hospital on the day of your surgery. Visitor Guidelines discussed. Call the surgeon's office with any new illnesses, exposures, or additional questions prior to surgery. Please reference your [facility] Surgical Experience Booklet” for additional information to prepare for your upcoming surgery.

## 2023-11-15 ENCOUNTER — HOSPITAL ENCOUNTER (OUTPATIENT)
Facility: HOSPITAL | Age: 58
Setting detail: OUTPATIENT SURGERY
Discharge: HOME | End: 2023-11-15
Attending: SURGERY | Admitting: SURGERY
Payer: COMMERCIAL

## 2023-11-15 ENCOUNTER — ANESTHESIA (OUTPATIENT)
Dept: PERIOP | Facility: HOSPITAL | Age: 58
End: 2023-11-15
Payer: COMMERCIAL

## 2023-11-15 VITALS
SYSTOLIC BLOOD PRESSURE: 144 MMHG | BODY MASS INDEX: 48.82 KG/M2 | DIASTOLIC BLOOD PRESSURE: 79 MMHG | WEIGHT: 293 LBS | RESPIRATION RATE: 20 BRPM | HEART RATE: 61 BPM | OXYGEN SATURATION: 98 % | HEIGHT: 65 IN | TEMPERATURE: 97.8 F

## 2023-11-15 DIAGNOSIS — K46.0 INCARCERATED HERNIA: Primary | ICD-10-CM

## 2023-11-15 LAB
APTT PPP: 28 SECONDS (ref 23–37)
INR PPP: 1.37 (ref 0.84–1.19)
PROTHROMBIN TIME: 16.7 SECONDS (ref 11.6–14.5)

## 2023-11-15 PROCEDURE — 85610 PROTHROMBIN TIME: CPT | Performed by: ANESTHESIOLOGY

## 2023-11-15 PROCEDURE — 85730 THROMBOPLASTIN TIME PARTIAL: CPT | Performed by: ANESTHESIOLOGY

## 2023-11-15 PROCEDURE — 49592 RPR AA HRN 1ST < 3 NCR/STRN: CPT

## 2023-11-15 PROCEDURE — 49592 RPR AA HRN 1ST < 3 NCR/STRN: CPT | Performed by: SURGERY

## 2023-11-15 RX ORDER — SODIUM CHLORIDE, SODIUM LACTATE, POTASSIUM CHLORIDE, CALCIUM CHLORIDE 600; 310; 30; 20 MG/100ML; MG/100ML; MG/100ML; MG/100ML
125 INJECTION, SOLUTION INTRAVENOUS CONTINUOUS
Status: DISCONTINUED | OUTPATIENT
Start: 2023-11-15 | End: 2023-11-15 | Stop reason: HOSPADM

## 2023-11-15 RX ORDER — PROPOFOL 10 MG/ML
INJECTION, EMULSION INTRAVENOUS AS NEEDED
Status: DISCONTINUED | OUTPATIENT
Start: 2023-11-15 | End: 2023-11-15

## 2023-11-15 RX ORDER — MAGNESIUM HYDROXIDE 1200 MG/15ML
LIQUID ORAL AS NEEDED
Status: DISCONTINUED | OUTPATIENT
Start: 2023-11-15 | End: 2023-11-15 | Stop reason: HOSPADM

## 2023-11-15 RX ORDER — SUCCINYLCHOLINE/SOD CL,ISO/PF 100 MG/5ML
SYRINGE (ML) INTRAVENOUS AS NEEDED
Status: DISCONTINUED | OUTPATIENT
Start: 2023-11-15 | End: 2023-11-15

## 2023-11-15 RX ORDER — GLYCOPYRROLATE 0.2 MG/ML
INJECTION INTRAMUSCULAR; INTRAVENOUS AS NEEDED
Status: DISCONTINUED | OUTPATIENT
Start: 2023-11-15 | End: 2023-11-15

## 2023-11-15 RX ORDER — MIDAZOLAM HYDROCHLORIDE 2 MG/2ML
INJECTION, SOLUTION INTRAMUSCULAR; INTRAVENOUS AS NEEDED
Status: DISCONTINUED | OUTPATIENT
Start: 2023-11-15 | End: 2023-11-15

## 2023-11-15 RX ORDER — CHLORHEXIDINE GLUCONATE 4 G/100ML
SOLUTION TOPICAL DAILY PRN
Status: DISCONTINUED | OUTPATIENT
Start: 2023-11-15 | End: 2023-11-15 | Stop reason: HOSPADM

## 2023-11-15 RX ORDER — MORPHINE SULFATE 10 MG/ML
2 INJECTION, SOLUTION INTRAMUSCULAR; INTRAVENOUS EVERY 4 HOURS PRN
Status: DISCONTINUED | OUTPATIENT
Start: 2023-11-15 | End: 2023-11-15 | Stop reason: HOSPADM

## 2023-11-15 RX ORDER — DEXAMETHASONE SODIUM PHOSPHATE 10 MG/ML
INJECTION, SOLUTION INTRAMUSCULAR; INTRAVENOUS AS NEEDED
Status: DISCONTINUED | OUTPATIENT
Start: 2023-11-15 | End: 2023-11-15

## 2023-11-15 RX ORDER — PHENYLEPHRINE HCL IN 0.9% NACL 1 MG/10 ML
SYRINGE (ML) INTRAVENOUS AS NEEDED
Status: DISCONTINUED | OUTPATIENT
Start: 2023-11-15 | End: 2023-11-15

## 2023-11-15 RX ORDER — OXYCODONE HYDROCHLORIDE AND ACETAMINOPHEN 5; 325 MG/1; MG/1
1 TABLET ORAL EVERY 4 HOURS PRN
Qty: 20 TABLET | Refills: 0 | Status: SHIPPED | OUTPATIENT
Start: 2023-11-15

## 2023-11-15 RX ORDER — HEPARIN SODIUM 5000 [USP'U]/ML
7500 INJECTION, SOLUTION INTRAVENOUS; SUBCUTANEOUS ONCE
Status: COMPLETED | OUTPATIENT
Start: 2023-11-15 | End: 2023-11-15

## 2023-11-15 RX ORDER — ONDANSETRON 2 MG/ML
4 INJECTION INTRAMUSCULAR; INTRAVENOUS EVERY 8 HOURS PRN
Status: DISCONTINUED | OUTPATIENT
Start: 2023-11-15 | End: 2023-11-15 | Stop reason: HOSPADM

## 2023-11-15 RX ORDER — HYDROMORPHONE HCL/PF 1 MG/ML
0.5 SYRINGE (ML) INJECTION
Status: DISCONTINUED | OUTPATIENT
Start: 2023-11-15 | End: 2023-11-15 | Stop reason: HOSPADM

## 2023-11-15 RX ORDER — PROPOFOL 10 MG/ML
INJECTION, EMULSION INTRAVENOUS CONTINUOUS PRN
Status: DISCONTINUED | OUTPATIENT
Start: 2023-11-15 | End: 2023-11-15

## 2023-11-15 RX ORDER — ONDANSETRON 2 MG/ML
4 INJECTION INTRAMUSCULAR; INTRAVENOUS ONCE AS NEEDED
Status: DISCONTINUED | OUTPATIENT
Start: 2023-11-15 | End: 2023-11-15 | Stop reason: HOSPADM

## 2023-11-15 RX ORDER — BUPIVACAINE HYDROCHLORIDE 5 MG/ML
INJECTION, SOLUTION EPIDURAL; INTRACAUDAL AS NEEDED
Status: DISCONTINUED | OUTPATIENT
Start: 2023-11-15 | End: 2023-11-15 | Stop reason: HOSPADM

## 2023-11-15 RX ORDER — LIDOCAINE HYDROCHLORIDE 20 MG/ML
INJECTION, SOLUTION EPIDURAL; INFILTRATION; INTRACAUDAL; PERINEURAL AS NEEDED
Status: DISCONTINUED | OUTPATIENT
Start: 2023-11-15 | End: 2023-11-15

## 2023-11-15 RX ORDER — NEOSTIGMINE METHYLSULFATE 1 MG/ML
INJECTION INTRAVENOUS AS NEEDED
Status: DISCONTINUED | OUTPATIENT
Start: 2023-11-15 | End: 2023-11-15

## 2023-11-15 RX ORDER — ACETAMINOPHEN 10 MG/ML
INJECTION, SOLUTION INTRAVENOUS AS NEEDED
Status: DISCONTINUED | OUTPATIENT
Start: 2023-11-15 | End: 2023-11-15

## 2023-11-15 RX ORDER — ROCURONIUM BROMIDE 10 MG/ML
INJECTION, SOLUTION INTRAVENOUS AS NEEDED
Status: DISCONTINUED | OUTPATIENT
Start: 2023-11-15 | End: 2023-11-15

## 2023-11-15 RX ORDER — FENTANYL CITRATE 50 UG/ML
INJECTION, SOLUTION INTRAMUSCULAR; INTRAVENOUS AS NEEDED
Status: DISCONTINUED | OUTPATIENT
Start: 2023-11-15 | End: 2023-11-15

## 2023-11-15 RX ORDER — ONDANSETRON 2 MG/ML
INJECTION INTRAMUSCULAR; INTRAVENOUS AS NEEDED
Status: DISCONTINUED | OUTPATIENT
Start: 2023-11-15 | End: 2023-11-15

## 2023-11-15 RX ORDER — OXYCODONE HYDROCHLORIDE AND ACETAMINOPHEN 5; 325 MG/1; MG/1
2 TABLET ORAL EVERY 4 HOURS PRN
Status: DISCONTINUED | OUTPATIENT
Start: 2023-11-15 | End: 2023-11-15 | Stop reason: HOSPADM

## 2023-11-15 RX ORDER — FENTANYL CITRATE/PF 50 MCG/ML
25 SYRINGE (ML) INJECTION
Status: DISCONTINUED | OUTPATIENT
Start: 2023-11-15 | End: 2023-11-15 | Stop reason: HOSPADM

## 2023-11-15 RX ORDER — METOCLOPRAMIDE HYDROCHLORIDE 5 MG/ML
INJECTION INTRAMUSCULAR; INTRAVENOUS AS NEEDED
Status: DISCONTINUED | OUTPATIENT
Start: 2023-11-15 | End: 2023-11-15

## 2023-11-15 RX ORDER — ESMOLOL HYDROCHLORIDE 10 MG/ML
INJECTION INTRAVENOUS AS NEEDED
Status: DISCONTINUED | OUTPATIENT
Start: 2023-11-15 | End: 2023-11-15

## 2023-11-15 RX ADMIN — FENTANYL CITRATE 25 MCG: 50 INJECTION, SOLUTION INTRAMUSCULAR; INTRAVENOUS at 11:02

## 2023-11-15 RX ADMIN — METOCLOPRAMIDE 10 MG: 5 INJECTION, SOLUTION INTRAMUSCULAR; INTRAVENOUS at 09:18

## 2023-11-15 RX ADMIN — Medication 140 MG: at 09:07

## 2023-11-15 RX ADMIN — ONDANSETRON 4 MG: 2 INJECTION INTRAMUSCULAR; INTRAVENOUS at 09:18

## 2023-11-15 RX ADMIN — HEPARIN SODIUM 7500 UNITS: 5000 INJECTION INTRAVENOUS; SUBCUTANEOUS at 08:39

## 2023-11-15 RX ADMIN — ACETAMINOPHEN 1000 MG: 10 INJECTION INTRAVENOUS at 09:33

## 2023-11-15 RX ADMIN — FENTANYL CITRATE 50 MCG: 50 INJECTION, SOLUTION INTRAMUSCULAR; INTRAVENOUS at 09:07

## 2023-11-15 RX ADMIN — LIDOCAINE HYDROCHLORIDE 100 MG: 20 INJECTION, SOLUTION EPIDURAL; INFILTRATION; INTRACAUDAL; PERINEURAL at 09:07

## 2023-11-15 RX ADMIN — SODIUM CHLORIDE, SODIUM LACTATE, POTASSIUM CHLORIDE, AND CALCIUM CHLORIDE 125 ML/HR: .6; .31; .03; .02 INJECTION, SOLUTION INTRAVENOUS at 08:41

## 2023-11-15 RX ADMIN — MIDAZOLAM 2 MG: 1 INJECTION INTRAMUSCULAR; INTRAVENOUS at 09:03

## 2023-11-15 RX ADMIN — Medication 100 MCG: at 09:20

## 2023-11-15 RX ADMIN — FENTANYL CITRATE 50 MCG: 50 INJECTION, SOLUTION INTRAMUSCULAR; INTRAVENOUS at 09:13

## 2023-11-15 RX ADMIN — ROCURONIUM BROMIDE 30 MG: 10 INJECTION, SOLUTION INTRAVENOUS at 09:13

## 2023-11-15 RX ADMIN — GLYCOPYRROLATE 0.4 MG: 0.2 INJECTION, SOLUTION INTRAMUSCULAR; INTRAVENOUS at 10:05

## 2023-11-15 RX ADMIN — SODIUM CHLORIDE, SODIUM LACTATE, POTASSIUM CHLORIDE, AND CALCIUM CHLORIDE: .6; .31; .03; .02 INJECTION, SOLUTION INTRAVENOUS at 09:53

## 2023-11-15 RX ADMIN — PROPOFOL 100 MCG/KG/MIN: 10 INJECTION, EMULSION INTRAVENOUS at 09:07

## 2023-11-15 RX ADMIN — Medication 100 MCG: at 09:23

## 2023-11-15 RX ADMIN — ESMOLOL HYDROCHLORIDE 30 MG: 100 INJECTION, SOLUTION INTRAVENOUS at 10:05

## 2023-11-15 RX ADMIN — CEFAZOLIN 3000 MG: 1 INJECTION, POWDER, FOR SOLUTION INTRAMUSCULAR; INTRAVENOUS at 09:00

## 2023-11-15 RX ADMIN — NEOSTIGMINE METHYLSULFATE 3 MG: 1 INJECTION INTRAVENOUS at 10:05

## 2023-11-15 RX ADMIN — OXYCODONE HYDROCHLORIDE AND ACETAMINOPHEN 2 TABLET: 5; 325 TABLET ORAL at 12:02

## 2023-11-15 RX ADMIN — DEXAMETHASONE SODIUM PHOSPHATE 10 MG: 10 INJECTION, SOLUTION INTRAMUSCULAR; INTRAVENOUS at 09:18

## 2023-11-15 RX ADMIN — PROPOFOL 150 MG: 10 INJECTION, EMULSION INTRAVENOUS at 09:07

## 2023-11-15 NOTE — ANESTHESIA POSTPROCEDURE EVALUATION
Post-Op Assessment Note    CV Status:  Stable  Pain Score: 0    Pain management: adequate    Multimodal analgesia used between 6 hours prior to anesthesia start to PACU discharge    Mental Status:  Sleepy and arousable   Hydration Status:  Euvolemic   PONV Controlled:  Controlled   Airway Patency:  Patent     Post Op Vitals Reviewed: Yes    No anethesia notable event occurred.     Staff: Anesthesiologist, CRNA               /66 (11/15/23 1031)    Temp 97.6 °F (36.4 °C) (11/15/23 1031)    Pulse 75 (11/15/23 1031)   Resp 18 (11/15/23 1031)    SpO2 97 % (11/15/23 1031)

## 2023-11-15 NOTE — INTERVAL H&P NOTE
H&P reviewed. After examining the patient I find no changes in the patients condition since the H&P had been written.     Vitals:    11/15/23 0809   BP: 130/93   Pulse: 74   Resp: 20   Temp: (!) 97.1 °F (36.2 °C)   SpO2: 99%

## 2023-11-15 NOTE — PROGRESS NOTES
Pt tolerated snack and is requesting pain pill; pt is able to reposition self but pain increases with movement; 2 percocet given as ordered

## 2023-11-15 NOTE — ANESTHESIA PREPROCEDURE EVALUATION
Procedure:  REPAIR HERNIA UMBILICAL possible mesh (Abdomen)    Relevant Problems   CARDIO   (+) NSTEMI (non-ST elevated myocardial infarction) (HCC)      ENDO   (+) Other specified hypothyroidism      GI/HEPATIC   (+) Gastroesophageal reflux disease without esophagitis   (+) Hiatal hernia      /RENAL   (+) Renal cyst, left   (+) Stage 3 chronic kidney disease (HCC)      MUSCULOSKELETAL   (+) Hiatal hernia      PULMONARY   (+) Moderate persistent asthma   (+) MICHAEL (obstructive sleep apnea)        Physical Exam    Airway    Mallampati score: II  TM Distance: <3 FB  Neck ROM: full     Dental    upper dentures and lower dentures,     Cardiovascular      Pulmonary      Other Findings        Anesthesia Plan  ASA Score- 3     Anesthesia Type- general with ASA Monitors. Additional Monitors:     Airway Plan: ETT. Comment: Patient denies NSTEMI - likely in setting of PE 2017. Plan Factors-Exercise tolerance (METS): >4 METS. Chart reviewed. Patient is not a current smoker. Patient did not smoke on day of surgery. Induction- intravenous. Postoperative Plan- Plan for postoperative opioid use. Planned trial extubation    Informed Consent- Anesthetic plan and risks discussed with patient. I personally reviewed this patient with the CRNA. Discussed and agreed on the Anesthesia Plan with the CRNA. Swetha Pimentel Anesthesia plan and consent discussed with Liliana Jansen who expressed understanding and agreement. Risks/benefits and alternatives discussed with patient including possible PONV, sore throat, damage to teeth/lips/gums and possibility of rare anesthetic and surgical emergencies.

## 2023-11-15 NOTE — DISCHARGE INSTR - AVS FIRST PAGE
Follow-up with Dr. Oc Easton in 2 weeks as per appointment. Regular diet as tolerated. Low intensity activity as tolerated, no heavy lifting, nothing greater than 10 pounds for 6 weeks. Dressing may be removed on Friday. You may shower on Friday. No baths or swimming    I would recommend you keep the abdominal binder on at all times during the day except when sleeping. You can restart your Coumadin tonight and continue the Lovenox shots  as per Dr. Christine Elias instructions. If develop fever, worsening pain, redness or drainage from the incision the call the office or go to the ER.

## 2023-11-24 DIAGNOSIS — I26.01 ACUTE SEPTIC PULMONARY EMBOLISM WITH ACUTE COR PULMONALE (HCC): Primary | ICD-10-CM

## 2023-11-24 DIAGNOSIS — I82.90 NON-OCCLUSIVE THROMBUS: ICD-10-CM

## 2023-11-25 ENCOUNTER — APPOINTMENT (OUTPATIENT)
Dept: LAB | Facility: MEDICAL CENTER | Age: 58
End: 2023-11-25
Payer: COMMERCIAL

## 2023-11-25 LAB
INR PPP: 1.26 (ref 0.84–1.19)
PROTHROMBIN TIME: 15.6 SECONDS (ref 11.6–14.5)

## 2023-11-25 PROCEDURE — 85610 PROTHROMBIN TIME: CPT

## 2023-11-25 PROCEDURE — 36415 COLL VENOUS BLD VENIPUNCTURE: CPT

## 2023-11-27 ENCOUNTER — ANTICOAG VISIT (OUTPATIENT)
Dept: FAMILY MEDICINE CLINIC | Facility: CLINIC | Age: 58
End: 2023-11-27

## 2023-11-29 ENCOUNTER — OFFICE VISIT (OUTPATIENT)
Dept: SURGERY | Facility: CLINIC | Age: 58
End: 2023-11-29
Payer: COMMERCIAL

## 2023-11-29 VITALS
HEART RATE: 90 BPM | BODY MASS INDEX: 48.82 KG/M2 | HEIGHT: 65 IN | WEIGHT: 293 LBS | DIASTOLIC BLOOD PRESSURE: 84 MMHG | TEMPERATURE: 98.7 F | SYSTOLIC BLOOD PRESSURE: 132 MMHG | OXYGEN SATURATION: 99 %

## 2023-11-29 DIAGNOSIS — K46.0 INCARCERATED HERNIA: Primary | ICD-10-CM

## 2023-11-29 PROCEDURE — 99212 OFFICE O/P EST SF 10 MIN: CPT | Performed by: SURGERY

## 2023-11-29 NOTE — PROGRESS NOTES
Assessment/Plan:    Incarcerated hernia  Status post open repair of incarcerated ventral hernia. Doing well. Incision clean dry intact with Steri-Strips. Continue IV lifting for additional 4 weeks. Follow-up 1 month. Diagnoses and all orders for this visit:    Incarcerated hernia          Subjective:      Patient ID: Svitlana Arreguin is a 62 y.o. female. 60-year-old female status post repair of incarcerated ventral hernia presents today for postop 2. Overall doing well. No nausea vomiting fevers chills. Tolerated diet. No significant abdominal pain. No redness or drainage from incision. The following portions of the patient's history were reviewed and updated as appropriate: She  has a past medical history of Abdominal pain, lower, Acute renal failure ([address]), Allergic rhinitis, Ankle pain, right, Arthritis, Asthma, Bilateral chronic knee pain, Bladder pain, Blood clot in vein, Bursitis, Cardiac disorder, Cardiac murmur, Chest tightness or pressure, COPD (chronic obstructive pulmonary disease) ([address]), Coronary artery disease, Curvature of spine, GERD (gastroesophageal reflux disease), Hernia, hiatal, Hyperlipidemia, Hypertension, Inguinal hernia, Jaw closure abnormality, Lumbar herniated disc, Morbid obesity ([address]), Obesity, Osteoarthritis of right knee, Patellar tendinitis, Poor flexibility of tendon, Presence of IVC filter (10/12/2018), Pulmonary embolism ([address]), Sepsis ([address]), Severe sepsis due to methicillin resistant Staphylococcus aureus (MRSA) with acute organ dysfunction ([address]), Sleep apnea, obstructive, Spider vein, symptomatic, Status post arthroscopy of right knee, Stomach disorder, Tear of medial meniscus of right knee, Thyroid disorder, and Umbilical hernia.   She   Patient Active Problem List    Diagnosis Date Noted    Incarcerated hernia 09/18/2023    Pseudotumor cerebri 05/10/2023    Left foot pain 12/08/2022    History of shoulder surgery 08/10/2022    Hiatal hernia 02/28/2022    MICHAEL (obstructive sleep apnea)     Snoring 11/20/2020    Bilateral leg edema 07/23/2020    Abnormal CT of the chest 05/03/2020    Renal cyst, left 05/03/2020    Non-occlusive thrombus 12/04/2018    Dyslipidemia 11/09/2018    Stage 3 chronic kidney disease ([address]) 11/07/2018    Seasonal allergic rhinitis due to pollen 07/10/2018    History of methicillin resistant staphylococcus aureus (MRSA) 07/10/2017    Chronic anticoagulation 07/10/2017    Cough 07/10/2017    Morbid obesity ([address]) 06/05/2017    Secondary pulmonary hypertension 05/26/2017    History of pulmonary embolus (PE) 05/25/2017    Gastroesophageal reflux disease without esophagitis 05/25/2017    NSTEMI (non-ST elevated myocardial infarction) ([address]) 05/25/2017    Moderate persistent asthma 11/26/2016    Other specified hypothyroidism 05/28/2015     She  has a past surgical history that includes Hysterectomy (2009); Open anterior shoulder reconstruction (Left); Hand surgery (Right); Foot surgery (Left); Colonoscopy; pr arthrs kne surg w/meniscectomy med/lat w/shvg (Right, 4/11/2016); Exploratory laparotomy; Peripherally inserted central catheter insertion; Incision and drainage anterior neck (Right, 6/8/2017); Other surgical history; Cystoscopy (01/1994); Incisional hernia repair; Shoulder surgery; IR IVC filter removal (1/22/2019); Fracture surgery; and pr rpr aa hernia 1st 3-10 cm reducible (N/A, 11/15/2023). Her family history includes Arthritis in her mother; Breast cancer in her maternal aunt; Colon cancer in her brother and son; Diabetes in her maternal aunt; Lung cancer in her father; No Known Problems in her daughter, daughter, maternal grandfather, maternal grandmother, paternal grandfather, paternal grandmother, sister, sister, sister, sister, sister, and sister. She  reports that she quit smoking about 28 years ago. Her smoking use included cigarettes.  She has never used smokeless tobacco. She reports that she does not drink alcohol and does not use drugs.  Current Outpatient Medications   Medication Sig Dispense Refill    albuterol (2.5 mg/3 mL) 0.083 % nebulizer solution Take 3 mL (2.5 mg total) by nebulization as needed for wheezing 3 mL 5    albuterol (PROVENTIL HFA,VENTOLIN HFA) 90 mcg/act inhaler Inhale 2 puffs every 4 (four) hours as needed for wheezing 54 g 3    atorvastatin (LIPITOR) 10 mg tablet Take 1 tablet (10 mg total) by mouth daily 90 tablet 3    budesonide-formoterol (Symbicort) 160-4.5 mcg/act inhaler Inhale 2 puffs 2 (two) times a day Rinse mouth after use 30.6 g 3    Diclofenac Sodium (VOLTAREN) 1 % Apply 4 g topically 4 (four) times a day 350 g 0    enoxaparin (LOVENOX) 40 mg/0.4 mL Inject 0.4 mL (40 mg total) under the skin 2 (two) times a day for 10 days Do not start before November 11, 2023. 8 mL 0    famotidine (PEPCID) 40 MG tablet TAKE ONE TABLET BY MOUTH DAILY AT 9PM AT BEDTIME 90 tablet 11    fluticasone (FLONASE) 50 mcg/act nasal spray INSTILL 2 SPRAYS IN EACH NOSTRIL DAILY (BULK) 48 g 11    furosemide (LASIX) 20 mg tablet Take 1 tablet (20 mg total) by mouth daily as needed (leg swelling) Take with potassium 30 tablet 5    hydrocortisone 0.5 % cream Apply topically 2 (two) times a day 30 g 3    levothyroxine 125 mcg tablet TAKE ONE TABLET BY MOUTH DAILY AT 8AM (vial) 90 tablet 3    loratadine (CLARITIN) 10 mg tablet Take 1 tablet (10 mg total) by mouth daily 30 tablet 5    montelukast (SINGULAIR) 10 mg tablet Take 1 tablet (10 mg total) by mouth daily at bedtime 90 tablet 3    omeprazole (PriLOSEC) 40 MG capsule Take 1 capsule (40 mg total) by mouth daily before breakfast 90 capsule 3    oxyCODONE-acetaminophen (PERCOCET) 5-325 mg per tablet Take 1 tablet by mouth every 4 (four) hours as needed for moderate pain or severe pain Max Daily Amount: 6 tablets 20 tablet 0    potassium chloride (MICRO-K) 10 MEQ CR capsule Take 1 capsule (10 mEq total) by mouth daily 30 capsule 5    warfarin (Coumadin) 1 mg tablet To take 8 mg on Monday (5mg tab and three 1 mg tablets) and 10 mg t,w,t,f,s,s 36 tablet 2    warfarin (COUMADIN) 5 mg tablet TAKE ONE AND ONE-HALF TABLETS BY MOUTH ON TUESDAYS,THURSDAYS, SATURDAYS AND SUNDAYS. TAKE TWO TABLETS ON MONDAYS, WEDNESDAYS AND FRIDAYS (VIAL) 135 tablet 3     No current facility-administered medications for this visit. She is allergic to clindamycin, medical tape, penicillin g, and penicillins. .    Review of Systems   Constitutional:  Negative for activity change, appetite change, chills, diaphoresis, fatigue, fever and unexpected weight change. Respiratory:  Negative for cough and shortness of breath. Cardiovascular:  Negative for chest pain and palpitations. Gastrointestinal:  Negative for abdominal pain, constipation, nausea and vomiting. Skin:  Negative for color change, pallor and rash. Objective:      /84 (BP Location: Left arm, Patient Position: Sitting, Cuff Size: Standard)   Pulse 90   Temp 98.7 °F (37.1 °C) (Temporal)   Ht 5' 5" (1.651 m)   Wt 133 kg (294 lb)   LMP  (LMP Unknown)   SpO2 99%   BMI 48.92 kg/m²          Physical Exam  Vitals reviewed. Constitutional:       General: She is not in acute distress. Appearance: Normal appearance. She is not ill-appearing, toxic-appearing or diaphoretic. HENT:      Head: Normocephalic and atraumatic. Right Ear: External ear normal.      Left Ear: External ear normal.   Eyes:      General: No scleral icterus. Right eye: No discharge. Left eye: No discharge. Cardiovascular:      Rate and Rhythm: Normal rate. Pulmonary:      Effort: Pulmonary effort is normal. No respiratory distress. Abdominal:      General: There is no distension. Palpations: Abdomen is soft. There is no mass. Tenderness: There is no abdominal tenderness. Hernia: No hernia is present. Comments: Supraumbilical vertical incision clean dry intact with Steri-Strips. No evidence of any erythema or drainage. Musculoskeletal:         General: No signs of injury. Normal range of motion. Cervical back: Normal range of motion. Skin:     General: Skin is warm. Coloration: Skin is not jaundiced or pale. Findings: No bruising or erythema. Neurological:      General: No focal deficit present. Mental Status: She is alert. Cranial Nerves: No cranial nerve deficit. Sensory: No sensory deficit. Motor: No weakness.       Coordination: Coordination normal.

## 2023-11-29 NOTE — ASSESSMENT & PLAN NOTE
Status post open repair of incarcerated ventral hernia. Doing well. Incision clean dry intact with Steri-Strips. Continue IV lifting for additional 4 weeks. Follow-up 1 month.

## 2023-12-06 ENCOUNTER — TELEPHONE (OUTPATIENT)
Dept: FAMILY MEDICINE CLINIC | Facility: CLINIC | Age: 58
End: 2023-12-06

## 2023-12-06 DIAGNOSIS — G47.33 OSA (OBSTRUCTIVE SLEEP APNEA): Primary | ICD-10-CM

## 2023-12-06 DIAGNOSIS — J45.40 MODERATE PERSISTENT ASTHMA WITHOUT COMPLICATION: ICD-10-CM

## 2023-12-14 ENCOUNTER — OFFICE VISIT (OUTPATIENT)
Dept: FAMILY MEDICINE CLINIC | Facility: CLINIC | Age: 58
End: 2023-12-14
Payer: COMMERCIAL

## 2023-12-14 VITALS
SYSTOLIC BLOOD PRESSURE: 140 MMHG | HEIGHT: 65 IN | WEIGHT: 293 LBS | DIASTOLIC BLOOD PRESSURE: 88 MMHG | TEMPERATURE: 96.4 F | HEART RATE: 78 BPM | BODY MASS INDEX: 48.82 KG/M2 | OXYGEN SATURATION: 96 %

## 2023-12-14 DIAGNOSIS — Z23 ENCOUNTER FOR IMMUNIZATION: ICD-10-CM

## 2023-12-14 DIAGNOSIS — Z86.711 HISTORY OF PULMONARY EMBOLUS (PE): ICD-10-CM

## 2023-12-14 DIAGNOSIS — J45.40 MODERATE PERSISTENT ASTHMA WITHOUT COMPLICATION: Primary | ICD-10-CM

## 2023-12-14 PROCEDURE — 90677 PCV20 VACCINE IM: CPT | Performed by: FAMILY MEDICINE

## 2023-12-14 PROCEDURE — 90471 IMMUNIZATION ADMIN: CPT | Performed by: FAMILY MEDICINE

## 2023-12-14 PROCEDURE — 99214 OFFICE O/P EST MOD 30 MIN: CPT | Performed by: FAMILY MEDICINE

## 2023-12-14 NOTE — PROGRESS NOTES
Name: Faith Lam      : 1965      MRN: [id number]  Encounter Provider: Mina Miller DO  Encounter Date: 2023   Encounter department: [address]   PT/INR ordered. Prevnar 20 given today. Referral to pulmonology. Follow-up here in 4 months or as needed  1. Moderate persistent asthma without complication  -     Ambulatory Referral to Pulmonology; Future    2. History of pulmonary embolus (PE)  -     Protime-INR    3. Encounter for immunization  -     Pneumococcal Conjugate Vaccine 20-valent (Pcv20)        Depression Screening and Follow-up Plan: Patient was screened for depression during today's encounter. They screened negative with a PHQ-2 score of 0. Subjective     Patient here today for follow-up. She denies any chest pain or shortness of breath. She is healing well from her umbilical hernia surgery. She is due to get her INR done. Review of Systems   Constitutional: Negative. Respiratory: Negative. Cardiovascular: Negative. Gastrointestinal: Negative. Genitourinary: Negative.         Past Medical History:   Diagnosis Date   • Abdominal pain, lower     [id number] RESOLVED   • Acute renal failure ([address])     [id number] RESOLVED   • Allergic rhinitis     [id number] RESOLVED   • Ankle pain, right     [id number] RESOLVED   • Arthritis     [id number] RESOLVED  [id number] RESOLVED   • Asthma    • Bilateral chronic knee pain     [id number]   • Bladder pain     [id number] RESOLVED   • Blood clot in vein    • Bursitis     [id number] RESOLVED   • Cardiac disorder     [id number]  RESOLVED   • Cardiac murmur     [id number] RESOLVED   • Chest tightness or pressure     [id number] RESOLVED   • COPD (chronic obstructive pulmonary disease) ([facility])    • Coronary artery disease    • Curvature of spine     [id number] RESOLVED   • GERD (gastroesophageal reflux disease)    • Hernia, hiatal     [id number] RESOLVED   • Hyperlipidemia     37HTF0870 RESOLVED   • Hypertension • Inguinal hernia     RIGHT   [id number] RESOLVED   • Jaw closure abnormality     [id number] RESOLVED   • Lumbar herniated disc     [id number] RESOLVED   • Morbid obesity ([address])     [id number]   • Obesity    • Osteoarthritis of right knee     [id number] RESOLVED   • Patellar tendinitis     [id number]   • Poor flexibility of tendon     37MQM3593   • Presence of IVC filter 10/12/2018   • Pulmonary embolism ([address])     [id number] RESOLVED   • Sepsis ([address])     [id number] RESOLVED   • Severe sepsis due to methicillin resistant Staphylococcus aureus (MRSA) with acute organ dysfunction ([address])     [id number] RESOLVED   • Sleep apnea, obstructive    • Spider vein, symptomatic     [id number] RESOLVED   • Status post arthroscopy of right knee     [id number] RESOLVED   • Stomach disorder     [id number] RESOLVED   • Tear of medial meniscus of right knee     SUBSEQUENT ENCOUNTER  RESOLVED [id number]   • Thyroid disorder     [id number]   • Umbilical hernia     [id number] RESOLVED     Past Surgical History:   Procedure Laterality Date   • COLONOSCOPY     • CYSTOSCOPY  01/1994    WITH BIOPSY     • EXPLORATORY LAPAROTOMY     • FOOT SURGERY Left    • FRACTURE SURGERY     • HAND SURGERY Right     cyst   • HYSTERECTOMY  2009   • INCISION AND DRAINAGE ANTERIOR NECK Right 6/8/2017    Procedure: INCISION AND DRAINAGE  (I&D) NECK;  Surgeon: Issa Chacon MD;  Location:  MAIN OR;  Service:    • INCISIONAL HERNIA REPAIR      WITH IMPLANTATION OF MESH  13 MAY 2014  LAST ASSESSED   • IR IVC FILTER REMOVAL  1/22/2019   • OPEN ANTERIOR SHOULDER RECONSTRUCTION Left    • OTHER SURGICAL HISTORY      BLOOD VESSEL REPAIR   13 MAY 2014 LAST ASSESSED   • PERIPHERALLY INSERTED CENTRAL CATHETER INSERTION     • NJ ARTHRS KNE SURG W/MENISCECTOMY MED/LAT W/SHVG Right 4/11/2016    Procedure: KNEE ARTHROSCOPY WITH MEDIAL MENISCECTOMY ;  Surgeon: Arcadio Lai MD;  Location: [facility];  Service: Orthopedics   • NJ RPR AA HERNIA 1ST 3-10 CM REDUCIBLE N/A 11/15/2023 Procedure: REPAIR OF INCARCERATED VENTRAL HERNIA;  Surgeon: Cr Maciel DO;  Location: [facility];  Service: General   • SHOULDER SURGERY       Family History   Problem Relation Age of Onset   • Arthritis Mother    • Colon cancer Brother    • Lung cancer Father    • No Known Problems Sister    • No Known Problems Daughter    • No Known Problems Maternal Grandmother    • No Known Problems Maternal Grandfather    • No Known Problems Paternal Grandmother    • No Known Problems Paternal Grandfather    • Breast cancer Maternal Aunt    • Diabetes Maternal Aunt    • No Known Problems Sister    • No Known Problems Sister    • No Known Problems Sister    • No Known Problems Sister    • No Known Problems Sister    • No Known Problems Daughter    • Colon cancer Son      Social History     Socioeconomic History   • Marital status: Single     Spouse name: None   • Number of children: None   • Years of education: None   • Highest education level: None   Occupational History   • Occupation: unemployed   Tobacco Use   • Smoking status: Former     Current packs/day: 0.00     Types: Cigarettes     Quit date: 1995     Years since quittin.0   • Smokeless tobacco: Never   • Tobacco comments:     quit 25 years ago   Vaping Use   • Vaping status: Never Used   Substance and Sexual Activity   • Alcohol use: Never   • Drug use: Never   • Sexual activity: Never   Other Topics Concern   • None   Social History Narrative    ALWAYS USES SEAT BELT         Social Determinants of Health     Financial Resource Strain: Not on file   Food Insecurity: Not on file   Transportation Needs: Not on file   Physical Activity: Not on file   Stress: Not on file   Social Connections: Not on file   Intimate Partner Violence: Not on file   Housing Stability: Not on file     Current Outpatient Medications on File Prior to Visit   Medication Sig   • albuterol (2.5 mg/3 mL) 0.083 % nebulizer solution Take 3 mL (2.5 mg total) by nebulization as needed for wheezing   • albuterol (PROVENTIL HFA,VENTOLIN HFA) 90 mcg/act inhaler Inhale 2 puffs every 4 (four) hours as needed for wheezing   • atorvastatin (LIPITOR) 10 mg tablet Take 1 tablet (10 mg total) by mouth daily   • budesonide-formoterol (Symbicort) 160-4.5 mcg/act inhaler Inhale 2 puffs 2 (two) times a day Rinse mouth after use   • Diclofenac Sodium (VOLTAREN) 1 % Apply 4 g topically 4 (four) times a day   • famotidine (PEPCID) 40 MG tablet TAKE ONE TABLET BY MOUTH DAILY AT 9PM AT BEDTIME   • fluticasone (FLONASE) 50 mcg/act nasal spray INSTILL 2 SPRAYS IN EACH NOSTRIL DAILY (BULK)   • furosemide (LASIX) 20 mg tablet Take 1 tablet (20 mg total) by mouth daily as needed (leg swelling) Take with potassium   • hydrocortisone 0.5 % cream Apply topically 2 (two) times a day   • levothyroxine 125 mcg tablet TAKE ONE TABLET BY MOUTH DAILY AT 8AM (vial)   • loratadine (CLARITIN) 10 mg tablet Take 1 tablet (10 mg total) by mouth daily   • montelukast (SINGULAIR) 10 mg tablet Take 1 tablet (10 mg total) by mouth daily at bedtime   • omeprazole (PriLOSEC) 40 MG capsule Take 1 capsule (40 mg total) by mouth daily before breakfast   • potassium chloride (MICRO-K) 10 MEQ CR capsule Take 1 capsule (10 mEq total) by mouth daily   • warfarin (Coumadin) 1 mg tablet To take 8 mg on Monday (5mg tab and three 1 mg tablets) and 10 mg t,w,t,f,s,s   • warfarin (COUMADIN) 5 mg tablet TAKE ONE AND ONE-HALF TABLETS BY MOUTH ON TUESDAYS,THURSDAYS, SATURDAYS AND SUNDAYS. TAKE TWO TABLETS ON MONDAYS, WEDNESDAYS AND FRIDAYS (VIAL)   • [DISCONTINUED] enoxaparin (LOVENOX) 40 mg/0.4 mL Inject 0.4 mL (40 mg total) under the skin 2 (two) times a day for 10 days Do not start before November 11, 2023.    • [DISCONTINUED] oxyCODONE-acetaminophen (PERCOCET) 5-325 mg per tablet Take 1 tablet by mouth every 4 (four) hours as needed for moderate pain or severe pain Max Daily Amount: 6 tablets (Patient not taking: Reported on 12/14/2023) Allergies   Allergen Reactions   • Clindamycin Angioedema   • Medical Tape Itching     Welt and redness   • Penicillin G Nausea Only   • Penicillins GI Intolerance     Nausea and vomiting     Immunization History   Administered Date(s) Administered   • INFLUENZA 09/29/2014, 09/28/2015, 11/27/2016, 11/30/2016, 05/29/2017, 10/07/2022, 09/18/2023   • Influenza Quadrivalent Preservative Free 3 years and older IM 11/30/2016   • Influenza Quadrivalent, 6-35 Months IM 08/29/2017   • Influenza, Quadrivalent (nasal) 11/27/2016   • Influenza, injectable, quadrivalent, preservative free 0.5 mL 09/18/2023   • Influenza, recombinant, quadrivalent,injectable, preservative free 10/12/2018, 09/11/2019, 09/15/2020, 09/30/2021, 10/07/2022   • Influenza, seasonal, injectable 09/29/2014   • Pneumococcal Conjugate Vaccine 20-valent (Pcv20), Polysace 12/14/2023   • Pneumococcal Polysaccharide PPV23 08/29/2017       Objective     /88   Pulse 78   Temp (!) 96.4 °F (35.8 °C)   Ht 5' 5" (1.651 m)   Wt 133 kg (293 lb 12.8 oz)   LMP  (LMP Unknown)   SpO2 96%   BMI 48.89 kg/m²     Physical Exam  Vitals reviewed. Constitutional:       General: She is not in acute distress. Appearance: Normal appearance. She is well-developed. She is not ill-appearing, toxic-appearing or diaphoretic. HENT:      Head: Normocephalic and atraumatic. Eyes:      Conjunctiva/sclera: Conjunctivae normal.   Cardiovascular:      Rate and Rhythm: Normal rate and regular rhythm. Heart sounds: Normal heart sounds. No murmur heard. No friction rub. No gallop. Pulmonary:      Effort: Pulmonary effort is normal. No respiratory distress. Breath sounds: Normal breath sounds. No wheezing, rhonchi or rales. Musculoskeletal:      Right lower leg: No edema. Left lower leg: No edema. Neurological:      General: No focal deficit present. Mental Status: She is alert and oriented to person, place, and time.    Psychiatric: Mood and Affect: Mood normal.         Behavior: Behavior normal.         Thought Content:  Thought content normal.         Judgment: Judgment normal.       Fito Chavis, DO

## 2023-12-15 ENCOUNTER — APPOINTMENT (OUTPATIENT)
Dept: LAB | Facility: MEDICAL CENTER | Age: 58
End: 2023-12-15
Payer: COMMERCIAL

## 2023-12-15 DIAGNOSIS — I82.90 NON-OCCLUSIVE THROMBUS: ICD-10-CM

## 2023-12-15 LAB
INR PPP: 4.3 (ref 0.84–1.19)
PROTHROMBIN TIME: 40.2 SECONDS (ref 11.6–14.5)

## 2023-12-15 PROCEDURE — 36415 COLL VENOUS BLD VENIPUNCTURE: CPT | Performed by: FAMILY MEDICINE

## 2023-12-15 PROCEDURE — 85610 PROTHROMBIN TIME: CPT | Performed by: FAMILY MEDICINE

## 2023-12-19 ENCOUNTER — ANTICOAG VISIT (OUTPATIENT)
Dept: FAMILY MEDICINE CLINIC | Facility: CLINIC | Age: 58
End: 2023-12-19

## 2023-12-19 DIAGNOSIS — I26.99 PULMONARY EMBOLISM, UNSPECIFIED CHRONICITY, UNSPECIFIED PULMONARY EMBOLISM TYPE, UNSPECIFIED WHETHER ACUTE COR PULMONALE PRESENT (HCC): ICD-10-CM

## 2023-12-19 RX ORDER — WARFARIN SODIUM 1 MG/1
TABLET ORAL
Qty: 60 TABLET | Refills: 2 | Status: SHIPPED | OUTPATIENT
Start: 2023-12-19

## 2024-01-03 ENCOUNTER — OFFICE VISIT (OUTPATIENT)
Dept: SURGERY | Facility: CLINIC | Age: 59
End: 2024-01-03
Payer: COMMERCIAL

## 2024-01-03 VITALS
HEIGHT: 65 IN | DIASTOLIC BLOOD PRESSURE: 88 MMHG | SYSTOLIC BLOOD PRESSURE: 126 MMHG | TEMPERATURE: 97.6 F | WEIGHT: 293 LBS | HEART RATE: 59 BPM | BODY MASS INDEX: 48.82 KG/M2 | OXYGEN SATURATION: 98 %

## 2024-01-03 DIAGNOSIS — K46.0 INCARCERATED HERNIA: Primary | ICD-10-CM

## 2024-01-03 PROCEDURE — 99212 OFFICE O/P EST SF 10 MIN: CPT | Performed by: SURGERY

## 2024-01-05 NOTE — ASSESSMENT & PLAN NOTE
Status post repair of incarcerated ventral hernia.  Doing well.  Incision has healed well.  Still some mild discomfort more in the right lower quadrant.  Patient feels this may be from previous heparin injections.  No palpable hematoma or underlying mass noted.  I suspect this will improve with time.  No lifting restrictions.  Follow-up as needed.

## 2024-01-05 NOTE — PROGRESS NOTES
Assessment/Plan:    Incarcerated hernia  Status post repair of incarcerated ventral hernia.  Doing well.  Incision has healed well.  Still some mild discomfort more in the right lower quadrant.  Patient feels this may be from previous heparin injections.  No palpable hematoma or underlying mass noted.  I suspect this will improve with time.  No lifting restrictions.  Follow-up as needed.     Diagnoses and all orders for this visit:    Incarcerated hernia          Subjective:      Patient ID: Celina Collins is a 58 y.o. female.    58-year-old female status post repair of incarcerated ventral hernia presents to follow-up.  Overall doing pretty well mild abdominal pain noted in the right lower quadrant just lateral to the umbilicus.  She patient feels that she could be from the previous Hepper injections.  Otherwise doing well.  Tolerating diet.        The following portions of the patient's history were reviewed and updated as appropriate: She  has a past medical history of Abdominal pain, lower, Acute renal failure (Formerly [facility]), Allergic rhinitis, Ankle pain, right, Arthritis, Asthma, Bilateral chronic knee pain, Bladder pain, Blood clot in vein, Bursitis, Cardiac disorder, Cardiac murmur, Chest tightness or pressure, COPD (chronic obstructive pulmonary disease) (Formerly [facility]), Coronary artery disease, Curvature of spine, GERD (gastroesophageal reflux disease), Hernia, hiatal, Hyperlipidemia, Hypertension, Inguinal hernia, Jaw closure abnormality, Lumbar herniated disc, Morbid obesity (Formerly [facility]), Obesity, Osteoarthritis of right knee, Patellar tendinitis, Poor flexibility of tendon, Presence of IVC filter (10/12/2018), Pulmonary embolism (Formerly [facility]), Sepsis (Formerly [facility]), Severe sepsis due to methicillin resistant Staphylococcus aureus (MRSA) with acute organ dysfunction (Formerly [facility]), Sleep apnea, obstructive, Spider vein, symptomatic, Status post arthroscopy of right knee, Stomach disorder, Tear of medial meniscus of right knee, Thyroid disorder, and  Umbilical hernia.  She   Patient Active Problem List    Diagnosis Date Noted    Moderate persistent asthma without complication 12/06/2023    Incarcerated hernia 09/18/2023    Pseudotumor cerebri 05/10/2023    Left foot pain 12/08/2022    History of shoulder surgery 08/10/2022    Hiatal hernia 02/28/2022    MICHAEL (obstructive sleep apnea)     Snoring 11/20/2020    Bilateral leg edema 07/23/2020    Abnormal CT of the chest 05/03/2020    Renal cyst, left 05/03/2020    Non-occlusive thrombus 12/04/2018    Dyslipidemia 11/09/2018    Stage 3 chronic kidney disease (HCC) 11/07/2018    Seasonal allergic rhinitis due to pollen 07/10/2018    History of methicillin resistant staphylococcus aureus (MRSA) 07/10/2017    Chronic anticoagulation 07/10/2017    Cough 07/10/2017    Morbid obesity (HCC) 06/05/2017    Secondary pulmonary hypertension 05/26/2017    History of pulmonary embolus (PE) 05/25/2017    Gastroesophageal reflux disease without esophagitis 05/25/2017    NSTEMI (non-ST elevated myocardial infarction) ([facility]) 05/25/2017    Moderate persistent asthma 11/26/2016    Other specified hypothyroidism 05/28/2015     She  has a past surgical history that includes Hysterectomy (2009); Open anterior shoulder reconstruction (Left); Hand surgery (Right); Foot surgery (Left); Colonoscopy; pr arthrs kne surg w/meniscectomy med/lat w/shvg (Right, 4/11/2016); Exploratory laparotomy; Peripherally inserted central catheter insertion; Incision and drainage anterior neck (Right, 6/8/2017); Other surgical history; Cystoscopy (01/1994); Incisional hernia repair; Shoulder surgery; IR IVC filter removal (1/22/2019); Fracture surgery; and pr rpr aa hernia 1st 3-10 cm reducible (N/A, 11/15/2023).  Her family history includes Arthritis in her mother; Breast cancer in her maternal aunt; Colon cancer in her brother and son; Diabetes in her maternal aunt; Lung cancer in her father; No Known Problems in her daughter, daughter, maternal grandfather,  maternal grandmother, paternal grandfather, paternal grandmother, sister, sister, sister, sister, sister, and sister.  She  reports that she quit smoking about 28 years ago. Her smoking use included cigarettes. She has never used smokeless tobacco. She reports that she does not drink alcohol and does not use drugs.  Current Outpatient Medications   Medication Sig Dispense Refill    albuterol (2.5 mg/3 mL) 0.083 % nebulizer solution Take 3 mL (2.5 mg total) by nebulization as needed for wheezing 3 mL 5    albuterol (PROVENTIL HFA,VENTOLIN HFA) 90 mcg/act inhaler Inhale 2 puffs every 4 (four) hours as needed for wheezing 54 g 3    atorvastatin (LIPITOR) 10 mg tablet Take 1 tablet (10 mg total) by mouth daily 90 tablet 3    budesonide-formoterol (Symbicort) 160-4.5 mcg/act inhaler Inhale 2 puffs 2 (two) times a day Rinse mouth after use 30.6 g 3    Diclofenac Sodium (VOLTAREN) 1 % Apply 4 g topically 4 (four) times a day 350 g 0    famotidine (PEPCID) 40 MG tablet TAKE ONE TABLET BY MOUTH DAILY AT 9PM AT BEDTIME 90 tablet 11    fluticasone (FLONASE) 50 mcg/act nasal spray INSTILL 2 SPRAYS IN EACH NOSTRIL DAILY (BULK) 48 g 11    furosemide (LASIX) 20 mg tablet Take 1 tablet (20 mg total) by mouth daily as needed (leg swelling) Take with potassium 30 tablet 5    hydrocortisone 0.5 % cream Apply topically 2 (two) times a day 30 g 3    levothyroxine 125 mcg tablet TAKE ONE TABLET BY MOUTH DAILY AT 8AM (vial) 90 tablet 3    loratadine (CLARITIN) 10 mg tablet Take 1 tablet (10 mg total) by mouth daily 30 tablet 5    montelukast (SINGULAIR) 10 mg tablet Take 1 tablet (10 mg total) by mouth daily at bedtime 90 tablet 3    omeprazole (PriLOSEC) 40 MG capsule Take 1 capsule (40 mg total) by mouth daily before breakfast 90 capsule 3    potassium chloride (MICRO-K) 10 MEQ CR capsule Take 1 capsule (10 mEq total) by mouth daily 30 capsule 5    warfarin (Coumadin) 1 mg tablet Take 2 tablets daily or as directed by provider 60 tablet  "2    warfarin (COUMADIN) 5 mg tablet TAKE ONE AND ONE-HALF TABLETS BY MOUTH ON TUESDAYS,THURSDAYS, SATURDAYS AND SUNDAYS. TAKE TWO TABLETS ON MONDAYS, WEDNESDAYS AND FRIDAYS (VIAL) 135 tablet 3     No current facility-administered medications for this visit.     She is allergic to clindamycin, medical tape, penicillin g, and penicillins..    Review of Systems   Constitutional:  Negative for activity change, appetite change, chills, diaphoresis, fatigue, fever and unexpected weight change.   Respiratory:  Negative for cough and shortness of breath.    Cardiovascular:  Negative for chest pain and palpitations.   Gastrointestinal:  Positive for abdominal pain. Negative for diarrhea, nausea and vomiting.   Skin:  Negative for color change, pallor and rash.         Objective:      /88 (BP Location: Left arm, Patient Position: Sitting, Cuff Size: Standard)   Pulse 59   Temp 97.6 °F (36.4 °C) (Temporal)   Ht 5' 5\" (1.651 m)   Wt 135 kg (297 lb)   LMP  (LMP Unknown)   SpO2 98%   BMI 49.42 kg/m²          Physical Exam  Vitals reviewed.   Constitutional:       General: She is not in acute distress.     Appearance: Normal appearance. She is obese. She is not ill-appearing, toxic-appearing or diaphoretic.   HENT:      Head: Normocephalic and atraumatic.      Right Ear: External ear normal.      Left Ear: External ear normal.   Eyes:      General: No scleral icterus.        Right eye: No discharge.         Left eye: No discharge.   Cardiovascular:      Rate and Rhythm: Normal rate.   Pulmonary:      Effort: Pulmonary effort is normal. No respiratory distress.   Abdominal:      General: There is no distension.      Palpations: Abdomen is soft. There is no mass.      Tenderness: There is abdominal tenderness (mild RLQ TTP).      Hernia: No hernia is present.   Musculoskeletal:         General: No swelling, tenderness, deformity or signs of injury. Normal range of motion.      Cervical back: Normal range of motion. "   Skin:     General: Skin is warm.      Coloration: Skin is not jaundiced or pale.      Findings: No bruising or erythema.   Neurological:      General: No focal deficit present.      Mental Status: She is alert and oriented to person, place, and time.      Cranial Nerves: No cranial nerve deficit.   Psychiatric:         Mood and Affect: Mood normal.         Thought Content: Thought content normal.         Judgment: Judgment normal.

## 2024-01-20 ENCOUNTER — APPOINTMENT (OUTPATIENT)
Dept: LAB | Facility: MEDICAL CENTER | Age: 59
End: 2024-01-20
Payer: COMMERCIAL

## 2024-01-22 ENCOUNTER — ANTICOAG VISIT (OUTPATIENT)
Dept: FAMILY MEDICINE CLINIC | Facility: CLINIC | Age: 59
End: 2024-01-22

## 2024-01-22 ENCOUNTER — OFFICE VISIT (OUTPATIENT)
Dept: PULMONOLOGY | Facility: CLINIC | Age: 59
End: 2024-01-22
Payer: COMMERCIAL

## 2024-01-22 VITALS
WEIGHT: 293 LBS | BODY MASS INDEX: 48.82 KG/M2 | HEIGHT: 65 IN | DIASTOLIC BLOOD PRESSURE: 81 MMHG | TEMPERATURE: 97.3 F | HEART RATE: 84 BPM | OXYGEN SATURATION: 96 % | SYSTOLIC BLOOD PRESSURE: 139 MMHG

## 2024-01-22 DIAGNOSIS — J45.40 MODERATE PERSISTENT ASTHMA WITHOUT COMPLICATION: ICD-10-CM

## 2024-01-22 DIAGNOSIS — J30.1 SEASONAL ALLERGIC RHINITIS DUE TO POLLEN: ICD-10-CM

## 2024-01-22 DIAGNOSIS — G47.33 OSA (OBSTRUCTIVE SLEEP APNEA): ICD-10-CM

## 2024-01-22 DIAGNOSIS — J45.41 MODERATE PERSISTENT ASTHMA WITH EXACERBATION: ICD-10-CM

## 2024-01-22 DIAGNOSIS — J30.9 ALLERGIC RHINITIS, UNSPECIFIED SEASONALITY, UNSPECIFIED TRIGGER: Primary | ICD-10-CM

## 2024-01-22 PROCEDURE — 99214 OFFICE O/P EST MOD 30 MIN: CPT

## 2024-01-22 RX ORDER — AZELASTINE 1 MG/ML
1 SPRAY, METERED NASAL 2 TIMES DAILY
Qty: 1 ML | Refills: 5 | Status: SHIPPED | OUTPATIENT
Start: 2024-01-22

## 2024-01-22 RX ORDER — AZITHROMYCIN 250 MG/1
TABLET, FILM COATED ORAL
Qty: 6 TABLET | Refills: 0 | Status: SHIPPED | OUTPATIENT
Start: 2024-01-22 | End: 2024-01-26

## 2024-01-22 RX ORDER — PREDNISONE 20 MG/1
40 TABLET ORAL DAILY
Qty: 10 TABLET | Refills: 0 | Status: SHIPPED | OUTPATIENT
Start: 2024-01-22

## 2024-01-22 NOTE — PROGRESS NOTES
Progress note - Pulmonary Medicine   Celina Collins 58 y.o. female MRN: [id number]       Impression & Plan:   Celina Collins is a 58 y.o. female with PMHx of asthma, hypothyroidism, seasonal allergies, MICHAEL on CPAP, CKD 3, HLD, prior PE on Coumadin and obesity who presents for follow-up of asthma and MICHAEL.    Moderate persistent Asthma with Exacerbation  Allergic Rhinitis  - The patient appears to be in an acute asthma exacerbation with sputum production, increased ARAUZ and wheezing.  Prior to this her symptoms had been well controlled for nearly a year.  - Will treat with azithromycin and 5 days prednisone 40mg qd  - Given nasal congestion and PND with prior flonase intolerance will trial azelastine nasal spray.  - Continue symbicort, singulair and claritin, rinse out mouth after each symbicort use  - Asked her to call the office should her symptoms not improve.  - Follow up in 3 months  -     azelastine (ASTELIN) 0.1 % nasal spray; 1 spray into each nostril 2 (two) times a day Use in each nostril as directed  -     Ambulatory Referral to Pulmonology  -     azithromycin (ZITHROMAX) 250 mg tablet; Take 2 tablets today then 1 tablet daily x 4 days  -     predniSONE 20 mg tablet; Take 2 tablets (40 mg total) by mouth daily    Obstructive Sleep Apnea  - The patient has a history of overall mild MICHAEL with exacerbation to severe during REM sleep, she is on auto-CPAP 5-20 cmH2O.  Compliance unable to be reviewed today as she has not been using the machine in the last month due to her above symptoms.  - Recommend she re-trial CPAP therapy after starting azelastine nasal spray and steroids/abx.  Untreated/under treated MICHAEL could potentially worsen control of her asthma, discussed this with the patient.  - Explained the importance of keeping the machine clean, and that they should be eligible for refills of supplies every 3-6 months depending on insurance.  - Encouraged healthy lifestyle with adequate sleep (7-9 hours per night),  healthy balanced diet and routine exercise.  Explained the importance of avoiding driving while drowsy.    Return in about 3 months (around 4/22/2024).  ______________________________________________________________________    HPI:    Celina Collins is a 58 y.o. female with PMHx of asthma, hypothyroidism, seasonal allergies, MICHAEL on CPAP, CKD 3, HLD, prior PE on Coumadin and obesity who presents for follow-up of asthma and MICHAEL.  The patient was last seen on 3/1/2023 at which time it was for continued use of Symbicort, albuterol HFA/nebs as needed and Coumadin for history of PE as well as CPAP nightly for MICHAEL.  At that time she had not required steroids or antibiotics for respiratory purposes in the 6 months since she had been seen last.    The patient reports that her asthma has been worse in the past month.  She states due to her worsening breathing over the last months she has had trouble tolerating her CPAP over this time as well.  She states she will have episodes of gasping/choking with her mask in place which was not happening prior to this last month.  She denies any florinda pressure intolerance or mask irritation.  She has been using her nebulizers twice daily in addition to albuterol HFA twice daily, Symbicort 160 mcg 2 puffs twice daily (rinsing out mouth after each use), Singulair and Claritin.  She is no longer taking Flonase nasal spray as it was causing recurrent epistaxis episodes.  She reports a cough productive of yellow/green mucus, she states the frequency is not significantly increased from prior, but previously it was dry.  She denies any significant dyspnea at rest, but will have increased dyspnea with steps or inclines surfaces.  Along with this dyspnea will come wheezing.  She does state that cold weather and seasonal allergies in the summer appear to be some of her bigger triggers for her asthma.  She denies any recent sick contacts, F/C, chest pain, pedal edema, hemoptysis, unintentional weight  loss or syncope.    Current Medications:    Current Outpatient Medications:     albuterol (2.5 mg/3 mL) 0.083 % nebulizer solution, Take 3 mL (2.5 mg total) by nebulization as needed for wheezing, Disp: 3 mL, Rfl: 5    albuterol (PROVENTIL HFA,VENTOLIN HFA) 90 mcg/act inhaler, Inhale 2 puffs every 4 (four) hours as needed for wheezing, Disp: 54 g, Rfl: 3    atorvastatin (LIPITOR) 10 mg tablet, Take 1 tablet (10 mg total) by mouth daily, Disp: 90 tablet, Rfl: 3    azelastine (ASTELIN) 0.1 % nasal spray, 1 spray into each nostril 2 (two) times a day Use in each nostril as directed, Disp: 1 mL, Rfl: 5    azithromycin (ZITHROMAX) 250 mg tablet, Take 2 tablets today then 1 tablet daily x 4 days, Disp: 6 tablet, Rfl: 0    budesonide-formoterol (Symbicort) 160-4.5 mcg/act inhaler, Inhale 2 puffs 2 (two) times a day Rinse mouth after use, Disp: 30.6 g, Rfl: 3    Diclofenac Sodium (VOLTAREN) 1 %, Apply 4 g topically 4 (four) times a day, Disp: 350 g, Rfl: 0    famotidine (PEPCID) 40 MG tablet, TAKE ONE TABLET BY MOUTH DAILY AT 9PM AT BEDTIME, Disp: 90 tablet, Rfl: 11    furosemide (LASIX) 20 mg tablet, Take 1 tablet (20 mg total) by mouth daily as needed (leg swelling) Take with potassium, Disp: 30 tablet, Rfl: 5    levothyroxine 125 mcg tablet, TAKE ONE TABLET BY MOUTH DAILY AT 8AM (vial), Disp: 90 tablet, Rfl: 3    loratadine (CLARITIN) 10 mg tablet, Take 1 tablet (10 mg total) by mouth daily, Disp: 30 tablet, Rfl: 5    montelukast (SINGULAIR) 10 mg tablet, Take 1 tablet (10 mg total) by mouth daily at bedtime, Disp: 90 tablet, Rfl: 3    omeprazole (PriLOSEC) 40 MG capsule, Take 1 capsule (40 mg total) by mouth daily before breakfast, Disp: 90 capsule, Rfl: 3    predniSONE 20 mg tablet, Take 2 tablets (40 mg total) by mouth daily, Disp: 10 tablet, Rfl: 0    hydrocortisone 0.5 % cream, Apply topically 2 (two) times a day (Patient not taking: Reported on 1/22/2024), Disp: 30 g, Rfl: 3    potassium chloride (MICRO-K) 10 MEQ  "CR capsule, Take 1 capsule (10 mEq total) by mouth daily, Disp: 30 capsule, Rfl: 5    warfarin (Coumadin) 1 mg tablet, Take 2 tablets daily or as directed by provider, Disp: 60 tablet, Rfl: 2    warfarin (COUMADIN) 5 mg tablet, TAKE ONE AND ONE-HALF TABLETS BY MOUTH ON ,,  AND SUNDAYS. TAKE TWO TABLETS ON ,  AND  (VIAL), Disp: 135 tablet, Rfl: 3    Review of Systems:  Review of Systems  10-point system review completed, all of which are negative except as mentioned above.    Past medical history, surgical history, and family history were reviewed and updated as appropriate    Social history updates:  Social History     Tobacco Use   Smoking Status Former    Current packs/day: 0.00    Types: Cigarettes    Quit date: 1995    Years since quittin.1   Smokeless Tobacco Never   Tobacco Comments    quit 25 years ago     PhysicalExamination:  Vitals:   /81 (BP Location: Right arm, Patient Position: Sitting, Cuff Size: Standard)   Pulse 84   Temp (!) 97.3 °F (36.3 °C) (Tympanic)   Ht 5' 5\" (1.651 m)   Wt 134 kg (296 lb)   LMP  (LMP Unknown)   SpO2 96%   BMI 49.26 kg/m²   Body mass index is 49.26 kg/m².    Constitutional: NAD, well appearing, on room air, no conversational dyspnea   Skin: Warm, dry, no rashes noted   Eyes: PERRL, normal conjunctiva  ENT: Nasal congestion present, moist mucus membranes.  Neck: No JVD, trachea is midline, no adenopathy.  Resp: faint B/L end expiratory wheezes, no rhonchi, rales or egophony   Cardiac: RRR, +S1/S2, no M/R/G  Abdomen: Soft, NT/ND  Extremities: No digital clubbing or pedal edema  Neuro: AAOx3    Diagnostic Data:  Labs:  I personally reviewed the most recent laboratory data pertinent to today's visit    Lab Results   Component Value Date    WBC 5.51 2023    HGB 13.8 2023    HCT 45.0 2023    MCV 99 (H) 2023     2023     Lab Results   Component Value Date    SODIUM 141 " 09/18/2023    K 4.0 09/18/2023    CO2 27 09/18/2023     09/18/2023    BUN 11 09/18/2023    CREATININE 1.05 09/18/2023    GLUCOSE 102 06/01/2017    CALCIUM 9.1 09/18/2023     PFT results:  The most recent pulmonary function tests were reviewed.  PFTs -- 11/20/2020  Spirometry:  FEV1/FVC Ratio is 81 %. FEV1 is 2.29 L/93 % of predicted. FVC is 2.83 L/86 % of predicted.  Lung volumes:  RV 1.10 L/58 % of predicted, TLC 4.05 L/79 % of predicted, RV/TLC ratio 27 %  Diffusing capacity:  57 % of predicted  IMPRESSION:  Normal spirometry with no obstruction, normal vital capacity  Decreased lung volumes indicating very mild restriction  Moderately impaired diffusion capacity  Normal flow volume loop    Imaging:  I personally reviewed the images in PACS pertinent to today's visit:  CXR Portable -- 3/25/2023  No acute cardiopulmonary disease.    CT C/A/P w/ Contrast  LUNGS:  Minimal bibasilar atelectasis or scarring.  No pneumothorax.  There is no tracheal or endobronchial lesion.  PLEURA:  Unremarkable.  Nondisplaced right 10th rib fracture.  No other evidence of acute pathology.  Moderate hiatal hernia.  Colonic diverticulosis.    Other studies:  PSG --12/30/2022 (Wt 302 lbs)  AHI -10.6  Sup AHI -27.9  REM AHI -42.3  O2 Shahbaz -70.0%  TST < 90% -29.3 min  PLMI - 0.0    2D Echo -- 5/4/2020  LEFT VENTRICLE:  Systolic function was normal. Ejection fraction was estimated to be 65 %.  There were no regional wall motion abnormalities.  RIGHT VENTRICLE:  The size was normal.  Systolic function was normal.  MITRAL VALVE:  There was trace regurgitation.  TRICUSPID VALVE:  There was trace regurgitation.  Pulmonary artery systolic pressure was within the normal range.    I have spent a total time of 30 minutes on 01/22/24 in caring for this patient including Instructions for management, Patient and family education, Importance of tx compliance, Counseling / Coordination of care, Documenting in the medical record, Reviewing /  "ordering tests, medicine, procedures  , and Obtaining or reviewing history  .    Nelia Waller MD  Pulmonary-Critical Care and Sleep Medicine  01/22/24    Portions of the record may have been created with voice recognition software. Occasional wrong word or \"sound a like\" substitutions may have occurred due to the inherent limitations of voice recognition software. Please read the chart carefully and recognize, using context, where substitutions have occurred.  "

## 2024-01-22 NOTE — PATIENT INSTRUCTIONS
- Prescription for azithromycin (Z-zoila) which is an antibiotic  - Prescription for prednisone take 2 tablets (40mg) once daily for 5 days  - Prescription for azelastine nasal spray to replace your flonase nasal spray  - Let the office know if you don't start feeling better in the next few weeks  - Continue on symbicort, singulair and claritin  - Follow up in 3 months

## 2024-01-23 DIAGNOSIS — I26.99 PULMONARY EMBOLISM (HCC): ICD-10-CM

## 2024-01-24 DIAGNOSIS — J30.1 SEASONAL ALLERGIC RHINITIS DUE TO POLLEN: ICD-10-CM

## 2024-01-24 DIAGNOSIS — I26.99 PULMONARY EMBOLISM (HCC): ICD-10-CM

## 2024-01-24 RX ORDER — WARFARIN SODIUM 5 MG/1
TABLET ORAL
Qty: 135 TABLET | Refills: 3 | Status: SHIPPED | OUTPATIENT
Start: 2024-01-24 | End: 2024-01-24 | Stop reason: SDUPTHER

## 2024-01-24 RX ORDER — WARFARIN SODIUM 5 MG/1
TABLET ORAL
Qty: 135 TABLET | Refills: 1 | Status: SHIPPED | OUTPATIENT
Start: 2024-01-24

## 2024-01-24 RX ORDER — LORATADINE 10 MG/1
10 TABLET ORAL DAILY
Qty: 30 TABLET | Refills: 5 | Status: SHIPPED | OUTPATIENT
Start: 2024-01-24

## 2024-02-03 ENCOUNTER — APPOINTMENT (OUTPATIENT)
Dept: LAB | Facility: MEDICAL CENTER | Age: 59
End: 2024-02-03
Payer: COMMERCIAL

## 2024-02-03 DIAGNOSIS — I82.90 NON-OCCLUSIVE THROMBUS: ICD-10-CM

## 2024-02-03 LAB
INR PPP: 2.37 (ref 0.84–1.19)
PROTHROMBIN TIME: 25.4 SECONDS (ref 11.6–14.5)

## 2024-02-03 PROCEDURE — 85610 PROTHROMBIN TIME: CPT

## 2024-02-03 PROCEDURE — 36415 COLL VENOUS BLD VENIPUNCTURE: CPT

## 2024-02-05 ENCOUNTER — ANTICOAG VISIT (OUTPATIENT)
Dept: FAMILY MEDICINE CLINIC | Facility: CLINIC | Age: 59
End: 2024-02-05

## 2024-02-07 ENCOUNTER — APPOINTMENT (OUTPATIENT)
Dept: LAB | Facility: MEDICAL CENTER | Age: 59
End: 2024-02-07
Payer: COMMERCIAL

## 2024-02-07 DIAGNOSIS — E07.9 THYROID DISEASE: ICD-10-CM

## 2024-02-07 DIAGNOSIS — I82.90 NON-OCCLUSIVE THROMBUS: ICD-10-CM

## 2024-02-07 DIAGNOSIS — G70.00 MYASTHENIA GRAVIS (HCC): Primary | ICD-10-CM

## 2024-02-07 LAB
T3 SERPL-MCNC: 0.8 NG/ML
T4 SERPL-MCNC: 7.22 UG/DL (ref 6.09–12.23)
TSH SERPL DL<=0.05 MIU/L-ACNC: 11.71 UIU/ML (ref 0.45–4.5)

## 2024-02-07 PROCEDURE — 84443 ASSAY THYROID STIM HORMONE: CPT

## 2024-02-07 PROCEDURE — 86042 ACETYLCHOLN RCPTR BLCKG ANTB: CPT

## 2024-02-07 PROCEDURE — 36415 COLL VENOUS BLD VENIPUNCTURE: CPT

## 2024-02-07 PROCEDURE — 86043 ACETYLCHOLN RCPTR MODLG ANTB: CPT

## 2024-02-07 PROCEDURE — 84480 ASSAY TRIIODOTHYRONINE (T3): CPT

## 2024-02-07 PROCEDURE — 86041 ACETYLCHOLN RCPTR BNDNG ANTB: CPT

## 2024-02-07 PROCEDURE — 84436 ASSAY OF TOTAL THYROXINE: CPT

## 2024-02-09 LAB — ACHR BIND AB SER-SCNC: <0.03 NMOL/L (ref 0–0.24)

## 2024-02-12 ENCOUNTER — APPOINTMENT (OUTPATIENT)
Dept: LAB | Facility: MEDICAL CENTER | Age: 59
End: 2024-02-12
Payer: COMMERCIAL

## 2024-02-12 DIAGNOSIS — Z53.09 MRI CONTRAINDICATED DUE TO METAL IMPLANT: ICD-10-CM

## 2024-02-12 LAB
ACHR MOD AB/ACHR TOTAL SFR SER: 0 % (ref 0–45)
BUN SERPL-MCNC: 14 MG/DL (ref 5–25)
CREAT SERPL-MCNC: 0.99 MG/DL (ref 0.6–1.3)
GFR SERPL CREATININE-BSD FRML MDRD: 63 ML/MIN/1.73SQ M

## 2024-02-12 PROCEDURE — 84520 ASSAY OF UREA NITROGEN: CPT

## 2024-02-12 PROCEDURE — 82565 ASSAY OF CREATININE: CPT

## 2024-02-12 PROCEDURE — 36415 COLL VENOUS BLD VENIPUNCTURE: CPT

## 2024-02-13 LAB — ACHR BLOCK AB/ACHR TOTAL SFR SER: 12 % (ref 0–25)

## 2024-02-20 ENCOUNTER — APPOINTMENT (OUTPATIENT)
Dept: LAB | Facility: MEDICAL CENTER | Age: 59
End: 2024-02-20
Payer: COMMERCIAL

## 2024-02-20 LAB
INR PPP: 4.07 (ref 0.84–1.19)
PROTHROMBIN TIME: 38.5 SECONDS (ref 11.6–14.5)

## 2024-02-20 PROCEDURE — 36415 COLL VENOUS BLD VENIPUNCTURE: CPT

## 2024-02-20 PROCEDURE — 85610 PROTHROMBIN TIME: CPT

## 2024-02-21 ENCOUNTER — TELEPHONE (OUTPATIENT)
Age: 59
End: 2024-02-21

## 2024-02-21 ENCOUNTER — HOSPITAL ENCOUNTER (OUTPATIENT)
Dept: MRI IMAGING | Facility: HOSPITAL | Age: 59
Discharge: HOME | End: 2024-02-21
Payer: COMMERCIAL

## 2024-02-21 ENCOUNTER — ANTICOAG VISIT (OUTPATIENT)
Dept: FAMILY MEDICINE CLINIC | Facility: CLINIC | Age: 59
End: 2024-02-21

## 2024-02-21 ENCOUNTER — HOSPITAL ENCOUNTER (OUTPATIENT)
Dept: MRI IMAGING | Facility: HOSPITAL | Age: 59
End: 2024-02-21
Payer: COMMERCIAL

## 2024-02-21 DIAGNOSIS — C71.9 MALIGNANT NEOPLASM OF BRAIN, UNSPECIFIED (HCC): ICD-10-CM

## 2024-02-21 PROBLEM — R05.9 COUGH: Status: RESOLVED | Noted: 2017-07-10 | Resolved: 2024-02-21

## 2024-02-21 PROCEDURE — G1004 CDSM NDSC: HCPCS

## 2024-02-21 PROCEDURE — 70553 MRI BRAIN STEM W/O & W/DYE: CPT

## 2024-02-21 PROCEDURE — A9585 GADOBUTROL INJECTION: HCPCS | Performed by: RADIOLOGY

## 2024-02-21 RX ORDER — GADOBUTROL 604.72 MG/ML
13 INJECTION INTRAVENOUS
Status: COMPLETED | OUTPATIENT
Start: 2024-02-21 | End: 2024-02-21

## 2024-02-21 RX ADMIN — GADOBUTROL 13 ML: 604.72 INJECTION INTRAVENOUS at 08:20

## 2024-02-21 NOTE — TELEPHONE ENCOUNTER
Pt calls in and states she as told at her last appt that if her allergies haven't improved to reach back out to us and she states they have in fact been worse then before. Please advise

## 2024-03-19 ENCOUNTER — HOSPITAL ENCOUNTER (OUTPATIENT)
Dept: MRI IMAGING | Facility: HOSPITAL | Age: 59
Discharge: HOME | End: 2024-03-19
Payer: COMMERCIAL

## 2024-03-19 DIAGNOSIS — H57.89 THYROID EYE DISEASE: ICD-10-CM

## 2024-03-19 DIAGNOSIS — E07.9 THYROID EYE DISEASE: ICD-10-CM

## 2024-03-19 PROCEDURE — A9585 GADOBUTROL INJECTION: HCPCS | Performed by: RADIOLOGY

## 2024-03-19 PROCEDURE — 70543 MRI ORBT/FAC/NCK W/O &W/DYE: CPT

## 2024-03-19 RX ORDER — GADOBUTROL 604.72 MG/ML
13 INJECTION INTRAVENOUS
Status: COMPLETED | OUTPATIENT
Start: 2024-03-19 | End: 2024-03-19

## 2024-03-19 RX ADMIN — GADOBUTROL 13 ML: 604.72 INJECTION INTRAVENOUS at 09:48

## 2024-03-22 DIAGNOSIS — E03.8 OTHER SPECIFIED HYPOTHYROIDISM: ICD-10-CM

## 2024-03-22 RX ORDER — LEVOTHYROXINE SODIUM 0.12 MG/1
125 TABLET ORAL EVERY MORNING
Qty: 90 TABLET | Refills: 3 | Status: SHIPPED | OUTPATIENT
Start: 2024-03-22

## 2024-04-05 ENCOUNTER — TELEPHONE (OUTPATIENT)
Dept: FAMILY MEDICINE CLINIC | Facility: CLINIC | Age: 59
End: 2024-04-05

## 2024-04-05 NOTE — TELEPHONE ENCOUNTER
Luis called to get most recent OV notes. Patient does not come in for visit until 4/16. They will call back after that time to get OV faxed.

## 2024-04-16 ENCOUNTER — ANTICOAG VISIT (OUTPATIENT)
Dept: FAMILY MEDICINE CLINIC | Facility: CLINIC | Age: 59
End: 2024-04-16

## 2024-04-16 ENCOUNTER — APPOINTMENT (OUTPATIENT)
Dept: LAB | Facility: MEDICAL CENTER | Age: 59
End: 2024-04-16
Payer: COMMERCIAL

## 2024-04-16 ENCOUNTER — OFFICE VISIT (OUTPATIENT)
Dept: FAMILY MEDICINE CLINIC | Facility: CLINIC | Age: 59
End: 2024-04-16
Payer: COMMERCIAL

## 2024-04-16 VITALS
HEART RATE: 87 BPM | OXYGEN SATURATION: 97 % | DIASTOLIC BLOOD PRESSURE: 92 MMHG | HEIGHT: 65 IN | BODY MASS INDEX: 48.82 KG/M2 | SYSTOLIC BLOOD PRESSURE: 144 MMHG | TEMPERATURE: 96.6 F | WEIGHT: 293 LBS

## 2024-04-16 DIAGNOSIS — E03.8 OTHER SPECIFIED HYPOTHYROIDISM: Chronic | ICD-10-CM

## 2024-04-16 DIAGNOSIS — J45.40 MODERATE PERSISTENT ASTHMA WITHOUT COMPLICATION: ICD-10-CM

## 2024-04-16 DIAGNOSIS — Z79.01 CHRONIC ANTICOAGULATION: ICD-10-CM

## 2024-04-16 DIAGNOSIS — K21.9 GASTROESOPHAGEAL REFLUX DISEASE WITHOUT ESOPHAGITIS: Chronic | ICD-10-CM

## 2024-04-16 DIAGNOSIS — E78.5 DYSLIPIDEMIA: Chronic | ICD-10-CM

## 2024-04-16 DIAGNOSIS — Z12.31 ENCOUNTER FOR SCREENING MAMMOGRAM FOR MALIGNANT NEOPLASM OF BREAST: Primary | ICD-10-CM

## 2024-04-16 LAB
ALBUMIN SERPL BCP-MCNC: 3.5 G/DL (ref 3.5–5)
ALP SERPL-CCNC: 73 U/L (ref 34–104)
ALT SERPL W P-5'-P-CCNC: 17 U/L (ref 7–52)
ANION GAP SERPL CALCULATED.3IONS-SCNC: 5 MMOL/L (ref 4–13)
AST SERPL W P-5'-P-CCNC: 20 U/L (ref 13–39)
BASOPHILS # BLD AUTO: 0.09 THOUSANDS/ÂΜL (ref 0–0.1)
BASOPHILS NFR BLD AUTO: 2 % (ref 0–1)
BILIRUB SERPL-MCNC: 0.39 MG/DL (ref 0.2–1)
BUN SERPL-MCNC: 16 MG/DL (ref 5–25)
CALCIUM SERPL-MCNC: 8.6 MG/DL (ref 8.4–10.2)
CHLORIDE SERPL-SCNC: 107 MMOL/L (ref 96–108)
CHOLEST SERPL-MCNC: 209 MG/DL
CO2 SERPL-SCNC: 28 MMOL/L (ref 21–32)
CREAT SERPL-MCNC: 1.05 MG/DL (ref 0.6–1.3)
EOSINOPHIL # BLD AUTO: 0.24 THOUSAND/ÂΜL (ref 0–0.61)
EOSINOPHIL NFR BLD AUTO: 4 % (ref 0–6)
ERYTHROCYTE [DISTWIDTH] IN BLOOD BY AUTOMATED COUNT: 13.8 % (ref 11.6–15.1)
GFR SERPL CREATININE-BSD FRML MDRD: 58 ML/MIN/1.73SQ M
GLUCOSE P FAST SERPL-MCNC: 92 MG/DL (ref 65–99)
HCT VFR BLD AUTO: 42.3 % (ref 34.8–46.1)
HDLC SERPL-MCNC: 42 MG/DL
HGB BLD-MCNC: 13 G/DL (ref 11.5–15.4)
IMM GRANULOCYTES # BLD AUTO: 0.01 THOUSAND/UL (ref 0–0.2)
IMM GRANULOCYTES NFR BLD AUTO: 0 % (ref 0–2)
LDLC SERPL CALC-MCNC: 134 MG/DL (ref 0–100)
LYMPHOCYTES # BLD AUTO: 1.51 THOUSANDS/ÂΜL (ref 0.6–4.47)
LYMPHOCYTES NFR BLD AUTO: 26 % (ref 14–44)
MCH RBC QN AUTO: 29.5 PG (ref 26.8–34.3)
MCHC RBC AUTO-ENTMCNC: 30.7 G/DL (ref 31.4–37.4)
MCV RBC AUTO: 96 FL (ref 82–98)
MONOCYTES # BLD AUTO: 0.66 THOUSAND/ÂΜL (ref 0.17–1.22)
MONOCYTES NFR BLD AUTO: 12 % (ref 4–12)
NEUTROPHILS # BLD AUTO: 3.21 THOUSANDS/ÂΜL (ref 1.85–7.62)
NEUTS SEG NFR BLD AUTO: 56 % (ref 43–75)
NRBC BLD AUTO-RTO: 0 /100 WBCS
PLATELET # BLD AUTO: 366 THOUSANDS/UL (ref 149–390)
PMV BLD AUTO: 11 FL (ref 8.9–12.7)
POTASSIUM SERPL-SCNC: 4 MMOL/L (ref 3.5–5.3)
PROT SERPL-MCNC: 6.3 G/DL (ref 6.4–8.4)
RBC # BLD AUTO: 4.4 MILLION/UL (ref 3.81–5.12)
SODIUM SERPL-SCNC: 140 MMOL/L (ref 135–147)
TRIGL SERPL-MCNC: 166 MG/DL
TSH SERPL DL<=0.05 MIU/L-ACNC: 2.98 UIU/ML (ref 0.45–4.5)
WBC # BLD AUTO: 5.72 THOUSAND/UL (ref 4.31–10.16)

## 2024-04-16 PROCEDURE — 85025 COMPLETE CBC W/AUTO DIFF WBC: CPT | Performed by: FAMILY MEDICINE

## 2024-04-16 PROCEDURE — 99214 OFFICE O/P EST MOD 30 MIN: CPT | Performed by: FAMILY MEDICINE

## 2024-04-16 PROCEDURE — 80053 COMPREHEN METABOLIC PANEL: CPT | Performed by: FAMILY MEDICINE

## 2024-04-16 PROCEDURE — 84443 ASSAY THYROID STIM HORMONE: CPT | Performed by: FAMILY MEDICINE

## 2024-04-16 PROCEDURE — 80061 LIPID PANEL: CPT | Performed by: FAMILY MEDICINE

## 2024-04-16 NOTE — PROGRESS NOTES
Name: Celina Collins      : 1965      MRN: [id number]  Encounter Provider: Neil Dan DO  Encounter Date: 2024   Encounter department: [facility]    Assessment & Plan   Left-sided lower chest discomfort I believe is musculoskeletal.  She will call us if symptoms continue or increase.  Lab work ordered on her today including her PT/INR.  Follow-up in 4 months or as needed.  1. Encounter for screening mammogram for malignant neoplasm of breast  -     Mammo screening bilateral w 3d & cad; Future    2. Other specified hypothyroidism    3. Gastroesophageal reflux disease without esophagitis  -     CBC and differential  -     Comprehensive metabolic panel  -     TSH, 3rd generation with Free T4 reflex    4. Moderate persistent asthma without complication  -     CBC and differential  -     Comprehensive metabolic panel  -     TSH, 3rd generation with Free T4 reflex    5. Dyslipidemia  -     Comprehensive metabolic panel  -     Lipid Panel with Direct LDL reflex    6. Chronic anticoagulation  -     Protime-INR           Subjective     Patient here today for follow-up.  Denies any increase shortness of breath.  She states for the last week or so has had some left lower lateral chest discomfort around the rib area.  She feels it superficial.  She states it is getting better.  Not worse with eating.  Seems to get worse with movement.      Review of Systems    Past Medical History:   Diagnosis Date   • Abdominal pain, lower     [id number] RESOLVED   • Acute renal failure (HCC)     00ESH5539 RESOLVED   • Allergic rhinitis     [id number] RESOLVED   • Ankle pain, right     [id number] RESOLVED   • Arthritis     06DLM1246 RESOLVED  [id number] RESOLVED   • Asthma    • Bilateral chronic knee pain     09SJS8465   • Bladder pain     06HCJ3618 RESOLVED   • Blood clot in vein    • Bursitis     52NEI5311 RESOLVED   • Cardiac disorder     13MKQ0062  RESOLVED   • Cardiac murmur     94BRD1787 RESOLVED   • Chest  tightness or pressure     23OCT2017 RESOLVED   • COPD (chronic obstructive pulmonary disease) ([facility])    • Coronary artery disease    • Curvature of spine     01AUG2017 RESOLVED   • GERD (gastroesophageal reflux disease)    • Hernia, hiatal     30JUN2017 RESOLVED   • Hyperlipidemia     23OCT2017 RESOLVED   • Hypertension    • Inguinal hernia     RIGHT   01AUG2017 RESOLVED   • Jaw closure abnormality     [id number] RESOLVED   • Lumbar herniated disc     01AUG2017 RESOLVED   • Morbid obesity ([facility])     30JUN2017   • Obesity    • Osteoarthritis of right knee     01AUG2017 RESOLVED   • Patellar tendinitis     01AUG2017   • Poor flexibility of tendon     01AUG2017   • Presence of IVC filter 10/12/2018   • Pulmonary embolism (HCC)     23OCT2017 RESOLVED   • Sepsis ([facility])     23OCT2017 RESOLVED   • Severe sepsis due to methicillin resistant Staphylococcus aureus (MRSA) with acute organ dysfunction ([facility])     [id number] RESOLVED   • Sleep apnea, obstructive    • Spider vein, symptomatic     01AUG2017 RESOLVED   • Status post arthroscopy of right knee     30JUN2017 RESOLVED   • Stomach disorder     01AUG2017 RESOLVED   • Tear of medial meniscus of right knee     SUBSEQUENT ENCOUNTER  RESOLVED 30JUN2017   • Thyroid disorder     01AUG2017   • Umbilical hernia     01AUG2017 RESOLVED     Past Surgical History:   Procedure Laterality Date   • COLONOSCOPY     • CYSTOSCOPY  01/1994    WITH BIOPSY     • EXPLORATORY LAPAROTOMY     • FOOT SURGERY Left    • FRACTURE SURGERY     • HAND SURGERY Right     cyst   • HYSTERECTOMY  2009   • INCISION AND DRAINAGE ANTERIOR NECK Right 6/8/2017    Procedure: INCISION AND DRAINAGE  (I&D) NECK;  Surgeon: Kirby Morales MD;  [facility];  Service:    • INCISIONAL HERNIA REPAIR      WITH IMPLANTATION OF MESH  13 MAY 2014  LAST ASSESSED   • IR IVC FILTER REMOVAL  1/22/2019   • OPEN ANTERIOR SHOULDER RECONSTRUCTION Left    • OTHER SURGICAL HISTORY      BLOOD VESSEL REPAIR   13 MAY 2014 LAST  ASSESSED   • PERIPHERALLY INSERTED CENTRAL CATHETER INSERTION     • KS ARTHRS KNE SURG W/MENISCECTOMY MED/LAT W/SHVG Right 2016    Procedure: KNEE ARTHROSCOPY WITH MEDIAL MENISCECTOMY ;  Surgeon: Mikey Pantoja MD;  Location: [facility];  Service: Orthopedics   • KS RPR AA HERNIA 1ST 3-10 CM REDUCIBLE N/A 11/15/2023    Procedure: REPAIR OF INCARCERATED VENTRAL HERNIA;  Surgeon: Lane Ventura DO;  Location: [facility];  Service: General   • SHOULDER SURGERY       Family History   Problem Relation Age of Onset   • Arthritis Mother    • Colon cancer Brother    • Lung cancer Father    • No Known Problems Sister    • No Known Problems Daughter    • No Known Problems Maternal Grandmother    • No Known Problems Maternal Grandfather    • No Known Problems Paternal Grandmother    • No Known Problems Paternal Grandfather    • Breast cancer Maternal Aunt    • Diabetes Maternal Aunt    • No Known Problems Sister    • No Known Problems Sister    • No Known Problems Sister    • No Known Problems Sister    • No Known Problems Sister    • No Known Problems Daughter    • Colon cancer Son      Social History     Socioeconomic History   • Marital status: Single     Spouse name: None   • Number of children: None   • Years of education: None   • Highest education level: None   Occupational History   • Occupation: unemployed   Tobacco Use   • Smoking status: Former     Current packs/day: 0.00     Types: Cigarettes     Quit date: 1995     Years since quittin.4   • Smokeless tobacco: Never   • Tobacco comments:     quit 25 years ago   Vaping Use   • Vaping status: Never Used   Substance and Sexual Activity   • Alcohol use: Never   • Drug use: Never   • Sexual activity: Never   Other Topics Concern   • None   Social History Narrative    ALWAYS USES SEAT BELT         Social Determinants of Health     Financial Resource Strain: Not on file   Food Insecurity: Not on file   Transportation Needs: Not on file   Physical  Activity: Not on file   Stress: Not on file   Social Connections: Not on file   Intimate Partner Violence: Not on file   Housing Stability: Not on file     Current Outpatient Medications on File Prior to Visit   Medication Sig   • albuterol (2.5 mg/3 mL) 0.083 % nebulizer solution Take 3 mL (2.5 mg total) by nebulization as needed for wheezing   • albuterol (PROVENTIL HFA,VENTOLIN HFA) 90 mcg/act inhaler Inhale 2 puffs every 4 (four) hours as needed for wheezing   • atorvastatin (LIPITOR) 10 mg tablet Take 1 tablet (10 mg total) by mouth daily   • azelastine (ASTELIN) 0.1 % nasal spray 1 spray into each nostril 2 (two) times a day Use in each nostril as directed   • budesonide-formoterol (Symbicort) 160-4.5 mcg/act inhaler Inhale 2 puffs 2 (two) times a day Rinse mouth after use   • Diclofenac Sodium (VOLTAREN) 1 % Apply 4 g topically 4 (four) times a day   • famotidine (PEPCID) 40 MG tablet TAKE ONE TABLET BY MOUTH DAILY AT 9PM AT BEDTIME   • furosemide (LASIX) 20 mg tablet Take 1 tablet (20 mg total) by mouth daily as needed (leg swelling) Take with potassium   • levothyroxine 125 mcg tablet TAKE ONE TABLET BY MOUTH DAILY AT 8AM   • loratadine (CLARITIN) 10 mg tablet Take 1 tablet (10 mg total) by mouth daily   • montelukast (SINGULAIR) 10 mg tablet Take 1 tablet (10 mg total) by mouth daily at bedtime   • Multiple Vitamins-Minerals (CENTRUM SILVER 50+WOMEN PO) Take by mouth   • omeprazole (PriLOSEC) 40 MG capsule Take 1 capsule (40 mg total) by mouth daily before breakfast   • potassium chloride (MICRO-K) 10 MEQ CR capsule Take 1 capsule (10 mEq total) by mouth daily   • warfarin (Coumadin) 1 mg tablet Take 2 tablets daily or as directed by provider   • warfarin (COUMADIN) 5 mg tablet TAKE ONE AND 1/2 TABLETS BY MOUTH ON TUESDAYS,THURSDAYS, SATURDAYS AND SUNDAYS. TAKE TWO TABLETS ON MONDAYS, WEDNESDAYS AND FRIDAYS   • [DISCONTINUED] Ferrous Sulfate (IRON PO) Take 1 tablet by mouth daily (Patient not taking:  "Reported on 4/16/2024)   • [DISCONTINUED] hydrocortisone 0.5 % cream Apply topically 2 (two) times a day (Patient not taking: Reported on 1/22/2024)   • [DISCONTINUED] predniSONE 20 mg tablet Take 2 tablets (40 mg total) by mouth daily (Patient not taking: Reported on 4/16/2024)     Allergies   Allergen Reactions   • Clindamycin Angioedema   • Medical Tape Itching     Welt and redness   • Penicillin G Nausea Only   • Penicillins GI Intolerance     Nausea and vomiting     Immunization History   Administered Date(s) Administered   • INFLUENZA 09/29/2014, 09/28/2015, 11/27/2016, 11/30/2016, 05/29/2017, 10/07/2022, 09/18/2023   • Influenza Quadrivalent Preservative Free 3 years and older IM 11/30/2016   • Influenza Quadrivalent, 6-35 Months IM 08/29/2017   • Influenza, Quadrivalent (nasal) 11/27/2016   • Influenza, injectable, quadrivalent, preservative free 0.5 mL 09/18/2023   • Influenza, recombinant, quadrivalent,injectable, preservative free 10/12/2018, 09/11/2019, 09/15/2020, 09/30/2021, 10/07/2022   • Influenza, seasonal, injectable 09/29/2014   • Pneumococcal Conjugate Vaccine 20-valent (Pcv20), Polysace 12/14/2023   • Pneumococcal Polysaccharide PPV23 08/29/2017       Objective     /92   Pulse 87   Temp (!) 96.6 °F (35.9 °C)   Ht 5' 5\" (1.651 m)   Wt 136 kg (299 lb 3.2 oz)   LMP  (LMP Unknown)   SpO2 97%   BMI 49.79 kg/m²     Physical Exam  Vitals reviewed.   Constitutional:       General: She is not in acute distress.     Appearance: Normal appearance. She is well-developed. She is not ill-appearing, toxic-appearing or diaphoretic.   HENT:      Head: Normocephalic and atraumatic.   Eyes:      Conjunctiva/sclera: Conjunctivae normal.   Cardiovascular:      Rate and Rhythm: Normal rate and regular rhythm.      Heart sounds: Normal heart sounds. No murmur heard.     No friction rub. No gallop.   Pulmonary:      Effort: Pulmonary effort is normal. No respiratory distress.      Breath sounds: Normal " breath sounds. No wheezing, rhonchi or rales.   Chest:      Chest wall: Tenderness (Mild left lower lateral chest discomfort) present.   Musculoskeletal:      Right lower leg: No edema.      Left lower leg: No edema.   Neurological:      General: No focal deficit present.      Mental Status: She is alert and oriented to person, place, and time.   Psychiatric:         Mood and Affect: Mood normal.         Behavior: Behavior normal.         Thought Content: Thought content normal.         Judgment: Judgment normal.       Neil Dan,

## 2024-04-22 ENCOUNTER — OFFICE VISIT (OUTPATIENT)
Dept: FAMILY MEDICINE CLINIC | Facility: CLINIC | Age: 59
End: 2024-04-22
Payer: COMMERCIAL

## 2024-04-22 ENCOUNTER — TELEPHONE (OUTPATIENT)
Dept: FAMILY MEDICINE CLINIC | Facility: CLINIC | Age: 59
End: 2024-04-22

## 2024-04-22 VITALS
TEMPERATURE: 97.2 F | HEIGHT: 65 IN | WEIGHT: 293 LBS | SYSTOLIC BLOOD PRESSURE: 132 MMHG | DIASTOLIC BLOOD PRESSURE: 86 MMHG | OXYGEN SATURATION: 97 % | BODY MASS INDEX: 48.82 KG/M2 | HEART RATE: 84 BPM

## 2024-04-22 DIAGNOSIS — E78.5 DYSLIPIDEMIA: ICD-10-CM

## 2024-04-22 DIAGNOSIS — H92.22 OTORRHAGIA OF LEFT EAR: Primary | ICD-10-CM

## 2024-04-22 DIAGNOSIS — G47.33 OSA (OBSTRUCTIVE SLEEP APNEA): ICD-10-CM

## 2024-04-22 PROCEDURE — 99213 OFFICE O/P EST LOW 20 MIN: CPT | Performed by: FAMILY MEDICINE

## 2024-04-22 RX ORDER — FLUTICASONE PROPIONATE 50 MCG
SPRAY, SUSPENSION (ML) NASAL
COMMUNITY
Start: 2024-04-16

## 2024-04-22 RX ORDER — ATORVASTATIN CALCIUM 10 MG/1
10 TABLET, FILM COATED ORAL
Qty: 90 TABLET | Refills: 3 | Status: SHIPPED | OUTPATIENT
Start: 2024-04-22

## 2024-04-22 NOTE — PROGRESS NOTES
Name: Celina Collins      : 1965      MRN: [id number]  Encounter Provider: Neil Dan DO  Encounter Date: 2024   Encounter department: [facility]    Assessment & Plan   Patient continues to use her oxygen at night to help with her sleep apnea.  No sign of infection in either canal.  Tympanic membranes within normal limits.  Just to soothe the left ear canal, Rx for Cipro eardrops.  Told her to avoid using Q-tips, and not to be aggressive with them if she does use them.  Just around the outside edge.  Call if symptoms continue or increase  1. Otorrhagia of left ear  -     ciprofloxacin-hydrocortisone (CIPRO HC OTIC) otic suspension; Administer 3 drops into the left ear 2 (two) times a day For 5-7 days    2. MICHAEL (obstructive sleep apnea)         Subjective     Patient here today stating this morning felt something draining from her left ear.  When she used a Q-tip, noticed blood.  She is able to hear out of both ears well.  No significant pain in either ear.  Just concerned about the blood.  She does use Q-tips often.      Review of Systems   Constitutional: Negative.    HENT:  Positive for ear discharge.    Respiratory: Negative.     Cardiovascular: Negative.    Gastrointestinal: Negative.    Genitourinary: Negative.        Past Medical History:   Diagnosis Date    Abdominal pain, lower     [id number] RESOLVED    Acute renal failure (HCC)     [id number] RESOLVED    Allergic rhinitis     15FZM6563 RESOLVED    Ankle pain, right     38JYT5327 RESOLVED    Arthritis     63VPH4439 RESOLVED  [id number] RESOLVED    Asthma     Bilateral chronic knee pain     46QCY0132    Bladder pain     23IOF5082 RESOLVED    Blood clot in vein     Bursitis     27HUE2459 RESOLVED    Cardiac disorder     89GOR5556  RESOLVED    Cardiac murmur     38ZTJ0995 RESOLVED    Chest tightness or pressure     58FGL3439 RESOLVED    COPD (chronic obstructive pulmonary disease) (HCC)     Coronary artery disease     Curvature of  spine     11EBQ1002 RESOLVED    GERD (gastroesophageal reflux disease)     Hernia, hiatal     61ISV1832 RESOLVED    Hyperlipidemia     19IBO5540 RESOLVED    Hypertension     Inguinal hernia     RIGHT   98ULW2275 RESOLVED    Jaw closure abnormality     78ATU3928 RESOLVED    Lumbar herniated disc     83LUB8532 RESOLVED    Morbid obesity (HCC)     64VWZ0429    Obesity     Osteoarthritis of right knee     69MUL8477 RESOLVED    Patellar tendinitis     03GOZ4471    Poor flexibility of tendon     87JLC0561    Presence of IVC filter 10/12/2018    Pulmonary embolism (HCC)     91OWY0203 RESOLVED    Sepsis (HCC)     84TWH3489 RESOLVED    Severe sepsis due to methicillin resistant Staphylococcus aureus (MRSA) with acute organ dysfunction (HCC)     64UIO8304 RESOLVED    Sleep apnea, obstructive     Spider vein, symptomatic     10DZM0838 RESOLVED    Status post arthroscopy of right knee     05AYW4517 RESOLVED    Stomach disorder     79GIX4988 RESOLVED    Tear of medial meniscus of right knee     SUBSEQUENT ENCOUNTER  RESOLVED 85OYI1506    Thyroid disorder     48EZS7938    Umbilical hernia     12RXB7543 RESOLVED     Past Surgical History:   Procedure Laterality Date    COLONOSCOPY      CYSTOSCOPY  01/1994    WITH BIOPSY      EXPLORATORY LAPAROTOMY      FOOT SURGERY Left     FRACTURE SURGERY      HAND SURGERY Right     cyst    HYSTERECTOMY  2009    INCISION AND DRAINAGE ANTERIOR NECK Right 6/8/2017    Procedure: INCISION AND DRAINAGE  (I&D) NECK;  Surgeon: Kirby Morales MD;  [facility];  Service:     INCISIONAL HERNIA REPAIR      WITH IMPLANTATION OF MESH  13 MAY 2014  LAST ASSESSED    IR IVC FILTER REMOVAL  1/22/2019    OPEN ANTERIOR SHOULDER RECONSTRUCTION Left     OTHER SURGICAL HISTORY      BLOOD VESSEL REPAIR   13 MAY 2014 LAST ASSESSED    PERIPHERALLY INSERTED CENTRAL CATHETER INSERTION      ND ARTHRS KNE SURG W/MENISCECTOMY MED/LAT W/SHVG Right 4/11/2016    Procedure: KNEE ARTHROSCOPY WITH MEDIAL MENISCECTOMY ;   Surgeon: Mikey Pantoja MD;  [facility];  Service: Orthopedics    KS RPR AA HERNIA 1ST 3-10 CM REDUCIBLE N/A 11/15/2023    Procedure: REPAIR OF INCARCERATED VENTRAL HERNIA;  Surgeon: Lane Ventura DO;  [facility];  Service: General    SHOULDER SURGERY       Family History   Problem Relation Age of Onset    Arthritis Mother     Colon cancer Brother     Lung cancer Father     No Known Problems Sister     No Known Problems Daughter     No Known Problems Maternal Grandmother     No Known Problems Maternal Grandfather     No Known Problems Paternal Grandmother     No Known Problems Paternal Grandfather     Breast cancer Maternal Aunt     Diabetes Maternal Aunt     No Known Problems Sister     No Known Problems Sister     No Known Problems Sister     No Known Problems Sister     No Known Problems Sister     No Known Problems Daughter     Colon cancer Son      Social History     Socioeconomic History    Marital status: Single     Spouse name: None    Number of children: None    Years of education: None    Highest education level: None   Occupational History    Occupation: unemployed   Tobacco Use    Smoking status: Former     Current packs/day: 0.00     Types: Cigarettes     Quit date: 1995     Years since quittin.4    Smokeless tobacco: Never    Tobacco comments:     quit 25 years ago   Vaping Use    Vaping status: Never Used   Substance and Sexual Activity    Alcohol use: Never    Drug use: Never    Sexual activity: Never   Other Topics Concern    None   Social History Narrative    ALWAYS USES SEAT BELT         Social Determinants of Health     Financial Resource Strain: Not on file   Food Insecurity: Not on file   Transportation Needs: Not on file   Physical Activity: Not on file   Stress: Not on file   Social Connections: Not on file   Intimate Partner Violence: Not on file   Housing Stability: Not on file     Current Outpatient Medications on File Prior to Visit   Medication Sig     albuterol (2.5 mg/3 mL) 0.083 % nebulizer solution Take 3 mL (2.5 mg total) by nebulization as needed for wheezing    albuterol (PROVENTIL HFA,VENTOLIN HFA) 90 mcg/act inhaler Inhale 2 puffs every 4 (four) hours as needed for wheezing    atorvastatin (LIPITOR) 10 mg tablet TAKE ONE TABLET BY MOUTH DAILY AT 5PM    budesonide-formoterol (Symbicort) 160-4.5 mcg/act inhaler Inhale 2 puffs 2 (two) times a day Rinse mouth after use    Diclofenac Sodium (VOLTAREN) 1 % Apply 4 g topically 4 (four) times a day    famotidine (PEPCID) 40 MG tablet TAKE ONE TABLET BY MOUTH DAILY AT 9PM AT BEDTIME    fluticasone (FLONASE) 50 mcg/act nasal spray     furosemide (LASIX) 20 mg tablet Take 1 tablet (20 mg total) by mouth daily as needed (leg swelling) Take with potassium    levothyroxine 125 mcg tablet TAKE ONE TABLET BY MOUTH DAILY AT 8AM    loratadine (CLARITIN) 10 mg tablet Take 1 tablet (10 mg total) by mouth daily    montelukast (SINGULAIR) 10 mg tablet Take 1 tablet (10 mg total) by mouth daily at bedtime    Multiple Vitamins-Minerals (CENTRUM SILVER 50+WOMEN PO) Take by mouth    omeprazole (PriLOSEC) 40 MG capsule Take 1 capsule (40 mg total) by mouth daily before breakfast    potassium chloride (MICRO-K) 10 MEQ CR capsule Take 1 capsule (10 mEq total) by mouth daily    warfarin (Coumadin) 1 mg tablet Take 2 tablets daily or as directed by provider    warfarin (COUMADIN) 5 mg tablet TAKE ONE AND 1/2 TABLETS BY MOUTH ON TUESDAYS,THURSDAYS, SATURDAYS AND SUNDAYS. TAKE TWO TABLETS ON MONDAYS, WEDNESDAYS AND FRIDAYS    azelastine (ASTELIN) 0.1 % nasal spray 1 spray into each nostril 2 (two) times a day Use in each nostril as directed (Patient not taking: Reported on 4/22/2024)     Allergies   Allergen Reactions    Clindamycin Angioedema    Medical Tape Itching     Welt and redness    Penicillin G Nausea Only    Penicillins GI Intolerance     Nausea and vomiting     Immunization History   Administered Date(s) Administered     "INFLUENZA 09/29/2014, 09/28/2015, 11/27/2016, 11/30/2016, 05/29/2017, 10/07/2022, 09/18/2023    Influenza Quadrivalent Preservative Free 3 years and older IM 11/30/2016    Influenza Quadrivalent, 6-35 Months IM 08/29/2017    Influenza, Quadrivalent (nasal) 11/27/2016    Influenza, injectable, quadrivalent, preservative free 0.5 mL 09/18/2023    Influenza, recombinant, quadrivalent,injectable, preservative free 10/12/2018, 09/11/2019, 09/15/2020, 09/30/2021, 10/07/2022    Influenza, seasonal, injectable 09/29/2014    Pneumococcal Conjugate Vaccine 20-valent (Pcv20), Polysace 12/14/2023    Pneumococcal Polysaccharide PPV23 08/29/2017       Objective     /86   Pulse 84   Temp (!) 97.2 °F (36.2 °C)   Ht 5' 5\" (1.651 m)   Wt 135 kg (297 lb)   LMP  (LMP Unknown)   SpO2 97%   BMI 49.42 kg/m²     Physical Exam  Vitals reviewed.   Constitutional:       General: She is not in acute distress.     Appearance: Normal appearance. She is well-developed. She is not diaphoretic.   HENT:      Head: Normocephalic and atraumatic.      Right Ear: Tympanic membrane normal.      Left Ear: Tympanic membrane normal.      Ears:      Comments: Dried blood seen on the bottom side of her left ear canal.  Eyes:      Conjunctiva/sclera: Conjunctivae normal.   Pulmonary:      Comments: Talking in full sentences in no distress  Neurological:      General: No focal deficit present.      Mental Status: She is alert and oriented to person, place, and time.   Psychiatric:         Mood and Affect: Mood normal.         Behavior: Behavior normal.         Thought Content: Thought content normal.         Judgment: Judgment normal.       Neil Dan, DO    "

## 2024-04-25 ENCOUNTER — TELEPHONE (OUTPATIENT)
Age: 59
End: 2024-04-25

## 2024-04-25 ENCOUNTER — TELEPHONE (OUTPATIENT)
Dept: FAMILY MEDICINE CLINIC | Facility: CLINIC | Age: 59
End: 2024-04-25

## 2024-04-25 NOTE — TELEPHONE ENCOUNTER
Brandi from [facility] called asking for a phone number we had on file for the patient because the one she had was incorrect.

## 2024-04-25 NOTE — TELEPHONE ENCOUNTER
She came in today to check in on patient use of oxygen. In December it was discontinued and note was to be sent to [facility]. Oxygen use was brought up at last visit. Contacted patient to see if she was still using it, se said she does not need it and it was ok to come . They will contact patient to pick it up.

## 2024-04-29 ENCOUNTER — OFFICE VISIT (OUTPATIENT)
Dept: PULMONOLOGY | Facility: CLINIC | Age: 59
End: 2024-04-29
Payer: COMMERCIAL

## 2024-04-29 VITALS
WEIGHT: 293 LBS | OXYGEN SATURATION: 95 % | SYSTOLIC BLOOD PRESSURE: 157 MMHG | HEART RATE: 80 BPM | DIASTOLIC BLOOD PRESSURE: 85 MMHG | HEIGHT: 65 IN | TEMPERATURE: 98 F | BODY MASS INDEX: 48.82 KG/M2

## 2024-04-29 DIAGNOSIS — G47.33 OSA (OBSTRUCTIVE SLEEP APNEA): ICD-10-CM

## 2024-04-29 DIAGNOSIS — E66.01 MORBID OBESITY (HCC): Chronic | ICD-10-CM

## 2024-04-29 DIAGNOSIS — J30.1 SEASONAL ALLERGIC RHINITIS DUE TO POLLEN: ICD-10-CM

## 2024-04-29 DIAGNOSIS — Z86.711 HISTORY OF PULMONARY EMBOLUS (PE): ICD-10-CM

## 2024-04-29 DIAGNOSIS — J45.40 MODERATE PERSISTENT ASTHMA WITHOUT COMPLICATION: Primary | ICD-10-CM

## 2024-04-29 PROCEDURE — 99213 OFFICE O/P EST LOW 20 MIN: CPT

## 2024-04-29 NOTE — PROGRESS NOTES
Progress note - Pulmonary Medicine   Celina Collins 59 y.o. female MRN: [id number]       Impression & Plan:   Celina Collins is a 59 y.o. female with PMHx of asthma, hypothyroidism, seasonal allergies, MICHAEL on CPAP, CKD 3, HLD, prior PE on Coumadin and obesity who presents for follow-up.    Moderate Persistent Asthma without Complication  - The patient has a history of moderate asthma and is currently maintained on symbicort 160mcg with albuterol HFA/nebs.  She had an exacerbation in 1/2024, but has been improved since prednisone course.  - Continue symbicort 160mcg BID, discussed the importance of rinsing her mouth after each use.  - Continue with albuterol HFA/nebs as needed  - Continue with loratadine and montelukast  - She is due for COVID vaccinations  - Follow-up in 3 to 4 months.    MICHAEL (obstructive sleep apnea)  Morbid Obesity  - The patient has a history of overall mild MICHAEL with exacerbation to severe during REM sleep, she is on auto-CPAP 5-20 cmH2O.  She previously stopped using her device due to increased nasal congestion which is now improving and she is amenable to try using the CPAP again.  - Continue APAP 5-20 cmH2O with trial of a nasal pillows mask.  - Asked her to speak to the DME about water chamber as the humidity will likely help with her nasal congestion and tolerance of the device.  - Explained the importance of keeping the machine clean, and that they should be eligible for refills of supplies every 3-6 months depending on insurance.  We also discussed the insurance compliance requirements.  - Encouraged healthy lifestyle with adequate sleep (7-9 hours per night), healthy balanced diet and routine exercise.  Explained the importance of avoiding driving while drowsy.  - Weight loss is strongly recommended.    Seasonal Allergic Rhinitis due to Pollen  - She has had good relief of her nasal symptoms with azelastine and is tolerating it better than Flonase.  Will continue with azelastine at this  time.  - Will also continue with loratadine and montelukast.    History of Pulmonary Embolus  - Previously recommend to continue lifelong coumadin therapy, reports she is compliant with medication, last INR was 4/16/2024 and is being followed by her PCP.    Return in about 4 years (around 4/29/2028).  ______________________________________________________________________    HPI:    Celina Collins is a 59 y.o. female with PMHx of asthma, hypothyroidism, seasonal allergies, MICHAEL on CPAP, CKD 3, HLD, prior PE on Coumadin and obesity who presents for follow-up.  The patient was last seen in the office on 1/22/2024 at which time she was noted to be in asthma exacerbation.  Plan was for prednisone 40 mg x 5 days, Z-Cristiano, azelastine nasal spray for chronic rhinitis not responsive to Flonase and resumption of CPAP when able.  She notes that she had good benefit with the prednisone and her breathing is back to baseline.  She reports that generally the heat/humidity will cause her to feel more dyspneic.  She denies any significant cough recently as well as hemoptysis or sputum production.  She did try the azelastine spray and reports it helped with her nasal congestion and was more tolerable than flonase.  Currently she is using her symbicort twice daily and is rinsing out her mouth after each use.  She is using her albuterol HFA ~3x a week and she last used her albuterol nebulizer ~3 weeks ago.  She thinks she is taking both her claritin and montelukast.  She denies any wheezing currently.    She did recently talk to her DME about switching masks and was given nasal pillows interface, but has not restarted using her device yet.  She does state that she has a replacement Marquise device which was given her during the recall, and notes that she was not given a water chamber along with the replacement device.  She does report receiving benefit in the past from using humidity on her prior machine.  Her DME is [facility], but she  has not spoken to him about the water chamber.    Current Medications:    Current Outpatient Medications:     albuterol (2.5 mg/3 mL) 0.083 % nebulizer solution, Take 3 mL (2.5 mg total) by nebulization as needed for wheezing, Disp: 3 mL, Rfl: 5    albuterol (PROVENTIL HFA,VENTOLIN HFA) 90 mcg/act inhaler, Inhale 2 puffs every 4 (four) hours as needed for wheezing, Disp: 54 g, Rfl: 3    atorvastatin (LIPITOR) 10 mg tablet, TAKE ONE TABLET BY MOUTH DAILY AT 5PM, Disp: 90 tablet, Rfl: 3    azelastine (ASTELIN) 0.1 % nasal spray, 1 spray into each nostril 2 (two) times a day Use in each nostril as directed (Patient not taking: Reported on 4/22/2024), Disp: 1 mL, Rfl: 5    budesonide-formoterol (Symbicort) 160-4.5 mcg/act inhaler, Inhale 2 puffs 2 (two) times a day Rinse mouth after use, Disp: 30.6 g, Rfl: 3    ciprofloxacin-hydrocortisone (CIPRO HC OTIC) otic suspension, Administer 3 drops into the left ear 2 (two) times a day For 5-7 days, Disp: 10 mL, Rfl: 0    Diclofenac Sodium (VOLTAREN) 1 %, Apply 4 g topically 4 (four) times a day, Disp: 350 g, Rfl: 0    famotidine (PEPCID) 40 MG tablet, TAKE ONE TABLET BY MOUTH DAILY AT 9PM AT BEDTIME, Disp: 90 tablet, Rfl: 11    fluticasone (FLONASE) 50 mcg/act nasal spray, , Disp: , Rfl:     furosemide (LASIX) 20 mg tablet, Take 1 tablet (20 mg total) by mouth daily as needed (leg swelling) Take with potassium, Disp: 30 tablet, Rfl: 5    levothyroxine 125 mcg tablet, TAKE ONE TABLET BY MOUTH DAILY AT 8AM, Disp: 90 tablet, Rfl: 3    loratadine (CLARITIN) 10 mg tablet, Take 1 tablet (10 mg total) by mouth daily, Disp: 30 tablet, Rfl: 5    montelukast (SINGULAIR) 10 mg tablet, Take 1 tablet (10 mg total) by mouth daily at bedtime, Disp: 90 tablet, Rfl: 3    Multiple Vitamins-Minerals (CENTRUM SILVER 50+WOMEN PO), Take by mouth, Disp: , Rfl:     omeprazole (PriLOSEC) 40 MG capsule, Take 1 capsule (40 mg total) by mouth daily before breakfast, Disp: 90 capsule, Rfl: 3    potassium  "chloride (MICRO-K) 10 MEQ CR capsule, Take 1 capsule (10 mEq total) by mouth daily, Disp: 30 capsule, Rfl: 5    warfarin (Coumadin) 1 mg tablet, Take 2 tablets daily or as directed by provider, Disp: 60 tablet, Rfl: 2    warfarin (COUMADIN) 5 mg tablet, TAKE ONE AND 1/2 TABLETS BY MOUTH ON ,,  AND SUNDAYS. TAKE TWO TABLETS ON ,  AND , Disp: 135 tablet, Rfl: 1    Review of Systems:  Review of Systems  10-point system review completed, all of which are negative except as mentioned above.    Past medical history, surgical history, and family history were reviewed and updated as appropriate    Social history updates:  Social History     Tobacco Use   Smoking Status Former    Current packs/day: 0.00    Types: Cigarettes    Quit date: 1995    Years since quittin.4   Smokeless Tobacco Never   Tobacco Comments    quit 25 years ago     PhysicalExamination:  Vitals:   /85 (BP Location: Left arm, Patient Position: Sitting, Cuff Size: Standard)   Pulse 80   Temp 98 °F (36.7 °C) (Temporal)   Ht 5' 5\" (1.651 m)   Wt 135 kg (297 lb)   LMP  (LMP Unknown)   SpO2 95%   BMI 49.42 kg/m²   Body mass index is 49.42 kg/m².    Constitutional: NAD, well appearing, on room air, no conversational dyspnea, obese   Skin: Warm, dry, no rashes noted   Eyes: PERRL, normal conjunctiva  ENT: Nasal congestion absent, moist mucus membranes.  Neck: No JVD, trachea is midline, no adenopathy.  Resp: CTA B/L, no W/R/R   Cardiac: RRR, +S1/S2, no M/R/G  Abdomen: Soft, NT/ND  Extremities: No digital clubbing or pedal edema  Neuro: AAOx3    Diagnostic Data:  Labs:  I personally reviewed the most recent laboratory data pertinent to today's visit    Lab Results   Component Value Date    WBC 5.72 2024    HGB 13.0 2024    HCT 42.3 2024    MCV 96 2024     2024     Lab Results   Component Value Date    SODIUM 140 2024    K 4.0 2024    CO2 28 " 04/16/2024     04/16/2024    BUN 16 04/16/2024    CREATININE 1.05 04/16/2024    GLUCOSE 102 06/01/2017    CALCIUM 8.6 04/16/2024     PFT results:  The most recent pulmonary function tests were reviewed.  PFTs -- 11/20/2020  Spirometry:  FEV1/FVC Ratio is 81 %. FEV1 is 2.29 L/93 % of predicted. FVC is 2.83 L/86 % of predicted.  Lung volumes:  RV 1.10 L/58 % of predicted, TLC 4.05 L/79 % of predicted, RV/TLC ratio 27 %  Diffusing capacity:  57 % of predicted     Imaging:  I personally reviewed the images in PACS pertinent to today's visit:  CXR Portable -- 3/25/2023  No acute cardiopulmonary disease.     CT C/A/P w/ Contrast -- 6/19/2021  LUNGS:  Minimal bibasilar atelectasis or scarring.  No pneumothorax.  There is no tracheal or endobronchial lesion.  PLEURA:  Unremarkable.  Nondisplaced right 10th rib fracture.  No other evidence of acute pathology.  Moderate hiatal hernia.  Colonic diverticulosis.     Other studies:  PSG --12/30/2022 (Wt 302 lbs)  AHI -10.6  Sup AHI -27.9  REM AHI -42.3  O2 Shahbaz -70.0%  TST < 90% -29.3 min  PLMI - 0.0     2D Echo -- 5/4/2020  LEFT VENTRICLE:  Systolic function was normal. Ejection fraction was estimated to be 65 %.  There were no regional wall motion abnormalities.  RIGHT VENTRICLE:  The size was normal.  Systolic function was normal.  MITRAL VALVE:  There was trace regurgitation.  TRICUSPID VALVE:  There was trace regurgitation.  Pulmonary artery systolic pressure was within the normal range.    I have spent a total time of 25 minutes on 04/29/24 in caring for this patient including Instructions for management, Patient and family education, Importance of tx compliance, Counseling / Coordination of care, Documenting in the medical record, Reviewing / ordering tests, medicine, procedures  , and Obtaining or reviewing history  .    Nelia Waller MD  Pulmonary-Critical Care and Sleep Medicine  04/29/24    Portions of the record may have been created with voice recognition  "software. Occasional wrong word or \"sound a like\" substitutions may have occurred due to the inherent limitations of voice recognition software. Please read the chart carefully and recognize, using context, where substitutions have occurred.  "

## 2024-04-29 NOTE — PATIENT INSTRUCTIONS
- Continue your symbicort twice daily and rinse out your mouth after each use  - Continue albuterol inhaler/nebulizers as needed  - Continue with azelastine nasal spray, claritin and singulair for allergies  - Call your medical equipment company about the water chamber on your device  - Start using your CPAP machine again  - Follow up in 3-4 months

## 2024-05-01 ENCOUNTER — APPOINTMENT (OUTPATIENT)
Dept: LAB | Facility: MEDICAL CENTER | Age: 59
End: 2024-05-01
Payer: COMMERCIAL

## 2024-05-01 DIAGNOSIS — I82.90 NON-OCCLUSIVE THROMBUS: ICD-10-CM

## 2024-05-01 LAB
INR PPP: 4.32 (ref 0.84–1.19)
PROTHROMBIN TIME: 40.4 SECONDS (ref 11.6–14.5)

## 2024-05-01 PROCEDURE — 85610 PROTHROMBIN TIME: CPT

## 2024-05-01 PROCEDURE — 36415 COLL VENOUS BLD VENIPUNCTURE: CPT

## 2024-05-02 ENCOUNTER — ANTICOAG VISIT (OUTPATIENT)
Dept: FAMILY MEDICINE CLINIC | Facility: CLINIC | Age: 59
End: 2024-05-02

## 2024-05-09 ENCOUNTER — TELEPHONE (OUTPATIENT)
Age: 59
End: 2024-05-09

## 2024-05-09 DIAGNOSIS — G47.33 OSA (OBSTRUCTIVE SLEEP APNEA): Primary | ICD-10-CM

## 2024-05-09 NOTE — TELEPHONE ENCOUNTER
Pt called in [facility] regarding her cpap machine , patient needs a cpap with a water tank . Pt was told from [facility] she would need a brand new script.    Please advise pt once the script has been sent to [facility]

## 2024-05-15 ENCOUNTER — APPOINTMENT (OUTPATIENT)
Dept: LAB | Facility: MEDICAL CENTER | Age: 59
End: 2024-05-15
Payer: COMMERCIAL

## 2024-05-15 DIAGNOSIS — I82.90 NON-OCCLUSIVE THROMBUS: ICD-10-CM

## 2024-05-15 LAB
INR PPP: 2.98 (ref 0.84–1.19)
PROTHROMBIN TIME: 30.4 SECONDS (ref 11.6–14.5)

## 2024-05-15 PROCEDURE — 85610 PROTHROMBIN TIME: CPT

## 2024-05-15 PROCEDURE — 36415 COLL VENOUS BLD VENIPUNCTURE: CPT

## 2024-05-16 ENCOUNTER — ANTICOAG VISIT (OUTPATIENT)
Dept: FAMILY MEDICINE CLINIC | Facility: CLINIC | Age: 59
End: 2024-05-16

## 2024-05-29 ENCOUNTER — APPOINTMENT (OUTPATIENT)
Dept: LAB | Facility: MEDICAL CENTER | Age: 59
End: 2024-05-29
Payer: COMMERCIAL

## 2024-05-29 DIAGNOSIS — I82.90 NON-OCCLUSIVE THROMBUS: ICD-10-CM

## 2024-05-29 LAB
INR PPP: 2.76 (ref 0.84–1.19)
PROTHROMBIN TIME: 28.6 SECONDS (ref 11.6–14.5)

## 2024-05-29 PROCEDURE — 36415 COLL VENOUS BLD VENIPUNCTURE: CPT

## 2024-05-29 PROCEDURE — 85610 PROTHROMBIN TIME: CPT

## 2024-05-30 ENCOUNTER — ANTICOAG VISIT (OUTPATIENT)
Dept: FAMILY MEDICINE CLINIC | Facility: CLINIC | Age: 59
End: 2024-05-30

## 2024-06-03 DIAGNOSIS — K21.00 GASTROESOPHAGEAL REFLUX DISEASE WITH ESOPHAGITIS WITHOUT HEMORRHAGE: ICD-10-CM

## 2024-06-03 DIAGNOSIS — J30.1 ALLERGIC RHINITIS DUE TO POLLEN: ICD-10-CM

## 2024-06-03 RX ORDER — FLUTICASONE PROPIONATE 50 MCG
SPRAY, SUSPENSION (ML) NASAL
Qty: 48 G | Refills: 1 | Status: SHIPPED | OUTPATIENT
Start: 2024-06-03

## 2024-06-03 RX ORDER — FAMOTIDINE 40 MG/1
TABLET, FILM COATED ORAL
Qty: 90 TABLET | Refills: 1 | Status: SHIPPED | OUTPATIENT
Start: 2024-06-03

## 2024-06-07 ENCOUNTER — APPOINTMENT (OUTPATIENT)
Dept: LAB | Facility: MEDICAL CENTER | Age: 59
End: 2024-06-07
Payer: COMMERCIAL

## 2024-06-07 DIAGNOSIS — I82.90 NON-OCCLUSIVE THROMBUS: ICD-10-CM

## 2024-06-07 LAB
INR PPP: 3.71 (ref 0.84–1.19)
PROTHROMBIN TIME: 35.9 SECONDS (ref 11.6–14.5)

## 2024-06-07 PROCEDURE — 36415 COLL VENOUS BLD VENIPUNCTURE: CPT

## 2024-06-07 PROCEDURE — 85610 PROTHROMBIN TIME: CPT

## 2024-06-10 ENCOUNTER — ANTICOAG VISIT (OUTPATIENT)
Dept: FAMILY MEDICINE CLINIC | Facility: CLINIC | Age: 59
End: 2024-06-10

## 2024-06-26 ENCOUNTER — APPOINTMENT (OUTPATIENT)
Dept: LAB | Facility: MEDICAL CENTER | Age: 59
End: 2024-06-26
Payer: COMMERCIAL

## 2024-06-26 DIAGNOSIS — I82.90 NON-OCCLUSIVE THROMBUS: ICD-10-CM

## 2024-06-26 LAB
INR PPP: 1.46 (ref 0.84–1.19)
PROTHROMBIN TIME: 17.5 SECONDS (ref 11.6–14.5)

## 2024-06-26 PROCEDURE — 36415 COLL VENOUS BLD VENIPUNCTURE: CPT

## 2024-06-26 PROCEDURE — 85610 PROTHROMBIN TIME: CPT

## 2024-06-27 ENCOUNTER — ANTICOAG VISIT (OUTPATIENT)
Dept: FAMILY MEDICINE CLINIC | Facility: CLINIC | Age: 59
End: 2024-06-27

## 2024-07-11 ENCOUNTER — OFFICE VISIT (OUTPATIENT)
Dept: FAMILY MEDICINE CLINIC | Facility: CLINIC | Age: 59
End: 2024-07-11
Payer: COMMERCIAL

## 2024-07-11 VITALS
DIASTOLIC BLOOD PRESSURE: 82 MMHG | RESPIRATION RATE: 18 BRPM | HEART RATE: 83 BPM | OXYGEN SATURATION: 98 % | TEMPERATURE: 97.2 F | BODY MASS INDEX: 48.82 KG/M2 | SYSTOLIC BLOOD PRESSURE: 150 MMHG | HEIGHT: 65 IN | WEIGHT: 293 LBS

## 2024-07-11 DIAGNOSIS — Z12.31 BREAST CANCER SCREENING BY MAMMOGRAM: ICD-10-CM

## 2024-07-11 DIAGNOSIS — Z98.890 HISTORY OF SHOULDER SURGERY: ICD-10-CM

## 2024-07-11 DIAGNOSIS — I26.99 PULMONARY EMBOLISM, UNSPECIFIED CHRONICITY, UNSPECIFIED PULMONARY EMBOLISM TYPE, UNSPECIFIED WHETHER ACUTE COR PULMONALE PRESENT (HCC): ICD-10-CM

## 2024-07-11 DIAGNOSIS — Z86.711 HISTORY OF PULMONARY EMBOLUS (PE): ICD-10-CM

## 2024-07-11 DIAGNOSIS — E78.5 DYSLIPIDEMIA: Chronic | ICD-10-CM

## 2024-07-11 DIAGNOSIS — M25.512 CHRONIC LEFT SHOULDER PAIN: ICD-10-CM

## 2024-07-11 DIAGNOSIS — I26.99 PULMONARY EMBOLISM (HCC): ICD-10-CM

## 2024-07-11 DIAGNOSIS — K21.9 GASTROESOPHAGEAL REFLUX DISEASE WITHOUT ESOPHAGITIS: Chronic | ICD-10-CM

## 2024-07-11 DIAGNOSIS — Z12.11 COLON CANCER SCREENING: ICD-10-CM

## 2024-07-11 DIAGNOSIS — Z00.00 ANNUAL PHYSICAL EXAM: Primary | ICD-10-CM

## 2024-07-11 DIAGNOSIS — G89.29 CHRONIC LEFT SHOULDER PAIN: ICD-10-CM

## 2024-07-11 PROCEDURE — 99396 PREV VISIT EST AGE 40-64: CPT | Performed by: PHYSICIAN ASSISTANT

## 2024-07-11 PROCEDURE — 99213 OFFICE O/P EST LOW 20 MIN: CPT | Performed by: PHYSICIAN ASSISTANT

## 2024-07-11 RX ORDER — WARFARIN SODIUM 5 MG/1
TABLET ORAL
Qty: 135 TABLET | Refills: 1 | Status: SHIPPED | OUTPATIENT
Start: 2024-07-11

## 2024-07-11 RX ORDER — OMEPRAZOLE 40 MG/1
40 CAPSULE, DELAYED RELEASE ORAL
Qty: 90 CAPSULE | Refills: 3 | Status: SHIPPED | OUTPATIENT
Start: 2024-07-11

## 2024-07-11 RX ORDER — WARFARIN SODIUM 1 MG/1
TABLET ORAL
Qty: 60 TABLET | Refills: 2 | Status: SHIPPED | OUTPATIENT
Start: 2024-07-11

## 2024-07-11 NOTE — ASSESSMENT & PLAN NOTE
Referral placed to orthopedics for further evaluation. She declines physical therapy at this time.

## 2024-07-11 NOTE — PROGRESS NOTES
"Adult Annual Physical  Name: Celina Collins      : 1965      MRN: [id number]  Encounter Provider: Jessica Gracia PA-C  Encounter Date: 2024   Encounter department: [facility]    Assessment & Plan   1. Breast cancer screening by mammogram  -     Mammo screening bilateral w 3d & cad; Future  2. Annual physical exam  3. Colon cancer screening  -     Cologuard  4. History of pulmonary embolus (PE)  -     Protime-INR; Standing  5. Dyslipidemia  -     Lipid Panel with Direct LDL reflex; Future    Immunizations and preventive care screenings were discussed with patient today. Appropriate education was printed on patient's after visit summary.    Counseling:  Dental Health: discussed importance of regular tooth brushing, flossing, and dental visits.      Depression Screening and Follow-up Plan: Patient was screened for depression during today's encounter. They screened negative with a PHQ-2 score of 0.        History of Present Illness     Adult Annual Physical:  Patient presents for annual physical. Celina is here today for routine appointment.  She is overdue for INR, states will have drawn today.  She is due for breast and colon CA screening, agreeable to both.  Due for lipid panel.  She does complain of chronic pain in L shoulder, history of \"shoulder reconstruction years ago,\" states the past few years has developed constant anterior L shoulder pain. .     Depression Screening:  - PHQ-2 Score: 0    General Health:    - Hearing: normal hearing right ear and normal hearing left ear.  - Vision: wears glasses.    Review of Systems   Constitutional:  Negative for chills and fever.   HENT:  Negative for ear pain and sore throat.    Eyes:  Negative for pain and visual disturbance.   Respiratory:  Negative for cough and shortness of breath.    Cardiovascular:  Negative for chest pain and palpitations.   Gastrointestinal:  Negative for abdominal pain and vomiting.   Genitourinary:  Negative for dysuria and " "hematuria.   Musculoskeletal:  Positive for arthralgias (L shoulder pain). Negative for back pain.   Skin:  Negative for color change and rash.   Neurological:  Negative for seizures and syncope.   All other systems reviewed and are negative.        Objective     /82 (BP Location: Left arm, Patient Position: Sitting, Cuff Size: Adult)   Pulse 83   Temp (!) 97.2 °F (36.2 °C) (Temporal)   Resp 18   Ht 5' 5\" (1.651 m)   Wt 135 kg (297 lb)   LMP  (LMP Unknown)   SpO2 98%   BMI 49.42 kg/m²     Physical Exam  Vitals reviewed.   Constitutional:       General: She is not in acute distress.     Appearance: She is well-developed. She is not diaphoretic.   HENT:      Head: Normocephalic and atraumatic.      Right Ear: Hearing, tympanic membrane, ear canal and external ear normal.      Left Ear: Hearing, tympanic membrane, ear canal and external ear normal.      Nose: Nose normal.      Mouth/Throat:      Mouth: Mucous membranes are moist.      Pharynx: Oropharynx is clear. Uvula midline. No oropharyngeal exudate.   Eyes:      General: No scleral icterus.        Right eye: No discharge.         Left eye: No discharge.      Conjunctiva/sclera: Conjunctivae normal.   Neck:      Thyroid: No thyromegaly.      Vascular: No carotid bruit.   Cardiovascular:      Rate and Rhythm: Normal rate and regular rhythm.      Heart sounds: Normal heart sounds. No murmur heard.  Pulmonary:      Effort: Pulmonary effort is normal. No respiratory distress.      Breath sounds: Normal breath sounds. No wheezing.   Abdominal:      General: Bowel sounds are normal. There is no distension.      Palpations: Abdomen is soft. There is no mass.      Tenderness: There is no abdominal tenderness. There is no guarding or rebound.   Musculoskeletal:      Left shoulder: Tenderness present. Decreased range of motion.        Arms:       Cervical back: Neck supple.   Lymphadenopathy:      Cervical: No cervical adenopathy.   Skin:     General: Skin is " warm and dry.      Findings: No erythema or rash.   Neurological:      Mental Status: She is alert and oriented to person, place, and time.   Psychiatric:         Behavior: Behavior normal.         Thought Content: Thought content normal.         Judgment: Judgment normal.

## 2024-07-11 NOTE — ASSESSMENT & PLAN NOTE
Continue omeprazole/famotidine for GERD. Refill omeprazole.   [FreeTextEntry1] : The patient was advised of the diagnosis. The natural history of the pathology was explained in full to the patient in layman's terms. All questions were answered. The risks and benefits of surgical and non-surgical treatment alternatives were explained in full to the patient.\par \par \par \par Left ring finger trigger finger CSI #1.\par pt referred for MRI of the left elbow to assess anterior mass.\par RTO s/p MRI. \par \par \par \par

## 2024-07-11 NOTE — PATIENT INSTRUCTIONS
"Patient Education     Routine physical for adults   The Basics   Written by the doctors and editors at [facility]   What is a physical? -- A physical is a routine visit, or \"check-up,\" with your doctor. You might also hear it called a \"wellness visit\" or \"preventive visit.\"  During each visit, the doctor will:   Ask about your physical and mental health   Ask about your habits, behaviors, and lifestyle   Do an exam   Give you vaccines if needed   Talk to you about any medicines you take   Give advice about your health   Answer your questions  Getting regular check-ups is an important part of taking care of your health. It can help your doctor find and treat any problems you have. But it's also important for preventing health problems.  A routine physical is different from a \"sick visit.\" A sick visit is when you see a doctor because of a health concern or problem. Since physicals are scheduled ahead of time, you can think about what you want to ask the doctor.  How often should I get a physical? -- It depends on your age and health. In general, for people age 21 years and older:   If you are younger than 50 years, you might be able to get a physical every 3 years.   If you are 50 years or older, your doctor might recommend a physical every year.  If you have an ongoing health condition, like diabetes or high blood pressure, your doctor will probably want to see you more often.  What happens during a physical? -- In general, each visit will include:   Physical exam - The doctor or nurse will check your height, weight, heart rate, and blood pressure. They will also look at your eyes and ears. They will ask about how you are feeling and whether you have any symptoms that bother you.   Medicines - It's a good idea to bring a list of all the medicines you take to each doctor visit. Your doctor will talk to you about your medicines and answer any questions. Tell them if you are having any side effects that bother you. You " "should also tell them if you are having trouble paying for any of your medicines.   Habits and behaviors - This includes:   Your diet   Your exercise habits   Whether you smoke, drink alcohol, or use drugs   Whether you are sexually active   Whether you feel safe at home  Your doctor will talk to you about things you can do to improve your health and lower your risk of health problems. They will also offer help and support. For example, if you want to quit smoking, they can give you advice and might prescribe medicines. If you want to improve your diet or get more physical activity, they can help you with this, too.   Lab tests, if needed - The tests you get will depend on your age and situation. For example, your doctor might want to check your:   Cholesterol   Blood sugar   Iron level   Vaccines - The recommended vaccines will depend on your age, health, and what vaccines you already had. Vaccines are very important because they can prevent certain serious or deadly infections.   Discussion of screening - \"Screening\" means checking for diseases or other health problems before they cause symptoms. Your doctor can recommend screening based on your age, risk, and preferences. This might include tests to check for:   Cancer, such as breast, prostate, cervical, ovarian, colorectal, prostate, lung, or skin cancer   Sexually transmitted infections, such as chlamydia and gonorrhea   Mental health conditions like depression and anxiety  Your doctor will talk to you about the different types of screening tests. They can help you decide which screenings to have. They can also explain what the results might mean.   Answering questions - The physical is a good time to ask the doctor or nurse questions about your health. If needed, they can refer you to other doctors or specialists, too.  Adults older than 65 years often need other care, too. As you get older, your doctor will talk to you about:   How to prevent falling at " home   Hearing or vision tests   Memory testing   How to take your medicines safely   Making sure that you have the help and support you need at home  All topics are updated as new evidence becomes available and our peer review process is complete.  This topic retrieved from [employer] on: May 02, 2024.  Topic 185036 Version 1.0  Release: 32.4.3 - C32.122  © 2024 UpToDate, Inc. and/or its affiliates. All rights reserved.  Consumer Information Use and Disclaimer   Disclaimer: This generalized information is a limited summary of diagnosis, treatment, and/or medication information. It is not meant to be comprehensive and should be used as a tool to help the user understand and/or assess potential diagnostic and treatment options. It does NOT include all information about conditions, treatments, medications, side effects, or risks that may apply to a specific patient. It is not intended to be medical advice or a substitute for the medical advice, diagnosis, or treatment of a health care provider based on the health care provider's examination and assessment of a patient's specific and unique circumstances. Patients must speak with a health care provider for complete information about their health, medical questions, and treatment options, including any risks or benefits regarding use of medications. This information does not endorse any treatments or medications as safe, effective, or approved for treating a specific patient. UpToDate, Inc. and its affiliates disclaim any warranty or liability relating to this information or the use thereof.The use of this information is governed by the Terms of Use, available at https://www.woltersVENNCOMMuwer.com/en/know/clinical-effectiveness-terms. 2024© UpToDate, Inc. and its affiliates and/or licensors. All rights reserved.  Copyright   © 2024 UpToDate, Inc. and/or its affiliates. All rights reserved.

## 2024-07-12 ENCOUNTER — APPOINTMENT (OUTPATIENT)
Dept: LAB | Facility: MEDICAL CENTER | Age: 59
End: 2024-07-12
Payer: COMMERCIAL

## 2024-07-12 ENCOUNTER — OFFICE VISIT (OUTPATIENT)
Dept: OBGYN CLINIC | Facility: CLINIC | Age: 59
End: 2024-07-12
Payer: COMMERCIAL

## 2024-07-12 VITALS
WEIGHT: 293 LBS | HEIGHT: 65 IN | HEART RATE: 88 BPM | DIASTOLIC BLOOD PRESSURE: 91 MMHG | TEMPERATURE: 98.2 F | OXYGEN SATURATION: 97 % | SYSTOLIC BLOOD PRESSURE: 126 MMHG | BODY MASS INDEX: 48.82 KG/M2 | RESPIRATION RATE: 16 BRPM

## 2024-07-12 DIAGNOSIS — E78.5 DYSLIPIDEMIA: Chronic | ICD-10-CM

## 2024-07-12 DIAGNOSIS — Z86.711 HISTORY OF PULMONARY EMBOLUS (PE): ICD-10-CM

## 2024-07-12 DIAGNOSIS — G89.29 CHRONIC LEFT SHOULDER PAIN: ICD-10-CM

## 2024-07-12 DIAGNOSIS — M12.812 ROTATOR CUFF ARTHROPATHY OF LEFT SHOULDER: Primary | ICD-10-CM

## 2024-07-12 DIAGNOSIS — M25.512 CHRONIC LEFT SHOULDER PAIN: ICD-10-CM

## 2024-07-12 LAB
CHOLEST SERPL-MCNC: 172 MG/DL
HDLC SERPL-MCNC: 48 MG/DL
INR PPP: 2.6 (ref 0.84–1.19)
LDLC SERPL CALC-MCNC: 100 MG/DL (ref 0–100)
PROTHROMBIN TIME: 27.3 SECONDS (ref 11.6–14.5)
TRIGL SERPL-MCNC: 122 MG/DL

## 2024-07-12 PROCEDURE — 99204 OFFICE O/P NEW MOD 45 MIN: CPT | Performed by: FAMILY MEDICINE

## 2024-07-12 PROCEDURE — 36415 COLL VENOUS BLD VENIPUNCTURE: CPT

## 2024-07-12 PROCEDURE — 85610 PROTHROMBIN TIME: CPT

## 2024-07-12 PROCEDURE — 80061 LIPID PANEL: CPT

## 2024-07-12 NOTE — PROGRESS NOTES
"   Subjective:  Chief Complaint   Patient presents with    Left Shoulder - Pain       Celina Collins is a 59 y.o. female with a medical history consistent for NSTEMI, pulmonary hypertension, asthma, hypothyroidism CKD 3 who is presenting today for initial evaluation of chronic left shoulder pain.  Patient was seen by primary care doctor and radiographs were obtained which revealed advanced degenerative changes in the left shoulder.  She has a history of prior rotator cuff repair.    The following portions of the patient's history were reviewed and updated as appropriate:   Medical history  Family history  Medication   PSH  Allergies      Occupation:         Objective:  /91   Pulse 88   Temp 98.2 °F (36.8 °C) (Temporal)   Resp 16   Ht 5' 5\" (1.651 m)   Wt 134 kg (295 lb)   LMP  (LMP Unknown)   SpO2 97%   BMI 49.09 kg/m²     Skin: no rashes, lesions, skin discolorations, lacerations  Vasculature: normal radial and ulnar pulse,  normal skin color, normal capillary refill in extremity, no upper extremity edema  Neurologic: Neurologic exam is normal throughout upper extremities, Awake, alert, and oriented x3, no apparent distress.   Musculoskeletal:   left SHOULDER EXAM    General: no gross deformity, no skin changes redness or warmth noted, no sign of infection    ROM:   FF (180): 90  ER (90): 70  IR : lumbar      Grind test: negative    Supraspinatus strength testing:3/5  Infraspinatus strength testin/5  Subscapularis 5/5    Notable crepitus in shoulder with range of motion testig          Tenderness to palpation of AC joint: +  Pain with cross-body adduction: negative        Imaging:    No results found.     Assessment/Plan:  1. Rotator cuff arthropathy of left shoulder    - Ambulatory Referral to Orthopedic Surgery    2. Chronic left shoulder pain    - Ambulatory Referral to Orthopedic Surgery      59-year-old female patient presenting today for chronic left shoulder pain.  Prior radiographs from  " were reviewed independently, there were advanced degenerative changes and superior humeral migration consistent with rotator cuff arthropathy.  We discussed the nature of rotator cuff arthropathy and the recommended treatment.  Physical therapy was recommended-patient declines.  We discussed the role for corticosteroid injection therapy to help with pain symptoms-patient is agreeable and will be scheduled for ultrasound-guided glenohumeral injection.  Finally we discussed the role for surgical intervention and patient would like to consider possible surgery in the future if no improvement.  She will be scheduled for the ultrasound-guided injection follow-up.  Return precautions provided

## 2024-07-15 ENCOUNTER — ANTICOAG VISIT (OUTPATIENT)
Dept: FAMILY MEDICINE CLINIC | Facility: CLINIC | Age: 59
End: 2024-07-15

## 2024-07-15 ENCOUNTER — TELEPHONE (OUTPATIENT)
Age: 59
End: 2024-07-15

## 2024-07-15 NOTE — TELEPHONE ENCOUNTER
Called Pt with message that Rx's were confirmed at pharm on 7/11/24 - copy of confirmation faxed to MARY KAY

## 2024-07-15 NOTE — PROGRESS NOTES
Called patient and gave her results told her to continue the same dose and retest in 2 wks  Asked pt her dose she states she is taking 1 1/2 tablets Tuesday, Thursday, Saturday and Sunday and all other days she takes 2 tablets

## 2024-07-15 NOTE — TELEPHONE ENCOUNTER
Patient called the RX Refill Line. Message is being forwarded to the office.     Patient is requesting warfarin 5 mg patient advised ordered on 07.11.24 for 135 w 1 refill to [facility] [place], patient state she got her omeprazole but did not get the warfarin, she says she does not understand, I placed her on hold and tried to contact the pharmacy but unsuccessful, no answer.     I advised patient to contact the pharmacy to double check if they have her warfarin 5 mg that was ordered on 07.11.2024 and to call us back if they do not have the prescription.    Patient also asked if we can call them back as well. I advised I francisco end a HP message to the office for her. She agreed and stated she has been out of warfarin for 4D.    Please contact patient at [phone]

## 2024-07-16 ENCOUNTER — VBI (OUTPATIENT)
Dept: ADMINISTRATIVE | Facility: OTHER | Age: 59
End: 2024-07-16

## 2024-07-16 NOTE — TELEPHONE ENCOUNTER
07/16/24 1:38 PM     Chart reviewed for Pap Smear (HPV) aka Cervical Cancer Screening ; nothing is submitted to the patient's insurance at this time.     Lauren Durant   [facility]

## 2024-07-19 ENCOUNTER — OFFICE VISIT (OUTPATIENT)
Dept: OBGYN CLINIC | Facility: CLINIC | Age: 59
End: 2024-07-19
Payer: COMMERCIAL

## 2024-07-19 VITALS
WEIGHT: 293 LBS | OXYGEN SATURATION: 99 % | HEIGHT: 65 IN | TEMPERATURE: 97.8 F | SYSTOLIC BLOOD PRESSURE: 152 MMHG | RESPIRATION RATE: 16 BRPM | BODY MASS INDEX: 48.82 KG/M2 | HEART RATE: 87 BPM | DIASTOLIC BLOOD PRESSURE: 82 MMHG

## 2024-07-19 DIAGNOSIS — M12.812 ROTATOR CUFF ARTHROPATHY OF LEFT SHOULDER: Primary | ICD-10-CM

## 2024-07-19 PROCEDURE — 20611 DRAIN/INJ JOINT/BURSA W/US: CPT | Performed by: FAMILY MEDICINE

## 2024-07-19 RX ORDER — TRIAMCINOLONE ACETONIDE 40 MG/ML
40 INJECTION, SUSPENSION INTRA-ARTICULAR; INTRAMUSCULAR
Status: COMPLETED | OUTPATIENT
Start: 2024-07-19 | End: 2024-07-19

## 2024-07-19 RX ORDER — LIDOCAINE HYDROCHLORIDE 10 MG/ML
4 INJECTION, SOLUTION INFILTRATION; PERINEURAL
Status: COMPLETED | OUTPATIENT
Start: 2024-07-19 | End: 2024-07-19

## 2024-07-19 RX ADMIN — LIDOCAINE HYDROCHLORIDE 4 ML: 10 INJECTION, SOLUTION INFILTRATION; PERINEURAL at 09:30

## 2024-07-19 RX ADMIN — TRIAMCINOLONE ACETONIDE 40 MG: 40 INJECTION, SUSPENSION INTRA-ARTICULAR; INTRAMUSCULAR at 09:30

## 2024-07-19 NOTE — PROGRESS NOTES
Large joint arthrocentesis: L glenohumeral  Universal Protocol:  Consent: Verbal consent obtained.  Risks and benefits: risks, benefits and alternatives were discussed  Consent given by: patient  Patient identity confirmed: verbally with patient  Supporting Documentation  Indications: pain   Procedure Details  Location: shoulder - L glenohumeral  Preparation: Patient was prepped and draped in the usual sterile fashion  Needle size: 22 G  Ultrasound guidance: yes  Approach: posterior  Medications administered: 4 mL lidocaine 1 %; 40 mg triamcinolone acetonide 40 mg/mL    Patient tolerance: patient tolerated the procedure well with no immediate complications    Risks and benefits of CSI were discussed with patient extensively. Risks were highlighted which included but were not limited to infection, pain, local site swelling, and chance that injection may not be effective. Patient was also counseled regarding glucose elevation days after receiving CSI and to be mindful of diet and check sugars daily. Patient agreeable to proceed with CSI after counseling.

## 2024-07-29 ENCOUNTER — OFFICE VISIT (OUTPATIENT)
Dept: FAMILY MEDICINE CLINIC | Facility: CLINIC | Age: 59
End: 2024-07-29
Payer: COMMERCIAL

## 2024-07-29 ENCOUNTER — APPOINTMENT (OUTPATIENT)
Dept: RADIOLOGY | Facility: MEDICAL CENTER | Age: 59
End: 2024-07-29
Payer: COMMERCIAL

## 2024-07-29 ENCOUNTER — APPOINTMENT (OUTPATIENT)
Dept: LAB | Facility: MEDICAL CENTER | Age: 59
End: 2024-07-29
Payer: COMMERCIAL

## 2024-07-29 VITALS
TEMPERATURE: 97.9 F | HEART RATE: 84 BPM | RESPIRATION RATE: 18 BRPM | SYSTOLIC BLOOD PRESSURE: 130 MMHG | HEIGHT: 65 IN | DIASTOLIC BLOOD PRESSURE: 78 MMHG | OXYGEN SATURATION: 98 % | WEIGHT: 291.6 LBS | BODY MASS INDEX: 48.58 KG/M2

## 2024-07-29 DIAGNOSIS — T14.90XA TRAUMA: ICD-10-CM

## 2024-07-29 DIAGNOSIS — Z86.711 HISTORY OF PULMONARY EMBOLUS (PE): ICD-10-CM

## 2024-07-29 DIAGNOSIS — S30.0XXD TRAUMATIC HEMATOMA OF LOWER BACK, SUBSEQUENT ENCOUNTER: ICD-10-CM

## 2024-07-29 DIAGNOSIS — T14.90XA TRAUMA: Primary | ICD-10-CM

## 2024-07-29 LAB
INR PPP: 1.7 (ref 0.84–1.19)
PROTHROMBIN TIME: 19.7 SECONDS (ref 11.6–14.5)

## 2024-07-29 PROCEDURE — 99214 OFFICE O/P EST MOD 30 MIN: CPT | Performed by: INTERNAL MEDICINE

## 2024-07-29 PROCEDURE — 72080 X-RAY EXAM THORACOLMB 2/> VW: CPT

## 2024-07-29 PROCEDURE — 85610 PROTHROMBIN TIME: CPT

## 2024-07-29 PROCEDURE — 36415 COLL VENOUS BLD VENIPUNCTURE: CPT

## 2024-07-29 NOTE — PROGRESS NOTES
Ambulatory Visit  Name: Celina Collins      : 1965      MRN: [id number]  Encounter Provider: Karen Logan DO  Encounter Date: 2024   Encounter department: [facility]    Assessment & Plan   1. Trauma  -     XR spine thoracolumbar 2 vw; Future; Expected date: 2024  Check xray and pt is due today for repeat INR which she will have done   Moist heat and can use topical otc patch as well but hematoma appears to be resolving   2. Traumatic hematoma of lower back, subsequent encounter  -     XR spine thoracolumbar 2 vw; Future; Expected date: 2024  Check xray to rule out compression fracture Hematoma seems to be slowly resolving Last inr within range and going today for repeat  3. History of pulmonary embolus (PE)  On chronic coumadin and has annual pulmonary followup next week      Followup with Jessica as scheduled/prn  History of Present Illness     HPI  Pt was clearing out storage bin and a chest aside where she was flipped up and struck her lower back last week She is on coumadin and noted bruising mid to low back It is slowly less painful No sob No open areas or bleeding from site last inr was 2 weeks ago and she is going today for repeat No chest pain She does feel tenderness lower mid back area where the bruising is and has been trying to check the site and did note it was changing color/lighter on outside area   Review of Systems   Constitutional:  Negative for chills and fever.   HENT: Negative.     Eyes:  Negative for visual disturbance.   Respiratory:  Negative for cough and shortness of breath.    Cardiovascular:  Negative for chest pain.   Genitourinary:  Negative for difficulty urinating and flank pain.   Musculoskeletal:  Positive for arthralgias and back pain.   Neurological:  Negative for dizziness, light-headedness and headaches.   Psychiatric/Behavioral:  Positive for sleep disturbance.      Past Medical History:   Diagnosis Date    Abdominal pain, lower      [id number] RESOLVED    Acute renal failure (HCC)     04TVK7150 RESOLVED    Allergic rhinitis     69BQD4551 RESOLVED    Ankle pain, right     90ITC8955 RESOLVED    Arthritis     56UYE3843 RESOLVED  [id number] RESOLVED    Asthma     Bilateral chronic knee pain     10SXH2000    Bladder pain     49FEG9823 RESOLVED    Blood clot in vein     Bursitis     30IHZ3270 RESOLVED    Cardiac disorder     06HWL8349  RESOLVED    Cardiac murmur     21CFX9152 RESOLVED    Chest tightness or pressure     44DTH0079 RESOLVED    COPD (chronic obstructive pulmonary disease) ([facility])     Coronary artery disease     Curvature of spine     69GLT5900 RESOLVED    GERD (gastroesophageal reflux disease)     Hernia, hiatal     72YKB7751 RESOLVED    Hyperlipidemia     75ADW3830 RESOLVED    Hypertension     Inguinal hernia     RIGHT   [id number] RESOLVED    Jaw closure abnormality     87RLS2701 RESOLVED    Lumbar herniated disc     44MOE0196 RESOLVED    Morbid obesity (HCC)     91NRC4460    Obesity     Osteoarthritis of right knee     87IKQ9870 RESOLVED    Patellar tendinitis     37YGB8704    Poor flexibility of tendon     49QPM5936    Presence of IVC filter 10/12/2018    Pulmonary embolism (HCC)     93BTK0122 RESOLVED    Sepsis (HCC)     [id number] RESOLVED    Severe sepsis due to methicillin resistant Staphylococcus aureus (MRSA) with acute organ dysfunction ([facility])     59PZH1970 RESOLVED    Sleep apnea, obstructive     Spider vein, symptomatic     87IXI6509 RESOLVED    Status post arthroscopy of right knee     81FDH3203 RESOLVED    Stomach disorder     46BXB8916 RESOLVED    Tear of medial meniscus of right knee     SUBSEQUENT ENCOUNTER  RESOLVED 68DOU6895    Thyroid disorder     61TBT5116    Umbilical hernia     99BCL4798 RESOLVED     Past Surgical History:   Procedure Laterality Date    COLONOSCOPY      CYSTOSCOPY  01/1994    WITH BIOPSY      EXPLORATORY LAPAROTOMY      FOOT SURGERY Left     FRACTURE SURGERY      HAND SURGERY Right     cyst     HYSTERECTOMY  2009    INCISION AND DRAINAGE ANTERIOR NECK Right 2017    Procedure: INCISION AND DRAINAGE  (I&D) NECK;  Surgeon: Kirby Morales MD;  Location:  MAIN OR;  Service:     INCISIONAL HERNIA REPAIR      WITH IMPLANTATION OF MESH  13 MAY 2014  LAST ASSESSED    IR IVC FILTER REMOVAL  2019    OPEN ANTERIOR SHOULDER RECONSTRUCTION Left     OTHER SURGICAL HISTORY      BLOOD VESSEL REPAIR   13 MAY 2014 LAST ASSESSED    PERIPHERALLY INSERTED CENTRAL CATHETER INSERTION      CT ARTHRS KNE SURG W/MENISCECTOMY MED/LAT W/SHVG Right 2016    Procedure: KNEE ARTHROSCOPY WITH MEDIAL MENISCECTOMY ;  Surgeon: Mikey Pantoja MD;  Location: [facility];  Service: Orthopedics    CT RPR AA HERNIA 1ST 3-10 CM REDUCIBLE N/A 11/15/2023    Procedure: REPAIR OF INCARCERATED VENTRAL HERNIA;  Surgeon: Lane Ventura DO;  Location: [facility];  Service: General    SHOULDER SURGERY       Social History     Socioeconomic History    Marital status: Single     Spouse name: Not on file    Number of children: Not on file    Years of education: Not on file    Highest education level: Not on file   Occupational History    Occupation: unemployed   Tobacco Use    Smoking status: Former     Current packs/day: 0.00     Types: Cigarettes     Quit date: 1995     Years since quittin.6    Smokeless tobacco: Never    Tobacco comments:     quit 25 years ago   Vaping Use    Vaping status: Never Used   Substance and Sexual Activity    Alcohol use: Never    Drug use: Never    Sexual activity: Never   Other Topics Concern    Not on file   Social History Narrative    ALWAYS USES SEAT BELT         Social Determinants of Health     Financial Resource Strain: Not on file   Food Insecurity: Not on file   Transportation Needs: Not on file   Physical Activity: Not on file   Stress: Not on file   Social Connections: Unknown (2024)    Received from [facility]    Social [employer]     How often do you feel lonely or isolated  "from those around you? (Adult - for ages 18 years and over): Not on file   Intimate Partner Violence: Not on file   Housing Stability: Not on file     Allergies   Allergen Reactions    Clindamycin Angioedema    Medical Tape Itching     Welt and redness    Penicillin G Nausea Only    Penicillins GI Intolerance     Nausea and vomiting         Objective     /78   Pulse 84   Temp 97.9 °F (36.6 °C) (Temporal)   Resp 18   Ht 5' 5\" (1.651 m)   Wt 132 kg (291 lb 9.6 oz)   LMP  (LMP Unknown)   SpO2 98%   BMI 48.52 kg/m²     Physical Exam  Vitals and nursing note reviewed.   Constitutional:       General: She is not in acute distress.     Appearance: Normal appearance. She is obese. She is not ill-appearing, toxic-appearing or diaphoretic.   HENT:      Head: Normocephalic and atraumatic.   Eyes:      General: No scleral icterus.  Cardiovascular:      Rate and Rhythm: Normal rate.      Pulses: Normal pulses.   Pulmonary:      Effort: Pulmonary effort is normal. No respiratory distress.      Breath sounds: Normal breath sounds. No wheezing.   Abdominal:      Palpations: Abdomen is soft.   Musculoskeletal:      Cervical back: Neck supple.      Right lower leg: No edema.      Left lower leg: No edema.   Lymphadenopathy:      Cervical: No cervical adenopathy.   Skin:     General: Skin is warm and dry.      Findings: Bruising present.   Neurological:      General: No focal deficit present.      Mental Status: She is alert and oriented to person, place, and time. Mental status is at baseline.      Cranial Nerves: No cranial nerve deficit.      Sensory: No sensory deficit.   Psychiatric:         Mood and Affect: Mood normal.         Behavior: Behavior normal.         Thought Content: Thought content normal.         Judgment: Judgment normal.     Hematoma mid lower thoracic and upper lumbar area No warmth or redness   Administrative Statements     "

## 2024-07-30 ENCOUNTER — ANTICOAG VISIT (OUTPATIENT)
Dept: FAMILY MEDICINE CLINIC | Facility: CLINIC | Age: 59
End: 2024-07-30

## 2024-08-09 ENCOUNTER — OFFICE VISIT (OUTPATIENT)
Dept: PULMONOLOGY | Facility: CLINIC | Age: 59
End: 2024-08-09
Payer: COMMERCIAL

## 2024-08-09 VITALS
HEIGHT: 65 IN | HEART RATE: 68 BPM | OXYGEN SATURATION: 94 % | DIASTOLIC BLOOD PRESSURE: 88 MMHG | BODY MASS INDEX: 48.65 KG/M2 | TEMPERATURE: 98.1 F | WEIGHT: 292 LBS | SYSTOLIC BLOOD PRESSURE: 124 MMHG

## 2024-08-09 DIAGNOSIS — R06.09 DYSPNEA ON EXERTION: Primary | ICD-10-CM

## 2024-08-09 DIAGNOSIS — E66.01 CLASS 3 SEVERE OBESITY DUE TO EXCESS CALORIES WITH SERIOUS COMORBIDITY AND BODY MASS INDEX (BMI) OF 45.0 TO 49.9 IN ADULT (HCC): ICD-10-CM

## 2024-08-09 DIAGNOSIS — G47.33 OSA (OBSTRUCTIVE SLEEP APNEA): ICD-10-CM

## 2024-08-09 DIAGNOSIS — J45.40 MODERATE PERSISTENT ASTHMA WITHOUT COMPLICATION: ICD-10-CM

## 2024-08-09 DIAGNOSIS — Z86.711 HISTORY OF PULMONARY EMBOLUS (PE): ICD-10-CM

## 2024-08-09 DIAGNOSIS — J30.2 SEASONAL ALLERGIES: ICD-10-CM

## 2024-08-09 PROCEDURE — 99214 OFFICE O/P EST MOD 30 MIN: CPT

## 2024-08-09 NOTE — PATIENT INSTRUCTIONS
- Continue with symbicort 2 puffs twice daily and rinse out mouth after each use  - Continue with albuterol inhaler and nebulizer as needed  - Continue all your allergy medications every day along with azelsatine nasal spray  - Lung function testing  - Try using the CPAP again, the tubing should come from the top of the head  - Follow up in 5 months

## 2024-08-09 NOTE — PROGRESS NOTES
Progress note - Pulmonary Medicine   Celina Collins 59 y.o. female MRN: [id number]       Impression & Plan:   Celina Collins is a 59 y.o. female with PMHx of asthma, hypothyroidism, seasonal allergies, MICHAEL on CPAP, CKD 3, HLD, prior PE on Coumadin and obesity who presents for follow-up.    Dyspnea on Exertion  - Likely multifactorial with asthma and habitus both contributing.  He is also possible there is another underlying condition.  - Will start with updating PFTs with BD  - Given palpitations with stairs could consider referral to cardiology for further evaluation if PFTs are unremarkable.  - Follow-up in 5 months.  -     Complete PFT with post bronchodilator; Future    Moderate Persistent Asthma without Exacerbation  - She does not appear in exacerbation at this time, but is having dyspnea on exertion.  Unclear if this is truly from her asthma although there is some component of whether causing worsening of her symptoms.  - Continue with Symbicort 160 mcg twice daily and rinse out mouth after each use.  - Continue albuterol HFA/nebs as needed  - Continue with management of allergy symptoms as below.    MICHAEL (Obstructive Sleep Apnea)  - The patient has a history of overall mild MICHAEL exacerbated to severe during REM sleep (AHI 10.6, supine AHI 27.9, REM AHI 42.3, O2 [place] 70%, TST <90% 29.3 min) and is currently on APAP 5-20 cmH2O.  She has had difficulty with her new mask and is not currently using her device.  - Therapy and compliance download not available during today's visit as the patient needs to bring her device to the [facility] for download.  - Demonstrated how the patient should probably be wearing her mask, discussed that the tubing should come from the top of the head which should be more comfortable.  Encouraged her to retry the device with the mask placed on properly.  - Explained the importance of keeping the machine clean, and that they should be eligible for refills of supplies every 3-6 months depending on  insurance.  We also discussed the insurance compliance requirements.  - Encouraged healthy lifestyle with adequate sleep (7-9 hours per night), healthy balanced diet and routine exercise.  Explained the importance of avoiding driving while drowsy.    Class 3 Severe Obesity  - Suspect the patient's habitus is at least partially contributing to her dyspnea.  Weight loss is recommended.  - We discussed the importance of daily activity and trying to increase walk distance to build exercise tolerance.    Seasonal Allergies  - She is doing well with the azelastine, continue this along with  montelukast and loratadine.    History of Pulmonary Embolus (PE)  - The patient was previously recommended to be on lifelong Coumadin therapy and reports compliance, continue follow-up with PCP for INR management.    Return in about 5 months (around 1/9/2025).  ______________________________________________________________________    HPI:    Celina Collins is a 59 y.o. female with PMHx of asthma, hypothyroidism, seasonal allergies, MICHAEL on CPAP, CKD 3, HLD, prior PE on Coumadin and obesity who presents for follow-up.  The patient was last seen in the office on 4/29/2024 at which time plan was to continue Symbicort 160 mcg with albuterol HFA/nebs as needed, loratadine and montelukast as well as to retry her CPAP due to improvement in her nasal congestion.  She reports she is taking her symbicort 2 puffs twice daily and rinsing out her mouth after each use.  She estimates she is using her albuterol HFA 3 times a week with very little benefit and albuterol nebulizers 4 times a week with some benefit.  She notes the weather recently has made things worse as she struggles with heat and humidity.  She is taking her montelukast and loratadine most days, but notes her symptoms are worse with days when she forgets them.  She is also taking the azelastine and stopped Flonase as she felt the azelastine was helping her significantly with her nasal  symptoms.  She denies any significant cough and reports only a rare wheeze stating that her biggest symptom at this time is dyspnea on exertion.  She reports being able to walk at least 5 blocks on flat ground without significant issue, but states stairs make her feel short of breath and she will note palpitations/tachycardia after 1 flight.  She denies any chest pain, pedal edema or syncope currently.    In regards to her CPAP, she reports she did get the new nasal pillows mask, but thought that the head strap with the tubing was to be placed on the back of her head and found it very difficult to sleep like this.  More recently she has not been using the device because it was uncomfortable.    She does report she has remained free from tobacco and quit nearly 30 years ago.    Current Medications:    Current Outpatient Medications:     albuterol (2.5 mg/3 mL) 0.083 % nebulizer solution, Take 3 mL (2.5 mg total) by nebulization as needed for wheezing, Disp: 3 mL, Rfl: 5    albuterol (PROVENTIL HFA,VENTOLIN HFA) 90 mcg/act inhaler, Inhale 2 puffs every 4 (four) hours as needed for wheezing, Disp: 54 g, Rfl: 3    atorvastatin (LIPITOR) 10 mg tablet, TAKE ONE TABLET BY MOUTH DAILY AT 5PM, Disp: 90 tablet, Rfl: 3    azelastine (ASTELIN) 0.1 % nasal spray, 1 spray into each nostril 2 (two) times a day Use in each nostril as directed, Disp: 1 mL, Rfl: 5    budesonide-formoterol (Symbicort) 160-4.5 mcg/act inhaler, Inhale 2 puffs 2 (two) times a day Rinse mouth after use, Disp: 30.6 g, Rfl: 3    famotidine (PEPCID) 40 MG tablet, TAKE ONE TABLET BY MOUTH DAILY AT 9PM AT BEDTIME, Disp: 90 tablet, Rfl: 1    furosemide (LASIX) 20 mg tablet, Take 1 tablet (20 mg total) by mouth daily as needed (leg swelling) Take with potassium, Disp: 30 tablet, Rfl: 5    levothyroxine 125 mcg tablet, TAKE ONE TABLET BY MOUTH DAILY AT 8AM, Disp: 90 tablet, Rfl: 3    loratadine (CLARITIN) 10 mg tablet, Take 1 tablet (10 mg total) by mouth daily,  "Disp: 30 tablet, Rfl: 5    montelukast (SINGULAIR) 10 mg tablet, Take 1 tablet (10 mg total) by mouth daily at bedtime, Disp: 90 tablet, Rfl: 3    Multiple Vitamins-Minerals (CENTRUM SILVER 50+WOMEN PO), Take by mouth, Disp: , Rfl:     omeprazole (PriLOSEC) 40 MG capsule, Take 1 capsule (40 mg total) by mouth daily before breakfast, Disp: 90 capsule, Rfl: 3    potassium chloride (MICRO-K) 10 MEQ CR capsule, Take 1 capsule (10 mEq total) by mouth daily, Disp: 30 capsule, Rfl: 5    warfarin (Coumadin) 1 mg tablet, Take 2 tablets daily or as directed by provider, Disp: 60 tablet, Rfl: 2    warfarin (COUMADIN) 5 mg tablet, TAKE ONE AND 1/2 TABLETS BY MOUTH ON ,,  AND SUNDAYS. TAKE TWO TABLETS ON ,  AND , Disp: 135 tablet, Rfl: 1    ciprofloxacin-hydrocortisone (CIPRO HC OTIC) otic suspension, Administer 3 drops into the left ear 2 (two) times a day For 5-7 days (Patient not taking: Reported on 2024), Disp: 10 mL, Rfl: 0    Diclofenac Sodium (VOLTAREN) 1 %, Apply 4 g topically 4 (four) times a day (Patient not taking: Reported on 2024), Disp: 350 g, Rfl: 0    fluticasone (FLONASE) 50 mcg/act nasal spray, INSTILL 2 SPRAYS IN EACH NOSTRIL DAILY (BULK) (Patient not taking: Reported on 2024), Disp: 48 g, Rfl: 1    Review of Systems:  Review of Systems  10-point system review completed, all of which are negative except as mentioned above.    Past medical history, surgical history, and family history were reviewed and updated as appropriate    Social history updates:  Social History     Tobacco Use   Smoking Status Former    Current packs/day: 0.00    Types: Cigarettes    Quit date: 1995    Years since quittin.7   Smokeless Tobacco Never   Tobacco Comments    quit 25 years ago     PhysicalExamination:  Vitals:   /88   Pulse 68   Temp 98.1 °F (36.7 °C) (Tympanic)   Ht 5' 5\" (1.651 m)   Wt 132 kg (292 lb)   LMP  (LMP Unknown)   SpO2 94%   " BMI 48.59 kg/m²   Body mass index is 48.59 kg/m².    Constitutional: NAD, well appearing, on room air, no conversational dyspnea, obese   Skin: Warm, dry, no rashes noted   Eyes: PERRL, normal conjunctiva  ENT: Nasal congestion absent, moist mucus membranes.  Neck: No JVD, trachea is midline, no adenopathy.  Resp: CTA B/L, no W/R/R   Cardiac: RRR, +S1/S2, no M/R/G  Abdomen: Soft, NT/ND  Extremities: No digital clubbing or pedal edema  Neuro: AAOx3    Diagnostic Data:  Labs:  I personally reviewed the most recent laboratory data pertinent to today's visit    Lab Results   Component Value Date    WBC 5.72 04/16/2024    HGB 13.0 04/16/2024    HCT 42.3 04/16/2024    MCV 96 04/16/2024     04/16/2024     Lab Results   Component Value Date    SODIUM 140 04/16/2024    K 4.0 04/16/2024    CO2 28 04/16/2024     04/16/2024    BUN 16 04/16/2024    CREATININE 1.05 04/16/2024    GLUCOSE 102 06/01/2017    CALCIUM 8.6 04/16/2024     PFT results:  The most recent pulmonary function tests were reviewed.  PFTs -- 11/20/2020  Spirometry:  FEV1/FVC Ratio is 81 %. FEV1 is 2.29 L/93 % of predicted. FVC is 2.83 L/86 % of predicted.  Lung volumes:  RV 1.10 L/58 % of predicted, TLC 4.05 L/79 % of predicted, RV/TLC ratio 27 %  Diffusing capacity:  57 % of predicted     Imaging:  I personally reviewed the images in PACS pertinent to today's visit:  CXR Portable -- 3/25/2023  No acute cardiopulmonary disease.     CT C/A/P w/ Contrast -- 6/19/2021  LUNGS:  Minimal bibasilar atelectasis or scarring.  No pneumothorax.  There is no tracheal or endobronchial lesion.  PLEURA:  Unremarkable.  Nondisplaced right 10th rib fracture.  No other evidence of acute pathology.  Moderate hiatal hernia.  Colonic diverticulosis.     Other studies:  PSG --12/30/2022 (Wt 302 lbs)  AHI -10.6  Sup AHI -27.9  REM AHI -42.3  O2 [place] -70.0%  TST < 90% -29.3 min  PLMI - 0.0     2D Echo -- 5/4/2020  LEFT VENTRICLE:  Systolic function was normal. Ejection  "fraction was estimated to be 65 %.  There were no regional wall motion abnormalities.  RIGHT VENTRICLE:  The size was normal.  Systolic function was normal.  MITRAL VALVE:  There was trace regurgitation.  TRICUSPID VALVE:  There was trace regurgitation.  Pulmonary artery systolic pressure was within the normal range.      Nelia Waller MD  Pulmonary-Critical Care and Sleep Medicine  08/09/24    Portions of the record may have been created with voice recognition software. Occasional wrong word or \"sound a like\" substitutions may have occurred due to the inherent limitations of voice recognition software. Please read the chart carefully and recognize, using context, where substitutions have occurred.  "

## 2024-08-15 ENCOUNTER — APPOINTMENT (OUTPATIENT)
Dept: LAB | Facility: MEDICAL CENTER | Age: 59
End: 2024-08-15
Payer: COMMERCIAL

## 2024-08-15 DIAGNOSIS — Z86.711 HISTORY OF PULMONARY EMBOLUS (PE): ICD-10-CM

## 2024-08-15 LAB
INR PPP: 2.27 (ref 0.85–1.19)
PROTHROMBIN TIME: 25 SECONDS (ref 12.3–15)

## 2024-08-15 PROCEDURE — 36415 COLL VENOUS BLD VENIPUNCTURE: CPT

## 2024-08-15 PROCEDURE — 85610 PROTHROMBIN TIME: CPT

## 2024-08-16 ENCOUNTER — ANTICOAG VISIT (OUTPATIENT)
Dept: FAMILY MEDICINE CLINIC | Facility: CLINIC | Age: 59
End: 2024-08-16

## 2024-08-28 ENCOUNTER — HOSPITAL ENCOUNTER (OUTPATIENT)
Dept: PULMONOLOGY | Facility: HOSPITAL | Age: 59
Discharge: HOME | End: 2024-08-28

## 2024-08-28 RX ORDER — ALBUTEROL SULFATE 0.83 MG/ML
2.5 SOLUTION RESPIRATORY (INHALATION) ONCE AS NEEDED
Status: DISCONTINUED | OUTPATIENT
Start: 2024-08-28 | End: 2024-09-01 | Stop reason: HOSPADM

## 2024-08-29 ENCOUNTER — VBI (OUTPATIENT)
Dept: ADMINISTRATIVE | Facility: OTHER | Age: 59
End: 2024-08-29

## 2024-08-29 NOTE — TELEPHONE ENCOUNTER
08/29/24 1:01 PM     Chart reviewed for CRC: Colonoscopy ; nothing is submitted to the patient's insurance at this time.     Lauren Durant   [facility]

## 2024-09-10 ENCOUNTER — APPOINTMENT (OUTPATIENT)
Dept: LAB | Facility: MEDICAL CENTER | Age: 59
End: 2024-09-10
Payer: COMMERCIAL

## 2024-09-10 DIAGNOSIS — Z86.711 HISTORY OF PULMONARY EMBOLUS (PE): ICD-10-CM

## 2024-09-10 LAB
INR PPP: 2.45 (ref 0.85–1.19)
PROTHROMBIN TIME: 26.5 SECONDS (ref 12.3–15)

## 2024-09-10 PROCEDURE — 36415 COLL VENOUS BLD VENIPUNCTURE: CPT

## 2024-09-10 PROCEDURE — 85610 PROTHROMBIN TIME: CPT

## 2024-09-11 ENCOUNTER — ANTICOAG VISIT (OUTPATIENT)
Dept: FAMILY MEDICINE CLINIC | Facility: CLINIC | Age: 59
End: 2024-09-11

## 2024-09-23 DIAGNOSIS — T78.3XXD ANGIOEDEMA, SUBSEQUENT ENCOUNTER: ICD-10-CM

## 2024-09-24 RX ORDER — MONTELUKAST SODIUM 10 MG/1
10 TABLET ORAL
Qty: 90 TABLET | Refills: 3 | Status: SHIPPED | OUTPATIENT
Start: 2024-09-24

## 2024-09-26 ENCOUNTER — HOSPITAL ENCOUNTER (OUTPATIENT)
Dept: PULMONOLOGY | Facility: HOSPITAL | Age: 59
End: 2024-09-26
Payer: COMMERCIAL

## 2024-09-26 DIAGNOSIS — R06.09 DYSPNEA ON EXERTION: ICD-10-CM

## 2024-09-26 PROCEDURE — 94060 EVALUATION OF WHEEZING: CPT

## 2024-09-26 PROCEDURE — 94729 DIFFUSING CAPACITY: CPT

## 2024-09-26 PROCEDURE — 94760 N-INVAS EAR/PLS OXIMETRY 1: CPT

## 2024-09-26 PROCEDURE — 94726 PLETHYSMOGRAPHY LUNG VOLUMES: CPT

## 2024-09-26 RX ORDER — ALBUTEROL SULFATE 0.83 MG/ML
2.5 SOLUTION RESPIRATORY (INHALATION) ONCE AS NEEDED
Status: COMPLETED | OUTPATIENT
Start: 2024-09-26 | End: 2024-09-26

## 2024-09-26 RX ADMIN — ALBUTEROL SULFATE 2.5 MG: 2.5 SOLUTION RESPIRATORY (INHALATION) at 09:29

## 2024-10-07 DIAGNOSIS — I26.99 PULMONARY EMBOLISM, UNSPECIFIED CHRONICITY, UNSPECIFIED PULMONARY EMBOLISM TYPE, UNSPECIFIED WHETHER ACUTE COR PULMONALE PRESENT (HCC): ICD-10-CM

## 2024-10-08 ENCOUNTER — TELEPHONE (OUTPATIENT)
Dept: FAMILY MEDICINE CLINIC | Facility: CLINIC | Age: 59
End: 2024-10-08

## 2024-10-08 RX ORDER — WARFARIN SODIUM 1 MG/1
TABLET ORAL
Qty: 60 TABLET | Refills: 2 | Status: SHIPPED | OUTPATIENT
Start: 2024-10-08

## 2024-10-08 NOTE — TELEPHONE ENCOUNTER
Please call the emergency contact for this patient and get an active phone number for her. She will be due for her next INR.

## 2024-10-11 ENCOUNTER — APPOINTMENT (OUTPATIENT)
Dept: LAB | Facility: MEDICAL CENTER | Age: 59
End: 2024-10-11
Payer: COMMERCIAL

## 2024-10-11 DIAGNOSIS — Z86.711 HISTORY OF PULMONARY EMBOLUS (PE): ICD-10-CM

## 2024-10-11 LAB
INR PPP: 1.9 (ref 0.85–1.19)
PROTHROMBIN TIME: 21.9 SECONDS (ref 12.3–15)

## 2024-10-11 PROCEDURE — 36415 COLL VENOUS BLD VENIPUNCTURE: CPT

## 2024-10-11 PROCEDURE — 85610 PROTHROMBIN TIME: CPT

## 2024-10-15 ENCOUNTER — ANTICOAG VISIT (OUTPATIENT)
Dept: FAMILY MEDICINE CLINIC | Facility: CLINIC | Age: 59
End: 2024-10-15

## 2024-10-23 ENCOUNTER — APPOINTMENT (OUTPATIENT)
Dept: LAB | Facility: MEDICAL CENTER | Age: 59
End: 2024-10-23
Payer: COMMERCIAL

## 2024-10-23 DIAGNOSIS — Z86.711 HISTORY OF PULMONARY EMBOLUS (PE): ICD-10-CM

## 2024-10-23 LAB
INR PPP: 2.1 (ref 0.85–1.19)
PROTHROMBIN TIME: 23.6 SECONDS (ref 12.3–15)

## 2024-10-23 PROCEDURE — 85610 PROTHROMBIN TIME: CPT

## 2024-10-23 PROCEDURE — 36415 COLL VENOUS BLD VENIPUNCTURE: CPT

## 2024-10-24 ENCOUNTER — TELEPHONE (OUTPATIENT)
Dept: FAMILY MEDICINE CLINIC | Facility: CLINIC | Age: 59
End: 2024-10-24

## 2024-10-24 NOTE — TELEPHONE ENCOUNTER
LM to call office back  ----- Message from Jessica Gracia PA-C sent at 10/24/2024 10:36 AM EDT -----  INR improved but still not therapeutic. Will increase dose.    Take 7.5 mg only on Mondays, take 10 mg all other days. Repeat INR in 2 weeks.

## 2024-11-06 ENCOUNTER — APPOINTMENT (OUTPATIENT)
Dept: LAB | Facility: MEDICAL CENTER | Age: 59
End: 2024-11-06
Payer: COMMERCIAL

## 2024-11-06 ENCOUNTER — APPOINTMENT (OUTPATIENT)
Dept: RADIOLOGY | Facility: MEDICAL CENTER | Age: 59
End: 2024-11-06
Payer: COMMERCIAL

## 2024-11-06 ENCOUNTER — TELEPHONE (OUTPATIENT)
Dept: PULMONOLOGY | Facility: CLINIC | Age: 59
End: 2024-11-06

## 2024-11-06 ENCOUNTER — OFFICE VISIT (OUTPATIENT)
Dept: FAMILY MEDICINE CLINIC | Facility: CLINIC | Age: 59
End: 2024-11-06
Payer: COMMERCIAL

## 2024-11-06 VITALS
DIASTOLIC BLOOD PRESSURE: 82 MMHG | HEIGHT: 65 IN | WEIGHT: 293 LBS | OXYGEN SATURATION: 96 % | SYSTOLIC BLOOD PRESSURE: 122 MMHG | BODY MASS INDEX: 48.82 KG/M2 | TEMPERATURE: 97.2 F | HEART RATE: 87 BPM

## 2024-11-06 DIAGNOSIS — M17.11 PRIMARY OSTEOARTHRITIS OF RIGHT KNEE: ICD-10-CM

## 2024-11-06 DIAGNOSIS — R60.0 EDEMA OF LEFT LOWER EXTREMITY: Primary | ICD-10-CM

## 2024-11-06 DIAGNOSIS — M79.605 PAIN OF LEFT LOWER EXTREMITY: ICD-10-CM

## 2024-11-06 DIAGNOSIS — Z23 ENCOUNTER FOR IMMUNIZATION: ICD-10-CM

## 2024-11-06 DIAGNOSIS — Z86.711 HISTORY OF PULMONARY EMBOLUS (PE): ICD-10-CM

## 2024-11-06 DIAGNOSIS — R60.0 EDEMA OF LEFT LOWER EXTREMITY: ICD-10-CM

## 2024-11-06 LAB
INR PPP: 3 (ref 0.85–1.19)
PROTHROMBIN TIME: 30.9 SECONDS (ref 12.3–15)

## 2024-11-06 PROCEDURE — 90673 RIV3 VACCINE NO PRESERV IM: CPT | Performed by: FAMILY MEDICINE

## 2024-11-06 PROCEDURE — 73590 X-RAY EXAM OF LOWER LEG: CPT

## 2024-11-06 PROCEDURE — 85610 PROTHROMBIN TIME: CPT

## 2024-11-06 PROCEDURE — 99213 OFFICE O/P EST LOW 20 MIN: CPT | Performed by: FAMILY MEDICINE

## 2024-11-06 PROCEDURE — 90471 IMMUNIZATION ADMIN: CPT | Performed by: FAMILY MEDICINE

## 2024-11-06 PROCEDURE — 36415 COLL VENOUS BLD VENIPUNCTURE: CPT

## 2024-11-06 NOTE — TELEPHONE ENCOUNTER
We can now schedule your follow up appointment with Dr. Waller. You are due in January. Please call us to schedule.    04-Oct-2019 01:21

## 2024-11-06 NOTE — PROGRESS NOTES
"Ambulatory Visit  Name: Celina Collins      : 1965      MRN: [id number]  Encounter Provider: Maximus Franco DO  Encounter Date: 2024   Encounter department: [facility]    Assessment & Plan  Edema of left lower extremity  Concerned about a posttraumatic DVT.  Obtain venous Doppler of left lower extremity.  Orders:    XR tibia fibula 2 vw left; Future    VAS VENOUS DUPLEX -LOWER LIMB UNILATERAL; Future    Pain of left lower extremity  Patient with ecchymosis of left lower extremity after fall.  Rule out fracture.  Tylenol for pain as labeled.  Orders:    XR tibia fibula 2 vw left; Future    VAS VENOUS DUPLEX -LOWER LIMB UNILATERAL; Future    Primary osteoarthritis of right knee  Continue current medications.  Only take Tylenol if needed.       Encounter for immunization    Orders:    influenza vaccine, recombinant, PF, 0.5 mL IM (Flublok)       History of Present Illness     Fall  Pertinent negatives include no abdominal pain, fever, hematuria or vomiting.         Review of Systems   Constitutional:  Negative for chills and fever.   HENT:  Negative for ear pain and sore throat.    Eyes:  Negative for pain and visual disturbance.   Respiratory:  Negative for cough and shortness of breath.    Cardiovascular:  Positive for leg swelling (left lower extremity edema after fall). Negative for chest pain and palpitations.   Gastrointestinal:  Negative for abdominal pain and vomiting.   Genitourinary:  Negative for dysuria and hematuria.   Musculoskeletal:  Positive for arthralgias (left lower extremity pain). Negative for back pain.   Skin:  Negative for color change and rash.   Neurological:  Negative for seizures and syncope.   All other systems reviewed and are negative.          Objective     /82   Pulse 87   Temp (!) 97.2 °F (36.2 °C)   Ht 5' 5\" (1.651 m)   Wt (!) 136 kg (300 lb 9.6 oz)   LMP  (LMP Unknown)   SpO2 96%   BMI 50.02 kg/m²     Physical Exam  Vitals and nursing note " reviewed.   Constitutional:       General: She is not in acute distress.     Appearance: She is well-developed.   HENT:      Head: Normocephalic and atraumatic.   Eyes:      Conjunctiva/sclera: Conjunctivae normal.   Cardiovascular:      Rate and Rhythm: Normal rate and regular rhythm.      Heart sounds: No murmur heard.  Pulmonary:      Effort: Pulmonary effort is normal. No respiratory distress.      Breath sounds: Normal breath sounds.   Abdominal:      Palpations: Abdomen is soft.      Tenderness: There is no abdominal tenderness.   Musculoskeletal:         General: No swelling.      Cervical back: Neck supple.      Comments: Left lower extremity with edema as well as ecchymosis from the knee down to the ankle.  There is tenderness to palpation.  Arterial pulses are intact.   Skin:     General: Skin is warm and dry.      Capillary Refill: Capillary refill takes less than 2 seconds.   Neurological:      Mental Status: She is alert.   Psychiatric:         Mood and Affect: Mood normal.         Behavior: Behavior normal.

## 2024-11-07 ENCOUNTER — HOSPITAL ENCOUNTER (OUTPATIENT)
Dept: NON INVASIVE DIAGNOSTICS | Facility: HOSPITAL | Age: 59
Discharge: HOME | End: 2024-11-07
Attending: FAMILY MEDICINE
Payer: COMMERCIAL

## 2024-11-07 DIAGNOSIS — M79.605 PAIN OF LEFT LOWER EXTREMITY: ICD-10-CM

## 2024-11-07 DIAGNOSIS — R60.0 EDEMA OF LEFT LOWER EXTREMITY: ICD-10-CM

## 2024-11-07 PROCEDURE — 93971 EXTREMITY STUDY: CPT

## 2024-11-08 PROCEDURE — 93971 EXTREMITY STUDY: CPT | Performed by: SURGERY

## 2024-11-20 ENCOUNTER — TELEPHONE (OUTPATIENT)
Age: 59
End: 2024-11-20

## 2024-11-20 NOTE — TELEPHONE ENCOUNTER
[facility] requests that prescriptions are sent to them for all of Celina's active medications.     # [phone]  Fax # [phone]

## 2024-11-21 ENCOUNTER — TELEPHONE (OUTPATIENT)
Age: 59
End: 2024-11-21

## 2024-11-21 DIAGNOSIS — E78.5 DYSLIPIDEMIA: ICD-10-CM

## 2024-11-21 DIAGNOSIS — J45.20 MILD INTERMITTENT ASTHMA WITHOUT COMPLICATION: ICD-10-CM

## 2024-11-21 DIAGNOSIS — K21.9 GASTROESOPHAGEAL REFLUX DISEASE WITHOUT ESOPHAGITIS: Chronic | ICD-10-CM

## 2024-11-21 DIAGNOSIS — R60.0 BILATERAL LEG EDEMA: ICD-10-CM

## 2024-11-21 DIAGNOSIS — E03.8 OTHER SPECIFIED HYPOTHYROIDISM: ICD-10-CM

## 2024-11-21 DIAGNOSIS — J45.40 MODERATE PERSISTENT ASTHMA, UNSPECIFIED WHETHER COMPLICATED: ICD-10-CM

## 2024-11-21 RX ORDER — LEVOTHYROXINE SODIUM 125 UG/1
125 TABLET ORAL EVERY MORNING
Qty: 90 TABLET | Refills: 3 | Status: SHIPPED | OUTPATIENT
Start: 2024-11-21

## 2024-11-21 RX ORDER — ATORVASTATIN CALCIUM 10 MG/1
10 TABLET, FILM COATED ORAL
Qty: 90 TABLET | Refills: 3 | Status: SHIPPED | OUTPATIENT
Start: 2024-11-21

## 2024-11-21 RX ORDER — FUROSEMIDE 20 MG/1
20 TABLET ORAL DAILY PRN
Qty: 30 TABLET | Refills: 5 | Status: SHIPPED | OUTPATIENT
Start: 2024-11-21

## 2024-11-21 RX ORDER — ALBUTEROL SULFATE 90 UG/1
2 INHALANT RESPIRATORY (INHALATION) EVERY 4 HOURS PRN
Qty: 54 G | Refills: 3 | Status: SHIPPED | OUTPATIENT
Start: 2024-11-21

## 2024-11-21 RX ORDER — POTASSIUM CHLORIDE 750 MG/1
10 CAPSULE, EXTENDED RELEASE ORAL DAILY
Qty: 30 CAPSULE | Refills: 5 | Status: SHIPPED | OUTPATIENT
Start: 2024-11-21

## 2024-11-21 RX ORDER — BUDESONIDE AND FORMOTEROL FUMARATE DIHYDRATE 160; 4.5 UG/1; UG/1
2 AEROSOL RESPIRATORY (INHALATION) 2 TIMES DAILY
Qty: 30.6 G | Refills: 3 | Status: SHIPPED | OUTPATIENT
Start: 2024-11-21

## 2024-11-21 RX ORDER — OMEPRAZOLE 40 MG/1
40 CAPSULE, DELAYED RELEASE ORAL
Qty: 90 CAPSULE | Refills: 3 | Status: SHIPPED | OUTPATIENT
Start: 2024-11-21

## 2024-11-21 NOTE — TELEPHONE ENCOUNTER
PA for (Symbicort) 160-4.5 mcg/act SUBMITTED to perform rx    via    []CMM-KEY:   [x]Surescripts-Case ID # [id number]  []Availity-Auth ID # NDC #   []Faxed to plan   []Other website   []Phone call Case ID #     []PA sent as URGENT    All office notes, labs and other pertaining documents and studies sent. Clinical questions answered. Awaiting determination from insurance company.     Turnaround time for your insurance to make a decision on your Prior Authorization can take 7-21 business days.

## 2024-11-21 NOTE — TELEPHONE ENCOUNTER
Jessica's patient  called asking for refills for 90 days to go to [facility]   Can you please fill Jessica is out of the office till December 2, 2024

## 2024-11-26 DIAGNOSIS — I26.99 PULMONARY EMBOLISM (HCC): ICD-10-CM

## 2024-11-26 NOTE — TELEPHONE ENCOUNTER
Brand name Symbicort is preferred    PA for (Symbicort) 160-4.5 mcg/act DENIED    Reason:(Screenshot if applicable)        Message sent to office clinical pool Yes    Denial letter scanned into Media Yes    Appeal started No (Provider will need to decide if appeal is warranted and send clinical documentation to Prior Authorization Team for initiation.)    **Please follow up with your patient regarding denial and next steps**

## 2024-11-27 ENCOUNTER — TELEPHONE (OUTPATIENT)
Dept: FAMILY MEDICINE CLINIC | Facility: CLINIC | Age: 59
End: 2024-11-27

## 2024-11-27 DIAGNOSIS — K21.00 GASTROESOPHAGEAL REFLUX DISEASE WITH ESOPHAGITIS WITHOUT HEMORRHAGE: ICD-10-CM

## 2024-11-27 DIAGNOSIS — J30.1 ALLERGIC RHINITIS DUE TO POLLEN, UNSPECIFIED SEASONALITY: Primary | ICD-10-CM

## 2024-11-27 RX ORDER — FLUTICASONE PROPIONATE 50 MCG
1 SPRAY, SUSPENSION (ML) NASAL DAILY
Qty: 9.9 ML | Refills: 1 | Status: SHIPPED | OUTPATIENT
Start: 2024-11-27

## 2024-11-27 RX ORDER — FAMOTIDINE 40 MG/1
40 TABLET, FILM COATED ORAL DAILY
Qty: 90 TABLET | Refills: 1 | Status: SHIPPED | OUTPATIENT
Start: 2024-11-27

## 2024-11-27 RX ORDER — WARFARIN SODIUM 5 MG/1
TABLET ORAL
Qty: 135 TABLET | Refills: 1 | Status: SHIPPED | OUTPATIENT
Start: 2024-11-27 | End: 2024-12-02 | Stop reason: SDUPTHER

## 2024-11-27 NOTE — TELEPHONE ENCOUNTER
Reason for call:   [x] Refill   [] Prior Auth  [] Other:     Office:   [x] PCP/Provider -   [] Specialty/Provider -     Medication: famotidine (PEPCID) 40 MG  TAKE ONE TABLET BY MOUTH DAILY AT 9PM AT BEDTIME       Pharmacy: [facility]     Does the patient have enough for 3 days?   [] Yes   [] No - Send as HP to POD

## 2024-11-27 NOTE — TELEPHONE ENCOUNTER
Reason for call:   [x] Refill   [] Prior Auth  [x] Other: Not on active med list     Office:   [x] PCP/Provider -   [] Specialty/Provider -     Medication:   fluticasone (FLONASE) 50 mcg/act nasal spray INSTILL 2 SPRAYS IN EACH NOSTRIL DAILY       Pharmacy: [facility]     Does the patient have enough for 3 days?   [] Yes   [x] No - Send as HP to POD

## 2024-12-02 ENCOUNTER — APPOINTMENT (OUTPATIENT)
Dept: LAB | Facility: MEDICAL CENTER | Age: 59
End: 2024-12-02
Payer: COMMERCIAL

## 2024-12-02 DIAGNOSIS — Z86.711 HISTORY OF PULMONARY EMBOLUS (PE): ICD-10-CM

## 2024-12-02 DIAGNOSIS — J45.40 MODERATE PERSISTENT ASTHMA, UNSPECIFIED WHETHER COMPLICATED: Primary | ICD-10-CM

## 2024-12-02 DIAGNOSIS — T78.3XXD ANGIOEDEMA, SUBSEQUENT ENCOUNTER: ICD-10-CM

## 2024-12-02 DIAGNOSIS — I26.99 PULMONARY EMBOLISM (HCC): ICD-10-CM

## 2024-12-02 LAB
INR PPP: 2.07 (ref 0.85–1.19)
PROTHROMBIN TIME: 23.3 SECONDS (ref 12.3–15)

## 2024-12-02 PROCEDURE — 85610 PROTHROMBIN TIME: CPT

## 2024-12-02 PROCEDURE — 36415 COLL VENOUS BLD VENIPUNCTURE: CPT

## 2024-12-02 RX ORDER — BUDESONIDE AND FORMOTEROL FUMARATE DIHYDRATE 160; 4.5 UG/1; UG/1
2 AEROSOL RESPIRATORY (INHALATION) 2 TIMES DAILY
Qty: 10.2 G | Refills: 2 | Status: SHIPPED | OUTPATIENT
Start: 2024-12-02

## 2024-12-02 NOTE — TELEPHONE ENCOUNTER
Reason for call:   [x] Refill   [] Prior Auth  [] Other:     Office:   [x] PCP/Provider - Jessica Gracia/Valentina  [] Specialty/Provider -     Medication: Singulair    Dose/Frequency: 10 mg     Quantity: #90    Pharmacy: [facility]    Does the patient have enough for 3 days?   [x] Yes   [] No - Send as HP to POD                  Reason for call:   [x] Refill   [] Prior Auth  [] Other:     Office:   [x] PCP/Provider - Jessica Gracia/Valentina   [] Specialty/Provider -     Medication: Coumadin    Dose/Frequency: 5 mg     Quantity: #135    Pharmacy: [facility]    Does the patient have enough for 3 days?   [x] Yes   [] No - Send as HP to POD

## 2024-12-03 ENCOUNTER — RESULTS FOLLOW-UP (OUTPATIENT)
Dept: FAMILY MEDICINE CLINIC | Facility: CLINIC | Age: 59
End: 2024-12-03

## 2024-12-03 ENCOUNTER — ANTICOAG VISIT (OUTPATIENT)
Dept: FAMILY MEDICINE CLINIC | Facility: CLINIC | Age: 59
End: 2024-12-03

## 2024-12-03 RX ORDER — WARFARIN SODIUM 5 MG/1
TABLET ORAL
Qty: 135 TABLET | Refills: 1 | Status: SHIPPED | OUTPATIENT
Start: 2024-12-03

## 2024-12-03 RX ORDER — MONTELUKAST SODIUM 10 MG/1
10 TABLET ORAL
Qty: 90 TABLET | Refills: 1 | Status: SHIPPED | OUTPATIENT
Start: 2024-12-03

## 2024-12-04 ENCOUNTER — TELEPHONE (OUTPATIENT)
Age: 59
End: 2024-12-04

## 2024-12-04 NOTE — TELEPHONE ENCOUNTER
Received call from Adolfo with [facility].  He is requesting that all active meds for pt be faxed to [phone].  Please review and advise.

## 2024-12-23 ENCOUNTER — OFFICE VISIT (OUTPATIENT)
Dept: OBGYN CLINIC | Facility: CLINIC | Age: 59
End: 2024-12-23
Payer: COMMERCIAL

## 2024-12-23 ENCOUNTER — APPOINTMENT (OUTPATIENT)
Dept: RADIOLOGY | Facility: MEDICAL CENTER | Age: 59
End: 2024-12-23
Payer: COMMERCIAL

## 2024-12-23 VITALS
BODY MASS INDEX: 48.82 KG/M2 | HEART RATE: 83 BPM | SYSTOLIC BLOOD PRESSURE: 132 MMHG | HEIGHT: 65 IN | TEMPERATURE: 98.1 F | RESPIRATION RATE: 16 BRPM | WEIGHT: 293 LBS | DIASTOLIC BLOOD PRESSURE: 90 MMHG | OXYGEN SATURATION: 98 %

## 2024-12-23 DIAGNOSIS — M19.112 POST-TRAUMATIC OSTEOARTHRITIS OF LEFT SHOULDER: Primary | ICD-10-CM

## 2024-12-23 DIAGNOSIS — M12.812 ROTATOR CUFF ARTHROPATHY OF LEFT SHOULDER: ICD-10-CM

## 2024-12-23 PROCEDURE — 73030 X-RAY EXAM OF SHOULDER: CPT

## 2024-12-23 PROCEDURE — 99214 OFFICE O/P EST MOD 30 MIN: CPT | Performed by: STUDENT IN AN ORGANIZED HEALTH CARE EDUCATION/TRAINING PROGRAM

## 2024-12-23 PROCEDURE — 20610 DRAIN/INJ JOINT/BURSA W/O US: CPT | Performed by: STUDENT IN AN ORGANIZED HEALTH CARE EDUCATION/TRAINING PROGRAM

## 2024-12-23 RX ORDER — BUPIVACAINE HYDROCHLORIDE 2.5 MG/ML
3 INJECTION, SOLUTION INFILTRATION; PERINEURAL
Status: COMPLETED | OUTPATIENT
Start: 2024-12-23 | End: 2024-12-23

## 2024-12-23 RX ADMIN — BUPIVACAINE HYDROCHLORIDE 3 ML: 2.5 INJECTION, SOLUTION INFILTRATION; PERINEURAL at 08:45

## 2024-12-23 NOTE — PROGRESS NOTES
Assessment & Plan  Post-traumatic osteoarthritis of left shoulder  Celina Collins is a 59 y.o. female who has left shoulder pain due to shoulder arthritis.  We reviewed their exam findings and imaging and how they relate to their diagnosis. We had a thorough discussion of the natural history of osteoarthritis and treatment options, both non-operative and operative.     In regards to non-operative management we discussed the role of anti-inflammatory medications to reduce pain and inflammation. We discussed physical therapy and its role in helping maintain strength and motion. Additionally we discussed corticosteroid injections which can also help decrease pain and inflammation. We reiterated that none of these modalities actually treat the pathology specifically, however, just provide symptomatic management.     We also discussed surgical treatment with shoulder arthroplasty, both anatomic and reverse. We had a discussion about the benefits of surgical management specifically with pain relief. We discussed the risks of surgical management including, infection, periprosthetic joint infection, dislocation, and the need for revision surgery.     Fortunately the patient is not a candidate for shoulder arthroplasty given her BMI of nearly 50.  We discussed weight management options and placed a referral to the weight management clinic.  The patient wishes to have a corticosteroid injection today which was provided for her.  She will follow-up with me in 6 months for repeat clinical evaluation.    Orders:    Ambulatory Referral to Weight Management; Future    Large joint arthrocentesis: L subacromial bursa         General  Chief Complaint   Patient presents with    Left Shoulder - Pain        Subjective    Celina Collins is a right hand dominant 59 y.o. female who presents with left shoulder pain     History of Present Illness   The patient complains of left shoulder pain. The pain started 20 years ago with injury. At that  time the patient describes falling through a floor resulting in surgery that she healed well post operatively. Over the years she developed anterior shoulder pain that worsened over time. Patient treated with Dr. Jarvis receiving a cortisone injection on 7/19/2024 that provided her with 2 months worth of relief. She has been taking Motrin for pain and denies going to physical therapy.     The patient rates the pain as a 3/10. The pain is located Anterior and Lateral.The pain is described as Throbbing. The patient has tried Injections for their problem.  The pain improved for about 2 months . The pain worsens with activity and at night time.     The patient does have pain at night. The patient does have pain with overhead activity, and does not describe weakness.     The pain does not go past the elbow. The patient does not have neck pain.The shoulder does not feel unstable. The patient does not have numbness.    [facility] Patient Answers  Failed to redirect to the Timeline version of the [employer] SmartLink.  Allergies   Allergen Reactions    Clindamycin Angioedema    Medical Tape Itching     Welt and redness    Penicillin G Nausea Only    Penicillins GI Intolerance     Nausea and vomiting     Outpatient Encounter Medications as of 12/23/2024   Medication Sig Dispense Refill    albuterol (2.5 mg/3 mL) 0.083 % nebulizer solution Take 3 mL (2.5 mg total) by nebulization as needed for wheezing 3 mL 5    albuterol (PROVENTIL HFA,VENTOLIN HFA) 90 mcg/act inhaler Inhale 2 puffs every 4 (four) hours as needed for wheezing 54 g 3    atorvastatin (LIPITOR) 10 mg tablet Take 1 tablet (10 mg total) by mouth daily with dinner 90 tablet 3    azelastine (ASTELIN) 0.1 % nasal spray 1 spray into each nostril 2 (two) times a day Use in each nostril as directed 1 mL 5    famotidine (PEPCID) 40 MG tablet Take 1 tablet (40 mg total) by mouth in the morning 90 tablet 1    fluticasone (FLONASE) 50 mcg/act nasal spray 1 spray into  each nostril daily 9.9 mL 1    furosemide (LASIX) 20 mg tablet Take 1 tablet (20 mg total) by mouth daily as needed (leg swelling) Take with potassium 30 tablet 5    levothyroxine 125 mcg tablet Take 1 tablet (125 mcg total) by mouth every morning At 8AM 90 tablet 3    loratadine (CLARITIN) 10 mg tablet Take 1 tablet (10 mg total) by mouth daily 30 tablet 5    montelukast (SINGULAIR) 10 mg tablet Take 1 tablet (10 mg total) by mouth daily at bedtime 90 tablet 1    Multiple Vitamins-Minerals (CENTRUM SILVER 50+WOMEN PO) Take by mouth      omeprazole (PriLOSEC) 40 MG capsule Take 1 capsule (40 mg total) by mouth daily before breakfast 90 capsule 3    potassium chloride (MICRO-K) 10 MEQ CR capsule Take 1 capsule (10 mEq total) by mouth daily 30 capsule 5    Symbicort 160-4.5 MCG/ACT inhaler Inhale 2 puffs 2 (two) times a day Rinse mouth after use. 10.2 g 2    warfarin (COUMADIN) 1 mg tablet take 2 tablets by mouth as directed by prescriber 60 tablet 2    warfarin (COUMADIN) 5 mg tablet TAKE 1 AND 1/2 TABLETS BY MOUTH ON TUESDAY, THURSDAYS, SATURDAYS AND SUNDAYS. TAKE 2 TABLETS ON MONDAYS, WEDNESDAYS AND FRIDAYS. 135 tablet 1     No facility-administered encounter medications on file as of 12/23/2024.     Past Medical History:   Diagnosis Date    Abdominal pain, lower     [id number] RESOLVED    Acute renal failure (HCC)     98YTP4375 RESOLVED    Allergic rhinitis     44UUW0925 RESOLVED    Ankle pain, right     97IOZ2270 RESOLVED    Arthritis     87GXI4040 RESOLVED  [id number] RESOLVED    Asthma     Bilateral chronic knee pain     05EPP2823    Bladder pain     71ELD1625 RESOLVED    Blood clot in vein     Bursitis     52LER1376 RESOLVED    Cardiac disorder     01OHR3256  RESOLVED    Cardiac murmur     57ZLX4543 RESOLVED    Chest tightness or pressure     82HXW0875 RESOLVED    Chronic kidney disease     COPD (chronic obstructive pulmonary disease) ([facility])     Coronary artery disease     Curvature of spine     01AUG2017  RESOLVED    Disease of thyroid gland     Fractures     GERD (gastroesophageal reflux disease)     Hernia, hiatal     64UPF1782 RESOLVED    Hyperlipidemia     84OXF2063 RESOLVED    Hypertension     Inguinal hernia     RIGHT   18BAC2986 RESOLVED    Jaw closure abnormality     27YZB4754 RESOLVED    Liver disease     Lumbar herniated disc     89CSU3070 RESOLVED    Morbid obesity (HCC)     37TAE6108    Obesity     Osteoarthritis of right knee     18EKN2305 RESOLVED    Patellar tendinitis     83QGH2416    Poor flexibility of tendon     75DRN0268    Presence of IVC filter 10/12/2018    Pulmonary embolism (HCC)     77WSE8994 RESOLVED    Sepsis (HCC)     47ZPX3454 RESOLVED    Severe sepsis due to methicillin resistant Staphylococcus aureus (MRSA) with acute organ dysfunction (HCC)     28FCX5438 RESOLVED    Sleep apnea, obstructive     Spider vein, symptomatic     93RPV8849 RESOLVED    Status post arthroscopy of right knee     30JUN2017 RESOLVED    Stomach disorder     33HKL4167 RESOLVED    Tear of medial meniscus of right knee     SUBSEQUENT ENCOUNTER  RESOLVED 30JUN2017    Thyroid disorder     15WZA9217    Umbilical hernia     28ZCJ0097 RESOLVED    Vascular disorder      Past Surgical History:   Procedure Laterality Date    COLONOSCOPY      CYSTOSCOPY  01/1994    WITH BIOPSY      EXPLORATORY LAPAROTOMY      FOOT SURGERY Left     FRACTURE SURGERY      HAND SURGERY Right     cyst    HYSTERECTOMY  2009    INCISION AND DRAINAGE ANTERIOR NECK Right 06/08/2017    Procedure: INCISION AND DRAINAGE  (I&D) NECK;  Surgeon: Kirby Morales MD;  Location: [facility];  Service:     INCISIONAL HERNIA REPAIR      WITH IMPLANTATION OF MESH  13 MAY 2014  LAST ASSESSED    IR IVC FILTER REMOVAL  01/22/2019    KNEE SURGERY      OPEN ANTERIOR SHOULDER RECONSTRUCTION Left     OTHER SURGICAL HISTORY      BLOOD VESSEL REPAIR   13 MAY 2014 LAST ASSESSED    PERIPHERALLY INSERTED CENTRAL CATHETER INSERTION      GA ARTHRS KNE SURG W/MENISCECTOMY  MED/LAT W/SHVG Right 2016    Procedure: KNEE ARTHROSCOPY WITH MEDIAL MENISCECTOMY ;  Surgeon: Mikey Pantoja MD;  Location: [facility];  Service: Orthopedics    GA RPR AA HERNIA 1ST 3-10 CM REDUCIBLE N/A 11/15/2023    Procedure: REPAIR OF INCARCERATED VENTRAL HERNIA;  Surgeon: Lane Ventura DO;  Location: [facility];  Service: General    SHOULDER SURGERY       Family History   Problem Relation Age of Onset    Arthritis Mother     Colon cancer Brother     Lung cancer Father     Clotting disorder Sister     No Known Problems Daughter     No Known Problems Maternal Grandmother     No Known Problems Maternal Grandfather     No Known Problems Paternal Grandmother     No Known Problems Paternal Grandfather     Breast cancer Maternal Aunt     Diabetes Maternal Aunt     No Known Problems Sister     No Known Problems Sister     No Known Problems Sister     No Known Problems Sister     No Known Problems Sister     No Known Problems Daughter     Colon cancer Son      Social History     Tobacco Use    Smoking status: Former     Current packs/day: 0.00     Types: Cigarettes     Quit date: 1995     Years since quittin.0    Smokeless tobacco: Never    Tobacco comments:     quit 25 years ago   Vaping Use    Vaping status: Never Used   Substance Use Topics    Alcohol use: Never    Drug use: Never           Objective      Vitals:    24 0831   BP: 132/90   Pulse: 83   Resp: 16   Temp: 98.1 °F (36.7 °C)   SpO2: 98%     Body mass index is 49.92 kg/m².  Physical Exam    Shoulder Exam    Affected shoulder:   left    Skin: Without ecchymosis, wounds or prior incisions    Tenderness:   Anterior joint     Range of Motion (active/passive):  Side Active (FE/ER/IR) Passive (FE/ER/IR)   left 80/10/back pocket 80/10   right 160/40/T spine 160/40       Rotator Cuff Strength:  Side Supraspinatus Infraspinatus/Teres Subscapularis   left 4/5 4/5 4/5   right 5/5 5/5 5/5       Impingement and Rotator Cuff Exam:  Neer's test for  impingement Positive   Collins' test for impingement Positive   Carmella's test With pain   Belly Press With pain     Biceps Exam:  Ketchum's test for SLAP tear: Deferred   Jergeson's test for biciptal pain: Positive   Speeds test for biciptal pain: Positive       Shoulder Instability Exam:  The apprehension test for anterior instability: Deferred   The relocation test: Deferred   The posterior load and shift test: Deferred         Cervical Spine Exam:  Tenderness: None  ROM: WNL  Spurlings: Negative    Distally the patient's neurovascular status is normal.    Additional exam: None    Review of Prior Testing:    I independently interpreted the following test:     left shoulder x-ray images done on 12/23/2024:  Multiple views show posttraumatic arthritis with slight varus angulation of the humeral head evidence of prior rotator cuff repair with 2 metal anchors in the greater tuberosity significant osteophyte formation and joint space obliteration         Follow Up: Return in about 6 months (around 6/23/2025).    All questions answered and patient agrees with plan.    Large joint arthrocentesis: L subacromial bursa  Universal Protocol:  Consent: Verbal consent obtained.  Risks and benefits: risks, benefits and alternatives were discussed  Consent given by: patient  Patient understanding: patient states understanding of the procedure being performed  Patient consent: the patient's understanding of the procedure matches consent given  Patient identity confirmed: verbally with patient  Supporting Documentation  Indications: pain   Procedure Details  Location: shoulder - L subacromial bursa  Preparation: Patient was prepped and draped in the usual sterile fashion  Ultrasound guidance: no  Approach: posterior  Medications administered: 1 mg triamcinolone acetonide 10 mg/mL; 3 mL bupivacaine 0.25 %    Patient tolerance: patient tolerated the procedure well with no immediate complications         Scribe Attestation      I,:   Ginna Gomez am acting as a scribe while in the presence of the attending physician.:       I,:  Jason Ashton MD personally performed the services described in this documentation    as scribed in my presence.:

## 2024-12-27 DIAGNOSIS — J30.1 ALLERGIC RHINITIS DUE TO POLLEN, UNSPECIFIED SEASONALITY: ICD-10-CM

## 2024-12-27 RX ORDER — FLUTICASONE PROPIONATE 50 MCG
1 SPRAY, SUSPENSION (ML) NASAL DAILY
Qty: 9.9 ML | Refills: 2 | Status: SHIPPED | OUTPATIENT
Start: 2024-12-27

## 2024-12-27 NOTE — TELEPHONE ENCOUNTER
Reason for call:   [x] Refill   [] Prior Auth  [] Other:     Office:   [x] PCP/Provider -   [] Specialty/Provider -     fluticasone (FLONASE) 50 mcg/act nasal spray 1 spray, Nasal, Daily       Quantity: 90 day    [facility]    Does the patient have enough for 3 days?   [] Yes   [x] No - Send as HP to POD

## 2025-01-14 ENCOUNTER — APPOINTMENT (OUTPATIENT)
Dept: LAB | Facility: MEDICAL CENTER | Age: 60
End: 2025-01-14
Payer: COMMERCIAL

## 2025-01-14 ENCOUNTER — OFFICE VISIT (OUTPATIENT)
Dept: FAMILY MEDICINE CLINIC | Facility: CLINIC | Age: 60
End: 2025-01-14
Payer: COMMERCIAL

## 2025-01-14 ENCOUNTER — HOSPITAL ENCOUNTER (EMERGENCY)
Facility: HOSPITAL | Age: 60
Discharge: HOME | End: 2025-01-14
Attending: EMERGENCY MEDICINE | Admitting: EMERGENCY MEDICINE
Payer: COMMERCIAL

## 2025-01-14 ENCOUNTER — HOSPITAL ENCOUNTER (OUTPATIENT)
Dept: NON INVASIVE DIAGNOSTICS | Facility: HOSPITAL | Age: 60
Discharge: HOME | End: 2025-01-14
Payer: COMMERCIAL

## 2025-01-14 VITALS
RESPIRATION RATE: 19 BRPM | TEMPERATURE: 97.2 F | WEIGHT: 293 LBS | BODY MASS INDEX: 48.82 KG/M2 | SYSTOLIC BLOOD PRESSURE: 134 MMHG | HEIGHT: 65 IN | OXYGEN SATURATION: 96 % | HEART RATE: 86 BPM | DIASTOLIC BLOOD PRESSURE: 88 MMHG

## 2025-01-14 VITALS
TEMPERATURE: 97.8 F | HEIGHT: 65 IN | RESPIRATION RATE: 18 BRPM | DIASTOLIC BLOOD PRESSURE: 92 MMHG | OXYGEN SATURATION: 97 % | WEIGHT: 293 LBS | HEART RATE: 91 BPM | BODY MASS INDEX: 48.82 KG/M2 | SYSTOLIC BLOOD PRESSURE: 140 MMHG

## 2025-01-14 DIAGNOSIS — I82.409 DVT (DEEP VENOUS THROMBOSIS) (HCC): Primary | ICD-10-CM

## 2025-01-14 DIAGNOSIS — Z12.11 COLON CANCER SCREENING: ICD-10-CM

## 2025-01-14 DIAGNOSIS — M79.605 LEFT LEG PAIN: ICD-10-CM

## 2025-01-14 DIAGNOSIS — Z79.01 CHRONIC ANTICOAGULATION: ICD-10-CM

## 2025-01-14 DIAGNOSIS — Z86.711 HISTORY OF PULMONARY EMBOLUS (PE): ICD-10-CM

## 2025-01-14 DIAGNOSIS — Z12.31 BREAST CANCER SCREENING BY MAMMOGRAM: ICD-10-CM

## 2025-01-14 DIAGNOSIS — M79.605 LEFT LEG PAIN: Primary | ICD-10-CM

## 2025-01-14 LAB
ANION GAP SERPL CALCULATED.3IONS-SCNC: 6 MMOL/L (ref 4–13)
APTT PPP: 26 SECONDS (ref 23–34)
BASOPHILS # BLD AUTO: 0.09 THOUSANDS/ΜL (ref 0–0.1)
BASOPHILS NFR BLD AUTO: 1 % (ref 0–1)
BUN SERPL-MCNC: 20 MG/DL (ref 5–25)
CALCIUM SERPL-MCNC: 9 MG/DL (ref 8.4–10.2)
CHLORIDE SERPL-SCNC: 105 MMOL/L (ref 96–108)
CO2 SERPL-SCNC: 27 MMOL/L (ref 21–32)
CREAT SERPL-MCNC: 1.04 MG/DL (ref 0.6–1.3)
EOSINOPHIL # BLD AUTO: 0.28 THOUSAND/ΜL (ref 0–0.61)
EOSINOPHIL NFR BLD AUTO: 4 % (ref 0–6)
ERYTHROCYTE [DISTWIDTH] IN BLOOD BY AUTOMATED COUNT: 14.5 % (ref 11.6–15.1)
GFR SERPL CREATININE-BSD FRML MDRD: 58 ML/MIN/1.73SQ M
GLUCOSE SERPL-MCNC: 94 MG/DL (ref 65–140)
HCT VFR BLD AUTO: 40.8 % (ref 34.8–46.1)
HGB BLD-MCNC: 12.6 G/DL (ref 11.5–15.4)
IMM GRANULOCYTES # BLD AUTO: 0.02 THOUSAND/UL (ref 0–0.2)
IMM GRANULOCYTES NFR BLD AUTO: 0 % (ref 0–2)
INR PPP: 1.12 (ref 0.85–1.19)
INR PPP: 1.16 (ref 0.85–1.19)
LYMPHOCYTES # BLD AUTO: 1.54 THOUSANDS/ΜL (ref 0.6–4.47)
LYMPHOCYTES NFR BLD AUTO: 20 % (ref 14–44)
MCH RBC QN AUTO: 29.5 PG (ref 26.8–34.3)
MCHC RBC AUTO-ENTMCNC: 30.9 G/DL (ref 31.4–37.4)
MCV RBC AUTO: 96 FL (ref 82–98)
MONOCYTES # BLD AUTO: 0.8 THOUSAND/ΜL (ref 0.17–1.22)
MONOCYTES NFR BLD AUTO: 10 % (ref 4–12)
NEUTROPHILS # BLD AUTO: 5.01 THOUSANDS/ΜL (ref 1.85–7.62)
NEUTS SEG NFR BLD AUTO: 65 % (ref 43–75)
NRBC BLD AUTO-RTO: 0 /100 WBCS
PLATELET # BLD AUTO: 301 THOUSANDS/UL (ref 149–390)
PMV BLD AUTO: 9.5 FL (ref 8.9–12.7)
POTASSIUM SERPL-SCNC: 4.3 MMOL/L (ref 3.5–5.3)
PROTHROMBIN TIME: 14.7 SECONDS (ref 12.3–15)
PROTHROMBIN TIME: 15.3 SECONDS (ref 12.3–15)
RBC # BLD AUTO: 4.27 MILLION/UL (ref 3.81–5.12)
SODIUM SERPL-SCNC: 138 MMOL/L (ref 135–147)
WBC # BLD AUTO: 7.74 THOUSAND/UL (ref 4.31–10.16)

## 2025-01-14 PROCEDURE — 36415 COLL VENOUS BLD VENIPUNCTURE: CPT

## 2025-01-14 PROCEDURE — 99284 EMERGENCY DEPT VISIT MOD MDM: CPT | Performed by: EMERGENCY MEDICINE

## 2025-01-14 PROCEDURE — 85610 PROTHROMBIN TIME: CPT | Performed by: EMERGENCY MEDICINE

## 2025-01-14 PROCEDURE — 80048 BASIC METABOLIC PNL TOTAL CA: CPT | Performed by: EMERGENCY MEDICINE

## 2025-01-14 PROCEDURE — 93971 EXTREMITY STUDY: CPT | Performed by: SURGERY

## 2025-01-14 PROCEDURE — 99213 OFFICE O/P EST LOW 20 MIN: CPT | Performed by: PHYSICIAN ASSISTANT

## 2025-01-14 PROCEDURE — 85025 COMPLETE CBC W/AUTO DIFF WBC: CPT | Performed by: EMERGENCY MEDICINE

## 2025-01-14 PROCEDURE — 93971 EXTREMITY STUDY: CPT

## 2025-01-14 PROCEDURE — 99283 EMERGENCY DEPT VISIT LOW MDM: CPT

## 2025-01-14 PROCEDURE — 85730 THROMBOPLASTIN TIME PARTIAL: CPT | Performed by: EMERGENCY MEDICINE

## 2025-01-14 PROCEDURE — 85610 PROTHROMBIN TIME: CPT

## 2025-01-14 NOTE — ED PROVIDER NOTES
Time reflects when diagnosis was documented in both MDM as applicable and the Disposition within this note       Time User Action Codes Description Comment    1/14/2025  5:44 PM Adryan Mckeon Add [I82.409] DVT (deep venous thrombosis) (HCC)           ED Disposition       ED Disposition   Discharge    Condition   Stable    Date/Time   Tue Jan 14, 2025  5:43 PM    Comment   Celina Collins discharge to home/self care.                   Assessment & Plan       Medical Decision Making  89-year-old female presenting for DVT to left lower extremity.  Had duplex today.  Is on warfarin but has not had her INR checked in over a month.  Leg is neurovascular intact.  Dopplerable pulses  Obtain CBC, BMP, coags.  INR at 1.16.  Patient claims she has been taking her Coumadin as prescribed.  I did speak with medicine who recommended starting her on 15 mg daily and having her INR rechecked on Friday.  Patient was given return precautions including discoloration of the leg, numbness, chest pain, shortness of breath or any other acute concerns.  I did send a message to the patient's PCP in regards to follow-up    Problems Addressed:  DVT (deep venous thrombosis) (HCC): acute illness or injury    Amount and/or Complexity of Data Reviewed  Labs: ordered.        ED Course as of 01/14/25 2151 Tue Jan 14, 2025   1707 I spoke with Dr. Kurtz of internal medicine.  He recommended starting the patient on 15 mg daily starting tonight.  Says that she should follow-up with her family doctor to have her INR checked on Friday morning   1719 I left a message with the patient's family doctor in regards to rechecking her INR on Friday morning.  I placed the order in epic and routed the results to Jessica Gracia PA-C       Medications - No data to display    ED Risk Strat Scores                          SBIRT 22yo+      Flowsheet Row Most Recent Value   Initial Alcohol Screen: US AUDIT-C     1. How often do you have a drink containing alcohol? 0 Filed at:  01/14/2025 1442   2. How many drinks containing alcohol do you have on a typical day you are drinking?  0 Filed at: 01/14/2025 1442   3b. FEMALE Any Age, or MALE 65+: How often do you have 4 or more drinks on one occassion? 0 Filed at: 01/14/2025 1442   Audit-C Score 0 Filed at: 01/14/2025 1442   JASIEL: How many times in the past year have you...    Used an illegal drug or used a prescription medication for non-medical reasons? Never Filed at: 01/14/2025 1442                            History of Present Illness       Chief Complaint   Patient presents with    Leg Pain     According to the patient she had an ultrasound less than an hour ago of the left leg and reports having multiple DVT's in the left leg       Past Medical History:   Diagnosis Date    Abdominal pain, lower     30JUN2017 RESOLVED    Acute renal failure (HCC)     23OCT2017 RESOLVED    Allergic rhinitis     30JUN2017 RESOLVED    Ankle pain, right     30JUN2017 RESOLVED    Arthritis     [id number] RESOLVED  [id number] RESOLVED    Asthma     Bilateral chronic knee pain     30JUN2017    Bladder pain     [id number] RESOLVED    Blood clot in vein     Bursitis     30JUN2017 RESOLVED    Cardiac disorder     23EQO2983  RESOLVED    Cardiac murmur     75LCN0025 RESOLVED    Chest tightness or pressure     22HUD5485 RESOLVED    Chronic kidney disease     COPD (chronic obstructive pulmonary disease) (HCC)     Coronary artery disease     Curvature of spine     01AUG2017 RESOLVED    Disease of thyroid gland     Fractures     GERD (gastroesophageal reflux disease)     Hernia, hiatal     73BUK7748 RESOLVED    Hyperlipidemia     22TYA9250 RESOLVED    Hypertension     Inguinal hernia     RIGHT   [id number] RESOLVED    Jaw closure abnormality     46BPT0239 RESOLVED    Liver disease     Lumbar herniated disc     40WUI4973 RESOLVED    Morbid obesity (HCC)     33STZ5026    Obesity     Osteoarthritis of right knee     65OVY7514 RESOLVED    Patellar tendinitis     01AUG2017     Poor flexibility of tendon     22FRS1769    Presence of IVC filter 10/12/2018    Pulmonary embolism (HCC)     55TGG0312 RESOLVED    Sepsis (HCC)     12BAG0315 RESOLVED    Severe sepsis due to methicillin resistant Staphylococcus aureus (MRSA) with acute organ dysfunction (HCC)     15GPA3018 RESOLVED    Sleep apnea, obstructive     Spider vein, symptomatic     28NYS4917 RESOLVED    Status post arthroscopy of right knee     17KBC1804 RESOLVED    Stomach disorder     04BKS2035 RESOLVED    Tear of medial meniscus of right knee     SUBSEQUENT ENCOUNTER  RESOLVED 46SWO0730    Thyroid disorder     72MNV5544    Umbilical hernia     07FOQ2706 RESOLVED    Vascular disorder       Past Surgical History:   Procedure Laterality Date    COLONOSCOPY      CYSTOSCOPY  01/1994    WITH BIOPSY      EXPLORATORY LAPAROTOMY      FOOT SURGERY Left     FRACTURE SURGERY      HAND SURGERY Right     cyst    HYSTERECTOMY  2009    INCISION AND DRAINAGE ANTERIOR NECK Right 06/08/2017    Procedure: INCISION AND DRAINAGE  (I&D) NECK;  Surgeon: Kirby Morales MD;  Location:  MAIN OR;  Service:     INCISIONAL HERNIA REPAIR      WITH IMPLANTATION OF MESH  13 MAY 2014  LAST ASSESSED    IR IVC FILTER REMOVAL  01/22/2019    KNEE SURGERY      OPEN ANTERIOR SHOULDER RECONSTRUCTION Left     OTHER SURGICAL HISTORY      BLOOD VESSEL REPAIR   13 MAY 2014 LAST ASSESSED    PERIPHERALLY INSERTED CENTRAL CATHETER INSERTION      HI ARTHRS KNE SURG W/MENISCECTOMY MED/LAT W/SHVG Right 04/11/2016    Procedure: KNEE ARTHROSCOPY WITH MEDIAL MENISCECTOMY ;  Surgeon: Mikey Pantoja MD;  Location: [facility];  Service: Orthopedics    HI RPR AA HERNIA 1ST 3-10 CM REDUCIBLE N/A 11/15/2023    Procedure: REPAIR OF INCARCERATED VENTRAL HERNIA;  Surgeon: Lane Ventura DO;  Location: [facility];  Service: General    SHOULDER SURGERY        Family History   Problem Relation Age of Onset    Arthritis Mother     Colon cancer Brother     Lung cancer Father     Clotting disorder  Sister     No Known Problems Daughter     No Known Problems Maternal Grandmother     No Known Problems Maternal Grandfather     No Known Problems Paternal Grandmother     No Known Problems Paternal Grandfather     Breast cancer Maternal Aunt     Diabetes Maternal Aunt     No Known Problems Sister     No Known Problems Sister     No Known Problems Sister     No Known Problems Sister     No Known Problems Sister     No Known Problems Daughter     Colon cancer Son       Social History     Tobacco Use    Smoking status: Former     Current packs/day: 0.00     Types: Cigarettes     Quit date: 1995     Years since quittin.1    Smokeless tobacco: Never    Tobacco comments:     quit 25 years ago   Vaping Use    Vaping status: Never Used   Substance Use Topics    Alcohol use: Never    Drug use: Never      E-Cigarette/Vaping    E-Cigarette Use Never User       E-Cigarette/Vaping Substances    Nicotine No     THC No     CBD No     Flavoring No     Other No     Unknown No       I have reviewed and agree with the history as documented.     Patient is a 59-year-old female who presents for evaluation of left lower extremity pain.  Patient says she has had the pain for the last couple days.  She saw her family doctor ordered a duplex ultrasound today.  She was found to have a partially occlusive DVT to the left popliteal, gastrocnemius, posterior tibial and peroneal veins.  The patient is on Coumadin, however, has not had her INR checked since .  She admits to some mild paresthesias in the left lower extremity.  Denies any redness or significant swelling.  On exam, there is no discoloration of the leg compared to the right leg.  The leg is neurovascular intact.  She has dopplerable PT and DP pulses.        Review of Systems   Constitutional:  Negative for fever and unexpected weight change.   HENT:  Negative for congestion, ear pain, sore throat and trouble swallowing.    Eyes:  Negative for pain and redness.    Respiratory:  Negative for cough, chest tightness and shortness of breath.    Cardiovascular:  Negative for chest pain and leg swelling.   Gastrointestinal:  Negative for abdominal distention, abdominal pain, diarrhea and vomiting.   Endocrine: Negative for polyuria.   Genitourinary:  Negative for dysuria, hematuria, pelvic pain and vaginal bleeding.   Musculoskeletal:  Positive for arthralgias (left leg). Negative for back pain and myalgias.   Skin:  Negative for color change and rash.   Neurological:  Negative for dizziness, syncope, weakness, light-headedness and headaches.           Objective       ED Triage Vitals [01/14/25 1438]   Temperature Pulse Blood Pressure Respirations SpO2 Patient Position - Orthostatic VS   97.8 °F (36.6 °C) 91 140/92 18 97 % Lying      Temp Source Heart Rate Source BP Location FiO2 (%) Pain Score    Temporal -- Right arm -- 3      Vitals      Date and Time Temp Pulse SpO2 Resp BP Pain Score FACES Pain Rating User   01/14/25 1635 -- -- -- -- -- 3 -- JW   01/14/25 1438 97.8 °F (36.6 °C) 91 97 % 18 140/92 3 -- [facility] [place]            Physical Exam  Vitals and nursing note reviewed.   Constitutional:       General: She is not in acute distress.     Appearance: She is well-developed.   HENT:      Head: Normocephalic and atraumatic.      Right Ear: External ear normal.      Left Ear: External ear normal.      Nose: Nose normal.      Mouth/Throat:      Mouth: Mucous membranes are moist.      Pharynx: No oropharyngeal exudate.   Eyes:      Conjunctiva/sclera: Conjunctivae normal.      Pupils: Pupils are equal, round, and reactive to light.   Cardiovascular:      Rate and Rhythm: Normal rate and regular rhythm.      Heart sounds: Normal heart sounds. No murmur heard.     No friction rub. No gallop.   Pulmonary:      Effort: Pulmonary effort is normal. No respiratory distress.      Breath sounds: Normal breath sounds. No wheezing or rales.   Abdominal:      General: There is no distension.       Palpations: Abdomen is soft.      Tenderness: There is no abdominal tenderness. There is no guarding.   Musculoskeletal:         General: Tenderness (left calf) present. No swelling or deformity. Normal range of motion.      Cervical back: Normal range of motion and neck supple.      Comments: Dopplerable PT and DP pulses  Intact movent of foot and toes  Sensation grossly intact    Lymphadenopathy:      Cervical: No cervical adenopathy.   Skin:     General: Skin is warm and dry.   Neurological:      General: No focal deficit present.      Mental Status: She is alert and oriented to person, place, and time. Mental status is at baseline.      Cranial Nerves: No cranial nerve deficit.      Sensory: No sensory deficit.      Motor: No weakness or abnormal muscle tone.      Coordination: Coordination normal.         Results Reviewed       Procedure Component Value Units Date/Time    Basic metabolic panel [[id number]] Collected: 01/14/25 1528    Lab Status: Final result Specimen: Blood from Arm, Right Updated: 01/14/25 1547     Sodium 138 mmol/L      Potassium 4.3 mmol/L      Chloride 105 mmol/L      CO2 27 mmol/L      ANION GAP 6 mmol/L      BUN 20 mg/dL      Creatinine 1.04 mg/dL      Glucose 94 mg/dL      Calcium 9.0 mg/dL      eGFR 58 ml/min/1.73sq m     Narrative:      National Kid[employer] Foundation guidelines for Chronic Kidney Disease (CKD):     Stage 1 with normal or high GFR (GFR > 90 mL/min/1.73 square meters)    Stage 2 Mild CKD (GFR = 60-89 mL/min/1.73 square meters)    Stage 3A Moderate CKD (GFR = 45-59 mL/min/1.73 square meters)    Stage 3B Moderate CKD (GFR = 30-44 mL/min/1.73 square meters)    Stage 4 Severe CKD (GFR = 15-29 mL/min/1.73 square meters)    Stage 5 End Stage CKD (GFR <15 mL/min/1.73 square meters)  Note: GFR calculation is accurate only with a steady state creatinine    Protime-INR [[id number]]  (Abnormal) Collected: 01/14/25 1528    Lab Status: Final result Specimen: Blood from Arm, Right  Updated: 01/14/25 1544     Protime 15.3 seconds      INR 1.16    Narrative:      INR Therapeutic Range    Indication                                             INR Range      Atrial Fibrillation                                               2.0-3.0  Hypercoagulable State                                    2.0.2.3  Left Ventricular Asist Device                            2.0-3.0  Mechanical Heart Valve                                  -    Aortic(with afib, MI, embolism, HF, LA enlargement,    and/or coagulopathy)                                     2.0-3.0 (2.5-3.5)     Mitral                                                             2.5-3.5  Prosthetic/Bioprosthetic Heart Valve               2.0-3.0  Venous thromboembolism (VTE: VT, PE        2.0-3.0    APTT [[id number]]  (Normal) Collected: 01/14/25 1528    Lab Status: Final result Specimen: Blood from Arm, Right Updated: 01/14/25 1544     PTT 26 seconds     CBC and differential [[id number]]  (Abnormal) Collected: 01/14/25 1528    Lab Status: Final result Specimen: Blood from Arm, Right Updated: 01/14/25 1532     WBC 7.74 Thousand/uL      RBC 4.27 Million/uL      Hemoglobin 12.6 g/dL      Hematocrit 40.8 %      MCV 96 fL      MCH 29.5 pg      MCHC 30.9 g/dL      RDW 14.5 %      MPV 9.5 fL      Platelets 301 Thousands/uL      nRBC 0 /100 WBCs      Segmented % 65 %      Immature Grans % 0 %      Lymphocytes % 20 %      Monocytes % 10 %      Eosinophils Relative 4 %      Basophils Relative 1 %      Absolute Neutrophils 5.01 Thousands/µL      Absolute Immature Grans 0.02 Thousand/uL      Absolute Lymphocytes 1.54 Thousands/µL      Absolute Monocytes 0.80 Thousand/µL      Eosinophils Absolute 0.28 Thousand/µL      Basophils Absolute 0.09 Thousands/µL             No orders to display       Procedures    ED Medication and Procedure Management   Prior to Admission Medications   Prescriptions Last Dose Informant Patient Reported? Taking?   Multiple Vitamins-Minerals  (CENTRUM SILVER 50+WOMEN PO)  Self Yes No   Sig: Take by mouth   Symbicort 160-4.5 MCG/ACT inhaler  Self No No   Sig: Inhale 2 puffs 2 (two) times a day Rinse mouth after use.   albuterol (2.5 mg/3 mL) 0.083 % nebulizer solution  Self No No   Sig: Take 3 mL (2.5 mg total) by nebulization as needed for wheezing   albuterol (PROVENTIL HFA,VENTOLIN HFA) 90 mcg/act inhaler  Self No No   Sig: Inhale 2 puffs every 4 (four) hours as needed for wheezing   atorvastatin (LIPITOR) 10 mg tablet  Self No No   Sig: Take 1 tablet (10 mg total) by mouth daily with dinner   azelastine (ASTELIN) 0.1 % nasal spray  Self No No   Si spray into each nostril 2 (two) times a day Use in each nostril as directed   famotidine (PEPCID) 40 MG tablet  Self No No   Sig: Take 1 tablet (40 mg total) by mouth in the morning   fluticasone (FLONASE) 50 mcg/act nasal spray   No No   Si spray into each nostril daily   furosemide (LASIX) 20 mg tablet  Self No No   Sig: Take 1 tablet (20 mg total) by mouth daily as needed (leg swelling) Take with potassium   levothyroxine 125 mcg tablet  Self No No   Sig: Take 1 tablet (125 mcg total) by mouth every morning At 8AM   loratadine (CLARITIN) 10 mg tablet  Self No No   Sig: Take 1 tablet (10 mg total) by mouth daily   montelukast (SINGULAIR) 10 mg tablet  Self No No   Sig: Take 1 tablet (10 mg total) by mouth daily at bedtime   omeprazole (PriLOSEC) 40 MG capsule  Self No No   Sig: Take 1 capsule (40 mg total) by mouth daily before breakfast   potassium chloride (MICRO-K) 10 MEQ CR capsule  Self No No   Sig: Take 1 capsule (10 mEq total) by mouth daily   warfarin (COUMADIN) 1 mg tablet  Self No No   Sig: take 2 tablets by mouth as directed by prescriber   warfarin (COUMADIN) 5 mg tablet  Self No No   Sig: TAKE 1 AND 1/2 TABLETS BY MOUTH ON TUESDAY, ,  AND SUNDAYS. TAKE 2 TABLETS ON ,  AND .      Facility-Administered Medications: None     Discharge Medication  List as of 1/14/2025  5:45 PM        CONTINUE these medications which have NOT CHANGED    Details   albuterol (2.5 mg/3 mL) 0.083 % nebulizer solution Take 3 mL (2.5 mg total) by nebulization as needed for wheezing, Starting Thu 7/29/2021, Normal      albuterol (PROVENTIL HFA,VENTOLIN HFA) 90 mcg/act inhaler Inhale 2 puffs every 4 (four) hours as needed for wheezing, Starting Thu 11/21/2024, Normal      atorvastatin (LIPITOR) 10 mg tablet Take 1 tablet (10 mg total) by mouth daily with dinner, Starting Thu 11/21/2024, Normal      azelastine (ASTELIN) 0.1 % nasal spray 1 spray into each nostril 2 (two) times a day Use in each nostril as directed, Starting Mon 1/22/2024, Normal      famotidine (PEPCID) 40 MG tablet Take 1 tablet (40 mg total) by mouth in the morning, Starting Wed 11/27/2024, Normal      fluticasone (FLONASE) 50 mcg/act nasal spray 1 spray into each nostril daily, Starting Fri 12/27/2024, Normal      furosemide (LASIX) 20 mg tablet Take 1 tablet (20 mg total) by mouth daily as needed (leg swelling) Take with potassium, Starting Thu 11/21/2024, Normal      levothyroxine 125 mcg tablet Take 1 tablet (125 mcg total) by mouth every morning At 8AM, Starting Thu 11/21/2024, Normal      loratadine (CLARITIN) 10 mg tablet Take 1 tablet (10 mg total) by mouth daily, Starting Wed 1/24/2024, Normal      montelukast (SINGULAIR) 10 mg tablet Take 1 tablet (10 mg total) by mouth daily at bedtime, Starting Tue 12/3/2024, Normal      Multiple Vitamins-Minerals (CENTRUM SILVER 50+WOMEN PO) Take by mouth, Historical Med      omeprazole (PriLOSEC) 40 MG capsule Take 1 capsule (40 mg total) by mouth daily before breakfast, Starting Thu 11/21/2024, Normal      potassium chloride (MICRO-K) 10 MEQ CR capsule Take 1 capsule (10 mEq total) by mouth daily, Starting Thu 11/21/2024, Normal      Symbicort 160-4.5 MCG/ACT inhaler Inhale 2 puffs 2 (two) times a day Rinse mouth after use., Starting Mon 12/2/2024, Normal      !!  warfarin (COUMADIN) 1 mg tablet take 2 tablets by mouth as directed by prescriber, Normal      !! warfarin (COUMADIN) 5 mg tablet TAKE 1 AND 1/2 TABLETS BY MOUTH ON TUESDAY, THURSDAYS, SATURDAYS AND SUNDAYS. TAKE 2 TABLETS ON MONDAYS, WEDNESDAYS AND FRIDAYS., Normal       !! - Potential duplicate medications found. Please discuss with provider.        Outpatient Discharge Orders   Protime-INR   Standing Status: Future Standing Exp. Date: 01/14/26     ED SEPSIS DOCUMENTATION   Time reflects when diagnosis was documented in both MDM as applicable and the Disposition within this note       Time User Action Codes Description Comment    1/14/2025  5:44 PM Adryan Mckeon Add [I82.409] DVT (deep venous thrombosis) ([facility])                  Adryan Mckeon DO  01/14/25 2150

## 2025-01-14 NOTE — PROGRESS NOTES
Name: Celina Collins      : 1965      MRN: [id number]  Encounter Provider: Jessica Gracia PA-C  Encounter Date: 2025   Encounter department: [facility]  :  Assessment & Plan  Left leg pain  Will check stat venous duplex to rule out DVT. Pt is overdue for INR, was due beginning of January. Instructed to also have INR drawn today. Pain likely due to hematoma from traumatic fall.  Orders:  •  VAS VENOUS DUPLEX -LOWER LIMB UNILATERAL; Future    Chronic anticoagulation  Pt overdue for INR, instructed to have drawn today. Encouraged to follow recommendations on scheduled testing.       Breast cancer screening by mammogram    Orders:  •  Mammo screening bilateral w 3d and cad; Future    Colon cancer screening    Orders:  •  Ambulatory Referral to Gastroenterology; Future          Depression Screening and Follow-up Plan: Patient was screened for depression during today's encounter. They screened negative with a PHQ-2 score of 0.      History of Present Illness     Celina is here today for routine follow up.  States that she fell 2 weeks ago and woke up following day with tender lump on posterior L LE. States was surrounded by redness, states was bruised, however there is currently no redness or bruising on exam. Area is very tender. She is also past due for INR.      Review of Systems   Constitutional:  Negative for chills and fever.   HENT:  Negative for ear pain and sore throat.    Eyes:  Negative for pain and visual disturbance.   Respiratory:  Negative for cough and shortness of breath.    Cardiovascular:  Negative for chest pain and palpitations.   Gastrointestinal:  Negative for abdominal pain and vomiting.   Genitourinary:  Negative for dysuria and hematuria.   Musculoskeletal:  Negative for arthralgias and back pain.        L LE tenderness   Skin:  Negative for color change and rash.   Neurological:  Negative for seizures and syncope.   All other systems reviewed and are negative.      Objective  "  /88 (BP Location: Left arm, Patient Position: Sitting, Cuff Size: Standard)   Pulse 86   Temp (!) 97.2 °F (36.2 °C) (Temporal)   Resp 19   Ht 5' 5\" (1.651 m)   Wt 135 kg (298 lb)   LMP  (LMP Unknown)   SpO2 96%   BMI 49.59 kg/m²      Physical Exam  Vitals reviewed.   Constitutional:       General: She is not in acute distress.     Appearance: She is well-developed. She is not diaphoretic.   HENT:      Head: Normocephalic and atraumatic.      Right Ear: Hearing, tympanic membrane, ear canal and external ear normal.      Left Ear: Hearing, tympanic membrane, ear canal and external ear normal.      Nose: Nose normal.      Mouth/Throat:      Mouth: Mucous membranes are moist.      Pharynx: Oropharynx is clear. Uvula midline. No oropharyngeal exudate.   Eyes:      General: No scleral icterus.        Right eye: No discharge.         Left eye: No discharge.      Conjunctiva/sclera: Conjunctivae normal.   Neck:      Thyroid: No thyromegaly.      Vascular: No carotid bruit.   Cardiovascular:      Rate and Rhythm: Normal rate and regular rhythm.      Heart sounds: Normal heart sounds. No murmur heard.  Pulmonary:      Effort: Pulmonary effort is normal. No respiratory distress.      Breath sounds: Normal breath sounds. No wheezing.   Abdominal:      General: Bowel sounds are normal. There is no distension.      Palpations: Abdomen is soft. There is no mass.      Tenderness: There is no abdominal tenderness. There is no guarding or rebound.   Musculoskeletal:         General: No tenderness. Normal range of motion.      Cervical back: Neck supple.        Legs:    Lymphadenopathy:      Cervical: No cervical adenopathy.   Skin:     General: Skin is warm and dry.      Findings: No erythema or rash.   Neurological:      Mental Status: She is alert and oriented to person, place, and time.   Psychiatric:         Behavior: Behavior normal.         Thought Content: Thought content normal.         Judgment: Judgment " normal.

## 2025-01-14 NOTE — ASSESSMENT & PLAN NOTE
Pt overdue for INR, instructed to have drawn today. Encouraged to follow recommendations on scheduled testing.

## 2025-01-15 ENCOUNTER — TELEPHONE (OUTPATIENT)
Dept: GASTROENTEROLOGY | Facility: HOSPITAL | Age: 60
End: 2025-01-15

## 2025-01-15 NOTE — TELEPHONE ENCOUNTER
Pt is currently taking Warfarin and was just in the ED for blood clots in her left leg. Advise she would need a OV prior to scheduling proc and booked for 02.20.25 in [place]

## 2025-01-15 NOTE — TELEPHONE ENCOUNTER
01/15/25  Screened by: Bandar Palm    Referring Provider      Pre- Screening:     There is no height or weight on file to calculate BMI.  Has patient been referred for a routine screening Colonoscopy? yes  Is the patient between 45-75 years old? yes      Previous Colonoscopy yes   If yes:    Date: 2012 maybe     Does the patient want to see a Gastroenterologist prior to their procedure OR are they having any GI symptoms? yes    Has the patient been hospitalized or had abdominal surgery in the past 6 months? no    Does the patient use supplemental oxygen? no    Does the patient take Coumadin, Lovenox, Plavix, Elliquis, Xarelto, or other blood thinning medication? yes    Has the patient had a stroke, cardiac event, or stent placed in the past year? no    If patient is between 45yrs - 49yrs, please advise patient that we will have to confirm benefits & coverage with their insurance company for a routine screening colonoscopy.

## 2025-01-16 ENCOUNTER — TELEPHONE (OUTPATIENT)
Dept: FAMILY MEDICINE CLINIC | Facility: CLINIC | Age: 60
End: 2025-01-16

## 2025-01-16 NOTE — TELEPHONE ENCOUNTER
Please call patient and verify with her when she is going to be getting her INR drawn again. ER instructed her to have it drawn tomorrow.

## 2025-01-17 ENCOUNTER — ANTICOAG VISIT (OUTPATIENT)
Dept: FAMILY MEDICINE CLINIC | Facility: CLINIC | Age: 60
End: 2025-01-17

## 2025-01-17 ENCOUNTER — APPOINTMENT (OUTPATIENT)
Dept: LAB | Facility: MEDICAL CENTER | Age: 60
End: 2025-01-17
Payer: COMMERCIAL

## 2025-01-17 ENCOUNTER — TELEPHONE (OUTPATIENT)
Dept: FAMILY MEDICINE CLINIC | Facility: CLINIC | Age: 60
End: 2025-01-17

## 2025-01-17 DIAGNOSIS — I82.409 DVT (DEEP VENOUS THROMBOSIS) (HCC): ICD-10-CM

## 2025-01-17 DIAGNOSIS — Z79.01 CHRONIC ANTICOAGULATION: Primary | ICD-10-CM

## 2025-01-17 LAB
INR PPP: 1.8 (ref 0.85–1.19)
PROTHROMBIN TIME: 21 SECONDS (ref 12.3–15)

## 2025-01-17 PROCEDURE — 36415 COLL VENOUS BLD VENIPUNCTURE: CPT

## 2025-01-17 PROCEDURE — 85610 PROTHROMBIN TIME: CPT

## 2025-01-17 RX ORDER — WARFARIN SODIUM 2.5 MG/1
2.5 TABLET ORAL
Qty: 30 TABLET | Refills: 0 | Status: SHIPPED | OUTPATIENT
Start: 2025-01-17

## 2025-01-17 NOTE — TELEPHONE ENCOUNTER
Please notify Jessica's patient that INR is still not therapeutic. I would like her to take an additional 2.5 mg of Coumadin daily to total 17.5 mg and recheck her INR on Monday. I sent a prescription for the 2.5 mg Coumadin to the pharmacy.

## 2025-01-17 NOTE — TELEPHONE ENCOUNTER
Pt called in to inform Jessica that she completed her INR this morning.     For any further information or update, please contact pt. Thank you!    Celina: [phone]

## 2025-01-20 ENCOUNTER — APPOINTMENT (OUTPATIENT)
Dept: LAB | Facility: MEDICAL CENTER | Age: 60
End: 2025-01-20
Payer: COMMERCIAL

## 2025-01-20 ENCOUNTER — RESULTS FOLLOW-UP (OUTPATIENT)
Dept: FAMILY MEDICINE CLINIC | Facility: CLINIC | Age: 60
End: 2025-01-20

## 2025-01-20 ENCOUNTER — TELEPHONE (OUTPATIENT)
Dept: FAMILY MEDICINE CLINIC | Facility: CLINIC | Age: 60
End: 2025-01-20

## 2025-01-20 ENCOUNTER — ANTICOAG VISIT (OUTPATIENT)
Dept: FAMILY MEDICINE CLINIC | Facility: CLINIC | Age: 60
End: 2025-01-20

## 2025-01-20 DIAGNOSIS — Z79.01 CHRONIC ANTICOAGULATION: ICD-10-CM

## 2025-01-20 LAB
INR PPP: 2.14 (ref 0.85–1.19)
PROTHROMBIN TIME: 23.9 SECONDS (ref 12.3–15)

## 2025-01-20 PROCEDURE — 36415 COLL VENOUS BLD VENIPUNCTURE: CPT

## 2025-01-20 PROCEDURE — 85610 PROTHROMBIN TIME: CPT

## 2025-01-20 NOTE — TELEPHONE ENCOUNTER
Called Pt L/M requesting return call to let us know her current coumadin dose & make sure she got the message to recheck on Thursday.    Jessica Gracia PA-C to [facility]        1/20/25  5:09 PM  Result Note  Please let patient know that her INR is know therapeutic at 2.14. Verify that she is taking 17.5 mg daily. If this is the case, please continue that and repeat INR again on Thursday.

## 2025-01-21 ENCOUNTER — ANTICOAG VISIT (OUTPATIENT)
Dept: FAMILY MEDICINE CLINIC | Facility: CLINIC | Age: 60
End: 2025-01-21

## 2025-01-21 ENCOUNTER — OFFICE VISIT (OUTPATIENT)
Dept: PULMONOLOGY | Facility: CLINIC | Age: 60
End: 2025-01-21
Payer: COMMERCIAL

## 2025-01-21 VITALS
BODY MASS INDEX: 48.82 KG/M2 | HEART RATE: 100 BPM | HEIGHT: 65 IN | TEMPERATURE: 98 F | WEIGHT: 293 LBS | OXYGEN SATURATION: 98 % | DIASTOLIC BLOOD PRESSURE: 86 MMHG | SYSTOLIC BLOOD PRESSURE: 133 MMHG

## 2025-01-21 DIAGNOSIS — J30.2 SEASONAL ALLERGIES: ICD-10-CM

## 2025-01-21 DIAGNOSIS — Z86.711 HISTORY OF PULMONARY EMBOLUS (PE): ICD-10-CM

## 2025-01-21 DIAGNOSIS — E66.813 CLASS 3 OBESITY: ICD-10-CM

## 2025-01-21 DIAGNOSIS — G47.33 OSA (OBSTRUCTIVE SLEEP APNEA): Primary | ICD-10-CM

## 2025-01-21 DIAGNOSIS — R06.09 DYSPNEA ON EXERTION: ICD-10-CM

## 2025-01-21 DIAGNOSIS — J45.40 MODERATE PERSISTENT ASTHMA WITHOUT COMPLICATION: ICD-10-CM

## 2025-01-21 PROCEDURE — 99214 OFFICE O/P EST MOD 30 MIN: CPT

## 2025-01-21 NOTE — PATIENT INSTRUCTIONS
- Start using symbicort (red inhaler) 2 puffs twice daily regardless of how you feel and rinse out mouth after each use  - Continue with albuterol inhaler as needed (blue inhaler)  - Please call your medical equipment company to get a download of your CPAP  - Follow up in 6 months   Impression: Diabetes mellitus Type 2 without mention of complication: G90.7. Plan: Diabetes Mellitus Type II without signs of diabetic retinopathy- Discussed the pathophysiology of diabetes and its effect on the eye. Stressed the importance of strong glucose control. Advised of importance of at least annual dilated examinations, and to contact us immediately for any problems or concerns.

## 2025-01-21 NOTE — TELEPHONE ENCOUNTER
Patient called to say she is only taking 10 mg of warfarin daily because the script for 2.5 mg was sent to her mail order pharmacy and she did not receive new dose yet.    Also, patient states when she was seen at ED on 1/14/25 as directed by this provider for blood clots in the legs, and ED did not provide treatment.    Patient is scheduled to be seen tomorrow, 1/22/25 to discuss DVT treatment.  Patient was surprised that she is to complete another INR lab on Thursday, and will discuss at upcoming appointment.

## 2025-01-21 NOTE — TELEPHONE ENCOUNTER
I spoke with the pt she has 5 mg pills and she states on the 14,15 & 16 she took 15 mg ( 3 - 5 mg pills )   The 17 th through the 20 th she was taking 10 mg ( 2  5 mg pills)   I told her that she is to be taking 17.5 mg she stated she can not because she does not have the 2 mg they where sent to the mail order pharmacy and not the local.   I told her she can cut the 5 mg in half to get 17.5 mg - pt did not understand.     I told her she could be changed to Eliquas but I do not know the cost and it could be expensive. She stated at some point she was on Eliques   But they took her off of it and changed her to warfrin because she kept forgetting to take the pills ( she was to take 2 of them in the AM and 2 at night.)     Please advise and please send 2 mg to local pharmacy

## 2025-01-21 NOTE — PROGRESS NOTES
So if she has been taking 10 mg the last few days, I want her to continue at 10 mg daily and still have her INR checked on Thursday. Her last INR was therapeutic. We are going to keep it here for now and move forward from this dose. Please update her flow chart.

## 2025-01-21 NOTE — PROGRESS NOTES
Progress note - Pulmonary Medicine   Celina Collins 59 y.o. female MRN: [id number]       Impression & Plan:   Celina Collins is a 59 y.o. female with PMHx of asthma, hypothyroidism, seasonal allergies, MICHAEL on CPAP, CKD 3, HLD,DVT/PE on Coumadin, former tobacco use and obesity who presents for follow-up.    Moderate Persistent Asthma without Exacerbation  - Not currently in exacerbation, she is not utilizing her inhalers correctly due to misunderstanding.  We spent time today discussing the colors of her inhalers and which ones to take when in detail instructions were left in her AVS.  - Start using Symbicort 2 puffs twice daily and rinse out mouth after each use.  - Continue with albuterol HFA as needed  - She is up-to-date on her influenza vaccine    Dyspnea on Exertion  - Moderate restriction on lung volumes with normal spirometry and DLCO more suggestive of restriction 2/2 habitus.  Weight loss is strongly recommended.  - Inhaler therapy as above.    MICHAEL (obstructive sleep apnea)  - The patient has a history of overall mild MICHAEL exacerbated to severe during REM sleep (AHI 10.6, supine AHI 27.9, REM AHI 42.3, O2 [place] 70%, TST <90% 29.3 min) and is currently on APAP 5-20 cmH2O.  - Therapy and compliance data not available at today's office visit, discussed with the patient that she needs to bring her SD card to her DME.  - Recommend continued nightly use of CPAP.    Class 3 Obesity  - Weight loss is strongly recommended, discussed that this is likely playing a role in her ARAUZ.    Seasonal Allergies  - Continue with montelukast, loratadine and azelastine.    History of Pulmonary Embolus (PE)  - Prior recommendation was for lifelong anticoagulation and she is on Coumadin.  More recently she was found to have a DVT with subtherapeutic INR, she is now following more for repeat INR checks.    Return in about 6 months (around 7/21/2025).  ______________________________________________________________________    HPI:    Celina  LARISSA Collins is a 59 y.o. female with PMHx of asthma, hypothyroidism, seasonal allergies, MICHAEL on CPAP, CKD 3, HLD,DVT/PE on Coumadin, former tobacco use and obesity who presents for follow-up.  The patient was last seen in the office on 8/9/2024 at which time she was noting ARAUZ and recommendation was for PFTs, continuation of Symbicort 160 mcg and albuterol HFA/nebs for her asthma, retrial of CPAP after education about how to properly fit mask and continuation of lifelong warfarin for her history of PE.  PFTs showed moderate restriction on lung volumes were otherwise normal.  She was in the ED on 1/14/2025 due to LLE pain and was found to have a DVT, it was noted that she is on warfarin however her INR had not been checked in some time and was 1.16 when checked.  Since then her warfarin dose has been increased.  She states that she did start using her CPAP again nightly, but has occasionally only been using it for a few hours before taking it off.  She forgot that she was post [place] [facility] is not available during today's visit.  She does feel that it works better for her now she has fit the mask properly and she is getting benefit from usage.  She states she is no longer waking herself up snoring as she did previously.    She does note breathing being at baseline currently and states she has a cough productive of clear sputum intermittently.  She occasionally does note worsening dyspnea particularly when in cold air and is using her albuterol HFA 2-3 times a week.  She did not realize she was post to take the Symbicort consistently and has not been using it every day.  She is taking her azelastine and loratadine daily, but states she is only using her montelukast intermittently.  She denies hemoptysis, intentional weight loss, night sweats, change in frequency of productive cough, chest pain on palpitations or increased pedal edema currently.    At last office visit recommendation was for  weight loss, she states that the co-pay for bariatrics was too high.  She is up-to-date on her flu vaccine and had PCV 20 last year.    Current Medications:    Current Outpatient Medications:     albuterol (2.5 mg/3 mL) 0.083 % nebulizer solution, Take 3 mL (2.5 mg total) by nebulization as needed for wheezing, Disp: 3 mL, Rfl: 5    albuterol (PROVENTIL HFA,VENTOLIN HFA) 90 mcg/act inhaler, Inhale 2 puffs every 4 (four) hours as needed for wheezing, Disp: 54 g, Rfl: 3    atorvastatin (LIPITOR) 10 mg tablet, Take 1 tablet (10 mg total) by mouth daily with dinner, Disp: 90 tablet, Rfl: 3    azelastine (ASTELIN) 0.1 % nasal spray, 1 spray into each nostril 2 (two) times a day Use in each nostril as directed, Disp: 1 mL, Rfl: 5    famotidine (PEPCID) 40 MG tablet, Take 1 tablet (40 mg total) by mouth in the morning, Disp: 90 tablet, Rfl: 1    fluticasone (FLONASE) 50 mcg/act nasal spray, 1 spray into each nostril daily, Disp: 9.9 mL, Rfl: 2    furosemide (LASIX) 20 mg tablet, Take 1 tablet (20 mg total) by mouth daily as needed (leg swelling) Take with potassium, Disp: 30 tablet, Rfl: 5    levothyroxine 125 mcg tablet, Take 1 tablet (125 mcg total) by mouth every morning At 8AM, Disp: 90 tablet, Rfl: 3    loratadine (CLARITIN) 10 mg tablet, Take 1 tablet (10 mg total) by mouth daily, Disp: 30 tablet, Rfl: 5    montelukast (SINGULAIR) 10 mg tablet, Take 1 tablet (10 mg total) by mouth daily at bedtime, Disp: 90 tablet, Rfl: 1    Multiple Vitamins-Minerals (CENTRUM SILVER 50+WOMEN PO), Take by mouth, Disp: , Rfl:     omeprazole (PriLOSEC) 40 MG capsule, Take 1 capsule (40 mg total) by mouth daily before breakfast, Disp: 90 capsule, Rfl: 3    potassium chloride (MICRO-K) 10 MEQ CR capsule, Take 1 capsule (10 mEq total) by mouth daily, Disp: 30 capsule, Rfl: 5    Symbicort 160-4.5 MCG/ACT inhaler, Inhale 2 puffs 2 (two) times a day Rinse mouth after use., Disp: 10.2 g, Rfl: 2    warfarin (COUMADIN) 2.5 mg tablet, Take 1  "tablet (2.5 mg total) by mouth daily Take as directed based on INR., Disp: 30 tablet, Rfl: 0    warfarin (COUMADIN) 5 mg tablet, TAKE 1 AND 1/2 TABLETS BY MOUTH ON TUESDAY, ,  AND SUNDAYS. TAKE 2 TABLETS ON ,  AND ., Disp: 135 tablet, Rfl: 1    Review of Systems:  Review of Systems  10-point system review completed, all of which are negative except as mentioned above.    Past medical history, surgical history, and family history were reviewed and updated as appropriate    Social history updates:  Social History     Tobacco Use   Smoking Status Former    Current packs/day: 0.00    Types: Cigarettes    Quit date: 1995    Years since quittin.1   Smokeless Tobacco Never   Tobacco Comments    quit 25 years ago     PhysicalExamination:  Vitals:   /86 (BP Location: Left arm, Patient Position: Sitting, Cuff Size: Adult)   Pulse 100   Temp 98 °F (36.7 °C) (Temporal)   Ht 5' 5\" (1.651 m)   Wt 135 kg (298 lb 6.4 oz)   LMP  (LMP Unknown)   SpO2 98%   BMI 49.66 kg/m²   Body mass index is 49.66 kg/m².    Constitutional: NAD, on room air, no conversational dyspnea, obese   Skin: Warm, dry, no rashes noted   Eyes: PERRL, normal conjunctiva  ENT: Nasal congestion absent, moist mucus membranes.  Neck: No JVD, trachea is midline, no adenopathy.  Resp: CTA B/L, no W/R/R   Cardiac: RRR, +S1/S2, no M/R/G  Extremities: No digital clubbing, 1+ pedal edema B/L LE  Neuro: AAOx3    Diagnostic Data:  Labs:  I personally reviewed the most recent laboratory data pertinent to today's visit    Lab Results   Component Value Date    WBC 7.74 2025    HGB 12.6 2025    HCT 40.8 2025    MCV 96 2025     2025     Lab Results   Component Value Date    SODIUM 138 2025    K 4.3 2025    CO2 27 2025     2025    BUN 20 2025    CREATININE 1.04 2025    GLUCOSE 102 2017    CALCIUM 9.0 2025     PFT " "results:  The most recent pulmonary function tests were reviewed.  PFTs w/ BD -- 9/26/2024      % Predicted Z-Score   FVC 2.69 L 88% -0.83   FEV1 2.28 L 93% -0.42   FEV1/FVC 85   1      After administration of bronchodilator:        % Change   FVC 2.72 L +1   FEV1 2.30 L 0      Lung volumes by body plethysmography:      % Predicted Z-Score   Total Lung Capacity 3.78 L 72% -2.65   Residual Volume 1.21 L 60% -2.11   RV/TLC Ratio 32 82%        DLCO corrected for hemoglobin level:      % Predicted Z-Score   DLCO 16.26 77% -1.55     Imaging:  None    Other studies:  PSG --12/30/2022 (Wt 302 lbs)  AHI -10.6  Sup AHI -27.9  REM AHI -42.3  O2 [place] -70.0%  TST < 90% -29.3 min  PLMI - 0.0      Nelia Waller MD  Pulmonary-Critical Care and Sleep Medicine  01/27/25    Portions of the record may have been created with voice recognition software. Occasional wrong word or \"sound a like\" substitutions may have occurred due to the inherent limitations of voice recognition software. Please read the chart carefully and recognize, using context, where substitutions have occurred.  "

## 2025-01-22 ENCOUNTER — TELEPHONE (OUTPATIENT)
Dept: FAMILY MEDICINE CLINIC | Facility: CLINIC | Age: 60
End: 2025-01-22

## 2025-01-22 NOTE — TELEPHONE ENCOUNTER
I called pt as per Jessica, received her VM and left detailed message that she does not have to come in for today's visit. She was treated in the ER for her DVT they upped her coumadin, Jessica wants her to get her INR checked tomorrow. This was all discussed yesterday with her on a phone call.

## 2025-01-23 ENCOUNTER — APPOINTMENT (OUTPATIENT)
Dept: LAB | Facility: MEDICAL CENTER | Age: 60
End: 2025-01-23
Payer: COMMERCIAL

## 2025-01-23 DIAGNOSIS — Z86.711 HISTORY OF PULMONARY EMBOLUS (PE): ICD-10-CM

## 2025-01-23 DIAGNOSIS — I26.99 PULMONARY EMBOLISM (HCC): ICD-10-CM

## 2025-01-23 LAB
INR PPP: 2.38 (ref 0.85–1.19)
PROTHROMBIN TIME: 25.9 SECONDS (ref 12.3–15)

## 2025-01-23 PROCEDURE — 85610 PROTHROMBIN TIME: CPT

## 2025-01-23 PROCEDURE — 36415 COLL VENOUS BLD VENIPUNCTURE: CPT

## 2025-01-23 RX ORDER — WARFARIN SODIUM 5 MG/1
TABLET ORAL
Qty: 135 TABLET | Refills: 1 | OUTPATIENT
Start: 2025-01-23

## 2025-01-24 ENCOUNTER — ANTICOAG VISIT (OUTPATIENT)
Dept: FAMILY MEDICINE CLINIC | Facility: CLINIC | Age: 60
End: 2025-01-24

## 2025-01-24 NOTE — PROGRESS NOTES
Spoke with pt and reviewed results.   Confirmed pt is taking 10 mg daily and she states she is.   I told her to continue the 10 mg daily and recheck on 1/30/25. She was agreeable

## 2025-01-28 ENCOUNTER — HOSPITAL ENCOUNTER (OUTPATIENT)
Dept: MAMMOGRAPHY | Facility: HOSPITAL | Age: 60
Discharge: HOME | End: 2025-01-28
Payer: COMMERCIAL

## 2025-01-28 VITALS — WEIGHT: 293 LBS | HEIGHT: 65 IN | BODY MASS INDEX: 48.82 KG/M2

## 2025-01-28 DIAGNOSIS — J31.0 RHINITIS, UNSPECIFIED TYPE: Primary | ICD-10-CM

## 2025-01-28 DIAGNOSIS — Z12.31 BREAST CANCER SCREENING BY MAMMOGRAM: ICD-10-CM

## 2025-01-28 PROCEDURE — 77063 BREAST TOMOSYNTHESIS BI: CPT

## 2025-01-28 PROCEDURE — 77067 SCR MAMMO BI INCL CAD: CPT

## 2025-01-28 RX ORDER — FLUTICASONE PROPIONATE 50 MCG
1 SPRAY, SUSPENSION (ML) NASAL DAILY
Qty: 9.9 ML | Refills: 2 | Status: SHIPPED | OUTPATIENT
Start: 2025-01-28

## 2025-01-28 NOTE — TELEPHONE ENCOUNTER
Reason for call:   [x] Refill   [] Prior Auth  [] Other:     Office:   [x] PCP/Provider -   [] Specialty/Provider -     Medication: FLONASE    Dose/Frequency: 50 MCG NASAL SPRAY    Ph[facility]: [facility] - [address]  [address], [place] [address]  Phone: [phone]  Fax: [phone]     Does the patient have enough for 3 days?   [] Yes   [x] No - Send as HP to POD

## 2025-01-29 ENCOUNTER — OFFICE VISIT (OUTPATIENT)
Dept: FAMILY MEDICINE CLINIC | Facility: CLINIC | Age: 60
End: 2025-01-29
Payer: COMMERCIAL

## 2025-01-29 ENCOUNTER — APPOINTMENT (OUTPATIENT)
Dept: LAB | Facility: MEDICAL CENTER | Age: 60
End: 2025-01-29
Payer: COMMERCIAL

## 2025-01-29 ENCOUNTER — RESULTS FOLLOW-UP (OUTPATIENT)
Dept: FAMILY MEDICINE CLINIC | Facility: CLINIC | Age: 60
End: 2025-01-29

## 2025-01-29 VITALS
WEIGHT: 293 LBS | HEIGHT: 65 IN | SYSTOLIC BLOOD PRESSURE: 130 MMHG | DIASTOLIC BLOOD PRESSURE: 84 MMHG | BODY MASS INDEX: 48.82 KG/M2 | OXYGEN SATURATION: 97 % | TEMPERATURE: 97.9 F | HEART RATE: 81 BPM | RESPIRATION RATE: 16 BRPM

## 2025-01-29 DIAGNOSIS — L30.9 DERMATITIS: Primary | ICD-10-CM

## 2025-01-29 DIAGNOSIS — Z86.711 HISTORY OF PULMONARY EMBOLUS (PE): ICD-10-CM

## 2025-01-29 LAB
INR PPP: 2.12 (ref 0.85–1.19)
PROTHROMBIN TIME: 23.8 SECONDS (ref 12.3–15)

## 2025-01-29 PROCEDURE — 99213 OFFICE O/P EST LOW 20 MIN: CPT | Performed by: PHYSICIAN ASSISTANT

## 2025-01-29 PROCEDURE — 85610 PROTHROMBIN TIME: CPT

## 2025-01-29 PROCEDURE — 36415 COLL VENOUS BLD VENIPUNCTURE: CPT

## 2025-01-29 RX ORDER — CLOTRIMAZOLE AND BETAMETHASONE DIPROPIONATE 10; .64 MG/G; MG/G
CREAM TOPICAL 2 TIMES DAILY
Qty: 45 G | Refills: 1 | Status: SHIPPED | OUTPATIENT
Start: 2025-01-29

## 2025-01-29 NOTE — PROGRESS NOTES
"Name: Celina Collins      : 1965      MRN: [id number]  Encounter Provider: Jessica Gracia PA-C  Encounter Date: 2025   Encounter department: [facility]  :  Assessment & Plan  Dermatitis  Appears to be fungal, will give lotrisone cream. Advised to stop scratching the skin.   Orders:  •  clotrimazole-betamethasone (LOTRISONE) 1-0.05 % cream; Apply topically 2 (two) times a day          Depression Screening and Follow-up Plan: Patient was screened for depression during today's encounter. They screened negative with a PHQ-2 score of 0.      History of Present Illness   Celina is here today complaining of itchy rash on bilateral forearms. She is unsure exactly when rash started but states has been present x few days. Has tried neosporin, calamine, and cortisone among other creams that she was unable to name.       Review of Systems   Constitutional:  Negative for chills and fever.   HENT:  Negative for ear pain and sore throat.    Eyes:  Negative for pain and visual disturbance.   Respiratory:  Negative for cough and shortness of breath.    Cardiovascular:  Negative for chest pain and palpitations.   Gastrointestinal:  Negative for abdominal pain and vomiting.   Genitourinary:  Negative for dysuria and hematuria.   Musculoskeletal:  Negative for arthralgias and back pain.   Skin:  Positive for rash. Negative for color change.   Neurological:  Negative for seizures and syncope.   All other systems reviewed and are negative.      Objective   /84 (BP Location: Left arm, Patient Position: Sitting, Cuff Size: Standard)   Pulse 81   Temp 97.9 °F (36.6 °C) (Temporal)   Resp 16   Ht 5' 5\" (1.651 m)   Wt 136 kg (300 lb)   LMP  (LMP Unknown)   SpO2 97%   BMI 49.92 kg/m²      Physical Exam  Vitals reviewed.   Constitutional:       General: She is not in acute distress.     Appearance: She is well-developed. She is not diaphoretic.   HENT:      Head: Normocephalic and atraumatic.      Right Ear: " Hearing, tympanic membrane, ear canal and external ear normal.      Left Ear: Hearing, tympanic membrane, ear canal and external ear normal.      Nose: Nose normal.      Mouth/Throat:      Mouth: Mucous membranes are moist.      Pharynx: Oropharynx is clear. Uvula midline. No oropharyngeal exudate.   Eyes:      General: No scleral icterus.        Right eye: No discharge.         Left eye: No discharge.      Conjunctiva/sclera: Conjunctivae normal.   Neck:      Thyroid: No thyromegaly.      Vascular: No carotid bruit.   Cardiovascular:      Rate and Rhythm: Normal rate and regular rhythm.      Heart sounds: Normal heart sounds. No murmur heard.  Pulmonary:      Effort: Pulmonary effort is normal. No respiratory distress.      Breath sounds: Normal breath sounds. No wheezing.   Abdominal:      General: Bowel sounds are normal. There is no distension.      Palpations: Abdomen is soft. There is no mass.      Tenderness: There is no abdominal tenderness. There is no guarding or rebound.   Musculoskeletal:         General: No tenderness. Normal range of motion.      Cervical back: Neck supple.   Lymphadenopathy:      Cervical: No cervical adenopathy.   Skin:     General: Skin is warm and dry.      Findings: Rash (bilateral forearms with raised, red rash, distributed in patches on bilateral forearms) present. No erythema.   Neurological:      Mental Status: She is alert and oriented to person, place, and time.   Psychiatric:         Behavior: Behavior normal.         Thought Content: Thought content normal.         Judgment: Judgment normal.

## 2025-01-30 ENCOUNTER — ANTICOAG VISIT (OUTPATIENT)
Dept: FAMILY MEDICINE CLINIC | Facility: CLINIC | Age: 60
End: 2025-01-30

## 2025-01-30 DIAGNOSIS — I26.99 PULMONARY EMBOLISM (HCC): ICD-10-CM

## 2025-01-30 RX ORDER — WARFARIN SODIUM 5 MG/1
TABLET ORAL
Qty: 135 TABLET | Refills: 0 | OUTPATIENT
Start: 2025-01-30

## 2025-01-30 NOTE — TELEPHONE ENCOUNTER
Reason for call:   [x] Refill   [] Prior Auth  [] Other:     Office:   [x] PCP/Provider -   [] Specialty/Provider -     Medication: warfarin (COUMADIN) 5 mg tablet     Dose/Frequency:  TAKE 1 AND 1/2 TABLETS BY MOUTH ON TUESDAY, THURSDAYS, SATURDAYS AND SUNDAYS. TAKE 2 TABLETS ON MONDAYS, WEDNESDAYS AND FRIDAYS.    Quantity: 135 tablet    [facility]: [facility] -     Does the patient have enough for 3 days?   [x] Yes   [] No - Send as HP to POD

## 2025-02-13 ENCOUNTER — TELEPHONE (OUTPATIENT)
Dept: FAMILY MEDICINE CLINIC | Facility: CLINIC | Age: 60
End: 2025-02-13

## 2025-02-13 NOTE — TELEPHONE ENCOUNTER
Please see scanned prior auth for Fluticasone Propionate 50 mcg received from [facility]. Thank you.

## 2025-02-14 DIAGNOSIS — I26.99 PULMONARY EMBOLISM (HCC): ICD-10-CM

## 2025-02-17 DIAGNOSIS — J45.40 MODERATE PERSISTENT ASTHMA, UNSPECIFIED WHETHER COMPLICATED: ICD-10-CM

## 2025-02-17 RX ORDER — WARFARIN SODIUM 5 MG/1
10 TABLET ORAL
Qty: 180 TABLET | Refills: 2 | Status: SHIPPED | OUTPATIENT
Start: 2025-02-17

## 2025-02-17 NOTE — TELEPHONE ENCOUNTER
Pt called refill line and stated she has been with out the medication for 2 days and would like to switch pharmacy's.

## 2025-02-18 RX ORDER — BUDESONIDE AND FORMOTEROL FUMARATE DIHYDRATE 160; 4.5 UG/1; UG/1
2 AEROSOL RESPIRATORY (INHALATION) 2 TIMES DAILY
Qty: 10.2 G | Refills: 11 | Status: SHIPPED | OUTPATIENT
Start: 2025-02-18

## 2025-02-19 ENCOUNTER — OFFICE VISIT (OUTPATIENT)
Dept: FAMILY MEDICINE CLINIC | Facility: CLINIC | Age: 60
End: 2025-02-19
Payer: COMMERCIAL

## 2025-02-19 VITALS
DIASTOLIC BLOOD PRESSURE: 78 MMHG | HEIGHT: 65 IN | BODY MASS INDEX: 48.82 KG/M2 | HEART RATE: 85 BPM | OXYGEN SATURATION: 98 % | TEMPERATURE: 97.9 F | SYSTOLIC BLOOD PRESSURE: 128 MMHG | WEIGHT: 293 LBS

## 2025-02-19 DIAGNOSIS — J06.9 VIRAL UPPER RESPIRATORY ILLNESS: Primary | ICD-10-CM

## 2025-02-19 PROCEDURE — 87636 SARSCOV2 & INF A&B AMP PRB: CPT | Performed by: PHYSICIAN ASSISTANT

## 2025-02-19 PROCEDURE — 99213 OFFICE O/P EST LOW 20 MIN: CPT | Performed by: PHYSICIAN ASSISTANT

## 2025-02-19 NOTE — PROGRESS NOTES
"Name: Celina Collins      : 1965      MRN: [id number]  Encounter Provider: Marii Stein PA-C  Encounter Date: 2025   Encounter department: [facility]  :  Assessment & Plan  Viral upper respiratory illness  That patient continue symptomatic relief.  She may continue Robitussin and Tylenol.  COVID/flu swab completed in office.  Will call with results and recommendations after they return.  Orders:  •  COVID/FLU; Future           History of Present Illness   Alona is a pleasant 59-year-old female who is here today complaining of cold-like symptoms since last Friday.  She is complaining of congestion, cough, runny nose, and headache.  She denies any fevers, chills, chest pains, or shortness of breath.  She was exposed to her roommate who tested positive for COVID last week.  She has been taking Tylenol and Robitussin, which is providing mild relief.      Review of Systems   Constitutional:  Negative for chills, diaphoresis, fatigue and fever.   HENT:  Positive for congestion, rhinorrhea, sinus pressure and sore throat. Negative for ear pain, postnasal drip, sneezing and trouble swallowing.    Eyes:  Negative for pain and visual disturbance.   Respiratory:  Positive for cough. Negative for apnea, shortness of breath and wheezing.    Cardiovascular:  Negative for chest pain and palpitations.   Gastrointestinal:  Negative for abdominal pain, constipation, diarrhea, nausea and vomiting.   Genitourinary:  Negative for dysuria and hematuria.   Musculoskeletal:  Negative for arthralgias, gait problem and myalgias.   Neurological:  Positive for headaches. Negative for dizziness, syncope, weakness, light-headedness and numbness.   Psychiatric/Behavioral:  Negative for suicidal ideas. The patient is not nervous/anxious.        Objective   /78   Pulse 85   Temp 97.9 °F (36.6 °C)   Ht 5' 5\" (1.651 m)   Wt 136 kg (299 lb 3.2 oz)   LMP  (LMP Unknown)   SpO2 98%   BMI 49.79 kg/m²      Physical " Exam  Vitals and nursing note reviewed.   Constitutional:       General: She is not in acute distress.     Appearance: She is well-developed. She is not diaphoretic.   HENT:      Head: Normocephalic and atraumatic.      Right Ear: Hearing, tympanic membrane, ear canal and external ear normal.      Left Ear: Hearing, tympanic membrane, ear canal and external ear normal.      Nose: Congestion and rhinorrhea present. No mucosal edema.      Mouth/Throat:      Pharynx: Posterior oropharyngeal erythema and postnasal drip present. No oropharyngeal exudate.   Eyes:      Extraocular Movements: Extraocular movements intact.   Cardiovascular:      Rate and Rhythm: Normal rate and regular rhythm.      Heart sounds: Normal heart sounds. No murmur heard.     No friction rub. No gallop.   Pulmonary:      Effort: Pulmonary effort is normal. No respiratory distress.      Breath sounds: Normal breath sounds. No wheezing or rales.   Musculoskeletal:         General: Normal range of motion.      Cervical back: Normal range of motion and neck supple.   Lymphadenopathy:      Cervical: No cervical adenopathy.   Skin:     General: Skin is warm and dry.      Findings: No rash.   Neurological:      Mental Status: She is alert and oriented to person, place, and time.   Psychiatric:         Behavior: Behavior normal.         Thought Content: Thought content normal.         Judgment: Judgment normal.

## 2025-02-20 ENCOUNTER — RESULTS FOLLOW-UP (OUTPATIENT)
Dept: FAMILY MEDICINE CLINIC | Facility: CLINIC | Age: 60
End: 2025-02-20

## 2025-02-20 LAB
FLUAV RNA RESP QL NAA+PROBE: NEGATIVE
FLUBV RNA RESP QL NAA+PROBE: NEGATIVE
SARS-COV-2 RNA RESP QL NAA+PROBE: POSITIVE

## 2025-02-20 NOTE — RESULT ENCOUNTER NOTE
Patient aware, symptoms started Friday so she is out of the window. Told her symptomatic relief and stay hydrated.

## 2025-02-21 NOTE — TELEPHONE ENCOUNTER
Patient called in saying she has a lot of mucus, really yellow. Wants to know what PCP advises. Thank you!

## 2025-02-28 ENCOUNTER — APPOINTMENT (OUTPATIENT)
Dept: LAB | Facility: MEDICAL CENTER | Age: 60
End: 2025-02-28
Payer: COMMERCIAL

## 2025-02-28 DIAGNOSIS — Z86.711 HISTORY OF PULMONARY EMBOLUS (PE): ICD-10-CM

## 2025-02-28 LAB
INR PPP: 2.48 (ref 0.85–1.19)
PROTHROMBIN TIME: 26.7 SECONDS (ref 12.3–15)

## 2025-02-28 PROCEDURE — 85610 PROTHROMBIN TIME: CPT

## 2025-02-28 PROCEDURE — 36415 COLL VENOUS BLD VENIPUNCTURE: CPT

## 2025-03-03 ENCOUNTER — ANTICOAG VISIT (OUTPATIENT)
Dept: FAMILY MEDICINE CLINIC | Facility: CLINIC | Age: 60
End: 2025-03-03

## 2025-03-03 ENCOUNTER — TELEPHONE (OUTPATIENT)
Age: 60
End: 2025-03-03

## 2025-03-06 DIAGNOSIS — L30.9 DERMATITIS: ICD-10-CM

## 2025-03-06 DIAGNOSIS — K21.9 GASTROESOPHAGEAL REFLUX DISEASE WITHOUT ESOPHAGITIS: Chronic | ICD-10-CM

## 2025-03-06 DIAGNOSIS — J30.1 SEASONAL ALLERGIC RHINITIS DUE TO POLLEN: ICD-10-CM

## 2025-03-06 DIAGNOSIS — T78.3XXD ANGIOEDEMA, SUBSEQUENT ENCOUNTER: ICD-10-CM

## 2025-03-06 RX ORDER — MONTELUKAST SODIUM 10 MG/1
10 TABLET ORAL
Qty: 90 TABLET | Refills: 0 | Status: SHIPPED | OUTPATIENT
Start: 2025-03-06

## 2025-03-06 RX ORDER — LORATADINE 10 MG/1
10 TABLET ORAL DAILY
Qty: 90 TABLET | Refills: 0 | Status: SHIPPED | OUTPATIENT
Start: 2025-03-06

## 2025-03-06 RX ORDER — CLOTRIMAZOLE AND BETAMETHASONE DIPROPIONATE 10; .64 MG/G; MG/G
CREAM TOPICAL 2 TIMES DAILY
Qty: 45 G | Refills: 1 | Status: SHIPPED | OUTPATIENT
Start: 2025-03-06

## 2025-03-06 RX ORDER — OMEPRAZOLE 40 MG/1
40 CAPSULE, DELAYED RELEASE ORAL
Qty: 90 CAPSULE | Refills: 0 | Status: SHIPPED | OUTPATIENT
Start: 2025-03-06

## 2025-03-06 NOTE — TELEPHONE ENCOUNTER
Reason for call:   [x] Refill   [] Prior Auth  [] Other:     Office:   [x] PCP/Provider - : [facility]  Authorized By: Jessica Gracia PA-C  [] Specialty/Provider -     Medication:   clotrimazole-betamethasone (LOTRISONE) 1-0.05 % cream   loratadine (CLARITIN) 10 mg tablet   omeprazole (PriLOSEC) 40 MG capsule     montelukast (SINGULAIR) 10 mg tablet       Pharmacy: [id number]  [place] [facility] [address] [phone]     Local Pharmacy   Does the patient have enough for 3 days?   [] Yes   [x] No - Send as HP to POD    Mail Away Pharmacy   Does the patient have enough for 10 days?   [] Yes   [] No - Send as HP to POD     No

## 2025-03-22 ENCOUNTER — APPOINTMENT (OUTPATIENT)
Dept: LAB | Facility: MEDICAL CENTER | Age: 60
End: 2025-03-22
Payer: COMMERCIAL

## 2025-03-22 DIAGNOSIS — Z86.711 HISTORY OF PULMONARY EMBOLUS (PE): ICD-10-CM

## 2025-03-22 LAB
INR PPP: 4.33 (ref 0.85–1.19)
PROTHROMBIN TIME: 40.6 SECONDS (ref 12.3–15)

## 2025-03-22 PROCEDURE — 36415 COLL VENOUS BLD VENIPUNCTURE: CPT

## 2025-03-22 PROCEDURE — 85610 PROTHROMBIN TIME: CPT

## 2025-03-24 ENCOUNTER — ANTICOAG VISIT (OUTPATIENT)
Dept: FAMILY MEDICINE CLINIC | Facility: CLINIC | Age: 60
End: 2025-03-24

## 2025-04-01 ENCOUNTER — OFFICE VISIT (OUTPATIENT)
Dept: URGENT CARE | Facility: MEDICAL CENTER | Age: 60
End: 2025-04-01
Payer: COMMERCIAL

## 2025-04-01 ENCOUNTER — APPOINTMENT (OUTPATIENT)
Dept: RADIOLOGY | Facility: MEDICAL CENTER | Age: 60
End: 2025-04-01
Payer: COMMERCIAL

## 2025-04-01 VITALS
HEIGHT: 65 IN | OXYGEN SATURATION: 97 % | RESPIRATION RATE: 17 BRPM | TEMPERATURE: 96.7 F | HEART RATE: 85 BPM | DIASTOLIC BLOOD PRESSURE: 78 MMHG | BODY MASS INDEX: 49.79 KG/M2 | SYSTOLIC BLOOD PRESSURE: 132 MMHG
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
## 2025-04-01 PROCEDURE — 73564 X-RAY EXAM KNEE 4 OR MORE: CPT

## 2025-04-01 PROCEDURE — 99213 OFFICE O/P EST LOW 20 MIN: CPT | Performed by: PHYSICIAN ASSISTANT

## 2025-04-01 PROCEDURE — 73610 X-RAY EXAM OF ANKLE: CPT

## 2025-04-01 RX ORDER — ACETAMINOPHEN 325 MG/1
650 TABLET ORAL EVERY 6 HOURS PRN
Status: SHIPPED | OUTPATIENT
Start: 2025-04-01

## 2025-04-01 RX ADMIN — ACETAMINOPHEN 650 MG: 325 TABLET ORAL at 19:04

## 2025-04-01 NOTE — PROGRESS NOTES
[facility]        NAME: Celina Collins is a 60 y.o. female  : 1965    MRN: [id number]  DATE: 2025  TIME: 7:25 PM    Assessment and Plan   Fall, initial encounter [W19.XXXA]  1. Fall, initial encounter  XR knee 4+ vw right injury    XR knee 4+ vw left injury    XR ankle 3+ vw right    acetaminophen (TYLENOL) tablet 650 mg        Patient Instructions     Elevate, apply ice, and take tylenol prn pain  F/u with orthopedics if pain persists  Follow up with PCP in 3-5 days.  Proceed to  ER if symptoms worsen.    If tests have been performed at [facility], our office will contact you with results if changes need to be made to the care plan discussed with you at the visit.  You [facility] on [facility]    Chief Complaint     Chief Complaint   Patient presents with    Fall     Fell today. Knee and hand pain.  Fell in driveway around 1730.   Came down on knees and hands. Didn't hit head and no LOC.          History of Present Illness       Fall  The accident occurred 1 to 3 hours ago. Fall occurred: at home on gravel driveway. Distance fallen: standing. There was no blood loss. The point of impact was the right knee and left knee (right ankle). The pain is severe. The symptoms are aggravated by ambulation, movement and pressure on injury. Pertinent negatives include no loss of consciousness, numbness or tingling. She has tried nothing for the symptoms.       Review of Systems   Review of Systems   Musculoskeletal:  Positive for arthralgias, gait problem and myalgias.   Neurological:  Negative for tingling, loss of consciousness and numbness.         Current Medications       Current Outpatient Medications:     albuterol (2.5 mg/3 mL) 0.083 % nebulizer solution, Take 3 mL (2.5 mg total) by nebulization as needed for wheezing, Disp: 3 mL, Rfl: 5    albuterol (PROVENTIL HFA,VENTOLIN HFA) 90 mcg/act inhaler, Inhale 2 puffs every 4 (four) hours as needed for wheezing, Disp: 54 g,  Rfl: 3    atorvastatin (LIPITOR) 10 mg tablet, Take 1 tablet (10 mg total) by mouth daily with dinner, Disp: 90 tablet, Rfl: 3    azelastine (ASTELIN) 0.1 % nasal spray, 1 spray into each nostril 2 (two) times a day Use in each nostril as directed, Disp: 1 mL, Rfl: 5    clotrimazole-betamethasone (LOTRISONE) 1-0.05 % cream, Apply topically 2 (two) times a day, Disp: 45 g, Rfl: 1    famotidine (PEPCID) 40 MG tablet, Take 1 tablet (40 mg total) by mouth in the morning, Disp: 90 tablet, Rfl: 1    fluticasone (FLONASE) 50 mcg/act nasal spray, 1 spray into each nostril daily, Disp: 9.9 mL, Rfl: 2    furosemide (LASIX) 20 mg tablet, Take 1 tablet (20 mg total) by mouth daily as needed (leg swelling) Take with potassium, Disp: 30 tablet, Rfl: 5    levothyroxine 125 mcg tablet, Take 1 tablet (125 mcg total) by mouth every morning At 8AM, Disp: 90 tablet, Rfl: 3    loratadine (CLARITIN) 10 mg tablet, Take 1 tablet (10 mg total) by mouth daily, Disp: 90 tablet, Rfl: 0    montelukast (SINGULAIR) 10 mg tablet, Take 1 tablet (10 mg total) by mouth daily at bedtime, Disp: 90 tablet, Rfl: 0    Multiple Vitamins-Minerals (CENTRUM SILVER 50+WOMEN PO), Take by mouth, Disp: , Rfl:     omeprazole (PriLOSEC) 40 MG capsule, Take 1 capsule (40 mg total) by mouth daily before breakfast, Disp: 90 capsule, Rfl: 0    potassium chloride (MICRO-K) 10 MEQ CR capsule, Take 1 capsule (10 mEq total) by mouth daily, Disp: 30 capsule, Rfl: 5    Symbicort 160-4.5 MCG/ACT inhaler, INHALE TWO PUFFS BY MOUTH TWICE DAILY Rinse mouth after use., Disp: 10.2 g, Rfl: 11    warfarin (COUMADIN) 2.5 mg tablet, Take 1 tablet (2.5 mg total) by mouth daily Take as directed based on INR., Disp: 30 tablet, Rfl: 0    warfarin (COUMADIN) 5 mg tablet, Take 2 tablets (10 mg total) by mouth daily OR as directed by provider, Disp: 180 tablet, Rfl: 2    Current Facility-Administered Medications:     acetaminophen (TYLENOL) tablet 650 mg, 650 mg, Oral, Q6H PRN, , 650 mg at  04/01/25 1904    Current Allergies     Allergies as of 04/01/2025 - Reviewed 04/01/2025   Allergen Reaction Noted    Clindamycin Angioedema 04/03/2020    Medical tape Itching 06/18/2020    Penicillin g Nausea Only 08/08/2014    Penicillins GI Intolerance 05/13/2014            The following portions of the patient's history were reviewed and updated as appropriate: allergies, current medications, past family history, past medical history, past social history, past surgical history and problem list.     Past Medical History:   Diagnosis Date    Abdominal pain, lower     [id number] RESOLVED    Acute renal failure (HCC)     04YPP9546 RESOLVED    Allergic rhinitis     92HVY5601 RESOLVED    Ankle pain, right     81XTH9297 RESOLVED    Arthritis     93VFL1782 RESOLVED  846IOA2421 RESOLVED    Asthma     Bilateral chronic knee pain     32SRB6748    Bladder pain     91FOD5460 RESOLVED    Blood clot in vein     Bursitis     50NGN1400 RESOLVED    Cardiac disorder     19HGK1740  RESOLVED    Cardiac murmur     01CPH0095 RESOLVED    Chest tightness or pressure     11BMN0622 RESOLVED    Chronic kidney disease     COPD (chronic obstructive pulmonary disease) ([facility])     Coronary artery disease     Curvature of spine     99SRF1606 RESOLVED    Disease of thyroid gland     Fractures     GERD (gastroesophageal reflux disease)     Hernia, hiatal     66CXT4521 RESOLVED    Hyperlipidemia     92YLN9810 RESOLVED    Hypertension     Inguinal hernia     RIGHT   82FVE5718 RESOLVED    Jaw closure abnormality     90LFC0968 RESOLVED    Liver disease     Lumbar herniated disc     71NOI4045 RESOLVED    Morbid obesity ([facility])     13OKD2289    Obesity     Osteoarthritis of right knee     10COK2798 RESOLVED    Patellar tendinitis     94SHZ1704    Poor flexibility of tendon     13XMT3322    Presence of IVC filter 10/12/2018    Pulmonary embolism ([facility])     44NOT1857 RESOLVED    Sepsis ([facility])     75SXY0897 RESOLVED    Severe sepsis due to methicillin resistant  Staphylococcus aureus (MRSA) with acute organ dysfunction (HCC)     06UPI8774 RESOLVED    Sleep apnea, obstructive     Spider vein, symptomatic     05ZTY2237 RESOLVED    Status post arthroscopy of right knee     30JUN2017 RESOLVED    Stomach disorder     05OZI6628 RESOLVED    Tear of medial meniscus of right knee     SUBSEQUENT ENCOUNTER  RESOLVED 23NXJ1104    Thyroid disorder     14KEA8071    Umbilical hernia     69TEH2521 RESOLVED    Vascular disorder        Past Surgical History:   Procedure Laterality Date    COLONOSCOPY      CYSTOSCOPY  01/1994    WITH BIOPSY      EXPLORATORY LAPAROTOMY      FOOT SURGERY Left     FRACTURE SURGERY      HAND SURGERY Right     cyst    HYSTERECTOMY  2009    INCISION AND DRAINAGE ANTERIOR NECK Right 06/08/2017    Procedure: INCISION AND DRAINAGE  (I&D) NECK;  Surgeon: Kirby Morales MD;  Location:  MAIN OR;  Service:     INCISIONAL HERNIA REPAIR      WITH IMPLANTATION OF MESH  13 MAY 2014  LAST ASSESSED    IR IVC FILTER REMOVAL  01/22/2019    KNEE SURGERY      OPEN ANTERIOR SHOULDER RECONSTRUCTION Left     OTHER SURGICAL HISTORY      BLOOD VESSEL REPAIR   13 MAY 2014 LAST ASSESSED    PERIPHERALLY INSERTED CENTRAL CATHETER INSERTION      SC ARTHRS KNE SURG W/MENISCECTOMY MED/LAT W/SHVG Right 04/11/2016    Procedure: KNEE ARTHROSCOPY WITH MEDIAL MENISCECTOMY ;  Surgeon: Mikey Pantoja MD;  Location: [facility];  Service: Orthopedics    SC RPR AA HERNIA 1ST 3-10 CM REDUCIBLE N/A 11/15/2023    Procedure: REPAIR OF INCARCERATED VENTRAL HERNIA;  Surgeon: Lane Ventura DO;  Location: [facility];  Service: General    SHOULDER SURGERY         Family History   Problem Relation Age of Onset    Arthritis Mother     Lung cancer Father     Clotting disorder Sister     No Known Problems Sister     No Known Problems Sister     No Known Problems Sister     No Known Problems Sister     No Known Problems Sister     No Known Problems Daughter     No Known Problems Daughter     No Known Problems  "Maternal Grandmother     No Known Problems Maternal Grandfather     No Known Problems Paternal Grandmother     No Known Problems Paternal Grandfather     Colon cancer Brother     Colon cancer Son     No Known Problems Son     No Known Problems Son     No Known Problems Son     Breast cancer Maternal Aunt     Diabetes Maternal Aunt          Medications have been verified.        Objective   /78   Pulse 85   Temp (!) 96.7 °F (35.9 °C)   Resp 17   Ht 5' 5\" (1.651 m)   LMP  (LMP Unknown)   SpO2 97%   BMI 49.79 kg/m²   No LMP recorded (lmp unknown). Patient has had a hysterectomy.       Physical Exam     Physical Exam  Constitutional:       Appearance: Normal appearance.   Cardiovascular:      Rate and Rhythm: Normal rate and regular rhythm.   Pulmonary:      Effort: Pulmonary effort is normal.   Musculoskeletal:      Right knee: Effusion present. No swelling or crepitus. Normal range of motion. Tenderness present. Normal meniscus.      Left knee: Effusion present. No swelling or crepitus. Normal range of motion. Tenderness (entire anterior knee) present. Normal meniscus.      Right ankle: No swelling, ecchymosis or lacerations. Tenderness (diffuse lateral) present. Normal range of motion.      Right Achilles Tendon: Normal.   Neurological:      Mental Status: She is alert.                   "

## 2025-04-02 ENCOUNTER — RESULTS FOLLOW-UP (OUTPATIENT)
Dept: URGENT CARE | Facility: MEDICAL CENTER | Age: 60
End: 2025-04-02

## 2025-04-02 DIAGNOSIS — I26.99 PULMONARY EMBOLISM (HCC): ICD-10-CM

## 2025-04-02 NOTE — TELEPHONE ENCOUNTER
Medication: warfarin (COUMADIN) 5 mg tablet     Dose/Frequency: Take 2 tablets (10 mg total) by mouth daily OR as directed by provider     Quantity: 180 tablet     Pharmacy: [facility] [place] - [address]     Office:   [x] PCP/Provider - Jessica Gracia PA-C   [] Speciality/Provider -     Does the patient have enough for 3 days?   [x] Yes   [] No - Send as HP to POD

## 2025-04-03 ENCOUNTER — TELEPHONE (OUTPATIENT)
Age: 60
End: 2025-04-03

## 2025-04-03 RX ORDER — WARFARIN SODIUM 5 MG/1
10 TABLET ORAL
Qty: 180 TABLET | Refills: 1 | Status: SHIPPED | OUTPATIENT
Start: 2025-04-03

## 2025-04-03 NOTE — TELEPHONE ENCOUNTER
So she didn't go to the ER she went to urgent care.  If she is having issues would recommend she make an OV to discuss with me.

## 2025-04-03 NOTE — TELEPHONE ENCOUNTER
Patient called she stated she fell on April 1st and she went to the ER, she is concerned because he Left knee is swollen and when she puts an ice pack on it after about 5 minutes the ice pack gets warm. She stated that she woke up a little light headed earlier today but that she feels good now. Patient would like a call back to discuss.    Please advice

## 2025-04-04 ENCOUNTER — OFFICE VISIT (OUTPATIENT)
Dept: FAMILY MEDICINE CLINIC | Facility: CLINIC | Age: 60
End: 2025-04-04
Payer: COMMERCIAL

## 2025-04-04 VITALS
HEIGHT: 65 IN | HEART RATE: 82 BPM | BODY MASS INDEX: 48.82 KG/M2 | WEIGHT: 293 LBS | SYSTOLIC BLOOD PRESSURE: 128 MMHG | TEMPERATURE: 97.4 F | DIASTOLIC BLOOD PRESSURE: 76 MMHG | OXYGEN SATURATION: 96 %

## 2025-04-04 DIAGNOSIS — M25.561 ACUTE BILATERAL KNEE PAIN: Primary | ICD-10-CM

## 2025-04-04 DIAGNOSIS — Z91.81 STATUS POST FALL: ICD-10-CM

## 2025-04-04 DIAGNOSIS — M25.571 ACUTE RIGHT ANKLE PAIN: ICD-10-CM

## 2025-04-04 DIAGNOSIS — M25.562 ACUTE BILATERAL KNEE PAIN: Primary | ICD-10-CM

## 2025-04-04 PROCEDURE — 99213 OFFICE O/P EST LOW 20 MIN: CPT | Performed by: PHYSICIAN ASSISTANT

## 2025-04-04 NOTE — PROGRESS NOTES
"Name: Celina Collins      : 1965      MRN: [id number]  Encounter Provider: Jessica Gracia PA-C  Encounter Date: 2025   Encounter department: [facility]  :  Assessment & Plan  Acute bilateral knee pain  Wrapped L knee with ACE. Continue to take Tylenol, apply ice in 20 min increments. Referral placed to orthopedics.  Orders:  •  Ambulatory Referral to Orthopedic Surgery; Future    Acute right ankle pain  Wrapped R ankle with ACE. Continue to take Tylenol, apply ice in 20 min increments. Referral placed to orthopedics.  Orders:  •  Ambulatory Referral to Orthopedic Surgery; Future    Status post fall    Orders:  •  Ambulatory Referral to Orthopedic Surgery; Future           History of Present Illness   Celina is here today S/P fall on 25. Complaining of bilateral knee pain as well as R ankle pain. Was evaluated at urgent care and had xrays which were negative for acute injury.      Review of Systems   Constitutional:  Negative for chills and fever.   HENT:  Negative for ear pain and sore throat.    Eyes:  Negative for pain and visual disturbance.   Respiratory:  Negative for cough and shortness of breath.    Cardiovascular:  Negative for chest pain and palpitations.   Gastrointestinal:  Negative for abdominal pain and vomiting.   Genitourinary:  Negative for dysuria and hematuria.   Musculoskeletal:  Positive for arthralgias, gait problem and joint swelling. Negative for back pain.   Skin:  Negative for color change and rash.   Neurological:  Negative for seizures and syncope.   All other systems reviewed and are negative.      Objective   /76 (BP Location: Left arm, Patient Position: Sitting, Cuff Size: Standard)   Pulse 82   Temp (!) 97.4 °F (36.3 °C) (Temporal)   Ht 5' 5\" (1.651 m)   Wt (!) 138 kg (303 lb 12.8 oz)   LMP  (LMP Unknown)   SpO2 96%   BMI 50.55 kg/m²      Physical Exam  Vitals and nursing note reviewed.   Constitutional:       General: She is not in acute distress.   "   Appearance: She is well-developed.   HENT:      Head: Normocephalic and atraumatic.   Eyes:      Conjunctiva/sclera: Conjunctivae normal.   Cardiovascular:      Rate and Rhythm: Normal rate and regular rhythm.      Heart sounds: No murmur heard.  Pulmonary:      Effort: Pulmonary effort is normal. No respiratory distress.      Breath sounds: Normal breath sounds.   Abdominal:      Palpations: Abdomen is soft.      Tenderness: There is no abdominal tenderness.   Musculoskeletal:         General: No swelling.      Cervical back: Neck supple.      Right knee: Tenderness present.      Left knee: Ecchymosis present. Tenderness present.      Right ankle: Ecchymosis present.      Right Achilles Tendon: Tenderness present.   Skin:     General: Skin is warm and dry.      Capillary Refill: Capillary refill takes less than 2 seconds.   Neurological:      Mental Status: She is alert.   Psychiatric:         Mood and Affect: Mood normal.

## 2025-04-06 ENCOUNTER — OFFICE VISIT (OUTPATIENT)
Dept: URGENT CARE | Facility: MEDICAL CENTER | Age: 60
End: 2025-04-06
Payer: COMMERCIAL

## 2025-04-06 VITALS
TEMPERATURE: 98.2 F | HEART RATE: 75 BPM | OXYGEN SATURATION: 99 % | SYSTOLIC BLOOD PRESSURE: 112 MMHG | BODY MASS INDEX: 50.42 KG/M2 | WEIGHT: 293 LBS | RESPIRATION RATE: 20 BRPM | DIASTOLIC BLOOD PRESSURE: 76 MMHG
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
## 2025-04-06 DIAGNOSIS — S40.861A TICK BITE OF RIGHT UPPER ARM, INITIAL ENCOUNTER: Primary | ICD-10-CM

## 2025-04-06 PROCEDURE — 10120 INC&RMVL FB SUBQ TISS SMPL: CPT | Performed by: PHYSICIAN ASSISTANT

## 2025-04-06 PROCEDURE — 99213 OFFICE O/P EST LOW 20 MIN: CPT | Performed by: PHYSICIAN ASSISTANT

## 2025-04-06 RX ORDER — DOXYCYCLINE 100 MG/1
200 CAPSULE ORAL ONCE
Qty: 2 CAPSULE | Refills: 0 | Status: SHIPPED | OUTPATIENT
Start: 2025-04-06 | End: 2025-04-06

## 2025-04-06 RX ORDER — LIDOCAINE HYDROCHLORIDE 20 MG/ML
5 INJECTION, SOLUTION EPIDURAL; INFILTRATION; INTRACAUDAL; PERINEURAL ONCE
Status: COMPLETED | OUTPATIENT
Start: 2025-04-06 | End: 2025-04-06

## 2025-04-06 RX ADMIN — LIDOCAINE HYDROCHLORIDE 5 ML: 20 INJECTION, SOLUTION EPIDURAL; INFILTRATION; INTRACAUDAL; PERINEURAL at 16:18

## 2025-04-06 NOTE — PROGRESS NOTES
[facility]        NAME: Celina Collins is a 60 y.o. female  : 1965    MRN: [id number]  DATE: 2025  TIME: 4:17 PM    Assessment and Plan   Tick bite of right upper arm, initial encounter [S40.861A, W57.XXXA]  1. Tick bite of right upper arm, initial encounter  doxycycline monohydrate (MONODOX) 100 mg capsule    lidocaine (PF) (XYLOCAINE-MPF) 2 % injection 5 mL    Foreign body removal          Results  Patient is aware of CDC guidelines but would like a removal attempt.       Patient Instructions     Assessment & Plan    As per CDC, informed that any remaining tick parts will naturally slough off as the skin epithelializes. Instructed patient to wear deet or picaridin spray when going out into the woods, wear long socks and tuck pants into socks. Take clothes off right away and inspect clothes and body. Purchase easy removal tick instrument. Tick can be sent out for testing at [email or web address]org.     Take doxycycline as prescribed  Keep area clean and dry  Watch for signs of infection  Follow up with PCP in 3-5 days.  Proceed to  ER if symptoms worsen.    If tests have been performed at [facility], our office will contact you with results if changes need to be made to the care plan discussed with you at the visit.  You can review your full results on [facility] My[facility].    Eat yogurt with live and active cultures and/or take a probiotic at least 3 hours before or after antibiotic dose. Monitor stool for diarrhea and/or blood. If this occurs, contact primary care doctor ASAP.       Chief Complaint     Chief Complaint   Patient presents with    Tick Removal     Found tick in right antecubital area. Attempt to remove severed tick in half. Remaining parts in arm.         History of Present Illness     History of Present Illness      States she attempted to remove a tick on her R anterior arm with partial success. Suspects it only attached today. Reports retained parts. Denies fevers, chills, HA, joint  pain, joint swelling, redness, rash, or drainage.        Review of Systems   Review of Systems   Constitutional:  Negative for chills and fever.   Musculoskeletal:  Negative for arthralgias and joint swelling.   Skin:  Negative for rash.   Neurological:  Negative for headaches.         Current Medications       Current Outpatient Medications:     albuterol (2.5 mg/3 mL) 0.083 % nebulizer solution, Take 3 mL (2.5 mg total) by nebulization as needed for wheezing, Disp: 3 mL, Rfl: 5    albuterol (PROVENTIL HFA,VENTOLIN HFA) 90 mcg/act inhaler, Inhale 2 puffs every 4 (four) hours as needed for wheezing, Disp: 54 g, Rfl: 3    atorvastatin (LIPITOR) 10 mg tablet, Take 1 tablet (10 mg total) by mouth daily with dinner, Disp: 90 tablet, Rfl: 3    azelastine (ASTELIN) 0.1 % nasal spray, 1 spray into each nostril 2 (two) times a day Use in each nostril as directed, Disp: 1 mL, Rfl: 5    clotrimazole-betamethasone (LOTRISONE) 1-0.05 % cream, Apply topically 2 (two) times a day, Disp: 45 g, Rfl: 1    doxycycline monohydrate (MONODOX) 100 mg capsule, Take 2 capsules (200 mg total) by mouth 1 (one) time for 1 dose, Disp: 2 capsule, Rfl: 0    famotidine (PEPCID) 40 MG tablet, Take 1 tablet (40 mg total) by mouth in the morning, Disp: 90 tablet, Rfl: 1    fluticasone (FLONASE) 50 mcg/act nasal spray, 1 spray into each nostril daily, Disp: 9.9 mL, Rfl: 2    furosemide (LASIX) 20 mg tablet, Take 1 tablet (20 mg total) by mouth daily as needed (leg swelling) Take with potassium, Disp: 30 tablet, Rfl: 5    levothyroxine 125 mcg tablet, Take 1 tablet (125 mcg total) by mouth every morning At 8AM, Disp: 90 tablet, Rfl: 3    loratadine (CLARITIN) 10 mg tablet, Take 1 tablet (10 mg total) by mouth daily, Disp: 90 tablet, Rfl: 0    montelukast (SINGULAIR) 10 mg tablet, Take 1 tablet (10 mg total) by mouth daily at bedtime, Disp: 90 tablet, Rfl: 0    Multiple Vitamins-Minerals (CENTRUM SILVER 50+WOMEN PO), Take by mouth, Disp: , Rfl:      omeprazole (PriLOSEC) 40 MG capsule, Take 1 capsule (40 mg total) by mouth daily before breakfast, Disp: 90 capsule, Rfl: 0    potassium chloride (MICRO-K) 10 MEQ CR capsule, Take 1 capsule (10 mEq total) by mouth daily, Disp: 30 capsule, Rfl: 5    Symbicort 160-4.5 MCG/ACT inhaler, INHALE TWO PUFFS BY MOUTH TWICE DAILY Rinse mouth after use., Disp: 10.2 g, Rfl: 11    warfarin (COUMADIN) 5 mg tablet, Take 2 tablets (10 mg total) by mouth daily OR as directed by provider, Disp: 180 tablet, Rfl: 1    warfarin (COUMADIN) 2.5 mg tablet, Take 1 tablet (2.5 mg total) by mouth daily Take as directed based on INR. (Patient not taking: Reported on 4/4/2025), Disp: 30 tablet, Rfl: 0    Current Facility-Administered Medications:     acetaminophen (TYLENOL) tablet 650 mg, 650 mg, Oral, Q6H PRN, , 650 mg at 04/01/25 1904    lidocaine (PF) (XYLOCAINE-MPF) 2 % injection 5 mL, 5 mL, Infiltration, Once,     Current Allergies     Allergies as of 04/06/2025 - Reviewed 04/06/2025   Allergen Reaction Noted    Clindamycin Angioedema 04/03/2020    Medical tape Itching 06/18/2020    Penicillin g Nausea Only 08/08/2014    Penicillins GI Intolerance 05/13/2014            The following portions of the patient's history were reviewed and updated as appropriate: allergies, current medications, past family history, past medical history, past social history, past surgical history and problem list.     Past Medical History:   Diagnosis Date    Abdominal pain, lower     40TZM2035 RESOLVED    Acute renal failure (HCC)     35OJX1879 RESOLVED    Allergic rhinitis     58KYE1031 RESOLVED    Ankle pain, right     20LIL2615 RESOLVED    Arthritis     02CIE7357 RESOLVED  318WTS0404 RESOLVED    Asthma     Bilateral chronic knee pain     00VRS7081    Bladder pain     07JER3818 RESOLVED    Blood clot in vein     Bursitis     86RMK4927 RESOLVED    Cardiac disorder     06GIN7717  RESOLVED    Cardiac murmur     99CLL4409 RESOLVED    Chest tightness or pressure      60QZV7553 RESOLVED    Chronic kidney disease     COPD (chronic obstructive pulmonary disease) (HCC)     Coronary artery disease     Curvature of spine     38ZVK8423 RESOLVED    Disease of thyroid gland     Fractures     GERD (gastroesophageal reflux disease)     Hernia, hiatal     86GQY0241 RESOLVED    Hyperlipidemia     45EGB5464 RESOLVED    Hypertension     Inguinal hernia     RIGHT   58SAJ4399 RESOLVED    Jaw closure abnormality     71MMH3212 RESOLVED    Liver disease     Lumbar herniated disc     29FFS8805 RESOLVED    Morbid obesity (HCC)     17TPL9639    Obesity     Osteoarthritis of right knee     36BTE3654 RESOLVED    Patellar tendinitis     32BSU4960    Poor flexibility of tendon     15LYN6910    Presence of IVC filter 10/12/2018    Pulmonary embolism (HCC)     82DXW9930 RESOLVED    Sepsis (HCC)     63RCZ3110 RESOLVED    Severe sepsis due to methicillin resistant Staphylococcus aureus (MRSA) with acute organ dysfunction (HCC)     82SGK0708 RESOLVED    Sleep apnea, obstructive     Spider vein, symptomatic     85OYH1350 RESOLVED    Status post arthroscopy of right knee     30JUN2017 RESOLVED    Stomach disorder     85YWH3478 RESOLVED    Tear of medial meniscus of right knee     SUBSEQUENT ENCOUNTER  RESOLVED 30JUN2017    Thyroid disorder     97VJI3478    Umbilical hernia     62PDZ0314 RESOLVED    Vascular disorder        Past Surgical History:   Procedure Laterality Date    COLONOSCOPY      CYSTOSCOPY  01/1994    WITH BIOPSY      EXPLORATORY LAPAROTOMY      FOOT SURGERY Left     FRACTURE SURGERY      HAND SURGERY Right     cyst    HYSTERECTOMY  2009    INCISION AND DRAINAGE ANTERIOR NECK Right 06/08/2017    Procedure: INCISION AND DRAINAGE  (I&D) NECK;  Surgeon: Kirby Morales MD;  Location: [facility];  Service:     INCISIONAL HERNIA REPAIR      WITH IMPLANTATION OF MESH  13 MAY 2014  LAST ASSESSED    IR IVC FILTER REMOVAL  01/22/2019    KNEE SURGERY      OPEN ANTERIOR SHOULDER RECONSTRUCTION Left      OTHER SURGICAL HISTORY      BLOOD VESSEL REPAIR   13 MAY 2014 LAST ASSESSED    PERIPHERALLY INSERTED CENTRAL CATHETER INSERTION      IN ARTHRS KNE SURG W/MENISCECTOMY MED/LAT W/SHVG Right 04/11/2016    Procedure: KNEE ARTHROSCOPY WITH MEDIAL MENISCECTOMY ;  Surgeon: Mikey Pantoja MD;  Location: [facility];  Service: Orthopedics    IN RPR AA HERNIA 1ST 3-10 CM REDUCIBLE N/A 11/15/2023    Procedure: REPAIR OF INCARCERATED VENTRAL HERNIA;  Surgeon: Lane Ventura DO;  Location: [facility];  Service: General    SHOULDER SURGERY         Family History   Problem Relation Age of Onset    Arthritis Mother     Lung cancer Father     Clotting disorder Sister     No Known Problems Sister     No Known Problems Sister     No Known Problems Sister     No Known Problems Sister     No Known Problems Sister     No Known Problems Daughter     No Known Problems Daughter     No Known Problems Maternal Grandmother     No Known Problems Maternal Grandfather     No Known Problems Paternal Grandmother     No Known Problems Paternal Grandfather     Colon cancer Brother     Colon cancer Son     No Known Problems Son     No Known Problems Son     No Known Problems Son     Breast cancer Maternal Aunt     Diabetes Maternal Aunt          Medications have been verified.        Objective   /76   Pulse 75   Temp 98.2 °F (36.8 °C)   Resp 20   Wt (!) 137 kg (303 lb)   LMP  (LMP Unknown)   SpO2 99%   BMI 50.42 kg/m²   No LMP recorded (lmp unknown). Patient has had a hysterectomy.       Physical Exam     Physical Exam  Constitutional:       General: She is not in acute distress.     Appearance: She is well-developed.   Cardiovascular:      Rate and Rhythm: Normal rate and regular rhythm.      Heart sounds: Normal heart sounds.   Pulmonary:      Effort: Pulmonary effort is normal.      Breath sounds: Normal breath sounds.   Musculoskeletal:         General: No swelling or tenderness.   Skin:     General: Skin is warm.      Findings:  Erythema present. No rash.          Neurological:      Mental Status: She is alert.   Psychiatric:         Behavior: Behavior normal.         Thought Content: Thought content normal.         Judgment: Judgment normal.         Universal Protocol:  Consent: Verbal consent obtained.  Risks and benefits: risks, benefits and alternatives were discussed  Consent given by: patient  Foreign body removal    Date/Time: 4/6/2025 3:30 PM    Performed by: Alexandria Louise PA-C  Authorized by: Alexandria Louise PA-C  Body area: skin  General location: upper extremity  Location: R upper arm.    Anesthesia:  Local Anesthetic: lidocaine 2% without epinephrine  Removal mechanism: forceps, alligator forceps and scalpel (11 blade)  Dressing: antibiotic ointment (band-aid)  Post-procedure assessment: foreign body removed  Patient tolerance: patient tolerated the procedure well with no immediate complications  Comments: Incision was made.

## 2025-04-06 NOTE — PATIENT INSTRUCTIONS
As per CDC, informed that any remaining tick parts will naturally slough off as the skin epithelializes. Instructed patient to wear deet or picaridin spray when going out into the woods, wear long socks and tuck pants into socks. Take clothes off right away and inspect clothes and body. Purchase easy removal tick instrument. Tick can be sent out for testing at [email or web address]org.     Take doxycycline as prescribed  Keep area clean and dry  Watch for signs of infection  Follow up with PCP in 3-5 days.  Proceed to  ER if symptoms worsen.    If tests have been performed at [facility], our office will contact you with results if changes need to be made to the care plan discussed with you at the visit.  You can review your full results on [facility] MyChart.    Eat yogurt with live and active cultures and/or take a probiotic at least 3 hours before or after antibiotic dose. Monitor stool for diarrhea and/or blood. If this occurs, contact primary care doctor ASAP.

## 2025-04-09 ENCOUNTER — OFFICE VISIT (OUTPATIENT)
Dept: OBGYN CLINIC | Facility: CLINIC | Age: 60
End: 2025-04-09
Payer: COMMERCIAL

## 2025-04-09 VITALS
BODY MASS INDEX: 48.82 KG/M2 | HEART RATE: 100 BPM | WEIGHT: 293 LBS | OXYGEN SATURATION: 97 % | RESPIRATION RATE: 16 BRPM | TEMPERATURE: 97.9 F | HEIGHT: 65 IN

## 2025-04-09 DIAGNOSIS — Z91.81 STATUS POST FALL: ICD-10-CM

## 2025-04-09 DIAGNOSIS — M25.571 ACUTE RIGHT ANKLE PAIN: ICD-10-CM

## 2025-04-09 DIAGNOSIS — M25.561 ACUTE BILATERAL KNEE PAIN: Primary | ICD-10-CM

## 2025-04-09 DIAGNOSIS — M25.562 ACUTE BILATERAL KNEE PAIN: Primary | ICD-10-CM

## 2025-04-09 DIAGNOSIS — S93.491A SPRAIN OF ANTERIOR TALOFIBULAR LIGAMENT OF RIGHT ANKLE, INITIAL ENCOUNTER: ICD-10-CM

## 2025-04-09 PROCEDURE — 99214 OFFICE O/P EST MOD 30 MIN: CPT | Performed by: STUDENT IN AN ORGANIZED HEALTH CARE EDUCATION/TRAINING PROGRAM

## 2025-04-09 NOTE — PROGRESS NOTES
Assessment & Plan  Acute bilateral knee pain  Patient with underlying b/l knee osteoarthritis, R>L  Presents with acute exacerbation of b/l knee pain s/p fall onto knees 4/1/2025  No acute injuries noted on XR  Begin PT  OTC pain medication, rest, elevation, ice, compressive ace wrap for pain and swelling control   Orders:    Ambulatory Referral to Orthopedic Surgery    Ambulatory Referral to Physical Therapy; Future    Sprain of anterior talofibular ligament of right ankle, initial encounter  Patient sustained a right ankle ATFL and possible CFL sprain 2/2 fall on 4/1/2025  WBAT in supportive sneaker. May wear aircast as needed  Begin PT  OTC pain medication, rest, elevation, ice, compression stocking for pain and swelling control   Orders:    Ambulatory Referral to Physical Therapy; Future    Ankle Air Cast    F/u PRN    General  Chief Complaint   Patient presents with    Left Knee - Pain    Right Knee - Pain    Right Ankle - Pain        Subjective    Celina Collins is a 60 y.o. female who presents with BILATERAL knee pain:    Chief Complaint   Patient presents with    Left Knee - Pain    Right Knee - Pain    Right Ankle - Pain          History of Present Illness   The patient complains of bilateral knee pain. The pain started 8 days ago with injury. She had a fall in her driveway 4/1/2025, landing on her knees. At that time the patient describes pain in both knees at time of injury, was able to stand, did not hear pop. She also believes she twisted her right ankle when she fell as well. She was evaluated at Urgent Care same day, XRs were taken and negative. She was then seen by PCP on 4/4 and referred her for further evaluation     The pain is 4/10. The pain is located Anterior. The pain is described as sharp. They have tried Tylenol and elevation, ice  for their problem. Pain improves with rest, tylenol. Pain worsens with weightbearing, walking.    The patient did not hear a pop. The patient does have swelling.   The patient does not feel unstable.      Has h/o right knee scope and medial meniscectomy in 2016. She previously saw Dr. Xiao for right knee arthritis, last visit in 2023; received steroid injection at that time.  Denies h/o diabetes, denies smoking, is on warfarin for h/o DVT. Does not use an assistive device at [facility]  Failed to redirect to the Timeline version of the [employer] SmartLink.  Allergies   Allergen Reactions    Clindamycin Angioedema    Medical Tape Itching     Welt and redness    Penicillin G Nausea Only    Penicillins GI Intolerance     Nausea and vomiting     Outpatient Encounter Medications as of 4/9/2025   Medication Sig Dispense Refill    albuterol (2.5 mg/3 mL) 0.083 % nebulizer solution Take 3 mL (2.5 mg total) by nebulization as needed for wheezing 3 mL 5    albuterol (PROVENTIL HFA,VENTOLIN HFA) 90 mcg/act inhaler Inhale 2 puffs every 4 (four) hours as needed for wheezing 54 g 3    atorvastatin (LIPITOR) 10 mg tablet Take 1 tablet (10 mg total) by mouth daily with dinner 90 tablet 3    azelastine (ASTELIN) 0.1 % nasal spray 1 spray into each nostril 2 (two) times a day Use in each nostril as directed 1 mL 5    clotrimazole-betamethasone (LOTRISONE) 1-0.05 % cream Apply topically 2 (two) times a day 45 g 1    famotidine (PEPCID) 40 MG tablet Take 1 tablet (40 mg total) by mouth in the morning 90 tablet 1    fluticasone (FLONASE) 50 mcg/act nasal spray 1 spray into each nostril daily 9.9 mL 2    furosemide (LASIX) 20 mg tablet Take 1 tablet (20 mg total) by mouth daily as needed (leg swelling) Take with potassium 30 tablet 5    levothyroxine 125 mcg tablet Take 1 tablet (125 mcg total) by mouth every morning At 8AM 90 tablet 3    loratadine (CLARITIN) 10 mg tablet Take 1 tablet (10 mg total) by mouth daily 90 tablet 0    montelukast (SINGULAIR) 10 mg tablet Take 1 tablet (10 mg total) by mouth daily at bedtime 90 tablet 0    Multiple Vitamins-Minerals  (CENTRUM SILVER 50+WOMEN PO) Take by mouth      omeprazole (PriLOSEC) 40 MG capsule Take 1 capsule (40 mg total) by mouth daily before breakfast 90 capsule 0    potassium chloride (MICRO-K) 10 MEQ CR capsule Take 1 capsule (10 mEq total) by mouth daily 30 capsule 5    Symbicort 160-4.5 MCG/ACT inhaler INHALE TWO PUFFS BY MOUTH TWICE DAILY Rinse mouth after use. 10.2 g 11    warfarin (COUMADIN) 5 mg tablet Take 2 tablets (10 mg total) by mouth daily OR as directed by provider 180 tablet 1    warfarin (COUMADIN) 2.5 mg tablet Take 1 tablet (2.5 mg total) by mouth daily Take as directed based on INR. (Patient not taking: Reported on 4/4/2025) 30 tablet 0     Facility-Administered Encounter Medications as of 4/9/2025   Medication Dose Route Frequency Provider Last Rate Last Admin    acetaminophen (TYLENOL) tablet 650 mg  650 mg Oral Q6H PRN    650 mg at 04/01/25 1904     Past Medical History:   Diagnosis Date    Abdominal pain, lower     [id number] RESOLVED    Acute renal failure (HCC)     86BUI2656 RESOLVED    Allergic rhinitis     [id number] RESOLVED    Ankle pain, right     26HQW2038 RESOLVED    Arthritis     [id number] RESOLVED  [id number] RESOLVED    Asthma     Bilateral chronic knee pain     95VAH5148    Bladder pain     43IYC7734 RESOLVED    Blood clot in vein     Bursitis     68ZIW1349 RESOLVED    Cardiac disorder     94JQV2403  RESOLVED    Cardiac murmur     52CYJ2223 RESOLVED    Chest tightness or pressure     37RQY3294 RESOLVED    Chronic kidney disease     COPD (chronic obstructive pulmonary disease) (HCC)     Coronary artery disease     Curvature of spine     50DIQ4752 RESOLVED    Disease of thyroid gland     Fractures     GERD (gastroesophageal reflux disease)     Hernia, hiatal     08WOP8747 RESOLVED    Hyperlipidemia     12TZE7616 RESOLVED    Hypertension     Inguinal hernia     RIGHT   82GXO6331 RESOLVED    Jaw closure abnormality     65NSS7430 RESOLVED    Liver disease     Lumbar herniated disc      72HAB5350 RESOLVED    Morbid obesity (HCC)     55YUD5552    Obesity     Osteoarthritis of right knee     10BNO0815 RESOLVED    Patellar tendinitis     98OJM7487    Poor flexibility of tendon     26OCM7622    Presence of IVC filter 10/12/2018    Pulmonary embolism (HCC)     55XHU1567 RESOLVED    Sepsis (HCC)     76BVM1535 RESOLVED    Severe sepsis due to methicillin resistant Staphylococcus aureus (MRSA) with acute organ dysfunction (HCC)     29BXR6468 RESOLVED    Sleep apnea, obstructive     Spider vein, symptomatic     76XPJ3971 RESOLVED    Status post arthroscopy of right knee     14EUO7781 RESOLVED    Stomach disorder     73GRY7257 RESOLVED    Tear of medial meniscus of right knee     SUBSEQUENT ENCOUNTER  RESOLVED 35EVU3074    Thyroid disorder     08BWY2955    Umbilical hernia     67NWI7408 RESOLVED    Vascular disorder      Past Surgical History:   Procedure Laterality Date    COLONOSCOPY      CYSTOSCOPY  01/1994    WITH BIOPSY      EXPLORATORY LAPAROTOMY      FOOT SURGERY Left     FRACTURE SURGERY      HAND SURGERY Right     cyst    HYSTERECTOMY  2009    INCISION AND DRAINAGE ANTERIOR NECK Right 06/08/2017    Procedure: INCISION AND DRAINAGE  (I&D) NECK;  Surgeon: Kirby Morales MD;  Location:  MAIN OR;  Service:     INCISIONAL HERNIA REPAIR      WITH IMPLANTATION OF MESH  13 MAY 2014  LAST ASSESSED    IR IVC FILTER REMOVAL  01/22/2019    KNEE SURGERY      OPEN ANTERIOR SHOULDER RECONSTRUCTION Left     OTHER SURGICAL HISTORY      BLOOD VESSEL REPAIR   13 MAY 2014 LAST ASSESSED    PERIPHERALLY INSERTED CENTRAL CATHETER INSERTION      RI ARTHRS KNE SURG W/MENISCECTOMY MED/LAT W/SHVG Right 04/11/2016    Procedure: KNEE ARTHROSCOPY WITH MEDIAL MENISCECTOMY ;  Surgeon: Mikey Pantoja MD;  Location: [facility];  Service: Orthopedics    RI RPR AA HERNIA 1ST 3-10 CM REDUCIBLE N/A 11/15/2023    Procedure: REPAIR OF INCARCERATED VENTRAL HERNIA;  Surgeon: Lane Ventura DO;  Location: [facility];  Service: General     SHOULDER SURGERY       Family History   Problem Relation Age of Onset    Arthritis Mother     Lung cancer Father     Clotting disorder Sister     No Known Problems Sister     No Known Problems Sister     No Known Problems Sister     No Known Problems Sister     No Known Problems Sister     No Known Problems Daughter     No Known Problems Daughter     No Known Problems Maternal Grandmother     No Known Problems Maternal Grandfather     No Known Problems Paternal Grandmother     No Known Problems Paternal Grandfather     Colon cancer Brother     Colon cancer Son     No Known Problems Son     No Known Problems Son     No Known Problems Son     Breast cancer Maternal Aunt     Diabetes Maternal Aunt      Social History     Tobacco Use    Smoking status: Former     Current packs/day: 0.00     Types: Cigarettes     Quit date: 1995     Years since quittin.3    Smokeless tobacco: Never    Tobacco comments:     quit 25 years ago   Vaping Use    Vaping status: Never Used   Substance Use Topics    Alcohol use: Never    Drug use: Never           Objective      Vitals:    25 1013   Pulse: 100   Resp: 16   Temp: 97.9 °F (36.6 °C)   SpO2: 97%     Body mass index is 50.42 kg/m².  Physical Exam  Constitutional:       General: She is not in acute distress.     Appearance: She is obese.   Pulmonary:      Effort: Pulmonary effort is normal. No respiratory distress.   Neurological:      General: No focal deficit present.      Mental Status: She is alert. Mental status is at baseline.       Knee Exam     Right Left   Inspection: Mild swelling   Ecchymosis- minimal Severe swelling  Ecchymosis- severe    Gait: Antalgic Antalgic   Quadriceps atrophy: None None     Tenderness: Medial Joint Line and Lateral Joint Line diffuse   ROM: 0-110 5-100   Effusion: Mild Moderate   Meniscus Exam   Right Left   Medial Meniscus: Joint Line Tenderness +Pain with Deep Flexion   Lateral Meniscus: Joint Line Tenderness +Pain with Deep Flexion    Ligament Exam   Right Left   ACL Lachman: negative    Anterior Drawer:  negative Lachman: negative    Anterior Drawer:negative     PCL Posterior Drawer:negative     Posterior Drawer:negative   MCL Stable at 0 and 30 degrees of flexion Stable at 0 and 30 degrees of flexion   LCL Stable at 0 and 30 degrees of flexion Stable at 0 and 30 degrees of flexion   PLC Negative Negative     Patellofemoral exam   Right Left   Patella grind test positive Positive    Patella instability: negative  1 Quadrants of translation negative  1 Quadrants of translation   Patellar Translation Apprehension negative negative     Right ankle exam:  Skin: mild lateral ankle swelling and ecchymosis  TTP of lateral ankle, ATFL   ROM: 20 degrees dorsiflexion, 30 degrees plantarflexion  Strength: 5/5 tibialis anterior, 5/5 gastrocnemius/soleus, 2+/5 eversion/peroneals, 4/5 inversion/posterior tibial, 5/5 FHL/EHL  SILT s/s/sp/dp/t.   2+ Dp pulses  Cap refill <2 sec    Distally the patient's neurovascular status is normal.    Review of Prior Testing  I independently interpreted the following test: X-rays of the bilateral knee available today, including weight bearing and merchant views.  Multiple views of the knee show degenerative changes consistent with mild osteoarthritis. No acute fractures    X-ray right ankle available for review and independently reviewed- no acute fracture, evidence of old avulsion injury of distal lateral malleolus            Follow Up: Return if symptoms worsen or fail to improve.    All questions answered and patient agrees with plan.          Scribe Attestation      I,:  Marj White PA-C am acting as a scribe while in the presence of the attending physician.:       I,:  Jason Ashton MD personally performed the services described in this documentation    as scribed in my presence.:

## 2025-04-09 NOTE — ASSESSMENT & PLAN NOTE
Patient sustained a right ankle ATFL and possible CFL sprain 2/2 fall on 4/1/2025  WBAT in supportive sneaker. May wear aircast as needed  Begin PT  OTC pain medication, rest, elevation, ice, compression stocking for pain and swelling control   Orders:    Ambulatory Referral to Physical Therapy; Future    Ankle Air Cast

## 2025-04-10 ENCOUNTER — APPOINTMENT (OUTPATIENT)
Dept: LAB | Facility: MEDICAL CENTER | Age: 60
End: 2025-04-10
Payer: COMMERCIAL

## 2025-04-10 ENCOUNTER — DOCUMENTATION (OUTPATIENT)
Dept: FAMILY MEDICINE CLINIC | Facility: CLINIC | Age: 60
End: 2025-04-10

## 2025-04-10 DIAGNOSIS — Z86.711 HISTORY OF PULMONARY EMBOLUS (PE): ICD-10-CM

## 2025-04-10 LAB
INR PPP: 5.46 (ref 0.85–1.19)
PROTHROMBIN TIME: 48.4 SECONDS (ref 12.3–15)

## 2025-04-10 PROCEDURE — 85610 PROTHROMBIN TIME: CPT

## 2025-04-10 PROCEDURE — 36415 COLL VENOUS BLD VENIPUNCTURE: CPT

## 2025-04-11 ENCOUNTER — ANTICOAG VISIT (OUTPATIENT)
Dept: FAMILY MEDICINE CLINIC | Facility: CLINIC | Age: 60
End: 2025-04-11

## 2025-04-11 NOTE — PROGRESS NOTES
Spoke to patient about elevated INR. She was educated on signs of bleeding. She already took dose of Coumadin tonight. I asked her to hold her dose tomorrow until she gets further instruction from our office. Patient understood.

## 2025-04-17 ENCOUNTER — APPOINTMENT (OUTPATIENT)
Dept: LAB | Facility: MEDICAL CENTER | Age: 60
End: 2025-04-17
Attending: PHYSICIAN ASSISTANT
Payer: COMMERCIAL

## 2025-04-17 DIAGNOSIS — Z86.711 HISTORY OF PULMONARY EMBOLUS (PE): ICD-10-CM

## 2025-04-17 LAB
INR PPP: 2.46 (ref 0.85–1.19)
PROTHROMBIN TIME: 26.5 SECONDS (ref 12.3–15)

## 2025-04-17 PROCEDURE — 85610 PROTHROMBIN TIME: CPT

## 2025-04-17 PROCEDURE — 36415 COLL VENOUS BLD VENIPUNCTURE: CPT

## 2025-04-18 ENCOUNTER — RESULTS FOLLOW-UP (OUTPATIENT)
Dept: FAMILY MEDICINE CLINIC | Facility: CLINIC | Age: 60
End: 2025-04-18

## 2025-04-18 ENCOUNTER — ANTICOAG VISIT (OUTPATIENT)
Dept: FAMILY MEDICINE CLINIC | Facility: CLINIC | Age: 60
End: 2025-04-18

## 2025-04-18 DIAGNOSIS — R60.0 BILATERAL LEG EDEMA: ICD-10-CM

## 2025-04-18 RX ORDER — POTASSIUM CHLORIDE 750 MG/1
CAPSULE, EXTENDED RELEASE ORAL
Qty: 30 CAPSULE | Refills: 5 | Status: SHIPPED | OUTPATIENT
Start: 2025-04-18

## 2025-04-18 RX ORDER — FUROSEMIDE 20 MG/1
TABLET ORAL
Qty: 30 TABLET | Refills: 5 | Status: SHIPPED | OUTPATIENT
Start: 2025-04-18

## 2025-04-21 ENCOUNTER — ANTICOAG VISIT (OUTPATIENT)
Dept: FAMILY MEDICINE CLINIC | Facility: CLINIC | Age: 60
End: 2025-04-21

## 2025-04-21 LAB — INR PPP: 2.46 (ref 0.85–1.19)

## 2025-04-24 ENCOUNTER — APPOINTMENT (OUTPATIENT)
Dept: RADIOLOGY | Facility: MEDICAL CENTER | Age: 60
End: 2025-04-24
Payer: COMMERCIAL

## 2025-04-24 ENCOUNTER — OFFICE VISIT (OUTPATIENT)
Dept: FAMILY MEDICINE CLINIC | Facility: CLINIC | Age: 60
End: 2025-04-24
Payer: COMMERCIAL

## 2025-04-24 VITALS
TEMPERATURE: 97.4 F | BODY MASS INDEX: 48.82 KG/M2 | WEIGHT: 293 LBS | HEART RATE: 85 BPM | DIASTOLIC BLOOD PRESSURE: 98 MMHG | OXYGEN SATURATION: 99 % | HEIGHT: 65 IN | SYSTOLIC BLOOD PRESSURE: 126 MMHG

## 2025-04-24 DIAGNOSIS — J45.20 MILD INTERMITTENT ASTHMA WITHOUT COMPLICATION: Chronic | ICD-10-CM

## 2025-04-24 DIAGNOSIS — Z51.81 ENCOUNTER FOR MEDICATION MONITORING: ICD-10-CM

## 2025-04-24 DIAGNOSIS — J22 LOWER RESPIRATORY INFECTION: ICD-10-CM

## 2025-04-24 DIAGNOSIS — J22 LOWER RESPIRATORY INFECTION: Primary | ICD-10-CM

## 2025-04-24 PROCEDURE — 99214 OFFICE O/P EST MOD 30 MIN: CPT | Performed by: FAMILY MEDICINE

## 2025-04-24 PROCEDURE — 71046 X-RAY EXAM CHEST 2 VIEWS: CPT

## 2025-04-24 RX ORDER — BENZONATATE 100 MG/1
100 CAPSULE ORAL 3 TIMES DAILY PRN
Qty: 15 CAPSULE | Refills: 0 | Status: SHIPPED | OUTPATIENT
Start: 2025-04-24

## 2025-04-24 RX ORDER — ALBUTEROL SULFATE 0.83 MG/ML
2.5 SOLUTION RESPIRATORY (INHALATION) EVERY 4 HOURS PRN
Qty: 90 ML | Refills: 5 | Status: SHIPPED | OUTPATIENT
Start: 2025-04-24

## 2025-04-24 RX ORDER — LEVOFLOXACIN 750 MG/1
750 TABLET, FILM COATED ORAL EVERY 24 HOURS
Qty: 5 TABLET | Refills: 0 | Status: SHIPPED | OUTPATIENT
Start: 2025-04-24 | End: 2025-04-29

## 2025-04-24 RX ORDER — PREDNISONE 20 MG/1
TABLET ORAL
Qty: 6 TABLET | Refills: 0 | Status: SHIPPED | OUTPATIENT
Start: 2025-04-24

## 2025-04-24 NOTE — PROGRESS NOTES
"Name: Celina Collins      : 1965      MRN: [id number]  Encounter Provider: Gold Venegas DO  Encounter Date: 2025   Encounter department: [facility]  :  Assessment & Plan  Mild intermittent asthma without complication    Orders:    albuterol (2.5 mg/3 mL) 0.083 % nebulizer solution; Take 3 mL (2.5 mg total) by nebulization every 4 (four) hours as needed for wheezing    Lower respiratory infection    Orders:    levofloxacin (LEVAQUIN) 750 mg tablet; Take 1 tablet (750 mg total) by mouth every 24 hours for 5 days    predniSONE 20 mg tablet; 1 tab twice daily day 1 and 2, 1 tab day 3 and 4    benzonatate (TESSALON PERLES) 100 mg capsule; Take 1 capsule (100 mg total) by mouth 3 (three) times a day as needed for cough    XR chest pa and lateral; Future           History of Present Illness   Cough with congestion seeing patient does have a history of pulmonary emboli most recent INR was therapeutic patient has been coughing up some purulent mucus lungs actually do not sound too bad I do not hear anything that sounds like it pneumonia but I did asked the patient to get a chest x-ray I did warn her that the antibiotic were giving her may potentiate her Coumadin she will need a pro time tomorrow    Cough  Associated symptoms include a rash and wheezing.     Review of Systems   Constitutional:  Positive for activity change and fatigue.   HENT:  Positive for sinus pressure and sinus pain.    Respiratory:  Positive for cough, chest tightness and wheezing.    Cardiovascular:  Negative for leg swelling.   Skin:  Positive for rash.       Objective   /98 (BP Location: Left arm, Patient Position: Sitting, Cuff Size: Large)   Pulse 85   Temp (!) 97.4 °F (36.3 °C) (Temporal)   Ht 5' 5\" (1.651 m)   Wt (!) 137 kg (303 lb)   LMP  (LMP Unknown)   SpO2 99%   BMI 50.42 kg/m²      Physical Exam  Constitutional:       Appearance: Normal appearance. She is obese.   HENT:      Head: Normocephalic. "   Cardiovascular:      Rate and Rhythm: Normal rate and regular rhythm.   Pulmonary:      Effort: Pulmonary effort is normal.      Breath sounds: Wheezing present.   Musculoskeletal:      Right lower leg: Edema present.      Left lower leg: Edema present.   Neurological:      General: No focal deficit present.      Mental Status: She is alert and oriented to person, place, and time.

## 2025-04-25 ENCOUNTER — RESULTS FOLLOW-UP (OUTPATIENT)
Dept: FAMILY MEDICINE CLINIC | Facility: CLINIC | Age: 60
End: 2025-04-25

## 2025-04-28 DIAGNOSIS — I26.99 PULMONARY EMBOLISM (HCC): ICD-10-CM

## 2025-04-28 RX ORDER — WARFARIN SODIUM 5 MG/1
10 TABLET ORAL
Qty: 180 TABLET | Refills: 1 | Status: SHIPPED | OUTPATIENT
Start: 2025-04-28

## 2025-04-28 NOTE — TELEPHONE ENCOUNTER
States she has been out since Friday and [facility] told her that it was sent to [facility] and she needs it to go to [facility].      Reason for call:   [x] Refill   [] Prior Auth  [] Other:     Office:   [x] PCP/Provider - [facility]   [] Specialty/Provider -     Medication: Coumadin    Dose/Frequency: 5 mg     Quantity: #180    Pharmacy: [facility] [address] [place] [address] Pharmacy   Does the patient have enough for 3 days?   [] Yes   [x] No - Send as HP to POD    Mail Away Pharmacy   Does the patient have enough for 10 days?   [] Yes   [] No - Send as HP to POD

## 2025-05-03 ENCOUNTER — APPOINTMENT (OUTPATIENT)
Dept: LAB | Facility: MEDICAL CENTER | Age: 60
End: 2025-05-03
Attending: FAMILY MEDICINE
Payer: COMMERCIAL

## 2025-05-03 LAB
INR PPP: 3.25 (ref 0.85–1.19)
PROTHROMBIN TIME: 32.7 SECONDS (ref 12.3–15)

## 2025-05-03 PROCEDURE — 85610 PROTHROMBIN TIME: CPT | Performed by: FAMILY MEDICINE

## 2025-05-03 PROCEDURE — 36415 COLL VENOUS BLD VENIPUNCTURE: CPT | Performed by: FAMILY MEDICINE

## 2025-05-06 ENCOUNTER — ANTICOAG VISIT (OUTPATIENT)
Dept: FAMILY MEDICINE CLINIC | Facility: CLINIC | Age: 60
End: 2025-05-06

## 2025-05-12 ENCOUNTER — APPOINTMENT (OUTPATIENT)
Dept: LAB | Facility: MEDICAL CENTER | Age: 60
End: 2025-05-12
Attending: PHYSICIAN ASSISTANT
Payer: COMMERCIAL

## 2025-05-12 DIAGNOSIS — Z86.711 HISTORY OF PULMONARY EMBOLUS (PE): ICD-10-CM

## 2025-05-12 LAB
INR PPP: 3.23 (ref 0.85–1.19)
PROTHROMBIN TIME: 32.6 SECONDS (ref 12.3–15)

## 2025-05-12 PROCEDURE — 85610 PROTHROMBIN TIME: CPT

## 2025-05-12 PROCEDURE — 36415 COLL VENOUS BLD VENIPUNCTURE: CPT

## 2025-05-13 ENCOUNTER — ANTICOAG VISIT (OUTPATIENT)
Dept: FAMILY MEDICINE CLINIC | Facility: CLINIC | Age: 60
End: 2025-05-13

## 2025-05-13 ENCOUNTER — RESULTS FOLLOW-UP (OUTPATIENT)
Dept: FAMILY MEDICINE CLINIC | Facility: CLINIC | Age: 60
End: 2025-05-13

## 2025-05-13 NOTE — TELEPHONE ENCOUNTER
Patient called back and did receive the message regarding her INR and dosing.  She understood and has no questions.

## 2025-05-19 DIAGNOSIS — K21.00 GASTROESOPHAGEAL REFLUX DISEASE WITH ESOPHAGITIS WITHOUT HEMORRHAGE: ICD-10-CM

## 2025-05-20 ENCOUNTER — APPOINTMENT (OUTPATIENT)
Dept: LAB | Facility: MEDICAL CENTER | Age: 60
End: 2025-05-20
Attending: PHYSICIAN ASSISTANT
Payer: COMMERCIAL

## 2025-05-20 ENCOUNTER — RESULTS FOLLOW-UP (OUTPATIENT)
Dept: FAMILY MEDICINE CLINIC | Facility: CLINIC | Age: 60
End: 2025-05-20

## 2025-05-20 ENCOUNTER — ANTICOAG VISIT (OUTPATIENT)
Dept: FAMILY MEDICINE CLINIC | Facility: CLINIC | Age: 60
End: 2025-05-20

## 2025-05-20 DIAGNOSIS — Z86.711 HISTORY OF PULMONARY EMBOLUS (PE): ICD-10-CM

## 2025-05-20 LAB
INR PPP: 2.51 (ref 0.85–1.19)
PROTHROMBIN TIME: 27 SECONDS (ref 12.3–15)

## 2025-05-20 PROCEDURE — 85610 PROTHROMBIN TIME: CPT

## 2025-05-20 PROCEDURE — 36415 COLL VENOUS BLD VENIPUNCTURE: CPT

## 2025-05-20 RX ORDER — FAMOTIDINE 40 MG/1
TABLET, FILM COATED ORAL
Qty: 90 TABLET | Refills: 1 | Status: SHIPPED | OUTPATIENT
Start: 2025-05-20

## 2025-06-02 ENCOUNTER — VBI (OUTPATIENT)
Dept: ADMINISTRATIVE | Facility: OTHER | Age: 60
End: 2025-06-02

## 2025-06-02 NOTE — TELEPHONE ENCOUNTER
06/02/25 2:23 PM     Chart reviewed for Total Abdominal Hysterectomy was/were submitted to the patient's insurance.     Rebecca Murrieta MA   [facility]

## 2025-06-04 ENCOUNTER — APPOINTMENT (OUTPATIENT)
Dept: LAB | Facility: MEDICAL CENTER | Age: 60
End: 2025-06-04
Attending: PHYSICIAN ASSISTANT
Payer: COMMERCIAL

## 2025-06-04 DIAGNOSIS — Z86.711 HISTORY OF PULMONARY EMBOLUS (PE): ICD-10-CM

## 2025-06-04 LAB
INR PPP: 3.1 (ref 0.85–1.19)
PROTHROMBIN TIME: 31.6 SECONDS (ref 12.3–15)

## 2025-06-04 PROCEDURE — 36415 COLL VENOUS BLD VENIPUNCTURE: CPT

## 2025-06-04 PROCEDURE — 85610 PROTHROMBIN TIME: CPT

## 2025-06-05 ENCOUNTER — VBI (OUTPATIENT)
Dept: ADMINISTRATIVE | Facility: OTHER | Age: 60
End: 2025-06-05

## 2025-06-05 ENCOUNTER — ANTICOAG VISIT (OUTPATIENT)
Dept: FAMILY MEDICINE CLINIC | Facility: CLINIC | Age: 60
End: 2025-06-05

## 2025-06-05 ENCOUNTER — RESULTS FOLLOW-UP (OUTPATIENT)
Dept: FAMILY MEDICINE CLINIC | Facility: CLINIC | Age: 60
End: 2025-06-05

## 2025-06-05 NOTE — TELEPHONE ENCOUNTER
06/05/25 12:17 PM     Chart reviewed for CRC: Colonoscopy ; nothing is submitted to the patient's insurance at this time.     Lauren Durant   [facility]

## 2025-06-12 ENCOUNTER — APPOINTMENT (OUTPATIENT)
Dept: LAB | Facility: MEDICAL CENTER | Age: 60
End: 2025-06-12
Payer: COMMERCIAL

## 2025-06-12 DIAGNOSIS — Z86.711 HISTORY OF PULMONARY EMBOLUS (PE): ICD-10-CM

## 2025-06-12 LAB
INR PPP: 2.62 (ref 0.85–1.19)
PROTHROMBIN TIME: 27.9 SECONDS (ref 12.3–15)

## 2025-06-12 PROCEDURE — 85610 PROTHROMBIN TIME: CPT

## 2025-06-12 PROCEDURE — 36415 COLL VENOUS BLD VENIPUNCTURE: CPT

## 2025-06-13 ENCOUNTER — RESULTS FOLLOW-UP (OUTPATIENT)
Dept: FAMILY MEDICINE CLINIC | Facility: CLINIC | Age: 60
End: 2025-06-13

## 2025-06-13 ENCOUNTER — ANTICOAG VISIT (OUTPATIENT)
Dept: FAMILY MEDICINE CLINIC | Facility: CLINIC | Age: 60
End: 2025-06-13

## 2025-06-23 ENCOUNTER — OFFICE VISIT (OUTPATIENT)
Dept: OBGYN CLINIC | Facility: CLINIC | Age: 60
End: 2025-06-23
Payer: COMMERCIAL

## 2025-06-23 VITALS
HEART RATE: 76 BPM | OXYGEN SATURATION: 99 % | WEIGHT: 293 LBS | HEIGHT: 65 IN | BODY MASS INDEX: 48.82 KG/M2 | RESPIRATION RATE: 16 BRPM | TEMPERATURE: 97.6 F

## 2025-06-23 DIAGNOSIS — M19.112 POST-TRAUMATIC OSTEOARTHRITIS OF LEFT SHOULDER: Primary | ICD-10-CM

## 2025-06-23 PROCEDURE — 99214 OFFICE O/P EST MOD 30 MIN: CPT | Performed by: STUDENT IN AN ORGANIZED HEALTH CARE EDUCATION/TRAINING PROGRAM

## 2025-06-23 PROCEDURE — 20610 DRAIN/INJ JOINT/BURSA W/O US: CPT | Performed by: STUDENT IN AN ORGANIZED HEALTH CARE EDUCATION/TRAINING PROGRAM

## 2025-06-23 RX ORDER — TRIAMCINOLONE ACETONIDE 40 MG/ML
40 INJECTION, SUSPENSION INTRA-ARTICULAR; INTRAMUSCULAR
Status: COMPLETED | OUTPATIENT
Start: 2025-06-23 | End: 2025-06-23

## 2025-06-23 RX ORDER — BUPIVACAINE HYDROCHLORIDE 5 MG/ML
3.5 INJECTION, SOLUTION PERINEURAL
Status: COMPLETED | OUTPATIENT
Start: 2025-06-23 | End: 2025-06-23

## 2025-06-23 RX ADMIN — TRIAMCINOLONE ACETONIDE 40 MG: 40 INJECTION, SUSPENSION INTRA-ARTICULAR; INTRAMUSCULAR at 09:45

## 2025-06-23 RX ADMIN — BUPIVACAINE HYDROCHLORIDE 3.5 ML: 5 INJECTION, SOLUTION PERINEURAL at 09:45

## 2025-06-23 NOTE — ASSESSMENT & PLAN NOTE
Celina Collins is a 59 y.o. female who presents for follow up regarding left shoulder pain due to shoulder arthritis. We had a thorough discussion of the natural history of osteoarthritis and treatment options, both non-operative and operative.      In regards to non-operative management we discussed the role of anti-inflammatory medications to reduce pain and inflammation. We discussed physical therapy and its role in helping maintain strength and motion. Additionally we discussed corticosteroid injections which can also help decrease pain and inflammation. We reiterated that none of these modalities actually treat the pathology specifically, however, just provide symptomatic management.      We also discussed surgical treatment with shoulder arthroplasty, both anatomic and reverse. We had a discussion about the benefits of surgical management specifically with pain relief. We discussed the risks of surgical management including, infection, periprosthetic joint infection, dislocation, and the need for revision surgery.      Fortunately the patient is not a candidate for shoulder arthroplasty given her BMI of nearly 50.  We discussed weight management options and placed a referral to the weight management clinic.  The patient wishes to have a corticosteroid injection today which was provided for her. Patient is to follow up as needed for worsening or unimproved pain.

## 2025-06-23 NOTE — PROGRESS NOTES
Assessment & Plan  Post-traumatic osteoarthritis of left shoulder  Celina Collins is a 59 y.o. female who presents for follow up regarding left shoulder pain due to shoulder arthritis. We had a thorough discussion of the natural history of osteoarthritis and treatment options, both non-operative and operative.      In regards to non-operative management we discussed the role of anti-inflammatory medications to reduce pain and inflammation. We discussed physical therapy and its role in helping maintain strength and motion. Additionally we discussed corticosteroid injections which can also help decrease pain and inflammation. We reiterated that none of these modalities actually treat the pathology specifically, however, just provide symptomatic management.      We also discussed surgical treatment with shoulder arthroplasty, both anatomic and reverse. We had a discussion about the benefits of surgical management specifically with pain relief. We discussed the risks of surgical management including, infection, periprosthetic joint infection, dislocation, and the need for revision surgery.      Fortunately the patient is not a candidate for shoulder arthroplasty given her BMI of nearly 50.  We discussed weight management options and placed a referral to the weight management clinic.  The patient wishes to have a corticosteroid injection today which was provided for her. Patient is to follow up as needed for worsening or unimproved pain.       General  Subjective    Celina Collins is a right hand dominant 60 y.o. female who presents for follow-up of left shoulder pain.    Patient was last seen on 12/23/2024 and diagnosed with post traumatic osteoarthritis. Since the last visit she has been doing alright. She received CSI last visit that lasted 2.5 months. States she did fall a few weeks ago, and that aggravated the right shoulder. States she might need a CSI today.         Objective      Vitals:    06/23/25 0928   Pulse: 76    Resp: 16   Temp: 97.6 °F (36.4 °C)   SpO2: 99%     Body mass index is 49.66 kg/m².  Physical Exam    Shoulder Exam    Affected shoulder:   right    Skin: Without ecchymosis, wounds or prior incisions    Tenderness:  anterior joint    Range of Motion (active/passive):  Side Active (FE/ER/IR) Passive (FE/ER/IR)   left 90/10/back pocket 90/10   right 160/40/t spine 160/40       Rotator Cuff Strength:  Side Supraspinatus Infraspinatus/Teres Subscapularis   left 4/5 4/5 4/5   right 5/5 5/5 5/5       Impingement and Rotator Cuff Exam:  Neer's test for impingement Positive   Collins' test for impingement Positive   Carmella's test With pain   Belly Press With pain     Biceps Exam:  Leelanau's test for SLAP tear: Deferred   Jergeson's test for biciptal pain: Positive   Speeds test for biciptal pain: Positive       Distally the patient's neurovascular status is normal.        Follow Up: Return if symptoms worsen or fail to improve.    All questions answered and patient agrees with plan.    Large joint arthrocentesis: R subacromial bursa    Performed by: Jason Ashton MD  Authorized by: Jason Ashton MD    Universal Protocol:  Consent: Verbal consent obtained  Consent given by: patient  Patient understanding: patient states understanding of the procedure being performed  Supporting Documentation  Indications: pain     Is this a Visco injection? NoProcedure Details  Location: shoulder - R subacromial bursa  Needle size: 22 G  Ultrasound guidance: no  Approach: posterior  Medications administered: 3.5 mL bupivacaine 0.5 %; 40 mg triamcinolone acetonide 40 mg/mL    Patient tolerance: patient tolerated the procedure well with no immediate complications  Dressing:  Sterile dressing applied          Scribe Attestation      I,:  Zak De La Rosa PA-C am acting as a scribe while in the presence of the attending physician.:       I,:  Jason Ashton MD personally performed the services described in this documentation     as scribed in my presence.:

## 2025-06-26 ENCOUNTER — APPOINTMENT (OUTPATIENT)
Dept: LAB | Facility: MEDICAL CENTER | Age: 60
End: 2025-06-26
Attending: PHYSICIAN ASSISTANT
Payer: COMMERCIAL

## 2025-06-26 DIAGNOSIS — Z86.711 HISTORY OF PULMONARY EMBOLUS (PE): ICD-10-CM

## 2025-06-26 LAB
INR PPP: 2.44 (ref 0.85–1.19)
PROTHROMBIN TIME: 26.4 SECONDS (ref 12.3–15)

## 2025-06-26 PROCEDURE — 85610 PROTHROMBIN TIME: CPT

## 2025-06-26 PROCEDURE — 36415 COLL VENOUS BLD VENIPUNCTURE: CPT

## 2025-06-27 ENCOUNTER — ANTICOAG VISIT (OUTPATIENT)
Dept: FAMILY MEDICINE CLINIC | Facility: CLINIC | Age: 60
End: 2025-06-27

## 2025-07-15 ENCOUNTER — HOSPITAL ENCOUNTER (OUTPATIENT)
Dept: NON INVASIVE DIAGNOSTICS | Facility: HOSPITAL | Age: 60
Discharge: HOME | End: 2025-07-15
Attending: PHYSICIAN ASSISTANT
Payer: COMMERCIAL

## 2025-07-15 ENCOUNTER — OFFICE VISIT (OUTPATIENT)
Dept: FAMILY MEDICINE CLINIC | Facility: CLINIC | Age: 60
End: 2025-07-15
Payer: COMMERCIAL

## 2025-07-15 VITALS
WEIGHT: 293 LBS | BODY MASS INDEX: 48.82 KG/M2 | HEART RATE: 45 BPM | DIASTOLIC BLOOD PRESSURE: 80 MMHG | TEMPERATURE: 97.2 F | OXYGEN SATURATION: 96 % | RESPIRATION RATE: 18 BRPM | HEIGHT: 65 IN | SYSTOLIC BLOOD PRESSURE: 130 MMHG

## 2025-07-15 DIAGNOSIS — J30.9 ALLERGIC RHINITIS, UNSPECIFIED SEASONALITY, UNSPECIFIED TRIGGER: ICD-10-CM

## 2025-07-15 DIAGNOSIS — R22.33 ARM MASS, BILATERAL: ICD-10-CM

## 2025-07-15 DIAGNOSIS — E78.5 DYSLIPIDEMIA: ICD-10-CM

## 2025-07-15 DIAGNOSIS — I26.99 PULMONARY EMBOLISM (HCC): ICD-10-CM

## 2025-07-15 DIAGNOSIS — J31.0 RHINITIS, UNSPECIFIED TYPE: ICD-10-CM

## 2025-07-15 DIAGNOSIS — Z00.00 ANNUAL PHYSICAL EXAM: Primary | ICD-10-CM

## 2025-07-15 DIAGNOSIS — T78.3XXD ANGIOEDEMA, SUBSEQUENT ENCOUNTER: ICD-10-CM

## 2025-07-15 DIAGNOSIS — Z53.20 COLON CANCER SCREENING DECLINED: ICD-10-CM

## 2025-07-15 DIAGNOSIS — J30.1 SEASONAL ALLERGIC RHINITIS DUE TO POLLEN: ICD-10-CM

## 2025-07-15 PROCEDURE — 99396 PREV VISIT EST AGE 40-64: CPT | Performed by: PHYSICIAN ASSISTANT

## 2025-07-15 PROCEDURE — 99214 OFFICE O/P EST MOD 30 MIN: CPT | Performed by: PHYSICIAN ASSISTANT

## 2025-07-15 PROCEDURE — 93970 EXTREMITY STUDY: CPT

## 2025-07-15 RX ORDER — LORATADINE 10 MG/1
10 TABLET ORAL DAILY
Qty: 90 TABLET | Refills: 0 | Status: SHIPPED | OUTPATIENT
Start: 2025-07-15

## 2025-07-15 RX ORDER — ATORVASTATIN CALCIUM 10 MG/1
10 TABLET, FILM COATED ORAL
Qty: 90 TABLET | Refills: 3 | Status: SHIPPED | OUTPATIENT
Start: 2025-07-15

## 2025-07-15 RX ORDER — FLUTICASONE PROPIONATE 50 MCG
1 SPRAY, SUSPENSION (ML) NASAL DAILY
Qty: 9.9 ML | Refills: 2 | Status: SHIPPED | OUTPATIENT
Start: 2025-07-15

## 2025-07-15 RX ORDER — AZELASTINE 1 MG/ML
1 SPRAY, METERED NASAL 2 TIMES DAILY
Qty: 1 ML | Refills: 5 | Status: SHIPPED | OUTPATIENT
Start: 2025-07-15

## 2025-07-15 RX ORDER — MONTELUKAST SODIUM 10 MG/1
10 TABLET ORAL
Qty: 90 TABLET | Refills: 0 | Status: SHIPPED | OUTPATIENT
Start: 2025-07-15

## 2025-07-15 RX ORDER — WARFARIN SODIUM 5 MG/1
10 TABLET ORAL
Qty: 180 TABLET | Refills: 1 | Status: SHIPPED | OUTPATIENT
Start: 2025-07-15

## 2025-07-16 PROCEDURE — 93970 EXTREMITY STUDY: CPT | Performed by: STUDENT IN AN ORGANIZED HEALTH CARE EDUCATION/TRAINING PROGRAM

## 2025-07-29 ENCOUNTER — TELEPHONE (OUTPATIENT)
Age: 60
End: 2025-07-29

## 2025-07-29 DIAGNOSIS — Z86.711 HISTORY OF PULMONARY EMBOLUS (PE): Primary | ICD-10-CM

## 2025-07-31 ENCOUNTER — APPOINTMENT (OUTPATIENT)
Dept: LAB | Facility: MEDICAL CENTER | Age: 60
End: 2025-07-31
Attending: PHYSICIAN ASSISTANT
Payer: COMMERCIAL

## 2025-07-31 ENCOUNTER — OFFICE VISIT (OUTPATIENT)
Dept: FAMILY MEDICINE CLINIC | Facility: CLINIC | Age: 60
End: 2025-07-31
Payer: COMMERCIAL

## 2025-07-31 VITALS
BODY MASS INDEX: 48.82 KG/M2 | WEIGHT: 293 LBS | OXYGEN SATURATION: 96 % | SYSTOLIC BLOOD PRESSURE: 130 MMHG | HEIGHT: 65 IN | HEART RATE: 77 BPM | TEMPERATURE: 97.5 F | DIASTOLIC BLOOD PRESSURE: 78 MMHG | RESPIRATION RATE: 18 BRPM

## 2025-07-31 DIAGNOSIS — Z12.11 SCREENING FOR COLORECTAL CANCER: ICD-10-CM

## 2025-07-31 DIAGNOSIS — L30.9 DERMATITIS: Primary | ICD-10-CM

## 2025-07-31 DIAGNOSIS — Z12.12 SCREENING FOR COLORECTAL CANCER: ICD-10-CM

## 2025-07-31 DIAGNOSIS — Z86.711 HISTORY OF PULMONARY EMBOLUS (PE): ICD-10-CM

## 2025-07-31 PROBLEM — R06.09 DYSPNEA ON EXERTION: Status: RESOLVED | Noted: 2024-09-26 | Resolved: 2025-07-31

## 2025-07-31 PROBLEM — R93.89 ABNORMAL CT OF THE CHEST: Status: RESOLVED | Noted: 2020-05-03 | Resolved: 2025-07-31

## 2025-07-31 PROBLEM — I82.90 NON-OCCLUSIVE THROMBUS: Status: RESOLVED | Noted: 2018-12-04 | Resolved: 2025-07-31

## 2025-07-31 PROBLEM — I26.99 PULMONARY EMBOLISM (HCC): Status: RESOLVED | Noted: 2024-07-11 | Resolved: 2025-07-31

## 2025-07-31 PROBLEM — S93.491A SPRAIN OF ANTERIOR TALOFIBULAR LIGAMENT OF RIGHT ANKLE, INITIAL ENCOUNTER: Status: RESOLVED | Noted: 2025-04-09 | Resolved: 2025-07-31

## 2025-07-31 PROBLEM — Z98.890 HISTORY OF SHOULDER SURGERY: Status: RESOLVED | Noted: 2022-08-10 | Resolved: 2025-07-31

## 2025-07-31 PROBLEM — K46.0 INCARCERATED HERNIA: Status: RESOLVED | Noted: 2023-09-18 | Resolved: 2025-07-31

## 2025-07-31 LAB
INR PPP: 2.79 (ref 0.85–1.19)
PROTHROMBIN TIME: 29.2 SECONDS (ref 12.3–15)

## 2025-07-31 PROCEDURE — 99213 OFFICE O/P EST LOW 20 MIN: CPT | Performed by: PHYSICIAN ASSISTANT

## 2025-07-31 PROCEDURE — 36415 COLL VENOUS BLD VENIPUNCTURE: CPT

## 2025-07-31 PROCEDURE — 85610 PROTHROMBIN TIME: CPT

## 2025-07-31 RX ORDER — TRIAMCINOLONE ACETONIDE 1 MG/G
CREAM TOPICAL 2 TIMES DAILY
Qty: 45 G | Refills: 0 | Status: SHIPPED | OUTPATIENT
Start: 2025-07-31

## 2025-08-01 ENCOUNTER — ANTICOAG VISIT (OUTPATIENT)
Dept: FAMILY MEDICINE CLINIC | Facility: CLINIC | Age: 60
End: 2025-08-01

## 2025-08-14 ENCOUNTER — TELEPHONE (OUTPATIENT)
Dept: GASTROENTEROLOGY | Facility: CLINIC | Age: 60
End: 2025-08-14

## (undated) DEVICE — NEEDLE 25G X 1 1/2

## (undated) DEVICE — SWABSTCK, BENZOIN TINCTURE, 1/PK, STRL: Brand: APLICARE

## (undated) DEVICE — GAUZE SPONGES,16 PLY: Brand: CURITY

## (undated) DEVICE — BULB SYRINGE,IRRIGATION WITH PROTECTIVE CAP: Brand: DOVER

## (undated) DEVICE — SUT PDS II 1 CT-1 27 IN Z347H

## (undated) DEVICE — DRAPE EQUIPMENT RF WAND

## (undated) DEVICE — GLOVE INDICATOR PI UNDERGLOVE SZ 7 BLUE

## (undated) DEVICE — GLOVE INDICATOR PI UNDERGLOVE SZ 7.5 BLUE

## (undated) DEVICE — 3M™ TEGADERM™ TRANSPARENT FILM DRESSING FRAME STYLE, 1626W, 4 IN X 4-3/4 IN (10 CM X 12 CM), 50/CT 4CT/CASE: Brand: 3M™ TEGADERM™

## (undated) DEVICE — SUT MONOCRYL 4-0 PS-2 27 IN Y426H

## (undated) DEVICE — SYRINGE 10ML LL

## (undated) DEVICE — BETHLEHEM UNIVERSAL MINOR GEN: Brand: CARDINAL HEALTH

## (undated) DEVICE — SUT VICRYL 3-0 SH 27 IN J416H

## (undated) DEVICE — PLUMEPEN PRO 10FT

## (undated) DEVICE — TUBING SUCTION 5MM X 12 FT

## (undated) DEVICE — GLOVE SRG BIOGEL ECLIPSE 7

## (undated) DEVICE — INTENDED FOR TISSUE SEPARATION, AND OTHER PROCEDURES THAT REQUIRE A SHARP SURGICAL BLADE TO PUNCTURE OR CUT.: Brand: BARD-PARKER SAFETY BLADES SIZE 15, STERILE

## (undated) DEVICE — PACK UNIVERSAL NECK

## (undated) DEVICE — TIBURON SPLIT SHEET: Brand: CONVERTORS

## (undated) DEVICE — CHLORAPREP HI-LITE 26ML ORANGE

## (undated) DEVICE — MEDI-VAC YANK SUCT HNDL W/TPRD BULBOUS TIP: Brand: CARDINAL HEALTH

## (undated) DEVICE — GLOVE SRG BIOGEL 7.5

## (undated) DEVICE — 3M™ STERI-STRIP™ REINFORCED ADHESIVE SKIN CLOSURES, R1546, 1/4 IN X 4 IN (6 MM X 100 MM), 10 STRIPS/ENVELOPE: Brand: 3M™ STERI-STRIP™